# Patient Record
Sex: FEMALE | Race: WHITE | NOT HISPANIC OR LATINO | Employment: OTHER | URBAN - NONMETROPOLITAN AREA
[De-identification: names, ages, dates, MRNs, and addresses within clinical notes are randomized per-mention and may not be internally consistent; named-entity substitution may affect disease eponyms.]

---

## 2017-01-25 ENCOUNTER — TRANSCRIBE ORDERS (OUTPATIENT)
Dept: ADMINISTRATIVE | Facility: HOSPITAL | Age: 62
End: 2017-01-25

## 2017-01-25 ENCOUNTER — APPOINTMENT (OUTPATIENT)
Dept: LAB | Facility: HOSPITAL | Age: 62
End: 2017-01-25
Attending: FAMILY MEDICINE

## 2017-01-25 DIAGNOSIS — I10 ESSENTIAL HYPERTENSION, MALIGNANT: ICD-10-CM

## 2017-01-25 DIAGNOSIS — E11.9 DIABETES MELLITUS WITHOUT COMPLICATION (HCC): Primary | ICD-10-CM

## 2017-01-25 LAB
ANION GAP SERPL CALCULATED.3IONS-SCNC: 10 MMOL/L (ref 4–13)
BUN BLD-MCNC: 14 MG/DL (ref 5–21)
BUN/CREAT SERPL: 25.5 (ref 7–25)
CALCIUM SPEC-SCNC: 9 MG/DL (ref 8.4–10.4)
CHLORIDE SERPL-SCNC: 101 MMOL/L (ref 98–110)
CO2 SERPL-SCNC: 28 MMOL/L (ref 24–31)
CREAT BLD-MCNC: 0.55 MG/DL (ref 0.5–1.4)
GFR SERPL CREATININE-BSD FRML MDRD: 113 ML/MIN/1.73
GLUCOSE BLD-MCNC: 118 MG/DL (ref 70–100)
HBA1C MFR BLD: 8.1 %
POTASSIUM BLD-SCNC: 4 MMOL/L (ref 3.5–5.3)
SODIUM BLD-SCNC: 139 MMOL/L (ref 135–145)

## 2017-01-25 PROCEDURE — 80048 BASIC METABOLIC PNL TOTAL CA: CPT | Performed by: FAMILY MEDICINE

## 2017-01-25 PROCEDURE — 83036 HEMOGLOBIN GLYCOSYLATED A1C: CPT | Performed by: FAMILY MEDICINE

## 2017-01-25 PROCEDURE — 36415 COLL VENOUS BLD VENIPUNCTURE: CPT | Performed by: FAMILY MEDICINE

## 2017-02-24 ENCOUNTER — LAB REQUISITION (OUTPATIENT)
Dept: LAB | Facility: HOSPITAL | Age: 62
End: 2017-02-24

## 2017-02-24 ENCOUNTER — HOSPITAL ENCOUNTER (OUTPATIENT)
Dept: GENERAL RADIOLOGY | Facility: HOSPITAL | Age: 62
Discharge: HOME | End: 2017-02-24
Attending: FAMILY MEDICINE | Admitting: FAMILY MEDICINE

## 2017-02-24 ENCOUNTER — TRANSCRIBE ORDERS (OUTPATIENT)
Dept: ADMINISTRATIVE | Facility: HOSPITAL | Age: 62
End: 2017-02-24

## 2017-02-24 DIAGNOSIS — R10.9 ABDOMINAL PAIN, UNSPECIFIED LOCATION: Primary | ICD-10-CM

## 2017-02-24 DIAGNOSIS — R10.9 ABDOMINAL PAIN: ICD-10-CM

## 2017-02-24 DIAGNOSIS — R10.9 ABDOMINAL PAIN, UNSPECIFIED LOCATION: ICD-10-CM

## 2017-02-24 PROCEDURE — 87086 URINE CULTURE/COLONY COUNT: CPT | Performed by: FAMILY MEDICINE

## 2017-02-24 PROCEDURE — 87088 URINE BACTERIA CULTURE: CPT | Performed by: FAMILY MEDICINE

## 2017-02-24 PROCEDURE — 74020 HC XR ABDOMEN FLAT & UPRIGHT: CPT

## 2017-02-24 PROCEDURE — 87186 SC STD MICRODIL/AGAR DIL: CPT | Performed by: FAMILY MEDICINE

## 2017-02-26 LAB — BACTERIA SPEC AEROBE CULT: ABNORMAL

## 2017-03-07 ENCOUNTER — HOSPITAL ENCOUNTER (OUTPATIENT)
Dept: GENERAL RADIOLOGY | Facility: HOSPITAL | Age: 62
Discharge: HOME | End: 2017-03-07
Attending: FAMILY MEDICINE | Admitting: FAMILY MEDICINE

## 2017-03-07 ENCOUNTER — TRANSCRIBE ORDERS (OUTPATIENT)
Dept: ADMINISTRATIVE | Facility: HOSPITAL | Age: 62
End: 2017-03-07

## 2017-03-07 DIAGNOSIS — J18.9 PNEUMONIA DUE TO INFECTIOUS ORGANISM, UNSPECIFIED LATERALITY, UNSPECIFIED PART OF LUNG: Primary | ICD-10-CM

## 2017-03-07 DIAGNOSIS — J18.9 PNEUMONIA DUE TO INFECTIOUS ORGANISM, UNSPECIFIED LATERALITY, UNSPECIFIED PART OF LUNG: ICD-10-CM

## 2017-03-07 PROCEDURE — 71020 HC CHEST PA AND LATERAL: CPT

## 2017-03-17 ENCOUNTER — APPOINTMENT (OUTPATIENT)
Dept: CARDIOLOGY | Facility: HOSPITAL | Age: 62
End: 2017-03-17
Attending: EMERGENCY MEDICINE

## 2017-03-17 ENCOUNTER — APPOINTMENT (OUTPATIENT)
Dept: CT IMAGING | Facility: HOSPITAL | Age: 62
End: 2017-03-17

## 2017-03-17 ENCOUNTER — HOSPITAL ENCOUNTER (EMERGENCY)
Facility: HOSPITAL | Age: 62
Discharge: HOME | End: 2017-03-17
Attending: EMERGENCY MEDICINE | Admitting: EMERGENCY MEDICINE

## 2017-03-17 ENCOUNTER — APPOINTMENT (OUTPATIENT)
Dept: GENERAL RADIOLOGY | Facility: HOSPITAL | Age: 62
End: 2017-03-17

## 2017-03-17 VITALS
RESPIRATION RATE: 12 BRPM | TEMPERATURE: 97.7 F | WEIGHT: 207 LBS | DIASTOLIC BLOOD PRESSURE: 75 MMHG | HEIGHT: 64 IN | HEART RATE: 72 BPM | BODY MASS INDEX: 35.34 KG/M2 | OXYGEN SATURATION: 94 % | SYSTOLIC BLOOD PRESSURE: 132 MMHG

## 2017-03-17 DIAGNOSIS — R07.89 CHEST WALL PAIN: Primary | ICD-10-CM

## 2017-03-17 LAB
ALBUMIN SERPL-MCNC: 3.6 G/DL (ref 3.5–5)
ALBUMIN/GLOB SERPL: 1.3 G/DL (ref 1.1–2.5)
ALP SERPL-CCNC: 73 U/L (ref 24–120)
ALT SERPL W P-5'-P-CCNC: 20 U/L (ref 0–54)
ANION GAP SERPL CALCULATED.3IONS-SCNC: 10 MMOL/L (ref 4–13)
AST SERPL-CCNC: 24 U/L (ref 7–45)
BASOPHILS # BLD AUTO: 0.02 10*3/MM3 (ref 0–0.2)
BASOPHILS NFR BLD AUTO: 0.1 % (ref 0–2)
BH CV STRESS BP STAGE 1: NORMAL
BH CV STRESS BP STAGE 2: NORMAL
BH CV STRESS DOSE DOBUTAMINE STAGE 1: 10
BH CV STRESS DOSE DOBUTAMINE STAGE 2: 20
BH CV STRESS DOSE DOBUTAMINE STAGE 3: 30
BH CV STRESS DURATION MIN STAGE 1: 3
BH CV STRESS DURATION MIN STAGE 2: 3
BH CV STRESS DURATION MIN STAGE 3: 2
BH CV STRESS DURATION SEC STAGE 1: 0
BH CV STRESS DURATION SEC STAGE 2: 0
BH CV STRESS DURATION SEC STAGE 3: 0
BH CV STRESS HR STAGE 1: 85
BH CV STRESS HR STAGE 2: 117
BH CV STRESS HR STAGE 3: 153
BH CV STRESS PROTOCOL 1: NORMAL
BH CV STRESS RECOVERY BP: NORMAL MMHG
BH CV STRESS RECOVERY HR: 78 BPM
BH CV STRESS STAGE 1: 1
BH CV STRESS STAGE 2: 2
BH CV STRESS STAGE 3: 3
BILIRUB SERPL-MCNC: 0.9 MG/DL (ref 0.1–1)
BUN BLD-MCNC: 19 MG/DL (ref 5–21)
BUN/CREAT SERPL: 38.8 (ref 7–25)
CALCIUM SPEC-SCNC: 8.8 MG/DL (ref 8.4–10.4)
CHLORIDE SERPL-SCNC: 104 MMOL/L (ref 98–110)
CO2 SERPL-SCNC: 25 MMOL/L (ref 24–31)
CREAT BLD-MCNC: 0.49 MG/DL (ref 0.5–1.4)
D DIMER PPP FEU-MCNC: 1.44 MG/L (FEU) (ref 0–0.5)
DEPRECATED RDW RBC AUTO: 54.2 FL (ref 40–54)
EOSINOPHIL # BLD AUTO: 0.04 10*3/MM3 (ref 0–0.7)
EOSINOPHIL NFR BLD AUTO: 0.3 % (ref 0–4)
ERYTHROCYTE [DISTWIDTH] IN BLOOD BY AUTOMATED COUNT: 15.7 % (ref 12–15)
GFR SERPL CREATININE-BSD FRML MDRD: 129 ML/MIN/1.73
GLOBULIN UR ELPH-MCNC: 2.8 GM/DL
GLUCOSE BLD-MCNC: 231 MG/DL (ref 70–100)
HCT VFR BLD AUTO: 39.8 % (ref 37–47)
HGB BLD-MCNC: 12.8 G/DL (ref 12–16)
HOLD SPECIMEN: NORMAL
HOLD SPECIMEN: NORMAL
IMM GRANULOCYTES # BLD: 0.06 10*3/MM3 (ref 0–0.03)
IMM GRANULOCYTES NFR BLD: 0.4 % (ref 0–5)
LYMPHOCYTES # BLD AUTO: 1.27 10*3/MM3 (ref 0.72–4.86)
LYMPHOCYTES NFR BLD AUTO: 8.3 % (ref 15–45)
MAXIMAL PREDICTED HEART RATE: 160 BPM
MCH RBC QN AUTO: 30.6 PG (ref 28–32)
MCHC RBC AUTO-ENTMCNC: 32.2 G/DL (ref 33–36)
MCV RBC AUTO: 95.2 FL (ref 82–98)
MONOCYTES # BLD AUTO: 0.68 10*3/MM3 (ref 0.19–1.3)
MONOCYTES NFR BLD AUTO: 4.4 % (ref 4–12)
NEUTROPHILS # BLD AUTO: 13.26 10*3/MM3 (ref 1.87–8.4)
NEUTROPHILS NFR BLD AUTO: 86.5 % (ref 39–78)
NT-PROBNP SERPL-MCNC: 295 PG/ML (ref 0–900)
PERCENT MAX PREDICTED HR: 95.63 %
PLATELET # BLD AUTO: 193 10*3/MM3 (ref 130–400)
PMV BLD AUTO: 12 FL (ref 6–12)
POTASSIUM BLD-SCNC: 4 MMOL/L (ref 3.5–5.3)
PROT SERPL-MCNC: 6.4 G/DL (ref 6.3–8.7)
RBC # BLD AUTO: 4.18 10*6/MM3 (ref 4.2–5.4)
SODIUM BLD-SCNC: 139 MMOL/L (ref 135–145)
STRESS BASELINE BP: NORMAL MMHG
STRESS BASELINE HR: 70 BPM
STRESS PERCENT HR: 113 %
STRESS POST PEAK BP: NORMAL MMHG
STRESS POST PEAK HR: 153 BPM
STRESS TARGET HR: 136 BPM
TROPONIN I SERPL-MCNC: 0 NG/ML (ref 0–0.07)
WBC NRBC COR # BLD: 15.33 10*3/MM3 (ref 4.8–10.8)
WHOLE BLOOD HOLD SPECIMEN: NORMAL
WHOLE BLOOD HOLD SPECIMEN: NORMAL

## 2017-03-17 PROCEDURE — 96375 TX/PRO/DX INJ NEW DRUG ADDON: CPT

## 2017-03-17 PROCEDURE — 96374 THER/PROPH/DIAG INJ IV PUSH: CPT

## 2017-03-17 PROCEDURE — 93010 ELECTROCARDIOGRAM REPORT: CPT | Performed by: INTERNAL MEDICINE

## 2017-03-17 PROCEDURE — 93350 STRESS TTE ONLY: CPT | Performed by: INTERNAL MEDICINE

## 2017-03-17 PROCEDURE — 25010000003 DOBUTAMINE PER 250 MG: Performed by: INTERNAL MEDICINE

## 2017-03-17 PROCEDURE — 93352 ADMIN ECG CONTRAST AGENT: CPT | Performed by: INTERNAL MEDICINE

## 2017-03-17 PROCEDURE — 93017 CV STRESS TEST TRACING ONLY: CPT

## 2017-03-17 PROCEDURE — 25010000002 PERFLUTREN (DEFINITY) 8.476 MG IN SODIUM CHLORIDE 10 ML INJECTION: Performed by: INTERNAL MEDICINE

## 2017-03-17 PROCEDURE — 99284 EMERGENCY DEPT VISIT MOD MDM: CPT

## 2017-03-17 PROCEDURE — 0 IOPAMIDOL PER 1 ML: Performed by: EMERGENCY MEDICINE

## 2017-03-17 PROCEDURE — 80053 COMPREHEN METABOLIC PANEL: CPT | Performed by: EMERGENCY MEDICINE

## 2017-03-17 PROCEDURE — 93005 ELECTROCARDIOGRAM TRACING: CPT | Performed by: EMERGENCY MEDICINE

## 2017-03-17 PROCEDURE — 25010000002 ONDANSETRON PER 1 MG: Performed by: EMERGENCY MEDICINE

## 2017-03-17 PROCEDURE — 25010000002 MORPHINE PER 10 MG: Performed by: EMERGENCY MEDICINE

## 2017-03-17 PROCEDURE — 83880 ASSAY OF NATRIURETIC PEPTIDE: CPT | Performed by: EMERGENCY MEDICINE

## 2017-03-17 PROCEDURE — 84484 ASSAY OF TROPONIN QUANT: CPT

## 2017-03-17 PROCEDURE — 71275 CT ANGIOGRAPHY CHEST: CPT

## 2017-03-17 PROCEDURE — C8928 TTE W OR W/O FOL W/CON,STRES: HCPCS

## 2017-03-17 PROCEDURE — 85025 COMPLETE CBC W/AUTO DIFF WBC: CPT | Performed by: EMERGENCY MEDICINE

## 2017-03-17 PROCEDURE — 85379 FIBRIN DEGRADATION QUANT: CPT | Performed by: EMERGENCY MEDICINE

## 2017-03-17 PROCEDURE — 93018 CV STRESS TEST I&R ONLY: CPT | Performed by: INTERNAL MEDICINE

## 2017-03-17 PROCEDURE — 71010 HC CHEST PA OR AP: CPT

## 2017-03-17 RX ORDER — SODIUM CHLORIDE 0.9 % (FLUSH) 0.9 %
10 SYRINGE (ML) INJECTION AS NEEDED
Status: DISCONTINUED | OUTPATIENT
Start: 2017-03-17 | End: 2017-03-17 | Stop reason: HOSPADM

## 2017-03-17 RX ORDER — METOPROLOL TARTRATE 5 MG/5ML
5 INJECTION INTRAVENOUS ONCE
Status: COMPLETED | OUTPATIENT
Start: 2017-03-17 | End: 2017-03-17

## 2017-03-17 RX ORDER — HYDROCODONE BITARTRATE AND ACETAMINOPHEN 7.5; 325 MG/1; MG/1
1 TABLET ORAL EVERY 4 HOURS PRN
Qty: 15 TABLET | Refills: 0 | Status: SHIPPED | OUTPATIENT
Start: 2017-03-17 | End: 2017-03-30

## 2017-03-17 RX ORDER — ONDANSETRON 2 MG/ML
4 INJECTION INTRAMUSCULAR; INTRAVENOUS ONCE
Status: COMPLETED | OUTPATIENT
Start: 2017-03-17 | End: 2017-03-17

## 2017-03-17 RX ORDER — LOSARTAN POTASSIUM 25 MG/1
25 TABLET ORAL DAILY
COMMUNITY
End: 2017-09-28 | Stop reason: SDUPTHER

## 2017-03-17 RX ORDER — METHYLPREDNISOLONE 4 MG/1
TABLET ORAL
Qty: 21 TABLET | Refills: 0 | Status: SHIPPED | OUTPATIENT
Start: 2017-03-17 | End: 2017-03-30

## 2017-03-17 RX ORDER — NITROGLYCERIN 0.4 MG/1
0.4 TABLET SUBLINGUAL
COMMUNITY

## 2017-03-17 RX ORDER — MORPHINE SULFATE 4 MG/ML
4 INJECTION, SOLUTION INTRAMUSCULAR; INTRAVENOUS ONCE
Status: COMPLETED | OUTPATIENT
Start: 2017-03-17 | End: 2017-03-17

## 2017-03-17 RX ORDER — SIMVASTATIN 40 MG
40 TABLET ORAL NIGHTLY
COMMUNITY
End: 2017-12-21 | Stop reason: SINTOL

## 2017-03-17 RX ORDER — DOBUTAMINE HYDROCHLORIDE 100 MG/100ML
10-50 INJECTION INTRAVENOUS
Status: DISCONTINUED | OUTPATIENT
Start: 2017-03-17 | End: 2017-03-17 | Stop reason: HOSPADM

## 2017-03-17 RX ADMIN — METOPROLOL TARTRATE 5 MG: 5 INJECTION, SOLUTION INTRAVENOUS at 16:55

## 2017-03-17 RX ADMIN — DOBUTAMINE IN DEXTROSE 10 MCG/KG/MIN: 100 INJECTION, SOLUTION INTRAVENOUS at 16:42

## 2017-03-17 RX ADMIN — MORPHINE SULFATE 4 MG: 4 INJECTION, SOLUTION INTRAMUSCULAR; INTRAVENOUS at 15:29

## 2017-03-17 RX ADMIN — SODIUM CHLORIDE 5 ML: 9 INJECTION INTRAMUSCULAR; INTRAVENOUS; SUBCUTANEOUS at 16:47

## 2017-03-17 RX ADMIN — SODIUM CHLORIDE 5 ML: 9 INJECTION INTRAMUSCULAR; INTRAVENOUS; SUBCUTANEOUS at 16:31

## 2017-03-17 RX ADMIN — ONDANSETRON HYDROCHLORIDE 4 MG: 2 SOLUTION INTRAMUSCULAR; INTRAVENOUS at 15:30

## 2017-03-17 RX ADMIN — IOPAMIDOL 150 ML: 755 INJECTION, SOLUTION INTRAVENOUS at 14:37

## 2017-03-19 ENCOUNTER — HOSPITAL ENCOUNTER (EMERGENCY)
Facility: HOSPITAL | Age: 62
Discharge: HOME | End: 2017-03-19
Admitting: EMERGENCY MEDICINE

## 2017-03-19 VITALS
OXYGEN SATURATION: 96 % | HEART RATE: 74 BPM | BODY MASS INDEX: 35.34 KG/M2 | DIASTOLIC BLOOD PRESSURE: 55 MMHG | WEIGHT: 207 LBS | HEIGHT: 64 IN | TEMPERATURE: 98 F | SYSTOLIC BLOOD PRESSURE: 127 MMHG | RESPIRATION RATE: 18 BRPM

## 2017-03-19 DIAGNOSIS — Z92.241 HISTORY OF SYSTEMIC STEROID THERAPY: ICD-10-CM

## 2017-03-19 DIAGNOSIS — I47.9 PAROXYSMAL TACHYCARDIA (HCC): Primary | ICD-10-CM

## 2017-03-19 LAB
ALBUMIN SERPL-MCNC: 3.6 G/DL (ref 3.5–5)
ALBUMIN/GLOB SERPL: 1.3 G/DL (ref 1.1–2.5)
ALP SERPL-CCNC: 77 U/L (ref 24–120)
ALT SERPL W P-5'-P-CCNC: 27 U/L (ref 0–54)
ANION GAP SERPL CALCULATED.3IONS-SCNC: 12 MMOL/L (ref 4–13)
AST SERPL-CCNC: 17 U/L (ref 7–45)
BASOPHILS # BLD AUTO: 0.01 10*3/MM3 (ref 0–0.2)
BASOPHILS NFR BLD AUTO: 0.1 % (ref 0–2)
BILIRUB SERPL-MCNC: 0.7 MG/DL (ref 0.1–1)
BUN BLD-MCNC: 16 MG/DL (ref 5–21)
BUN/CREAT SERPL: 27.6 (ref 7–25)
CALCIUM SPEC-SCNC: 9.1 MG/DL (ref 8.4–10.4)
CHLORIDE SERPL-SCNC: 105 MMOL/L (ref 98–110)
CO2 SERPL-SCNC: 26 MMOL/L (ref 24–31)
CREAT BLD-MCNC: 0.58 MG/DL (ref 0.5–1.4)
DEPRECATED RDW RBC AUTO: 52.3 FL (ref 40–54)
EOSINOPHIL # BLD AUTO: 0 10*3/MM3 (ref 0–0.7)
EOSINOPHIL NFR BLD AUTO: 0 % (ref 0–4)
ERYTHROCYTE [DISTWIDTH] IN BLOOD BY AUTOMATED COUNT: 15.4 % (ref 12–15)
GFR SERPL CREATININE-BSD FRML MDRD: 106 ML/MIN/1.73
GLOBULIN UR ELPH-MCNC: 2.7 GM/DL
GLUCOSE BLD-MCNC: 265 MG/DL (ref 70–100)
HCT VFR BLD AUTO: 37.3 % (ref 37–47)
HGB BLD-MCNC: 12.3 G/DL (ref 12–16)
IMM GRANULOCYTES # BLD: 0.06 10*3/MM3 (ref 0–0.03)
IMM GRANULOCYTES NFR BLD: 0.5 % (ref 0–5)
LYMPHOCYTES # BLD AUTO: 1.06 10*3/MM3 (ref 0.72–4.86)
LYMPHOCYTES NFR BLD AUTO: 8.4 % (ref 15–45)
MCH RBC QN AUTO: 30.9 PG (ref 28–32)
MCHC RBC AUTO-ENTMCNC: 33 G/DL (ref 33–36)
MCV RBC AUTO: 93.7 FL (ref 82–98)
MONOCYTES # BLD AUTO: 0.64 10*3/MM3 (ref 0.19–1.3)
MONOCYTES NFR BLD AUTO: 5.1 % (ref 4–12)
NEUTROPHILS # BLD AUTO: 10.82 10*3/MM3 (ref 1.87–8.4)
NEUTROPHILS NFR BLD AUTO: 85.9 % (ref 39–78)
PLATELET # BLD AUTO: 220 10*3/MM3 (ref 130–400)
PMV BLD AUTO: 11.3 FL (ref 6–12)
POTASSIUM BLD-SCNC: 4.2 MMOL/L (ref 3.5–5.3)
PROT SERPL-MCNC: 6.3 G/DL (ref 6.3–8.7)
RBC # BLD AUTO: 3.98 10*6/MM3 (ref 4.2–5.4)
SODIUM BLD-SCNC: 143 MMOL/L (ref 135–145)
WBC NRBC COR # BLD: 12.59 10*3/MM3 (ref 4.8–10.8)

## 2017-03-19 PROCEDURE — 85025 COMPLETE CBC W/AUTO DIFF WBC: CPT | Performed by: PHYSICIAN ASSISTANT

## 2017-03-19 PROCEDURE — 80053 COMPREHEN METABOLIC PANEL: CPT | Performed by: PHYSICIAN ASSISTANT

## 2017-03-19 PROCEDURE — 93010 ELECTROCARDIOGRAM REPORT: CPT | Performed by: INTERNAL MEDICINE

## 2017-03-19 PROCEDURE — 99283 EMERGENCY DEPT VISIT LOW MDM: CPT

## 2017-03-19 PROCEDURE — 93005 ELECTROCARDIOGRAM TRACING: CPT

## 2017-03-19 RX ORDER — SODIUM CHLORIDE 0.9 % (FLUSH) 0.9 %
10 SYRINGE (ML) INJECTION AS NEEDED
Status: DISCONTINUED | OUTPATIENT
Start: 2017-03-19 | End: 2017-03-20 | Stop reason: HOSPADM

## 2017-03-20 NOTE — ED PROVIDER NOTES
Subjective   HPI Comments: The patient has a history of atrial flutter.  She has been doing well on her current therapy.  She was here in ED 2 days ago for chest pain.  She had a negative stress and was told that her pain was from pleurisy.  She was given a steroid pack.  She started taking it yesterday.  She states that about 2:30 she had a 5 mintue run of feeling her heart beat fast.  She took it at 160 at the highest.  Then, she had a second episode of 5 minute fast heart rate at 116 at highest that is again now resolved.    Patient is a 61 y.o. female presenting with palpitations.   History provided by:  Patient   used: No    Palpitations   Palpitations quality:  Fast  Onset quality:  At rest  Duration:  5 minutes  Timing:  Sporadic  Progression:  Resolved  Chronicity:  Recurrent  Context: not anxiety, not appetite suppressants, not blood loss, not bronchodilators, not dehydration and not exercise    Relieved by:  Nothing  Worsened by:  Nothing  Associated symptoms: no back pain, no chest pain, no chest pressure, no cough, no diaphoresis, no dizziness, no hemoptysis, no leg pain, no lower extremity edema, no malaise/fatigue, no nausea, no near-syncope, no numbness, no orthopnea, no PND, no shortness of breath, no syncope, no vomiting and no weakness        Review of Systems   Constitutional: Negative for diaphoresis and malaise/fatigue.   HENT: Negative.    Eyes: Negative.    Respiratory: Negative for cough, hemoptysis and shortness of breath.    Cardiovascular: Positive for palpitations. Negative for chest pain, orthopnea, syncope, PND and near-syncope.   Gastrointestinal: Negative for nausea and vomiting.   Endocrine: Negative.    Genitourinary: Negative.    Musculoskeletal: Negative for back pain.   Skin: Negative.    Allergic/Immunologic: Negative.    Neurological: Negative for dizziness, weakness and numbness.   Hematological: Negative.    Psychiatric/Behavioral: Negative.        Past  Medical History   Diagnosis Date   • Abnormal stress test    • Atrial flutter    • CAD (coronary artery disease)    • CAD in native artery      CABG x 3   • Diabetes mellitus    • Essential hypertension      Tricuspid valve insufficiency, unspecified etiology    • Hyperlipemia, mixed    • Hyperlipidemia    • Hypertension    • MI, old    • Mitral valve prolapse    • Near syncope    • Obesity, morbid    • Palpitations    • Paroxysmal SVT (supraventricular tachycardia)    • Paroxysmal SVT (supraventricular tachycardia)    • Pedal edema    • Precordial pain    • Rheumatoid arthritis    • Rheumatoid arthritis    • S/P CABG x 3        Allergies   Allergen Reactions   • Albuterol      High heart rate   • Codeine      Chest pain   • Sulfa Antibiotics Hives       Past Surgical History   Procedure Laterality Date   • Cardiac catheterization Left 06/18/2012     Intensify medical therapy   • Coronary stent placement  09/2009     X1 - Stent to LAD, Drug Eluting   • Replacement total knee Right    • Gallbladder surgery     • Appendectomy     • Coronary artery bypass graft  05/05/2002     LIMA to LAD, SVG to D1, SVG to OM1 - x3   • Cardiac catheterization Left 05/05/2002     surgery   • Cholecystectomy         Family History   Problem Relation Age of Onset   • Cancer Father    • Coronary artery disease Father    • Coronary artery disease Brother        Social History     Social History   • Marital status:      Spouse name: N/A   • Number of children: N/A   • Years of education: N/A     Social History Main Topics   • Smoking status: Former Smoker   • Smokeless tobacco: None   • Alcohol use No   • Drug use: No   • Sexual activity: Defer     Other Topics Concern   • None     Social History Narrative           Objective   Physical Exam   Constitutional: She is oriented to person, place, and time. She appears well-developed and well-nourished. No distress.   HENT:   Head: Normocephalic and atraumatic.   Eyes: EOM are normal.  Pupils are equal, round, and reactive to light.   Neck: Normal range of motion.   Cardiovascular: Normal rate, regular rhythm and normal heart sounds.  Exam reveals no friction rub.    No murmur heard.  Pulmonary/Chest: Effort normal and breath sounds normal. No respiratory distress. She has no wheezes.   Musculoskeletal: Normal range of motion. She exhibits no edema or deformity.   Neurological: She is alert and oriented to person, place, and time.   Skin: Skin is warm and dry. No rash noted. She is not diaphoretic. No erythema.   Psychiatric: She has a normal mood and affect. Her behavior is normal.   Nursing note and vitals reviewed.      Procedures         ED Course  ED Course                  MDM  Number of Diagnoses or Management Options  History of systemic steroid therapy:   Paroxysmal tachycardia:   Diagnosis management comments: Discussed case with Dr. Rico.  Patient has had normal rate and rhythm since arrival here.  Will DC steroid pack and discharge patient home.      Final diagnoses:   Paroxysmal tachycardia   History of systemic steroid therapy            Ricardo Bennett PA-C  03/19/17 1989

## 2017-03-30 ENCOUNTER — OFFICE VISIT (OUTPATIENT)
Dept: CARDIOLOGY | Facility: CLINIC | Age: 62
End: 2017-03-30

## 2017-03-30 VITALS
HEART RATE: 70 BPM | DIASTOLIC BLOOD PRESSURE: 64 MMHG | BODY MASS INDEX: 35.34 KG/M2 | WEIGHT: 207 LBS | HEIGHT: 64 IN | SYSTOLIC BLOOD PRESSURE: 134 MMHG

## 2017-03-30 DIAGNOSIS — E78.2 MIXED HYPERLIPIDEMIA: ICD-10-CM

## 2017-03-30 DIAGNOSIS — I48.3 TYPICAL ATRIAL FLUTTER (HCC): ICD-10-CM

## 2017-03-30 DIAGNOSIS — E11.9 TYPE 2 DIABETES MELLITUS WITHOUT COMPLICATION, WITHOUT LONG-TERM CURRENT USE OF INSULIN (HCC): ICD-10-CM

## 2017-03-30 DIAGNOSIS — I25.10 CORONARY ARTERY DISEASE INVOLVING NATIVE CORONARY ARTERY OF NATIVE HEART WITHOUT ANGINA PECTORIS: Primary | ICD-10-CM

## 2017-03-30 PROCEDURE — 99214 OFFICE O/P EST MOD 30 MIN: CPT | Performed by: INTERNAL MEDICINE

## 2017-03-30 PROCEDURE — 93000 ELECTROCARDIOGRAM COMPLETE: CPT | Performed by: INTERNAL MEDICINE

## 2017-03-30 NOTE — PROGRESS NOTES
Teresa Serna  [id number]  1955  61 y.o.  female    Referring Provider: Teresa Contreras MD    Reason for Follow-up Visit: PAF  Subjective .   Feeling better  Recent atypical chest pain with normal stress test  Occasional palpitations but much imrpoved    History of present illness:  Teresa Serna is a 61 y.o. yo female with history of PAF who presents today for   Chief Complaint   Patient presents with   • Rapid Heart Rate     6 month f/u    • Edema     ankles    .    History  Past Medical History:   Diagnosis Date   • Abnormal stress test    • Atrial flutter    • CAD (coronary artery disease)    • CAD in native artery     CABG x 3   • Diabetes mellitus    • Essential hypertension     Tricuspid valve insufficiency, unspecified etiology    • Hyperlipemia, mixed    • Hyperlipidemia    • Hypertension    • MI, old    • Mitral valve prolapse    • Near syncope    • Obesity, morbid    • Palpitations    • Paroxysmal SVT (supraventricular tachycardia)    • Paroxysmal SVT (supraventricular tachycardia)    • Pedal edema    • Precordial pain    • Rheumatoid arthritis    • Rheumatoid arthritis    • S/P CABG x 3    ,   Past Surgical History:   Procedure Laterality Date   • APPENDECTOMY     • CARDIAC CATHETERIZATION Left 06/18/2012    Intensify medical therapy   • CARDIAC CATHETERIZATION Left 05/05/2002    surgery   • CHOLECYSTECTOMY     • CORONARY ARTERY BYPASS GRAFT  05/05/2002    LIMA to LAD, SVG to D1, SVG to OM1 - x3   • CORONARY STENT PLACEMENT  09/2009    X1 - Stent to LAD, Drug Eluting   • GALLBLADDER SURGERY     • REPLACEMENT TOTAL KNEE Right    ,   Family History   Problem Relation Age of Onset   • Cancer Father    • Coronary artery disease Father    • Coronary artery disease Brother    ,   Social History   Substance Use Topics   • Smoking status: Former Smoker   • Smokeless tobacco: Never Used   • Alcohol use No   ,     Medications  Current Outpatient Prescriptions   Medication Sig Dispense Refill   • aspirin  81 MG EC tablet Take 81 mg by mouth Daily.     • dronedarone (MULTAQ) 400 MG tablet Take 1 tablet by mouth 2 (Two) Times a Day With Meals. 60 tablet 11   • etanercept (ENBREL) 50 MG/ML solution prefilled syringe injection Inject 50 mg under the skin 1 (One) Time Per Week.     • folic acid (FOLVITE) 1 MG tablet Take 1 mg by mouth Daily.     • furosemide (LASIX) 20 MG tablet Take 20 mg by mouth Daily.     • glyBURIDE (DIAbeta) 5 MG tablet Take 5 mg by mouth Daily With Breakfast.     • isosorbide mononitrate (IMDUR) 30 MG 24 hr tablet Take 30 mg by mouth Daily.     • losartan (COZAAR) 25 MG tablet Take 25 mg by mouth Daily.     • metFORMIN (GLUCOPHAGE) 1000 MG tablet Take 1,000 mg by mouth Daily With Breakfast.     • methotrexate 2.5 MG tablet Take 25 mg by mouth 1 (One) Time Per Week. 10 TABLETS Q WEEK patient takes on Saturday but didn't have previous Saturday dose     • METOPROLOL TARTRATE PO Take 50 mg by mouth 2 (Two) Times a Day.     • nitroglycerin (NITROSTAT) 0.4 MG SL tablet Place 0.4 mg under the tongue Every 5 (Five) Minutes As Needed for Chest Pain. Take no more than 3 doses in 15 minutes.     • pantoprazole (PROTONIX) 40 MG EC tablet Take 40 mg by mouth 2 (Two) Times a Day.     • potassium chloride (K-DUR) 10 MEQ CR tablet Take 10 mEq by mouth Daily.     • predniSONE (DELTASONE) 1 MG tablet Take 4 mg by mouth Every Night.     • predniSONE (DELTASONE) 5 MG tablet Take 5 mg by mouth Every Morning.     • rivaroxaban (XARELTO) 20 MG tablet Take 1 tablet by mouth Daily. 30 tablet 11   • simvastatin (ZOCOR) 40 MG tablet Take 40 mg by mouth Every Night.       No current facility-administered medications for this visit.        Allergies:  Albuterol; Codeine; and Sulfa antibiotics    Review of Systems  Review of Systems   Constitution: Positive for weakness.   HENT: Negative.    Eyes: Negative.    Cardiovascular: Negative for chest pain, claudication, cyanosis, dyspnea on exertion, irregular heartbeat, leg  "swelling, near-syncope, orthopnea, palpitations, paroxysmal nocturnal dyspnea and syncope.   Respiratory: Negative.    Endocrine: Negative.    Hematologic/Lymphatic: Negative.    Skin: Negative.    Gastrointestinal: Negative for anorexia.   Genitourinary: Negative.    Psychiatric/Behavioral: Negative.        Objective     Physical Exam:  /64 (BP Location: Left arm, Patient Position: Sitting, Cuff Size: Adult)  Pulse 70  Ht 64\" (162.6 cm)  Wt 207 lb (93.9 kg)  BMI 35.53 kg/m2  Physical Exam   Constitutional: She appears well-developed.   HENT:   Head: Normocephalic.   Neck: Normal carotid pulses and no JVD present. No tracheal tenderness present. Carotid bruit is not present. No tracheal deviation and no edema present.   Cardiovascular: Regular rhythm, normal heart sounds and normal pulses.    Pulmonary/Chest: Effort normal. No stridor.   Abdominal: Soft.   Neurological: She is alert. She has normal strength. No cranial nerve deficit or sensory deficit.   Skin: Skin is warm.   Psychiatric: She has a normal mood and affect. Her speech is normal and behavior is normal.       Results Review:       ECG 12 Lead  Date/Time: 3/30/2017 9:34 AM  Performed by: RADHA PRINCE  Authorized by: RADHA PRINCE   Comparison: compared with previous ECG from 3/19/2017  Comparison to previous ECG: QTc normalized  Rhythm: sinus rhythm  Rate: normal  QRS axis: normal  Clinical impression: normal ECG            Assessment/Plan   Patient Active Problem List   Diagnosis   • CAD (coronary artery disease)   • Atrial flutter   • Type 2 diabetes mellitus without complication, without long-term current use of insulin   • Mixed hyperlipidemia       Stable doing well. No chest pain or excessive dyspnea. No palpitations. No significant pedal edema. Compliant with medications and diet. Latest labs and medications reviewed. Saw Dr De Anda form EP and was told to continue same treatment.    Plan:    Doing much better on Multaq  Saw Dr" De Anda  Keep follow up with me in 6 months  Close follow up with you as scheduled.  Intensive factor modifications.  See order list.   No additional cardiac testing required at this point in time.      Return in about 6 months (around 9/30/2017).

## 2017-04-26 ENCOUNTER — TRANSCRIBE ORDERS (OUTPATIENT)
Dept: ADMINISTRATIVE | Facility: HOSPITAL | Age: 62
End: 2017-04-26

## 2017-04-26 ENCOUNTER — LAB (OUTPATIENT)
Dept: LAB | Facility: HOSPITAL | Age: 62
End: 2017-04-26
Attending: FAMILY MEDICINE

## 2017-04-26 DIAGNOSIS — E11.9 DIABETES MELLITUS WITHOUT COMPLICATION (HCC): ICD-10-CM

## 2017-04-26 DIAGNOSIS — D72.829 LEUKOCYTOSIS, UNSPECIFIED TYPE: Primary | ICD-10-CM

## 2017-04-26 DIAGNOSIS — E78.5 HYPERLIPIDEMIA, UNSPECIFIED HYPERLIPIDEMIA TYPE: ICD-10-CM

## 2017-04-26 DIAGNOSIS — D72.829 LEUKOCYTOSIS, UNSPECIFIED TYPE: ICD-10-CM

## 2017-04-26 LAB
ALBUMIN SERPL-MCNC: 4 G/DL (ref 3.5–5)
ALBUMIN/GLOB SERPL: 1.4 G/DL (ref 1.1–2.5)
ALP SERPL-CCNC: 82 U/L (ref 24–120)
ALT SERPL W P-5'-P-CCNC: 41 U/L (ref 0–54)
ANION GAP SERPL CALCULATED.3IONS-SCNC: 11 MMOL/L (ref 4–13)
ARTICHOKE IGE QN: 38 MG/DL (ref 0–99)
AST SERPL-CCNC: 30 U/L (ref 7–45)
BILIRUB SERPL-MCNC: 0.9 MG/DL (ref 0.1–1)
BUN BLD-MCNC: 14 MG/DL (ref 5–21)
BUN/CREAT SERPL: 23 (ref 7–25)
CALCIUM SPEC-SCNC: 9.2 MG/DL (ref 8.4–10.4)
CHLORIDE SERPL-SCNC: 100 MMOL/L (ref 98–110)
CHOLEST SERPL-MCNC: 117 MG/DL (ref 130–200)
CO2 SERPL-SCNC: 30 MMOL/L (ref 24–31)
CREAT BLD-MCNC: 0.61 MG/DL (ref 0.5–1.4)
DEPRECATED RDW RBC AUTO: 54.2 FL (ref 40–54)
ERYTHROCYTE [DISTWIDTH] IN BLOOD BY AUTOMATED COUNT: 16 % (ref 12–15)
GFR SERPL CREATININE-BSD FRML MDRD: 100 ML/MIN/1.73
GLOBULIN UR ELPH-MCNC: 2.8 GM/DL
GLUCOSE BLD-MCNC: 130 MG/DL (ref 70–100)
HBA1C MFR BLD: 8.1 %
HCT VFR BLD AUTO: 40.5 % (ref 37–47)
HDLC SERPL-MCNC: 65 MG/DL
HGB BLD-MCNC: 13 G/DL (ref 12–16)
LDLC/HDLC SERPL: 0.49 {RATIO}
MCH RBC QN AUTO: 30.3 PG (ref 28–32)
MCHC RBC AUTO-ENTMCNC: 32.1 G/DL (ref 33–36)
MCV RBC AUTO: 94.4 FL (ref 82–98)
PLATELET # BLD AUTO: 218 10*3/MM3 (ref 130–400)
PMV BLD AUTO: 10.6 FL (ref 6–12)
POTASSIUM BLD-SCNC: 4.4 MMOL/L (ref 3.5–5.3)
PROT SERPL-MCNC: 6.8 G/DL (ref 6.3–8.7)
RBC # BLD AUTO: 4.29 10*6/MM3 (ref 4.2–5.4)
SODIUM BLD-SCNC: 141 MMOL/L (ref 135–145)
TRIGL SERPL-MCNC: 101 MG/DL (ref 0–149)
WBC NRBC COR # BLD: 10.16 10*3/MM3 (ref 4.8–10.8)

## 2017-04-26 PROCEDURE — 80053 COMPREHEN METABOLIC PANEL: CPT | Performed by: FAMILY MEDICINE

## 2017-04-26 PROCEDURE — 80061 LIPID PANEL: CPT | Performed by: FAMILY MEDICINE

## 2017-04-26 PROCEDURE — 83036 HEMOGLOBIN GLYCOSYLATED A1C: CPT | Performed by: FAMILY MEDICINE

## 2017-04-26 PROCEDURE — 85027 COMPLETE CBC AUTOMATED: CPT | Performed by: FAMILY MEDICINE

## 2017-04-26 PROCEDURE — 36415 COLL VENOUS BLD VENIPUNCTURE: CPT | Performed by: FAMILY MEDICINE

## 2017-04-26 PROCEDURE — 82570 ASSAY OF URINE CREATININE: CPT | Performed by: FAMILY MEDICINE

## 2017-04-26 PROCEDURE — 82043 UR ALBUMIN QUANTITATIVE: CPT | Performed by: FAMILY MEDICINE

## 2017-04-27 LAB
CREAT 24H UR-MCNC: 78.6 MG/DL
MICROALB/CRT. RATIO UR: 24.7 MG/G CREAT (ref 0–30)
MICROALBUMIN UR-MCNC: 19.4 UG/ML

## 2017-05-23 ENCOUNTER — TRANSCRIBE ORDERS (OUTPATIENT)
Dept: ADMINISTRATIVE | Facility: HOSPITAL | Age: 62
End: 2017-05-23

## 2017-05-23 ENCOUNTER — HOSPITAL ENCOUNTER (OUTPATIENT)
Dept: GENERAL RADIOLOGY | Facility: HOSPITAL | Age: 62
Discharge: HOME | End: 2017-05-23
Attending: FAMILY MEDICINE | Admitting: FAMILY MEDICINE

## 2017-05-23 DIAGNOSIS — M79.672 LEFT FOOT PAIN: Primary | ICD-10-CM

## 2017-05-23 DIAGNOSIS — M79.672 LEFT FOOT PAIN: ICD-10-CM

## 2017-05-23 PROCEDURE — 73630 X-RAY EXAM OF FOOT: CPT

## 2017-07-13 ENCOUNTER — TELEPHONE (OUTPATIENT)
Dept: URGENT CARE | Facility: CLINIC | Age: 62
End: 2017-07-13

## 2017-07-13 DIAGNOSIS — J01.10 ACUTE NON-RECURRENT FRONTAL SINUSITIS: Primary | ICD-10-CM

## 2017-07-13 DIAGNOSIS — J01.10 ACUTE FRONTAL SINUSITIS, RECURRENCE NOT SPECIFIED: ICD-10-CM

## 2017-07-13 RX ORDER — CEFDINIR 300 MG/1
300 CAPSULE ORAL 2 TIMES DAILY
Qty: 14 CAPSULE | Refills: 0 | Status: SHIPPED | OUTPATIENT
Start: 2017-07-13 | End: 2017-09-28

## 2017-07-13 NOTE — TELEPHONE ENCOUNTER
Pt called saying Augmentin hurts her stomach and if we could call in something different omniecf was called in.

## 2017-07-24 ENCOUNTER — TELEPHONE (OUTPATIENT)
Dept: FAMILY MEDICINE CLINIC | Age: 62
End: 2017-07-24

## 2017-07-24 RX ORDER — FUROSEMIDE 20 MG/1
20 TABLET ORAL DAILY
Qty: 60 TABLET | Refills: 3 | Status: SHIPPED | OUTPATIENT
Start: 2017-07-24 | End: 2018-04-24 | Stop reason: SDUPTHER

## 2017-07-24 RX ORDER — FUROSEMIDE 20 MG/1
20 TABLET ORAL DAILY PRN
COMMUNITY
End: 2017-10-31 | Stop reason: SDUPTHER

## 2017-08-18 RX ORDER — METFORMIN HYDROCHLORIDE 1000 MG/1
1000 TABLET, FILM COATED, EXTENDED RELEASE ORAL
Qty: 30 TABLET | Refills: 0 | Status: SHIPPED | OUTPATIENT
Start: 2017-08-18 | End: 2017-09-18 | Stop reason: SDUPTHER

## 2017-08-25 RX ORDER — SIMVASTATIN 40 MG
40 TABLET ORAL EVERY EVENING
Qty: 30 TABLET | Refills: 3 | Status: SHIPPED | OUTPATIENT
Start: 2017-08-25 | End: 2018-01-02 | Stop reason: SDUPTHER

## 2017-09-18 RX ORDER — METFORMIN HYDROCHLORIDE 1000 MG/1
1000 TABLET, FILM COATED, EXTENDED RELEASE ORAL
Qty: 90 TABLET | Refills: 1 | Status: SHIPPED | OUTPATIENT
Start: 2017-09-18 | End: 2018-01-23 | Stop reason: CLARIF

## 2017-09-21 RX ORDER — METOPROLOL TARTRATE 50 MG/1
TABLET, FILM COATED ORAL
Qty: 60 TABLET | Refills: 0 | Status: SHIPPED | OUTPATIENT
Start: 2017-09-21 | End: 2017-11-14 | Stop reason: SDUPTHER

## 2017-09-28 ENCOUNTER — OFFICE VISIT (OUTPATIENT)
Dept: CARDIOLOGY | Facility: CLINIC | Age: 62
End: 2017-09-28

## 2017-09-28 VITALS
WEIGHT: 206 LBS | DIASTOLIC BLOOD PRESSURE: 62 MMHG | HEIGHT: 64 IN | SYSTOLIC BLOOD PRESSURE: 122 MMHG | OXYGEN SATURATION: 98 % | RESPIRATION RATE: 18 BRPM | HEART RATE: 74 BPM | BODY MASS INDEX: 35.17 KG/M2

## 2017-09-28 DIAGNOSIS — I25.10 CORONARY ARTERY DISEASE INVOLVING NATIVE CORONARY ARTERY OF NATIVE HEART WITHOUT ANGINA PECTORIS: Primary | ICD-10-CM

## 2017-09-28 DIAGNOSIS — Z95.5 STENTED CORONARY ARTERY: ICD-10-CM

## 2017-09-28 DIAGNOSIS — E11.9 TYPE 2 DIABETES MELLITUS WITHOUT COMPLICATION, WITHOUT LONG-TERM CURRENT USE OF INSULIN (HCC): ICD-10-CM

## 2017-09-28 DIAGNOSIS — E78.2 MIXED HYPERLIPIDEMIA: ICD-10-CM

## 2017-09-28 DIAGNOSIS — Z95.1 S/P CABG X 3: ICD-10-CM

## 2017-09-28 DIAGNOSIS — I48.3 TYPICAL ATRIAL FLUTTER (HCC): ICD-10-CM

## 2017-09-28 PROCEDURE — 93000 ELECTROCARDIOGRAM COMPLETE: CPT | Performed by: INTERNAL MEDICINE

## 2017-09-28 PROCEDURE — 99214 OFFICE O/P EST MOD 30 MIN: CPT | Performed by: INTERNAL MEDICINE

## 2017-09-28 RX ORDER — LOSARTAN POTASSIUM 50 MG/1
50 TABLET ORAL DAILY
Qty: 90 TABLET | Refills: 3 | Status: SHIPPED | OUTPATIENT
Start: 2017-09-28 | End: 2017-10-24 | Stop reason: HOSPADM

## 2017-09-28 NOTE — PROGRESS NOTES
Teresa Serna  [id number]  1955  61 y.o.  female    Referring Provider: Teresa Contreras MD    Reason for Follow-up Visit: Paroxysmal atrial fibrillation     Subjective .   Overall feeling well   No chest pain   Moderate chronic shortness of breath   Occasional palpitations  Much better after starting Multaq  Compliant with medications  Decreased effort tolerance  Moderate fatigue    History of present illness:  Teresa Serna is a 61 y.o. yo female with history of Paroxysmal atrial fibrillation who presents today for   Chief Complaint   Patient presents with   • PAF     6 MONTH    .    History  Past Medical History:   Diagnosis Date   • Abnormal stress test    • Atrial flutter    • CAD (coronary artery disease)    • CAD in native artery     CABG x 3   • Diabetes mellitus    • Essential hypertension     Tricuspid valve insufficiency, unspecified etiology    • Hyperlipemia, mixed    • Hyperlipidemia    • Hypertension    • MI, old    • Mitral valve prolapse    • Near syncope    • Obesity, morbid    • Palpitations    • Paroxysmal SVT (supraventricular tachycardia)    • Paroxysmal SVT (supraventricular tachycardia)    • Pedal edema    • Precordial pain    • Rheumatoid arthritis    • Rheumatoid arthritis    • S/P CABG x 3    ,   Past Surgical History:   Procedure Laterality Date   • APPENDECTOMY     • CARDIAC CATHETERIZATION Left 06/18/2012    Intensify medical therapy   • CARDIAC CATHETERIZATION Left 05/05/2002    surgery   • CHOLECYSTECTOMY     • CORONARY ARTERY BYPASS GRAFT  05/05/2002    LIMA to LAD, SVG to D1, SVG to OM1 - x3   • CORONARY STENT PLACEMENT  09/2009    X1 - Stent to LAD, Drug Eluting   • GALLBLADDER SURGERY     • REPLACEMENT TOTAL KNEE Right    ,   Family History   Problem Relation Age of Onset   • Cancer Father    • Coronary artery disease Father    • Coronary artery disease Brother    ,   Social History   Substance Use Topics   • Smoking status: Former Smoker   • Smokeless tobacco: Never Used    • Alcohol use No   ,     Medications  Current Outpatient Prescriptions   Medication Sig Dispense Refill   • aspirin 81 MG EC tablet Take 81 mg by mouth Daily.     • dronedarone (MULTAQ) 400 MG tablet Take 1 tablet by mouth 2 (Two) Times a Day With Meals. 60 tablet 11   • etanercept (ENBREL) 50 MG/ML solution prefilled syringe injection Inject 50 mg under the skin 1 (One) Time Per Week.     • folic acid (FOLVITE) 1 MG tablet Take 1 mg by mouth Daily.     • furosemide (LASIX) 20 MG tablet Take 20 mg by mouth Daily.     • glyBURIDE (DIAbeta) 5 MG tablet Take 5 mg by mouth Daily With Breakfast.     • isosorbide mononitrate (IMDUR) 30 MG 24 hr tablet Take 30 mg by mouth Daily.     • losartan (COZAAR) 25 MG tablet Take 25 mg by mouth Daily.     • metFORMIN (GLUCOPHAGE) 1000 MG tablet Take 1,000 mg by mouth Daily With Breakfast.     • methotrexate 2.5 MG tablet Take 25 mg by mouth 1 (One) Time Per Week. 10 TABLETS Q WEEK patient takes on Saturday but didn't have previous Saturday dose     • METOPROLOL TARTRATE PO Take 50 mg by mouth 2 (Two) Times a Day.     • nitroglycerin (NITROSTAT) 0.4 MG SL tablet Place 0.4 mg under the tongue Every 5 (Five) Minutes As Needed for Chest Pain. Take no more than 3 doses in 15 minutes.     • pantoprazole (PROTONIX) 40 MG EC tablet Take 40 mg by mouth 2 (Two) Times a Day.     • potassium chloride (K-DUR) 10 MEQ CR tablet Take 10 mEq by mouth Daily.     • predniSONE (DELTASONE) 1 MG tablet Take 4 mg by mouth Every Night.     • predniSONE (DELTASONE) 5 MG tablet Take 5 mg by mouth Every Morning.     • rivaroxaban (XARELTO) 20 MG tablet Take 1 tablet by mouth Daily. 30 tablet 11   • simvastatin (ZOCOR) 40 MG tablet Take 40 mg by mouth Every Night.       No current facility-administered medications for this visit.        Allergies:  Albuterol; Codeine; and Sulfa antibiotics    Review of Systems  Review of Systems   Constitution: Positive for weakness and malaise/fatigue.   HENT: Negative.   "  Eyes: Negative.    Cardiovascular: Positive for dyspnea on exertion. Negative for chest pain, claudication, cyanosis, irregular heartbeat, leg swelling, near-syncope, orthopnea, palpitations, paroxysmal nocturnal dyspnea and syncope.   Respiratory: Negative.    Endocrine: Negative.    Hematologic/Lymphatic: Negative.    Skin: Negative.    Gastrointestinal: Negative for anorexia.   Genitourinary: Negative.    Psychiatric/Behavioral: Negative.        Objective     Physical Exam:  /62 (BP Location: Left arm, Patient Position: Sitting, Cuff Size: Adult)  Pulse 74  Resp 18  Ht 64\" (162.6 cm)  Wt 206 lb (93.4 kg)  SpO2 98%  BMI 35.36 kg/m2  Physical Exam   Constitutional: She appears well-developed.   HENT:   Head: Normocephalic.   Neck: Normal carotid pulses and no JVD present. No tracheal tenderness present. Carotid bruit is not present. No tracheal deviation and no edema present.   Cardiovascular: Regular rhythm and normal pulses.    Murmur heard.   Systolic murmur is present with a grade of 2/6   Pulmonary/Chest: Effort normal. No stridor. She has wheezes.   Abdominal: Soft.   Neurological: She is alert. She has normal strength. No cranial nerve deficit or sensory deficit.   Skin: Skin is warm.   Psychiatric: She has a normal mood and affect. Her speech is normal and behavior is normal.       Results Review:       ECG 12 Lead  Date/Time: 9/28/2017 11:15 AM  Performed by: RADHA PRINCE  Authorized by: RADHA PRINCE   Comparison: compared with previous ECG from 3/30/2017  Comparison to previous ECG: NON SPECIFIC STT CHANGES  Rhythm: sinus rhythm  Rate: normal  ST Segments: ST segments normal  QRS axis: normal  Clinical impression: abnormal ECG and non-specific ECG  Comments: Prolonged QT            Assessment/Plan   Patient Active Problem List   Diagnosis   • CAD (coronary artery disease)   • Atrial flutter   • Type 2 diabetes mellitus without complication, without long-term current use of insulin   • Mixed " hyperlipidemia   • S/P CABG x 3 2000 Dr Larsen   • Stented coronary artery     Low risk stress echo with normal LVEF by echo cardiogram.       Plan:    Due to fatigue decrease Lopressor from 50 BID to 25 mg BID  And increase Losartan from 25 to 50 mg daily  Close follow up with you as scheduled.  Intensive factor modifications.  See order list.    Counseled regarding disease appropriate diet, fluid, caffeine, stimulants and sodium intake as well as importance of compliance to diet, exercise and regular follow up.  Avoid NSAIDS and COX2 inhibitors. Use Acetaminophen PRN.  Monitor for any signs of bleeding including red or dark stools. Fall precautions.   Patient is asked to monitor BP at home or work, several times per month and return with written values at next office visit.   Keep LDL below 70 mg/dl. Monitor liver and renal functions.      Return in about 6 months (around 3/28/2018).

## 2017-10-22 ENCOUNTER — APPOINTMENT (OUTPATIENT)
Dept: GENERAL RADIOLOGY | Facility: HOSPITAL | Age: 62
End: 2017-10-22

## 2017-10-22 ENCOUNTER — HOSPITAL ENCOUNTER (INPATIENT)
Facility: HOSPITAL | Age: 62
LOS: 2 days | Discharge: HOME | End: 2017-10-24
Attending: EMERGENCY MEDICINE | Admitting: INTERNAL MEDICINE

## 2017-10-22 ENCOUNTER — APPOINTMENT (OUTPATIENT)
Dept: CT IMAGING | Facility: HOSPITAL | Age: 62
End: 2017-10-22

## 2017-10-22 DIAGNOSIS — M89.8X1 SHOULDER BLADE PAIN: Primary | ICD-10-CM

## 2017-10-22 DIAGNOSIS — D50.0 IRON DEFICIENCY ANEMIA DUE TO CHRONIC BLOOD LOSS: ICD-10-CM

## 2017-10-22 DIAGNOSIS — R19.5 FECAL OCCULT BLOOD TEST POSITIVE: ICD-10-CM

## 2017-10-22 DIAGNOSIS — R06.02 SOB (SHORTNESS OF BREATH): ICD-10-CM

## 2017-10-22 LAB
ALBUMIN SERPL-MCNC: 4.3 G/DL (ref 3.5–5)
ALBUMIN/GLOB SERPL: 1.4 G/DL (ref 1.1–2.5)
ALP SERPL-CCNC: 110 U/L (ref 24–120)
ALT SERPL W P-5'-P-CCNC: 51 U/L (ref 0–54)
ANION GAP SERPL CALCULATED.3IONS-SCNC: 12 MMOL/L (ref 4–13)
AST SERPL-CCNC: 46 U/L (ref 7–45)
BASOPHILS # BLD AUTO: 0.02 10*3/MM3 (ref 0–0.2)
BASOPHILS NFR BLD AUTO: 0.2 % (ref 0–2)
BILIRUB SERPL-MCNC: 0.8 MG/DL (ref 0.1–1)
BUN BLD-MCNC: 11 MG/DL (ref 5–21)
BUN/CREAT SERPL: 21.2 (ref 7–25)
CALCIUM SPEC-SCNC: 9.2 MG/DL (ref 8.4–10.4)
CHLORIDE SERPL-SCNC: 106 MMOL/L (ref 98–110)
CO2 SERPL-SCNC: 26 MMOL/L (ref 24–31)
CREAT BLD-MCNC: 0.52 MG/DL (ref 0.5–1.4)
DEPRECATED RDW RBC AUTO: 59.6 FL (ref 40–54)
DEVELOPER EXPIRATION DATE: ABNORMAL
DEVELOPER LOT NUMBER: 111
EOSINOPHIL # BLD AUTO: 0.07 10*3/MM3 (ref 0–0.7)
EOSINOPHIL NFR BLD AUTO: 0.7 % (ref 0–4)
ERYTHROCYTE [DISTWIDTH] IN BLOOD BY AUTOMATED COUNT: 20.1 % (ref 12–15)
EXPIRATION DATE: ABNORMAL
FECAL OCCULT BLOOD SCREEN, POC: POSITIVE
GFR SERPL CREATININE-BSD FRML MDRD: 120 ML/MIN/1.73
GLOBULIN UR ELPH-MCNC: 3 GM/DL
GLUCOSE BLD-MCNC: 120 MG/DL (ref 70–100)
GLUCOSE BLDC GLUCOMTR-MCNC: 164 MG/DL (ref 70–130)
GLUCOSE BLDC GLUCOMTR-MCNC: 173 MG/DL (ref 70–130)
HCT VFR BLD AUTO: 33.5 % (ref 37–47)
HGB BLD-MCNC: 9.7 G/DL (ref 12–16)
HOLD SPECIMEN: NORMAL
HOLD SPECIMEN: NORMAL
IMM GRANULOCYTES # BLD: 0.04 10*3/MM3 (ref 0–0.03)
IMM GRANULOCYTES NFR BLD: 0.4 % (ref 0–5)
LYMPHOCYTES # BLD AUTO: 2.58 10*3/MM3 (ref 0.72–4.86)
LYMPHOCYTES NFR BLD AUTO: 24.7 % (ref 15–45)
Lab: 111
MCH RBC QN AUTO: 24.1 PG (ref 28–32)
MCHC RBC AUTO-ENTMCNC: 29 G/DL (ref 33–36)
MCV RBC AUTO: 83.3 FL (ref 82–98)
MONOCYTES # BLD AUTO: 0.85 10*3/MM3 (ref 0.19–1.3)
MONOCYTES NFR BLD AUTO: 8.1 % (ref 4–12)
NEGATIVE CONTROL: NEGATIVE
NEUTROPHILS # BLD AUTO: 6.87 10*3/MM3 (ref 1.87–8.4)
NEUTROPHILS NFR BLD AUTO: 65.9 % (ref 39–78)
NT-PROBNP SERPL-MCNC: 517 PG/ML (ref 0–900)
PLATELET # BLD AUTO: 254 10*3/MM3 (ref 130–400)
PMV BLD AUTO: 10.4 FL (ref 6–12)
POSITIVE CONTROL: POSITIVE
POTASSIUM BLD-SCNC: 3.5 MMOL/L (ref 3.5–5.3)
PROT SERPL-MCNC: 7.3 G/DL (ref 6.3–8.7)
RBC # BLD AUTO: 4.02 10*6/MM3 (ref 4.2–5.4)
SODIUM BLD-SCNC: 144 MMOL/L (ref 135–145)
TROPONIN I SERPL-MCNC: <0.012 NG/ML (ref 0–0.03)
TROPONIN I SERPL-MCNC: <0.012 NG/ML (ref 0–0.03)
WBC NRBC COR # BLD: 10.43 10*3/MM3 (ref 4.8–10.8)
WHOLE BLOOD HOLD SPECIMEN: NORMAL
WHOLE BLOOD HOLD SPECIMEN: NORMAL

## 2017-10-22 PROCEDURE — 80053 COMPREHEN METABOLIC PANEL: CPT | Performed by: EMERGENCY MEDICINE

## 2017-10-22 PROCEDURE — 71275 CT ANGIOGRAPHY CHEST: CPT

## 2017-10-22 PROCEDURE — 84484 ASSAY OF TROPONIN QUANT: CPT | Performed by: INTERNAL MEDICINE

## 2017-10-22 PROCEDURE — 93005 ELECTROCARDIOGRAM TRACING: CPT | Performed by: EMERGENCY MEDICINE

## 2017-10-22 PROCEDURE — 71010 HC CHEST PA OR AP: CPT

## 2017-10-22 PROCEDURE — 0 IOPAMIDOL PER 1 ML: Performed by: EMERGENCY MEDICINE

## 2017-10-22 PROCEDURE — 25010000002 MORPHINE SULFATE (PF) 2 MG/ML SOLUTION: Performed by: INTERNAL MEDICINE

## 2017-10-22 PROCEDURE — 63710000001 INSULIN LISPRO (HUMAN) PER 5 UNITS: Performed by: INTERNAL MEDICINE

## 2017-10-22 PROCEDURE — 82962 GLUCOSE BLOOD TEST: CPT

## 2017-10-22 PROCEDURE — 82270 OCCULT BLOOD FECES: CPT | Performed by: EMERGENCY MEDICINE

## 2017-10-22 PROCEDURE — 85025 COMPLETE CBC W/AUTO DIFF WBC: CPT | Performed by: EMERGENCY MEDICINE

## 2017-10-22 PROCEDURE — 93010 ELECTROCARDIOGRAM REPORT: CPT | Performed by: INTERNAL MEDICINE

## 2017-10-22 PROCEDURE — G0378 HOSPITAL OBSERVATION PER HR: HCPCS

## 2017-10-22 PROCEDURE — 25010000002 MORPHINE PER 10 MG: Performed by: EMERGENCY MEDICINE

## 2017-10-22 PROCEDURE — 96374 THER/PROPH/DIAG INJ IV PUSH: CPT

## 2017-10-22 PROCEDURE — 96375 TX/PRO/DX INJ NEW DRUG ADDON: CPT

## 2017-10-22 PROCEDURE — 83880 ASSAY OF NATRIURETIC PEPTIDE: CPT | Performed by: EMERGENCY MEDICINE

## 2017-10-22 PROCEDURE — 25010000002 ONDANSETRON PER 1 MG: Performed by: EMERGENCY MEDICINE

## 2017-10-22 PROCEDURE — 84484 ASSAY OF TROPONIN QUANT: CPT | Performed by: EMERGENCY MEDICINE

## 2017-10-22 PROCEDURE — 99284 EMERGENCY DEPT VISIT MOD MDM: CPT

## 2017-10-22 RX ORDER — FOLIC ACID 1 MG/1
1 TABLET ORAL DAILY
Status: DISCONTINUED | OUTPATIENT
Start: 2017-10-22 | End: 2017-10-24 | Stop reason: HOSPADM

## 2017-10-22 RX ORDER — NALOXONE HCL 0.4 MG/ML
0.4 VIAL (ML) INJECTION
Status: DISCONTINUED | OUTPATIENT
Start: 2017-10-22 | End: 2017-10-24 | Stop reason: HOSPADM

## 2017-10-22 RX ORDER — SODIUM CHLORIDE 0.9 % (FLUSH) 0.9 %
10 SYRINGE (ML) INJECTION AS NEEDED
Status: DISCONTINUED | OUTPATIENT
Start: 2017-10-22 | End: 2017-10-24 | Stop reason: HOSPADM

## 2017-10-22 RX ORDER — ASPIRIN 81 MG/1
81 TABLET ORAL DAILY
Status: DISCONTINUED | OUTPATIENT
Start: 2017-10-22 | End: 2017-10-24 | Stop reason: HOSPADM

## 2017-10-22 RX ORDER — MORPHINE SULFATE 2 MG/ML
1 INJECTION, SOLUTION INTRAMUSCULAR; INTRAVENOUS EVERY 4 HOURS PRN
Status: DISCONTINUED | OUTPATIENT
Start: 2017-10-22 | End: 2017-10-24 | Stop reason: HOSPADM

## 2017-10-22 RX ORDER — LOSARTAN POTASSIUM 50 MG/1
50 TABLET ORAL DAILY
Status: DISCONTINUED | OUTPATIENT
Start: 2017-10-22 | End: 2017-10-22

## 2017-10-22 RX ORDER — GLIPIZIDE 5 MG/1
5 TABLET ORAL
Status: DISCONTINUED | OUTPATIENT
Start: 2017-10-23 | End: 2017-10-24 | Stop reason: HOSPADM

## 2017-10-22 RX ORDER — ATORVASTATIN CALCIUM 10 MG/1
20 TABLET, FILM COATED ORAL NIGHTLY
Status: DISCONTINUED | OUTPATIENT
Start: 2017-10-22 | End: 2017-10-24 | Stop reason: HOSPADM

## 2017-10-22 RX ORDER — FUROSEMIDE 20 MG/1
20 TABLET ORAL DAILY
Status: DISCONTINUED | OUTPATIENT
Start: 2017-10-22 | End: 2017-10-24 | Stop reason: HOSPADM

## 2017-10-22 RX ORDER — PREDNISONE 1 MG/1
4 TABLET ORAL NIGHTLY
Status: DISCONTINUED | OUTPATIENT
Start: 2017-10-22 | End: 2017-10-22 | Stop reason: CLARIF

## 2017-10-22 RX ORDER — MORPHINE SULFATE 4 MG/ML
4 INJECTION, SOLUTION INTRAMUSCULAR; INTRAVENOUS ONCE
Status: COMPLETED | OUTPATIENT
Start: 2017-10-22 | End: 2017-10-22

## 2017-10-22 RX ORDER — PANTOPRAZOLE SODIUM 40 MG/1
40 TABLET, DELAYED RELEASE ORAL EVERY 12 HOURS SCHEDULED
Status: DISCONTINUED | OUTPATIENT
Start: 2017-10-22 | End: 2017-10-24 | Stop reason: HOSPADM

## 2017-10-22 RX ORDER — NITROGLYCERIN 0.4 MG/1
0.4 TABLET SUBLINGUAL
Status: DISCONTINUED | OUTPATIENT
Start: 2017-10-22 | End: 2017-10-24 | Stop reason: HOSPADM

## 2017-10-22 RX ORDER — SODIUM CHLORIDE 9 MG/ML
125 INJECTION, SOLUTION INTRAVENOUS CONTINUOUS
Status: DISCONTINUED | OUTPATIENT
Start: 2017-10-22 | End: 2017-10-22

## 2017-10-22 RX ORDER — ONDANSETRON 2 MG/ML
4 INJECTION INTRAMUSCULAR; INTRAVENOUS ONCE
Status: COMPLETED | OUTPATIENT
Start: 2017-10-22 | End: 2017-10-22

## 2017-10-22 RX ORDER — METHYLPREDNISOLONE SODIUM SUCCINATE 125 MG/2ML
60 INJECTION, POWDER, LYOPHILIZED, FOR SOLUTION INTRAMUSCULAR; INTRAVENOUS EVERY 6 HOURS
Status: DISCONTINUED | OUTPATIENT
Start: 2017-10-22 | End: 2017-10-23

## 2017-10-22 RX ORDER — METOPROLOL TARTRATE 50 MG/1
50 TABLET, FILM COATED ORAL EVERY 12 HOURS SCHEDULED
Status: DISCONTINUED | OUTPATIENT
Start: 2017-10-22 | End: 2017-10-24 | Stop reason: HOSPADM

## 2017-10-22 RX ORDER — LIDOCAINE 50 MG/G
1 PATCH TOPICAL
Status: DISCONTINUED | OUTPATIENT
Start: 2017-10-22 | End: 2017-10-24 | Stop reason: HOSPADM

## 2017-10-22 RX ORDER — ATORVASTATIN CALCIUM 10 MG/1
20 TABLET, FILM COATED ORAL DAILY
Status: DISCONTINUED | OUTPATIENT
Start: 2017-10-22 | End: 2017-10-22

## 2017-10-22 RX ORDER — POTASSIUM CHLORIDE 750 MG/1
10 CAPSULE, EXTENDED RELEASE ORAL DAILY
Status: DISCONTINUED | OUTPATIENT
Start: 2017-10-22 | End: 2017-10-24 | Stop reason: HOSPADM

## 2017-10-22 RX ORDER — NICOTINE POLACRILEX 4 MG
15 LOZENGE BUCCAL
Status: DISCONTINUED | OUTPATIENT
Start: 2017-10-22 | End: 2017-10-24 | Stop reason: HOSPADM

## 2017-10-22 RX ORDER — LOSARTAN POTASSIUM 25 MG/1
25 TABLET ORAL
Status: DISCONTINUED | OUTPATIENT
Start: 2017-10-22 | End: 2017-10-24 | Stop reason: HOSPADM

## 2017-10-22 RX ORDER — SODIUM CHLORIDE 9 MG/ML
75 INJECTION, SOLUTION INTRAVENOUS CONTINUOUS
Status: DISCONTINUED | OUTPATIENT
Start: 2017-10-22 | End: 2017-10-23

## 2017-10-22 RX ORDER — SODIUM CHLORIDE 0.9 % (FLUSH) 0.9 %
1-10 SYRINGE (ML) INJECTION AS NEEDED
Status: DISCONTINUED | OUTPATIENT
Start: 2017-10-22 | End: 2017-10-24 | Stop reason: HOSPADM

## 2017-10-22 RX ORDER — DEXTROSE MONOHYDRATE 25 G/50ML
25 INJECTION, SOLUTION INTRAVENOUS
Status: DISCONTINUED | OUTPATIENT
Start: 2017-10-22 | End: 2017-10-24 | Stop reason: HOSPADM

## 2017-10-22 RX ORDER — ACETAMINOPHEN 325 MG/1
650 TABLET ORAL EVERY 4 HOURS PRN
Status: DISCONTINUED | OUTPATIENT
Start: 2017-10-22 | End: 2017-10-24 | Stop reason: HOSPADM

## 2017-10-22 RX ORDER — PREDNISONE 1 MG/1
5 TABLET ORAL EVERY MORNING
Status: DISCONTINUED | OUTPATIENT
Start: 2017-10-23 | End: 2017-10-22 | Stop reason: CLARIF

## 2017-10-22 RX ORDER — TRAMADOL HYDROCHLORIDE 50 MG/1
50 TABLET ORAL EVERY 6 HOURS PRN
Status: DISCONTINUED | OUTPATIENT
Start: 2017-10-22 | End: 2017-10-24 | Stop reason: HOSPADM

## 2017-10-22 RX ORDER — ISOSORBIDE MONONITRATE 30 MG/1
30 TABLET, EXTENDED RELEASE ORAL DAILY
Status: DISCONTINUED | OUTPATIENT
Start: 2017-10-22 | End: 2017-10-24 | Stop reason: HOSPADM

## 2017-10-22 RX ADMIN — METOPROLOL TARTRATE 50 MG: 50 TABLET, FILM COATED ORAL at 21:19

## 2017-10-22 RX ADMIN — INSULIN LISPRO 2 UNITS: 100 INJECTION, SOLUTION INTRAVENOUS; SUBCUTANEOUS at 16:02

## 2017-10-22 RX ADMIN — NITROGLYCERIN 0.4 MG: 0.4 TABLET SUBLINGUAL at 07:29

## 2017-10-22 RX ADMIN — IOPAMIDOL 150 ML: 755 INJECTION, SOLUTION INTRAVENOUS at 11:13

## 2017-10-22 RX ADMIN — ASPIRIN 81 MG: 81 TABLET ORAL at 15:44

## 2017-10-22 RX ADMIN — INSULIN LISPRO 2 UNITS: 100 INJECTION, SOLUTION INTRAVENOUS; SUBCUTANEOUS at 21:29

## 2017-10-22 RX ADMIN — ONDANSETRON 4 MG: 2 INJECTION INTRAMUSCULAR; INTRAVENOUS at 08:11

## 2017-10-22 RX ADMIN — PANTOPRAZOLE SODIUM 40 MG: 40 TABLET, DELAYED RELEASE ORAL at 16:01

## 2017-10-22 RX ADMIN — SODIUM CHLORIDE 75 ML/HR: 9 INJECTION, SOLUTION INTRAVENOUS at 14:30

## 2017-10-22 RX ADMIN — MORPHINE SULFATE 1 MG: 2 INJECTION, SOLUTION INTRAMUSCULAR; INTRAVENOUS at 13:37

## 2017-10-22 RX ADMIN — PANTOPRAZOLE SODIUM 40 MG: 40 TABLET, DELAYED RELEASE ORAL at 21:15

## 2017-10-22 RX ADMIN — NITROGLYCERIN 0.4 MG: 0.4 TABLET SUBLINGUAL at 07:56

## 2017-10-22 RX ADMIN — POTASSIUM CHLORIDE 10 MEQ: 750 CAPSULE, EXTENDED RELEASE ORAL at 16:01

## 2017-10-22 RX ADMIN — FUROSEMIDE 20 MG: 20 TABLET ORAL at 16:01

## 2017-10-22 RX ADMIN — FOLIC ACID 1 MG: 1 TABLET ORAL at 15:44

## 2017-10-22 RX ADMIN — LOSARTAN POTASSIUM 25 MG: 25 TABLET ORAL at 18:07

## 2017-10-22 RX ADMIN — LIDOCAINE 1 PATCH: 50 PATCH CUTANEOUS at 15:41

## 2017-10-22 RX ADMIN — MORPHINE SULFATE 1 MG: 2 INJECTION, SOLUTION INTRAMUSCULAR; INTRAVENOUS at 18:07

## 2017-10-22 RX ADMIN — ATORVASTATIN CALCIUM 20 MG: 10 TABLET, FILM COATED ORAL at 21:18

## 2017-10-22 RX ADMIN — ISOSORBIDE MONONITRATE 30 MG: 30 TABLET ORAL at 15:41

## 2017-10-22 RX ADMIN — SODIUM CHLORIDE 125 ML/HR: 9 INJECTION, SOLUTION INTRAVENOUS at 07:33

## 2017-10-22 RX ADMIN — MORPHINE SULFATE 4 MG: 4 INJECTION, SOLUTION INTRAMUSCULAR; INTRAVENOUS at 08:11

## 2017-10-22 RX ADMIN — DRONEDARONE 400 MG: 400 TABLET, FILM COATED ORAL at 18:07

## 2017-10-23 LAB
ALBUMIN SERPL-MCNC: 3.3 G/DL (ref 3.5–5)
ALBUMIN/GLOB SERPL: 1.3 G/DL (ref 1.1–2.5)
ALP SERPL-CCNC: 94 U/L (ref 24–120)
ALT SERPL W P-5'-P-CCNC: 49 U/L (ref 0–54)
ANION GAP SERPL CALCULATED.3IONS-SCNC: 7 MMOL/L (ref 4–13)
ANISOCYTOSIS BLD QL: NORMAL
ARTICHOKE IGE QN: <30 MG/DL (ref 0–99)
AST SERPL-CCNC: 27 U/L (ref 7–45)
BASOPHILS # BLD AUTO: 0.01 10*3/MM3 (ref 0–0.2)
BASOPHILS NFR BLD AUTO: 0.1 % (ref 0–2)
BILIRUB SERPL-MCNC: 1.2 MG/DL (ref 0.1–1)
BUN BLD-MCNC: 6 MG/DL (ref 5–21)
BUN/CREAT SERPL: 12.2 (ref 7–25)
CALCIUM SPEC-SCNC: 8.2 MG/DL (ref 8.4–10.4)
CHLORIDE SERPL-SCNC: 105 MMOL/L (ref 98–110)
CHOLEST SERPL-MCNC: 85 MG/DL (ref 130–200)
CO2 SERPL-SCNC: 26 MMOL/L (ref 24–31)
CREAT BLD-MCNC: 0.49 MG/DL (ref 0.5–1.4)
DEPRECATED RDW RBC AUTO: 60.3 FL (ref 40–54)
EOSINOPHIL # BLD AUTO: 0.05 10*3/MM3 (ref 0–0.7)
EOSINOPHIL NFR BLD AUTO: 0.5 % (ref 0–4)
ERYTHROCYTE [DISTWIDTH] IN BLOOD BY AUTOMATED COUNT: 20.3 % (ref 12–15)
GFR SERPL CREATININE-BSD FRML MDRD: 129 ML/MIN/1.73
GLOBULIN UR ELPH-MCNC: 2.5 GM/DL
GLUCOSE BLD-MCNC: 159 MG/DL (ref 70–100)
GLUCOSE BLDC GLUCOMTR-MCNC: 138 MG/DL (ref 70–130)
GLUCOSE BLDC GLUCOMTR-MCNC: 159 MG/DL (ref 70–130)
GLUCOSE BLDC GLUCOMTR-MCNC: 174 MG/DL (ref 70–130)
GLUCOSE BLDC GLUCOMTR-MCNC: 175 MG/DL (ref 70–130)
HBA1C MFR BLD: 8.3 %
HCT VFR BLD AUTO: 28.4 % (ref 37–47)
HCT VFR BLD AUTO: 29.5 % (ref 37–47)
HDLC SERPL-MCNC: 48 MG/DL
HGB BLD-MCNC: 8.3 G/DL (ref 12–16)
HGB BLD-MCNC: 8.6 G/DL (ref 12–16)
IMM GRANULOCYTES # BLD: 0.03 10*3/MM3 (ref 0–0.03)
IMM GRANULOCYTES NFR BLD: 0.3 % (ref 0–5)
IRON 24H UR-MRATE: 14 MCG/DL (ref 42–180)
IRON SATN MFR SERPL: 4 % (ref 20–45)
LDLC/HDLC SERPL: ABNORMAL {RATIO}
LYMPHOCYTES # BLD AUTO: 1.23 10*3/MM3 (ref 0.72–4.86)
LYMPHOCYTES NFR BLD AUTO: 12.5 % (ref 15–45)
MCH RBC QN AUTO: 24.3 PG (ref 28–32)
MCHC RBC AUTO-ENTMCNC: 29.2 G/DL (ref 33–36)
MCV RBC AUTO: 83 FL (ref 82–98)
MONOCYTES # BLD AUTO: 0.47 10*3/MM3 (ref 0.19–1.3)
MONOCYTES NFR BLD AUTO: 4.8 % (ref 4–12)
NEUTROPHILS # BLD AUTO: 8.03 10*3/MM3 (ref 1.87–8.4)
NEUTROPHILS NFR BLD AUTO: 81.8 % (ref 39–78)
NRBC BLD MANUAL-RTO: 0.2 /100 WBC (ref 0–0)
PLAT MORPH BLD: NORMAL
PLATELET # BLD AUTO: 205 10*3/MM3 (ref 130–400)
PMV BLD AUTO: 9.9 FL (ref 6–12)
POTASSIUM BLD-SCNC: 3.3 MMOL/L (ref 3.5–5.3)
PROT SERPL-MCNC: 5.8 G/DL (ref 6.3–8.7)
RBC # BLD AUTO: 3.42 10*6/MM3 (ref 4.2–5.4)
SODIUM BLD-SCNC: 138 MMOL/L (ref 135–145)
TIBC SERPL-MCNC: 395 MCG/DL (ref 225–420)
TRIGL SERPL-MCNC: 83 MG/DL (ref 0–149)
TROPONIN I SERPL-MCNC: <0.012 NG/ML (ref 0–0.03)
TROPONIN I SERPL-MCNC: <0.012 NG/ML (ref 0–0.03)
WBC MORPH BLD: NORMAL
WBC NRBC COR # BLD: 9.82 10*3/MM3 (ref 4.8–10.8)

## 2017-10-23 PROCEDURE — 83036 HEMOGLOBIN GLYCOSYLATED A1C: CPT | Performed by: INTERNAL MEDICINE

## 2017-10-23 PROCEDURE — 83550 IRON BINDING TEST: CPT | Performed by: NURSE PRACTITIONER

## 2017-10-23 PROCEDURE — 85007 BL SMEAR W/DIFF WBC COUNT: CPT | Performed by: INTERNAL MEDICINE

## 2017-10-23 PROCEDURE — 63710000001 INSULIN LISPRO (HUMAN) PER 5 UNITS: Performed by: INTERNAL MEDICINE

## 2017-10-23 PROCEDURE — 82962 GLUCOSE BLOOD TEST: CPT

## 2017-10-23 PROCEDURE — 85014 HEMATOCRIT: CPT | Performed by: NURSE PRACTITIONER

## 2017-10-23 PROCEDURE — 80061 LIPID PANEL: CPT | Performed by: INTERNAL MEDICINE

## 2017-10-23 PROCEDURE — 80053 COMPREHEN METABOLIC PANEL: CPT | Performed by: INTERNAL MEDICINE

## 2017-10-23 PROCEDURE — G0378 HOSPITAL OBSERVATION PER HR: HCPCS

## 2017-10-23 PROCEDURE — 84484 ASSAY OF TROPONIN QUANT: CPT | Performed by: INTERNAL MEDICINE

## 2017-10-23 PROCEDURE — 25010000002 IRON SUCROSE PER 1 MG: Performed by: NURSE PRACTITIONER

## 2017-10-23 PROCEDURE — 83540 ASSAY OF IRON: CPT | Performed by: NURSE PRACTITIONER

## 2017-10-23 PROCEDURE — 85018 HEMOGLOBIN: CPT | Performed by: NURSE PRACTITIONER

## 2017-10-23 PROCEDURE — 85025 COMPLETE CBC W/AUTO DIFF WBC: CPT | Performed by: INTERNAL MEDICINE

## 2017-10-23 RX ORDER — SIMETHICONE 80 MG
80 TABLET,CHEWABLE ORAL 4 TIMES DAILY PRN
Status: DISCONTINUED | OUTPATIENT
Start: 2017-10-23 | End: 2017-10-24 | Stop reason: HOSPADM

## 2017-10-23 RX ORDER — POTASSIUM CHLORIDE 750 MG/1
40 CAPSULE, EXTENDED RELEASE ORAL ONCE
Status: COMPLETED | OUTPATIENT
Start: 2017-10-23 | End: 2017-10-23

## 2017-10-23 RX ORDER — SIMETHICONE 80 MG
80 TABLET,CHEWABLE ORAL ONCE
Status: COMPLETED | OUTPATIENT
Start: 2017-10-23 | End: 2017-10-23

## 2017-10-23 RX ADMIN — ISOSORBIDE MONONITRATE 30 MG: 30 TABLET ORAL at 08:37

## 2017-10-23 RX ADMIN — FOLIC ACID 1 MG: 1 TABLET ORAL at 08:38

## 2017-10-23 RX ADMIN — INSULIN LISPRO 2 UNITS: 100 INJECTION, SOLUTION INTRAVENOUS; SUBCUTANEOUS at 21:53

## 2017-10-23 RX ADMIN — ASPIRIN 81 MG: 81 TABLET ORAL at 08:38

## 2017-10-23 RX ADMIN — GLIPIZIDE 5 MG: 5 TABLET ORAL at 08:37

## 2017-10-23 RX ADMIN — FUROSEMIDE 20 MG: 20 TABLET ORAL at 08:37

## 2017-10-23 RX ADMIN — POTASSIUM CHLORIDE 10 MEQ: 750 CAPSULE, EXTENDED RELEASE ORAL at 08:37

## 2017-10-23 RX ADMIN — ATORVASTATIN CALCIUM 20 MG: 10 TABLET, FILM COATED ORAL at 21:53

## 2017-10-23 RX ADMIN — SODIUM CHLORIDE 75 ML/HR: 9 INJECTION, SOLUTION INTRAVENOUS at 04:15

## 2017-10-23 RX ADMIN — IRON SUCROSE 300 MG: 20 INJECTION, SOLUTION INTRAVENOUS at 16:53

## 2017-10-23 RX ADMIN — LIDOCAINE 1 PATCH: 50 PATCH CUTANEOUS at 08:38

## 2017-10-23 RX ADMIN — DRONEDARONE 400 MG: 400 TABLET, FILM COATED ORAL at 17:59

## 2017-10-23 RX ADMIN — PANTOPRAZOLE SODIUM 40 MG: 40 TABLET, DELAYED RELEASE ORAL at 21:53

## 2017-10-23 RX ADMIN — DRONEDARONE 400 MG: 400 TABLET, FILM COATED ORAL at 08:38

## 2017-10-23 RX ADMIN — INSULIN LISPRO 2 UNITS: 100 INJECTION, SOLUTION INTRAVENOUS; SUBCUTANEOUS at 11:54

## 2017-10-23 RX ADMIN — INSULIN LISPRO 2 UNITS: 100 INJECTION, SOLUTION INTRAVENOUS; SUBCUTANEOUS at 08:37

## 2017-10-23 RX ADMIN — METOPROLOL TARTRATE 50 MG: 50 TABLET, FILM COATED ORAL at 21:53

## 2017-10-23 RX ADMIN — LOSARTAN POTASSIUM 25 MG: 25 TABLET ORAL at 08:38

## 2017-10-23 RX ADMIN — ACETAMINOPHEN 650 MG: 325 TABLET, FILM COATED ORAL at 02:14

## 2017-10-23 RX ADMIN — PANTOPRAZOLE SODIUM 40 MG: 40 TABLET, DELAYED RELEASE ORAL at 08:38

## 2017-10-23 RX ADMIN — ACETAMINOPHEN 650 MG: 325 TABLET, FILM COATED ORAL at 11:57

## 2017-10-23 RX ADMIN — SIMETHICONE 80 MG: 80 TABLET, CHEWABLE ORAL at 08:38

## 2017-10-23 RX ADMIN — POTASSIUM CHLORIDE 40 MEQ: 750 CAPSULE, EXTENDED RELEASE ORAL at 08:39

## 2017-10-23 RX ADMIN — METOPROLOL TARTRATE 50 MG: 50 TABLET, FILM COATED ORAL at 08:38

## 2017-10-24 ENCOUNTER — APPOINTMENT (OUTPATIENT)
Dept: GENERAL RADIOLOGY | Facility: HOSPITAL | Age: 62
End: 2017-10-24

## 2017-10-24 VITALS
BODY MASS INDEX: 35.87 KG/M2 | RESPIRATION RATE: 16 BRPM | TEMPERATURE: 98.3 F | DIASTOLIC BLOOD PRESSURE: 53 MMHG | OXYGEN SATURATION: 93 % | HEIGHT: 64 IN | HEART RATE: 89 BPM | WEIGHT: 210.13 LBS | SYSTOLIC BLOOD PRESSURE: 117 MMHG

## 2017-10-24 LAB
ANION GAP SERPL CALCULATED.3IONS-SCNC: 10 MMOL/L (ref 4–13)
BASOPHILS # BLD AUTO: 0.01 10*3/MM3 (ref 0–0.2)
BASOPHILS NFR BLD AUTO: 0.1 % (ref 0–2)
BUN BLD-MCNC: 6 MG/DL (ref 5–21)
BUN/CREAT SERPL: 10.9 (ref 7–25)
CALCIUM SPEC-SCNC: 8.9 MG/DL (ref 8.4–10.4)
CHLORIDE SERPL-SCNC: 103 MMOL/L (ref 98–110)
CO2 SERPL-SCNC: 28 MMOL/L (ref 24–31)
CREAT BLD-MCNC: 0.55 MG/DL (ref 0.5–1.4)
DEPRECATED RDW RBC AUTO: 59.5 FL (ref 40–54)
EOSINOPHIL # BLD AUTO: 0.06 10*3/MM3 (ref 0–0.7)
EOSINOPHIL NFR BLD AUTO: 0.8 % (ref 0–4)
ERYTHROCYTE [DISTWIDTH] IN BLOOD BY AUTOMATED COUNT: 20.3 % (ref 12–15)
GFR SERPL CREATININE-BSD FRML MDRD: 112 ML/MIN/1.73
GLUCOSE BLD-MCNC: 137 MG/DL (ref 70–100)
GLUCOSE BLDC GLUCOMTR-MCNC: 132 MG/DL (ref 70–130)
HCT VFR BLD AUTO: 30.4 % (ref 37–47)
HCT VFR BLD AUTO: 30.6 % (ref 37–47)
HGB BLD-MCNC: 8.7 G/DL (ref 12–16)
HGB BLD-MCNC: 8.8 G/DL (ref 12–16)
IMM GRANULOCYTES # BLD: 0.03 10*3/MM3 (ref 0–0.03)
IMM GRANULOCYTES NFR BLD: 0.4 % (ref 0–5)
LYMPHOCYTES # BLD AUTO: 0.86 10*3/MM3 (ref 0.72–4.86)
LYMPHOCYTES NFR BLD AUTO: 10.8 % (ref 15–45)
MCH RBC QN AUTO: 23.9 PG (ref 28–32)
MCHC RBC AUTO-ENTMCNC: 28.8 G/DL (ref 33–36)
MCV RBC AUTO: 83.2 FL (ref 82–98)
MONOCYTES # BLD AUTO: 0.66 10*3/MM3 (ref 0.19–1.3)
MONOCYTES NFR BLD AUTO: 8.3 % (ref 4–12)
NEUTROPHILS # BLD AUTO: 6.33 10*3/MM3 (ref 1.87–8.4)
NEUTROPHILS NFR BLD AUTO: 79.6 % (ref 39–78)
PLATELET # BLD AUTO: 229 10*3/MM3 (ref 130–400)
PMV BLD AUTO: 10.1 FL (ref 6–12)
POTASSIUM BLD-SCNC: 3.6 MMOL/L (ref 3.5–5.3)
RBC # BLD AUTO: 3.68 10*6/MM3 (ref 4.2–5.4)
SODIUM BLD-SCNC: 141 MMOL/L (ref 135–145)
WBC NRBC COR # BLD: 7.95 10*3/MM3 (ref 4.8–10.8)

## 2017-10-24 PROCEDURE — 85025 COMPLETE CBC W/AUTO DIFF WBC: CPT | Performed by: NURSE PRACTITIONER

## 2017-10-24 PROCEDURE — 85014 HEMATOCRIT: CPT | Performed by: NURSE PRACTITIONER

## 2017-10-24 PROCEDURE — 74247: CPT

## 2017-10-24 PROCEDURE — 82962 GLUCOSE BLOOD TEST: CPT

## 2017-10-24 PROCEDURE — 85018 HEMOGLOBIN: CPT | Performed by: NURSE PRACTITIONER

## 2017-10-24 PROCEDURE — G0378 HOSPITAL OBSERVATION PER HR: HCPCS

## 2017-10-24 PROCEDURE — 80048 BASIC METABOLIC PNL TOTAL CA: CPT | Performed by: NURSE PRACTITIONER

## 2017-10-24 RX ORDER — LOSARTAN POTASSIUM 25 MG/1
25 TABLET ORAL
Qty: 30 TABLET | Refills: 1 | Status: SHIPPED | OUTPATIENT
Start: 2017-10-25 | End: 2017-11-13 | Stop reason: ALTCHOICE

## 2017-10-24 RX ORDER — FERROUS SULFATE 325(65) MG
325 TABLET ORAL
Qty: 30 TABLET | Refills: 1 | Status: ON HOLD | OUTPATIENT
Start: 2017-10-24 | End: 2018-10-11

## 2017-10-24 RX ORDER — PANTOPRAZOLE SODIUM 40 MG/1
40 TABLET, DELAYED RELEASE ORAL EVERY 12 HOURS SCHEDULED
Qty: 60 TABLET | Refills: 1 | Status: SHIPPED | OUTPATIENT
Start: 2017-10-24

## 2017-10-24 RX ADMIN — PANTOPRAZOLE SODIUM 40 MG: 40 TABLET, DELAYED RELEASE ORAL at 09:07

## 2017-10-24 RX ADMIN — GLIPIZIDE 5 MG: 5 TABLET ORAL at 09:07

## 2017-10-24 RX ADMIN — BARIUM SULFATE 250 ML: 980 POWDER, FOR SUSPENSION ORAL at 08:35

## 2017-10-24 RX ADMIN — METOPROLOL TARTRATE 50 MG: 50 TABLET, FILM COATED ORAL at 09:07

## 2017-10-24 RX ADMIN — LOSARTAN POTASSIUM 25 MG: 25 TABLET ORAL at 09:08

## 2017-10-24 RX ADMIN — FUROSEMIDE 20 MG: 20 TABLET ORAL at 09:08

## 2017-10-24 RX ADMIN — POTASSIUM CHLORIDE 10 MEQ: 750 CAPSULE, EXTENDED RELEASE ORAL at 09:07

## 2017-10-24 RX ADMIN — BARIUM SULFATE 250 ML: 0.6 SUSPENSION ORAL at 08:35

## 2017-10-24 RX ADMIN — LIDOCAINE 1 PATCH: 50 PATCH CUTANEOUS at 09:06

## 2017-10-24 RX ADMIN — ASPIRIN 81 MG: 81 TABLET ORAL at 09:07

## 2017-10-24 RX ADMIN — DRONEDARONE 400 MG: 400 TABLET, FILM COATED ORAL at 09:07

## 2017-10-24 RX ADMIN — ISOSORBIDE MONONITRATE 30 MG: 30 TABLET ORAL at 09:07

## 2017-10-24 RX ADMIN — FOLIC ACID 1 MG: 1 TABLET ORAL at 09:07

## 2017-10-25 ENCOUNTER — TELEPHONE (OUTPATIENT)
Dept: FAMILY MEDICINE CLINIC | Age: 62
End: 2017-10-25

## 2017-10-25 RX ORDER — ONDANSETRON HYDROCHLORIDE 8 MG/1
TABLET, FILM COATED ORAL
Qty: 21 TABLET | Refills: 0 | Status: SHIPPED | OUTPATIENT
Start: 2017-10-25 | End: 2019-06-26

## 2017-10-26 RX ORDER — ONDANSETRON 4 MG/1
4 TABLET, FILM COATED ORAL DAILY PRN
Qty: 30 TABLET | Refills: 0 | Status: SHIPPED | OUTPATIENT
Start: 2017-10-26 | End: 2017-10-31 | Stop reason: SDUPTHER

## 2017-10-31 ENCOUNTER — OFFICE VISIT (OUTPATIENT)
Dept: FAMILY MEDICINE CLINIC | Age: 62
End: 2017-10-31
Payer: COMMERCIAL

## 2017-10-31 ENCOUNTER — APPOINTMENT (OUTPATIENT)
Dept: LAB | Facility: HOSPITAL | Age: 62
End: 2017-10-31

## 2017-10-31 ENCOUNTER — TRANSCRIBE ORDERS (OUTPATIENT)
Dept: ADMINISTRATIVE | Facility: HOSPITAL | Age: 62
End: 2017-10-31

## 2017-10-31 VITALS
OXYGEN SATURATION: 97 % | HEART RATE: 69 BPM | HEIGHT: 64 IN | DIASTOLIC BLOOD PRESSURE: 64 MMHG | BODY MASS INDEX: 36.09 KG/M2 | WEIGHT: 211.4 LBS | SYSTOLIC BLOOD PRESSURE: 108 MMHG

## 2017-10-31 DIAGNOSIS — R19.5 HEME POSITIVE STOOL: ICD-10-CM

## 2017-10-31 DIAGNOSIS — D50.9 IRON DEFICIENCY ANEMIA, UNSPECIFIED IRON DEFICIENCY ANEMIA TYPE: Primary | ICD-10-CM

## 2017-10-31 DIAGNOSIS — I10 ESSENTIAL HYPERTENSION: ICD-10-CM

## 2017-10-31 DIAGNOSIS — D50.9 IRON DEFICIENCY ANEMIA, UNSPECIFIED IRON DEFICIENCY ANEMIA TYPE: ICD-10-CM

## 2017-10-31 LAB
DEPRECATED RDW RBC AUTO: 78.5 FL (ref 40–54)
ERYTHROCYTE [DISTWIDTH] IN BLOOD BY AUTOMATED COUNT: 24.9 % (ref 12–15)
HCT VFR BLD AUTO: 33.9 % (ref 37–47)
HGB BLD-MCNC: 9.6 G/DL (ref 12–16)
IRON 24H UR-MRATE: 135 MCG/DL (ref 42–180)
MCH RBC QN AUTO: 24.9 PG (ref 28–32)
MCHC RBC AUTO-ENTMCNC: 28.3 G/DL (ref 33–36)
MCV RBC AUTO: 87.8 FL (ref 82–98)
PLATELET # BLD AUTO: 273 10*3/MM3 (ref 130–400)
PMV BLD AUTO: 10.3 FL (ref 6–12)
RBC # BLD AUTO: 3.86 10*6/MM3 (ref 4.2–5.4)
WBC NRBC COR # BLD: 10.43 10*3/MM3 (ref 4.8–10.8)

## 2017-10-31 PROCEDURE — 85027 COMPLETE CBC AUTOMATED: CPT | Performed by: CLINICAL NURSE SPECIALIST

## 2017-10-31 PROCEDURE — G8427 DOCREV CUR MEDS BY ELIG CLIN: HCPCS | Performed by: CLINICAL NURSE SPECIALIST

## 2017-10-31 PROCEDURE — 3014F SCREEN MAMMO DOC REV: CPT | Performed by: CLINICAL NURSE SPECIALIST

## 2017-10-31 PROCEDURE — 36415 COLL VENOUS BLD VENIPUNCTURE: CPT

## 2017-10-31 PROCEDURE — 83540 ASSAY OF IRON: CPT | Performed by: CLINICAL NURSE SPECIALIST

## 2017-10-31 PROCEDURE — 3017F COLORECTAL CA SCREEN DOC REV: CPT | Performed by: CLINICAL NURSE SPECIALIST

## 2017-10-31 PROCEDURE — G8417 CALC BMI ABV UP PARAM F/U: HCPCS | Performed by: CLINICAL NURSE SPECIALIST

## 2017-10-31 PROCEDURE — 1036F TOBACCO NON-USER: CPT | Performed by: CLINICAL NURSE SPECIALIST

## 2017-10-31 PROCEDURE — G8484 FLU IMMUNIZE NO ADMIN: HCPCS | Performed by: CLINICAL NURSE SPECIALIST

## 2017-10-31 PROCEDURE — 99214 OFFICE O/P EST MOD 30 MIN: CPT | Performed by: CLINICAL NURSE SPECIALIST

## 2017-10-31 RX ORDER — PANTOPRAZOLE SODIUM 40 MG/1
40 TABLET, DELAYED RELEASE ORAL
COMMUNITY
Start: 2017-10-24 | End: 2018-04-24 | Stop reason: SDUPTHER

## 2017-10-31 RX ORDER — ASPIRIN 81 MG/1
81 TABLET ORAL DAILY
Status: ON HOLD | COMMUNITY
End: 2022-01-01 | Stop reason: HOSPADM

## 2017-10-31 RX ORDER — LOSARTAN POTASSIUM 25 MG/1
25 TABLET ORAL DAILY
COMMUNITY
End: 2018-01-11 | Stop reason: SDUPTHER

## 2017-10-31 RX ORDER — FOLIC ACID 1 MG/1
1 TABLET ORAL DAILY
COMMUNITY

## 2017-10-31 RX ORDER — NITROGLYCERIN 0.4 MG/1
0.4 TABLET SUBLINGUAL
COMMUNITY
End: 2018-06-05 | Stop reason: SDUPTHER

## 2017-10-31 RX ORDER — PREDNISONE 1 MG/1
5 TABLET ORAL 2 TIMES DAILY
COMMUNITY

## 2017-10-31 RX ORDER — POTASSIUM CHLORIDE 750 MG/1
10 TABLET, FILM COATED, EXTENDED RELEASE ORAL
COMMUNITY
End: 2017-11-14 | Stop reason: SDUPTHER

## 2017-10-31 RX ORDER — GLYBURIDE 5 MG/1
5 TABLET ORAL DAILY
COMMUNITY
End: 2018-01-23

## 2017-10-31 RX ORDER — ISOSORBIDE MONONITRATE 30 MG/1
30 TABLET, EXTENDED RELEASE ORAL DAILY
COMMUNITY
End: 2018-02-19 | Stop reason: SDUPTHER

## 2017-10-31 RX ORDER — FERROUS SULFATE 325(65) MG
325 TABLET ORAL DAILY
COMMUNITY
Start: 2017-10-24 | End: 2018-01-02 | Stop reason: SDUPTHER

## 2017-10-31 ASSESSMENT — ENCOUNTER SYMPTOMS
SORE THROAT: 0
SINUS PRESSURE: 0
CHEST TIGHTNESS: 0
CONSTIPATION: 0
COUGH: 0
TROUBLE SWALLOWING: 0
DIARRHEA: 0
BLOOD IN STOOL: 0
SHORTNESS OF BREATH: 0
ANAL BLEEDING: 0
BACK PAIN: 1
ABDOMINAL PAIN: 1
COLOR CHANGE: 0
WHEEZING: 0

## 2017-10-31 NOTE — PROGRESS NOTES
SUBJECTIVE:  Erick Aguilar is a 65 y.o. who presents today for Follow-Up from Hospital (Patient states that she was treated for anemia and blood in her stool at [address]      HPI    Ms day presents today for hospital follow up. She presented to [facility] ER with shoulder blade pain with some increased shortness of breath. She has underlying CAD with prior CABG and stenting, as well as RA. She had negative cardiac enzymes and CTA. She had negative stress testing in march. She is followed by Dr Tyrone Escoto for CAD and PAF. On admission, her hgb was 9.7 with heme positive stools. Her hgb dropped to 8.3 with iron or 14. Her Xarelto was held and she continues to hold this. She did receive IV venofer prior to discharge, hgb was 8.7  She denies any bleeding, but does have ALISSA abdominal pain. She has appt with Dr Jessica Brown on 11/29. She continues with fatigue, this really is chronic but more acute recently. She has chronic pain, but her back does feel some better. She is also on ferrous sulfate 325mg once daily, protonix 40mg twice daily. She notes pounding in her ears with exertion only. She has a lower than normal blood pressure reading due to multiple agents for PAF and CAD. She tried a lower dose of metoprolol but had tachycardia. She is on a low dose ARB, underlying DM as well.       Past Medical History:   Diagnosis Date    Atrial flutter by electrocardiogram ([facility] [address]     Constipation     Coronary artery disease     Diabetes mellitus (HCC)     Essential hypertension     Hyperlipidemia     IBS (irritable bowel syndrome)     Iron deficiency anemia     Menopause     Paroxysmal SVT (supraventricular tachycardia) (HCC)     Rheumatoid arthritis (HCC)      Past Surgical History:   Procedure Laterality Date    CHOLECYSTECTOMY      CORONARY ARTERY BYPASS GRAFT      TOTAL KNEE ARTHROPLASTY       Family History   Problem Relation Age of Onset    Heart Attack Father     Heart Attack Brother      Social History   Substance Use Topics    Smoking status: Former Smoker    Smokeless tobacco: Never Used    Alcohol use Not on file     Current Outpatient Prescriptions   Medication Sig Dispense Refill    losartan (COZAAR) 25 MG tablet Take 25 mg by mouth daily      isosorbide mononitrate (IMDUR) 30 MG extended release tablet Take 30 mg by mouth daily      ferrous sulfate 325 (65 Fe) MG tablet Take 325 mg by mouth daily      glyBURIDE (DIABETA) 5 MG tablet Take 5 mg by mouth daily      predniSONE (DELTASONE) 5 MG tablet Take 5 mg by mouth Take 5 mg in the morning and 4 mg in the evening.  aspirin EC 81 MG EC tablet Take 81 mg by mouth daily      dronedarone hcl (MULTAQ) 400 MG TABS Take 400 mg by mouth 2 times daily      nitroGLYCERIN (NITROSTAT) 0.4 MG SL tablet Place 0.4 mg under the tongue      etanercept (ENBREL) 50 MG/ML injection Inject 50 mg into the skin once a week      methotrexate (RHEUMATREX) 2.5 MG chemo tablet Take 25 mg by mouth      folic acid (FOLVITE) 1 MG tablet Take 1 mg by mouth      pantoprazole (PROTONIX) 40 MG tablet Take 40 mg by mouth      potassium chloride (KLOR-CON) 10 MEQ extended release tablet Take 10 mEq by mouth      ondansetron (ZOFRAN) 8 MG tablet TAKE 1 TABLET BY MOUTH EVERY 8 HOURS AS NEEDED 21 tablet 0    metoprolol tartrate (LOPRESSOR) 50 MG tablet TAKE 1 TABLET TWICE DAILY 60 tablet 0    metFORMIN, MOD, (GLUMETZA) 1000 MG extended release tablet Take 1 tablet by mouth daily (with breakfast) 90 tablet 1    simvastatin (ZOCOR) 40 MG tablet Take 1 tablet by mouth every evening 30 tablet 3    furosemide (LASIX) 20 MG tablet Take 1 tablet by mouth daily 60 tablet 3     No current facility-administered medications for this visit. Allergies   Allergen Reactions    Albuterol     Codeine     Sulfa Antibiotics        Review of Systems   Constitutional: Positive for fatigue. Negative for appetite change, chills, diaphoresis and fever.    HENT: Negative for Vitals reviewed. ASSESSMENT/PLAN:  1. Iron deficiency anemia, unspecified iron deficiency anemia type  Recheck labs today  She will take order to [facility] due to her insurance preference  Continue ferrous sulfate once daily for now  - CBC; Future  - Iron; Future    2. Essential hypertension  Asked her to hold the losartan for a few days only, if she notes improvement of fatigue with higher blood pressure readings to let us know  We may be able to stop losartan as she has normal renal function  She will need carotid US, considering the pounding sensation she is having, she is aware to keep us posted    3. Heme positive stool  Most likely UGI bleed vs gastritis  Continue to hold xarelto  Stay on PPI BID  Keep follow up with Dr Angel Rodriguez for possible scoping  Let Dr Fidencio Landin know you are off [facility] records reviewed, total time spent exceeded 25 minutes for hospital record review and direct patient contact         Return for already scheduled.

## 2017-11-01 ENCOUNTER — TELEPHONE (OUTPATIENT)
Dept: FAMILY MEDICINE CLINIC | Age: 62
End: 2017-11-01

## 2017-11-01 ENCOUNTER — TELEPHONE (OUTPATIENT)
Dept: CARDIOLOGY | Facility: CLINIC | Age: 62
End: 2017-11-01

## 2017-11-01 NOTE — TELEPHONE ENCOUNTER
Pt notified of results. Pt already has appt with Dr Jessica Brown told to call back if any questions or problems.

## 2017-11-01 NOTE — TELEPHONE ENCOUNTER
Her labs look great. Hgb is up to 9.3 and her iron is great. Stay on current medications. See if she can call Dr Princess Templeton office and try to get in sooner to see them, so she doesn't have to stay off Xarelto that long.   Maybe get on a cancellation list.

## 2017-11-01 NOTE — TELEPHONE ENCOUNTER
PT WAS IN HOSPITAL FOR IN ANEMIA POSITIVE HEMOCCLTS    THEY TOOK HER OFF XARELTO    PT WANTS TO MAKE SURE THIS IS OK WITH YOU    PLEASE ADVISE

## 2017-11-13 ENCOUNTER — OFFICE VISIT (OUTPATIENT)
Dept: GASTROENTEROLOGY | Facility: CLINIC | Age: 62
End: 2017-11-13

## 2017-11-13 VITALS
SYSTOLIC BLOOD PRESSURE: 128 MMHG | DIASTOLIC BLOOD PRESSURE: 74 MMHG | WEIGHT: 213 LBS | HEIGHT: 64 IN | TEMPERATURE: 96.6 F | BODY MASS INDEX: 36.37 KG/M2 | HEART RATE: 68 BPM | OXYGEN SATURATION: 97 %

## 2017-11-13 DIAGNOSIS — Z87.19 HISTORY OF ESOPHAGITIS: ICD-10-CM

## 2017-11-13 DIAGNOSIS — D64.9 ANEMIA, UNSPECIFIED TYPE: Primary | ICD-10-CM

## 2017-11-13 DIAGNOSIS — K92.1 BLACK TARRY STOOLS: ICD-10-CM

## 2017-11-13 DIAGNOSIS — R93.89 ABNORMAL FINDING ON IMAGING: ICD-10-CM

## 2017-11-13 PROCEDURE — 99214 OFFICE O/P EST MOD 30 MIN: CPT | Performed by: NURSE PRACTITIONER

## 2017-11-13 RX ORDER — SODIUM, POTASSIUM,MAG SULFATES 17.5-3.13G
2 SOLUTION, RECONSTITUTED, ORAL ORAL ONCE
Qty: 2 BOTTLE | Refills: 0 | Status: SHIPPED | OUTPATIENT
Start: 2017-11-13 | End: 2017-11-13

## 2017-11-13 NOTE — PROGRESS NOTES
Chief Complaint:   Chief Complaint   Patient presents with   • Follow-up     Patient is here today for hospital follow up with anemia and blood in her stools.         Patient ID: Teresa Serna is a 62 y.o. female     History of Present Illness:  This is a very pleasant 62-year-old female who comes today status post hospitalization with anemia and occult blood in stools.  The patient came in with complaints of shoulder blade pain.  She does carry a history of CAD along with A. Fib.  During her hospitalization she was found to have a hemoglobin of 9.7.  She tells me that she had seen some black tarry stools off and on prior to this hospitalization however she did not see it occur on a daily basis.  An upper GI study was performed with showed evidence of active ulceration.  She was started on Protonix twice a day and advised to follow-up in our office status post hospitalization.  During her hospitalization her Xarelto was discontinued and has not then restarted per Dr. Reyes.    She denies any nausea, vomiting, epigastric pain, pyrosis or hematemesis.  She denies any fever or chills.  She denies any melena or hematochezia.  She denies any unintentional weight loss or loss of appetite.  The patient's last colonoscopy was performed on 2/10/12 for rectal bleeding.  Diverticulosis was noted and nonbleeding internal hemorrhoids.  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    Past Medical History:   Diagnosis Date   • Abnormal stress test    • Atrial flutter    • CAD (coronary artery disease)    • CAD in native artery     CABG x 3   • Diabetes mellitus    • Essential hypertension     Tricuspid valve insufficiency, unspecified etiology    • Hyperlipemia, mixed    • Hyperlipidemia    • Hypertension    • MI, old    • Mitral valve prolapse    • Near syncope    • Obesity, morbid    • Palpitations    • Paroxysmal SVT  (supraventricular tachycardia)    • Paroxysmal SVT (supraventricular tachycardia)    • Pedal edema    • Precordial pain    • Rheumatoid arthritis    • Rheumatoid arthritis    • S/P CABG x 3        Past Surgical History:   Procedure Laterality Date   • APPENDECTOMY     • CARDIAC CATHETERIZATION Left 06/18/2012    Intensify medical therapy   • CARDIAC CATHETERIZATION Left 05/05/2002    surgery   • CHOLECYSTECTOMY     • COLONOSCOPY  02/10/2012   • CORONARY ARTERY BYPASS GRAFT  05/05/2002    LIMA to LAD, SVG to D1, SVG to OM1 - x3   • CORONARY STENT PLACEMENT  09/2009    X1 - Stent to LAD, Drug Eluting   • GALLBLADDER SURGERY     • REPLACEMENT TOTAL KNEE Right    • UPPER GASTROINTESTINAL ENDOSCOPY  03/23/2015         Current Outpatient Prescriptions:   •  aspirin 81 MG EC tablet, Take 81 mg by mouth Daily., Disp: , Rfl:   •  dronedarone (MULTAQ) 400 MG tablet, Take 1 tablet by mouth 2 (Two) Times a Day With Meals., Disp: 60 tablet, Rfl: 11  •  etanercept (ENBREL) 50 MG/ML solution prefilled syringe injection, Inject 50 mg under the skin 1 (One) Time Per Week. Saturday., Disp: , Rfl:   •  ferrous sulfate 325 (65 FE) MG tablet, Take 1 tablet by mouth Daily With Breakfast., Disp: 30 tablet, Rfl: 1  •  folic acid (FOLVITE) 1 MG tablet, Take 1 mg by mouth Daily., Disp: , Rfl:   •  furosemide (LASIX) 20 MG tablet, Take 20 mg by mouth Daily., Disp: , Rfl:   •  glyBURIDE (DIAbeta) 5 MG tablet, Take 5 mg by mouth Daily With Breakfast., Disp: , Rfl:   •  isosorbide mononitrate (IMDUR) 30 MG 24 hr tablet, Take 30 mg by mouth Daily., Disp: , Rfl:   •  metFORMIN (GLUCOPHAGE) 1000 MG tablet, Take 1,000 mg by mouth Daily With Breakfast., Disp: , Rfl:   •  methotrexate 2.5 MG tablet, Take 25 mg by mouth 1 (One) Time Per Week. Saturday., Disp: , Rfl:   •  metoprolol tartrate (LOPRESSOR) 25 MG tablet, Take 1 tablet by mouth 2 (Two) Times a Day., Disp: 180 tablet, Rfl: 3  •  nitroglycerin (NITROSTAT) 0.4 MG SL tablet, Place 0.4 mg under  "the tongue Every 5 (Five) Minutes As Needed for Chest Pain. Take no more than 3 doses in 15 minutes., Disp: , Rfl:   •  pantoprazole (PROTONIX) 40 MG EC tablet, Take 1 tablet by mouth Every 12 (Twelve) Hours., Disp: 60 tablet, Rfl: 1  •  potassium chloride (K-DUR) 10 MEQ CR tablet, Take 10 mEq by mouth Daily., Disp: , Rfl:   •  predniSONE (DELTASONE) 1 MG tablet, Take 4 mg by mouth Every Night., Disp: , Rfl:   •  predniSONE (DELTASONE) 5 MG tablet, Take 5 mg by mouth Every Morning., Disp: , Rfl:   •  simvastatin (ZOCOR) 40 MG tablet, Take 40 mg by mouth Every Night., Disp: , Rfl:   •  sodium-potassium-magnesium sulfates (SUPREP BOWEL PREP KIT) 17.5-3.13-1.6 GM/180ML solution oral solution, Take 2 bottles by mouth 1 (One) Time for 1 dose. Split dose prep as directed by office instructions provided.  2 bottles = one kit., Disp: 2 bottle, Rfl: 0    Allergies   Allergen Reactions   • Albuterol Palpitations     High heart rate   • Codeine      Chest pain   • Sulfa Antibiotics Hives       Social History     Social History   • Marital status:      Spouse name: N/A   • Number of children: N/A   • Years of education: N/A     Occupational History   • Not on file.     Social History Main Topics   • Smoking status: Former Smoker   • Smokeless tobacco: Never Used   • Alcohol use No   • Drug use: No   • Sexual activity: Defer     Other Topics Concern   • Not on file     Social History Narrative       Family History   Problem Relation Age of Onset   • Cancer Father    • Coronary artery disease Father    • Coronary artery disease Brother    • Colon polyps Neg Hx    • Colon cancer Neg Hx    • Esophageal cancer Neg Hx    • Liver cancer Neg Hx    • Liver disease Neg Hx    • Rectal cancer Neg Hx    • Stomach cancer Neg Hx        Vitals:    11/13/17 0935   BP: 128/74   Pulse: 68   Temp: 96.6 °F (35.9 °C)   SpO2: 97%   Weight: 213 lb (96.6 kg)   Height: 64\" (162.6 cm)       Review of Systems:    General:    Present -feeling well "   Skin:    Not Present-Rash   HEENT:     Not Present-Acute visual changes or Acute hearing changes   Neck :    Not Present- swollen glands   Genitourinary:      Not Present- burning, frequency, urgency hematuria, dysuria,   Cardiovascular:   Not Present-chest pain, palpitations, or pressure   Respiratory:   Not Present- shortness of breath or cough   Gastrointestinal:  Musculoskeletal:  Neurological:  Psychiatric:   Present as mentioned in the HP    Not Present. Recent gait disturbances.    Not Present-Seizures and weakness in extremities.    Not Present- Anxiety or Depression.       Physical Exam:    General Appearance:    Alert, cooperative, in no acute distress   Psych:    Mood appropriate    Eyes:          conjunctivae and sclerae normal, no   icterus, no pallor   ENMT:    Ears appear intact with no abnormalities noted oral mucosa moist   Neck:   No adenopathy, supple, trachea midline, no thyromegaly, no   carotid bruit, no JVD    Cardiovascular:    Regular rhythm and normal rate, normal S1 and S2, no            murmur, no gallop, no rub, no click   Gastrointestinal:     Inspection normal.  Normal bowel sounds, no masses, no organomegaly, soft round non-tender, non-distended, no guarding, no rebound or tenderness. No hepatosplenomegaly.   Skin:   No bleeding, bruising or rash   Neurologic:   nonfocal       Lab Results   Component Value Date    WBC 10.43 10/31/2017    WBC 7.95 10/24/2017    WBC 9.82 10/23/2017    HGB 9.6 (L) 10/31/2017    HGB 8.7 (L) 10/24/2017    HGB 8.8 (L) 10/24/2017    HCT 33.9 (L) 10/31/2017    HCT 30.4 (L) 10/24/2017    HCT 30.6 (L) 10/24/2017     10/31/2017     10/24/2017     10/23/2017        Lab Results   Component Value Date     10/24/2017     10/23/2017     10/22/2017    K 3.6 10/24/2017    K 3.3 (L) 10/23/2017    K 3.5 10/22/2017     10/24/2017     10/23/2017     10/22/2017    CO2 28.0 10/24/2017    CO2 26.0 10/23/2017    CO2  26.0 10/22/2017    BUN 6 10/24/2017    BUN 6 10/23/2017    BUN 11 10/22/2017    CREATININE 0.55 10/24/2017    CREATININE 0.49 (L) 10/23/2017    CREATININE 0.52 10/22/2017    BILITOT 1.2 (H) 10/23/2017    BILITOT 0.8 10/22/2017    BILITOT 0.9 04/26/2017    ALKPHOS 94 10/23/2017    ALKPHOS 110 10/22/2017    ALKPHOS 82 04/26/2017    ALT 49 10/23/2017    ALT 51 10/22/2017    ALT 41 04/26/2017    AST 27 10/23/2017    AST 46 (H) 10/22/2017    AST 30 04/26/2017    GLUCOSE 137 (H) 10/24/2017    GLUCOSE 159 (H) 10/23/2017    GLUCOSE 120 (H) 10/22/2017       Lab Results   Component Value Date    INR 0.83 (L) 09/02/2016    INR 0.91 03/20/2016    INR 0.90 (L) 02/12/2015       Assessment and Plan:     was seen today for follow-up.    Diagnoses and all orders for this visit:    Anemia, unspecified type  Comments:  Xarelto was stopped by cardiology   Orders:  -     Case Request; Standing  -     Case Request EGD and colonoscopy    Abnormal finding on imaging  -     Case Request; Standing  -     Case Request    Black tarry stools  -     Case Request; Standing  -     Case Request    History of esophagitis  -     Case Request; Standing  -     Case Request    Other orders  -     Implement Anesthesia Orders Day of Procedure; Standing  -     Obtain Informed Consent; Standing  -     Verify bowel prep was successful; Standing  -     sodium-potassium-magnesium sulfates (SUPREP BOWEL PREP KIT) 17.5-3.13-1.6 GM/180ML solution oral solution; Take 2 bottles by mouth 1 (One) Time for 1 dose. Split dose prep as directed by office instructions provided.  2 bottles = one kit.       The risks, benefits, and alternatives of endoscopy were reviewed with the patient today.  Risks including perforation, with or without dilation, possibly requiring surgery.  Additional risks include risk of bleeding.  There is also the risk of a drug reaction or problems with anesthesia.  This will be discussed with the further by the anesthesia team on the  day of the procedure. The benefits include the diagnosis and management of disease of the upper digestive tract.  Alternatives to endoscopy include upper GI series, laboratory testing, radiographic evaluation, or no intervention.  The patient verbalizes understanding and agrees.    The risks, benefits, and alternatives of colonoscopy were reviewed with the patient today.  Risks including perforation of the colon possibly requiring surgery or colostomy.  Additional risks include risk of bleeding from biopsies or removal of colon tissue.  There is also the risk of a drug reaction or problems with anesthesia.  This will be discussed with the further by the anesthesia team on the day of the procedure.  Lastly there is a possibility of missing a colon polyp or cancer.  The benefits include the diagnosis and management of disease of the colon and rectum.  Alternatives to colonoscopy include barium enema, laboratory testing, radiographic evaluation, or no intervention.  The patient verbalizes understanding and agrees.      Next follow-up appointment      EMR Dragon/Transcription disclaimer:  Much of this encounter note is an electronic transcription/translation of spoken language to printed text. The electronic translation of spoken language may permit erroneous, or at times, nonsensical words or phrases to be inadvertently transcribed; although I have reviewed the note for such errors, some may still exist.

## 2017-11-14 ENCOUNTER — OFFICE VISIT (OUTPATIENT)
Dept: FAMILY MEDICINE CLINIC | Age: 62
End: 2017-11-14
Payer: COMMERCIAL

## 2017-11-14 ENCOUNTER — HOSPITAL ENCOUNTER (OUTPATIENT)
Age: 62
Setting detail: SPECIMEN
Discharge: HOME | End: 2017-11-14
Payer: COMMERCIAL

## 2017-11-14 VITALS
HEIGHT: 64 IN | BODY MASS INDEX: 36.02 KG/M2 | HEART RATE: 67 BPM | WEIGHT: 211 LBS | SYSTOLIC BLOOD PRESSURE: 110 MMHG | DIASTOLIC BLOOD PRESSURE: 68 MMHG | OXYGEN SATURATION: 96 %

## 2017-11-14 DIAGNOSIS — I10 ESSENTIAL HYPERTENSION: ICD-10-CM

## 2017-11-14 DIAGNOSIS — K27.9 PEPTIC ULCER DISEASE: ICD-10-CM

## 2017-11-14 DIAGNOSIS — Z00.00 ANNUAL PHYSICAL EXAM: ICD-10-CM

## 2017-11-14 DIAGNOSIS — D50.9 IRON DEFICIENCY ANEMIA, UNSPECIFIED IRON DEFICIENCY ANEMIA TYPE: Primary | ICD-10-CM

## 2017-11-14 DIAGNOSIS — D50.9 IRON DEFICIENCY ANEMIA, UNSPECIFIED IRON DEFICIENCY ANEMIA TYPE: ICD-10-CM

## 2017-11-14 DIAGNOSIS — Z12.4 ENCOUNTER FOR PAPANICOLAOU SMEAR FOR CERVICAL CANCER SCREENING: ICD-10-CM

## 2017-11-14 LAB
ANION GAP SERPL CALCULATED.3IONS-SCNC: 13 MMOL/L (ref 7–19)
BASOPHILS ABSOLUTE: 0.1 K/UL (ref 0–0.2)
BASOPHILS RELATIVE PERCENT: 0.6 % (ref 0–1)
BUN BLDV-MCNC: 14 MG/DL (ref 8–23)
CALCIUM SERPL-MCNC: 8.9 MG/DL (ref 8.8–10.2)
CHLORIDE BLD-SCNC: 103 MMOL/L (ref 98–111)
CO2: 28 MMOL/L (ref 22–29)
CREAT SERPL-MCNC: 0.6 MG/DL (ref 0.5–0.9)
EOSINOPHILS ABSOLUTE: 0.1 K/UL (ref 0–0.6)
EOSINOPHILS RELATIVE PERCENT: 0.5 % (ref 0–5)
GFR NON-AFRICAN AMERICAN: >60
GLUCOSE BLD-MCNC: 190 MG/DL (ref 74–109)
HCT VFR BLD CALC: 40.5 % (ref 37–47)
HEMOGLOBIN: 11.2 G/DL (ref 12–16)
LYMPHOCYTES ABSOLUTE: 1.3 K/UL (ref 1.1–4.5)
LYMPHOCYTES RELATIVE PERCENT: 12.9 % (ref 20–40)
MCH RBC QN AUTO: 25.8 PG (ref 27–31)
MCHC RBC AUTO-ENTMCNC: 27.7 G/DL (ref 33–37)
MCV RBC AUTO: 93.3 FL (ref 81–99)
MONOCYTES ABSOLUTE: 0.5 K/UL (ref 0–0.9)
MONOCYTES RELATIVE PERCENT: 4.8 % (ref 0–10)
NEUTROPHILS ABSOLUTE: 7.8 K/UL (ref 1.5–7.5)
NEUTROPHILS RELATIVE PERCENT: 80.7 % (ref 50–65)
PDW BLD-RTO: 24.6 % (ref 11.5–14.5)
PLATELET # BLD: 231 K/UL (ref 130–400)
PMV BLD AUTO: 10.9 FL (ref 9.4–12.3)
POTASSIUM SERPL-SCNC: 4.8 MMOL/L (ref 3.5–5)
RBC # BLD: 4.34 M/UL (ref 4.2–5.4)
SODIUM BLD-SCNC: 144 MMOL/L (ref 136–145)
WBC # BLD: 9.7 K/UL (ref 4.8–10.8)

## 2017-11-14 PROCEDURE — 99396 PREV VISIT EST AGE 40-64: CPT | Performed by: FAMILY MEDICINE

## 2017-11-14 PROCEDURE — 88142 CYTOPATH C/V THIN LAYER: CPT

## 2017-11-14 RX ORDER — POTASSIUM CHLORIDE 750 MG/1
10 TABLET, FILM COATED, EXTENDED RELEASE ORAL DAILY
Qty: 30 TABLET | Refills: 3 | Status: SHIPPED | OUTPATIENT
Start: 2017-11-14 | End: 2018-02-19 | Stop reason: SDUPTHER

## 2017-11-14 RX ORDER — METOPROLOL TARTRATE 50 MG/1
TABLET, FILM COATED ORAL
Qty: 60 TABLET | Refills: 3 | Status: SHIPPED | OUTPATIENT
Start: 2017-11-14 | End: 2018-01-23 | Stop reason: SDUPTHER

## 2017-11-14 ASSESSMENT — PATIENT HEALTH QUESTIONNAIRE - PHQ9
SUM OF ALL RESPONSES TO PHQ9 QUESTIONS 1 & 2: 1
2. FEELING DOWN, DEPRESSED OR HOPELESS: 0
SUM OF ALL RESPONSES TO PHQ QUESTIONS 1-9: 1
1. LITTLE INTEREST OR PLEASURE IN DOING THINGS: 1

## 2017-11-14 NOTE — PROGRESS NOTES
daily      glyBURIDE (DIABETA) 5 MG tablet Take 5 mg by mouth daily      predniSONE (DELTASONE) 5 MG tablet Take 5 mg by mouth Take 5 mg in the morning and 4 mg in the evening.  aspirin EC 81 MG EC tablet Take 81 mg by mouth daily      dronedarone hcl (MULTAQ) 400 MG TABS Take 400 mg by mouth 2 times daily      etanercept (ENBREL) 50 MG/ML injection Inject 50 mg into the skin once a week      methotrexate (RHEUMATREX) 2.5 MG chemo tablet Take 25 mg by mouth      folic acid (FOLVITE) 1 MG tablet Take 1 mg by mouth      pantoprazole (PROTONIX) 40 MG tablet Take 40 mg by mouth      ondansetron (ZOFRAN) 8 MG tablet TAKE 1 TABLET BY MOUTH EVERY 8 HOURS AS NEEDED 21 tablet 0    metFORMIN, MOD, (GLUMETZA) 1000 MG extended release tablet Take 1 tablet by mouth daily (with breakfast) 90 tablet 1    simvastatin (ZOCOR) 40 MG tablet Take 1 tablet by mouth every evening 30 tablet 3    furosemide (LASIX) 20 MG tablet Take 1 tablet by mouth daily 60 tablet 3    losartan (COZAAR) 25 MG tablet Take 25 mg by mouth daily      nitroGLYCERIN (NITROSTAT) 0.4 MG SL tablet Place 0.4 mg under the tongue       No current facility-administered medications for this visit. Allergies   Allergen Reactions    Albuterol     Codeine     Sulfa Antibiotics        Health Maintenance   Topic Date Due    Hepatitis C screen  1955    Diabetic foot exam  10/24/1966    Diabetic retinal exam  10/24/1966    HIV screen  10/24/1971    Colon cancer screen colonoscopy  10/24/2006    Diabetic hemoglobin A1C test  08/27/2015    Diabetic microalbuminuria test  08/27/2015    Lipid screen  08/27/2015    Zostavax vaccine  10/24/2016    Flu vaccine (1) 09/01/2017    Breast cancer screen  12/05/2019    Cervical cancer screen  11/14/2020    DTaP/Tdap/Td vaccine (2 - Td) 12/31/2022    Pneumococcal med risk  Completed       Subjective:      Review of Systems   Constitutional: Negative for chills and fever.    HENT: Negative for congestion. Respiratory: Negative for cough, chest tightness and shortness of breath. Cardiovascular: Negative for chest pain, palpitations and leg swelling. Gastrointestinal: Negative for abdominal pain, anal bleeding, constipation, diarrhea and nausea. Genitourinary: Negative for difficulty urinating. Psychiatric/Behavioral: Negative. See HPI for visit specific review of symptoms. All others negative      Objective:   /68   Pulse 67   Ht 5' 4\" (1.626 m)   Wt 211 lb (95.7 kg)   SpO2 96%   BMI 36.22 kg/m²   Physical Exam   Constitutional: She appears well-developed. She does not appear ill. Eyes: Pupils are equal, round, and reactive to light. Neck: Normal range of motion. Neck supple. Cardiovascular: Normal rate and regular rhythm. Exam reveals no friction rub. No murmur heard. Pulmonary/Chest: Effort normal and breath sounds normal. No respiratory distress. She has no wheezes. She has no rales. Abdominal: Soft. Bowel sounds are normal. She exhibits no distension. There is no tenderness. There is no rebound and no guarding. Genitourinary: Cervix exhibits no motion tenderness, no discharge and no friability. Right adnexum displays no mass, no tenderness and no fullness. Left adnexum displays no mass, no tenderness and no fullness. No tenderness in the vagina. No vaginal discharge found. Musculoskeletal: She exhibits no edema. Neurological: She is alert. Psychiatric: She has a normal mood and affect.  Her behavior is normal.         Lab Review   Recent Results (from the past 672 hour(s))   CBC Auto Differential    Collection Time: 11/14/17 12:53 PM   Result Value Ref Range    WBC 9.7 4.8 - 10.8 K/uL    RBC 4.34 4.20 - 5.40 M/uL    Hemoglobin 11.2 (L) 12.0 - 16.0 g/dL    Hematocrit 40.5 37.0 - 47.0 %    MCV 93.3 81.0 - 99.0 fL    MCH 25.8 (L) 27.0 - 31.0 pg    MCHC 27.7 (L) 33.0 - 37.0 g/dL    RDW 24.6 (H) 11.5 - 14.5 %    Platelets 689 466 - 568 K/uL    MPV 10.9 9.4 - 12.3 fL    Neutrophils % 80.7 (H) 50.0 - 65.0 %    Lymphocytes % 12.9 (L) 20.0 - 40.0 %    Monocytes % 4.8 0.0 - 10.0 %    Eosinophils % 0.5 0.0 - 5.0 %    Basophils % 0.6 0.0 - 1.0 %    Neutrophils # 7.8 (H) 1.5 - 7.5 K/uL    Lymphocytes # 1.3 1.1 - 4.5 K/uL    Monocytes # 0.50 0.00 - 0.90 K/uL    Eosinophils # 0.10 0.00 - 0.60 K/uL    Basophils # 0.10 0.00 - 0.20 K/uL   Basic Metabolic Panel    Collection Time: 11/14/17 12:53 PM   Result Value Ref Range    Sodium 144 136 - 145 mmol/L    Potassium 4.8 3.5 - 5.0 mmol/L    Chloride 103 98 - 111 mmol/L    CO2 28 22 - 29 mmol/L    Anion Gap 13 7 - 19 mmol/L    Glucose 190 (H) 74 - 109 mg/dL    BUN 14 8 - 23 mg/dL    CREATININE 0.6 0.5 - 0.9 mg/dL    GFR Non-African American >60 >60    Calcium 8.9 8.8 - 10.2 mg/dL               Assessment & Plan: The following diagnoses and conditions are stable with no further orders unless indicated:  Rosa Elena Sandoval was seen today for annual exam.    Diagnoses and all orders for this visit:    Iron deficiency anemia, unspecified iron deficiency anemia type  -     CBC Auto Differential; Future   STable, continue protonix. Continue Fe. Essential hypertension  -     Basic Metabolic Panel; Future  Blood pressure is stable. Continue current medications. Monitor ambulatory bp readings, if persistently >140/90, return to clinic. Encounter for Papanicolaou smear for cervical cancer screening  -     PAP SMEAR; Future    Annual physical exam  Labs reviewed and discussed. Encouraged healthy diet and regular exercise. Immunizations UTD. See HPI for further details. Peptic ulcer disease  Followed by GI, continue protonix. Other orders  -     potassium chloride (KLOR-CON) 10 MEQ extended release tablet; Take 1 tablet by mouth daily  -     metoprolol tartrate (LOPRESSOR) 50 MG tablet; TAKE 1 TABLET TWICE DAILY            Return in about 2 months (around 1/14/2018). Take 1/2 losartan 25 daily for renal protection.       Discussed use, benefit, and side effects of prescribed medications. All patient questions answered. Pt voiced understanding. Reviewed health maintenance. Instructed to continue current medications, diet and exercise. Patient agreed with treatment plan. Follow up as directed.

## 2017-11-23 RX ORDER — RIVAROXABAN 20 MG/1
TABLET, FILM COATED ORAL
Qty: 30 TABLET | Refills: 11 | Status: SHIPPED | OUTPATIENT
Start: 2017-11-23 | End: 2017-11-28

## 2017-11-28 ENCOUNTER — OFFICE VISIT (OUTPATIENT)
Dept: CARDIOLOGY | Facility: CLINIC | Age: 62
End: 2017-11-28

## 2017-11-28 VITALS
WEIGHT: 203.2 LBS | HEIGHT: 64 IN | HEART RATE: 73 BPM | BODY MASS INDEX: 34.69 KG/M2 | DIASTOLIC BLOOD PRESSURE: 74 MMHG | SYSTOLIC BLOOD PRESSURE: 128 MMHG

## 2017-11-28 DIAGNOSIS — E11.9 TYPE 2 DIABETES MELLITUS WITHOUT COMPLICATION, WITHOUT LONG-TERM CURRENT USE OF INSULIN (HCC): ICD-10-CM

## 2017-11-28 DIAGNOSIS — I10 ESSENTIAL HYPERTENSION: ICD-10-CM

## 2017-11-28 DIAGNOSIS — R06.02 SHORTNESS OF BREATH: ICD-10-CM

## 2017-11-28 DIAGNOSIS — I25.10 CORONARY ARTERY DISEASE INVOLVING NATIVE CORONARY ARTERY OF NATIVE HEART WITHOUT ANGINA PECTORIS: Primary | ICD-10-CM

## 2017-11-28 DIAGNOSIS — E78.2 MIXED HYPERLIPIDEMIA: ICD-10-CM

## 2017-11-28 DIAGNOSIS — R60.9 EDEMA, UNSPECIFIED TYPE: ICD-10-CM

## 2017-11-28 DIAGNOSIS — I48.92 ATRIAL FLUTTER, UNSPECIFIED TYPE (HCC): ICD-10-CM

## 2017-11-28 PROCEDURE — 99214 OFFICE O/P EST MOD 30 MIN: CPT | Performed by: NURSE PRACTITIONER

## 2017-11-28 PROCEDURE — 93000 ELECTROCARDIOGRAM COMPLETE: CPT | Performed by: NURSE PRACTITIONER

## 2017-11-28 RX ORDER — LOSARTAN POTASSIUM 25 MG/1
25 TABLET ORAL DAILY
COMMUNITY

## 2017-11-28 NOTE — PROGRESS NOTES
"    Subjective:     Encounter Date:11/28/2017      Patient ID: Teresa Serna is a 62 y.o. female.    Chief Complaint:  HPI Comments: The patient reports she is feeling fair. She admits mild chronic MCCAULEY. She denies chest pain. She has intermittent palpitations- usually daily but only last a few seconds and do not interfere with activities of daily living. Her primary complaint is lower extremity swelling with associated pain- front of her legs, and redness. She denies calf pain, itching, drainage, fever, or chills. She denies weight gain.     Coronary Artery Disease   Presents for follow-up visit. Symptoms include leg swelling and palpitations. Pertinent negatives include no chest pain, chest pressure, chest tightness, dizziness, shortness of breath or weight gain. The symptoms have been stable. Compliance with diet is variable. Compliance with exercise is variable. Compliance with medications is good.   Atrial Fibrillation   Presents for follow-up visit. Symptoms include palpitations. Symptoms are negative for chest pain, dizziness, shortness of breath, syncope and weakness. The symptoms have been stable. Past medical history includes atrial fibrillation and CAD.       The following portions of the patient's history were reviewed and updated as appropriate: allergies, current medications, past family history, past medical history, past social history, past surgical history and problem list.  /74  Pulse 73  Ht 64\" (162.6 cm)  Wt 203 lb 3.2 oz (92.2 kg)  BMI 34.88 kg/m2  Allergies   Allergen Reactions   • Albuterol Palpitations     High heart rate   • Codeine      Chest pain   • Sulfa Antibiotics Hives       Current Outpatient Prescriptions:   •  aspirin 81 MG EC tablet, Take 81 mg by mouth Daily., Disp: , Rfl:   •  dronedarone (MULTAQ) 400 MG tablet, Take 1 tablet by mouth 2 (Two) Times a Day With Meals., Disp: 60 tablet, Rfl: 11  •  etanercept (ENBREL) 50 MG/ML solution prefilled syringe injection, Inject " 50 mg under the skin 1 (One) Time Per Week. Saturday., Disp: , Rfl:   •  ferrous sulfate 325 (65 FE) MG tablet, Take 1 tablet by mouth Daily With Breakfast., Disp: 30 tablet, Rfl: 1  •  folic acid (FOLVITE) 1 MG tablet, Take 1 mg by mouth Daily., Disp: , Rfl:   •  furosemide (LASIX) 20 MG tablet, Take 20 mg by mouth Daily., Disp: , Rfl:   •  glyBURIDE (DIAbeta) 5 MG tablet, Take 5 mg by mouth Daily With Breakfast., Disp: , Rfl:   •  isosorbide mononitrate (IMDUR) 30 MG 24 hr tablet, Take 30 mg by mouth Daily., Disp: , Rfl:   •  losartan (COZAAR) 25 MG tablet, Take 25 mg by mouth Daily., Disp: , Rfl:   •  metFORMIN (GLUCOPHAGE) 1000 MG tablet, Take 1,000 mg by mouth Daily With Breakfast., Disp: , Rfl:   •  methotrexate 2.5 MG tablet, Take 2.5 mg by mouth 1 (One) Time Per Week. Takes 10 tablets on Saturday., Disp: , Rfl:   •  metoprolol tartrate (LOPRESSOR) 25 MG tablet, Take 1 tablet by mouth 2 (Two) Times a Day. (Patient taking differently: Take 50 mg by mouth 2 (Two) Times a Day.), Disp: 180 tablet, Rfl: 3  •  nitroglycerin (NITROSTAT) 0.4 MG SL tablet, Place 0.4 mg under the tongue Every 5 (Five) Minutes As Needed for Chest Pain. Take no more than 3 doses in 15 minutes., Disp: , Rfl:   •  pantoprazole (PROTONIX) 40 MG EC tablet, Take 1 tablet by mouth Every 12 (Twelve) Hours., Disp: 60 tablet, Rfl: 1  •  potassium chloride (K-DUR) 10 MEQ CR tablet, Take 10 mEq by mouth Daily., Disp: , Rfl:   •  predniSONE (DELTASONE) 1 MG tablet, Take 4 mg by mouth Every Night., Disp: , Rfl:   •  predniSONE (DELTASONE) 5 MG tablet, Take 5 mg by mouth Every Morning., Disp: , Rfl:   •  simvastatin (ZOCOR) 40 MG tablet, Take 40 mg by mouth Every Night., Disp: , Rfl:   Past Medical History:   Diagnosis Date   • Abnormal stress test    • Atrial flutter    • CAD (coronary artery disease)    • CAD in native artery     CABG x 3   • Diabetes mellitus    • Essential hypertension     Tricuspid valve insufficiency, unspecified etiology    •  Hyperlipemia, mixed    • Hyperlipidemia    • Hypertension    • MI, old    • Mitral valve prolapse    • Near syncope    • Obesity, morbid    • Palpitations    • Paroxysmal SVT (supraventricular tachycardia)    • Paroxysmal SVT (supraventricular tachycardia)    • Pedal edema    • Precordial pain    • Rheumatoid arthritis    • Rheumatoid arthritis    • S/P CABG x 3      Past Surgical History:   Procedure Laterality Date   • APPENDECTOMY     • CARDIAC CATHETERIZATION Left 06/18/2012    Intensify medical therapy   • CARDIAC CATHETERIZATION Left 05/05/2002    surgery   • CHOLECYSTECTOMY     • COLONOSCOPY  02/10/2012   • CORONARY ARTERY BYPASS GRAFT  05/05/2002    LIMA to LAD, SVG to D1, SVG to OM1 - x3   • CORONARY STENT PLACEMENT  09/2009    X1 - Stent to LAD, Drug Eluting   • GALLBLADDER SURGERY     • REPLACEMENT TOTAL KNEE Right    • UPPER GASTROINTESTINAL ENDOSCOPY  03/23/2015     Social History     Social History   • Marital status:      Spouse name: N/A   • Number of children: N/A   • Years of education: N/A     Occupational History   • Not on file.     Social History Main Topics   • Smoking status: Former Smoker   • Smokeless tobacco: Never Used   • Alcohol use No   • Drug use: No   • Sexual activity: Defer     Other Topics Concern   • Not on file     Social History Narrative     Family History   Problem Relation Age of Onset   • Cancer Father    • Coronary artery disease Father    • Coronary artery disease Brother    • Colon polyps Neg Hx    • Colon cancer Neg Hx    • Esophageal cancer Neg Hx    • Liver cancer Neg Hx    • Liver disease Neg Hx    • Rectal cancer Neg Hx    • Stomach cancer Neg Hx        Review of Systems   Constitution: Negative for chills, diaphoresis, fever, weakness and weight gain.   HENT: Negative for nosebleeds.    Eyes: Negative for visual disturbance.   Cardiovascular: Positive for dyspnea on exertion, leg swelling and palpitations. Negative for chest pain, claudication, cyanosis,  irregular heartbeat, near-syncope, orthopnea, paroxysmal nocturnal dyspnea and syncope.   Respiratory: Negative for chest tightness, cough, hemoptysis, shortness of breath, sputum production and wheezing.    Hematologic/Lymphatic: Negative for bleeding problem.   Skin: Negative for color change and flushing.   Musculoskeletal: Positive for myalgias. Negative for falls.   Gastrointestinal: Negative for bloating, abdominal pain, hematemesis, hematochezia, melena, nausea and vomiting.   Genitourinary: Negative for hematuria.   Neurological: Negative for dizziness and light-headedness.   Psychiatric/Behavioral: Negative for altered mental status.         ECG 12 Lead  Date/Time: 11/28/2017 12:25 PM  Performed by: LEON AYALA  Authorized by: LEON AYALA   Comparison: compared with previous ECG from 10/22/2017  Similar to previous ECG  Rhythm: sinus rhythm               Objective:     Physical Exam   Constitutional: She is oriented to person, place, and time. She appears well-developed and well-nourished. No distress.   HENT:   Head: Normocephalic and atraumatic.   Eyes: Pupils are equal, round, and reactive to light.   Neck: Normal range of motion. Neck supple. No JVD present. No thyromegaly present.   Cardiovascular: Normal rate, regular rhythm, normal heart sounds and intact distal pulses.  Exam reveals no gallop and no friction rub.    No murmur heard.  Pulses:       Dorsalis pedis pulses are 2+ on the right side, and 2+ on the left side.   Pulmonary/Chest: Effort normal and breath sounds normal. No respiratory distress. She has no wheezes. She has no rales. She exhibits no tenderness.   Abdominal: Soft. Bowel sounds are normal. She exhibits no distension. There is no tenderness.   Musculoskeletal: Normal range of motion. She exhibits edema (2+ BLE edema).   Neurological: She is alert and oriented to person, place, and time. No cranial nerve deficit.   Skin: Skin is warm and dry. She is not diaphoretic.   Erythema of  lower extremities   Psychiatric: She has a normal mood and affect. Her behavior is normal.       Lab Review:       Assessment:          Diagnosis Plan   1. Coronary artery disease involving native coronary artery of native heart without angina pectoris  CABG 2000, PCI 2009. Stable, no angina. Stress echo negative 3/2017             2. Atrial flutter, unspecified type  Paroxysmal, maintaining NSR on multaq per today's EKG   3. Mixed hyperlipidemia  On statin, followed by pcp    4. Type 2 diabetes mellitus without complication, without long-term current use of insulin     5. Essential hypertension  Controlled          Plan:       2d echo  Flexible lasix dosing - continue 20 mg daily and take 1 extra 20 mg daily PRN sudden significant weight gain (greater than 2 lbs overnight or greater than 5 lbs in 1 week), edema or shortness of breath  Low sodium diet- less than 2g/day  Compression stockings  Consider ABIs at follow up if no improvement in leg pain- pt declines today; good pedal pulses   Continue current medical therapy- ASA, multaq, losartan 25 mg daily, lopressor 50 mg BID (pt increased lopressor back to 50 BID due to tachycardia and it resolved), imdur and statin  Xarelto on hold due to heme positive stools, anemia during recent hospital admission- followed by Dr. Bo. She is scheduled for endoscopy and colonoscopy at the end of January. Hx ulcers. She admits continued dark stools, but believes this may be secondary to taking iron. She admits a small amount of scattered bruising. She denies any other signs of bleeding. Pt reports anemia stable and improved per recent labs by Dr. Contreras- obtain copy.   Follow up 4 weeks, sooner with new or worsening symptoms.    Reviewed signs and symptoms of CHF and what to report with the patient. Patient instructed to restrict sodium and weigh daily. Report weight gain of greater than 2 lbs overnight or 5 lbs in 1 week. Pt verbalized understanding of instructions and plan of  care.

## 2017-12-05 ENCOUNTER — HOSPITAL ENCOUNTER (OUTPATIENT)
Dept: CARDIOLOGY | Facility: HOSPITAL | Age: 62
Discharge: HOME | End: 2017-12-05
Admitting: NURSE PRACTITIONER

## 2017-12-05 ENCOUNTER — HOSPITAL ENCOUNTER (OUTPATIENT)
Dept: WOMENS IMAGING | Age: 62
Discharge: HOME | End: 2017-12-05
Payer: COMMERCIAL

## 2017-12-05 VITALS
BODY MASS INDEX: 34.66 KG/M2 | SYSTOLIC BLOOD PRESSURE: 130 MMHG | DIASTOLIC BLOOD PRESSURE: 70 MMHG | WEIGHT: 203 LBS | HEIGHT: 64 IN

## 2017-12-05 DIAGNOSIS — R60.9 EDEMA, UNSPECIFIED TYPE: ICD-10-CM

## 2017-12-05 DIAGNOSIS — R06.02 SHORTNESS OF BREATH: ICD-10-CM

## 2017-12-05 DIAGNOSIS — Z12.31 VISIT FOR SCREENING MAMMOGRAM: ICD-10-CM

## 2017-12-05 PROCEDURE — 93306 TTE W/DOPPLER COMPLETE: CPT

## 2017-12-05 PROCEDURE — 77063 BREAST TOMOSYNTHESIS BI: CPT

## 2017-12-05 PROCEDURE — 93306 TTE W/DOPPLER COMPLETE: CPT | Performed by: INTERNAL MEDICINE

## 2017-12-07 LAB
BH CV ECHO MEAS - AO MAX PG (FULL): 4.6 MMHG
BH CV ECHO MEAS - AO MAX PG: 9.6 MMHG
BH CV ECHO MEAS - AO MEAN PG (FULL): 3 MMHG
BH CV ECHO MEAS - AO MEAN PG: 5 MMHG
BH CV ECHO MEAS - AO ROOT AREA (BSA CORRECTED): 1.7
BH CV ECHO MEAS - AO ROOT AREA: 8.6 CM^2
BH CV ECHO MEAS - AO ROOT DIAM: 3.3 CM
BH CV ECHO MEAS - AO V2 MAX: 155 CM/SEC
BH CV ECHO MEAS - AO V2 MEAN: 99 CM/SEC
BH CV ECHO MEAS - AO V2 VTI: 33.4 CM
BH CV ECHO MEAS - AVA(I,A): 1.8 CM^2
BH CV ECHO MEAS - AVA(I,D): 1.8 CM^2
BH CV ECHO MEAS - AVA(V,A): 2 CM^2
BH CV ECHO MEAS - AVA(V,D): 2 CM^2
BH CV ECHO MEAS - BSA(HAYCOCK): 2.1 M^2
BH CV ECHO MEAS - BSA: 2 M^2
BH CV ECHO MEAS - BZI_BMI: 34.8 KILOGRAMS/M^2
BH CV ECHO MEAS - BZI_METRIC_HEIGHT: 162.6 CM
BH CV ECHO MEAS - BZI_METRIC_WEIGHT: 92.1 KG
BH CV ECHO MEAS - CONTRAST EF 4CH: 63.8 ML/M^2
BH CV ECHO MEAS - EDV(CUBED): 87.5 ML
BH CV ECHO MEAS - EDV(MOD-SP4): 55.6 ML
BH CV ECHO MEAS - EDV(TEICH): 89.6 ML
BH CV ECHO MEAS - EF(CUBED): 71.6 %
BH CV ECHO MEAS - EF(MOD-SP4): 63.8 %
BH CV ECHO MEAS - EF(TEICH): 63.4 %
BH CV ECHO MEAS - ESV(CUBED): 24.9 ML
BH CV ECHO MEAS - ESV(MOD-SP4): 20.1 ML
BH CV ECHO MEAS - ESV(TEICH): 32.8 ML
BH CV ECHO MEAS - FS: 34.2 %
BH CV ECHO MEAS - IVS/LVPW: 0.9
BH CV ECHO MEAS - IVSD: 0.97 CM
BH CV ECHO MEAS - LA DIMENSION: 5.2 CM
BH CV ECHO MEAS - LA/AO: 1.6
BH CV ECHO MEAS - LAT PEAK E' VEL: 8.5 CM/SEC
BH CV ECHO MEAS - LV DIASTOLIC VOL/BSA (35-75): 28.2 ML/M^2
BH CV ECHO MEAS - LV MASS(C)D: 155.6 GRAMS
BH CV ECHO MEAS - LV MASS(C)DI: 79.1 GRAMS/M^2
BH CV ECHO MEAS - LV MAX PG: 5 MMHG
BH CV ECHO MEAS - LV MEAN PG: 2 MMHG
BH CV ECHO MEAS - LV SYSTOLIC VOL/BSA (12-30): 10.2 ML/M^2
BH CV ECHO MEAS - LV V1 MAX: 112 CM/SEC
BH CV ECHO MEAS - LV V1 MEAN: 65.4 CM/SEC
BH CV ECHO MEAS - LV V1 VTI: 21.3 CM
BH CV ECHO MEAS - LVIDD: 4.4 CM
BH CV ECHO MEAS - LVIDS: 2.9 CM
BH CV ECHO MEAS - LVLD AP4: 7 CM
BH CV ECHO MEAS - LVLS AP4: 5.5 CM
BH CV ECHO MEAS - LVOT AREA (M): 2.8 CM^2
BH CV ECHO MEAS - LVOT AREA: 2.8 CM^2
BH CV ECHO MEAS - LVOT DIAM: 1.9 CM
BH CV ECHO MEAS - LVPWD: 1.1 CM
BH CV ECHO MEAS - MR ALIAS VEL: 36.4 CM/SEC
BH CV ECHO MEAS - MR ERO: 0.06 CM^2
BH CV ECHO MEAS - MR FLOW RATE: 36.6 CM^3/SEC
BH CV ECHO MEAS - MR MAX PG: 128.8 MMHG
BH CV ECHO MEAS - MR MAX VEL: 567.5 CM/SEC
BH CV ECHO MEAS - MR MEAN PG: 95 MMHG
BH CV ECHO MEAS - MR MEAN VEL: 465 CM/SEC
BH CV ECHO MEAS - MR PISA RADIUS: 0.4 CM
BH CV ECHO MEAS - MR PISA: 1 CM^2
BH CV ECHO MEAS - MR VOLUME: 13.3 ML
BH CV ECHO MEAS - MR VTI: 207 CM
BH CV ECHO MEAS - MV A MAX VEL: 60.6 CM/SEC
BH CV ECHO MEAS - MV AREA (1 DIAM): 2.5 CM^2
BH CV ECHO MEAS - MV DEC TIME: 0.21 SEC
BH CV ECHO MEAS - MV DIAM: 1.8 CM
BH CV ECHO MEAS - MV E MAX VEL: 109 CM/SEC
BH CV ECHO MEAS - MV E/A: 1.8
BH CV ECHO MEAS - MV FLOW AREA(1DIAM): 2.5 CM^2
BH CV ECHO MEAS - PA MAX PG: 4.5 MMHG
BH CV ECHO MEAS - PA V2 MAX: 106 CM/SEC
BH CV ECHO MEAS - RAP SYSTOLE: 5 MMHG
BH CV ECHO MEAS - RVDD: 4.3 CM
BH CV ECHO MEAS - RVSP: 42.9 MMHG
BH CV ECHO MEAS - SI(AO): 145.1 ML/M^2
BH CV ECHO MEAS - SI(CUBED): 31.8 ML/M^2
BH CV ECHO MEAS - SI(LVOT): 30.7 ML/M^2
BH CV ECHO MEAS - SI(MOD-SP4): 18 ML/M^2
BH CV ECHO MEAS - SI(TEICH): 28.9 ML/M^2
BH CV ECHO MEAS - SV(AO): 285.7 ML
BH CV ECHO MEAS - SV(CUBED): 62.6 ML
BH CV ECHO MEAS - SV(LVOT): 60.4 ML
BH CV ECHO MEAS - SV(MOD-SP4): 35.5 ML
BH CV ECHO MEAS - SV(TEICH): 56.8 ML
BH CV ECHO MEAS - TR MAX VEL: 308 CM/SEC
E/E' RATIO: 14.3
LEFT ATRIUM VOLUME INDEX: 37 ML/M2
LEFT ATRIUM VOLUME: 70 CM3
LV EF 2D ECHO EST: 60 %
MAXIMAL PREDICTED HEART RATE: 159 BPM
STRESS TARGET HR: 135 BPM

## 2017-12-10 PROBLEM — Z00.00 ANNUAL PHYSICAL EXAM: Status: ACTIVE | Noted: 2017-11-14

## 2017-12-10 PROBLEM — K27.9 PEPTIC ULCER DISEASE: Status: ACTIVE | Noted: 2017-12-10

## 2017-12-10 ASSESSMENT — ENCOUNTER SYMPTOMS
DIARRHEA: 0
ABDOMINAL PAIN: 0
NAUSEA: 0
COUGH: 0
CONSTIPATION: 0
SHORTNESS OF BREATH: 0
ANAL BLEEDING: 0
CHEST TIGHTNESS: 0

## 2017-12-19 RX ORDER — DRONEDARONE 400 MG/1
TABLET, FILM COATED ORAL
Qty: 60 TABLET | Refills: 5 | Status: CANCELLED | OUTPATIENT
Start: 2017-12-19

## 2018-01-02 RX ORDER — SIMVASTATIN 40 MG
40 TABLET ORAL EVERY EVENING
Qty: 30 TABLET | Refills: 3 | Status: SHIPPED | OUTPATIENT
Start: 2018-01-02 | End: 2018-04-16 | Stop reason: SDUPTHER

## 2018-01-02 RX ORDER — FERROUS SULFATE 325(65) MG
325 TABLET ORAL DAILY
Qty: 30 TABLET | Refills: 1 | Status: SHIPPED | OUTPATIENT
Start: 2018-01-02 | End: 2018-02-19 | Stop reason: SDUPTHER

## 2018-01-03 ENCOUNTER — OFFICE VISIT (OUTPATIENT)
Dept: CARDIOLOGY | Facility: CLINIC | Age: 63
End: 2018-01-03

## 2018-01-03 VITALS
RESPIRATION RATE: 18 BRPM | HEART RATE: 64 BPM | DIASTOLIC BLOOD PRESSURE: 80 MMHG | HEIGHT: 64 IN | BODY MASS INDEX: 33.63 KG/M2 | SYSTOLIC BLOOD PRESSURE: 118 MMHG | WEIGHT: 197 LBS

## 2018-01-03 DIAGNOSIS — Z95.5 S/P CORONARY ARTERY STENT PLACEMENT: ICD-10-CM

## 2018-01-03 DIAGNOSIS — D64.9 ANEMIA, UNSPECIFIED TYPE: ICD-10-CM

## 2018-01-03 DIAGNOSIS — I48.92 ATRIAL FLUTTER, UNSPECIFIED TYPE (HCC): ICD-10-CM

## 2018-01-03 DIAGNOSIS — I25.10 CORONARY ARTERY DISEASE INVOLVING NATIVE CORONARY ARTERY OF NATIVE HEART WITHOUT ANGINA PECTORIS: Primary | ICD-10-CM

## 2018-01-03 DIAGNOSIS — Z95.1 S/P CABG X 3: ICD-10-CM

## 2018-01-03 DIAGNOSIS — E11.9 TYPE 2 DIABETES MELLITUS WITHOUT COMPLICATION, WITHOUT LONG-TERM CURRENT USE OF INSULIN (HCC): ICD-10-CM

## 2018-01-03 DIAGNOSIS — I10 ESSENTIAL HYPERTENSION: ICD-10-CM

## 2018-01-03 DIAGNOSIS — K92.2 GASTROINTESTINAL HEMORRHAGE, UNSPECIFIED GASTROINTESTINAL HEMORRHAGE TYPE: ICD-10-CM

## 2018-01-03 DIAGNOSIS — E78.2 MIXED HYPERLIPIDEMIA: ICD-10-CM

## 2018-01-03 PROCEDURE — 99213 OFFICE O/P EST LOW 20 MIN: CPT | Performed by: NURSE PRACTITIONER

## 2018-01-03 RX ORDER — METOPROLOL TARTRATE 50 MG/1
50 TABLET, FILM COATED ORAL 2 TIMES DAILY
COMMUNITY

## 2018-01-03 NOTE — PROGRESS NOTES
Subjective:     Encounter Date:01/03/2018      Patient ID: Teresa Serna is a 62 y.o. female. She presents today for follow up of recent office visit with complaints of bilateral lower extremity edema and recent 2D echo. She has a history of coronary artery disease s/p coronary artery bypass grafting X3 in 2009 and subsequent stent placement in 2009, hypertension, hyperlipidemia, type 2 diabetes mellitus and paroxysmal atrial flutter. She was taken off of xarelto during a recent hospital admission due to anemia with GI bleeding. She is scheduled to have and endoscopy/colonoscopy at the end of the month. She denies any signs of bleeding at this time. She reports that bilateral lower extremity edema has improved since her last visit. 2D echo revealed normal left ventricular systolic function with ejection fraction of 60% and mild pulmonary hypertension. She reports chronic intermittent palpitations and dyspnea with exertion that is at baseline. She denies chest pain, dizziness, syncope, orthopnea, PND or swelling.      Chief Complaint:  Coronary Artery Disease   Presents for follow-up visit. Symptoms include palpitations. Pertinent negatives include no chest pain, chest pressure, dizziness, leg swelling, shortness of breath or weight gain. Risk factors include hyperlipidemia. The symptoms have been stable. Compliance with diet is variable. Compliance with exercise is variable. Compliance with medications is good.   Atrial Flutter   This is a chronic problem. The current episode started more than 1 year ago. The problem occurs intermittently. The problem has been unchanged. Pertinent negatives include no abdominal pain, anorexia, arthralgias, change in bowel habit, chest pain, chills, congestion, coughing, diaphoresis, fatigue, fever, headaches, joint swelling, myalgias, nausea, neck pain, numbness, rash, sore throat, swollen glands, urinary symptoms, vertigo, visual change, vomiting or weakness. Treatments tried:  antiarrhythmic. The treatment provided significant relief.   Hypertension   This is a chronic problem. The current episode started more than 1 year ago. The problem is controlled. Associated symptoms include palpitations. Pertinent negatives include no anxiety, blurred vision, chest pain, headaches, malaise/fatigue, neck pain, orthopnea, peripheral edema, PND, shortness of breath or sweats. Risk factors for coronary artery disease include dyslipidemia. Past treatments include alpha 1 blockers, diuretics and beta blockers. The current treatment provides significant improvement.   Hyperlipidemia   This is a chronic problem. The current episode started more than 1 year ago. The problem is controlled. Recent lipid tests were reviewed and are low. Pertinent negatives include no chest pain, leg pain, myalgias or shortness of breath.   Palpitations    This is a chronic problem. The current episode started more than 1 year ago. The problem occurs intermittently. The problem has been unchanged. Pertinent negatives include no anxiety, chest fullness, chest pain, coughing, diaphoresis, dizziness, fever, irregular heartbeat, malaise/fatigue, nausea, near-syncope, numbness, shortness of breath, syncope, vomiting or weakness. She has tried beta blockers and antiarrhythmics for the symptoms. The treatment provided significant relief. Risk factors include diabetes mellitus.   Shortness of Breath   This is a chronic problem. The current episode started more than 1 year ago. The problem occurs intermittently. The problem has been unchanged. Pertinent negatives include no abdominal pain, chest pain, claudication, coryza, ear pain, fever, headaches, hemoptysis, leg pain, leg swelling, neck pain, orthopnea, PND, rash, rhinorrhea, sore throat, sputum production, swollen glands, syncope, vomiting or wheezing. The symptoms are aggravated by exercise. Her past medical history is significant for CAD.       The following portions of the  patient's history were reviewed and updated as appropriate: allergies, current medications, past family history, past medical history, past social history, past surgical history and problem list.     Allergies   Allergen Reactions   • Albuterol Palpitations     High heart rate   • Codeine      Chest pain   • Sulfa Antibiotics Hives       Current Outpatient Prescriptions:   •  aspirin 81 MG EC tablet, Take 81 mg by mouth Daily., Disp: , Rfl:   •  dronedarone (MULTAQ) 400 MG tablet, Take 1 tablet by mouth 2 (Two) Times a Day With Meals., Disp: 60 tablet, Rfl: 11  •  ferrous sulfate 325 (65 FE) MG tablet, Take 1 tablet by mouth Daily With Breakfast., Disp: 30 tablet, Rfl: 1  •  folic acid (FOLVITE) 1 MG tablet, Take 1 mg by mouth Daily., Disp: , Rfl:   •  furosemide (LASIX) 20 MG tablet, Take 20 mg by mouth Daily., Disp: , Rfl:   •  glyBURIDE (DIAbeta) 5 MG tablet, Take 5 mg by mouth Daily With Breakfast., Disp: , Rfl:   •  isosorbide mononitrate (IMDUR) 30 MG 24 hr tablet, Take 30 mg by mouth Daily., Disp: , Rfl:   •  losartan (COZAAR) 25 MG tablet, Take 25 mg by mouth Daily., Disp: , Rfl:   •  metFORMIN (GLUCOPHAGE) 1000 MG tablet, Take 1,000 mg by mouth Daily With Breakfast., Disp: , Rfl:   •  methotrexate 2.5 MG tablet, Take 2.5 mg by mouth 1 (One) Time Per Week. Takes 10 tablets on Saturday., Disp: , Rfl:   •  metoprolol tartrate (LOPRESSOR) 50 MG tablet, Take 50 mg by mouth 2 (Two) Times a Day., Disp: , Rfl:   •  nitroglycerin (NITROSTAT) 0.4 MG SL tablet, Place 0.4 mg under the tongue Every 5 (Five) Minutes As Needed for Chest Pain. Take no more than 3 doses in 15 minutes., Disp: , Rfl:   •  pantoprazole (PROTONIX) 40 MG EC tablet, Take 1 tablet by mouth Every 12 (Twelve) Hours., Disp: 60 tablet, Rfl: 1  •  potassium chloride (K-DUR) 10 MEQ CR tablet, Take 10 mEq by mouth Daily., Disp: , Rfl:   •  predniSONE (DELTASONE) 1 MG tablet, Take 4 mg by mouth Every Night., Disp: , Rfl:   •  predniSONE (DELTASONE) 5 MG  tablet, Take 5 mg by mouth Every Morning., Disp: , Rfl:   Past Medical History:   Diagnosis Date   • Abnormal stress test    • Atrial flutter    • CAD (coronary artery disease)    • CAD in native artery     CABG x 3   • Diabetes mellitus    • Essential hypertension     Tricuspid valve insufficiency, unspecified etiology    • Hyperlipemia, mixed    • Hyperlipidemia    • Hypertension    • MI, old    • Mitral valve prolapse    • Near syncope    • Obesity, morbid    • Palpitations    • Paroxysmal SVT (supraventricular tachycardia)    • Paroxysmal SVT (supraventricular tachycardia)    • Pedal edema    • Precordial pain    • Rheumatoid arthritis    • Rheumatoid arthritis    • S/P CABG x 3      Past Surgical History:   Procedure Laterality Date   • APPENDECTOMY     • CARDIAC CATHETERIZATION Left 06/18/2012    Intensify medical therapy   • CARDIAC CATHETERIZATION Left 05/05/2002    surgery   • CHOLECYSTECTOMY     • COLONOSCOPY  02/10/2012   • CORONARY ARTERY BYPASS GRAFT  05/05/2002    LIMA to LAD, SVG to D1, SVG to OM1 - x3   • CORONARY STENT PLACEMENT  09/2009    X1 - Stent to LAD, Drug Eluting   • GALLBLADDER SURGERY     • REPLACEMENT TOTAL KNEE Right    • UPPER GASTROINTESTINAL ENDOSCOPY  03/23/2015     Family History   Problem Relation Age of Onset   • Cancer Father    • Coronary artery disease Father    • Coronary artery disease Brother    • Colon polyps Neg Hx    • Colon cancer Neg Hx    • Esophageal cancer Neg Hx    • Liver cancer Neg Hx    • Liver disease Neg Hx    • Rectal cancer Neg Hx    • Stomach cancer Neg Hx      Social History     Social History   • Marital status:      Spouse name: N/A   • Number of children: N/A   • Years of education: N/A     Occupational History   • Not on file.     Social History Main Topics   • Smoking status: Former Smoker   • Smokeless tobacco: Never Used   • Alcohol use No   • Drug use: No   • Sexual activity: Defer     Other Topics Concern   • Not on file     Social History  "Narrative         Review of Systems   Constitution: Negative for chills, decreased appetite, diaphoresis, fatigue, fever, weakness, malaise/fatigue, night sweats, weight gain and weight loss.   HENT: Negative for congestion, ear pain, nosebleeds, rhinorrhea and sore throat.    Eyes: Negative for blurred vision and visual disturbance.   Cardiovascular: Positive for dyspnea on exertion and palpitations. Negative for chest pain, claudication, irregular heartbeat, leg swelling, near-syncope, orthopnea, paroxysmal nocturnal dyspnea and syncope.   Respiratory: Negative for cough, hemoptysis, shortness of breath, snoring, sputum production and wheezing.    Endocrine: Negative for cold intolerance and heat intolerance.   Hematologic/Lymphatic: Does not bruise/bleed easily.   Skin: Negative for rash.   Musculoskeletal: Negative for arthralgias, back pain, falls, joint swelling, myalgias and neck pain.   Gastrointestinal: Negative for abdominal pain, anorexia, change in bowel habit, constipation, diarrhea, dysphagia, heartburn, nausea and vomiting.   Genitourinary: Negative for hematuria.   Neurological: Negative for dizziness, headaches, light-headedness, numbness and vertigo.   Psychiatric/Behavioral: Negative for altered mental status. The patient is not nervous/anxious.    Allergic/Immunologic: Negative for persistent infections.       Procedures  /80 (BP Location: Left arm, Patient Position: Sitting, Cuff Size: Adult)  Pulse 64  Resp 18  Ht 162.6 cm (64\")  Wt 89.4 kg (197 lb)  Breastfeeding? No  BMI 33.81 kg/m2       Objective:     Physical Exam   Constitutional: She is oriented to person, place, and time. Vital signs are normal. She appears well-developed and well-nourished. No distress.   HENT:   Head: Normocephalic and atraumatic.   Right Ear: External ear normal.   Left Ear: External ear normal.   Nose: Nose normal.   Eyes: Conjunctivae are normal. Pupils are equal, round, and reactive to light. Right eye " exhibits no discharge. Left eye exhibits no discharge.   Neck: Normal range of motion. Neck supple. No JVD present. Carotid bruit is not present. No tracheal deviation present. No thyromegaly present.   Cardiovascular: Normal rate, regular rhythm, normal heart sounds, intact distal pulses and normal pulses.  PMI is not displaced.  Exam reveals no gallop and no friction rub.    No murmur heard.  Pulses:       Radial pulses are 2+ on the right side, and 2+ on the left side.        Dorsalis pedis pulses are 2+ on the right side, and 2+ on the left side.        Posterior tibial pulses are 2+ on the right side, and 2+ on the left side.   Pulmonary/Chest: Effort normal and breath sounds normal. No respiratory distress. She has no decreased breath sounds. She has no wheezes. She has no rhonchi. She has no rales. She exhibits no tenderness.   Abdominal: Soft. She exhibits no distension. There is no tenderness.   Musculoskeletal: Normal range of motion. She exhibits no edema, tenderness or deformity.   Neurological: She is alert and oriented to person, place, and time.   Skin: Skin is warm and dry. No rash noted. She is not diaphoretic. No erythema. No pallor.   Psychiatric: She has a normal mood and affect. Her behavior is normal. Judgment and thought content normal.   Vitals reviewed.      Lab Review:     Lab Results   Component Value Date    WBC 10.43 10/31/2017    HGB 9.6 (L) 10/31/2017    HCT 33.9 (L) 10/31/2017    MCV 87.8 10/31/2017     10/31/2017     Lab Results   Component Value Date    GLUCOSE 137 (H) 10/24/2017    BUN 6 10/24/2017    CREATININE 0.55 10/24/2017    EGFRIFNONA 112 10/24/2017    BCR 10.9 10/24/2017    K 3.6 10/24/2017    CO2 28.0 10/24/2017    CALCIUM 8.9 10/24/2017    ALBUMIN 3.30 (L) 10/23/2017    LABIL2 1.3 10/23/2017    AST 27 10/23/2017    ALT 49 10/23/2017     Lab Results   Component Value Date    CHOL 85 (L) 10/23/2017    CHOL 117 (L) 04/26/2017    CHLPL 127 (L) 09/03/2016    CHLPL 144  03/22/2016    CHLPL 142 02/12/2015     Lab Results   Component Value Date    TRIG 83 10/23/2017    TRIG 101 04/26/2017    TRIG 158 (H) 09/03/2016     Lab Results   Component Value Date    HDL 48 (L) 10/23/2017    HDL 65 04/26/2017    HDL 54 09/03/2016     Lab Results   Component Value Date    LDLDIRECT <30 10/23/2017    LDLDIRECT 38 04/26/2017    LDLDIRECT 46 09/03/2016           Assessment:          Diagnosis Plan   1. Coronary artery disease involving native coronary artery of native heart without angina pectoris  No clinical signs of ischemia.    2. S/P CABG x 3 2000 Dr Larsen  Continues on aspirin.    3. S/P coronary artery stent placement      2009   4. Atrial flutter, unspecified type  Maintaining sinus rhythm. Xarelto on hold due to GI bleed.    5. Mixed hyperlipidemia  Well controlled.    6. Essential hypertension  Well controlled.    7. Type 2 diabetes mellitus without complication, without long-term current use of insulin     8. Anemia, unspecified type     9. Gastrointestinal hemorrhage, unspecified gastrointestinal hemorrhage type  Scheduled for endoscopy/colonoscopy at the end of the month.           Plan:       1. Continue medications as previously prescribed.   2. Xarelto on hold due to anemia with GI bleed. Endoscopy and colonoscopy scheduled for the end of the month. Resume xarelto when okay with GI.   3. Report any worsening symptoms.   4. Follow up with PCP for blood pressure, cholesterol and diabetes management and routine lab work.  5. Follow up with Dr. Reyes in 6 months, or sooner if needed.   6. Continue heart healthy diet and regular exercise as tolerated.

## 2018-01-11 RX ORDER — LOSARTAN POTASSIUM 25 MG/1
25 TABLET ORAL DAILY
Qty: 30 TABLET | Refills: 3 | Status: SHIPPED | OUTPATIENT
Start: 2018-01-11 | End: 2018-04-16 | Stop reason: SDUPTHER

## 2018-01-18 DIAGNOSIS — Z00.00 ANNUAL PHYSICAL EXAM: Primary | ICD-10-CM

## 2018-01-18 DIAGNOSIS — Z00.00 ANNUAL PHYSICAL EXAM: ICD-10-CM

## 2018-01-18 LAB
ALBUMIN SERPL-MCNC: 4.1 G/DL (ref 3.5–5.2)
ALP BLD-CCNC: 76 U/L (ref 35–104)
ALT SERPL-CCNC: 19 U/L (ref 5–33)
ANION GAP SERPL CALCULATED.3IONS-SCNC: 14 MMOL/L (ref 7–19)
AST SERPL-CCNC: 15 U/L (ref 5–32)
BASOPHILS ABSOLUTE: 0.1 K/UL (ref 0–0.2)
BASOPHILS RELATIVE PERCENT: 0.4 % (ref 0–1)
BILIRUB SERPL-MCNC: 1 MG/DL (ref 0.2–1.2)
BUN BLDV-MCNC: 11 MG/DL (ref 8–23)
CALCIUM SERPL-MCNC: 9.2 MG/DL (ref 8.8–10.2)
CHLORIDE BLD-SCNC: 99 MMOL/L (ref 98–111)
CHOLESTEROL, TOTAL: 144 MG/DL (ref 160–199)
CO2: 28 MMOL/L (ref 22–29)
CREAT SERPL-MCNC: 0.5 MG/DL (ref 0.5–0.9)
EOSINOPHILS ABSOLUTE: 0.1 K/UL (ref 0–0.6)
EOSINOPHILS RELATIVE PERCENT: 0.9 % (ref 0–5)
GFR NON-AFRICAN AMERICAN: >60
GLUCOSE BLD-MCNC: 85 MG/DL (ref 74–109)
HBA1C MFR BLD: 8.1 %
HCT VFR BLD CALC: 47.2 % (ref 37–47)
HDLC SERPL-MCNC: 85 MG/DL (ref 65–121)
HEMOGLOBIN: 14.1 G/DL (ref 12–16)
LDL CHOLESTEROL CALCULATED: 42 MG/DL
LYMPHOCYTES ABSOLUTE: 1.4 K/UL (ref 1.1–4.5)
LYMPHOCYTES RELATIVE PERCENT: 10.2 % (ref 20–40)
MCH RBC QN AUTO: 28 PG (ref 27–31)
MCHC RBC AUTO-ENTMCNC: 29.9 G/DL (ref 33–37)
MCV RBC AUTO: 93.8 FL (ref 81–99)
MONOCYTES ABSOLUTE: 0.7 K/UL (ref 0–0.9)
MONOCYTES RELATIVE PERCENT: 5.3 % (ref 0–10)
NEUTROPHILS ABSOLUTE: 11.7 K/UL (ref 1.5–7.5)
NEUTROPHILS RELATIVE PERCENT: 82.7 % (ref 50–65)
PDW BLD-RTO: 18.6 % (ref 11.5–14.5)
PLATELET # BLD: 239 K/UL (ref 130–400)
PMV BLD AUTO: 10.5 FL (ref 9.4–12.3)
POTASSIUM SERPL-SCNC: 4.4 MMOL/L (ref 3.5–5)
RBC # BLD: 5.03 M/UL (ref 4.2–5.4)
SODIUM BLD-SCNC: 141 MMOL/L (ref 136–145)
TOTAL PROTEIN: 6.9 G/DL (ref 6.6–8.7)
TRIGL SERPL-MCNC: 87 MG/DL (ref 0–149)
WBC # BLD: 14.1 K/UL (ref 4.8–10.8)

## 2018-01-23 ENCOUNTER — OFFICE VISIT (OUTPATIENT)
Dept: FAMILY MEDICINE CLINIC | Age: 63
End: 2018-01-23
Payer: COMMERCIAL

## 2018-01-23 VITALS
TEMPERATURE: 98 F | SYSTOLIC BLOOD PRESSURE: 112 MMHG | HEART RATE: 69 BPM | HEIGHT: 64 IN | OXYGEN SATURATION: 96 % | WEIGHT: 204 LBS | DIASTOLIC BLOOD PRESSURE: 64 MMHG | BODY MASS INDEX: 34.83 KG/M2

## 2018-01-23 DIAGNOSIS — D72.829 LEUKOCYTOSIS, UNSPECIFIED TYPE: ICD-10-CM

## 2018-01-23 DIAGNOSIS — D50.9 IRON DEFICIENCY ANEMIA, UNSPECIFIED IRON DEFICIENCY ANEMIA TYPE: Primary | ICD-10-CM

## 2018-01-23 DIAGNOSIS — I10 ESSENTIAL HYPERTENSION: ICD-10-CM

## 2018-01-23 DIAGNOSIS — K27.9 PEPTIC ULCER DISEASE: ICD-10-CM

## 2018-01-23 DIAGNOSIS — E78.01 FAMILIAL HYPERCHOLESTEROLEMIA: ICD-10-CM

## 2018-01-23 DIAGNOSIS — E11.9 TYPE 2 DIABETES MELLITUS WITHOUT COMPLICATION, WITHOUT LONG-TERM CURRENT USE OF INSULIN (HCC): ICD-10-CM

## 2018-01-23 DIAGNOSIS — K58.9 IRRITABLE BOWEL SYNDROME, UNSPECIFIED TYPE: ICD-10-CM

## 2018-01-23 DIAGNOSIS — M06.9 RHEUMATOID ARTHRITIS INVOLVING MULTIPLE SITES, UNSPECIFIED RHEUMATOID FACTOR PRESENCE: ICD-10-CM

## 2018-01-23 PROCEDURE — 3014F SCREEN MAMMO DOC REV: CPT | Performed by: FAMILY MEDICINE

## 2018-01-23 PROCEDURE — G8484 FLU IMMUNIZE NO ADMIN: HCPCS | Performed by: FAMILY MEDICINE

## 2018-01-23 PROCEDURE — 3017F COLORECTAL CA SCREEN DOC REV: CPT | Performed by: FAMILY MEDICINE

## 2018-01-23 PROCEDURE — 99214 OFFICE O/P EST MOD 30 MIN: CPT | Performed by: FAMILY MEDICINE

## 2018-01-23 PROCEDURE — G8417 CALC BMI ABV UP PARAM F/U: HCPCS | Performed by: FAMILY MEDICINE

## 2018-01-23 PROCEDURE — 3045F PR MOST RECENT HEMOGLOBIN A1C LEVEL 7.0-9.0%: CPT | Performed by: FAMILY MEDICINE

## 2018-01-23 PROCEDURE — 1036F TOBACCO NON-USER: CPT | Performed by: FAMILY MEDICINE

## 2018-01-23 PROCEDURE — G8427 DOCREV CUR MEDS BY ELIG CLIN: HCPCS | Performed by: FAMILY MEDICINE

## 2018-01-23 RX ORDER — METOPROLOL TARTRATE 50 MG/1
TABLET, FILM COATED ORAL
Qty: 60 TABLET | Refills: 5 | Status: SHIPPED | OUTPATIENT
Start: 2018-01-23 | End: 2018-08-07 | Stop reason: SDUPTHER

## 2018-01-23 RX ORDER — METFORMIN HYDROCHLORIDE 500 MG/1
500 TABLET, FILM COATED, EXTENDED RELEASE ORAL 2 TIMES DAILY WITH MEALS
Qty: 60 TABLET | Refills: 5 | Status: SHIPPED | OUTPATIENT
Start: 2018-01-23 | End: 2018-04-24 | Stop reason: CLARIF

## 2018-01-23 RX ORDER — METFORMIN HYDROCHLORIDE 500 MG/1
500 TABLET, FILM COATED, EXTENDED RELEASE ORAL 2 TIMES DAILY WITH MEALS
COMMUNITY
End: 2018-01-23 | Stop reason: SDUPTHER

## 2018-01-23 NOTE — PROGRESS NOTES
use Not on file      Current Outpatient Prescriptions   Medication Sig Dispense Refill    Tofacitinib Citrate (XELJANZ XR) 11 MG TB24 Take 11 mg by mouth daily      metoprolol tartrate (LOPRESSOR) 50 MG tablet TAKE 1 TABLET TWICE DAILY 60 tablet 5    metFORMIN, MOD, (GLUMETZA) 500 MG extended release tablet Take 1 tablet by mouth 2 times daily (with meals) 60 tablet 5    losartan (COZAAR) 25 MG tablet Take 1 tablet by mouth daily 30 tablet 3    ferrous sulfate 325 (65 Fe) MG tablet Take 1 tablet by mouth daily 30 tablet 1    simvastatin (ZOCOR) 40 MG tablet Take 1 tablet by mouth every evening 30 tablet 3    potassium chloride (KLOR-CON) 10 MEQ extended release tablet Take 1 tablet by mouth daily 30 tablet 3    isosorbide mononitrate (IMDUR) 30 MG extended release tablet Take 30 mg by mouth daily      predniSONE (DELTASONE) 5 MG tablet Take 5 mg by mouth Take 5 mg in the morning and 4 mg in the evening.  aspirin EC 81 MG EC tablet Take 81 mg by mouth daily      dronedarone hcl (MULTAQ) 400 MG TABS Take 400 mg by mouth 2 times daily      nitroGLYCERIN (NITROSTAT) 0.4 MG SL tablet Place 0.4 mg under the tongue      folic acid (FOLVITE) 1 MG tablet Take 1 mg by mouth      pantoprazole (PROTONIX) 40 MG tablet Take 40 mg by mouth      ondansetron (ZOFRAN) 8 MG tablet TAKE 1 TABLET BY MOUTH EVERY 8 HOURS AS NEEDED 21 tablet 0    furosemide (LASIX) 20 MG tablet Take 1 tablet by mouth daily 60 tablet 3     No current facility-administered medications for this visit.       Allergies   Allergen Reactions    Albuterol     Codeine     Sulfa Antibiotics        Health Maintenance   Topic Date Due    Hepatitis C screen  1955    Diabetic foot exam  10/24/1966    Diabetic retinal exam  10/24/1966    HIV screen  10/24/1971    Diabetic microalbuminuria test  08/27/2015    Zostavax vaccine  10/24/2016    Flu vaccine (1) 09/01/2017    A1C test (Diabetic or Prediabetic)  01/18/2019    Lipid screen 18.6 (H) 11.5 - 14.5 %    Platelets 353 258 - 364 K/uL    MPV 10.5 9.4 - 12.3 fL    Neutrophils % 82.7 (H) 50.0 - 65.0 %    Lymphocytes % 10.2 (L) 20.0 - 40.0 %    Monocytes % 5.3 0.0 - 10.0 %    Eosinophils % 0.9 0.0 - 5.0 %    Basophils % 0.4 0.0 - 1.0 %    Neutrophils # 11.7 (H) 1.5 - 7.5 K/uL    Lymphocytes # 1.4 1.1 - 4.5 K/uL    Monocytes # 0.70 0.00 - 0.90 K/uL    Eosinophils # 0.10 0.00 - 0.60 K/uL    Basophils # 0.10 0.00 - 0.20 K/uL   Lipid Panel    Collection Time: 01/18/18  9:19 AM   Result Value Ref Range    Cholesterol, Total 144 (L) 160 - 199 mg/dL    Triglycerides 87 0 - 149 mg/dL    HDL 85 65 - 121 mg/dL    LDL Calculated 42 <100 mg/dL   Hemoglobin A1C    Collection Time: 01/18/18  9:19 AM   Result Value Ref Range    Hemoglobin A1C 8.1 (H) See comment %   CBC Auto Differential    Collection Time: 02/07/18 11:25 AM   Result Value Ref Range    WBC 11.8 (H) 4.8 - 10.8 K/uL    RBC 4.87 4.20 - 5.40 M/uL    Hemoglobin 14.0 12.0 - 16.0 g/dL    Hematocrit 45.4 37.0 - 47.0 %    MCV 93.2 81.0 - 99.0 fL    MCH 28.7 27.0 - 31.0 pg    MCHC 30.8 (L) 33.0 - 37.0 g/dL    RDW 16.2 (H) 11.5 - 14.5 %    Platelets 618 503 - 736 K/uL    MPV 10.8 9.4 - 12.3 fL    Neutrophils % 83.8 (H) 50.0 - 65.0 %    Lymphocytes % 10.0 (L) 20.0 - 40.0 %    Monocytes % 5.0 0.0 - 10.0 %    Eosinophils % 0.3 0.0 - 5.0 %    Basophils % 0.4 0.0 - 1.0 %    Neutrophils # 9.9 (H) 1.5 - 7.5 K/uL    Lymphocytes # 1.2 1.1 - 4.5 K/uL    Monocytes # 0.60 0.00 - 0.90 K/uL    Eosinophils # 0.00 0.00 - 0.60 K/uL    Basophils # 0.10 0.00 - 0.20 K/uL   Comprehensive Metabolic Panel    Collection Time: 02/07/18 11:25 AM   Result Value Ref Range    Sodium 140 136 - 145 mmol/L    Potassium 4.2 3.5 - 5.0 mmol/L    Chloride 101 98 - 111 mmol/L    CO2 25 22 - 29 mmol/L    Anion Gap 14 7 - 19 mmol/L    Glucose 337 (H) 74 - 109 mg/dL    BUN 18 8 - 23 mg/dL    CREATININE 0.5 0.5 - 0.9 mg/dL    GFR Non-African American >60 >60    Calcium 9.0 8.8 - 10.2 mg/dL

## 2018-01-31 ENCOUNTER — ANESTHESIA (OUTPATIENT)
Dept: GASTROENTEROLOGY | Facility: HOSPITAL | Age: 63
End: 2018-01-31

## 2018-01-31 ENCOUNTER — HOSPITAL ENCOUNTER (OUTPATIENT)
Facility: HOSPITAL | Age: 63
Setting detail: HOSPITAL OUTPATIENT SURGERY
Discharge: HOME | End: 2018-01-31
Attending: INTERNAL MEDICINE | Admitting: INTERNAL MEDICINE

## 2018-01-31 ENCOUNTER — ANESTHESIA EVENT (OUTPATIENT)
Dept: GASTROENTEROLOGY | Facility: HOSPITAL | Age: 63
End: 2018-01-31

## 2018-01-31 VITALS
SYSTOLIC BLOOD PRESSURE: 138 MMHG | RESPIRATION RATE: 16 BRPM | HEIGHT: 64 IN | HEART RATE: 72 BPM | WEIGHT: 198 LBS | TEMPERATURE: 97.3 F | DIASTOLIC BLOOD PRESSURE: 74 MMHG | OXYGEN SATURATION: 99 % | BODY MASS INDEX: 33.8 KG/M2

## 2018-01-31 DIAGNOSIS — D64.9 ANEMIA, UNSPECIFIED TYPE: ICD-10-CM

## 2018-01-31 DIAGNOSIS — R93.89 ABNORMAL FINDING ON IMAGING: ICD-10-CM

## 2018-01-31 DIAGNOSIS — K92.1 BLACK TARRY STOOLS: ICD-10-CM

## 2018-01-31 DIAGNOSIS — Z87.19 HISTORY OF ESOPHAGITIS: ICD-10-CM

## 2018-01-31 PROBLEM — K63.5 COLON POLYPS: Status: ACTIVE | Noted: 2018-01-31

## 2018-01-31 PROBLEM — K31.7 GASTRIC POLYP: Status: ACTIVE | Noted: 2018-01-03

## 2018-01-31 LAB — GLUCOSE BLDC GLUCOMTR-MCNC: 134 MG/DL (ref 70–130)

## 2018-01-31 PROCEDURE — 82962 GLUCOSE BLOOD TEST: CPT

## 2018-01-31 PROCEDURE — 45385 COLONOSCOPY W/LESION REMOVAL: CPT | Performed by: INTERNAL MEDICINE

## 2018-01-31 PROCEDURE — 43251 EGD REMOVE LESION SNARE: CPT | Performed by: INTERNAL MEDICINE

## 2018-01-31 PROCEDURE — 88341 IMHCHEM/IMCYTCHM EA ADD ANTB: CPT | Performed by: INTERNAL MEDICINE

## 2018-01-31 PROCEDURE — 88342 IMHCHEM/IMCYTCHM 1ST ANTB: CPT | Performed by: INTERNAL MEDICINE

## 2018-01-31 PROCEDURE — 87081 CULTURE SCREEN ONLY: CPT | Performed by: INTERNAL MEDICINE

## 2018-01-31 PROCEDURE — 25010000002 PROPOFOL 10 MG/ML EMULSION: Performed by: NURSE ANESTHETIST, CERTIFIED REGISTERED

## 2018-01-31 PROCEDURE — 88305 TISSUE EXAM BY PATHOLOGIST: CPT | Performed by: INTERNAL MEDICINE

## 2018-01-31 PROCEDURE — 43239 EGD BIOPSY SINGLE/MULTIPLE: CPT | Performed by: INTERNAL MEDICINE

## 2018-01-31 DEVICE — DEV CLIP ENDO RESOLUTION360 CONTRL ROT 235CM: Type: IMPLANTABLE DEVICE | Site: STOMACH | Status: FUNCTIONAL

## 2018-01-31 RX ORDER — LIDOCAINE HYDROCHLORIDE 20 MG/ML
INJECTION, SOLUTION INFILTRATION; PERINEURAL AS NEEDED
Status: DISCONTINUED | OUTPATIENT
Start: 2018-01-31 | End: 2018-01-31 | Stop reason: SURG

## 2018-01-31 RX ORDER — PROPOFOL 10 MG/ML
VIAL (ML) INTRAVENOUS AS NEEDED
Status: DISCONTINUED | OUTPATIENT
Start: 2018-01-31 | End: 2018-01-31 | Stop reason: SURG

## 2018-01-31 RX ORDER — SODIUM CHLORIDE 9 MG/ML
500 INJECTION, SOLUTION INTRAVENOUS CONTINUOUS PRN
Status: DISCONTINUED | OUTPATIENT
Start: 2018-01-31 | End: 2018-01-31 | Stop reason: HOSPADM

## 2018-01-31 RX ORDER — SODIUM CHLORIDE 0.9 % (FLUSH) 0.9 %
3 SYRINGE (ML) INJECTION AS NEEDED
Status: DISCONTINUED | OUTPATIENT
Start: 2018-01-31 | End: 2018-01-31 | Stop reason: HOSPADM

## 2018-01-31 RX ORDER — GLYCOPYRROLATE 0.2 MG/ML
INJECTION INTRAMUSCULAR; INTRAVENOUS AS NEEDED
Status: DISCONTINUED | OUTPATIENT
Start: 2018-01-31 | End: 2018-01-31 | Stop reason: SURG

## 2018-01-31 RX ADMIN — PROPOFOL 480 MG: 10 INJECTION, EMULSION INTRAVENOUS at 08:33

## 2018-01-31 RX ADMIN — LIDOCAINE HYDROCHLORIDE 60 MG: 20 INJECTION, SOLUTION INFILTRATION; PERINEURAL at 08:33

## 2018-01-31 RX ADMIN — SODIUM CHLORIDE 500 ML: 9 INJECTION, SOLUTION INTRAVENOUS at 08:07

## 2018-01-31 RX ADMIN — GLYCOPYRROLATE 0.2 MG: 0.2 INJECTION, SOLUTION INTRAMUSCULAR; INTRAVENOUS at 08:30

## 2018-01-31 NOTE — ANESTHESIA POSTPROCEDURE EVALUATION
Patient: Teresa ANTUNEZ Day    Procedure Summary     Date Anesthesia Start Anesthesia Stop Room / Location    01/31/18 0829 [id number] [facility] ENDOSCOPY 2 / [facility] END[facility]       Procedure Diagnosis Surgeon Provider    ESOPHAGOGASTRODUODENOSCOPY WITH ANESTHESIA (N/A Esophagus); COLONOSCOPY WITH ANESTHESIA (N/A ) Abnormal finding on imaging; History of esophagitis; Black tarry stools; Anemia, unspecified type  (Abnormal finding on imaging [R93.8]; History of esophagitis [Z87.19]; Black tarry stools [K92.1]; Anemia, unspecified type [D64.9]) MD Cindy Guzman CRNA          Anesthesia Type: general  Last vitals  BP   116/71 (01/31/18 0911)   Temp   97.3 °F (36.3 °C) (01/31/18 0734)   Pulse   75 (01/31/18 0911)   Resp   22 (01/31/18 0911)     SpO2   95 % (01/31/18 0911)     Post Anesthesia Care and Evaluation    Patient location during evaluation: PHASE II  Patient participation: complete - patient participated  Level of consciousness: awake and alert  Pain management: satisfactory to patient  Airway patency: patent  Anesthetic complications: No anesthetic complications    Cardiovascular status: acceptable  Respiratory status: acceptable  Hydration status: acceptable

## 2018-01-31 NOTE — ANESTHESIA PREPROCEDURE EVALUATION
Anesthesia Evaluation     Patient summary reviewed   no history of anesthetic complications:  NPO Solid Status: > 8 hours       Airway   Mallampati: II  TM distance: >3 FB  Neck ROM: full  Dental      Pulmonary - negative pulmonary ROS   Cardiovascular   Exercise tolerance: good (4-7 METS)    Patient on routine beta blocker and Beta blocker given within 24 hours of surgery    (+) hypertension, CAD, CABG (2000), cardiac stents (2009) dysrhythmias, hyperlipidemia  (-) angina      Neuro/Psych- negative ROS  GI/Hepatic/Renal/Endo    (+) obesity,  diabetes mellitus,     Musculoskeletal     Abdominal    Substance History      OB/GYN          Other   (+) arthritis (rheumatoid)                                             Anesthesia Plan    ASA 3     general     intravenous induction   Anesthetic plan and risks discussed with patient.

## 2018-02-01 LAB — UREASE TISS QL: NEGATIVE

## 2018-02-05 LAB
CYTO UR: NORMAL
LAB AP CASE REPORT: NORMAL
LAB AP CLINICAL INFORMATION: NORMAL
Lab: NORMAL
PATH REPORT.FINAL DX SPEC: NORMAL
PATH REPORT.GROSS SPEC: NORMAL

## 2018-02-07 ENCOUNTER — HOSPITAL ENCOUNTER (OUTPATIENT)
Dept: LAB | Age: 63
Discharge: HOME | End: 2018-02-07
Payer: COMMERCIAL

## 2018-02-07 LAB
ALBUMIN SERPL-MCNC: 3.9 G/DL (ref 3.5–5.2)
ALP BLD-CCNC: 74 U/L (ref 35–104)
ALT SERPL-CCNC: 21 U/L (ref 5–33)
ANION GAP SERPL CALCULATED.3IONS-SCNC: 14 MMOL/L (ref 7–19)
AST SERPL-CCNC: 17 U/L (ref 5–32)
BASOPHILS ABSOLUTE: 0.1 K/UL (ref 0–0.2)
BASOPHILS RELATIVE PERCENT: 0.4 % (ref 0–1)
BILIRUB SERPL-MCNC: 0.8 MG/DL (ref 0.2–1.2)
BUN BLDV-MCNC: 18 MG/DL (ref 8–23)
C-REACTIVE PROTEIN: 0.2 MG/DL (ref 0–0.5)
CALCIUM SERPL-MCNC: 9 MG/DL (ref 8.8–10.2)
CHLORIDE BLD-SCNC: 101 MMOL/L (ref 98–111)
CHOLESTEROL, TOTAL: 127 MG/DL (ref 160–199)
CO2: 25 MMOL/L (ref 22–29)
CREAT SERPL-MCNC: 0.5 MG/DL (ref 0.5–0.9)
EOSINOPHILS ABSOLUTE: 0 K/UL (ref 0–0.6)
EOSINOPHILS RELATIVE PERCENT: 0.3 % (ref 0–5)
GFR NON-AFRICAN AMERICAN: >60
GLUCOSE BLD-MCNC: 337 MG/DL (ref 74–109)
HCT VFR BLD CALC: 45.4 % (ref 37–47)
HDLC SERPL-MCNC: 68 MG/DL (ref 65–121)
HEMOGLOBIN: 14 G/DL (ref 12–16)
LDL CHOLESTEROL CALCULATED: 37 MG/DL
LYMPHOCYTES ABSOLUTE: 1.2 K/UL (ref 1.1–4.5)
LYMPHOCYTES RELATIVE PERCENT: 10 % (ref 20–40)
MCH RBC QN AUTO: 28.7 PG (ref 27–31)
MCHC RBC AUTO-ENTMCNC: 30.8 G/DL (ref 33–37)
MCV RBC AUTO: 93.2 FL (ref 81–99)
MONOCYTES ABSOLUTE: 0.6 K/UL (ref 0–0.9)
MONOCYTES RELATIVE PERCENT: 5 % (ref 0–10)
NEUTROPHILS ABSOLUTE: 9.9 K/UL (ref 1.5–7.5)
NEUTROPHILS RELATIVE PERCENT: 83.8 % (ref 50–65)
PDW BLD-RTO: 16.2 % (ref 11.5–14.5)
PLATELET # BLD: 242 K/UL (ref 130–400)
PMV BLD AUTO: 10.8 FL (ref 9.4–12.3)
POTASSIUM SERPL-SCNC: 4.2 MMOL/L (ref 3.5–5)
RBC # BLD: 4.87 M/UL (ref 4.2–5.4)
SODIUM BLD-SCNC: 140 MMOL/L (ref 136–145)
TOTAL PROTEIN: 6.6 G/DL (ref 6.6–8.7)
TRIGL SERPL-MCNC: 110 MG/DL (ref 0–149)
WBC # BLD: 11.8 K/UL (ref 4.8–10.8)

## 2018-02-10 PROBLEM — E78.01 FAMILIAL HYPERCHOLESTEROLEMIA: Status: ACTIVE | Noted: 2018-02-10

## 2018-02-10 PROBLEM — K58.9 IBS (IRRITABLE BOWEL SYNDROME): Status: ACTIVE | Noted: 2018-02-10

## 2018-02-10 PROBLEM — D72.829 LEUKOCYTOSIS: Status: ACTIVE | Noted: 2018-02-10

## 2018-02-10 PROBLEM — M06.9 RHEUMATOID ARTHRITIS (HCC): Status: ACTIVE | Noted: 2018-02-10

## 2018-02-10 ASSESSMENT — ENCOUNTER SYMPTOMS
SHORTNESS OF BREATH: 0
CHEST TIGHTNESS: 0
NAUSEA: 0
ABDOMINAL PAIN: 0
DIARRHEA: 0
CONSTIPATION: 0
ANAL BLEEDING: 0
COUGH: 0

## 2018-02-19 RX ORDER — FERROUS SULFATE 325(65) MG
325 TABLET ORAL DAILY
Qty: 30 TABLET | Refills: 0 | Status: SHIPPED | OUTPATIENT
Start: 2018-02-19 | End: 2018-06-05 | Stop reason: SDUPTHER

## 2018-02-19 RX ORDER — POTASSIUM CHLORIDE 750 MG/1
10 TABLET, FILM COATED, EXTENDED RELEASE ORAL DAILY
Qty: 30 TABLET | Refills: 0 | Status: SHIPPED | OUTPATIENT
Start: 2018-02-19 | End: 2018-04-16 | Stop reason: SDUPTHER

## 2018-02-19 RX ORDER — ISOSORBIDE MONONITRATE 30 MG/1
TABLET, EXTENDED RELEASE ORAL
Qty: 30 TABLET | Refills: 0 | Status: SHIPPED | OUTPATIENT
Start: 2018-02-19 | End: 2018-04-16 | Stop reason: SDUPTHER

## 2018-02-22 ENCOUNTER — OFFICE VISIT (OUTPATIENT)
Dept: FAMILY MEDICINE CLINIC | Age: 63
End: 2018-02-22
Payer: MEDICARE

## 2018-02-22 VITALS
SYSTOLIC BLOOD PRESSURE: 128 MMHG | WEIGHT: 208 LBS | DIASTOLIC BLOOD PRESSURE: 78 MMHG | TEMPERATURE: 97.6 F | HEART RATE: 61 BPM | BODY MASS INDEX: 35.51 KG/M2 | OXYGEN SATURATION: 95 % | HEIGHT: 64 IN

## 2018-02-22 DIAGNOSIS — I10 ESSENTIAL HYPERTENSION: Primary | ICD-10-CM

## 2018-02-22 DIAGNOSIS — D50.9 IRON DEFICIENCY ANEMIA, UNSPECIFIED IRON DEFICIENCY ANEMIA TYPE: ICD-10-CM

## 2018-02-22 DIAGNOSIS — M06.9 RHEUMATOID ARTHRITIS INVOLVING MULTIPLE SITES, UNSPECIFIED RHEUMATOID FACTOR PRESENCE: ICD-10-CM

## 2018-02-22 DIAGNOSIS — E11.9 TYPE 2 DIABETES MELLITUS WITHOUT COMPLICATION, WITHOUT LONG-TERM CURRENT USE OF INSULIN (HCC): ICD-10-CM

## 2018-02-22 PROCEDURE — 3014F SCREEN MAMMO DOC REV: CPT | Performed by: FAMILY MEDICINE

## 2018-02-22 PROCEDURE — 3017F COLORECTAL CA SCREEN DOC REV: CPT | Performed by: FAMILY MEDICINE

## 2018-02-22 PROCEDURE — G8427 DOCREV CUR MEDS BY ELIG CLIN: HCPCS | Performed by: FAMILY MEDICINE

## 2018-02-22 PROCEDURE — 3045F PR MOST RECENT HEMOGLOBIN A1C LEVEL 7.0-9.0%: CPT | Performed by: FAMILY MEDICINE

## 2018-02-22 PROCEDURE — 99214 OFFICE O/P EST MOD 30 MIN: CPT | Performed by: FAMILY MEDICINE

## 2018-02-22 PROCEDURE — 1036F TOBACCO NON-USER: CPT | Performed by: FAMILY MEDICINE

## 2018-02-22 PROCEDURE — G8417 CALC BMI ABV UP PARAM F/U: HCPCS | Performed by: FAMILY MEDICINE

## 2018-02-22 PROCEDURE — G8484 FLU IMMUNIZE NO ADMIN: HCPCS | Performed by: FAMILY MEDICINE

## 2018-02-22 RX ORDER — GLYBURIDE 5 MG/1
2.5 TABLET ORAL 2 TIMES DAILY WITH MEALS
Qty: 30 TABLET | Refills: 3
Start: 2018-02-22 | End: 2018-05-16 | Stop reason: SDUPTHER

## 2018-02-22 ASSESSMENT — ENCOUNTER SYMPTOMS
COUGH: 0
ABDOMINAL PAIN: 0
ANAL BLEEDING: 0
DIARRHEA: 0
SHORTNESS OF BREATH: 0
CONSTIPATION: 0
NAUSEA: 0
CHEST TIGHTNESS: 0

## 2018-02-22 NOTE — PROGRESS NOTES
cancer screen  12/05/2019    Cervical cancer screen  11/14/2020    DTaP/Tdap/Td vaccine (2 - Td) 12/31/2022    Colon cancer screen colonoscopy  01/31/2028    Pneumococcal med risk  Completed       Subjective:      Review of Systems   Constitutional: Negative for chills and fever. HENT: Negative for congestion. Respiratory: Negative for cough, chest tightness and shortness of breath. Cardiovascular: Negative for chest pain, palpitations and leg swelling. Gastrointestinal: Negative for abdominal pain, anal bleeding, constipation, diarrhea and nausea. Genitourinary: Negative for difficulty urinating. Psychiatric/Behavioral: Negative. See HPI for visit specific review of symptoms. All others negative      Objective:   /78   Pulse 61   Temp 97.6 °F (36.4 °C)   Ht 5' 4\" (1.626 m)   Wt 208 lb (94.3 kg)   SpO2 95%   BMI 35.70 kg/m²   Physical Exam   Constitutional: She appears well-developed. She does not appear ill. Eyes: Pupils are equal, round, and reactive to light. Neck: Normal range of motion. Neck supple. Cardiovascular: Normal rate and regular rhythm. Exam reveals no friction rub. No murmur heard. Pulmonary/Chest: Effort normal and breath sounds normal. No respiratory distress. She has no wheezes. She has no rales. Abdominal: Soft. Bowel sounds are normal. She exhibits no distension. There is no tenderness. There is no rebound and no guarding. Musculoskeletal: She exhibits no edema. Neurological: She is alert. Psychiatric: She has a normal mood and affect.  Her behavior is normal.         Lab Review   Recent Results (from the past 672 hour(s))   CBC Auto Differential    Collection Time: 02/07/18 11:25 AM   Result Value Ref Range    WBC 11.8 (H) 4.8 - 10.8 K/uL    RBC 4.87 4.20 - 5.40 M/uL    Hemoglobin 14.0 12.0 - 16.0 g/dL    Hematocrit 45.4 37.0 - 47.0 %    MCV 93.2 81.0 - 99.0 fL    MCH 28.7 27.0 - 31.0 pg    MCHC 30.8 (L) 33.0 - 37.0 g/dL    RDW 16.2 (H) 11.5 - 14.5 %    Platelets 900 852 - 737 K/uL    MPV 10.8 9.4 - 12.3 fL    Neutrophils % 83.8 (H) 50.0 - 65.0 %    Lymphocytes % 10.0 (L) 20.0 - 40.0 %    Monocytes % 5.0 0.0 - 10.0 %    Eosinophils % 0.3 0.0 - 5.0 %    Basophils % 0.4 0.0 - 1.0 %    Neutrophils # 9.9 (H) 1.5 - 7.5 K/uL    Lymphocytes # 1.2 1.1 - 4.5 K/uL    Monocytes # 0.60 0.00 - 0.90 K/uL    Eosinophils # 0.00 0.00 - 0.60 K/uL    Basophils # 0.10 0.00 - 0.20 K/uL   Comprehensive Metabolic Panel    Collection Time: 02/07/18 11:25 AM   Result Value Ref Range    Sodium 140 136 - 145 mmol/L    Potassium 4.2 3.5 - 5.0 mmol/L    Chloride 101 98 - 111 mmol/L    CO2 25 22 - 29 mmol/L    Anion Gap 14 7 - 19 mmol/L    Glucose 337 (H) 74 - 109 mg/dL    BUN 18 8 - 23 mg/dL    CREATININE 0.5 0.5 - 0.9 mg/dL    GFR Non-African American >60 >60    Calcium 9.0 8.8 - 10.2 mg/dL    Total Protein 6.6 6.6 - 8.7 g/dL    Alb 3.9 3.5 - 5.2 g/dL    Total Bilirubin 0.8 0.2 - 1.2 mg/dL    Alkaline Phosphatase 74 35 - 104 U/L    ALT 21 5 - 33 U/L    AST 17 5 - 32 U/L   C-Reactive Protein    Collection Time: 02/07/18 11:25 AM   Result Value Ref Range    CRP 0.20 0.00 - 0.50 mg/dL   Lipid Panel    Collection Time: 02/07/18 11:25 AM   Result Value Ref Range    Cholesterol, Total 127 (L) 160 - 199 mg/dL    Triglycerides 110 0 - 149 mg/dL    HDL 68 65 - 121 mg/dL    LDL Calculated 37 <100 mg/dL               Assessment & Plan: The following diagnoses and conditions are stable with no further orders unless indicated:  Sachin Vang was seen today for diabetes. Diagnoses and all orders for this visit:    Essential hypertension  -     Basic Metabolic Panel; Future  Recheck bmp in 2 months. Blood pressure is stable. Continue current medications. Monitor ambulatory bp readings, if persistently >140/90, return to clinic. Type 2 diabetes mellitus without complication, without long-term current use of insulin (HCC)  Uncontrolled. Will add back glyburide, increase to 2.5 mg bid. Monitor accuchecks daily. Take 2nd dose with meal at night. If hypoglycemic, call or rtc asap. Encouraged adherence to diabetic diet. Rheumatoid arthritis involving multiple sites, unspecified rheumatoid factor presence (HCC)  Stable, continue xeljanz, labs stable. Followed by Rheum. Iron deficiency anemia, unspecified iron deficiency anemia type  Stable, no current symptoms, recheck hgb with next labs. Other orders  -     glyBURIDE (DIABETA) 5 MG tablet; Take 0.5 tablets by mouth 2 times daily (with meals)            Return for already scheduled. Discussed use, benefit, and side effects of prescribed medications. All patient questions answered. Pt voiced understanding. Reviewed health maintenance. Instructed to continue current medications, diet and exercise. Patient agreed with treatment plan. Follow up as directed.

## 2018-02-28 ENCOUNTER — OFFICE VISIT (OUTPATIENT)
Dept: FAMILY MEDICINE CLINIC | Age: 63
End: 2018-02-28
Payer: MEDICARE

## 2018-02-28 VITALS
SYSTOLIC BLOOD PRESSURE: 126 MMHG | TEMPERATURE: 98.1 F | OXYGEN SATURATION: 96 % | HEART RATE: 80 BPM | DIASTOLIC BLOOD PRESSURE: 80 MMHG

## 2018-02-28 DIAGNOSIS — B96.89 ACUTE BACTERIAL SINUSITIS: Primary | ICD-10-CM

## 2018-02-28 DIAGNOSIS — H61.22 IMPACTED CERUMEN OF LEFT EAR: ICD-10-CM

## 2018-02-28 DIAGNOSIS — J01.90 ACUTE BACTERIAL SINUSITIS: Primary | ICD-10-CM

## 2018-02-28 PROCEDURE — 1036F TOBACCO NON-USER: CPT | Performed by: CLINICAL NURSE SPECIALIST

## 2018-02-28 PROCEDURE — 99213 OFFICE O/P EST LOW 20 MIN: CPT | Performed by: CLINICAL NURSE SPECIALIST

## 2018-02-28 PROCEDURE — 3017F COLORECTAL CA SCREEN DOC REV: CPT | Performed by: CLINICAL NURSE SPECIALIST

## 2018-02-28 PROCEDURE — G8427 DOCREV CUR MEDS BY ELIG CLIN: HCPCS | Performed by: CLINICAL NURSE SPECIALIST

## 2018-02-28 PROCEDURE — G8484 FLU IMMUNIZE NO ADMIN: HCPCS | Performed by: CLINICAL NURSE SPECIALIST

## 2018-02-28 PROCEDURE — 3014F SCREEN MAMMO DOC REV: CPT | Performed by: CLINICAL NURSE SPECIALIST

## 2018-02-28 PROCEDURE — G8417 CALC BMI ABV UP PARAM F/U: HCPCS | Performed by: CLINICAL NURSE SPECIALIST

## 2018-02-28 RX ORDER — AMOXICILLIN AND CLAVULANATE POTASSIUM 875; 125 MG/1; MG/1
1 TABLET, FILM COATED ORAL 2 TIMES DAILY
Qty: 14 TABLET | Refills: 0 | Status: SHIPPED | OUTPATIENT
Start: 2018-02-28 | End: 2018-03-07

## 2018-02-28 ASSESSMENT — ENCOUNTER SYMPTOMS
SHORTNESS OF BREATH: 0
SORE THROAT: 0
SINUS PAIN: 1
EYE DISCHARGE: 0
WHEEZING: 0
NAUSEA: 0
VOMITING: 0
CHEST TIGHTNESS: 0
EYE PAIN: 0
SINUS PRESSURE: 1
RHINORRHEA: 0
FACIAL SWELLING: 0
COUGH: 1

## 2018-03-14 ENCOUNTER — TELEPHONE (OUTPATIENT)
Dept: FAMILY MEDICINE CLINIC | Age: 63
End: 2018-03-14

## 2018-03-14 ENCOUNTER — OFFICE VISIT (OUTPATIENT)
Dept: FAMILY MEDICINE CLINIC | Age: 63
End: 2018-03-14
Payer: MEDICARE

## 2018-03-14 VITALS
TEMPERATURE: 98.8 F | DIASTOLIC BLOOD PRESSURE: 68 MMHG | OXYGEN SATURATION: 93 % | WEIGHT: 202.8 LBS | BODY MASS INDEX: 34.81 KG/M2 | HEART RATE: 85 BPM | SYSTOLIC BLOOD PRESSURE: 116 MMHG

## 2018-03-14 DIAGNOSIS — R05.9 COUGH: ICD-10-CM

## 2018-03-14 DIAGNOSIS — J06.9 BACTERIAL UPPER RESPIRATORY INFECTION: Primary | ICD-10-CM

## 2018-03-14 DIAGNOSIS — B96.89 BACTERIAL UPPER RESPIRATORY INFECTION: Primary | ICD-10-CM

## 2018-03-14 PROCEDURE — G8427 DOCREV CUR MEDS BY ELIG CLIN: HCPCS | Performed by: CLINICAL NURSE SPECIALIST

## 2018-03-14 PROCEDURE — 3017F COLORECTAL CA SCREEN DOC REV: CPT | Performed by: CLINICAL NURSE SPECIALIST

## 2018-03-14 PROCEDURE — G8484 FLU IMMUNIZE NO ADMIN: HCPCS | Performed by: CLINICAL NURSE SPECIALIST

## 2018-03-14 PROCEDURE — G8417 CALC BMI ABV UP PARAM F/U: HCPCS | Performed by: CLINICAL NURSE SPECIALIST

## 2018-03-14 PROCEDURE — 3014F SCREEN MAMMO DOC REV: CPT | Performed by: CLINICAL NURSE SPECIALIST

## 2018-03-14 PROCEDURE — 99213 OFFICE O/P EST LOW 20 MIN: CPT | Performed by: CLINICAL NURSE SPECIALIST

## 2018-03-14 PROCEDURE — 1036F TOBACCO NON-USER: CPT | Performed by: CLINICAL NURSE SPECIALIST

## 2018-03-14 RX ORDER — CEFDINIR 300 MG/1
300 CAPSULE ORAL 2 TIMES DAILY
Qty: 14 CAPSULE | Refills: 0 | Status: SHIPPED | OUTPATIENT
Start: 2018-03-14 | End: 2018-03-21

## 2018-03-14 RX ORDER — PROMETHAZINE HYDROCHLORIDE AND PHENYLEPHRINE HYDROCHLORIDE 6.25; 5 MG/5ML; MG/5ML
5 SYRUP ORAL 4 TIMES DAILY PRN
Qty: 120 ML | Refills: 0 | Status: SHIPPED | OUTPATIENT
Start: 2018-03-14 | End: 2018-03-14 | Stop reason: RX

## 2018-03-14 ASSESSMENT — ENCOUNTER SYMPTOMS
COUGH: 1
VOMITING: 0
FACIAL SWELLING: 0
CHEST TIGHTNESS: 0
SINUS PRESSURE: 0
WHEEZING: 0
RHINORRHEA: 1
SORE THROAT: 0
NAUSEA: 0
SHORTNESS OF BREATH: 0

## 2018-03-14 NOTE — PROGRESS NOTES
MOUTH DAILY. 30 tablet 0    isosorbide mononitrate (IMDUR) 30 MG extended release tablet TAKE 1 TABLET DAILY. 30 tablet 0    ferrous sulfate 325 (65 Fe) MG tablet TAKE 1 TABLET BY MOUTH DAILY 30 tablet 0    Tofacitinib Citrate (XELJANZ XR) 11 MG TB24 Take 11 mg by mouth daily      metoprolol tartrate (LOPRESSOR) 50 MG tablet TAKE 1 TABLET TWICE DAILY 60 tablet 5    metFORMIN, MOD, (GLUMETZA) 500 MG extended release tablet Take 1 tablet by mouth 2 times daily (with meals) 60 tablet 5    losartan (COZAAR) 25 MG tablet Take 1 tablet by mouth daily 30 tablet 3    simvastatin (ZOCOR) 40 MG tablet Take 1 tablet by mouth every evening 30 tablet 3    predniSONE (DELTASONE) 5 MG tablet Take 5 mg by mouth Take 5 mg in the morning and 4 mg in the evening.  aspirin EC 81 MG EC tablet Take 81 mg by mouth daily      dronedarone hcl (MULTAQ) 400 MG TABS Take 400 mg by mouth 2 times daily      nitroGLYCERIN (NITROSTAT) 0.4 MG SL tablet Place 0.4 mg under the tongue      folic acid (FOLVITE) 1 MG tablet Take 1 mg by mouth      pantoprazole (PROTONIX) 40 MG tablet Take 40 mg by mouth      ondansetron (ZOFRAN) 8 MG tablet TAKE 1 TABLET BY MOUTH EVERY 8 HOURS AS NEEDED 21 tablet 0    furosemide (LASIX) 20 MG tablet Take 1 tablet by mouth daily 60 tablet 3     No current facility-administered medications for this visit. Allergies   Allergen Reactions    Albuterol     Codeine     Sulfa Antibiotics     Augmentin [Amoxicillin-Pot Clavulanate] Palpitations       Review of Systems   Constitutional: Positive for fatigue and fever. Negative for appetite change and chills. HENT: Positive for postnasal drip and rhinorrhea. Negative for congestion, ear discharge, ear pain, facial swelling, hearing loss, sinus pressure and sore throat. Eyes: Negative for visual disturbance. Respiratory: Positive for cough. Negative for chest tightness, shortness of breath and wheezing. Cardiovascular: Negative for chest pain. Gastrointestinal: Negative for nausea and vomiting. Genitourinary: Negative for dysuria, frequency and urgency. Musculoskeletal: Positive for arthralgias. Negative for myalgias. Neurological: Negative for weakness, light-headedness and headaches. OBJECTIVE:  /68   Pulse 85   Temp 98.8 °F (37.1 °C)   Wt 202 lb 12.8 oz (92 kg)   SpO2 93%   BMI 34.81 kg/m²    Physical Exam   Constitutional: She is oriented to person, place, and time. She appears well-developed and well-nourished. HENT:   Head: Normocephalic and atraumatic. Nose: Mucosal edema and rhinorrhea present. Mouth/Throat: Oropharynx is clear and moist. No oropharyngeal exudate. Eyes: Conjunctivae are normal. Pupils are equal, round, and reactive to light. Right eye exhibits no discharge. Left eye exhibits no discharge. Neck: Neck supple. Cardiovascular: Normal rate and regular rhythm. Pulmonary/Chest: Effort normal and breath sounds normal. No respiratory distress. She has no wheezes. She has no rales. Musculoskeletal: Normal range of motion. Lymphadenopathy:     She has no cervical adenopathy. Neurological: She is alert and oriented to person, place, and time. Skin: Skin is warm and dry. No rash noted. Psychiatric: She has a normal mood and affect. Vitals reviewed. ASSESSMENT/PLAN:  1. Bacterial upper respiratory infection  otc plain antihistamine like zyrtec or allegra once daily  - cefdinir (OMNICEF) 300 MG capsule; Take 1 capsule by mouth 2 times daily for 7 days  Dispense: 14 capsule; Refill: 0    2. Cough  - Promethazine-Phenylephrine (PROMETHAZINE VC PLAIN) 6.25-5 MG/5ML syrup; Take 5 mLs by mouth 4 times daily as needed for Cough  Dispense: 120 mL; Refill: 0         Return for already scheduled.

## 2018-03-14 NOTE — TELEPHONE ENCOUNTER
PHarmacy called and stated that they do no have the promethazine VC in stock that was sent in by [facility]. Pharmacy would like to know if there is something else that would rather have sent in. Please advise.

## 2018-03-19 RX ORDER — LANCETS 28 GAUGE
EACH MISCELLANEOUS
Qty: 100 EACH | Refills: 2 | Status: SHIPPED | OUTPATIENT
Start: 2018-03-19 | End: 2018-04-24 | Stop reason: SDUPTHER

## 2018-03-19 RX ORDER — BLOOD-GLUCOSE METER
KIT MISCELLANEOUS
Qty: 100 STRIP | Refills: 0 | Status: SHIPPED | OUTPATIENT
Start: 2018-03-19 | End: 2018-04-24 | Stop reason: SDUPTHER

## 2018-03-26 ENCOUNTER — OFFICE VISIT (OUTPATIENT)
Dept: FAMILY MEDICINE CLINIC | Age: 63
End: 2018-03-26
Payer: MEDICARE

## 2018-03-26 ENCOUNTER — HOSPITAL ENCOUNTER (OUTPATIENT)
Dept: GENERAL RADIOLOGY | Age: 63
Discharge: HOME | End: 2018-03-26
Payer: MEDICARE

## 2018-03-26 VITALS
BODY MASS INDEX: 34.02 KG/M2 | WEIGHT: 199.25 LBS | TEMPERATURE: 97 F | SYSTOLIC BLOOD PRESSURE: 148 MMHG | HEIGHT: 64 IN | OXYGEN SATURATION: 94 % | DIASTOLIC BLOOD PRESSURE: 74 MMHG | HEART RATE: 70 BPM

## 2018-03-26 DIAGNOSIS — R05.9 COUGH: ICD-10-CM

## 2018-03-26 DIAGNOSIS — J20.9 ACUTE BRONCHITIS, UNSPECIFIED ORGANISM: ICD-10-CM

## 2018-03-26 DIAGNOSIS — I48.92 ATRIAL FLUTTER BY ELECTROCARDIOGRAM (HCC): ICD-10-CM

## 2018-03-26 DIAGNOSIS — J01.10 ACUTE NON-RECURRENT FRONTAL SINUSITIS: ICD-10-CM

## 2018-03-26 DIAGNOSIS — I10 ESSENTIAL HYPERTENSION: Primary | ICD-10-CM

## 2018-03-26 PROCEDURE — 71046 X-RAY EXAM CHEST 2 VIEWS: CPT

## 2018-03-26 PROCEDURE — G8484 FLU IMMUNIZE NO ADMIN: HCPCS | Performed by: NURSE PRACTITIONER

## 2018-03-26 PROCEDURE — 1036F TOBACCO NON-USER: CPT | Performed by: NURSE PRACTITIONER

## 2018-03-26 PROCEDURE — 99213 OFFICE O/P EST LOW 20 MIN: CPT | Performed by: NURSE PRACTITIONER

## 2018-03-26 PROCEDURE — 3017F COLORECTAL CA SCREEN DOC REV: CPT | Performed by: NURSE PRACTITIONER

## 2018-03-26 PROCEDURE — 3014F SCREEN MAMMO DOC REV: CPT | Performed by: NURSE PRACTITIONER

## 2018-03-26 PROCEDURE — G8427 DOCREV CUR MEDS BY ELIG CLIN: HCPCS | Performed by: NURSE PRACTITIONER

## 2018-03-26 PROCEDURE — G8417 CALC BMI ABV UP PARAM F/U: HCPCS | Performed by: NURSE PRACTITIONER

## 2018-03-26 RX ORDER — CEFDINIR 300 MG/1
300 CAPSULE ORAL 2 TIMES DAILY
Qty: 20 CAPSULE | Refills: 0 | Status: SHIPPED | OUTPATIENT
Start: 2018-03-26 | End: 2018-04-05

## 2018-03-26 ASSESSMENT — ENCOUNTER SYMPTOMS
COUGH: 1
SHORTNESS OF BREATH: 0
CHEST TIGHTNESS: 1
ABDOMINAL PAIN: 0
DIARRHEA: 0
SINUS PRESSURE: 1
WHEEZING: 0
SORE THROAT: 0
NAUSEA: 0

## 2018-03-26 NOTE — PROGRESS NOTES
Ms.Teresa CHERYL Fagan is a 65 y.o. female who presents today for   Chief Complaint   Patient presents with    Cough    URI       HPI:  She was treated for acute sinusitis a few weeks ago with augmentin but could not tolerate it due to tachycardia. She has underlying paroxysmal atrial flutter/SVT which has been controlled with multaq. She stopped the augmentin after 1 dose. She was seen again and started on cefdinir which she completed last week. Some improvement with the sinus pressure but symptoms have not resolved. She has also had cough and chest congestion with occasional chest tightness but no wheeze. No recent fever/chills. Altogether symptoms ongoing x 5 weeks. She tolerates delsym which helps some with the cough. She cannot take several abx or otc meds due to allergy or causing palpitations/tachycardia. Cannot tolerate higher doses of steroids. BP has been stable at home on current meds. Systolic minimally elevated today. Review of Systems   Constitutional: Negative for chills and fever. HENT: Positive for congestion and sinus pressure. Negative for ear pain and sore throat. Respiratory: Positive for cough and chest tightness. Negative for shortness of breath and wheezing. Cardiovascular: Negative for chest pain. Gastrointestinal: Negative for abdominal pain, diarrhea and nausea. Musculoskeletal: Positive for arthralgias (RA, chronic/stable). Negative for myalgias. Skin: Negative for rash.        Past Medical History:   Diagnosis Date    Atrial flutter by electrocardiogram ([facility] [address]     Constipation     Coronary artery disease     Diabetes mellitus (HCC)     Essential hypertension     Hyperlipidemia     IBS (irritable bowel syndrome)     Iron deficiency anemia     Menopause     Paroxysmal SVT (supraventricular tachycardia) (HCC)     Rheumatoid arthritis (HCC)        Current Outpatient Prescriptions   Medication Sig Dispense Refill    cefdinir (OMNICEF) 300 MG capsule Take

## 2018-04-12 PROBLEM — Z00.00 ANNUAL PHYSICAL EXAM: Status: RESOLVED | Noted: 2017-11-14 | Resolved: 2018-04-12

## 2018-04-16 RX ORDER — SIMVASTATIN 40 MG
40 TABLET ORAL EVERY EVENING
Qty: 30 TABLET | Refills: 0 | Status: SHIPPED | OUTPATIENT
Start: 2018-04-16 | End: 2018-04-24 | Stop reason: SDUPTHER

## 2018-04-16 RX ORDER — LOSARTAN POTASSIUM 25 MG/1
25 TABLET ORAL DAILY
Qty: 30 TABLET | Refills: 0 | Status: SHIPPED | OUTPATIENT
Start: 2018-04-16 | End: 2018-04-24 | Stop reason: SDUPTHER

## 2018-04-16 RX ORDER — POTASSIUM CHLORIDE 750 MG/1
10 TABLET, FILM COATED, EXTENDED RELEASE ORAL DAILY
Qty: 30 TABLET | Refills: 0 | Status: SHIPPED | OUTPATIENT
Start: 2018-04-16 | End: 2018-04-24 | Stop reason: SDUPTHER

## 2018-04-16 RX ORDER — ISOSORBIDE MONONITRATE 30 MG/1
TABLET, EXTENDED RELEASE ORAL
Qty: 30 TABLET | Refills: 0 | Status: SHIPPED | OUTPATIENT
Start: 2018-04-16 | End: 2018-04-24 | Stop reason: SDUPTHER

## 2018-04-18 DIAGNOSIS — E11.9 TYPE 2 DIABETES MELLITUS WITHOUT COMPLICATION, WITHOUT LONG-TERM CURRENT USE OF INSULIN (HCC): ICD-10-CM

## 2018-04-18 DIAGNOSIS — I10 ESSENTIAL HYPERTENSION: ICD-10-CM

## 2018-04-18 DIAGNOSIS — D50.9 IRON DEFICIENCY ANEMIA, UNSPECIFIED IRON DEFICIENCY ANEMIA TYPE: ICD-10-CM

## 2018-04-18 LAB
ANION GAP SERPL CALCULATED.3IONS-SCNC: 12 MMOL/L (ref 7–19)
BASOPHILS ABSOLUTE: 0.1 K/UL (ref 0–0.2)
BASOPHILS RELATIVE PERCENT: 0.4 % (ref 0–1)
BUN BLDV-MCNC: 15 MG/DL (ref 8–23)
CALCIUM SERPL-MCNC: 9.3 MG/DL (ref 8.8–10.2)
CHLORIDE BLD-SCNC: 101 MMOL/L (ref 98–111)
CO2: 28 MMOL/L (ref 22–29)
CREAT SERPL-MCNC: 0.6 MG/DL (ref 0.5–0.9)
EOSINOPHILS ABSOLUTE: 0.1 K/UL (ref 0–0.6)
EOSINOPHILS RELATIVE PERCENT: 0.6 % (ref 0–5)
GFR NON-AFRICAN AMERICAN: >60
GLUCOSE BLD-MCNC: 112 MG/DL (ref 74–109)
HBA1C MFR BLD: 8.5 %
HCT VFR BLD CALC: 44.6 % (ref 37–47)
HEMOGLOBIN: 14 G/DL (ref 12–16)
IRON: 109 UG/DL (ref 37–145)
LYMPHOCYTES ABSOLUTE: 2 K/UL (ref 1.1–4.5)
LYMPHOCYTES RELATIVE PERCENT: 17.4 % (ref 20–40)
MCH RBC QN AUTO: 30.1 PG (ref 27–31)
MCHC RBC AUTO-ENTMCNC: 31.4 G/DL (ref 33–37)
MCV RBC AUTO: 95.9 FL (ref 81–99)
MONOCYTES ABSOLUTE: 0.6 K/UL (ref 0–0.9)
MONOCYTES RELATIVE PERCENT: 5 % (ref 0–10)
NEUTROPHILS ABSOLUTE: 8.5 K/UL (ref 1.5–7.5)
NEUTROPHILS RELATIVE PERCENT: 76 % (ref 50–65)
PDW BLD-RTO: 16.1 % (ref 11.5–14.5)
PLATELET # BLD: 254 K/UL (ref 130–400)
PMV BLD AUTO: 10.3 FL (ref 9.4–12.3)
POTASSIUM SERPL-SCNC: 4 MMOL/L (ref 3.5–5)
RBC # BLD: 4.65 M/UL (ref 4.2–5.4)
SODIUM BLD-SCNC: 141 MMOL/L (ref 136–145)
WBC # BLD: 11.2 K/UL (ref 4.8–10.8)

## 2018-04-24 ENCOUNTER — TELEPHONE (OUTPATIENT)
Dept: FAMILY MEDICINE CLINIC | Age: 63
End: 2018-04-24

## 2018-04-24 ENCOUNTER — OFFICE VISIT (OUTPATIENT)
Dept: FAMILY MEDICINE CLINIC | Age: 63
End: 2018-04-24
Payer: MEDICARE

## 2018-04-24 VITALS
DIASTOLIC BLOOD PRESSURE: 72 MMHG | TEMPERATURE: 98.2 F | SYSTOLIC BLOOD PRESSURE: 118 MMHG | OXYGEN SATURATION: 97 % | HEART RATE: 78 BPM

## 2018-04-24 DIAGNOSIS — R15.9 INCONTINENCE OF FECES, UNSPECIFIED FECAL INCONTINENCE TYPE: ICD-10-CM

## 2018-04-24 DIAGNOSIS — E11.9 TYPE 2 DIABETES MELLITUS WITHOUT COMPLICATION, WITHOUT LONG-TERM CURRENT USE OF INSULIN (HCC): ICD-10-CM

## 2018-04-24 DIAGNOSIS — I10 ESSENTIAL HYPERTENSION: ICD-10-CM

## 2018-04-24 DIAGNOSIS — D50.9 IRON DEFICIENCY ANEMIA, UNSPECIFIED IRON DEFICIENCY ANEMIA TYPE: ICD-10-CM

## 2018-04-24 DIAGNOSIS — D72.829 LEUKOCYTOSIS, UNSPECIFIED TYPE: Primary | ICD-10-CM

## 2018-04-24 LAB
BILIRUBIN, POC: NORMAL
BLOOD URINE, POC: NORMAL
CLARITY, POC: CLEAR
COLOR, POC: YELLOW
GLUCOSE URINE, POC: NORMAL
KETONES, POC: NORMAL
LEUKOCYTE EST, POC: NORMAL
NITRITE, POC: NORMAL
PH, POC: 5
PROTEIN, POC: NORMAL
SPECIFIC GRAVITY, POC: 1.01
UROBILINOGEN, POC: 2

## 2018-04-24 PROCEDURE — G8417 CALC BMI ABV UP PARAM F/U: HCPCS | Performed by: FAMILY MEDICINE

## 2018-04-24 PROCEDURE — 81002 URINALYSIS NONAUTO W/O SCOPE: CPT | Performed by: FAMILY MEDICINE

## 2018-04-24 PROCEDURE — 99214 OFFICE O/P EST MOD 30 MIN: CPT | Performed by: FAMILY MEDICINE

## 2018-04-24 PROCEDURE — 1036F TOBACCO NON-USER: CPT | Performed by: FAMILY MEDICINE

## 2018-04-24 PROCEDURE — 2022F DILAT RTA XM EVC RTNOPTHY: CPT | Performed by: FAMILY MEDICINE

## 2018-04-24 PROCEDURE — G8427 DOCREV CUR MEDS BY ELIG CLIN: HCPCS | Performed by: FAMILY MEDICINE

## 2018-04-24 PROCEDURE — 3017F COLORECTAL CA SCREEN DOC REV: CPT | Performed by: FAMILY MEDICINE

## 2018-04-24 PROCEDURE — 3045F PR MOST RECENT HEMOGLOBIN A1C LEVEL 7.0-9.0%: CPT | Performed by: FAMILY MEDICINE

## 2018-04-24 RX ORDER — LOSARTAN POTASSIUM 25 MG/1
25 TABLET ORAL DAILY
Qty: 30 TABLET | Refills: 3 | Status: SHIPPED | OUTPATIENT
Start: 2018-04-24 | End: 2018-08-07 | Stop reason: SDUPTHER

## 2018-04-24 RX ORDER — POTASSIUM CHLORIDE 750 MG/1
10 TABLET, FILM COATED, EXTENDED RELEASE ORAL DAILY
Qty: 30 TABLET | Refills: 3 | Status: SHIPPED | OUTPATIENT
Start: 2018-04-24 | End: 2018-09-24 | Stop reason: CLARIF

## 2018-04-24 RX ORDER — FUROSEMIDE 20 MG/1
20 TABLET ORAL DAILY
Qty: 30 TABLET | Refills: 3 | Status: SHIPPED | OUTPATIENT
Start: 2018-04-24 | End: 2018-08-07 | Stop reason: SDUPTHER

## 2018-04-24 RX ORDER — SIMVASTATIN 40 MG
40 TABLET ORAL EVERY EVENING
Qty: 30 TABLET | Refills: 3 | Status: SHIPPED | OUTPATIENT
Start: 2018-04-24 | End: 2018-08-07 | Stop reason: SDUPTHER

## 2018-04-24 RX ORDER — LANCETS 28 GAUGE
EACH MISCELLANEOUS
Qty: 100 EACH | Refills: 2 | Status: SHIPPED | OUTPATIENT
Start: 2018-04-24

## 2018-04-24 RX ORDER — ISOSORBIDE MONONITRATE 30 MG/1
TABLET, EXTENDED RELEASE ORAL
Qty: 30 TABLET | Refills: 3 | Status: SHIPPED | OUTPATIENT
Start: 2018-04-24 | End: 2018-10-12 | Stop reason: SDUPTHER

## 2018-04-24 RX ORDER — PANTOPRAZOLE SODIUM 40 MG/1
40 TABLET, DELAYED RELEASE ORAL DAILY
Qty: 30 TABLET | Refills: 3 | Status: SHIPPED | OUTPATIENT
Start: 2018-04-24 | End: 2018-10-12 | Stop reason: SDUPTHER

## 2018-05-04 PROBLEM — R15.9 INCONTINENCE OF FECES: Status: ACTIVE | Noted: 2018-05-04

## 2018-05-04 ASSESSMENT — ENCOUNTER SYMPTOMS
NAUSEA: 0
CHEST TIGHTNESS: 0
SHORTNESS OF BREATH: 0
ABDOMINAL PAIN: 0
CONSTIPATION: 0
DIARRHEA: 0
COUGH: 0
ANAL BLEEDING: 0

## 2018-05-16 RX ORDER — GLYBURIDE 5 MG/1
2.5 TABLET ORAL 2 TIMES DAILY WITH MEALS
Qty: 30 TABLET | Refills: 3 | Status: SHIPPED | OUTPATIENT
Start: 2018-05-16 | End: 2018-08-07 | Stop reason: SDUPTHER

## 2018-06-05 ENCOUNTER — APPOINTMENT (OUTPATIENT)
Dept: CT IMAGING | Facility: HOSPITAL | Age: 63
End: 2018-06-05

## 2018-06-05 ENCOUNTER — HOSPITAL ENCOUNTER (EMERGENCY)
Facility: HOSPITAL | Age: 63
Discharge: HOME | End: 2018-06-05
Attending: EMERGENCY MEDICINE | Admitting: EMERGENCY MEDICINE

## 2018-06-05 ENCOUNTER — APPOINTMENT (OUTPATIENT)
Dept: GENERAL RADIOLOGY | Facility: HOSPITAL | Age: 63
End: 2018-06-05

## 2018-06-05 ENCOUNTER — OFFICE VISIT (OUTPATIENT)
Dept: FAMILY MEDICINE CLINIC | Age: 63
End: 2018-06-05
Payer: MEDICARE

## 2018-06-05 ENCOUNTER — TELEPHONE (OUTPATIENT)
Dept: FAMILY MEDICINE CLINIC | Age: 63
End: 2018-06-05

## 2018-06-05 VITALS
BODY MASS INDEX: 35 KG/M2 | DIASTOLIC BLOOD PRESSURE: 80 MMHG | WEIGHT: 205 LBS | HEART RATE: 74 BPM | RESPIRATION RATE: 18 BRPM | HEIGHT: 64 IN | SYSTOLIC BLOOD PRESSURE: 130 MMHG

## 2018-06-05 VITALS
BODY MASS INDEX: 35 KG/M2 | TEMPERATURE: 98.4 F | OXYGEN SATURATION: 98 % | DIASTOLIC BLOOD PRESSURE: 68 MMHG | HEIGHT: 64 IN | WEIGHT: 205 LBS | RESPIRATION RATE: 18 BRPM | SYSTOLIC BLOOD PRESSURE: 132 MMHG | HEART RATE: 84 BPM

## 2018-06-05 DIAGNOSIS — D72.829 LEUKOCYTOSIS, UNSPECIFIED TYPE: ICD-10-CM

## 2018-06-05 DIAGNOSIS — R00.0 SINUS TACHYCARDIA: Primary | ICD-10-CM

## 2018-06-05 DIAGNOSIS — R15.9 INCONTINENCE OF FECES, UNSPECIFIED FECAL INCONTINENCE TYPE: ICD-10-CM

## 2018-06-05 DIAGNOSIS — E11.9 TYPE 2 DIABETES MELLITUS WITHOUT COMPLICATION, WITHOUT LONG-TERM CURRENT USE OF INSULIN (HCC): ICD-10-CM

## 2018-06-05 DIAGNOSIS — D50.9 IRON DEFICIENCY ANEMIA, UNSPECIFIED IRON DEFICIENCY ANEMIA TYPE: ICD-10-CM

## 2018-06-05 DIAGNOSIS — I48.92 ATRIAL FLUTTER BY ELECTROCARDIOGRAM (HCC): ICD-10-CM

## 2018-06-05 DIAGNOSIS — R51.9 NONINTRACTABLE HEADACHE, UNSPECIFIED CHRONICITY PATTERN, UNSPECIFIED HEADACHE TYPE: ICD-10-CM

## 2018-06-05 DIAGNOSIS — I10 ESSENTIAL HYPERTENSION: Primary | ICD-10-CM

## 2018-06-05 DIAGNOSIS — E78.01 FAMILIAL HYPERCHOLESTEROLEMIA: ICD-10-CM

## 2018-06-05 PROBLEM — K29.40 ATROPHIC GASTRITIS WITHOUT HEMORRHAGE: Status: ACTIVE | Noted: 2018-06-05

## 2018-06-05 PROBLEM — K31.7 POLYP OF STOMACH: Status: ACTIVE | Noted: 2018-06-05

## 2018-06-05 LAB
ALBUMIN SERPL-MCNC: 4.4 G/DL (ref 3.5–5)
ALBUMIN/GLOB SERPL: 1.4 G/DL (ref 1.1–2.5)
ALP SERPL-CCNC: 95 U/L (ref 24–120)
ALT SERPL W P-5'-P-CCNC: 48 U/L (ref 0–54)
ANION GAP SERPL CALCULATED.3IONS-SCNC: 16 MMOL/L (ref 4–13)
AST SERPL-CCNC: 32 U/L (ref 7–45)
BASOPHILS # BLD AUTO: 0.04 10*3/MM3 (ref 0–0.2)
BASOPHILS ABSOLUTE: 0 K/UL (ref 0–0.2)
BASOPHILS NFR BLD AUTO: 0.3 % (ref 0–2)
BASOPHILS RELATIVE PERCENT: 0.2 % (ref 0–1)
BILIRUB SERPL-MCNC: 1.1 MG/DL (ref 0.1–1)
BUN BLD-MCNC: 9 MG/DL (ref 5–21)
BUN/CREAT SERPL: 16.7 (ref 7–25)
CALCIUM SPEC-SCNC: 9.4 MG/DL (ref 8.4–10.4)
CHLORIDE SERPL-SCNC: 103 MMOL/L (ref 98–110)
CO2 SERPL-SCNC: 22 MMOL/L (ref 24–31)
CREAT BLD-MCNC: 0.54 MG/DL (ref 0.5–1.4)
D DIMER PPP FEU-MCNC: 0.41 MG/L (FEU) (ref 0–0.5)
DEPRECATED RDW RBC AUTO: 48.4 FL (ref 40–54)
EOSINOPHIL # BLD AUTO: 0.04 10*3/MM3 (ref 0–0.7)
EOSINOPHIL NFR BLD AUTO: 0.3 % (ref 0–4)
EOSINOPHILS ABSOLUTE: 0 K/UL (ref 0–0.6)
EOSINOPHILS RELATIVE PERCENT: 0.2 % (ref 0–5)
ERYTHROCYTE [DISTWIDTH] IN BLOOD BY AUTOMATED COUNT: 14.4 % (ref 12–15)
GFR SERPL CREATININE-BSD FRML MDRD: 115 ML/MIN/1.73
GLOBULIN UR ELPH-MCNC: 3.1 GM/DL
GLUCOSE BLD-MCNC: 258 MG/DL (ref 70–100)
HCT VFR BLD AUTO: 44.9 % (ref 37–47)
HCT VFR BLD CALC: 45.5 % (ref 37–47)
HEMOGLOBIN: 14 G/DL (ref 12–16)
HGB BLD-MCNC: 14.5 G/DL (ref 12–16)
HOLD SPECIMEN: NORMAL
HOLD SPECIMEN: NORMAL
IMM GRANULOCYTES # BLD: 0.13 10*3/MM3 (ref 0–0.03)
IMM GRANULOCYTES NFR BLD: 0.9 % (ref 0–5)
LYMPHOCYTES # BLD AUTO: 1.98 10*3/MM3 (ref 0.72–4.86)
LYMPHOCYTES ABSOLUTE: 1.1 K/UL (ref 1.1–4.5)
LYMPHOCYTES NFR BLD AUTO: 14.5 % (ref 15–45)
LYMPHOCYTES RELATIVE PERCENT: 8.7 % (ref 20–40)
MAGNESIUM SERPL-MCNC: 1.8 MG/DL (ref 1.4–2.2)
MCH RBC QN AUTO: 29.8 PG (ref 27–31)
MCH RBC QN AUTO: 29.8 PG (ref 28–32)
MCHC RBC AUTO-ENTMCNC: 30.8 G/DL (ref 33–37)
MCHC RBC AUTO-ENTMCNC: 32.3 G/DL (ref 33–36)
MCV RBC AUTO: 92.2 FL (ref 82–98)
MCV RBC AUTO: 96.8 FL (ref 81–99)
MONOCYTES # BLD AUTO: 0.75 10*3/MM3 (ref 0.19–1.3)
MONOCYTES ABSOLUTE: 0.7 K/UL (ref 0–0.9)
MONOCYTES NFR BLD AUTO: 5.5 % (ref 4–12)
MONOCYTES RELATIVE PERCENT: 5.7 % (ref 0–10)
NEUTROPHILS # BLD AUTO: 10.76 10*3/MM3 (ref 1.87–8.4)
NEUTROPHILS ABSOLUTE: 10.3 K/UL (ref 1.5–7.5)
NEUTROPHILS NFR BLD AUTO: 78.5 % (ref 39–78)
NEUTROPHILS RELATIVE PERCENT: 84.4 % (ref 50–65)
NRBC BLD MANUAL-RTO: 0 /100 WBC (ref 0–0)
PDW BLD-RTO: 14.6 % (ref 11.5–14.5)
PLATELET # BLD AUTO: 268 10*3/MM3 (ref 130–400)
PLATELET # BLD: 224 K/UL (ref 130–400)
PMV BLD AUTO: 10.5 FL (ref 6–12)
PMV BLD AUTO: 10.9 FL (ref 9.4–12.3)
POTASSIUM BLD-SCNC: 4.1 MMOL/L (ref 3.5–5.3)
PROT SERPL-MCNC: 7.5 G/DL (ref 6.3–8.7)
RBC # BLD AUTO: 4.87 10*6/MM3 (ref 4.2–5.4)
RBC # BLD: 4.7 M/UL (ref 4.2–5.4)
SODIUM BLD-SCNC: 141 MMOL/L (ref 135–145)
TROPONIN I SERPL-MCNC: <0.012 NG/ML (ref 0–0.03)
TSH SERPL DL<=0.05 MIU/L-ACNC: 1.31 MIU/ML (ref 0.47–4.68)
WBC # BLD: 12.3 K/UL (ref 4.8–10.8)
WBC NRBC COR # BLD: 13.7 10*3/MM3 (ref 4.8–10.8)
WHOLE BLOOD HOLD SPECIMEN: NORMAL
WHOLE BLOOD HOLD SPECIMEN: NORMAL

## 2018-06-05 PROCEDURE — 71045 X-RAY EXAM CHEST 1 VIEW: CPT

## 2018-06-05 PROCEDURE — 96360 HYDRATION IV INFUSION INIT: CPT

## 2018-06-05 PROCEDURE — 99214 OFFICE O/P EST MOD 30 MIN: CPT | Performed by: FAMILY MEDICINE

## 2018-06-05 PROCEDURE — 84443 ASSAY THYROID STIM HORMONE: CPT | Performed by: EMERGENCY MEDICINE

## 2018-06-05 PROCEDURE — 93010 ELECTROCARDIOGRAM REPORT: CPT | Performed by: INTERNAL MEDICINE

## 2018-06-05 PROCEDURE — 1036F TOBACCO NON-USER: CPT | Performed by: FAMILY MEDICINE

## 2018-06-05 PROCEDURE — 99285 EMERGENCY DEPT VISIT HI MDM: CPT

## 2018-06-05 PROCEDURE — 93005 ELECTROCARDIOGRAM TRACING: CPT

## 2018-06-05 PROCEDURE — 84484 ASSAY OF TROPONIN QUANT: CPT | Performed by: EMERGENCY MEDICINE

## 2018-06-05 PROCEDURE — G8427 DOCREV CUR MEDS BY ELIG CLIN: HCPCS | Performed by: FAMILY MEDICINE

## 2018-06-05 PROCEDURE — 70450 CT HEAD/BRAIN W/O DYE: CPT

## 2018-06-05 PROCEDURE — 3017F COLORECTAL CA SCREEN DOC REV: CPT | Performed by: FAMILY MEDICINE

## 2018-06-05 PROCEDURE — 83735 ASSAY OF MAGNESIUM: CPT | Performed by: EMERGENCY MEDICINE

## 2018-06-05 PROCEDURE — 85379 FIBRIN DEGRADATION QUANT: CPT | Performed by: EMERGENCY MEDICINE

## 2018-06-05 PROCEDURE — 2022F DILAT RTA XM EVC RTNOPTHY: CPT | Performed by: FAMILY MEDICINE

## 2018-06-05 PROCEDURE — 93005 ELECTROCARDIOGRAM TRACING: CPT | Performed by: EMERGENCY MEDICINE

## 2018-06-05 PROCEDURE — 85025 COMPLETE CBC W/AUTO DIFF WBC: CPT | Performed by: EMERGENCY MEDICINE

## 2018-06-05 PROCEDURE — G8417 CALC BMI ABV UP PARAM F/U: HCPCS | Performed by: FAMILY MEDICINE

## 2018-06-05 PROCEDURE — 3045F PR MOST RECENT HEMOGLOBIN A1C LEVEL 7.0-9.0%: CPT | Performed by: FAMILY MEDICINE

## 2018-06-05 PROCEDURE — 80053 COMPREHEN METABOLIC PANEL: CPT | Performed by: EMERGENCY MEDICINE

## 2018-06-05 RX ORDER — ASPIRIN 81 MG/1
324 TABLET, CHEWABLE ORAL ONCE
Status: COMPLETED | OUTPATIENT
Start: 2018-06-05 | End: 2018-06-05

## 2018-06-05 RX ORDER — ACETAMINOPHEN 500 MG
1000 TABLET ORAL ONCE
Status: COMPLETED | OUTPATIENT
Start: 2018-06-05 | End: 2018-06-05

## 2018-06-05 RX ORDER — NITROGLYCERIN 0.4 MG/1
0.4 TABLET SUBLINGUAL EVERY 5 MIN PRN
Qty: 25 TABLET | Refills: 2 | Status: SHIPPED | OUTPATIENT
Start: 2018-06-05 | End: 2020-05-14

## 2018-06-05 RX ORDER — SODIUM CHLORIDE 0.9 % (FLUSH) 0.9 %
10 SYRINGE (ML) INJECTION AS NEEDED
Status: DISCONTINUED | OUTPATIENT
Start: 2018-06-05 | End: 2018-06-05 | Stop reason: HOSPADM

## 2018-06-05 RX ORDER — FERROUS SULFATE 325(65) MG
325 TABLET ORAL DAILY
Qty: 30 TABLET | Refills: 0 | Status: SHIPPED | OUTPATIENT
Start: 2018-06-05 | End: 2018-08-07

## 2018-06-05 RX ADMIN — ACETAMINOPHEN 1000 MG: 500 TABLET ORAL at 15:49

## 2018-06-05 RX ADMIN — ASPIRIN 81 MG 243 MG: 81 TABLET ORAL at 15:34

## 2018-06-05 RX ADMIN — SODIUM CHLORIDE 1000 ML: 9 INJECTION, SOLUTION INTRAVENOUS at 14:31

## 2018-06-05 NOTE — ED PROVIDER NOTES
Subjective   Patient is a 62-year-old female who presents with headache, shortness of breath, palpitations, fast heart rate.  History of SVT, a flutter, diabetes.  Patient notes approximately 10-15 minutes ago she was in town and she noted her heart racing.  She got an acute headache which she describes as severe.  Top of her head.  No radiation.  Different from her normal headache.  Associated with shortness of breath and fast heart rate.  Denies associated chest pain, focal numbness or weakness, confusion, slurred speech, abdominal pain, nausea, vomiting, urinary symptoms.  Denies any skin symptoms.            Review of Systems   Constitutional: Negative for chills, diaphoresis, fatigue and fever.   HENT: Negative for sore throat.    Eyes: Negative for photophobia and visual disturbance.   Respiratory: Positive for shortness of breath. Negative for cough, wheezing and stridor.    Cardiovascular: Positive for palpitations. Negative for chest pain and leg swelling.   Gastrointestinal: Negative for abdominal pain, blood in stool, constipation, diarrhea, nausea and vomiting.   Genitourinary: Negative for dysuria and hematuria.   Musculoskeletal: Negative for back pain.   Skin: Negative for rash.   Neurological: Negative for dizziness, tremors, seizures, syncope, speech difficulty, weakness, light-headedness and headaches.   Psychiatric/Behavioral: Negative for confusion.       Past Medical History:   Diagnosis Date   • Abnormal stress test    • Atrial flutter    • CAD (coronary artery disease)    • CAD in native artery     CABG x 3   • Diabetes mellitus    • Essential hypertension     Tricuspid valve insufficiency, unspecified etiology    • Hyperlipemia, mixed    • Hyperlipidemia    • Hypertension    • MI, old    • Mitral valve prolapse    • Near syncope    • Obesity, morbid    • Palpitations    • Paroxysmal SVT (supraventricular tachycardia)    • Paroxysmal SVT (supraventricular tachycardia)    • Pedal edema    •  Precordial pain    • Rheumatoid arthritis    • Rheumatoid arthritis    • S/P CABG x 3        Allergies   Allergen Reactions   • Albuterol Palpitations     High heart rate   • Codeine      Chest pain   • Sulfa Antibiotics Hives       Past Surgical History:   Procedure Laterality Date   • APPENDECTOMY     • CARDIAC CATHETERIZATION Left 06/18/2012    Intensify medical therapy   • CARDIAC CATHETERIZATION Left 05/05/2002    surgery   • CHOLECYSTECTOMY     • COLONOSCOPY  02/10/2012   • COLONOSCOPY N/A 1/31/2018    Procedure: COLONOSCOPY WITH ANESTHESIA;  Surgeon: Patricia Bo MD;  Location: [facility] ENDOSCOPY;  Service:    • CORONARY ARTERY BYPASS GRAFT  05/05/2002    LIMA to LAD, SVG to D1, SVG to OM1 - x3   • CORONARY STENT PLACEMENT  09/2009    X1 - Stent to LAD, Drug Eluting   • ENDOSCOPY N/A 1/31/2018    Procedure: ESOPHAGOGASTRODUODENOSCOPY WITH ANESTHESIA;  Surgeon: Patricia Bo MD;  Location: [facility] ENDOSCOPY;  Service:    • GALLBLADDER SURGERY     • REPLACEMENT TOTAL KNEE Right    • UPPER GASTROINTESTINAL ENDOSCOPY  03/23/2015       Family History   Problem Relation Age of Onset   • Cancer Father    • Coronary artery disease Father    • Coronary artery disease Brother    • Colon polyps Neg Hx    • Colon cancer Neg Hx    • Esophageal cancer Neg Hx    • Liver cancer Neg Hx    • Liver disease Neg Hx    • Rectal cancer Neg Hx    • Stomach cancer Neg Hx        Social History     Social History   • Marital status:      Social History Main Topics   • Smoking status: Former Smoker     Quit date: 1/31/2000   • Smokeless tobacco: Never Used   • Alcohol use No   • Drug use: No   • Sexual activity: Defer     Other Topics Concern   • Not on file       Lab Results (last 24 hours)     Procedure Component Value Units Date/Time    CBC & Differential [[id number]] Collected:  06/05/18 1421    Specimen:  Blood Updated:  06/05/18 1430    Narrative:       The following orders were created for panel order CBC &  Differential.  Procedure                               Abnormality         Status                     ---------                               -----------         ------                     CBC Auto Differential[[id number]]        Abnormal            Final result                 Please view results for these tests on the individual orders.    Comprehensive Metabolic Panel [[id number]]  (Abnormal) Collected:  06/05/18 1421    Specimen:  Blood Updated:  06/05/18 1440     Glucose 258 (H) mg/dL      BUN 9 mg/dL      Creatinine 0.54 mg/dL      Sodium 141 mmol/L      Potassium 4.1 mmol/L      Chloride 103 mmol/L      CO2 22.0 (L) mmol/L      Calcium 9.4 mg/dL      Total Protein 7.5 g/dL      Albumin 4.40 g/dL      ALT (SGPT) 48 U/L      AST (SGOT) 32 U/L      Alkaline Phosphatase 95 U/L      Total Bilirubin 1.1 (H) mg/dL      eGFR Non African Amer 115 mL/min/1.73      Globulin 3.1 gm/dL      A/G Ratio 1.4 g/dL      BUN/Creatinine Ratio 16.7     Anion Gap 16.0 (H) mmol/L     Troponin [[id number]]  (Normal) Collected:  06/05/18 1421    Specimen:  Blood Updated:  06/05/18 1451     Troponin I <0.012 ng/mL     CBC Auto Differential [[id number]]  (Abnormal) Collected:  06/05/18 1421    Specimen:  Blood Updated:  06/05/18 1430     WBC 13.70 (H) 10*3/mm3      RBC 4.87 10*6/mm3      Hemoglobin 14.5 g/dL      Hematocrit 44.9 %      MCV 92.2 fL      MCH 29.8 pg      MCHC 32.3 (L) g/dL      RDW 14.4 %      RDW-SD 48.4 fl      MPV 10.5 fL      Platelets 268 10*3/mm3      Neutrophil % 78.5 (H) %      Lymphocyte % 14.5 (L) %      Monocyte % 5.5 %      Eosinophil % 0.3 %      Basophil % 0.3 %      Immature Grans % 0.9 %      Neutrophils, Absolute 10.76 (H) 10*3/mm3      Lymphocytes, Absolute 1.98 10*3/mm3      Monocytes, Absolute 0.75 10*3/mm3      Eosinophils, Absolute 0.04 10*3/mm3      Basophils, Absolute 0.04 10*3/mm3      Immature Grans, Absolute 0.13 (H) 10*3/mm3      nRBC 0.0 /100 WBC     D-dimer, Quantitative [[id number]]  (Normal)  Collected:  06/05/18 1421    Specimen:  Blood Updated:  06/05/18 1440     D-Dimer, Quantitative 0.41 mg/L (FEU)     Narrative:       Reference Range is 0-0.50 mg/L FEU. However, results <0.50 mg/L FEU tends to rule out DVT or PE. Results >0.50 mg/L FEU are not useful in predicting absence or presence of DVT or PE.    TSH [[id number]]  (Normal) Collected:  06/05/18 1421    Specimen:  Blood Updated:  06/05/18 1515     TSH 1.310 mIU/mL     Magnesium [[id number]]  (Normal) Collected:  06/05/18 1421    Specimen:  Blood Updated:  06/05/18 1443     Magnesium 1.8 mg/dL           Objective   Physical Exam   Constitutional: She is oriented to person, place, and time. She appears well-nourished.  Non-toxic appearance. She does not have a sickly appearance. She does not appear ill. No distress.   HENT:   Head: Atraumatic.   Mouth/Throat: Oropharynx is clear and moist and mucous membranes are normal.   Eyes: EOM are normal. Pupils are equal, round, and reactive to light.   Neck: Normal range of motion. Neck supple. No JVD present. No tracheal deviation present.   Cardiovascular: Normal rate, regular rhythm, normal heart sounds and intact distal pulses.  Exam reveals no gallop and no friction rub.    No murmur heard.  Pulmonary/Chest: Effort normal and breath sounds normal. No stridor. Tachypnea noted. No respiratory distress. She has no decreased breath sounds. She has no wheezes. She has no rhonchi. She has no rales. She exhibits no tenderness.   Abdominal: Soft. There is no tenderness.   Musculoskeletal: Normal range of motion. She exhibits no edema, tenderness or deformity.   Neurological: She is alert and oriented to person, place, and time. She has normal strength. No cranial nerve deficit or sensory deficit. GCS eye subscore is 4. GCS verbal subscore is 5. GCS motor subscore is 6.   Skin: Skin is warm and dry. Capillary refill takes less than 2 seconds. She is not diaphoretic.   Psychiatric: She has a normal mood and affect.  "  Nursing note and vitals reviewed.      ECG 12 Lead    Date/Time: 6/5/2018 2:30 PM  Performed by: RICARDO SANDERS  Authorized by: RICARDO SANDERS   Interpreted by physician  Comparison: compared with previous ECG   Rhythm: sinus tachycardia  Rate: tachycardic  QRS axis: normal  Comments: Sinus tachycardia.  There appears to be a P-wave before each QRS.  DE interval 126.  There are nonspecific ST and T-wave changes which were present on previous ECG.  No obvious SVT, A. fib, a flutter.  No Q waves.               CT Head Without Contrast   Final Result   1. No acute intracranial process.           This report was finalized on 06/05/2018 15:13 by Dr. Carlos Omer MD.      XR Chest 1 View   Final Result   1. Cardiomegaly without evidence of acute cardiopulmonary process.           This report was finalized on 06/05/2018 14:37 by Dr. Carlos Omer MD.          /67 (BP Location: Right arm, Patient Position: Lying)   Pulse 86   Temp 98.4 °F (36.9 °C) (Oral)   Resp 19   Ht 162.6 cm (64\")   Wt 93 kg (205 lb)   SpO2 97%   BMI 35.19 kg/m²     ED Course    ED Course as of Jun 05 1543   Tue Jun 05, 2018   1508 Glucose: (!) 258 [TH]   1509 WBC: (!) 13.70 [TH]   1509 This is a 62-year-old female who presents the setting of tachycardia and palpitations along with headache.  Initial ECG was sinus tachycardia.  Sinus arrhythmia noted.  Chest x-ray showed cardiomegaly otherwise normal.  Glucose and white blood cell count slightly elevated otherwise labs benign.  D-dimer negative.  Troponin negative.  [TH]   1519 Head CT negative.  [TH]   1541 Upon reassessment, vitals have improved.  Normal sinus rhythm.  She is well-appearing and she feels significantly improved after fluids.  We will give her Tylenol for her headache.  Head CT was negative.  Again she had no chest pain.  We will finish out the fluids and discharge her home.  I did advise PCP follow-up this week.  [TH]      ED Course User Index  [TH] Ricardo " Horacio Adams MD       Medications   sodium chloride 0.9 % flush 10 mL (not administered)   aspirin chewable tablet 324 mg (243 mg Oral Given 6/5/18 1534)   sodium chloride 0.9 % bolus 1,000 mL (1,000 mL Intravenous New Bag 6/5/18 1431)           PERC Rule (for pulmonary embolism) reviewed and/or performed as part of the patient evaluation and treatment planning process.  The result associated with this review/performance is: 2      MDM  Number of Diagnoses or Management Options  Nonintractable headache, unspecified chronicity pattern, unspecified headache type: new and requires workup  Sinus tachycardia: new and requires workup     Amount and/or Complexity of Data Reviewed  Clinical lab tests: reviewed and ordered  Tests in the radiology section of CPT®: reviewed and ordered  Tests in the medicine section of CPT®: reviewed    Risk of Complications, Morbidity, and/or Mortality  Presenting problems: low  Diagnostic procedures: low  Management options: low    Patient Progress  Patient progress: resolved      Final diagnoses:   Sinus tachycardia   Nonintractable headache, unspecified chronicity pattern, unspecified headache type          Ricardo Adams MD  06/05/18 9122

## 2018-06-05 NOTE — ED NOTES
Patient discharged with AVS. AVS reviewed and patient given RX x0. All questions answered at this time. Vitals stable at discharge.        Baudilio Dykes RN  06/05/18 9460

## 2018-06-05 NOTE — ED NOTES
Intentional Rounding complete. Patient resting comfortably at this time. Call light in reach. Vitals stable. Will continue to monitor.        Baudilio Dykes RN  06/05/18 1700

## 2018-06-16 ASSESSMENT — ENCOUNTER SYMPTOMS
ANAL BLEEDING: 0
COUGH: 0
CHEST TIGHTNESS: 0
CONSTIPATION: 0
SHORTNESS OF BREATH: 0
NAUSEA: 0
DIARRHEA: 0
ABDOMINAL PAIN: 0

## 2018-07-16 RX ORDER — BLOOD-GLUCOSE METER
KIT MISCELLANEOUS
Qty: 100 STRIP | Refills: 0 | Status: SHIPPED | OUTPATIENT
Start: 2018-07-16 | End: 2019-05-29 | Stop reason: SDUPTHER

## 2018-07-17 ENCOUNTER — OFFICE VISIT (OUTPATIENT)
Dept: CARDIOLOGY | Facility: CLINIC | Age: 63
End: 2018-07-17

## 2018-07-17 VITALS
SYSTOLIC BLOOD PRESSURE: 140 MMHG | WEIGHT: 204 LBS | HEART RATE: 94 BPM | OXYGEN SATURATION: 98 % | BODY MASS INDEX: 34.83 KG/M2 | DIASTOLIC BLOOD PRESSURE: 78 MMHG | HEIGHT: 64 IN

## 2018-07-17 DIAGNOSIS — E78.2 MIXED HYPERLIPIDEMIA: ICD-10-CM

## 2018-07-17 DIAGNOSIS — Z95.5 S/P CORONARY ARTERY STENT PLACEMENT: ICD-10-CM

## 2018-07-17 DIAGNOSIS — Z87.19 HISTORY OF ESOPHAGITIS: ICD-10-CM

## 2018-07-17 DIAGNOSIS — Z95.1 S/P CABG X 3: ICD-10-CM

## 2018-07-17 DIAGNOSIS — R93.89 ABNORMAL FINDING ON IMAGING: ICD-10-CM

## 2018-07-17 DIAGNOSIS — M89.8X1 SHOULDER BLADE PAIN: ICD-10-CM

## 2018-07-17 DIAGNOSIS — I25.10 CORONARY ARTERY DISEASE INVOLVING NATIVE CORONARY ARTERY WITHOUT ANGINA PECTORIS, UNSPECIFIED WHETHER NATIVE OR TRANSPLANTED HEART: ICD-10-CM

## 2018-07-17 DIAGNOSIS — K31.7 GASTRIC POLYP: ICD-10-CM

## 2018-07-17 DIAGNOSIS — E11.9 TYPE 2 DIABETES MELLITUS WITHOUT COMPLICATION, WITHOUT LONG-TERM CURRENT USE OF INSULIN (HCC): ICD-10-CM

## 2018-07-17 DIAGNOSIS — I48.3 TYPICAL ATRIAL FLUTTER (HCC): ICD-10-CM

## 2018-07-17 DIAGNOSIS — I10 ESSENTIAL HYPERTENSION: Primary | ICD-10-CM

## 2018-07-17 PROCEDURE — 99214 OFFICE O/P EST MOD 30 MIN: CPT | Performed by: INTERNAL MEDICINE

## 2018-07-17 PROCEDURE — 93000 ELECTROCARDIOGRAM COMPLETE: CPT | Performed by: INTERNAL MEDICINE

## 2018-07-17 RX ORDER — AMIODARONE HYDROCHLORIDE 200 MG/1
200 TABLET ORAL 2 TIMES DAILY
Qty: 60 TABLET | Refills: 1 | Status: ON HOLD | OUTPATIENT
Start: 2018-07-17 | End: 2018-08-02

## 2018-07-17 NOTE — PROGRESS NOTES
Teresa Serna  [id number]  1955  62 y.o.  female    Referring Provider: Teresa Contreras MD    Reason for Follow-up Visit: Here for routine follow up   Paroxysmal atrial fibrillation     Subjective    Mild chronic exertional shortness of breath on exertion relieved with rest  No significant cough or wheezing  Going on for several months  No palpitations  No associated chest pain  No significant pedal edema  No fever or chills  No significant expectoration  No hemoptysis  No presyncope or syncope   Saw Dr De Anda and 30 day monitor was ordered  Now gets light headed and dizzy  No anticoagulation   as Gastrointestinal bleed had UGIE and Colonscopy s/p gastric polypectomy 1/18/18    History of present illness:  Teresa Serna is a 62 y.o. yo female with history of Paroxysmal atrial fibrillation who presents today for   Chief Complaint   Patient presents with   • Coronary Artery Disease     6 MON FU   • Rapid Heart Rate   • Dizziness   • Fatigue   • Shortness of Breath   • Palpitations   .    History  Past Medical History:   Diagnosis Date   • Abnormal stress test    • Atrial flutter (CMS/HCC)    • CAD (coronary artery disease)    • CAD in native artery     CABG x 3   • Diabetes mellitus (CMS/HCC)    • Essential hypertension     Tricuspid valve insufficiency, unspecified etiology    • Hyperlipemia, mixed    • Hyperlipidemia    • Hypertension    • MI, old    • Mitral valve prolapse    • Near syncope    • Obesity, morbid (CMS/HCC)    • Palpitations    • Paroxysmal SVT (supraventricular tachycardia) (CMS/HCC)    • Paroxysmal SVT (supraventricular tachycardia) (CMS/HCC)    • Pedal edema    • Precordial pain    • Rheumatoid arthritis (CMS/HCC)    • Rheumatoid arthritis (CMS/HCC)    • S/P CABG x 3    ,   Past Surgical History:   Procedure Laterality Date   • APPENDECTOMY     • CARDIAC CATHETERIZATION Left 06/18/2012    Intensify medical therapy   • CARDIAC CATHETERIZATION Left 05/05/2002    surgery   • CHOLECYSTECTOMY      • COLONOSCOPY  02/10/2012   • COLONOSCOPY N/A 1/31/2018    Procedure: COLONOSCOPY WITH ANESTHESIA;  Surgeon: Patricia Bo MD;  Location: [facility] ENDOSCOPY;  Service:    • CORONARY ARTERY BYPASS GRAFT  05/05/2002    LIMA to LAD, SVG to D1, SVG to OM1 - x3   • CORONARY STENT PLACEMENT  09/2009    X1 - Stent to LAD, Drug Eluting   • ENDOSCOPY N/A 1/31/2018    Procedure: ESOPHAGOGASTRODUODENOSCOPY WITH ANESTHESIA;  Surgeon: Patricia Bo MD;  Location: [facility] ENDOSCOPY;  Service:    • GALLBLADDER SURGERY     • REPLACEMENT TOTAL KNEE Right    • UPPER GASTROINTESTINAL ENDOSCOPY  03/23/2015   ,   Family History   Problem Relation Age of Onset   • Cancer Father    • Coronary artery disease Father    • Coronary artery disease Brother    • Colon polyps Neg Hx    • Colon cancer Neg Hx    • Esophageal cancer Neg Hx    • Liver cancer Neg Hx    • Liver disease Neg Hx    • Rectal cancer Neg Hx    • Stomach cancer Neg Hx    ,   Social History   Substance Use Topics   • Smoking status: Former Smoker     Years: 20.00     Types: Cigarettes     Quit date: 1/31/2000   • Smokeless tobacco: Never Used   • Alcohol use No   ,     Medications  Current Outpatient Prescriptions   Medication Sig Dispense Refill   • aspirin 81 MG EC tablet Take 81 mg by mouth Daily.     • folic acid (FOLVITE) 1 MG tablet Take 1 mg by mouth Daily.     • furosemide (LASIX) 20 MG tablet Take 20 mg by mouth Daily.     • glyBURIDE (DIAbeta) 5 MG tablet Take 5 mg by mouth Daily With Breakfast.     • isosorbide mononitrate (IMDUR) 30 MG 24 hr tablet Take 30 mg by mouth Daily.     • losartan (COZAAR) 25 MG tablet Take 25 mg by mouth Daily.     • metFORMIN (GLUCOPHAGE) 1000 MG tablet Take 1,000 mg by mouth Daily With Breakfast.     • metoprolol tartrate (LOPRESSOR) 50 MG tablet Take 50 mg by mouth 2 (Two) Times a Day.     • nitroglycerin (NITROSTAT) 0.4 MG SL tablet Place 0.4 mg under the tongue Every 5 (Five) Minutes As Needed for Chest Pain. Take no more than 3  "doses in 15 minutes.     • pantoprazole (PROTONIX) 40 MG EC tablet Take 1 tablet by mouth Every 12 (Twelve) Hours. 60 tablet 1   • potassium chloride (K-DUR) 10 MEQ CR tablet Take 10 mEq by mouth Daily.     • predniSONE (DELTASONE) 1 MG tablet Take 4 mg by mouth Every Night.     • predniSONE (DELTASONE) 5 MG tablet Take 5 mg by mouth Every Morning.     • Tofacitinib Citrate (XELJANZ XR) 11 MG tablet sustained-release 24 hour Take  by mouth.     • amiodarone (PACERONE) 200 MG tablet Take 1 tablet by mouth 2 (Two) Times a Day. 60 tablet 1   • ferrous sulfate 325 (65 FE) MG tablet Take 1 tablet by mouth Daily With Breakfast. 30 tablet 1   • methotrexate 2.5 MG tablet Take 2.5 mg by mouth 1 (One) Time Per Week. Takes 10 tablets on Saturday.       No current facility-administered medications for this visit.      Results for orders placed during the hospital encounter of 12/05/17   Adult Transthoracic Echo Complete W/ Cont if Necessary Per Protocol    Narrative · Left ventricular systolic function is normal. Estimated EF = 60%.  · Mild pulmonary hypertension is present.          Allergies:  Albuterol; Codeine; and Sulfa antibiotics    Review of Systems  Review of Systems   Constitution: Positive for weakness and malaise/fatigue.   HENT: Negative.    Eyes: Negative.    Cardiovascular: Positive for dyspnea on exertion and palpitations. Negative for chest pain, claudication, cyanosis, irregular heartbeat, leg swelling, near-syncope, orthopnea, paroxysmal nocturnal dyspnea and syncope.   Respiratory: Negative.    Endocrine: Negative.    Hematologic/Lymphatic: Negative.    Skin: Negative.    Musculoskeletal: Positive for arthritis, back pain and joint pain.   Gastrointestinal: Negative for anorexia.   Genitourinary: Negative.    Psychiatric/Behavioral: Negative.        Objective     Physical Exam:  /78   Pulse 94   Ht 162.6 cm (64.02\")   Wt 92.5 kg (204 lb)   SpO2 98%   BMI 35.00 kg/m²   Physical Exam "   Constitutional: She appears well-developed.   HENT:   Head: Normocephalic.   Neck: Normal carotid pulses and no JVD present. No tracheal tenderness present. Carotid bruit is not present. No tracheal deviation and no edema present.   Cardiovascular: Regular rhythm and normal pulses.    Murmur heard.   Systolic murmur is present with a grade of 2/6   Pulmonary/Chest: Effort normal. No stridor. She has wheezes.   Abdominal: Soft.   Neurological: She is alert. She has normal strength. No cranial nerve deficit or sensory deficit.   Skin: Skin is warm.   Psychiatric: She has a normal mood and affect. Her speech is normal and behavior is normal.       Results Review:       ECG 12 Lead  Date/Time: 7/17/2018 1:42 PM  Performed by: RADHA PRINCE  Authorized by: RADHA PRINCE   Comparison: compared with previous ECG from 3/17/2017  Similar to previous ECG  Rhythm: sinus rhythm  Ectopy: atrial premature contractions  Rate: normal  Conduction: conduction normal  ST Segments: ST segments normal  T Waves: T waves normal  QRS axis: normal  Other: no other findings  Clinical impression: abnormal ECG            Assessment/Plan   Patient Active Problem List   Diagnosis   • CAD (coronary artery disease)   • Atrial flutter (CMS/HCC)   • Type 2 diabetes mellitus without complication, without long-term current use of insulin (CMS/HCC)   • Mixed hyperlipidemia   • S/P CABG x 3 2000 Dr Larsen   • Shoulder blade pain   • Anemia   • Abnormal finding on imaging   • History of esophagitis   • Essential hypertension   • S/P coronary artery stent placement   • Gastric polyp   • Colon polyps     Low risk stress echo with normal LVEF by echo cardiogram.       Plan:    Low salt/ HTN/ Heart healthy carbohydrate restricted cardiac diet as applicable to this patient's current diagnoses.   This handout has relevant information regarding shopping for food, preparing meals, what to eat at restaurants, tracking of food intake, information regarding sodium  "intake and salt content, how to read food labels, knowing what to eat, tips reagarding physical activity, calorie count and calorie expenditure. What foods to avoid. Information regarding alcoholic drinks along with \"good\" and \"bad\" fats.  Reiterated prior recommendations     The patient is advised to reduce or avoid caffeine or other cardiac stimulants.     The patient was advised that NSAID-type medications have three  very important potential side effects: cardiovascular complications, gastrointestinal irritation including hemorrhage and renal injuries.  Do not use anti-inflammatories such as Motrin/ibuprofen, Alleve/naprosyn, Mobic and like medications Use Tylenol instead.The patient expresses understanding of these issues and questions were answered.   Monitor for any signs of bleeding including red or dark stools. Fall precautions.       Monitor for any signs of bleeding including red or dark stools. Fall precautions.   Patient is asked to monitor BP at home or work, several times per month and return with written values at next office visit.     Advised staying uptodate with immunizations per established standard guidelines.    Reviewed available prior notes, consults, prior visits, laboratory findings, radiology And cardiology relevant reports. Updated chart as applicable. I have reviewed the patient's medical history in detail and updated the computerized patient record as relevant.    Updated patient regarding any new or relevant abnormalities on review of records or any new findings on physical exam. Mentioned to patient about purpose of visit and desirable health short and long term goals and objectives.    Offered to give patient  a copy of my notes and relevant tests/ prior ECG etc for the patient to review and follow specific advise and relevant findings if any, prognosis, along with my current and future plans.      Questions were encouraged, asked and answered to the patient's  understanding and " satisfaction. Questions if any regarding current medications and side effects, need for refills and importance of compliance to medications stressed.    Reviewed available prior notes, consults, prior visits, laboratory findings, radiology And cardiology relevant reports. Updated chart as applicable. I have reviewed the patient's medical history in detail and updated the computerized patient record as relevant.    Updated patient regarding any new or relevant abnormalities on review of records or any new findings on physical exam. Mentioned to patient about purpose of visit and desirable health short and long term goals and objectives.     Monitor CBC, CMP, TSH and Lipid Panel by primary  Eye exam, pulmonary function tests   Discussed Amiodarone induced toxicity and required monitoring and close follow up for this  Requested Prescriptions     Signed Prescriptions Disp Refills   • amiodarone (PACERONE) 200 MG tablet 60 tablet 1     Sig: Take 1 tablet by mouth 2 (Two) Times a Day.      After 30 days decrease Amiodarone to daily dose  Stop Multaq as not effective  Will check 30 day monitor report ordered by Dr De Anda    Keep A1c less than 7 Primary to monitor  Keep LDL below 70 mg/dl. Monitor liver and renal functions.   Monitor CBC, CMP and Lipid Panel by primary      Return in about 4 weeks (around 8/14/2018).

## 2018-07-18 ENCOUNTER — HOSPITAL ENCOUNTER (OUTPATIENT)
Facility: HOSPITAL | Age: 63
Setting detail: OBSERVATION
Discharge: HOME | End: 2018-07-21
Attending: INTERNAL MEDICINE | Admitting: INTERNAL MEDICINE

## 2018-07-18 PROBLEM — I48.0 PAF (PAROXYSMAL ATRIAL FIBRILLATION) (HCC): Status: ACTIVE | Noted: 2018-07-18

## 2018-07-18 LAB — GLUCOSE BLDC GLUCOMTR-MCNC: 189 MG/DL (ref 70–130)

## 2018-07-18 PROCEDURE — 99220 PR INITIAL OBSERVATION CARE/DAY 70 MINUTES: CPT | Performed by: INTERNAL MEDICINE

## 2018-07-18 PROCEDURE — G0378 HOSPITAL OBSERVATION PER HR: HCPCS

## 2018-07-18 PROCEDURE — 63710000001 PREDNISONE PER 5 MG: Performed by: INTERNAL MEDICINE

## 2018-07-18 PROCEDURE — G0379 DIRECT REFER HOSPITAL OBSERV: HCPCS

## 2018-07-18 PROCEDURE — 82962 GLUCOSE BLOOD TEST: CPT

## 2018-07-18 PROCEDURE — 94799 UNLISTED PULMONARY SVC/PX: CPT

## 2018-07-18 RX ORDER — POTASSIUM CHLORIDE 750 MG/1
10 CAPSULE, EXTENDED RELEASE ORAL DAILY
Status: DISCONTINUED | OUTPATIENT
Start: 2018-07-18 | End: 2018-07-21 | Stop reason: HOSPADM

## 2018-07-18 RX ORDER — AMIODARONE HYDROCHLORIDE 200 MG/1
400 TABLET ORAL 2 TIMES DAILY
Status: DISCONTINUED | OUTPATIENT
Start: 2018-07-18 | End: 2018-07-21 | Stop reason: HOSPADM

## 2018-07-18 RX ORDER — PANTOPRAZOLE SODIUM 40 MG/1
40 TABLET, DELAYED RELEASE ORAL EVERY 12 HOURS SCHEDULED
Status: DISCONTINUED | OUTPATIENT
Start: 2018-07-18 | End: 2018-07-21 | Stop reason: HOSPADM

## 2018-07-18 RX ORDER — FERROUS SULFATE 325(65) MG
325 TABLET ORAL
Status: DISCONTINUED | OUTPATIENT
Start: 2018-07-19 | End: 2018-07-21 | Stop reason: HOSPADM

## 2018-07-18 RX ORDER — ATORVASTATIN CALCIUM 10 MG/1
20 TABLET, FILM COATED ORAL DAILY
Status: DISCONTINUED | OUTPATIENT
Start: 2018-07-18 | End: 2018-07-21 | Stop reason: HOSPADM

## 2018-07-18 RX ORDER — SODIUM CHLORIDE 0.9 % (FLUSH) 0.9 %
1-10 SYRINGE (ML) INJECTION AS NEEDED
Status: DISCONTINUED | OUTPATIENT
Start: 2018-07-18 | End: 2018-07-21 | Stop reason: HOSPADM

## 2018-07-18 RX ORDER — ASPIRIN 81 MG/1
81 TABLET ORAL DAILY
Status: DISCONTINUED | OUTPATIENT
Start: 2018-07-18 | End: 2018-07-21 | Stop reason: HOSPADM

## 2018-07-18 RX ORDER — FOLIC ACID 1 MG/1
1 TABLET ORAL DAILY
Status: DISCONTINUED | OUTPATIENT
Start: 2018-07-18 | End: 2018-07-21 | Stop reason: HOSPADM

## 2018-07-18 RX ORDER — SIMVASTATIN 40 MG
40 TABLET ORAL NIGHTLY
COMMUNITY
Start: 2018-07-17 | End: 2022-01-01

## 2018-07-18 RX ORDER — METOPROLOL TARTRATE 50 MG/1
50 TABLET, FILM COATED ORAL EVERY 12 HOURS SCHEDULED
Status: DISCONTINUED | OUTPATIENT
Start: 2018-07-18 | End: 2018-07-21 | Stop reason: HOSPADM

## 2018-07-18 RX ORDER — FUROSEMIDE 20 MG/1
20 TABLET ORAL DAILY
Status: DISCONTINUED | OUTPATIENT
Start: 2018-07-18 | End: 2018-07-21 | Stop reason: HOSPADM

## 2018-07-18 RX ORDER — NITROGLYCERIN 0.4 MG/1
0.4 TABLET SUBLINGUAL
Status: DISCONTINUED | OUTPATIENT
Start: 2018-07-18 | End: 2018-07-21 | Stop reason: HOSPADM

## 2018-07-18 RX ORDER — PREDNISONE 1 MG/1
4 TABLET ORAL NIGHTLY
Status: DISCONTINUED | OUTPATIENT
Start: 2018-07-18 | End: 2018-07-21 | Stop reason: HOSPADM

## 2018-07-18 RX ORDER — ISOSORBIDE MONONITRATE 30 MG/1
30 TABLET, EXTENDED RELEASE ORAL DAILY
Status: DISCONTINUED | OUTPATIENT
Start: 2018-07-18 | End: 2018-07-21 | Stop reason: HOSPADM

## 2018-07-18 RX ORDER — LOSARTAN POTASSIUM 25 MG/1
25 TABLET ORAL DAILY
Status: DISCONTINUED | OUTPATIENT
Start: 2018-07-18 | End: 2018-07-21 | Stop reason: HOSPADM

## 2018-07-18 RX ORDER — PREDNISONE 1 MG/1
5 TABLET ORAL EVERY MORNING
Status: DISCONTINUED | OUTPATIENT
Start: 2018-07-19 | End: 2018-07-21 | Stop reason: HOSPADM

## 2018-07-18 RX ADMIN — ATORVASTATIN CALCIUM 20 MG: 10 TABLET, FILM COATED ORAL at 21:26

## 2018-07-18 RX ADMIN — METOPROLOL TARTRATE 50 MG: 50 TABLET ORAL at 22:37

## 2018-07-18 RX ADMIN — AMIODARONE HYDROCHLORIDE 400 MG: 200 TABLET ORAL at 21:25

## 2018-07-18 RX ADMIN — PREDNISONE 4 MG: 1 TABLET ORAL at 21:26

## 2018-07-18 RX ADMIN — PANTOPRAZOLE SODIUM 40 MG: 40 TABLET, DELAYED RELEASE ORAL at 21:25

## 2018-07-18 NOTE — H&P
LOS: 0 days   Patient Care Team:  Teresa Contreras MD as PCP - General  Teresa Contreras MD as PCP - Family Medicine  Jagdeep Reyes MD as Cardiologist (Cardiology)    Chief Complaint: Active Problems:    PAF (paroxysmal atrial fibrillation) (CMS/HCC)    History of current illness    Very pleasant 62-year-old  lady with history of coronary artery disease with history of paroxysmal atrial fibrillation/flutter type II diabetes mellitus with prior aortocoronary bypass surgery in May 2000 by Dr. Levy with chronic anemia and history of gastric polyps status post polypectomy in January 2018 presented to outside local hospital with complaints of rapid heart rate.  Had transient chest pain and heart rate was a high this subsequently resolved.  No associated diaphoresis or nausea.  He felt somewhat weak and tired.  No presyncope or syncope.  Had mild shortness of breath when had rapid atrial fibrillation/flutter.  Currently heart rates of L control and feels better  Somewhat anxious  Easy fatigability  Generalized weakness  Telemetry: no malignant arrhythmia. No significant pauses.  Atrial fibrillation with controlled ventricular response       Review of Systems   Constitutional: No chills   Has fatigue   No fever.   HENT: Negative.    Eyes: Negative.    Respiratory: Negative for cough,   No chest wall soreness,   Moderate shortness of breath,   no wheezing, no stridor.    Cardiovascular: Positive for chest pain,   Positive for palpitations   No significant  leg swelling.   Gastrointestinal: Negative for abdominal distention,  No abdominal pain,   No blood in stool,   No constipation,   No diarrhea,   No nausea   No vomiting.   Endocrine: Negative.    Genitourinary: Negative for difficulty urinating, dysuria, flank pain and hematuria.   Musculoskeletal: Negative.    Skin: Negative for rash and wound.   Allergic/Immunologic: Negative.    Neurological: Negative for dizziness, syncope, weakness,   No  light-headedness  No  headaches.   Hematological: Does not bruise/bleed easily.   Psychiatric/Behavioral: Negative for agitation or behavioral problems,   No confusion,   the patient is  nervous/anxious.       History:   Past Medical History:   Diagnosis Date   • Abnormal stress test    • Atrial flutter (CMS/HCC)    • CAD (coronary artery disease)    • CAD in native artery     CABG x 3   • Diabetes mellitus (CMS/HCC)    • Essential hypertension     Tricuspid valve insufficiency, unspecified etiology    • Hyperlipemia, mixed    • Hyperlipidemia    • Hypertension    • MI, old    • Mitral valve prolapse    • Near syncope    • Obesity, morbid (CMS/[facility])    • Palpitations    • Paroxysmal SVT (supraventricular tachycardia) (CMS/HCC)    • Paroxysmal SVT (supraventricular tachycardia) (CMS/[facility])    • Pedal edema    • Precordial pain    • Rheumatoid arthritis (CMS/HCC)    • Rheumatoid arthritis (CMS/[facility])    • S/P CABG x 3      Past Surgical History:   Procedure Laterality Date   • APPENDECTOMY     • CARDIAC CATHETERIZATION Left 06/18/2012    Intensify medical therapy   • CARDIAC CATHETERIZATION Left 05/05/2002    surgery   • CHOLECYSTECTOMY     • COLONOSCOPY  02/10/2012   • COLONOSCOPY N/A 1/31/2018    Procedure: COLONOSCOPY WITH ANESTHESIA;  Surgeon: Patricia oB MD;  Location: [facility] ENDOSCOPY;  Service:    • CORONARY ARTERY BYPASS GRAFT  05/05/2002    LIMA to LAD, SVG to D1, SVG to OM1 - x3   • CORONARY STENT PLACEMENT  09/2009    X1 - Stent to LAD, Drug Eluting   • ENDOSCOPY N/A 1/31/2018    Procedure: ESOPHAGOGASTRODUODENOSCOPY WITH ANESTHESIA;  Surgeon: Patricia Bo MD;  Location: [facility] ENDOSCOPY;  Service:    • GALLBLADDER SURGERY     • REPLACEMENT TOTAL KNEE Right    • UPPER GASTROINTESTINAL ENDOSCOPY  03/23/2015     Social History     Social History   • Marital status:      Spouse name: N/A   • Number of children: N/A   • Years of education: N/A     Occupational History   • Not on file.     Social History  Main Topics   • Smoking status: Former Smoker     Years: 20.00     Types: Cigarettes     Quit date: 1/31/2000   • Smokeless tobacco: Never Used   • Alcohol use No   • Drug use: No   • Sexual activity: Defer     Other Topics Concern   • Not on file     Social History Narrative   • No narrative on file     Family History   Problem Relation Age of Onset   • Cancer Father    • Coronary artery disease Father    • Coronary artery disease Brother    • Colon polyps Neg Hx    • Colon cancer Neg Hx    • Esophageal cancer Neg Hx    • Liver cancer Neg Hx    • Liver disease Neg Hx    • Rectal cancer Neg Hx    • Stomach cancer Neg Hx        Labs:  WBC No results found for: WBC   HGB No results found for: HGB   HCT No results found for: HCT   Platelets No results found for: PLT   MCV No results found for: MCV         Invalid input(s): LABALBU, PROT  Lab Results   Component Value Date    CKMB 0.42 03/21/2016    TROPONINI <0.012 06/05/2018     PT/INR:  No results found for: PROTIME/No results found for: INR    Imaging Results (last 72 hours)     ** No results found for the last 72 hours. **          Objective     Allergies   Allergen Reactions   • Albuterol Palpitations     High heart rate   • Codeine      Chest pain   • Augmentin [Amoxicillin-Pot Clavulanate] Palpitations   • Sulfa Antibiotics Hives       Medication Review: Performed  Current Facility-Administered Medications   Medication Dose Route Frequency Provider Last Rate Last Dose   • amiodarone (PACERONE) tablet 400 mg  400 mg Oral BID Jagdeep Reyes MD       • aspirin EC tablet 81 mg  81 mg Oral Daily Jagdeep Reyes MD       • atorvastatin (LIPITOR) tablet 20 mg  20 mg Oral Daily Jagdeep Reyes MD       • enoxaparin (LOVENOX) syringe 90 mg  1 mg/kg Subcutaneous Q12H Jagdeep Ryees MD       • [START ON 7/19/2018] ferrous sulfate tablet 325 mg  325 mg Oral Daily With Breakfast Jagdeep eRyes MD       • folic acid (FOLVITE) tablet 1 mg  1 mg Oral Daily Jagdeep Reyes MD       •  "furosemide (LASIX) tablet 20 mg  20 mg Oral Daily Jagdeep Reyes MD       • isosorbide mononitrate (IMDUR) 24 hr tablet 30 mg  30 mg Oral Daily Jagdeep Reyes MD       • losartan (COZAAR) tablet 25 mg  25 mg Oral Daily Jagdeep Reyes MD       • [START ON 7/19/2018] metFORMIN (GLUCOPHAGE) tablet 1,000 mg  1,000 mg Oral Daily With Breakfast Jagdeep Reyes MD       • metoprolol tartrate (LOPRESSOR) tablet 50 mg  50 mg Oral Q12H Jagdeep Reyes MD       • nitroglycerin (NITROSTAT) SL tablet 0.4 mg  0.4 mg Sublingual Q5 Min PRN Jagdeep Reyes MD       • pantoprazole (PROTONIX) EC tablet 40 mg  40 mg Oral Q12H Jagdeep Reyes MD       • potassium chloride (MICRO-K) CR capsule 10 mEq  10 mEq Oral Daily Jagdeep Reyes MD       • predniSONE (DELTASONE) tablet 4 mg  4 mg Oral Nightly Jagdeep Reyes MD       • [START ON 7/19/2018] predniSONE (DELTASONE) tablet 5 mg  5 mg Oral QAM Jagdeep Reyes MD       • sodium chloride 0.9 % flush 1-10 mL  1-10 mL Intravenous PRN Jagdeep Reyes MD           Vital Sign Min/Max for last 24 hours  Temp  Min: 98.1 °F (36.7 °C)  Max: 98.1 °F (36.7 °C)   BP  Min: 119/62  Max: 119/62   Pulse  Min: 92  Max: 92   Resp  Min: 18  Max: 18   SpO2  Min: 96 %  Max: 96 %   No Data Recorded   Weight  Min: 93.9 kg (207 lb)  Max: 93.9 kg (207 lb)     Flowsheet Rows      First Filed Value   Admission Height  162.6 cm (64\") Documented at 07/18/2018 1555   Admission Weight  93.9 kg (207 lb) Documented at 07/18/2018 1555          Results for orders placed during the hospital encounter of 12/05/17   Adult Transthoracic Echo Complete W/ Cont if Necessary Per Protocol    Narrative · Left ventricular systolic function is normal. Estimated EF = 60%.  · Mild pulmonary hypertension is present.          Physical Exam:  General Appearance: Awake, alert, in no acute distress  Eyes: Pupils equal and reactive    Ears: Appear intact with no abnormalities noted  Nose: Nares normal, no drainage  Neck: supple, trachea midline, no carotid bruit and no " JVD  Back: no kyphosis present,    Lungs: respirations regular, respirations even and respirations unlabored  Heart: normal S1, S2,  2/6 pansystolic murmur in the left sternal border,  no rub and no click  Abdomen: normal bowel sounds, no masses, no hepatomegaly, no splenomegaly, guarding and no rebound tenderness   Skin: no bleeding, bruising or rash  Extremities: no cyanosis  Psychiatric/Behavioral: Negative for agitation, behavioral problems, confusion, the patient does  appear to be nervous/anxious.          Results Review:   I reviewed the patient's new clinical results.  I reviewed the patient's new imaging results and agree with the interpretation.  I reviewed the patient's other test results and agree with the interpretation  I personally viewed and interpreted the patient's EKG/Telemetry data  Discussed with patient, and present family member(s) Nurse taking care of the patient    Assessment/Plan     Active Problems:    PAF (paroxysmal atrial fibrillation) (CMS/HCC)  Rapid ventricular response  • CAD (coronary artery disease)   • Atrial flutter (CMS/HCC)   • Type 2 diabetes mellitus without complication, without long-term current use of insulin (CMS/HCC)   • Mixed hyperlipidemia   • S/P CABG x 3 2000 Dr Larsen   • Shoulder blade pain   • Anemia   • Abnormal finding on imaging   • History of esophagitis   • Essential hypertension   • S/P coronary artery stent placement   • Gastric polyp   • Colon polyps         Plan    Increase amiodarone to 400 mg by mouth twice a day ×4 days then 200 mg daily  Check TSH CBC and CMP tomorrow  Lovenox 1 mg/kg simultaneously every 12 hours already received a dose from [facility]  Elective physiology consultation  When the obtained records more than 10 years old when had aortocoronary bypass surgery to see if left atrial appendage was excluded  Reviewed available prior notes, consults, prior visits, laboratory findings, radiology And cardiology relevant reports.  Updated chart as applicable. I have reviewed the patient's medical history in detail and updated the computerized patient record as relevant.    Updated patient regarding any new or relevant abnormalities on review of records or any new findings on physical exam. Mentioned to patient about purpose of visit and desirable health short and long term goals and objectives.   Monitor blood sugars  Supportive care  Telemetry  Optimal medical therapy  Deep vein thrombosis prophylaxis/precautions  Appropriate diet, fluid, sodium, caffeine, stimulants intake   Compliance to diet and medications   Avoid NSAIDS    Jagdeep Reyes MD  07/18/18  5:31 PM

## 2018-07-19 ENCOUNTER — APPOINTMENT (OUTPATIENT)
Dept: CARDIOLOGY | Facility: HOSPITAL | Age: 63
End: 2018-07-19
Attending: INTERNAL MEDICINE

## 2018-07-19 LAB
ALBUMIN SERPL-MCNC: 3.8 G/DL (ref 3.5–5)
ALBUMIN/GLOB SERPL: 1.5 G/DL (ref 1.1–2.5)
ALP SERPL-CCNC: 67 U/L (ref 24–120)
ALT SERPL W P-5'-P-CCNC: 44 U/L (ref 0–54)
ANION GAP SERPL CALCULATED.3IONS-SCNC: 10 MMOL/L (ref 4–13)
AST SERPL-CCNC: 26 U/L (ref 7–45)
BASOPHILS # BLD AUTO: 0.03 10*3/MM3 (ref 0–0.2)
BASOPHILS NFR BLD AUTO: 0.4 % (ref 0–2)
BH CV ECHO MEAS - AO MAX PG (FULL): 2.7 MMHG
BH CV ECHO MEAS - AO MAX PG: 7.1 MMHG
BH CV ECHO MEAS - AO MEAN PG (FULL): 0 MMHG
BH CV ECHO MEAS - AO MEAN PG: 3 MMHG
BH CV ECHO MEAS - AO ROOT AREA (BSA CORRECTED): 1.2
BH CV ECHO MEAS - AO ROOT AREA: 4.2 CM^2
BH CV ECHO MEAS - AO ROOT DIAM: 2.3 CM
BH CV ECHO MEAS - AO V2 MAX: 133 CM/SEC
BH CV ECHO MEAS - AO V2 MEAN: 85 CM/SEC
BH CV ECHO MEAS - AO V2 VTI: 21.9 CM
BH CV ECHO MEAS - AVA(I,A): 2.8 CM^2
BH CV ECHO MEAS - AVA(I,D): 2.8 CM^2
BH CV ECHO MEAS - AVA(V,A): 2.4 CM^2
BH CV ECHO MEAS - AVA(V,D): 2.4 CM^2
BH CV ECHO MEAS - BSA(HAYCOCK): 2.1 M^2
BH CV ECHO MEAS - BSA: 1.9 M^2
BH CV ECHO MEAS - BZI_BMI: 37.9 KILOGRAMS/M^2
BH CV ECHO MEAS - BZI_METRIC_HEIGHT: 157.5 CM
BH CV ECHO MEAS - BZI_METRIC_WEIGHT: 93.9 KG
BH CV ECHO MEAS - CONTRAST EF 4CH: 68.6 ML/M^2
BH CV ECHO MEAS - EDV(CUBED): 73 ML
BH CV ECHO MEAS - EDV(MOD-SP4): 58.6 ML
BH CV ECHO MEAS - EDV(TEICH): 77.7 ML
BH CV ECHO MEAS - EF(CUBED): 61.2 %
BH CV ECHO MEAS - EF(MOD-SP4): 68.6 %
BH CV ECHO MEAS - EF(TEICH): 53.1 %
BH CV ECHO MEAS - ESV(CUBED): 28.4 ML
BH CV ECHO MEAS - ESV(MOD-SP4): 18.4 ML
BH CV ECHO MEAS - ESV(TEICH): 36.4 ML
BH CV ECHO MEAS - FS: 27 %
BH CV ECHO MEAS - IVS/LVPW: 1.1
BH CV ECHO MEAS - IVSD: 1 CM
BH CV ECHO MEAS - LA DIMENSION: 4.4 CM
BH CV ECHO MEAS - LA/AO: 1.9
BH CV ECHO MEAS - LAT PEAK E' VEL: 8.5 CM/SEC
BH CV ECHO MEAS - LV DIASTOLIC VOL/BSA (35-75): 30.2 ML/M^2
BH CV ECHO MEAS - LV MASS(C)D: 133.2 GRAMS
BH CV ECHO MEAS - LV MASS(C)DI: 68.7 GRAMS/M^2
BH CV ECHO MEAS - LV MAX PG: 4.4 MMHG
BH CV ECHO MEAS - LV MEAN PG: 3 MMHG
BH CV ECHO MEAS - LV SYSTOLIC VOL/BSA (12-30): 9.5 ML/M^2
BH CV ECHO MEAS - LV V1 MAX: 103.5 CM/SEC
BH CV ECHO MEAS - LV V1 MEAN: 79.7 CM/SEC
BH CV ECHO MEAS - LV V1 VTI: 19.3 CM
BH CV ECHO MEAS - LVIDD: 4.2 CM
BH CV ECHO MEAS - LVIDS: 3.1 CM
BH CV ECHO MEAS - LVLD AP4: 7.2 CM
BH CV ECHO MEAS - LVLS AP4: 5.9 CM
BH CV ECHO MEAS - LVOT AREA (M): 3.1 CM^2
BH CV ECHO MEAS - LVOT AREA: 3.1 CM^2
BH CV ECHO MEAS - LVOT DIAM: 2 CM
BH CV ECHO MEAS - LVPWD: 0.95 CM
BH CV ECHO MEAS - MED PEAK E' VEL: 7.62 CM/SEC
BH CV ECHO MEAS - MV A MAX VEL: 74.1 CM/SEC
BH CV ECHO MEAS - MV DEC TIME: 0.3 SEC
BH CV ECHO MEAS - MV E MAX VEL: 51.7 CM/SEC
BH CV ECHO MEAS - MV E/A: 0.7
BH CV ECHO MEAS - PA MAX PG: 2.5 MMHG
BH CV ECHO MEAS - PA V2 MAX: 78.5 CM/SEC
BH CV ECHO MEAS - RAP SYSTOLE: 5 MMHG
BH CV ECHO MEAS - RVSP: 34.6 MMHG
BH CV ECHO MEAS - SI(AO): 46.9 ML/M^2
BH CV ECHO MEAS - SI(CUBED): 23 ML/M^2
BH CV ECHO MEAS - SI(LVOT): 31.3 ML/M^2
BH CV ECHO MEAS - SI(MOD-SP4): 20.7 ML/M^2
BH CV ECHO MEAS - SI(TEICH): 21.3 ML/M^2
BH CV ECHO MEAS - SV(AO): 91 ML
BH CV ECHO MEAS - SV(CUBED): 44.7 ML
BH CV ECHO MEAS - SV(LVOT): 60.6 ML
BH CV ECHO MEAS - SV(MOD-SP4): 40.2 ML
BH CV ECHO MEAS - SV(TEICH): 41.3 ML
BH CV ECHO MEAS - TR MAX VEL: 272 CM/SEC
BH CV ECHO MEASUREMENTS AVERAGE E/E' RATIO: 6.41
BILIRUB SERPL-MCNC: 1 MG/DL (ref 0.1–1)
BUN BLD-MCNC: 16 MG/DL (ref 5–21)
BUN/CREAT SERPL: 34.8 (ref 7–25)
CALCIUM SPEC-SCNC: 9.1 MG/DL (ref 8.4–10.4)
CHLORIDE SERPL-SCNC: 104 MMOL/L (ref 98–110)
CO2 SERPL-SCNC: 27 MMOL/L (ref 24–31)
CREAT BLD-MCNC: 0.46 MG/DL (ref 0.5–1.4)
DEPRECATED RDW RBC AUTO: 48.4 FL (ref 40–54)
EOSINOPHIL # BLD AUTO: 0.02 10*3/MM3 (ref 0–0.7)
EOSINOPHIL NFR BLD AUTO: 0.2 % (ref 0–4)
ERYTHROCYTE [DISTWIDTH] IN BLOOD BY AUTOMATED COUNT: 14.6 % (ref 12–15)
GFR SERPL CREATININE-BSD FRML MDRD: 138 ML/MIN/1.73
GLOBULIN UR ELPH-MCNC: 2.5 GM/DL
GLUCOSE BLD-MCNC: 204 MG/DL (ref 70–100)
HCT VFR BLD AUTO: 41.2 % (ref 37–47)
HGB BLD-MCNC: 13.6 G/DL (ref 12–16)
IMM GRANULOCYTES # BLD: 0.06 10*3/MM3 (ref 0–0.03)
IMM GRANULOCYTES NFR BLD: 0.7 % (ref 0–5)
LEFT ATRIUM VOLUME INDEX: 14.9 ML/M2
LEFT ATRIUM VOLUME: 29 CM3
LV EF 2D ECHO EST: 55 %
LYMPHOCYTES # BLD AUTO: 1.2 10*3/MM3 (ref 0.72–4.86)
LYMPHOCYTES NFR BLD AUTO: 14.4 % (ref 15–45)
MAXIMAL PREDICTED HEART RATE: 159 BPM
MCH RBC QN AUTO: 30.3 PG (ref 28–32)
MCHC RBC AUTO-ENTMCNC: 33 G/DL (ref 33–36)
MCV RBC AUTO: 91.8 FL (ref 82–98)
MONOCYTES # BLD AUTO: 0.55 10*3/MM3 (ref 0.19–1.3)
MONOCYTES NFR BLD AUTO: 6.6 % (ref 4–12)
NEUTROPHILS # BLD AUTO: 6.49 10*3/MM3 (ref 1.87–8.4)
NEUTROPHILS NFR BLD AUTO: 77.7 % (ref 39–78)
NRBC BLD MANUAL-RTO: 0 /100 WBC (ref 0–0)
PLATELET # BLD AUTO: 209 10*3/MM3 (ref 130–400)
PMV BLD AUTO: 10.8 FL (ref 6–12)
POTASSIUM BLD-SCNC: 4.2 MMOL/L (ref 3.5–5.3)
PROT SERPL-MCNC: 6.3 G/DL (ref 6.3–8.7)
RBC # BLD AUTO: 4.49 10*6/MM3 (ref 4.2–5.4)
SODIUM BLD-SCNC: 141 MMOL/L (ref 135–145)
STRESS TARGET HR: 135 BPM
TSH SERPL DL<=0.05 MIU/L-ACNC: 0.87 MIU/ML (ref 0.47–4.68)
WBC NRBC COR # BLD: 8.35 10*3/MM3 (ref 4.8–10.8)

## 2018-07-19 PROCEDURE — 93306 TTE W/DOPPLER COMPLETE: CPT | Performed by: INTERNAL MEDICINE

## 2018-07-19 PROCEDURE — G0378 HOSPITAL OBSERVATION PER HR: HCPCS

## 2018-07-19 PROCEDURE — 80053 COMPREHEN METABOLIC PANEL: CPT | Performed by: INTERNAL MEDICINE

## 2018-07-19 PROCEDURE — 93010 ELECTROCARDIOGRAM REPORT: CPT | Performed by: INTERNAL MEDICINE

## 2018-07-19 PROCEDURE — 94799 UNLISTED PULMONARY SVC/PX: CPT

## 2018-07-19 PROCEDURE — 84443 ASSAY THYROID STIM HORMONE: CPT | Performed by: INTERNAL MEDICINE

## 2018-07-19 PROCEDURE — 25010000002 PERFLUTREN 6.52 MG/ML SUSPENSION: Performed by: INTERNAL MEDICINE

## 2018-07-19 PROCEDURE — 85025 COMPLETE CBC W/AUTO DIFF WBC: CPT | Performed by: INTERNAL MEDICINE

## 2018-07-19 PROCEDURE — 93306 TTE W/DOPPLER COMPLETE: CPT

## 2018-07-19 PROCEDURE — 99225 PR SBSQ OBSERVATION CARE/DAY 25 MINUTES: CPT | Performed by: INTERNAL MEDICINE

## 2018-07-19 PROCEDURE — 93005 ELECTROCARDIOGRAM TRACING: CPT | Performed by: INTERNAL MEDICINE

## 2018-07-19 PROCEDURE — 94760 N-INVAS EAR/PLS OXIMETRY 1: CPT

## 2018-07-19 PROCEDURE — 25010000002 ENOXAPARIN PER 10 MG: Performed by: INTERNAL MEDICINE

## 2018-07-19 PROCEDURE — 96372 THER/PROPH/DIAG INJ SC/IM: CPT

## 2018-07-19 PROCEDURE — 63710000001 PREDNISONE PER 5 MG: Performed by: INTERNAL MEDICINE

## 2018-07-19 RX ORDER — ACETAMINOPHEN 325 MG/1
650 TABLET ORAL EVERY 4 HOURS PRN
Status: DISCONTINUED | OUTPATIENT
Start: 2018-07-19 | End: 2018-07-21 | Stop reason: HOSPADM

## 2018-07-19 RX ADMIN — ISOSORBIDE MONONITRATE 30 MG: 30 TABLET, EXTENDED RELEASE ORAL at 09:12

## 2018-07-19 RX ADMIN — FUROSEMIDE 20 MG: 20 TABLET ORAL at 09:12

## 2018-07-19 RX ADMIN — ENOXAPARIN SODIUM 90 MG: 100 INJECTION SUBCUTANEOUS at 17:39

## 2018-07-19 RX ADMIN — FERROUS SULFATE TAB 325 MG (65 MG ELEMENTAL FE) 325 MG: 325 (65 FE) TAB at 09:12

## 2018-07-19 RX ADMIN — PANTOPRAZOLE SODIUM 40 MG: 40 TABLET, DELAYED RELEASE ORAL at 09:12

## 2018-07-19 RX ADMIN — AMIODARONE HYDROCHLORIDE 400 MG: 200 TABLET ORAL at 09:12

## 2018-07-19 RX ADMIN — METFORMIN HYDROCHLORIDE 1000 MG: 500 TABLET ORAL at 09:12

## 2018-07-19 RX ADMIN — PANTOPRAZOLE SODIUM 40 MG: 40 TABLET, DELAYED RELEASE ORAL at 20:36

## 2018-07-19 RX ADMIN — METOPROLOL TARTRATE 50 MG: 50 TABLET ORAL at 09:12

## 2018-07-19 RX ADMIN — PERFLUTREN 9.78 MG: 6.52 INJECTION, SUSPENSION INTRAVENOUS at 16:14

## 2018-07-19 RX ADMIN — LOSARTAN POTASSIUM 25 MG: 25 TABLET ORAL at 09:12

## 2018-07-19 RX ADMIN — POTASSIUM CHLORIDE 10 MEQ: 750 CAPSULE, EXTENDED RELEASE ORAL at 09:12

## 2018-07-19 RX ADMIN — FOLIC ACID 1 MG: 1 TABLET ORAL at 09:12

## 2018-07-19 RX ADMIN — ACETAMINOPHEN 650 MG: 325 TABLET, FILM COATED ORAL at 11:08

## 2018-07-19 RX ADMIN — ATORVASTATIN CALCIUM 20 MG: 10 TABLET, FILM COATED ORAL at 20:36

## 2018-07-19 RX ADMIN — ENOXAPARIN SODIUM 90 MG: 100 INJECTION SUBCUTANEOUS at 04:47

## 2018-07-19 RX ADMIN — PREDNISONE 5 MG: 5 TABLET ORAL at 06:27

## 2018-07-19 RX ADMIN — AMIODARONE HYDROCHLORIDE 400 MG: 200 TABLET ORAL at 20:36

## 2018-07-19 RX ADMIN — METOPROLOL TARTRATE 50 MG: 50 TABLET ORAL at 20:36

## 2018-07-19 RX ADMIN — PREDNISONE 4 MG: 1 TABLET ORAL at 20:35

## 2018-07-19 RX ADMIN — ASPIRIN 81 MG: 81 TABLET ORAL at 11:08

## 2018-07-19 NOTE — PLAN OF CARE
Problem: Patient Care Overview  Goal: Plan of Care Review  Outcome: Ongoing (interventions implemented as appropriate)   07/19/18 0406   Coping/Psychosocial   Plan of Care Reviewed With patient   Plan of Care Review   Progress no change   OTHER   Outcome Summary VSS. S/SA/ST 85 to 117 on tele. PO amio initiated. Plans for echo today. Will continue to monitor.       Problem: Arrhythmia/Dysrhythmia (Symptomatic) (Adult)  Goal: Signs and Symptoms of Listed Potential Problems Will be Absent, Minimized or Managed (Arrhythmia/Dysrhythmia)  Outcome: Ongoing (interventions implemented as appropriate)   07/19/18 0406   Goal/Outcome Evaluation   Problems Assessed (Arrhythmia/Dysrhythmia) all   Problems Present (Dysrhythmia) electrophysiologic conduction defect

## 2018-07-19 NOTE — PLAN OF CARE
Problem: Patient Care Overview  Goal: Plan of Care Review  Outcome: Ongoing (interventions implemented as appropriate)   07/19/18 6173   Coping/Psychosocial   Plan of Care Reviewed With patient;family   Plan of Care Review   Progress improving   OTHER   Outcome Summary TYLENOL FOR HEADACHE THIS AM WITH RELIEF. ECHO PENDING, NO FURTHER COMPLAINTS. CONTINUE TO MONITOR.     Goal: Individualization and Mutuality  Outcome: Ongoing (interventions implemented as appropriate)    Goal: Discharge Needs Assessment  Outcome: Ongoing (interventions implemented as appropriate)    Goal: Interprofessional Rounds/Family Conf  Outcome: Ongoing (interventions implemented as appropriate)      Problem: Arrhythmia/Dysrhythmia (Symptomatic) (Adult)  Goal: Signs and Symptoms of Listed Potential Problems Will be Absent, Minimized or Managed (Arrhythmia/Dysrhythmia)  Outcome: Ongoing (interventions implemented as appropriate)

## 2018-07-20 PROCEDURE — 63710000001 PREDNISONE PER 5 MG: Performed by: INTERNAL MEDICINE

## 2018-07-20 PROCEDURE — G0378 HOSPITAL OBSERVATION PER HR: HCPCS

## 2018-07-20 PROCEDURE — 94760 N-INVAS EAR/PLS OXIMETRY 1: CPT

## 2018-07-20 PROCEDURE — 94799 UNLISTED PULMONARY SVC/PX: CPT

## 2018-07-20 PROCEDURE — 99225 PR SBSQ OBSERVATION CARE/DAY 25 MINUTES: CPT | Performed by: INTERNAL MEDICINE

## 2018-07-20 PROCEDURE — 96372 THER/PROPH/DIAG INJ SC/IM: CPT

## 2018-07-20 PROCEDURE — 25010000002 ENOXAPARIN PER 10 MG: Performed by: INTERNAL MEDICINE

## 2018-07-20 RX ADMIN — AMIODARONE HYDROCHLORIDE 400 MG: 200 TABLET ORAL at 20:47

## 2018-07-20 RX ADMIN — ACETAMINOPHEN 650 MG: 325 TABLET, FILM COATED ORAL at 22:20

## 2018-07-20 RX ADMIN — APIXABAN 5 MG: 5 TABLET, FILM COATED ORAL at 08:53

## 2018-07-20 RX ADMIN — METOPROLOL TARTRATE 50 MG: 50 TABLET ORAL at 20:46

## 2018-07-20 RX ADMIN — AMIODARONE HYDROCHLORIDE 400 MG: 200 TABLET ORAL at 08:52

## 2018-07-20 RX ADMIN — APIXABAN 5 MG: 5 TABLET, FILM COATED ORAL at 20:46

## 2018-07-20 RX ADMIN — METOPROLOL TARTRATE 50 MG: 50 TABLET ORAL at 08:52

## 2018-07-20 RX ADMIN — PANTOPRAZOLE SODIUM 40 MG: 40 TABLET, DELAYED RELEASE ORAL at 20:46

## 2018-07-20 RX ADMIN — ISOSORBIDE MONONITRATE 30 MG: 30 TABLET, EXTENDED RELEASE ORAL at 08:52

## 2018-07-20 RX ADMIN — ATORVASTATIN CALCIUM 20 MG: 10 TABLET, FILM COATED ORAL at 20:47

## 2018-07-20 RX ADMIN — POTASSIUM CHLORIDE 10 MEQ: 750 CAPSULE, EXTENDED RELEASE ORAL at 08:50

## 2018-07-20 RX ADMIN — FUROSEMIDE 20 MG: 20 TABLET ORAL at 08:52

## 2018-07-20 RX ADMIN — PANTOPRAZOLE SODIUM 40 MG: 40 TABLET, DELAYED RELEASE ORAL at 08:50

## 2018-07-20 RX ADMIN — ASPIRIN 81 MG: 81 TABLET ORAL at 08:52

## 2018-07-20 RX ADMIN — PREDNISONE 4 MG: 1 TABLET ORAL at 20:47

## 2018-07-20 RX ADMIN — ENOXAPARIN SODIUM 90 MG: 100 INJECTION SUBCUTANEOUS at 06:58

## 2018-07-20 RX ADMIN — METFORMIN HYDROCHLORIDE 1000 MG: 500 TABLET ORAL at 08:50

## 2018-07-20 RX ADMIN — LOSARTAN POTASSIUM 25 MG: 25 TABLET ORAL at 08:57

## 2018-07-20 RX ADMIN — PREDNISONE 5 MG: 5 TABLET ORAL at 06:58

## 2018-07-20 RX ADMIN — FOLIC ACID 1 MG: 1 TABLET ORAL at 08:50

## 2018-07-20 NOTE — CONSULTS
Referring Provider: Dr. Jagdeep Reyes  Reason for Consultation: Evaluation of atrial fibrillation    Patient Care Team:  Teresa Contreras MD as PCP - General  Teresa Contreras MD as PCP - Family Medicine  Jagdeep Reyes MD as Cardiologist (Cardiology)    Chief complaint Atrial fibrillation    Subjective .     History of present illness:  A consultation was requested by Dr. Reyes regarding atrial fibrillation.The patient reports that this problem has recently become worse.  She was admitted after experiencing heart rates fluctuating to 190 bpm.  She was experiencing a mixture of atrial fibrillation and atrial flutter.She had just been started on amiodarone, and the dose has now been increased.  She has seen Dr. Reyes on 7/17.    She has a history of coronary disease, CABG in 2000, PTCI in 2009.  She has a history of anemia, treated last fall.  She was on Xarelto at the time.  A polyp was removed from her stomach.    Review of Systems   Pertinent items are noted in HPI, all other systems reviewed and negative    History  Past Medical History:   Diagnosis Date   • Abnormal stress test    • Atrial flutter (CMS/HCC)    • CAD (coronary artery disease)    • CAD in native artery     CABG x 3   • Diabetes mellitus (CMS/HCC)    • Essential hypertension     Tricuspid valve insufficiency, unspecified etiology    • Hyperlipemia, mixed    • Hyperlipidemia    • Hypertension    • MI, old    • Mitral valve prolapse    • Near syncope    • Obesity, morbid (CMS/HCC)    • Palpitations    • Paroxysmal SVT (supraventricular tachycardia) (CMS/HCC)    • Paroxysmal SVT (supraventricular tachycardia) (CMS/HCC)    • Pedal edema    • Precordial pain    • Rheumatoid arthritis (CMS/HCC)    • Rheumatoid arthritis (CMS/HCC)    • S/P CABG x 3    ,   Past Surgical History:   Procedure Laterality Date   • APPENDECTOMY     • CARDIAC CATHETERIZATION Left 06/18/2012    Intensify medical therapy   • CARDIAC CATHETERIZATION Left 05/05/2002    surgery    • CHOLECYSTECTOMY     • COLONOSCOPY  02/10/2012   • COLONOSCOPY N/A 1/31/2018    Procedure: COLONOSCOPY WITH ANESTHESIA;  Surgeon: Patricia Bo MD;  Location: [facility] ENDOSCOPY;  Service:    • CORONARY ARTERY BYPASS GRAFT  05/05/2002    LIMA to LAD, SVG to D1, SVG to OM1 - x3   • CORONARY STENT PLACEMENT  09/2009    X1 - Stent to LAD, Drug Eluting   • ENDOSCOPY N/A 1/31/2018    Procedure: ESOPHAGOGASTRODUODENOSCOPY WITH ANESTHESIA;  Surgeon: Patricia Bo MD;  Location: [facility] ENDOSCOPY;  Service:    • GALLBLADDER SURGERY     • REPLACEMENT TOTAL KNEE Right    • UPPER GASTROINTESTINAL ENDOSCOPY  03/23/2015   ,   Family History   Problem Relation Age of Onset   • Cancer Father    • Coronary artery disease Father    • Coronary artery disease Brother    • Colon polyps Neg Hx    • Colon cancer Neg Hx    • Esophageal cancer Neg Hx    • Liver cancer Neg Hx    • Liver disease Neg Hx    • Rectal cancer Neg Hx    • Stomach cancer Neg Hx     and   Prescriptions Prior to Admission   Medication Sig Dispense Refill Last Dose   • simvastatin (ZOCOR) 40 MG tablet Take 40 mg by mouth Every Night.      • amiodarone (PACERONE) 200 MG tablet Take 1 tablet by mouth 2 (Two) Times a Day. 60 tablet 1    • aspirin 81 MG EC tablet Take 81 mg by mouth Daily.   Taking   • ferrous sulfate 325 (65 FE) MG tablet Take 1 tablet by mouth Daily With Breakfast. (Patient not taking: Reported on 7/18/2018) 30 tablet 1 Not Taking at Unknown time   • folic acid (FOLVITE) 1 MG tablet Take 1 mg by mouth Daily.   Taking   • furosemide (LASIX) 20 MG tablet Take 20 mg by mouth Daily.   Taking   • glyBURIDE (DIAbeta) 5 MG tablet Take 5 mg by mouth Daily With Breakfast.   Taking   • isosorbide mononitrate (IMDUR) 30 MG 24 hr tablet Take 30 mg by mouth Daily.   Taking   • losartan (COZAAR) 25 MG tablet Take 25 mg by mouth Daily.   Taking   • metFORMIN (GLUCOPHAGE) 1000 MG tablet Take 1,000 mg by mouth Daily With Breakfast.   Taking   • methotrexate 2.5 MG  "tablet Take 2.5 mg by mouth 1 (One) Time Per Week. Takes 10 tablets on Saturday.   Not Taking   • metoprolol tartrate (LOPRESSOR) 50 MG tablet Take 50 mg by mouth 2 (Two) Times a Day.   Taking   • nitroglycerin (NITROSTAT) 0.4 MG SL tablet Place 0.4 mg under the tongue Every 5 (Five) Minutes As Needed for Chest Pain. Take no more than 3 doses in 15 minutes.   Taking   • pantoprazole (PROTONIX) 40 MG EC tablet Take 1 tablet by mouth Every 12 (Twelve) Hours. 60 tablet 1 Taking   • potassium chloride (K-DUR) 10 MEQ CR tablet Take 10 mEq by mouth Daily.   Taking   • predniSONE (DELTASONE) 1 MG tablet Take 4 mg by mouth Every Night.   Taking   • predniSONE (DELTASONE) 5 MG tablet Take 5 mg by mouth Every Morning.   Taking   • Tofacitinib Citrate (XELJANZ XR) 11 MG tablet sustained-release 24 hour Take  by mouth.   Taking       Objective     Vital Sign Min/Max for last 24 hours  Temp  Min: 97.4 °F (36.3 °C)  Max: 98 °F (36.7 °C)   BP  Min: 110/68  Max: 149/91   Pulse  Min: 67  Max: 93   Resp  Min: 16  Max: 20   SpO2  Min: 91 %  Max: 96 %   No Data Recorded   Weight  Min: 91.1 kg (200 lb 12.8 oz)  Max: 91.1 kg (200 lb 12.8 oz)     Flowsheet Rows      First Filed Value   Admission Height  162.6 cm (64\") Documented at 07/18/2018 1555   Admission Weight  93.9 kg (207 lb) Documented at 07/18/2018 1555             Ejection Fraction  No results found for: EF    Echo EF Estimated  Lab Results   Component Value Date    ECHOEFEST 55 07/19/2018       Nuclear Stress Ejection Fraction  No components found for: NUCEF    Cath Ejection Fraction Quantitative  No results found for: CATHEF    Physical Exam:     General Appearance:    Alert, cooperative, in no acute distress   Head:    Normocephalic, without obvious abnormality, atraumatic   Eyes:            Lids and lashes normal, conjunctivae and sclerae normal, no   icterus, no pallor, corneas clear, PERRLA   Ears:    Ears appear intact with no abnormalities noted   Throat:   No oral " lesions, no thrush, oral mucosa moist   Neck:   No adenopathy, supple, trachea midline, no thyromegaly, no     carotid bruit, no JVD   Back:     No kyphosis present, no scoliosis present, no skin lesions,       erythema or scars, no tenderness to percussion or                   palpation,   range of motion normal   Lungs:     Clear to auscultation,respirations regular, even and                   unlabored    Heart:    Regular rhythm and normal rate, normal S1 and S2, no            murmur, no gallop, no rub, no click   Breast Exam:    Deferred   Abdomen:     Normal bowel sounds, no masses, no organomegaly, soft        non-tender, non-distended, no guarding, no rebound                 tenderness   Genitalia:    Deferred   Extremities:   Moves all extremities well, no edema, no cyanosis, no              redness   Pulses:   Pulses palpable and equal bilaterally   Skin:   No bleeding, bruising or rash   Lymph nodes:   No palpable adenopathy   Neurologic:   Cranial nerves 2 - 12 grossly intact, sensation intact, DTR        present and equal bilaterally       Results Review:   I personally viewed and interpreted the patient's EKG/Telemetry data      Assessment/Plan     Active Problems:    PAF (paroxysmal atrial fibrillation) (CMS/HCC)      Atrial fibrillation:  -She has had a mixture of atrial fibrillation and flutter.  This may subside with the higher dose of amiodarone.  -If the arrhythmia subsides and she is discharged with good control, then I would be happy to see her as an outpatient for consideration of possible future ablation to eliminate the long-term need for amiodarone.  -If the arrhythmias cannot be suppressed, please call us to see about arranging additional inpatient care.    Coronary artery disease:  -Controlled, no new intervention mentioned at this time.    I discussed the patients findings and my recommendations with patient and family    W Cristhian De Anda MD  07/19/18  11:01 PM

## 2018-07-20 NOTE — PROGRESS NOTES
LOS: 0 days   Patient Care Team:  Teresa Contreras MD as PCP - General  Teresa Contreras MD as PCP - Family Medicine  Jagdeep Reyes MD as Cardiologist (Cardiology)    Chief Complaint: Active Problems:    PAF (paroxysmal atrial fibrillation) (CMS/HCC)    Shortness of breath    Subjective  Feeling  better  No chest pain   No excessive shortness of breath  No new complaints  No further palpitations    Interval History: Improved overall    Patient Complaints: No chief complaint on file.    Denies chest pain currently. Denies excessive shortness of breath. Denies abdominal pain, nausea vomiting or diarrhoea.    Telemetry: no malignant arrhythmia. No significant pauses. Maintaining sinus rhythm    Review of Systems   Constitutional: No chills   Has fatigue   No fever.   HENT: Negative.    Eyes: Negative.    Respiratory: Negative for cough,   No chest wall soreness,   Mild shortness of breath,   no wheezing, no stridor.    Cardiovascular: Negative for chest pain,   No palpitations   No significant  leg swelling.   Gastrointestinal: Negative for abdominal distention,  No abdominal pain,   No blood in stool,   No constipation,   No diarrhea,   No nausea   No vomiting.   Endocrine: Negative.    Genitourinary: Negative for difficulty urinating, dysuria, flank pain and hematuria.   Musculoskeletal: Negative.    Skin: Negative for rash and wound.   Allergic/Immunologic: Negative.    Neurological: Negative for dizziness, syncope, weakness,   No light-headedness  No  headaches.   Hematological: Does not bruise/bleed easily.   Psychiatric/Behavioral: Negative for agitation or behavioral problems,   No confusion,   the patient is  nervous/anxious.       History:   Past Medical History:   Diagnosis Date   • Abnormal stress test    • Atrial flutter (CMS/HCC)    • CAD (coronary artery disease)    • CAD in native artery     CABG x 3   • Diabetes mellitus (CMS/HCC)    • Essential hypertension     Tricuspid valve insufficiency,  unspecified etiology    • Hyperlipemia, mixed    • Hyperlipidemia    • Hypertension    • MI, old    • Mitral valve prolapse    • Near syncope    • Obesity, morbid (CMS/HCC)    • Palpitations    • Paroxysmal SVT (supraventricular tachycardia) (CMS/HCC)    • Paroxysmal SVT (supraventricular tachycardia) (CMS/HCC)    • Pedal edema    • Precordial pain    • Rheumatoid arthritis (CMS/HCC)    • Rheumatoid arthritis (CMS/HCC)    • S/P CABG x 3      Past Surgical History:   Procedure Laterality Date   • APPENDECTOMY     • CARDIAC CATHETERIZATION Left 06/18/2012    Intensify medical therapy   • CARDIAC CATHETERIZATION Left 05/05/2002    surgery   • CHOLECYSTECTOMY     • COLONOSCOPY  02/10/2012   • COLONOSCOPY N/A 1/31/2018    Procedure: COLONOSCOPY WITH ANESTHESIA;  Surgeon: Patricia Bo MD;  Location: [facility] ENDOSCOPY;  Service:    • CORONARY ARTERY BYPASS GRAFT  05/05/2002    LIMA to LAD, SVG to D1, SVG to OM1 - x3   • CORONARY STENT PLACEMENT  09/2009    X1 - Stent to LAD, Drug Eluting   • ENDOSCOPY N/A 1/31/2018    Procedure: ESOPHAGOGASTRODUODENOSCOPY WITH ANESTHESIA;  Surgeon: Patricia Bo MD;  Location: [facility] ENDOSCOPY;  Service:    • GALLBLADDER SURGERY     • REPLACEMENT TOTAL KNEE Right    • UPPER GASTROINTESTINAL ENDOSCOPY  03/23/2015     Social History     Social History   • Marital status:      Spouse name: N/A   • Number of children: N/A   • Years of education: N/A     Occupational History   • Not on file.     Social History Main Topics   • Smoking status: Former Smoker     Years: 20.00     Types: Cigarettes     Quit date: 1/31/2000   • Smokeless tobacco: Never Used   • Alcohol use No   • Drug use: No   • Sexual activity: Defer     Other Topics Concern   • Not on file     Social History Narrative   • No narrative on file     Family History   Problem Relation Age of Onset   • Cancer Father    • Coronary artery disease Father    • Coronary artery disease Brother    • Colon polyps Neg Hx    • Colon  cancer Neg Hx    • Esophageal cancer Neg Hx    • Liver cancer Neg Hx    • Liver disease Neg Hx    • Rectal cancer Neg Hx    • Stomach cancer Neg Hx        Labs:  WBC WBC   Date Value Ref Range Status   07/19/2018 8.35 4.80 - 10.80 10*3/mm3 Final      HGB Hemoglobin   Date Value Ref Range Status   07/19/2018 13.6 12.0 - 16.0 g/dL Final      HCT Hematocrit   Date Value Ref Range Status   07/19/2018 41.2 37.0 - 47.0 % Final      Platelets Platelets   Date Value Ref Range Status   07/19/2018 209 130 - 400 10*3/mm3 Final      MCV MCV   Date Value Ref Range Status   07/19/2018 91.8 82.0 - 98.0 fL Final        Results from last 7 days  Lab Units 07/19/18  0453   SODIUM mmol/L 141   POTASSIUM mmol/L 4.2   CHLORIDE mmol/L 104   CO2 mmol/L 27.0   BUN mg/dL 16   CREATININE mg/dL 0.46*   CALCIUM mg/dL 9.1   BILIRUBIN mg/dL 1.0   ALK PHOS U/L 67   ALT (SGPT) U/L 44   AST (SGOT) U/L 26   GLUCOSE mg/dL 204*     Lab Results   Component Value Date    CKMB 0.42 03/21/2016    TROPONINI <0.012 06/05/2018     PT/INR:  No results found for: PROTIME/No results found for: INR    Imaging Results (last 72 hours)     ** No results found for the last 72 hours. **          Objective     Allergies   Allergen Reactions   • Albuterol Palpitations     High heart rate   • Codeine      Chest pain   • Augmentin [Amoxicillin-Pot Clavulanate] Palpitations   • Sulfa Antibiotics Hives       Medication Review: Performed  Current Facility-Administered Medications   Medication Dose Route Frequency Provider Last Rate Last Dose   • acetaminophen (TYLENOL) tablet 650 mg  650 mg Oral Q4H PRN Jagdeep Reyes MD   650 mg at 07/19/18 1108   • amiodarone (PACERONE) tablet 400 mg  400 mg Oral BID Jagdeep Reyes MD   400 mg at 07/19/18 2036   • aspirin EC tablet 81 mg  81 mg Oral Daily Jagdeep Reyes MD   81 mg at 07/19/18 1108   • atorvastatin (LIPITOR) tablet 20 mg  20 mg Oral Daily Jagdeep Reyes MD   20 mg at 07/19/18 2036   • enoxaparin (LOVENOX) syringe 90 mg  1 mg/kg  "Subcutaneous Q12H Jagdeep Reyes MD   90 mg at 07/19/18 1739   • ferrous sulfate tablet 325 mg  325 mg Oral Daily With Breakfast Jagdeep Reyes MD   325 mg at 07/19/18 0912   • folic acid (FOLVITE) tablet 1 mg  1 mg Oral Daily Jagdeep Reyes MD   1 mg at 07/19/18 0912   • furosemide (LASIX) tablet 20 mg  20 mg Oral Daily Jagdeep Reyes MD   20 mg at 07/19/18 0912   • isosorbide mononitrate (IMDUR) 24 hr tablet 30 mg  30 mg Oral Daily Jagdeep Reyes MD   30 mg at 07/19/18 0912   • losartan (COZAAR) tablet 25 mg  25 mg Oral Daily Jagdeep Reyes MD   25 mg at 07/19/18 0912   • metFORMIN (GLUCOPHAGE) tablet 1,000 mg  1,000 mg Oral Daily With Breakfast Jagdeep Reyes MD   1,000 mg at 07/19/18 0912   • metoprolol tartrate (LOPRESSOR) tablet 50 mg  50 mg Oral Q12H Jagdeep Reyes MD   50 mg at 07/19/18 2036   • nitroglycerin (NITROSTAT) SL tablet 0.4 mg  0.4 mg Sublingual Q5 Min PRN Jagdeep Reyes MD       • pantoprazole (PROTONIX) EC tablet 40 mg  40 mg Oral Q12H Jagdeep Reyes MD   40 mg at 07/19/18 2036   • potassium chloride (MICRO-K) CR capsule 10 mEq  10 mEq Oral Daily Jagdeep Reyes MD   10 mEq at 07/19/18 0912   • predniSONE (DELTASONE) tablet 4 mg  4 mg Oral Nightly Jagdeep Reyes MD   4 mg at 07/19/18 2035   • predniSONE (DELTASONE) tablet 5 mg  5 mg Oral QAM Jagdeep Reyes MD   5 mg at 07/19/18 0627   • sodium chloride 0.9 % flush 1-10 mL  1-10 mL Intravenous PRN Jagdeep Reyes MD           Vital Sign Min/Max for last 24 hours  Temp  Min: 97.4 °F (36.3 °C)  Max: 98 °F (36.7 °C)   BP  Min: 110/68  Max: 149/91   Pulse  Min: 67  Max: 93   Resp  Min: 16  Max: 20   SpO2  Min: 91 %  Max: 96 %   No Data Recorded   Weight  Min: 91.1 kg (200 lb 12.8 oz)  Max: 91.1 kg (200 lb 12.8 oz)     Flowsheet Rows      First Filed Value   Admission Height  162.6 cm (64\") Documented at 07/18/2018 1555   Admission Weight  93.9 kg (207 lb) Documented at 07/18/2018 1555          Results for orders placed during the hospital encounter of 12/05/17   Adult " Transthoracic Echo Complete W/ Cont if Necessary Per Protocol    Narrative · Left ventricular systolic function is normal. Estimated EF = 60%.  · Mild pulmonary hypertension is present.          Physical Exam:  General Appearance: Awake, alert, in no acute distress  Eyes: Pupils equal and reactive    Ears: Appear intact with no abnormalities noted  Nose: Nares normal, no drainage  Neck: supple, trachea midline, no carotid bruit and no JVD  Back: no kyphosis present,    Lungs: respirations regular, respirations even and respirations unlabored  Heart: normal S1, S2,  2/6 pansystolic murmur in the left sternal border,  no rub and no click  Abdomen: normal bowel sounds, no masses, no hepatomegaly, no splenomegaly, guarding and no rebound tenderness   Skin: no bleeding, bruising or rash  Extremities: no cyanosis  Psychiatric/Behavioral: Negative for agitation, behavioral problems, confusion, the patient does  appear to be nervous/anxious.          Results Review:   I reviewed the patient's new clinical results.  I reviewed the patient's new imaging results and agree with the interpretation.  I reviewed the patient's other test results and agree with the interpretation  I personally viewed and interpreted the patient's EKG/Telemetry data  Discussed with patient, and present family member(s)     Assessment/Plan      PAF (paroxysmal atrial fibrillation) (CMS/HCC)  Rapid ventricular response  • CAD (coronary artery disease)   • Atrial flutter (CMS/HCC)   • Type 2 diabetes mellitus without complication, without long-term current use of insulin (CMS/HCC)   • Mixed hyperlipidemia   • S/P CABG x 3 2000 Dr Larsen   • Shoulder blade pain   • Anemia   • Abnormal finding on imaging   • History of esophagitis   • Essential hypertension   • S/P coronary artery stent placement   • Gastric polyp   • Colon polyps            Plan     Continue amiodarone   400 mg by mouth twice a day ×4 days then 200 mg daily  Lovenox 1 mg/kg simultaneously  every 12 hours already received a dose from transferring hospital  Elective physiology consultation  When the obtained records more than 10 years old when had aortocoronary bypass surgery to see if left atrial appendage was excluded  Monitor blood sugars  Supportive care  Telemetry  Optimal medical therapy  Deep vein thrombosis prophylaxis/precautions  Appropriate diet, fluid, sodium, caffeine, stimulants intake   Compliance to diet and medications   Avoid NSAIDS  Discharge Home 1-2 days  Start Eliquis 5 mg by mouth twice a day on discharge      Jagdeep Reyes MD  07/19/18  9:24 PM

## 2018-07-20 NOTE — PLAN OF CARE
Problem: Patient Care Overview  Goal: Plan of Care Review  Outcome: Ongoing (interventions implemented as appropriate)   07/20/18 2744   Coping/Psychosocial   Plan of Care Reviewed With patient   Plan of Care Review   Progress improving   OTHER   Outcome Summary VSS. No c/o pain today. Eliquis started today. Up ad arlen. Will cont to monitor.

## 2018-07-20 NOTE — PLAN OF CARE
Problem: Patient Care Overview  Goal: Plan of Care Review  Outcome: Ongoing (interventions implemented as appropriate)   07/20/18 0433   Coping/Psychosocial   Plan of Care Reviewed With patient   Plan of Care Review   Progress improving   OTHER   Outcome Summary No pain. no c/o's. Cont. to monitor. SA

## 2018-07-21 VITALS
BODY MASS INDEX: 34.45 KG/M2 | OXYGEN SATURATION: 92 % | HEIGHT: 64 IN | WEIGHT: 201.8 LBS | HEART RATE: 81 BPM | SYSTOLIC BLOOD PRESSURE: 115 MMHG | DIASTOLIC BLOOD PRESSURE: 54 MMHG | TEMPERATURE: 97.5 F | RESPIRATION RATE: 18 BRPM

## 2018-07-21 LAB
ALBUMIN SERPL-MCNC: 3.9 G/DL (ref 3.5–5)
ALBUMIN/GLOB SERPL: 1.6 G/DL (ref 1.1–2.5)
ALP SERPL-CCNC: 73 U/L (ref 24–120)
ALT SERPL W P-5'-P-CCNC: 44 U/L (ref 0–54)
ANION GAP SERPL CALCULATED.3IONS-SCNC: 15 MMOL/L (ref 4–13)
AST SERPL-CCNC: 26 U/L (ref 7–45)
BASOPHILS # BLD AUTO: 0.03 10*3/MM3 (ref 0–0.2)
BASOPHILS NFR BLD AUTO: 0.3 % (ref 0–2)
BILIRUB SERPL-MCNC: 1.6 MG/DL (ref 0.1–1)
BUN BLD-MCNC: 15 MG/DL (ref 5–21)
BUN/CREAT SERPL: 33.3 (ref 7–25)
CALCIUM SPEC-SCNC: 9.4 MG/DL (ref 8.4–10.4)
CHLORIDE SERPL-SCNC: 98 MMOL/L (ref 98–110)
CO2 SERPL-SCNC: 25 MMOL/L (ref 24–31)
CREAT BLD-MCNC: 0.45 MG/DL (ref 0.5–1.4)
DEPRECATED RDW RBC AUTO: 47.4 FL (ref 40–54)
EOSINOPHIL # BLD AUTO: 0.03 10*3/MM3 (ref 0–0.7)
EOSINOPHIL NFR BLD AUTO: 0.3 % (ref 0–4)
ERYTHROCYTE [DISTWIDTH] IN BLOOD BY AUTOMATED COUNT: 14.3 % (ref 12–15)
GFR SERPL CREATININE-BSD FRML MDRD: 142 ML/MIN/1.73
GLOBULIN UR ELPH-MCNC: 2.5 GM/DL
GLUCOSE BLD-MCNC: 218 MG/DL (ref 70–100)
HCT VFR BLD AUTO: 44.7 % (ref 37–47)
HGB BLD-MCNC: 14.5 G/DL (ref 12–16)
IMM GRANULOCYTES # BLD: 0.08 10*3/MM3 (ref 0–0.03)
IMM GRANULOCYTES NFR BLD: 0.8 % (ref 0–5)
LYMPHOCYTES # BLD AUTO: 0.93 10*3/MM3 (ref 0.72–4.86)
LYMPHOCYTES NFR BLD AUTO: 8.8 % (ref 15–45)
MCH RBC QN AUTO: 29.4 PG (ref 28–32)
MCHC RBC AUTO-ENTMCNC: 32.4 G/DL (ref 33–36)
MCV RBC AUTO: 90.5 FL (ref 82–98)
MONOCYTES # BLD AUTO: 0.7 10*3/MM3 (ref 0.19–1.3)
MONOCYTES NFR BLD AUTO: 6.7 % (ref 4–12)
NEUTROPHILS # BLD AUTO: 8.75 10*3/MM3 (ref 1.87–8.4)
NEUTROPHILS NFR BLD AUTO: 83.1 % (ref 39–78)
NRBC BLD MANUAL-RTO: 0 /100 WBC (ref 0–0)
PLATELET # BLD AUTO: 196 10*3/MM3 (ref 130–400)
PMV BLD AUTO: 10.8 FL (ref 6–12)
POTASSIUM BLD-SCNC: 4.4 MMOL/L (ref 3.5–5.3)
PROT SERPL-MCNC: 6.4 G/DL (ref 6.3–8.7)
RBC # BLD AUTO: 4.94 10*6/MM3 (ref 4.2–5.4)
SODIUM BLD-SCNC: 138 MMOL/L (ref 135–145)
WBC NRBC COR # BLD: 10.52 10*3/MM3 (ref 4.8–10.8)

## 2018-07-21 PROCEDURE — 99217 PR OBSERVATION CARE DISCHARGE MANAGEMENT: CPT | Performed by: INTERNAL MEDICINE

## 2018-07-21 PROCEDURE — G0378 HOSPITAL OBSERVATION PER HR: HCPCS

## 2018-07-21 PROCEDURE — 80053 COMPREHEN METABOLIC PANEL: CPT | Performed by: INTERNAL MEDICINE

## 2018-07-21 PROCEDURE — 85025 COMPLETE CBC W/AUTO DIFF WBC: CPT | Performed by: INTERNAL MEDICINE

## 2018-07-21 PROCEDURE — 63710000001 PREDNISONE PER 5 MG: Performed by: INTERNAL MEDICINE

## 2018-07-21 RX ADMIN — ASPIRIN 81 MG: 81 TABLET ORAL at 08:24

## 2018-07-21 RX ADMIN — PREDNISONE 5 MG: 5 TABLET ORAL at 08:24

## 2018-07-21 RX ADMIN — POTASSIUM CHLORIDE 10 MEQ: 750 CAPSULE, EXTENDED RELEASE ORAL at 08:24

## 2018-07-21 RX ADMIN — FOLIC ACID 1 MG: 1 TABLET ORAL at 08:24

## 2018-07-21 RX ADMIN — METFORMIN HYDROCHLORIDE 1000 MG: 500 TABLET ORAL at 08:24

## 2018-07-21 RX ADMIN — AMIODARONE HYDROCHLORIDE 400 MG: 200 TABLET ORAL at 08:25

## 2018-07-21 RX ADMIN — APIXABAN 5 MG: 5 TABLET, FILM COATED ORAL at 08:25

## 2018-07-21 RX ADMIN — LOSARTAN POTASSIUM 25 MG: 25 TABLET ORAL at 08:24

## 2018-07-21 RX ADMIN — ISOSORBIDE MONONITRATE 30 MG: 30 TABLET, EXTENDED RELEASE ORAL at 08:24

## 2018-07-21 RX ADMIN — METOPROLOL TARTRATE 50 MG: 50 TABLET ORAL at 08:25

## 2018-07-21 RX ADMIN — FUROSEMIDE 20 MG: 20 TABLET ORAL at 08:24

## 2018-07-21 RX ADMIN — PANTOPRAZOLE SODIUM 40 MG: 40 TABLET, DELAYED RELEASE ORAL at 08:24

## 2018-07-21 NOTE — PLAN OF CARE
Problem: Patient Care Overview  Goal: Plan of Care Review  Outcome: Ongoing (interventions implemented as appropriate)   07/21/18 2333   Coping/Psychosocial   Plan of Care Reviewed With patient   Plan of Care Review   Progress improving   OTHER   Outcome Summary VSS, c/o aye shoulder pain with relief from PRN pain med. Pt tolerating Eliquis. Possibly home today. Cont to monitor     Goal: Individualization and Mutuality  Outcome: Ongoing (interventions implemented as appropriate)    Goal: Discharge Needs Assessment  Outcome: Ongoing (interventions implemented as appropriate)      Problem: Arrhythmia/Dysrhythmia (Symptomatic) (Adult)  Goal: Signs and Symptoms of Listed Potential Problems Will be Absent, Minimized or Managed (Arrhythmia/Dysrhythmia)  Outcome: Ongoing (interventions implemented as appropriate)   07/20/18 6654   Goal/Outcome Evaluation   Problems Assessed (Arrhythmia/Dysrhythmia) all   Problems Present (Dysrhythmia) situational response

## 2018-07-21 NOTE — PROGRESS NOTES
LOS: 0 days   Patient Care Team:  Teresa Contreras MD as PCP - General  Teresa Contreras MD as PCP - Family Medicine  Jagdeep Reyes MD as Cardiologist (Cardiology)    Chief Complaint: Active Problems:    PAF (paroxysmal atrial fibrillation) (CMS/HCC)    Shortness of breath    Subjective  Feeling  better  No chest pain   No excessive shortness of breath  No new complaints  No further palpitations    Interval History: Improved overall    Patient Complaints: palpitations    Denies chest pain currently. Denies excessive shortness of breath. Denies abdominal pain, nausea vomiting or diarrhoea.    Telemetry: no malignant arrhythmia. No significant pauses. Maintaining sinus rhythm    Review of Systems   Constitutional: No chills   Has fatigue   No fever.   HENT: Negative.    Eyes: Negative.    Respiratory: Negative for cough,   No chest wall soreness,   Mild shortness of breath,   no wheezing, no stridor.    Cardiovascular: Negative for chest pain,   No palpitations   No significant  leg swelling.   Gastrointestinal: Negative for abdominal distention,  No abdominal pain,   No blood in stool,   No constipation,   No diarrhea,   No nausea   No vomiting.   Endocrine: Negative.    Genitourinary: Negative for difficulty urinating, dysuria, flank pain and hematuria.   Musculoskeletal: Negative.    Skin: Negative for rash and wound.   Allergic/Immunologic: Negative.    Neurological: Negative for dizziness, syncope, weakness,   No light-headedness  No  headaches.   Hematological: Does not bruise/bleed easily.   Psychiatric/Behavioral: Negative for agitation or behavioral problems,   No confusion,   the patient is  nervous/anxious.       History:   Past Medical History:   Diagnosis Date   • Abnormal stress test    • Atrial flutter (CMS/HCC)    • CAD (coronary artery disease)    • CAD in native artery     CABG x 3   • Diabetes mellitus (CMS/HCC)    • Essential hypertension     Tricuspid valve insufficiency, unspecified  etiology    • Hyperlipemia, mixed    • Hyperlipidemia    • Hypertension    • MI, old    • Mitral valve prolapse    • Near syncope    • Obesity, morbid (CMS/HCC)    • Palpitations    • Paroxysmal SVT (supraventricular tachycardia) (CMS/HCC)    • Paroxysmal SVT (supraventricular tachycardia) (CMS/HCC)    • Pedal edema    • Precordial pain    • Rheumatoid arthritis (CMS/HCC)    • Rheumatoid arthritis (CMS/HCC)    • S/P CABG x 3      Past Surgical History:   Procedure Laterality Date   • APPENDECTOMY     • CARDIAC CATHETERIZATION Left 06/18/2012    Intensify medical therapy   • CARDIAC CATHETERIZATION Left 05/05/2002    surgery   • CHOLECYSTECTOMY     • COLONOSCOPY  02/10/2012   • COLONOSCOPY N/A 1/31/2018    Procedure: COLONOSCOPY WITH ANESTHESIA;  Surgeon: Patricia Bo MD;  Location: [facility] ENDOSCOPY;  Service:    • CORONARY ARTERY BYPASS GRAFT  05/05/2002    LIMA to LAD, SVG to D1, SVG to OM1 - x3   • CORONARY STENT PLACEMENT  09/2009    X1 - Stent to LAD, Drug Eluting   • ENDOSCOPY N/A 1/31/2018    Procedure: ESOPHAGOGASTRODUODENOSCOPY WITH ANESTHESIA;  Surgeon: Patricia Bo MD;  Location: [facility] ENDOSCOPY;  Service:    • GALLBLADDER SURGERY     • REPLACEMENT TOTAL KNEE Right    • UPPER GASTROINTESTINAL ENDOSCOPY  03/23/2015     Social History     Social History   • Marital status:      Spouse name: N/A   • Number of children: N/A   • Years of education: N/A     Occupational History   • Not on file.     Social History Main Topics   • Smoking status: Former Smoker     Years: 20.00     Types: Cigarettes     Quit date: 1/31/2000   • Smokeless tobacco: Never Used   • Alcohol use No   • Drug use: No   • Sexual activity: Defer     Other Topics Concern   • Not on file     Social History Narrative   • No narrative on file     Family History   Problem Relation Age of Onset   • Cancer Father    • Coronary artery disease Father    • Coronary artery disease Brother    • Colon polyps Neg Hx    • Colon cancer Neg Hx     • Esophageal cancer Neg Hx    • Liver cancer Neg Hx    • Liver disease Neg Hx    • Rectal cancer Neg Hx    • Stomach cancer Neg Hx        Labs:  WBC WBC   Date Value Ref Range Status   07/19/2018 8.35 4.80 - 10.80 10*3/mm3 Final      HGB Hemoglobin   Date Value Ref Range Status   07/19/2018 13.6 12.0 - 16.0 g/dL Final      HCT Hematocrit   Date Value Ref Range Status   07/19/2018 41.2 37.0 - 47.0 % Final      Platelets Platelets   Date Value Ref Range Status   07/19/2018 209 130 - 400 10*3/mm3 Final      MCV MCV   Date Value Ref Range Status   07/19/2018 91.8 82.0 - 98.0 fL Final          Results from last 7 days  Lab Units 07/19/18  0453   SODIUM mmol/L 141   POTASSIUM mmol/L 4.2   CHLORIDE mmol/L 104   CO2 mmol/L 27.0   BUN mg/dL 16   CREATININE mg/dL 0.46*   CALCIUM mg/dL 9.1   BILIRUBIN mg/dL 1.0   ALK PHOS U/L 67   ALT (SGPT) U/L 44   AST (SGOT) U/L 26   GLUCOSE mg/dL 204*     Lab Results   Component Value Date    CKMB 0.42 03/21/2016    TROPONINI <0.012 06/05/2018     PT/INR:  No results found for: PROTIME/No results found for: INR    Imaging Results (last 72 hours)     ** No results found for the last 72 hours. **          Objective     Allergies   Allergen Reactions   • Albuterol Palpitations     High heart rate   • Codeine      Chest pain   • Augmentin [Amoxicillin-Pot Clavulanate] Palpitations   • Sulfa Antibiotics Hives       Medication Review: Performed  Current Facility-Administered Medications   Medication Dose Route Frequency Provider Last Rate Last Dose   • acetaminophen (TYLENOL) tablet 650 mg  650 mg Oral Q4H PRN Jagdeep Reyes MD   650 mg at 07/19/18 1108   • amiodarone (PACERONE) tablet 400 mg  400 mg Oral BID Jagdeep Reyes MD   400 mg at 07/20/18 2047   • apixaban (ELIQUIS) tablet 5 mg  5 mg Oral Q12H Jagdeep Reyes MD   5 mg at 07/20/18 2046   • aspirin EC tablet 81 mg  81 mg Oral Daily Jagdeep Reyes MD   81 mg at 07/20/18 0852   • atorvastatin (LIPITOR) tablet 20 mg  20 mg Oral Daily Jagdeep Reyes,  "MD   20 mg at 07/20/18 2047   • ferrous sulfate tablet 325 mg  325 mg Oral Daily With Breakfast Jagdeep Reyes MD   325 mg at 07/19/18 0912   • folic acid (FOLVITE) tablet 1 mg  1 mg Oral Daily Jagdeep Reyes MD   1 mg at 07/20/18 0850   • furosemide (LASIX) tablet 20 mg  20 mg Oral Daily Jagdeep Reyes MD   20 mg at 07/20/18 0852   • isosorbide mononitrate (IMDUR) 24 hr tablet 30 mg  30 mg Oral Daily Jagdeep Reyes MD   30 mg at 07/20/18 0852   • losartan (COZAAR) tablet 25 mg  25 mg Oral Daily Jagdeep Reyes MD   25 mg at 07/20/18 0857   • metFORMIN (GLUCOPHAGE) tablet 1,000 mg  1,000 mg Oral Daily With Breakfast Jagdeep Reyes MD   1,000 mg at 07/20/18 0850   • metoprolol tartrate (LOPRESSOR) tablet 50 mg  50 mg Oral Q12H Jagdeep Reyes MD   50 mg at 07/20/18 2046   • nitroglycerin (NITROSTAT) SL tablet 0.4 mg  0.4 mg Sublingual Q5 Min PRN Jagdeep Reyes MD       • pantoprazole (PROTONIX) EC tablet 40 mg  40 mg Oral Q12H Jagdeep Reyes MD   40 mg at 07/20/18 2046   • potassium chloride (MICRO-K) CR capsule 10 mEq  10 mEq Oral Daily Jagdeep Reyes MD   10 mEq at 07/20/18 0850   • predniSONE (DELTASONE) tablet 4 mg  4 mg Oral Nightly Jagdeep Reyes MD   4 mg at 07/20/18 2047   • predniSONE (DELTASONE) tablet 5 mg  5 mg Oral QAM Jagdeep Reyes MD   5 mg at 07/20/18 0658   • sodium chloride 0.9 % flush 1-10 mL  1-10 mL Intravenous PRN Jagdeep Reyes MD           Vital Sign Min/Max for last 24 hours  Temp  Min: 97.2 °F (36.2 °C)  Max: 97.7 °F (36.5 °C)   BP  Min: 114/70  Max: 144/66   Pulse  Min: 66  Max: 114   Resp  Min: 18  Max: 18   SpO2  Min: 92 %  Max: 96 %   No Data Recorded   Weight  Min: 91.5 kg (201 lb 12.8 oz)  Max: 91.5 kg (201 lb 12.8 oz)     Flowsheet Rows      First Filed Value   Admission Height  162.6 cm (64\") Documented at 07/18/2018 1555   Admission Weight  93.9 kg (207 lb) Documented at 07/18/2018 1555          Results for orders placed during the hospital encounter of 07/18/18   Adult Transthoracic Echo Complete W/ Cont if " Necessary Per Protocol    Narrative · Left ventricular systolic function is normal. Estimated EF = 55%.  · Left ventricular diastolic dysfunction.  · Left atrial cavity size is mildly dilated.  · No evidence of pulmonary hypertension is present.          Physical Exam:  General Appearance: Awake, alert, in no acute distress  Eyes: Pupils equal and reactive    Ears: Appear intact with no abnormalities noted  Nose: Nares normal, no drainage  Neck: supple, trachea midline, no carotid bruit and no JVD  Back: no kyphosis present,    Lungs: respirations regular, respirations even and respirations unlabored  Heart: normal S1, S2,  2/6 pansystolic murmur in the left sternal border,  no rub and no click  Abdomen: normal bowel sounds, no masses, no hepatomegaly, no splenomegaly, guarding and no rebound tenderness   Skin: no bleeding, bruising or rash  Extremities: no cyanosis  Psychiatric/Behavioral: Negative for agitation, behavioral problems, confusion, the patient does  appear to be nervous/anxious.          Results Review:   I reviewed the patient's new clinical results.  I reviewed the patient's new imaging results and agree with the interpretation.  I reviewed the patient's other test results and agree with the interpretation  I personally viewed and interpreted the patient's EKG/Telemetry data  Discussed with patient,      Assessment/Plan      PAF (paroxysmal atrial fibrillation) (CMS/HCC)  Rapid ventricular response  • CAD (coronary artery disease)   • Atrial flutter (CMS/HCC)   • Type 2 diabetes mellitus without complication, without long-term current use of insulin (CMS/HCC)   • Mixed hyperlipidemia   • S/P CABG x 3 2000 Dr Larsen   • Shoulder blade pain   • Anemia   • Abnormal finding on imaging   • History of esophagitis   • Essential hypertension   • S/P coronary artery stent placement   • Gastric polyp   • Colon polyps            Plan     Continue amiodarone   400 mg by mouth twice a day ×4 days then 200 mg  daily  Electrophysiology consultation was obtained and recommendations reviewed    Reviewed available prior notes, consults, prior visits, laboratory findings, radiology And cardiology relevant reports. Updated chart as applicable. I have reviewed the patient's medical history in detail and updated the computerized patient record as relevant.    Updated patient regarding any new or relevant abnormalities on review of records or any new findings on physical exam. Mentioned to patient about purpose of visit and desirable health short and long term goals and objectives.     Discontinue Lovenox and start Eliquis 5 mg by mouth twice a day and monitor closely for GI bleeding given history of GI bleeding with subsequent gastric polypectomy    Hopefully discharge home in the next 1 to 2 days, provided no GI bleeding  Monitor blood sugars  Supportive care  Telemetry  Optimal medical therapy  Deep vein thrombosis prophylaxis/precautions  Appropriate diet, fluid, sodium, caffeine, stimulants intake   Compliance to diet and medications   Avoid NSAIDS  Ambulate patient    EMR Dragon/Transcription disclaimer: Much of this encounter note is an electronic transcription/translation of spoken language to printed text. The electronic translation of spoken language may permit erroneous, or at times, nonsensical words or phrases to be inadvertently transcribed; although I have reviewed the note for such errors, some may still exist.       Jagdeep Reyes MD  07/20/18  8:50 PM

## 2018-07-21 NOTE — DISCHARGE SUMMARY
[facility], [facility] Discharge Summary    Date of Discharge:  7/21/2018    Discharge Diagnosis:   PAF  CAD  T2DM  HL  CAD, s/p CABG  H/o anemia  HTN    Hospital Course  Patient is a 62 y.o. female presented with rapid HR to [facility].  She was transferred here.  Medication was adjusted, and she was started on anticoagulation tx.  She was seen in consult by EP.  If this recurs or if there is concern for long-term amio tx, she can be referred to EP for ablation.  She is now in SR and ready for d/c.  She has been cautioned about the risk for bleeding on anticoagulation.      Consults:   Consults     Date and Time Order Name Status Description    7/18/2018 1721 Inpatient Cardiac Electrophysiology Consult            Physical Exam at Discharge    Vital Signs  Temp:  [97.2 °F (36.2 °C)-98.1 °F (36.7 °C)] 97.5 °F (36.4 °C)  Heart Rate:  [] 81  Resp:  [18] 18  BP: (115-145)/(53-79) 115/54    Physical Exam:  General Appearance:    Alert, cooperative, in no acute distress   Head:    Normocephalic, without obvious abnormality, atraumatic   Eyes:            Lids and lashes normal, conjunctivae and sclerae normal, no   icterus, PERRLA, EOMI   Throat:   Oral mucosa pink and moist   Neck:   No adenopathy, supple, trachea midline, no thyromegaly, no   carotid bruit, no JVD   Lungs:     Clear to auscultation bilaterally,respirations regular, even     and unlabored    Heart:    Regular rhythm and normal rate, normal S1 and S2, no            murmur, no gallop, no rub, no click   Chest Wall:    No abnormalities observed   Abdomen:     Normal bowel sounds present in all four quadrants, no       masses, no organomegaly, soft non-tender, non-distended    Extremities:   No edema, no cyanosis, no clubbing   Pulses:   Pulses palpable and equal bilaterally   Skin:   No bleeding, bruising or rash   Psychiatric:   Displays appropriate mood and affect       Discharge Disposition  Home or Self Care    Discharge  Medications     Discharge Medications      New Medications      Instructions Start Date   apixaban 5 MG tablet tablet  Commonly known as:  ELIQUIS   5 mg, Oral, Every 12 Hours Scheduled         Continue These Medications      Instructions Start Date   amiodarone 200 MG tablet  Commonly known as:  PACERONE   200 mg, Oral, 2 Times Daily      aspirin 81 MG EC tablet   81 mg, Oral, Daily      ferrous sulfate 325 (65 FE) MG tablet   325 mg, Oral, Daily With Breakfast      folic acid 1 MG tablet  Commonly known as:  FOLVITE   1 mg, Oral, Daily      furosemide 20 MG tablet  Commonly known as:  LASIX   20 mg, Oral, Daily      glyBURIDE 5 MG tablet  Commonly known as:  DIAbeta   5 mg, Oral, Daily With Breakfast      isosorbide mononitrate 30 MG 24 hr tablet  Commonly known as:  IMDUR   30 mg, Oral, Daily      losartan 25 MG tablet  Commonly known as:  COZAAR   25 mg, Oral, Daily      metFORMIN 1000 MG tablet  Commonly known as:  GLUCOPHAGE   1,000 mg, Oral, Daily With Breakfast      methotrexate 2.5 MG tablet   2.5 mg, Oral, Weekly, Takes 10 tablets on Saturday.      metoprolol tartrate 50 MG tablet  Commonly known as:  LOPRESSOR   50 mg, Oral, 2 Times Daily      nitroglycerin 0.4 MG SL tablet  Commonly known as:  NITROSTAT   0.4 mg, Sublingual, Every 5 Minutes PRN, Take no more than 3 doses in 15 minutes.      pantoprazole 40 MG EC tablet  Commonly known as:  PROTONIX   40 mg, Oral, Every 12 Hours Scheduled      potassium chloride 10 MEQ CR tablet  Commonly known as:  K-DUR   10 mEq, Oral, Daily      predniSONE 5 MG tablet  Commonly known as:  DELTASONE   5 mg, Oral, Every Morning      predniSONE 1 MG tablet  Commonly known as:  DELTASONE   4 mg, Oral, Nightly      simvastatin 40 MG tablet  Commonly known as:  ZOCOR   40 mg, Oral, Nightly      XELJANZ XR 11 MG tablet sustained-release 24 hour  Generic drug:  Tofacitinib Citrate   Oral           Follow-up Appointments  Future Appointments  Date Time Provider Department  [place]   8/22/2018 11:30 AM Jagdeep eRyes MD [facility] None     Additional Instructions for the Follow-ups that You Need to Schedule     Discharge Follow-up with PCP    As directed      Currently Documented PCP:  Teresa Contreras MD  PCP Phone Number:  [phone]    Follow Up Details:  1-2 weeks         Discharge Follow-up with Specified Provider: Amy; 1 Month    As directed      To:  Amy    Follow Up:  1 Month                Eliza Mandujano PA-C  07/21/18  10:39 AM

## 2018-07-30 ENCOUNTER — APPOINTMENT (OUTPATIENT)
Dept: GENERAL RADIOLOGY | Facility: HOSPITAL | Age: 63
End: 2018-07-30

## 2018-07-30 ENCOUNTER — TELEPHONE (OUTPATIENT)
Dept: CARDIOLOGY | Facility: CLINIC | Age: 63
End: 2018-07-30

## 2018-07-30 ENCOUNTER — HOSPITAL ENCOUNTER (INPATIENT)
Facility: HOSPITAL | Age: 63
LOS: 4 days | Discharge: HOME | End: 2018-08-03
Attending: EMERGENCY MEDICINE | Admitting: INTERNAL MEDICINE

## 2018-07-30 DIAGNOSIS — R11.0 NAUSEA: ICD-10-CM

## 2018-07-30 DIAGNOSIS — R06.00 DYSPNEA, UNSPECIFIED TYPE: ICD-10-CM

## 2018-07-30 DIAGNOSIS — I48.92 ATRIAL FLUTTER, UNSPECIFIED TYPE (HCC): Primary | ICD-10-CM

## 2018-07-30 DIAGNOSIS — R00.2 PALPITATIONS: ICD-10-CM

## 2018-07-30 LAB
ALBUMIN SERPL-MCNC: 4.2 G/DL (ref 3.5–5)
ALBUMIN/GLOB SERPL: 1.6 G/DL (ref 1.1–2.5)
ALP SERPL-CCNC: 70 U/L (ref 24–120)
ALT SERPL W P-5'-P-CCNC: 42 U/L (ref 0–54)
ANION GAP SERPL CALCULATED.3IONS-SCNC: 12 MMOL/L (ref 4–13)
AST SERPL-CCNC: 40 U/L (ref 7–45)
BASOPHILS # BLD AUTO: 0.04 10*3/MM3 (ref 0–0.2)
BASOPHILS NFR BLD AUTO: 0.5 % (ref 0–2)
BILIRUB SERPL-MCNC: 0.9 MG/DL (ref 0.1–1)
BUN BLD-MCNC: 14 MG/DL (ref 5–21)
BUN/CREAT SERPL: 19.2 (ref 7–25)
CALCIUM SPEC-SCNC: 9 MG/DL (ref 8.4–10.4)
CHLORIDE SERPL-SCNC: 104 MMOL/L (ref 98–110)
CO2 SERPL-SCNC: 25 MMOL/L (ref 24–31)
CREAT BLD-MCNC: 0.73 MG/DL (ref 0.5–1.4)
DEPRECATED RDW RBC AUTO: 48.9 FL (ref 40–54)
EOSINOPHIL # BLD AUTO: 0.02 10*3/MM3 (ref 0–0.7)
EOSINOPHIL NFR BLD AUTO: 0.2 % (ref 0–4)
ERYTHROCYTE [DISTWIDTH] IN BLOOD BY AUTOMATED COUNT: 14.6 % (ref 12–15)
GFR SERPL CREATININE-BSD FRML MDRD: 81 ML/MIN/1.73
GLOBULIN UR ELPH-MCNC: 2.6 GM/DL
GLUCOSE BLD-MCNC: 195 MG/DL (ref 70–100)
HCT VFR BLD AUTO: 45.7 % (ref 37–47)
HGB BLD-MCNC: 14.7 G/DL (ref 12–16)
HOLD SPECIMEN: NORMAL
HOLD SPECIMEN: NORMAL
IMM GRANULOCYTES # BLD: 0.07 10*3/MM3 (ref 0–0.03)
IMM GRANULOCYTES NFR BLD: 0.8 % (ref 0–5)
INR PPP: 0.96 (ref 0.91–1.09)
LYMPHOCYTES # BLD AUTO: 1.31 10*3/MM3 (ref 0.72–4.86)
LYMPHOCYTES NFR BLD AUTO: 15.3 % (ref 15–45)
MCH RBC QN AUTO: 29.5 PG (ref 28–32)
MCHC RBC AUTO-ENTMCNC: 32.2 G/DL (ref 33–36)
MCV RBC AUTO: 91.6 FL (ref 82–98)
MONOCYTES # BLD AUTO: 0.39 10*3/MM3 (ref 0.19–1.3)
MONOCYTES NFR BLD AUTO: 4.6 % (ref 4–12)
NEUTROPHILS # BLD AUTO: 6.73 10*3/MM3 (ref 1.87–8.4)
NEUTROPHILS NFR BLD AUTO: 78.6 % (ref 39–78)
NRBC BLD MANUAL-RTO: 0 /100 WBC (ref 0–0)
NT-PROBNP SERPL-MCNC: 563 PG/ML (ref 0–900)
PLATELET # BLD AUTO: 228 10*3/MM3 (ref 130–400)
PMV BLD AUTO: 10.8 FL (ref 6–12)
POTASSIUM BLD-SCNC: 4.1 MMOL/L (ref 3.5–5.3)
PROT SERPL-MCNC: 6.8 G/DL (ref 6.3–8.7)
PROTHROMBIN TIME: 13.1 SECONDS (ref 11.9–14.6)
RBC # BLD AUTO: 4.99 10*6/MM3 (ref 4.2–5.4)
SODIUM BLD-SCNC: 141 MMOL/L (ref 135–145)
TROPONIN I SERPL-MCNC: <0.012 NG/ML (ref 0–0.03)
TROPONIN I SERPL-MCNC: <0.012 NG/ML (ref 0–0.03)
TSH SERPL DL<=0.05 MIU/L-ACNC: 1.76 MIU/ML (ref 0.47–4.68)
WBC NRBC COR # BLD: 8.56 10*3/MM3 (ref 4.8–10.8)
WHOLE BLOOD HOLD SPECIMEN: NORMAL
WHOLE BLOOD HOLD SPECIMEN: NORMAL

## 2018-07-30 PROCEDURE — 93010 ELECTROCARDIOGRAM REPORT: CPT | Performed by: INTERNAL MEDICINE

## 2018-07-30 PROCEDURE — 25010000002 ADENOSINE PER 6 MG

## 2018-07-30 PROCEDURE — 71045 X-RAY EXAM CHEST 1 VIEW: CPT

## 2018-07-30 PROCEDURE — 93005 ELECTROCARDIOGRAM TRACING: CPT | Performed by: EMERGENCY MEDICINE

## 2018-07-30 PROCEDURE — 25010000002 METOCLOPRAMIDE PER 10 MG: Performed by: EMERGENCY MEDICINE

## 2018-07-30 PROCEDURE — 85610 PROTHROMBIN TIME: CPT | Performed by: EMERGENCY MEDICINE

## 2018-07-30 PROCEDURE — 80053 COMPREHEN METABOLIC PANEL: CPT | Performed by: EMERGENCY MEDICINE

## 2018-07-30 PROCEDURE — 99285 EMERGENCY DEPT VISIT HI MDM: CPT

## 2018-07-30 PROCEDURE — 84443 ASSAY THYROID STIM HORMONE: CPT | Performed by: EMERGENCY MEDICINE

## 2018-07-30 PROCEDURE — 93005 ELECTROCARDIOGRAM TRACING: CPT | Performed by: INTERNAL MEDICINE

## 2018-07-30 PROCEDURE — 84436 ASSAY OF TOTAL THYROXINE: CPT | Performed by: EMERGENCY MEDICINE

## 2018-07-30 PROCEDURE — 84484 ASSAY OF TROPONIN QUANT: CPT | Performed by: EMERGENCY MEDICINE

## 2018-07-30 PROCEDURE — 25010000002 DIPHENHYDRAMINE PER 50 MG: Performed by: EMERGENCY MEDICINE

## 2018-07-30 PROCEDURE — 83880 ASSAY OF NATRIURETIC PEPTIDE: CPT | Performed by: EMERGENCY MEDICINE

## 2018-07-30 PROCEDURE — 36415 COLL VENOUS BLD VENIPUNCTURE: CPT

## 2018-07-30 PROCEDURE — 85025 COMPLETE CBC W/AUTO DIFF WBC: CPT | Performed by: EMERGENCY MEDICINE

## 2018-07-30 PROCEDURE — 25010000002 ADENOSINE PER 6 MG: Performed by: EMERGENCY MEDICINE

## 2018-07-30 PROCEDURE — 93005 ELECTROCARDIOGRAM TRACING: CPT

## 2018-07-30 RX ORDER — PREDNISONE 1 MG/1
4 TABLET ORAL NIGHTLY
Status: DISCONTINUED | OUTPATIENT
Start: 2018-07-30 | End: 2018-08-03 | Stop reason: HOSPADM

## 2018-07-30 RX ORDER — LOSARTAN POTASSIUM 25 MG/1
25 TABLET ORAL DAILY
Status: DISCONTINUED | OUTPATIENT
Start: 2018-07-31 | End: 2018-08-03 | Stop reason: HOSPADM

## 2018-07-30 RX ORDER — DIPHENHYDRAMINE HYDROCHLORIDE 50 MG/ML
25 INJECTION INTRAMUSCULAR; INTRAVENOUS ONCE
Status: COMPLETED | OUTPATIENT
Start: 2018-07-30 | End: 2018-07-30

## 2018-07-30 RX ORDER — ADENOSINE 3 MG/ML
12 INJECTION, SOLUTION INTRAVENOUS ONCE
Status: COMPLETED | OUTPATIENT
Start: 2018-07-30 | End: 2018-07-30

## 2018-07-30 RX ORDER — METOCLOPRAMIDE HYDROCHLORIDE 5 MG/ML
10 INJECTION INTRAMUSCULAR; INTRAVENOUS ONCE
Status: COMPLETED | OUTPATIENT
Start: 2018-07-30 | End: 2018-07-30

## 2018-07-30 RX ORDER — SODIUM CHLORIDE 0.9 % (FLUSH) 0.9 %
10 SYRINGE (ML) INJECTION AS NEEDED
Status: DISCONTINUED | OUTPATIENT
Start: 2018-07-30 | End: 2018-08-03 | Stop reason: HOSPADM

## 2018-07-30 RX ORDER — AMIODARONE HYDROCHLORIDE 200 MG/1
200 TABLET ORAL DAILY
Status: DISCONTINUED | OUTPATIENT
Start: 2018-07-31 | End: 2018-08-03 | Stop reason: HOSPADM

## 2018-07-30 RX ORDER — PREDNISONE 1 MG/1
5 TABLET ORAL DAILY
Status: DISCONTINUED | OUTPATIENT
Start: 2018-07-31 | End: 2018-08-03 | Stop reason: HOSPADM

## 2018-07-30 RX ORDER — ISOSORBIDE MONONITRATE 30 MG/1
30 TABLET, EXTENDED RELEASE ORAL DAILY
Status: DISCONTINUED | OUTPATIENT
Start: 2018-07-31 | End: 2018-08-03 | Stop reason: HOSPADM

## 2018-07-30 RX ORDER — ADENOSINE 3 MG/ML
INJECTION, SOLUTION INTRAVENOUS
Status: COMPLETED
Start: 2018-07-30 | End: 2018-07-30

## 2018-07-30 RX ORDER — METOPROLOL TARTRATE 50 MG/1
50 TABLET, FILM COATED ORAL EVERY 12 HOURS SCHEDULED
Status: DISCONTINUED | OUTPATIENT
Start: 2018-07-30 | End: 2018-08-03 | Stop reason: HOSPADM

## 2018-07-30 RX ORDER — ATORVASTATIN CALCIUM 10 MG/1
20 TABLET, FILM COATED ORAL DAILY
Status: DISCONTINUED | OUTPATIENT
Start: 2018-07-31 | End: 2018-08-03 | Stop reason: HOSPADM

## 2018-07-30 RX ORDER — PANTOPRAZOLE SODIUM 40 MG/1
40 TABLET, DELAYED RELEASE ORAL
Status: DISCONTINUED | OUTPATIENT
Start: 2018-07-31 | End: 2018-08-03

## 2018-07-30 RX ORDER — ASPIRIN 81 MG/1
81 TABLET ORAL DAILY
Status: DISCONTINUED | OUTPATIENT
Start: 2018-07-31 | End: 2018-08-03 | Stop reason: HOSPADM

## 2018-07-30 RX ORDER — ADENOSINE 3 MG/ML
6 INJECTION, SOLUTION INTRAVENOUS ONCE
Status: COMPLETED | OUTPATIENT
Start: 2018-07-30 | End: 2018-07-30

## 2018-07-30 RX ORDER — NITROGLYCERIN 0.4 MG/1
0.4 TABLET SUBLINGUAL
Status: DISCONTINUED | OUTPATIENT
Start: 2018-07-30 | End: 2018-08-03 | Stop reason: HOSPADM

## 2018-07-30 RX ADMIN — DILTIAZEM HYDROCHLORIDE 5 MG/HR: 5 INJECTION INTRAVENOUS at 16:25

## 2018-07-30 RX ADMIN — SODIUM CHLORIDE 1000 ML: 9 INJECTION, SOLUTION INTRAVENOUS at 15:45

## 2018-07-30 RX ADMIN — ADENOSINE 12 MG: 3 INJECTION, SOLUTION INTRAVENOUS at 15:44

## 2018-07-30 RX ADMIN — DIPHENHYDRAMINE HYDROCHLORIDE 25 MG: 50 INJECTION, SOLUTION INTRAMUSCULAR; INTRAVENOUS at 17:49

## 2018-07-30 RX ADMIN — METOPROLOL TARTRATE 50 MG: 50 TABLET ORAL at 21:47

## 2018-07-30 RX ADMIN — ADENOSINE 6 MG: 3 INJECTION, SOLUTION INTRAVENOUS at 13:38

## 2018-07-30 RX ADMIN — APIXABAN 5 MG: 5 TABLET, FILM COATED ORAL at 21:44

## 2018-07-30 RX ADMIN — DILTIAZEM HYDROCHLORIDE 15 MG/HR: 5 INJECTION INTRAVENOUS at 23:52

## 2018-07-30 RX ADMIN — METOCLOPRAMIDE 10 MG: 5 INJECTION, SOLUTION INTRAMUSCULAR; INTRAVENOUS at 17:49

## 2018-07-31 PROCEDURE — 94760 N-INVAS EAR/PLS OXIMETRY 1: CPT

## 2018-07-31 PROCEDURE — 63710000001 PREDNISONE PER 5 MG: Performed by: INTERNAL MEDICINE

## 2018-07-31 PROCEDURE — 94799 UNLISTED PULMONARY SVC/PX: CPT

## 2018-07-31 PROCEDURE — 99223 1ST HOSP IP/OBS HIGH 75: CPT | Performed by: INTERNAL MEDICINE

## 2018-07-31 PROCEDURE — 25010000002 DIGOXIN PER 500 MCG: Performed by: INTERNAL MEDICINE

## 2018-07-31 RX ORDER — DIGOXIN 0.25 MG/ML
250 INJECTION INTRAMUSCULAR; INTRAVENOUS EVERY 6 HOURS
Status: COMPLETED | OUTPATIENT
Start: 2018-07-31 | End: 2018-08-01

## 2018-07-31 RX ORDER — ACETAMINOPHEN 325 MG/1
650 TABLET ORAL EVERY 6 HOURS PRN
Status: DISCONTINUED | OUTPATIENT
Start: 2018-07-31 | End: 2018-08-03 | Stop reason: HOSPADM

## 2018-07-31 RX ORDER — DIGOXIN 125 MCG
125 TABLET ORAL DAILY
Status: DISCONTINUED | OUTPATIENT
Start: 2018-08-02 | End: 2018-08-03

## 2018-07-31 RX ADMIN — PANTOPRAZOLE SODIUM 40 MG: 40 TABLET, DELAYED RELEASE ORAL at 17:33

## 2018-07-31 RX ADMIN — APIXABAN 5 MG: 5 TABLET, FILM COATED ORAL at 10:05

## 2018-07-31 RX ADMIN — PANTOPRAZOLE SODIUM 40 MG: 40 TABLET, DELAYED RELEASE ORAL at 10:06

## 2018-07-31 RX ADMIN — APIXABAN 5 MG: 5 TABLET, FILM COATED ORAL at 20:16

## 2018-07-31 RX ADMIN — DILTIAZEM HYDROCHLORIDE 15 MG/HR: 5 INJECTION INTRAVENOUS at 22:30

## 2018-07-31 RX ADMIN — METOPROLOL TARTRATE 50 MG: 50 TABLET ORAL at 20:16

## 2018-07-31 RX ADMIN — LOSARTAN POTASSIUM 25 MG: 25 TABLET ORAL at 10:05

## 2018-07-31 RX ADMIN — DILTIAZEM HYDROCHLORIDE 5 MG/HR: 5 INJECTION INTRAVENOUS at 14:59

## 2018-07-31 RX ADMIN — PREDNISONE 4 MG: 1 TABLET ORAL at 20:16

## 2018-07-31 RX ADMIN — ASPIRIN 81 MG: 81 TABLET ORAL at 10:05

## 2018-07-31 RX ADMIN — ISOSORBIDE MONONITRATE 30 MG: 30 TABLET, EXTENDED RELEASE ORAL at 10:05

## 2018-07-31 RX ADMIN — ACETAMINOPHEN 650 MG: 325 TABLET, FILM COATED ORAL at 01:29

## 2018-07-31 RX ADMIN — ATORVASTATIN CALCIUM 20 MG: 10 TABLET, FILM COATED ORAL at 10:06

## 2018-07-31 RX ADMIN — PREDNISONE 5 MG: 5 TABLET ORAL at 10:05

## 2018-07-31 RX ADMIN — DIGOXIN 250 MCG: 250 INJECTION, SOLUTION INTRAMUSCULAR; INTRAVENOUS; PARENTERAL at 23:14

## 2018-07-31 RX ADMIN — METOPROLOL TARTRATE 50 MG: 50 TABLET ORAL at 10:05

## 2018-07-31 RX ADMIN — DILTIAZEM HYDROCHLORIDE 15 MG/HR: 5 INJECTION INTRAVENOUS at 07:15

## 2018-07-31 RX ADMIN — AMIODARONE HYDROCHLORIDE 200 MG: 200 TABLET ORAL at 10:05

## 2018-08-01 LAB — T4 SERPL-MCNC: 6.6 UG/DL (ref 4.5–12)

## 2018-08-01 PROCEDURE — 63710000001 PREDNISONE PER 5 MG: Performed by: INTERNAL MEDICINE

## 2018-08-01 PROCEDURE — 99233 SBSQ HOSP IP/OBS HIGH 50: CPT | Performed by: INTERNAL MEDICINE

## 2018-08-01 PROCEDURE — 25010000002 DIGOXIN PER 500 MCG: Performed by: INTERNAL MEDICINE

## 2018-08-01 RX ORDER — VERAPAMIL HYDROCHLORIDE 80 MG/1
80 TABLET ORAL EVERY 8 HOURS SCHEDULED
Status: DISCONTINUED | OUTPATIENT
Start: 2018-08-01 | End: 2018-08-03 | Stop reason: HOSPADM

## 2018-08-01 RX ORDER — VERAPAMIL HYDROCHLORIDE 40 MG/1
40 TABLET ORAL EVERY 8 HOURS SCHEDULED
Status: DISCONTINUED | OUTPATIENT
Start: 2018-08-01 | End: 2018-08-01

## 2018-08-01 RX ADMIN — PANTOPRAZOLE SODIUM 40 MG: 40 TABLET, DELAYED RELEASE ORAL at 08:57

## 2018-08-01 RX ADMIN — METOPROLOL TARTRATE 50 MG: 50 TABLET ORAL at 20:03

## 2018-08-01 RX ADMIN — ISOSORBIDE MONONITRATE 30 MG: 30 TABLET, EXTENDED RELEASE ORAL at 08:58

## 2018-08-01 RX ADMIN — DILTIAZEM HYDROCHLORIDE 15 MG/HR: 5 INJECTION INTRAVENOUS at 10:21

## 2018-08-01 RX ADMIN — APIXABAN 5 MG: 5 TABLET, FILM COATED ORAL at 08:58

## 2018-08-01 RX ADMIN — AMIODARONE HYDROCHLORIDE 200 MG: 200 TABLET ORAL at 08:58

## 2018-08-01 RX ADMIN — PREDNISONE 4 MG: 1 TABLET ORAL at 20:03

## 2018-08-01 RX ADMIN — DIGOXIN 250 MCG: 250 INJECTION, SOLUTION INTRAMUSCULAR; INTRAVENOUS; PARENTERAL at 05:01

## 2018-08-01 RX ADMIN — VERAPAMIL HYDROCHLORIDE 40 MG: 40 TABLET ORAL at 08:57

## 2018-08-01 RX ADMIN — LOSARTAN POTASSIUM 25 MG: 25 TABLET ORAL at 08:58

## 2018-08-01 RX ADMIN — DIGOXIN 250 MCG: 250 INJECTION, SOLUTION INTRAMUSCULAR; INTRAVENOUS; PARENTERAL at 11:32

## 2018-08-01 RX ADMIN — PREDNISONE 5 MG: 5 TABLET ORAL at 08:58

## 2018-08-01 RX ADMIN — VERAPAMIL HYDROCHLORIDE 40 MG: 40 TABLET ORAL at 14:35

## 2018-08-01 RX ADMIN — VERAPAMIL HYDROCHLORIDE 80 MG: 80 TABLET, FILM COATED ORAL at 20:04

## 2018-08-01 RX ADMIN — ATORVASTATIN CALCIUM 20 MG: 10 TABLET, FILM COATED ORAL at 08:58

## 2018-08-01 RX ADMIN — ASPIRIN 81 MG: 81 TABLET ORAL at 08:58

## 2018-08-01 RX ADMIN — METOPROLOL TARTRATE 50 MG: 50 TABLET ORAL at 08:58

## 2018-08-01 RX ADMIN — PANTOPRAZOLE SODIUM 40 MG: 40 TABLET, DELAYED RELEASE ORAL at 16:39

## 2018-08-01 RX ADMIN — DILTIAZEM HYDROCHLORIDE 15 MG/HR: 5 INJECTION INTRAVENOUS at 22:34

## 2018-08-01 RX ADMIN — APIXABAN 5 MG: 5 TABLET, FILM COATED ORAL at 20:04

## 2018-08-01 NOTE — PLAN OF CARE
Problem: Arrhythmia/Dysrhythmia (Symptomatic) (Adult)  Goal: Signs and Symptoms of Listed Potential Problems Will be Absent, Minimized or Managed (Arrhythmia/Dysrhythmia)  Outcome: Ongoing (interventions implemented as appropriate)      Problem: Patient Care Overview  Goal: Plan of Care Review  Outcome: Ongoing (interventions implemented as appropriate)   08/01/18 1524   Coping/Psychosocial   Plan of Care Reviewed With patient   Plan of Care Review   Progress improving   OTHER   Outcome Summary Pt did not c/o pain this shift. Calan started today per order. Heart rate is a little bit controlled today. Card drip wean down to 5ml/hr. Pt is ambulating in the hallway today. BP is WNL. Cont to monitor pt.      Goal: Individualization and Mutuality  Outcome: Ongoing (interventions implemented as appropriate)

## 2018-08-01 NOTE — H&P
LOS: 1 day   Patient Care Team:  Teresa Contreras MD as PCP - General  Teresa Contreras MD as PCP - Family Medicine  Jagdeep Reyes MD as Cardiologist (Cardiology)    Chief Complaint: Active Problems:    Atrial flutter (CMS/HCC)    Presenting complaints: Palpitations  Extremely pleasant 62-year-old  lady with history of paroxysmal atrial flutter/fibrillation who was recently admitted to the hospital and then discharged and has been on Eliquis since approximately July 18  Was doing well until approximately 9 AM yesterday started complaining of rapid heart rate.  Experience palpitations.  No chest pain  Mild shortness of breath  No cough or phlegm or fever  Currently feels better with atrial flutter with ventricular rates 1 20-1 30 bpm  Continues on intravenous Cardizem and continues on prior oral medications  No presyncope or syncope  Compliant with current prescribed at occasions  No significant dietary indiscretion including excessive consumption of high sodium containing food or drink items or intake of any cardiac stimulants    Telemetry: no malignant arrhythmia. No significant pauses.atrial flutter with rapid ventricular response    Review of Systems   Constitutional: No chills   Has fatigue   No fever.   HENT: Negative.    Eyes: Negative.    Respiratory: Negative for cough,   No chest wall soreness,   Shortness of breath,   no wheezing, no stridor.    Cardiovascular: Negative for chest pain,   Positive for palpitations   No significant  leg swelling.   Gastrointestinal: Negative for abdominal distention,  No abdominal pain,   No blood in stool,   No constipation,   No diarrhea,   No nausea   No vomiting.   Endocrine: Negative.    Genitourinary: Negative for difficulty urinating, dysuria, flank pain and hematuria.   Musculoskeletal: Negative.    Skin: Negative for rash and wound.   Allergic/Immunologic: Negative.    Neurological: Negative for dizziness, syncope, weakness,   No  light-headedness  No  headaches.   Hematological: Does not bruise/bleed easily.   Psychiatric/Behavioral: Negative for agitation or behavioral problems,   No confusion,   the patient is  nervous/anxious.       History:   Past Medical History:   Diagnosis Date   • Abnormal stress test    • Atrial flutter (CMS/HCC)    • CAD (coronary artery disease)    • CAD in native artery     CABG x 3   • Diabetes mellitus (CMS/HCC)    • Essential hypertension     Tricuspid valve insufficiency, unspecified etiology    • Hyperlipemia, mixed    • Hyperlipidemia    • Hypertension    • MI, old    • Mitral valve prolapse    • Near syncope    • Obesity, morbid (CMS/[facility])    • Palpitations    • Paroxysmal SVT (supraventricular tachycardia) (CMS/HCC)    • Paroxysmal SVT (supraventricular tachycardia) (CMS/[facility])    • Pedal edema    • Precordial pain    • Rheumatoid arthritis (CMS/HCC)    • Rheumatoid arthritis (CMS/[facility])    • S/P CABG x 3      Past Surgical History:   Procedure Laterality Date   • APPENDECTOMY     • CARDIAC CATHETERIZATION Left 06/18/2012    Intensify medical therapy   • CARDIAC CATHETERIZATION Left 05/05/2002    surgery   • CHOLECYSTECTOMY     • COLONOSCOPY  02/10/2012   • COLONOSCOPY N/A 1/31/2018    Procedure: COLONOSCOPY WITH ANESTHESIA;  Surgeon: Patricia Bo MD;  Location: [facility] ENDOSCOPY;  Service:    • CORONARY ARTERY BYPASS GRAFT  05/05/2002    LIMA to LAD, SVG to D1, SVG to OM1 - x3   • CORONARY STENT PLACEMENT  09/2009    X1 - Stent to LAD, Drug Eluting   • ENDOSCOPY N/A 1/31/2018    Procedure: ESOPHAGOGASTRODUODENOSCOPY WITH ANESTHESIA;  Surgeon: Patricia Bo MD;  Location: [facility] ENDOSCOPY;  Service:    • GALLBLADDER SURGERY     • REPLACEMENT TOTAL KNEE Right    • UPPER GASTROINTESTINAL ENDOSCOPY  03/23/2015     Social History     Social History   • Marital status:      Spouse name: N/A   • Number of children: N/A   • Years of education: N/A     Occupational History   • Not on file.     Social History  Main Topics   • Smoking status: Former Smoker     Years: 20.00     Types: Cigarettes     Quit date: 1/31/2000   • Smokeless tobacco: Never Used   • Alcohol use No   • Drug use: No   • Sexual activity: Defer     Other Topics Concern   • Not on file     Social History Narrative   • No narrative on file     Family History   Problem Relation Age of Onset   • Cancer Father    • Coronary artery disease Father    • Coronary artery disease Brother    • Colon polyps Neg Hx    • Colon cancer Neg Hx    • Esophageal cancer Neg Hx    • Liver cancer Neg Hx    • Liver disease Neg Hx    • Rectal cancer Neg Hx    • Stomach cancer Neg Hx        Labs:  WBC WBC   Date Value Ref Range Status   07/30/2018 8.56 4.80 - 10.80 10*3/mm3 Final      HGB Hemoglobin   Date Value Ref Range Status   07/30/2018 14.7 12.0 - 16.0 g/dL Final      HCT Hematocrit   Date Value Ref Range Status   07/30/2018 45.7 37.0 - 47.0 % Final      Platelets Platelets   Date Value Ref Range Status   07/30/2018 228 130 - 400 10*3/mm3 Final      MCV MCV   Date Value Ref Range Status   07/30/2018 91.6 82.0 - 98.0 fL Final        Results from last 7 days  Lab Units 07/30/18  1530   SODIUM mmol/L 141   POTASSIUM mmol/L 4.1   CHLORIDE mmol/L 104   CO2 mmol/L 25.0   BUN mg/dL 14   CREATININE mg/dL 0.73   CALCIUM mg/dL 9.0   BILIRUBIN mg/dL 0.9   ALK PHOS U/L 70   ALT (SGPT) U/L 42   AST (SGOT) U/L 40   GLUCOSE mg/dL 195*     Lab Results   Component Value Date    CKMB 0.42 03/21/2016    TROPONINI <0.012 07/30/2018     PT/INR:    Protime   Date Value Ref Range Status   07/30/2018 13.1 11.9 - 14.6 Seconds Final   /  INR   Date Value Ref Range Status   07/30/2018 0.96 0.91 - 1.09 Final       Imaging Results (last 72 hours)     Procedure Component Value Units Date/Time    XR Chest 1 View [[id number]] Collected:  07/30/18 1732     Updated:  07/30/18 1735    Narrative:       EXAMINATION: Chest 1 view 7/30/2018     HISTORY: CHF/COPD protocol     FINDINGS: Today's exam is compared to  previous study of 6/5/2018. Lungs  are fully expanded and clear. The patient's undergone prior median  sternotomy. There is no effusion or free air present.       Impression:       . No active disease.  This report was finalized on 07/30/2018 17:32 by Dr. Ignacio Perkins MD.          Objective     Allergies   Allergen Reactions   • Codeine Other (See Comments)     Chest pain   • Albuterol Palpitations     High heart rate   • Augmentin [Amoxicillin-Pot Clavulanate] Palpitations   • Sulfa Antibiotics Hives       Medication Review: Performed  Current Facility-Administered Medications   Medication Dose Route Frequency Provider Last Rate Last Dose   • acetaminophen (TYLENOL) tablet 650 mg  650 mg Oral Q6H PRN Jagdeep Reyes MD   650 mg at 07/31/18 0129   • amiodarone (PACERONE) tablet 200 mg  200 mg Oral Daily Matt Johnson MD   200 mg at 07/31/18 1005   • apixaban (ELIQUIS) tablet 5 mg  5 mg Oral Q12H Matt Johnson MD   5 mg at 07/31/18 2016   • aspirin EC tablet 81 mg  81 mg Oral Daily Matt Johnson MD   81 mg at 07/31/18 1005   • atorvastatin (LIPITOR) tablet 20 mg  20 mg Oral Daily Matt Johnson MD   20 mg at 07/31/18 1006   • diltiaZEM (CARDIZEM) 125 mg in sodium chloride 0.9 % 125 mL (1 mg/mL) infusion  5-15 mg/hr Intravenous Continuous Pepito Clemente,  5 mL/hr at 07/31/18 1459 5 mg/hr at 07/31/18 1459   • isosorbide mononitrate (IMDUR) 24 hr tablet 30 mg  30 mg Oral Daily Matt Johnson MD   30 mg at 07/31/18 1005   • losartan (COZAAR) tablet 25 mg  25 mg Oral Daily Matt Johnson MD   25 mg at 07/31/18 1005   • metoprolol tartrate (LOPRESSOR) tablet 50 mg  50 mg Oral Q12H Matt Johnson MD   50 mg at 07/31/18 2016   • nitroglycerin (NITROSTAT) SL tablet 0.4 mg  0.4 mg Sublingual Q5 Min PRN Matt Johnson MD       • pantoprazole (PROTONIX) EC tablet 40 mg  40 mg Oral BID AC Matt Johnson MD   40 mg at 07/31/18 1733   • predniSONE (DELTASONE) tablet 4 mg  4 mg Oral Nightly Matt Johnson MD   4  "mg at 07/31/18 2016   • predniSONE (DELTASONE) tablet 5 mg  5 mg Oral Daily Matt Johnson MD   5 mg at 07/31/18 1005   • sodium chloride 0.9 % flush 10 mL  10 mL Intravenous PRN Pepito Clemente,            Vital Sign Min/Max for last 24 hours  Temp  Min: 97 °F (36.1 °C)  Max: 98 °F (36.7 °C)   BP  Min: 95/52  Max: 121/75   Pulse  Min: 63  Max: 129   Resp  Min: 16  Max: 20   SpO2  Min: 92 %  Max: 96 %   No Data Recorded   Weight  Min: 92.1 kg (203 lb 2 oz)  Max: 92.1 kg (203 lb 2 oz)     Flowsheet Rows      First Filed Value   Admission Height  162.6 cm (64\") Documented at 07/30/2018 1511   Admission Weight  91.6 kg (202 lb) Documented at 07/30/2018 1511          Results for orders placed during the hospital encounter of 07/18/18   Adult Transthoracic Echo Complete W/ Cont if Necessary Per Protocol    Narrative · Left ventricular systolic function is normal. Estimated EF = 55%.  · Left ventricular diastolic dysfunction.  · Left atrial cavity size is mildly dilated.  · No evidence of pulmonary hypertension is present.          Physical Exam:    General Appearance: Awake, alert, in no acute distress  Eyes: Pupils equal and reactive    Ears: Appear intact with no abnormalities noted  Nose: Nares normal, no drainage  Neck: supple, trachea midline, no carotid bruit and no JVD  Back: no kyphosis present,    Lungs: respirations regular, respirations even and respirations unlabored  Heart: normal S1, S2,  2/6 pansystolic murmur in the left sternal border,  no rub and no click  tachycardic  Abdomen: normal bowel sounds, no masses, no hepatomegaly, no splenomegaly, guarding and no rebound tenderness   Skin: no bleeding, bruising or rash  Extremities: no cyanosis  Psychiatric/Behavioral: Negative for agitation, behavioral problems, confusion, the patient does  appear to be nervous/anxious.          Results Review:   I reviewed the patient's new clinical results.  I reviewed the patient's new imaging results and agree with " the interpretation.  I reviewed the patient's other test results and agree with the interpretation  I personally viewed and interpreted the patient's EKG/Telemetry data  Discussed with patient, and present family member    Reviewed available prior notes, consults, prior visits, laboratory findings, radiology and cardiology relevant reports. Updated chart as applicable. I have reviewed the patient's medical history in detail and updated the computerized patient record as relevant.    Updated patient regarding any new or relevant abnormalities on review of records or any new findings on physical exam. Mentioned to patient about purpose of visit and desirable health short and long term goals and objectives.     Assessment/Plan     Active Problems:    Atrial flutter (CMS/HCC)  PAF (paroxysmal atrial fibrillation) (CMS/HCC)  Rapid ventricular response  CAD (coronary artery disease)   Atrial flutter (CMS/HCC)   Type 2 diabetes mellitus without complication, without long-term current use of insulin (CMS/HCC)   Mixed hyperlipidemia   S/P CABG x 3 2000 Dr Larsen   Shoulder blade pain   Anemia   Abnormal finding on imaging   History of esophagitis   Essential hypertension   S/P coronary artery stent placement   Gastric polyp   Colon polyps         Plan    Rate controlled  Defer DC cardioversion as anticoagulation duration less than 4 weeks  Hopefully will revert back to sinus rhythm on her own  Continue Eliquis  EP consultation with Dr. De Anda who has seen her before with a view to perform atrial flutter ablation  Supportive care  Telemetry  Optimal medical therapy  Deep vein thrombosis prophylaxis/precautions  Appropriate diet, fluid, sodium, caffeine, stimulants intake   Compliance to diet and medications   Avoid NSAIDS    Jagdeep Reyes MD  07/31/18  9:32 PM    EMR Dragon/Transcription was used to dictate part of this note

## 2018-08-01 NOTE — PLAN OF CARE
Problem: Patient Care Overview  Goal: Plan of Care Review  Outcome: Ongoing (interventions implemented as appropriate)   08/01/18 0302   Coping/Psychosocial   Plan of Care Reviewed With patient   Plan of Care Review   Progress no change   OTHER   Outcome Summary Aflutter. patient still on IV cardizem. Dr. Reyes started IV Digoxin every 6 hours and po daily. Cardizem drip continues at 15mg/hr. Patient stable without compllaints. cont. to monitor. call MD for c/o's or concerns.

## 2018-08-02 LAB
ALBUMIN SERPL-MCNC: 4 G/DL (ref 3.5–5)
ALBUMIN/GLOB SERPL: 1.7 G/DL (ref 1.1–2.5)
ALP SERPL-CCNC: 65 U/L (ref 24–120)
ALT SERPL W P-5'-P-CCNC: 55 U/L (ref 0–54)
ANION GAP SERPL CALCULATED.3IONS-SCNC: 10 MMOL/L (ref 4–13)
AST SERPL-CCNC: 27 U/L (ref 7–45)
BASOPHILS # BLD AUTO: 0.02 10*3/MM3 (ref 0–0.2)
BASOPHILS NFR BLD AUTO: 0.2 % (ref 0–2)
BILIRUB SERPL-MCNC: 2 MG/DL (ref 0.1–1)
BUN BLD-MCNC: 10 MG/DL (ref 5–21)
BUN/CREAT SERPL: 21.7 (ref 7–25)
CALCIUM SPEC-SCNC: 9.1 MG/DL (ref 8.4–10.4)
CHLORIDE SERPL-SCNC: 106 MMOL/L (ref 98–110)
CO2 SERPL-SCNC: 25 MMOL/L (ref 24–31)
CREAT BLD-MCNC: 0.46 MG/DL (ref 0.5–1.4)
DEPRECATED RDW RBC AUTO: 48.8 FL (ref 40–54)
DIGOXIN SERPL-MCNC: 1 NG/ML (ref 0.8–2)
EOSINOPHIL # BLD AUTO: 0.03 10*3/MM3 (ref 0–0.7)
EOSINOPHIL NFR BLD AUTO: 0.4 % (ref 0–4)
ERYTHROCYTE [DISTWIDTH] IN BLOOD BY AUTOMATED COUNT: 14.6 % (ref 12–15)
GFR SERPL CREATININE-BSD FRML MDRD: 138 ML/MIN/1.73
GLOBULIN UR ELPH-MCNC: 2.4 GM/DL
GLUCOSE BLD-MCNC: 191 MG/DL (ref 70–100)
HCT VFR BLD AUTO: 44.5 % (ref 37–47)
HGB BLD-MCNC: 14.6 G/DL (ref 12–16)
IMM GRANULOCYTES # BLD: 0.05 10*3/MM3 (ref 0–0.03)
IMM GRANULOCYTES NFR BLD: 0.6 % (ref 0–5)
LYMPHOCYTES # BLD AUTO: 1.01 10*3/MM3 (ref 0.72–4.86)
LYMPHOCYTES NFR BLD AUTO: 11.9 % (ref 15–45)
MCH RBC QN AUTO: 30 PG (ref 28–32)
MCHC RBC AUTO-ENTMCNC: 32.8 G/DL (ref 33–36)
MCV RBC AUTO: 91.4 FL (ref 82–98)
MONOCYTES # BLD AUTO: 0.49 10*3/MM3 (ref 0.19–1.3)
MONOCYTES NFR BLD AUTO: 5.8 % (ref 4–12)
NEUTROPHILS # BLD AUTO: 6.89 10*3/MM3 (ref 1.87–8.4)
NEUTROPHILS NFR BLD AUTO: 81.1 % (ref 39–78)
NRBC BLD MANUAL-RTO: 0 /100 WBC (ref 0–0)
PLATELET # BLD AUTO: 204 10*3/MM3 (ref 130–400)
PMV BLD AUTO: 10.4 FL (ref 6–12)
POTASSIUM BLD-SCNC: 4.1 MMOL/L (ref 3.5–5.3)
PROT SERPL-MCNC: 6.4 G/DL (ref 6.3–8.7)
RBC # BLD AUTO: 4.87 10*6/MM3 (ref 4.2–5.4)
SODIUM BLD-SCNC: 141 MMOL/L (ref 135–145)
WBC NRBC COR # BLD: 8.49 10*3/MM3 (ref 4.8–10.8)

## 2018-08-02 PROCEDURE — 80053 COMPREHEN METABOLIC PANEL: CPT | Performed by: INTERNAL MEDICINE

## 2018-08-02 PROCEDURE — 80162 ASSAY OF DIGOXIN TOTAL: CPT | Performed by: INTERNAL MEDICINE

## 2018-08-02 PROCEDURE — 63710000001 PREDNISONE PER 5 MG: Performed by: INTERNAL MEDICINE

## 2018-08-02 PROCEDURE — 85025 COMPLETE CBC W/AUTO DIFF WBC: CPT | Performed by: INTERNAL MEDICINE

## 2018-08-02 PROCEDURE — 99233 SBSQ HOSP IP/OBS HIGH 50: CPT | Performed by: INTERNAL MEDICINE

## 2018-08-02 RX ORDER — FUROSEMIDE 20 MG/1
20 TABLET ORAL DAILY
Status: DISCONTINUED | OUTPATIENT
Start: 2018-08-02 | End: 2018-08-03 | Stop reason: HOSPADM

## 2018-08-02 RX ORDER — AMIODARONE HYDROCHLORIDE 200 MG/1
200 TABLET ORAL DAILY
COMMUNITY
End: 2018-08-14 | Stop reason: SDUPTHER

## 2018-08-02 RX ADMIN — VERAPAMIL HYDROCHLORIDE 80 MG: 80 TABLET, FILM COATED ORAL at 20:26

## 2018-08-02 RX ADMIN — VERAPAMIL HYDROCHLORIDE 80 MG: 80 TABLET, FILM COATED ORAL at 05:05

## 2018-08-02 RX ADMIN — PANTOPRAZOLE SODIUM 40 MG: 40 TABLET, DELAYED RELEASE ORAL at 08:23

## 2018-08-02 RX ADMIN — METOPROLOL TARTRATE 50 MG: 50 TABLET ORAL at 08:22

## 2018-08-02 RX ADMIN — FUROSEMIDE 20 MG: 20 TABLET ORAL at 09:12

## 2018-08-02 RX ADMIN — METOPROLOL TARTRATE 50 MG: 50 TABLET ORAL at 20:25

## 2018-08-02 RX ADMIN — AMIODARONE HYDROCHLORIDE 200 MG: 200 TABLET ORAL at 08:23

## 2018-08-02 RX ADMIN — PREDNISONE 4 MG: 1 TABLET ORAL at 20:26

## 2018-08-02 RX ADMIN — VERAPAMIL HYDROCHLORIDE 80 MG: 80 TABLET, FILM COATED ORAL at 13:52

## 2018-08-02 RX ADMIN — PREDNISONE 5 MG: 5 TABLET ORAL at 08:23

## 2018-08-02 RX ADMIN — ACETAMINOPHEN 650 MG: 325 TABLET, FILM COATED ORAL at 20:29

## 2018-08-02 RX ADMIN — LOSARTAN POTASSIUM 25 MG: 25 TABLET ORAL at 08:23

## 2018-08-02 RX ADMIN — ISOSORBIDE MONONITRATE 30 MG: 30 TABLET, EXTENDED RELEASE ORAL at 08:22

## 2018-08-02 RX ADMIN — PANTOPRAZOLE SODIUM 40 MG: 40 TABLET, DELAYED RELEASE ORAL at 16:56

## 2018-08-02 RX ADMIN — ATORVASTATIN CALCIUM 20 MG: 10 TABLET, FILM COATED ORAL at 08:22

## 2018-08-02 RX ADMIN — APIXABAN 5 MG: 5 TABLET, FILM COATED ORAL at 20:26

## 2018-08-02 RX ADMIN — APIXABAN 5 MG: 5 TABLET, FILM COATED ORAL at 08:22

## 2018-08-02 RX ADMIN — DIGOXIN 125 MCG: 0.12 TABLET ORAL at 08:23

## 2018-08-02 RX ADMIN — ASPIRIN 81 MG: 81 TABLET ORAL at 08:22

## 2018-08-02 NOTE — PAYOR COMM NOTE
"8/2/18 [facility]  ATTENTION: NOEL ELDER [phone]  [phone]    FAXING ADDITIONAL CLINICAL    DayTeresa (62 y.o. Female)     Date of Birth Social Security Number Address Home Phone MRN    1955  [address] [address] [phone] [id number]    Yazidi Marital Status          Religion        Admission Date Admission Type Admitting Provider Attending Provider Department, Room/Bed    7/30/18 Emergency Jagdeep Reyes MD Bose, Sanjay, MD [facility] 4B, 401/1    Discharge Date Discharge Disposition Discharge Destination                       Attending Provider:  Jagdeep Reyes MD    Allergies:  Codeine, Albuterol, Augmentin [Amoxicillin-pot Clavulanate], Sulfa Antibiotics    Isolation:  None   Infection:  None   Code Status:  CPR    Ht:  162.6 cm (64\")   Wt:  91.9 kg (202 lb 9.6 oz)    Admission Cmt:  None   Principal Problem:  None                Active Insurance as of 7/30/2018     Primary Coverage     Payor Plan Insurance Group Employer/Plan Group    ALLIANCE COAL ALLIANCE COAL [id number]     Payor Plan Address Payor Plan Phone Number Effective From Effective To    [address] [phone] 1/1/2018     [place] [id number]       Subscriber Name Subscriber Birth Date Member ID       TERESA STERN 1955 [id number]           Secondary Coverage     Payor Plan Insurance Group Employer/Plan Group    MEDICARE MEDICARE A & B      Payor Plan Address Payor Plan Phone Number Effective From Effective To    [address] [phone] 4/1/2017     [facility] [address]       Subscriber Name Subscriber Birth Date Member ID       TERESA STERN 1955 [id number]                 Emergency Contacts      (Rel.) Home Phone Work Phone Mobile Phone    Barbra Moody (Sister) -- -- [phone]    Cyrus Stern (Spouse) [phone] -- [phone]    DaphneJose (Son) -- -- [phone]      ED Provider Notes  Date of Service: 7/30/2018 6:33 PM  Pepito Clemente " [place], DO   Emergency Medicine   Expand All Collapse All    []Manual[]Template  []Copied        [facility]  EMERGENCY DEPARTMENT ENCOUNTER       Pt Name: Teresa Serna  MRN: [id number]  Birthdate 1955  Date of evaluation: 7/30/2018        CHIEF COMPLAINT            Chief Complaint   Patient presents with   • Shortness of Breath   • Rapid Heart Rate         Nurses Notes reviewed and I agree except as noted in the HPI.        HISTORY OF PRESENT ILLNESS    Tereas Serna is a 62 y.o. female who presents To the emergency department complaining of shortness of breath, palpitations, and nausea.  Patient has history of rheumatoid arthritis, SVT, diabetes, and atrial flutter.  Patient is currently wearing a cardiac event monitor.  Patient recently started amiodarone and Eliquis.  Patient sees Dr. Reyes for cardiology.  She denies chest pain, fever, chills, cough.      REVIEW OF SYSTEMS     Review of Systems   All systems reviewed and otherwise negative except as listed above in HPI        PAST MEDICAL HISTORY      Medical History        Past Medical History:   Diagnosis Date   • Abnormal stress test     • Atrial flutter (CMS/HCC)     • CAD (coronary artery disease)     • CAD in native artery       CABG x 3   • Diabetes mellitus (CMS/HCC)     • Essential hypertension       Tricuspid valve insufficiency, unspecified etiology    • Hyperlipemia, mixed     • Hyperlipidemia     • Hypertension     • MI, old     • Mitral valve prolapse     • Near syncope     • Obesity, morbid (CMS/HCC)     • Palpitations     • Paroxysmal SVT (supraventricular tachycardia) (CMS/HCC)     • Paroxysmal SVT (supraventricular tachycardia) (CMS/HCC)     • Pedal edema     • Precordial pain     • Rheumatoid arthritis (CMS/HCC)     • Rheumatoid arthritis (CMS/HCC)     • S/P CABG x 3              SURGICAL HISTORY      has a past surgical history that includes Cardiac catheterization (Left, 06/18/2012); Replacement total knee (Right); Gallbladder  surgery; Appendectomy; Cardiac catheterization (Left, 05/05/2002); Cholecystectomy; Upper gastrointestinal endoscopy (03/23/2015); Colonoscopy (02/10/2012); Esophagogastroduodenoscopy (N/A, 1/31/2018); Colonoscopy (N/A, 1/31/2018); Coronary stent placement (09/2009); and Coronary artery bypass graft (05/05/2002).     CURRENT MEDICATIONS        Medication List      ASK your doctor about these medications    amiodarone 200 MG tablet  Commonly known as:  PACERONE  Take 1 tablet by mouth 2 (Two) Times a Day.     apixaban 5 MG tablet tablet  Commonly known as:  ELIQUIS  Take 1 tablet by mouth Every 12 (Twelve) Hours.     aspirin 81 MG EC tablet     ferrous sulfate 325 (65 FE) MG tablet  Take 1 tablet by mouth Daily With Breakfast.     folic acid 1 MG tablet  Commonly known as:  FOLVITE     furosemide 20 MG tablet  Commonly known as:  LASIX     glyBURIDE 5 MG tablet  Commonly known as:  DIAbeta     isosorbide mononitrate 30 MG 24 hr tablet  Commonly known as:  IMDUR     losartan 25 MG tablet  Commonly known as:  COZAAR     metFORMIN 1000 MG tablet  Commonly known as:  GLUCOPHAGE     methotrexate 2.5 MG tablet     metoprolol tartrate 50 MG tablet  Commonly known as:  LOPRESSOR     nitroglycerin 0.4 MG SL tablet  Commonly known as:  NITROSTAT     pantoprazole 40 MG EC tablet  Commonly known as:  PROTONIX  Take 1 tablet by mouth Every 12 (Twelve) Hours.     potassium chloride 10 MEQ CR tablet  Commonly known as:  K-DUR     * predniSONE 5 MG tablet  Commonly known as:  DELTASONE     * predniSONE 1 MG tablet  Commonly known as:  DELTASONE     simvastatin 40 MG tablet  Commonly known as:  ZOCOR     XELJANZ XR 11 MG tablet sustained-release 24 hour  Generic drug:  Tofacitinib Citrate        * This list has 2 medication(s) that are the same as other medications   prescribed for you. Read the directions carefully, and ask your doctor or   other care provider to review them with you.               ALLERGIES     is allergic to  "codeine; albuterol; augmentin [amoxicillin-pot clavulanate]; and sulfa antibiotics.     FAMILY HISTORY     indicated that her mother is alive. She indicated that her father is alive. She indicated that the status of her brother is unknown. She indicated that the status of her neg hx is unknown.    family history includes Cancer in her father; Coronary artery disease in her brother and father.     SOCIAL HISTORY      reports that she quit smoking about 18 years ago. Her smoking use included Cigarettes. She quit after 20.00 years of use. She has never used smokeless tobacco. She reports that she does not drink alcohol or use drugs.     PHYSICAL EXAM     INITIAL VITALS:  height is 162.6 cm (64\") and weight is 92.2 kg (203 lb 3.2 oz). Her temporal artery  temperature is 97.4 °F (36.3 °C). Her blood pressure is 132/79 and her pulse is 132 (abnormal). Her respiration is 24 and oxygen saturation is 95%.    Physical Exam     CONSTITUTIONAL: Well developed, well nourished, not diaphoretic mildly distressed  HENT: Normocephalic, atraumatic, oropharynx clear and moist  EYES: PERRL, EOM normal, no discharge, no scleral icterus  NECK: ROM normal, supple, no tracheal deviation nor JVD, no stridor  CARDIOVASCULAR: Tachycardic rate, normal rhythm, heart sounds normal, no rub no gallop, intact distal pulses, normal cap refill  PULMONARY: Normal effort and breath sounds, no distress, no wheezes, rhonchi or rales, no chest tenderness  ABDOMINAL: Soft, nontender, no guarding, no mass, no rebound, no hernia  GENITOURINARY/ANORECTAL: deferred  MUSCULOSKELETAL: ROM normal, no tenderness nor deformity, no edema  NEUROLOGICAL: Alert, oriented x 3, DTRS normal, CN x 10 normal, normal tone  SKIN: Warm, dry, no erythema, no rash, normal color  PSYCH: Mood and affect normal, behavior normal, thought content and judgement normal.     DIAGNOSTIC RESULTS      EKG: All EKG's are interpreted by the Emergency Department Physician who either signs or " Co-signs this chart in the absence of a cardiologist.     EKG performed at 1508 shows atrial flutter with 2-1 AV conduction.  Rate of 127 bpm, normal axis, nonspecific ST segments and T waves     EKG performed at 1541 shows no acute changes from EKG performed at 1508              RADIOLOGY: non-plain film images(s) such as CT, Ultrasound and MRI are read by the radiologist.  Plain radiographic images are visualized and preliminarily interpreted by the emergency physician unless otherwise stated below.  Xr Chest 1 View     Result Date: 7/30/2018  . No active disease. This report was finalized on 07/30/2018 17:32 by Dr. Ignacio Perkins MD.                 LABS:           Lab Results (last 24 hours)      Procedure Component Value Units Date/Time     CBC & Differential [[id number]] Collected:  07/30/18 1530     Specimen:  Blood Updated:  07/30/18 1545     Narrative:        The following orders were created for panel order CBC & Differential.  Procedure                               Abnormality         Status                     ---------                               -----------         ------                     CBC Auto Differential[[id number]]        Abnormal            Final result                  Please view results for these tests on the individual orders.     Comprehensive Metabolic Panel [[id number]]  (Abnormal) Collected:  07/30/18 1530     Specimen:  Blood Updated:  07/30/18 1623       Glucose 195 (H) mg/dL         BUN 14 mg/dL         Comment: Specimen hemolyzed. Results may be affected.          Creatinine 0.73 mg/dL         Sodium 141 mmol/L         Potassium 4.1 mmol/L         Comment: Specimen hemolyzed.  Results may be affected.          Chloride 104 mmol/L         CO2 25.0 mmol/L         Calcium 9.0 mg/dL         Total Protein 6.8 g/dL         Albumin 4.20 g/dL         ALT (SGPT) 42 U/L         Comment: Specimen hemolyzed.  Results may be affected.          AST (SGOT) 40 U/L         Comment: Specimen  hemolyzed.  Results may be affected.          Alkaline Phosphatase 70 U/L         Comment: Specimen hemolyzed. Results may be affected.          Total Bilirubin 0.9 mg/dL         eGFR Non African Amer 81 mL/min/1.73         Globulin 2.6 gm/dL         A/G Ratio 1.6 g/dL         BUN/Creatinine Ratio 19.2       Anion Gap 12.0 mmol/L       BNP [[id number]]  (Normal) Collected:  07/30/18 1530     Specimen:  Blood Updated:  07/30/18 1607       proBNP 563.0 pg/mL       Troponin [[id number]]  (Normal) Collected:  07/30/18 1530     Specimen:  Blood Updated:  07/30/18 1607       Troponin I <0.012 ng/mL       Protime-INR [[id number]]  (Normal) Collected:  07/30/18 1530     Specimen:  Blood Updated:  07/30/18 1606       Protime 13.1 Seconds         INR 0.96     CBC Auto Differential [[id number]]  (Abnormal) Collected:  07/30/18 1530     Specimen:  Blood Updated:  07/30/18 1545       WBC 8.56 10*3/mm3         RBC 4.99 10*6/mm3         Hemoglobin 14.7 g/dL         Hematocrit 45.7 %         MCV 91.6 fL         MCH 29.5 pg         MCHC 32.2 (L) g/dL         RDW 14.6 %         RDW-SD 48.9 fl         MPV 10.8 fL         Platelets 228 10*3/mm3         Neutrophil % 78.6 (H) %         Lymphocyte % 15.3 %         Monocyte % 4.6 %         Eosinophil % 0.2 %         Basophil % 0.5 %         Immature Grans % 0.8 %         Neutrophils, Absolute 6.73 10*3/mm3         Lymphocytes, Absolute 1.31 10*3/mm3         Monocytes, Absolute 0.39 10*3/mm3         Eosinophils, Absolute 0.02 10*3/mm3         Basophils, Absolute 0.04 10*3/mm3         Immature Grans, Absolute 0.07 (H) 10*3/mm3         nRBC 0.0 /100 WBC       TSH [[id number]]  (Normal) Collected:  07/30/18 1530     Specimen:  Blood Updated:  07/30/18 1908       TSH 1.760 mIU/mL       Troponin [[id number]]  (Normal) Collected:  07/30/18 1859     Specimen:  Blood Updated:  07/30/18 1929       Troponin I <0.012 ng/mL       T4 [[id number]] Collected:  07/30/18 1859     Specimen:  Blood Updated:   07/30/18 1902             EMERGENCY DEPARTMENT COURSE:   Vitals:    Vitals          Vitals:     07/30/18 1846 07/30/18 1901 07/30/18 1916 07/30/18 2015   BP: 123/70 106/67 119/76 132/79   BP Location:       Left arm   Patient Position:       Sitting   Pulse: (!) 128 (!) 127 (!) 128 (!) 132   Resp:       24   Temp:       97.4 °F (36.3 °C)   TempSrc:       Temporal Artery    SpO2: 94% 94% 93% 95%   Weight:       92.2 kg (203 lb 3.2 oz)   Height:                       The patient was given the following medications:  Medications   sodium chloride 0.9 % flush 10 mL (not administered)   diltiaZEM (CARDIZEM) 125 mg in sodium chloride 0.9 % 125 mL (1 mg/mL) infusion (15 mg/hr Intravenous Rate/Dose Change 7/30/18 1717)   amiodarone (PACERONE) tablet 200 mg (not administered)   apixaban (ELIQUIS) tablet 5 mg (5 mg Oral Given 7/30/18 2144)   aspirin EC tablet 81 mg (not administered)   isosorbide mononitrate (IMDUR) 24 hr tablet 30 mg (not administered)   losartan (COZAAR) tablet 25 mg (not administered)   metoprolol tartrate (LOPRESSOR) tablet 50 mg (50 mg Oral Given 7/30/18 2147)   nitroglycerin (NITROSTAT) SL tablet 0.4 mg (not administered)   pantoprazole (PROTONIX) EC tablet 40 mg (not administered)   predniSONE (DELTASONE) tablet 4 mg (4 mg Oral Not Given 7/30/18 2144)   predniSONE (DELTASONE) tablet 5 mg (not administered)   atorvastatin (LIPITOR) tablet 20 mg (not administered)   adenosine (ADENOCARD) injection 6 mg (6 mg Intravenous Given 7/30/18 1338)   adenosine (ADENOCARD) injection 12 mg (12 mg Intravenous Given 7/30/18 1544)   sodium chloride 0.9 % bolus 1,000 mL (0 mL Intravenous Stopped 7/30/18 1749)   diltiazem (CARDIZEM) bolus from bag 1 mg/mL 20 mg (20 mg Intravenous Bolus from Bag 7/30/18 1625)   diphenhydrAMINE (BENADRYL) injection 25 mg (25 mg Intravenous Given 7/30/18 1749)   metoclopramide (REGLAN) injection 10 mg (10 mg Intravenous Given 7/30/18 1749)                Patient with history of atrial  flutter and SVT currently on amiodarone and Eliquis presents with shortness of breath, palpitations, and nausea.  EKG looked mostly like atrial flutter but could not rule out SVT.  Patient received 6 mg of adenosine followed by 12 mg of adenosine.  On the rhythm strip the patient's heart rate slowed down enough to show that she is in fact in atrial flutter.  She received Cardizem bolus and drip.  Labs show hyperglycemia.  Chest x-ray is unremarkable.  I spoke to Dr. Johnson who agrees that the patient should be admitted to the hospital and he would like the patient admitted to Dr. Reyes.  Patient is comfortable at time of mission and agreeable with plan of care.        CRITICAL CARE:  none     CONSULTS:  Cardiology     PROCEDURES:  None     FINAL IMPRESSION       1. Atrial flutter, unspecified type (CMS/HCC)    2. Dyspnea, unspecified type    3. Palpitations    4. Nausea           DISPOSITION/PLAN   Admit        PATIENT REFERRED TO:  No follow-up provider specified.     DISCHARGE MEDICATIONS:     Medication List      ASK your doctor about these medications    amiodarone 200 MG tablet  Commonly known as:  PACERONE  Take 1 tablet by mouth 2 (Two) Times a Day.     apixaban 5 MG tablet tablet  Commonly known as:  ELIQUIS  Take 1 tablet by mouth Every 12 (Twelve) Hours.     aspirin 81 MG EC tablet     ferrous sulfate 325 (65 FE) MG tablet  Take 1 tablet by mouth Daily With Breakfast.     folic acid 1 MG tablet  Commonly known as:  FOLVITE     furosemide 20 MG tablet  Commonly known as:  LASIX     glyBURIDE 5 MG tablet  Commonly known as:  DIAbeta     isosorbide mononitrate 30 MG 24 hr tablet  Commonly known as:  IMDUR     losartan 25 MG tablet  Commonly known as:  COZAAR     metFORMIN 1000 MG tablet  Commonly known as:  GLUCOPHAGE     methotrexate 2.5 MG tablet     metoprolol tartrate 50 MG tablet  Commonly known as:  LOPRESSOR     nitroglycerin 0.4 MG SL tablet  Commonly known as:  NITROSTAT     pantoprazole 40 MG EC  tablet  Commonly known as:  PROTONIX  Take 1 tablet by mouth Every 12 (Twelve) Hours.     potassium chloride 10 MEQ CR tablet  Commonly known as:  K-DUR     * predniSONE 5 MG tablet  Commonly known as:  DELTASONE     * predniSONE 1 MG tablet  Commonly known as:  DELTASONE     simvastatin 40 MG tablet  Commonly known as:  ZOCOR     XELJANZ XR 11 MG tablet sustained-release 24 hour  Generic drug:  Tofacitinib Citrate        * This list has 2 medication(s) that are the same as other medications   prescribed for you. Read the directions carefully, and ask your doctor or   other care provider to review them with you.               (Please note that portions of this note were completed with a voice recognition program.)     DO Pepito Vizcaino DO  07/30/18 [id number]         H&P  Date of Service: 7/31/2018 7:50 AM  Jagdeep Reyes MD   Cardiology   Expand All Collapse All    []Manual[]Template  []Copied      LOS: 1 day   Patient Care Team:  Teresa Contreras MD as PCP - General  Teresa Contreras MD as PCP - Family Medicine  Jagdeep Reyes MD as Cardiologist (Cardiology)     Chief Complaint: Active Problems:    Atrial flutter (CMS/HCC)     Presenting complaints: Palpitations  Extremely pleasant 62-year-old  lady with history of paroxysmal atrial flutter/fibrillation who was recently admitted to the hospital and then discharged and has been on Eliquis since approximately July 18  Was doing well until approximately 9 AM yesterday started complaining of rapid heart rate.  Experience palpitations.  No chest pain  Mild shortness of breath  No cough or phlegm or fever  Currently feels better with atrial flutter with ventricular rates 1 20-1 30 bpm  Continues on intravenous Cardizem and continues on prior oral medications  No presyncope or syncope  Compliant with current prescribed at occasions  No significant dietary indiscretion including excessive consumption of high sodium  containing food or drink items or intake of any cardiac stimulants     Telemetry: no malignant arrhythmia. No significant pauses.atrial flutter with rapid ventricular response     Review of Systems   Constitutional: No chills   Has fatigue   No fever.   HENT: Negative.    Eyes: Negative.    Respiratory: Negative for cough,   No chest wall soreness,   Shortness of breath,   no wheezing, no stridor.    Cardiovascular: Negative for chest pain,   Positive for palpitations   No significant  leg swelling.   Gastrointestinal: Negative for abdominal distention,  No abdominal pain,   No blood in stool,   No constipation,   No diarrhea,   No nausea   No vomiting.   Endocrine: Negative.    Genitourinary: Negative for difficulty urinating, dysuria, flank pain and hematuria.   Musculoskeletal: Negative.    Skin: Negative for rash and wound.   Allergic/Immunologic: Negative.    Neurological: Negative for dizziness, syncope, weakness,   No light-headedness  No  headaches.   Hematological: Does not bruise/bleed easily.   Psychiatric/Behavioral: Negative for agitation or behavioral problems,   No confusion,   the patient is  nervous/anxious.        History:   Medical History        Past Medical History:   Diagnosis Date   • Abnormal stress test     • Atrial flutter (CMS/HCC)     • CAD (coronary artery disease)     • CAD in native artery       CABG x 3   • Diabetes mellitus (CMS/HCC)     • Essential hypertension       Tricuspid valve insufficiency, unspecified etiology    • Hyperlipemia, mixed     • Hyperlipidemia     • Hypertension     • MI, old     • Mitral valve prolapse     • Near syncope     • Obesity, morbid (CMS/HCC)     • Palpitations     • Paroxysmal SVT (supraventricular tachycardia) (CMS/HCC)     • Paroxysmal SVT (supraventricular tachycardia) (CMS/HCC)     • Pedal edema     • Precordial pain     • Rheumatoid arthritis (CMS/HCC)     • Rheumatoid arthritis (CMS/HCC)     • S/P CABG x 3           Surgical History         Past  Surgical History:   Procedure Laterality Date   • APPENDECTOMY       • CARDIAC CATHETERIZATION Left 06/18/2012     Intensify medical therapy   • CARDIAC CATHETERIZATION Left 05/05/2002     surgery   • CHOLECYSTECTOMY       • COLONOSCOPY   02/10/2012   • COLONOSCOPY N/A 1/31/2018     Procedure: COLONOSCOPY WITH ANESTHESIA;  Surgeon: Patricia Bo MD;  Location: [facility] ENDOSCOPY;  Service:    • CORONARY ARTERY BYPASS GRAFT   05/05/2002     LIMA to LAD, SVG to D1, SVG to OM1 - x3   • CORONARY STENT PLACEMENT   09/2009     X1 - Stent to LAD, Drug Eluting   • ENDOSCOPY N/A 1/31/2018     Procedure: ESOPHAGOGASTRODUODENOSCOPY WITH ANESTHESIA;  Surgeon: Patricia Bo MD;  Location: [facility] ENDOSCOPY;  Service:    • GALLBLADDER SURGERY       • REPLACEMENT TOTAL KNEE Right     • UPPER GASTROINTESTINAL ENDOSCOPY   03/23/2015         Social History   Social History            Social History   • Marital status:        Spouse name: N/A   • Number of children: N/A   • Years of education: N/A          Occupational History   • Not on file.            Social History Main Topics   • Smoking status: Former Smoker       Years: 20.00       Types: Cigarettes       Quit date: 1/31/2000   • Smokeless tobacco: Never Used   • Alcohol use No   • Drug use: No   • Sexual activity: Defer           Other Topics Concern   • Not on file          Social History Narrative   • No narrative on file               Family History   Problem Relation Age of Onset   • Cancer Father     • Coronary artery disease Father     • Coronary artery disease Brother     • Colon polyps Neg Hx     • Colon cancer Neg Hx     • Esophageal cancer Neg Hx     • Liver cancer Neg Hx     • Liver disease Neg Hx     • Rectal cancer Neg Hx     • Stomach cancer Neg Hx           Labs:  WBC       WBC   Date Value Ref Range Status   07/30/2018 8.56 4.80 - 10.80 10*3/mm3 Final       HGB       Hemoglobin   Date Value Ref Range Status   07/30/2018 14.7 12.0 - 16.0 g/dL Final        HCT       Hematocrit   Date Value Ref Range Status   07/30/2018 45.7 37.0 - 47.0 % Final       Platelets       Platelets   Date Value Ref Range Status   07/30/2018 228 130 - 400 10*3/mm3 Final       MCV       MCV   Date Value Ref Range Status   07/30/2018 91.6 82.0 - 98.0 fL Final          Results from last 7 days  Lab Units 07/30/18  1530   SODIUM mmol/L 141   POTASSIUM mmol/L 4.1   CHLORIDE mmol/L 104   CO2 mmol/L 25.0   BUN mg/dL 14   CREATININE mg/dL 0.73   CALCIUM mg/dL 9.0   BILIRUBIN mg/dL 0.9   ALK PHOS U/L 70   ALT (SGPT) U/L 42   AST (SGOT) U/L 40   GLUCOSE mg/dL 195*            Lab Results   Component Value Date     CKMB 0.42 03/21/2016     TROPONINI <0.012 07/30/2018      PT/INR:          Protime   Date Value Ref Range Status   07/30/2018 13.1 11.9 - 14.6 Seconds Final   /        INR   Date Value Ref Range Status   07/30/2018 0.96 0.91 - 1.09 Final                 Imaging Results (last 72 hours)      Procedure Component Value Units Date/Time     XR Chest 1 View [[id number]] Collected:  07/30/18 1732       Updated:  07/30/18 1735     Narrative:        EXAMINATION: Chest 1 view 7/30/2018     HISTORY: CHF/COPD protocol     FINDINGS: Today's exam is compared to previous study of 6/5/2018. Lungs  are fully expanded and clear. The patient's undergone prior median  sternotomy. There is no effusion or free air present.        Impression:        . No active disease.  This report was finalized on 07/30/2018 17:32 by Dr. Ignacio Perkins MD.                Objective               Allergies   Allergen Reactions   • Codeine Other (See Comments)       Chest pain   • Albuterol Palpitations       High heart rate   • Augmentin [Amoxicillin-Pot Clavulanate] Palpitations   • Sulfa Antibiotics Hives         Medication Review: Performed  Current Medications             Current Facility-Administered Medications   Medication Dose Route Frequency Provider Last Rate Last Dose   • acetaminophen (TYLENOL) tablet 650 mg  650 mg  "Oral Q6H PRN Jagdeep Reyes MD   650 mg at 07/31/18 0129   • amiodarone (PACERONE) tablet 200 mg  200 mg Oral Daily Matt Johnson MD   200 mg at 07/31/18 1005   • apixaban (ELIQUIS) tablet 5 mg  5 mg Oral Q12H Matt Johnson MD   5 mg at 07/31/18 2016   • aspirin EC tablet 81 mg  81 mg Oral Daily Matt Johnson MD   81 mg at 07/31/18 1005   • atorvastatin (LIPITOR) tablet 20 mg  20 mg Oral Daily Matt Johnson MD   20 mg at 07/31/18 1006   • diltiaZEM (CARDIZEM) 125 mg in sodium chloride 0.9 % 125 mL (1 mg/mL) infusion  5-15 mg/hr Intravenous Continuous Pepito Clemente, DO 5 mL/hr at 07/31/18 1459 5 mg/hr at 07/31/18 1459   • isosorbide mononitrate (IMDUR) 24 hr tablet 30 mg  30 mg Oral Daily Matt Johnson MD   30 mg at 07/31/18 1005   • losartan (COZAAR) tablet 25 mg  25 mg Oral Daily Matt Johnson MD   25 mg at 07/31/18 1005   • metoprolol tartrate (LOPRESSOR) tablet 50 mg  50 mg Oral Q12H Matt Johnson MD   50 mg at 07/31/18 2016   • nitroglycerin (NITROSTAT) SL tablet 0.4 mg  0.4 mg Sublingual Q5 Min PRN Matt Johnson MD       • pantoprazole (PROTONIX) EC tablet 40 mg  40 mg Oral BID AC Matt Johnson MD   40 mg at 07/31/18 1733   • predniSONE (DELTASONE) tablet 4 mg  4 mg Oral Nightly Matt Johnson MD   4 mg at 07/31/18 2016   • predniSONE (DELTASONE) tablet 5 mg  5 mg Oral Daily Matt Johnson MD   5 mg at 07/31/18 1005   • sodium chloride 0.9 % flush 10 mL  10 mL Intravenous PRN Pepito Clemente, DO                Vital Sign Min/Max for last 24 hours  Temp  Min: 97 °F (36.1 °C)  Max: 98 °F (36.7 °C)   BP  Min: 95/52  Max: 121/75   Pulse  Min: 63  Max: 129   Resp  Min: 16  Max: 20   SpO2  Min: 92 %  Max: 96 %   No Data Recorded   Weight  Min: 92.1 kg (203 lb 2 oz)  Max: 92.1 kg (203 lb 2 oz)          Flowsheet Rows      First Filed Value   Admission Height  162.6 cm (64\") Documented at 07/30/2018 1511   Admission Weight  91.6 kg (202 lb) Documented at 07/30/2018 1511                  Results " for orders placed during the hospital encounter of 07/18/18   Adult Transthoracic Echo Complete W/ Cont if Necessary Per Protocol     Narrative · Left ventricular systolic function is normal. Estimated EF = 55%.  · Left ventricular diastolic dysfunction.  · Left atrial cavity size is mildly dilated.  · No evidence of pulmonary hypertension is present.            Physical Exam:     General Appearance: Awake, alert, in no acute distress  Eyes: Pupils equal and reactive    Ears: Appear intact with no abnormalities noted  Nose: Nares normal, no drainage  Neck: supple, trachea midline, no carotid bruit and no JVD  Back: no kyphosis present,    Lungs: respirations regular, respirations even and respirations unlabored  Heart: normal S1, S2,  2/6 pansystolic murmur in the left sternal border,  no rub and no click  tachycardic  Abdomen: normal bowel sounds, no masses, no hepatomegaly, no splenomegaly, guarding and no rebound tenderness   Skin: no bleeding, bruising or rash  Extremities: no cyanosis  Psychiatric/Behavioral: Negative for agitation, behavioral problems, confusion, the patient does  appear to be nervous/anxious.                      Results Review:   I reviewed the patient's new clinical results.  I reviewed the patient's new imaging results and agree with the interpretation.  I reviewed the patient's other test results and agree with the interpretation  I personally viewed and interpreted the patient's EKG/Telemetry data  Discussed with patient, and present family member     Reviewed available prior notes, consults, prior visits, laboratory findings, radiology and cardiology relevant reports. Updated chart as applicable. I have reviewed the patient's medical history in detail and updated the computerized patient record as relevant.    Updated patient regarding any new or relevant abnormalities on review of records or any new findings on physical exam. Mentioned to patient about purpose of visit and desirable  health short and long term goals and objectives.         Assessment/Plan         Active Problems:    Atrial flutter (CMS/HCC)  PAF (paroxysmal atrial fibrillation) (CMS/HCC)  Rapid ventricular response  CAD (coronary artery disease)   Atrial flutter (CMS/HCC)   Type 2 diabetes mellitus without complication, without long-term current use of insulin (CMS/HCC)   Mixed hyperlipidemia   S/P CABG x 3 2000 Dr Larsen   Shoulder blade pain   Anemia   Abnormal finding on imaging   History of esophagitis   Essential hypertension   S/P coronary artery stent placement   Gastric polyp   Colon polyps            Plan     Rate controlled  Defer DC cardioversion as anticoagulation duration less than 4 weeks  Hopefully will revert back to sinus rhythm on her own  Continue Eliquis  EP consultation with Dr. De Anda who has seen her before with a view to perform atrial flutter ablation  Supportive care  Telemetry  Optimal medical therapy  Deep vein thrombosis prophylaxis/precautions  Appropriate diet, fluid, sodium, caffeine, stimulants intake   Compliance to diet and medications   Avoid NSAIDS     Jagdeep Reyes MD  07/31/18  9:32 PM     EMR Dragon/Transcription was used to dictate part of this note              Plan of Care  Date of Service: 8/1/2018 3:07 AM  Lucy Mejia RN   Cardiology      Problem: Patient Care Overview  Goal: Plan of Care Review  Outcome: Ongoing (interventions implemented as appropriate)    08/01/18 0302   Coping/Psychosocial   Plan of Care Reviewed With patient   Plan of Care Review   Progress no change   OTHER   Outcome Summary Aflutter. patient still on IV cardizem. Dr. Reyes started IV Digoxin every 6 hours and po daily. Cardizem drip continues at 15mg/hr. Patient stable without compllaints. cont. to monitor. call MD for c/o's or concerns.                     Plan of Care  Date of Service: 8/2/2018 4:52 AM  Lucy Mejia RN   Cardiology      Problem: Arrhythmia/Dysrhythmia (Symptomatic) (Adult)  Goal: Signs  "and Symptoms of Listed Potential Problems Will be Absent, Minimized or Managed (Arrhythmia/Dysrhythmia)  Outcome: Ongoing (interventions implemented as appropriate)        Problem: Patient Care Overview  Goal: Plan of Care Review  Outcome: Ongoing (interventions implemented as appropriate)    08/02/18 4673   OTHER   Outcome Summary Patient afl 60's-130's. cardizem at 15. started verapamil continue to monitor and report any concerns.                     Treesa Serna #[id number] (CSN:[id number])  (62 y.o. F)  (Adm: 07/30/18)   [facility] [id number]   Comment      Last edited by  on  at    Attending Provider: Jagdeep Reyes MD    Allergies: Codeine, Albuterol, Augmentin [Amoxicillin-pot Clavulanate], Sulfa Antibiotics    Isolation: (none)   Code Status: CPR    Ht: 162.6 cm (64\")   Wt: 91.9 kg (202 lb 9.6 oz)   Admission Wt: 91.6 kg (202 lb)    Admission Cmt: None         Treatment Team     Treatment Team   Provider Role Specialty From To   Jagdeep Reyes MD Attending Provider Cardiology 07/30/18 2014 -   Alyssa Garcia, RN Registered Nurse  - 08/02/18 0726 -   Krystal Jones Technician  - 08/02/18 0653 -   Aurora Silva, RRT Respiratory Therapist  Respiratory Therapy 08/02/18 0545 -   Eliu Reddy Technician  - 08/01/18 1847 -   Silvia Sanchez, RRT Respiratory Therapist  Respiratory Therapy 08/01/18 1754 -   Kendal Keller, LPN   - 08/01/18 1049 -   Natalie Siegel, MSW    07/31/18 0802 -   Lucy Mejai, RN Charge Nurse  - 07/30/18 2020 -   Sonja Cardona, RN Case Manager  - 07/30/18 1836 -   Orders to Be Acknowledged   Comment      Last edited by  on  at    None   Length of Stay     Actual Admission Date    3 days 07/30/2018    BestPractice Advisories     Click to view active BestPractice Advisories      Treatment Team Sticky Notes   Comment      Last edited by  on  at     Sticky Notes to Physicians   Comment   Patient takes Lasix 20mg oral daily at home and she has " not been getting it here.  Is there a reason that it was not restarted? She would like to have it in the morning because her ankles are starting to swell a little.  She does not want to take it tonight.    It was listed on her home med list.  Please advise.   Last edited by Lucy Mejia RN on 08/01/18 at 2240          Medical Problems   Comment      Last edited by  on  at    Hospital Problem List   Date Reviewed: 7/21/2018      Codes Priority Class Noted POA   Atrial flutter (CMS/HCC) ICD-10-CM: I48.92  ICD-9-CM: 427.32   Unknown Yes      Non-Hospital Problem List   Date Reviewed: 7/21/2018      Codes Priority Class Noted   CAD (coronary artery disease) ICD-10-CM: I25.10  ICD-9-CM: 414.00   Unknown   Type 2 diabetes mellitus without complication, without long-term current use of insulin (CMS/HCC) ICD-10-CM: E11.9  ICD-9-CM: 250.00   11/8/2016   Mixed hyperlipidemia ICD-10-CM: E78.2  ICD-9-CM: 272.2   11/8/2016   S/P CABG x 3 2000 Dr Larsen ICD-10-CM: Z95.1  ICD-9-CM: V45.81   9/28/2017   Shoulder blade pain ICD-10-CM: M89.8X1  ICD-9-CM: 733.90   10/22/2017   Anemia ICD-10-CM: D64.9  ICD-9-CM: 285.9   11/13/2017   Abnormal finding on imaging ICD-10-CM: R93.8  ICD-9-CM: 793.99   11/13/2017   History of esophagitis ICD-10-CM: Z87.19  ICD-9-CM: V12.79   11/13/2017   Overview Signed 11/13/2017  2:40 PM by Etelvina Castañeda APRN   Added automatically from request for surgery [id number]   Essential hypertension ICD-10-CM: I10  ICD-9-CM: 401.9   11/28/2017   S/P coronary artery stent placement ICD-10-CM: Z95.5  ICD-9-CM: V45.82   1/3/2018   Gastric polyp ICD-10-CM: K31.7  ICD-9-CM: 211.1   1/3/2018   Overview Signed 1/31/2018  9:16 AM by Patricia Bo MD   This is actively oozing on endoscopy in January 2018.  It was removed completely with snare cautery.  There was also a smaller nonbleeding polyp at the pylorus which was also removed with snare cautery.   Colon polyps ICD-10-CM: K63.5  ICD-9-CM: 211.3   1/31/2018   Overview  Signed 1/31/2018  9:16 AM by Patricia Bo MD   Removed from the transverse colon in January 2018.   PAF (paroxysmal atrial fibrillation) (CMS/[facility]) ICD-10-CM: I48.0  ICD-9-CM: 427.31   7/18/2018      Consult Orders   Expand  Hide   Start   Ordered   08/02/18 0538  Inpatient Cardiac Electrophysiology Consult Once    Complete Specialty: Cardiac Electrophysiology Provider: (Not yet assigned)    08/02/18 0537   Vital Signs (last 2 days)     Date/Time  Temp  Pulse  Resp  BP  Device (Oxygen Therapy)  SpO2   08/02/18 0700  97.6 (36.4)  85  18  124/83  room air  94   08/02/18 0339  97 (36.1)   121  18  123/69  room air  97   08/01/18 2341  96.9 (36.1)  79  18  118/63  room air  92   08/01/18 2043  97.9 (36.6)   129  18  111/71  room air  93   08/01/18 2003  --   129  --  111/71  --  --   08/01/18 1921  --  --  --  --  room air  --   08/01/18 1658  98.4 (36.9)   127  18  133/79  room air  98   08/01/18 1403  98.4 (36.9)  68  18  124/64  room air  96   08/01/18 1131  --  78  --  --  --  --   08/01/18 0831  97.6 (36.4)   129  18  134/72  room air  96   08/01/18 0800  --  --  --  --  room air  --   08/01/18 0456  96.9 (36.1)   129  18  121/81  room air  94   08/01/18 0347  96.9 (36.1)   121  18  117/71  room air  93   07/31/18 2330  96.9 (36.1)   125  18  96/64  room air  93   07/31/18 2016  97 (36.1)   129  18  120/68  room air  92   Pulse: Simultaneous filing. User may be unaware of other data. at 07/31/18 2016   BP: Simultaneous filing. User may be unaware of other data. at 07/31/18 2016 07/31/18 2001  --  --  --  --  room air  94   07/31/18 1915  --  --  --  --  room air  --   07/31/18 1610  97.1 (36.2)  63  18  97/58  room air  92   07/31/18 1100  98 (36.7)  88  20  108/52  room air  92   07/31/18 0730  --  --  --  --  room air  --   07/31/18 0723  97 (36.1)   127  20  115/72  room air  94   Pulse: RN notified at 07/31/18 0723   07/31/18 0720  --   127  --  --  room air  93   07/31/18 0405  97.7 (36.5)  118  16  95/52   room air  96      Intake/Output Detail Report     Date 07/31/18 0701 - 08/01/18 0700 08/01/18 0701 - 08/02/18 0700 08/02/18 0701 - 08/03/18 0700   Shift 1068-4343 2907-9924 Total 8647-3260 2433-7757 Total 2874-5418 5879-4179 Total   I  N  T  A  K  E P.O. 960  960 840  840         P.O. 960  960 840  840       I.V. 359  359            Volume (mL) Diltiazem 359  359          Shift Total 1319  1319 840  840      O  U  T  P  U  T Urine 350 1050 1842 998 5302 2100         Urine 350 1050 4219 233 9893 2100         Unmeasured Urine Occurrence 1 x  1 x          Stool               Unmeasured Stool Occurrence    1 x  1 x       Shift Total 350 1050 8561 785 2764 2100       -1050 -81 140 1400 -0663         Patient Lines/Drains/Airways Status     Active Lines, Drains, and Airways     Peripheral IV 07/30/18 1530 Right Arm   Placement date:  07/30/18  Site:  Arm    Placement time:  1530  Days:  2      Orders and Labs

## 2018-08-02 NOTE — PLAN OF CARE
Problem: Arrhythmia/Dysrhythmia (Symptomatic) (Adult)  Goal: Signs and Symptoms of Listed Potential Problems Will be Absent, Minimized or Managed (Arrhythmia/Dysrhythmia)  Outcome: Ongoing (interventions implemented as appropriate)      Problem: Patient Care Overview  Goal: Plan of Care Review  Outcome: Ongoing (interventions implemented as appropriate)   08/02/18 1882   Coping/Psychosocial   Plan of Care Reviewed With patient   Plan of Care Review   Progress improving   OTHER   Outcome Summary Patient off cardizem drip since beginning of shift. AFL/AF  on tele. No c/o pain. Continue to monitor.

## 2018-08-02 NOTE — PLAN OF CARE
Problem: Arrhythmia/Dysrhythmia (Symptomatic) (Adult)  Goal: Signs and Symptoms of Listed Potential Problems Will be Absent, Minimized or Managed (Arrhythmia/Dysrhythmia)  Outcome: Ongoing (interventions implemented as appropriate)      Problem: Patient Care Overview  Goal: Plan of Care Review  Outcome: Ongoing (interventions implemented as appropriate)   08/02/18 5469   OTHER   Outcome Summary Patient afl 60's-130's. cardizem at 15. started verapamil continue to monitor and report any concerns.

## 2018-08-02 NOTE — CONSULTS
.  Encounter Provider: Robert Wasserman MD  Place of Service: [facility]  Patient Name: Teresa Serna  :1955  Referral Provider: Jagdeep Reyes MD    Chief complaint  palpitations    History of Present Illness  Pt seen and examined. Records reviewed from [facility], [facility], and [facility]. Pt with a h/o recurrent palpitations. Event monitoring in the past documented runs of atrial tachycardia. She developed more frequent and sustained palps this year. Presented to [facility] ER in regular SVT. After receiving adenosine, noted both A flutter and A fib. She was placed on multaq but continued to experience atrial arrhythmias. Was seen by Dr. De Anda and event monitor placed. Recently was switched to amiodarone and apixaban resumed.    She presented with sustained tachycardia on ; found to be in AFl with RVR.      Past Medical History:   Diagnosis Date   • Abnormal stress test    • Atrial flutter (CMS/HCC)    • CAD (coronary artery disease)    • CAD in native artery     CABG x 3   • Diabetes mellitus (CMS/HCC)    • Essential hypertension     Tricuspid valve insufficiency, unspecified etiology    • Hyperlipemia, mixed    • Hyperlipidemia    • Hypertension    • MI, old    • Mitral valve prolapse    • Near syncope    • Obesity, morbid (CMS/HCC)    • Palpitations    • Paroxysmal SVT (supraventricular tachycardia) (CMS/HCC)    • Paroxysmal SVT (supraventricular tachycardia) (CMS/HCC)    • Pedal edema    • Precordial pain    • Rheumatoid arthritis (CMS/HCC)    • Rheumatoid arthritis (CMS/HCC)    • S/P CABG x 3        Past Surgical History:   Procedure Laterality Date   • APPENDECTOMY     • CARDIAC CATHETERIZATION Left 2012    Intensify medical therapy   • CARDIAC CATHETERIZATION Left 2002    surgery   • CHOLECYSTECTOMY     • COLONOSCOPY  02/10/2012   • COLONOSCOPY N/A 2018    Procedure: COLONOSCOPY WITH ANESTHESIA;  Surgeon: Patricia Bo MD;  Location: [facility] ENDOSCOPY;   Service:    • CORONARY ARTERY BYPASS GRAFT  05/05/2002    LIMA to LAD, SVG to D1, SVG to OM1 - x3   • CORONARY STENT PLACEMENT  09/2009    X1 - Stent to LAD, Drug Eluting   • ENDOSCOPY N/A 1/31/2018    Procedure: ESOPHAGOGASTRODUODENOSCOPY WITH ANESTHESIA;  Surgeon: Patricia Bo MD;  Location: [facility] ENDOSCOPY;  Service:    • GALLBLADDER SURGERY     • REPLACEMENT TOTAL KNEE Right    • UPPER GASTROINTESTINAL ENDOSCOPY  03/23/2015       Prescriptions Prior to Admission   Medication Sig Dispense Refill Last Dose   • amiodarone (PACERONE) 200 MG tablet Take 1 tablet by mouth 2 (Two) Times a Day. (Patient taking differently: Take 200 mg by mouth Daily.) 60 tablet 1    • apixaban (ELIQUIS) 5 MG tablet tablet Take 1 tablet by mouth Every 12 (Twelve) Hours. 60 tablet 11    • aspirin 81 MG EC tablet Take 81 mg by mouth Daily.   Taking   • ferrous sulfate 325 (65 FE) MG tablet Take 1 tablet by mouth Daily With Breakfast. (Patient not taking: Reported on 7/18/2018) 30 tablet 1 Not Taking at Unknown time   • folic acid (FOLVITE) 1 MG tablet Take 1 mg by mouth Daily.   Taking   • furosemide (LASIX) 20 MG tablet Take 20 mg by mouth Daily.   Taking   • glyBURIDE (DIAbeta) 5 MG tablet Take 2.5 mg by mouth 2 (Two) Times a Day With Meals.   Taking   • isosorbide mononitrate (IMDUR) 30 MG 24 hr tablet Take 30 mg by mouth Daily.   Taking   • losartan (COZAAR) 25 MG tablet Take 25 mg by mouth Daily.   Taking   • metFORMIN (GLUCOPHAGE) 1000 MG tablet Take 1,000 mg by mouth Daily With Breakfast.   Taking   • methotrexate 2.5 MG tablet Take 2.5 mg by mouth 1 (One) Time Per Week. Takes 10 tablets on Saturday.   Not Taking   • metoprolol tartrate (LOPRESSOR) 50 MG tablet Take 50 mg by mouth 2 (Two) Times a Day.   Taking   • nitroglycerin (NITROSTAT) 0.4 MG SL tablet Place 0.4 mg under the tongue Every 5 (Five) Minutes As Needed for Chest Pain. Take no more than 3 doses in 15 minutes.   Taking   • pantoprazole (PROTONIX) 40 MG EC tablet  Take 1 tablet by mouth Every 12 (Twelve) Hours. 60 tablet 1 Taking   • potassium chloride (K-DUR) 10 MEQ CR tablet Take 10 mEq by mouth Daily.   Taking   • predniSONE (DELTASONE) 1 MG tablet Take 4 mg by mouth Every Night.   Taking   • predniSONE (DELTASONE) 5 MG tablet Take 5 mg by mouth Every Morning.   Taking   • simvastatin (ZOCOR) 40 MG tablet Take 40 mg by mouth Every Night.      • Tofacitinib Citrate (XELJANZ XR) 11 MG tablet sustained-release 24 hour Take  by mouth.   Taking       Current Meds  Scheduled Meds:  amiodarone 200 mg Oral Daily   apixaban 5 mg Oral Q12H   aspirin 81 mg Oral Daily   atorvastatin 20 mg Oral Daily   digoxin 125 mcg Oral Daily   furosemide 20 mg Oral Daily   isosorbide mononitrate 30 mg Oral Daily   losartan 25 mg Oral Daily   metoprolol tartrate 50 mg Oral Q12H   pantoprazole 40 mg Oral BID AC   predniSONE 4 mg Oral Nightly   predniSONE 5 mg Oral Daily   verapamil 80 mg Oral Q8H     Continuous Infusions:  diltiaZEM 5-15 mg/hr Last Rate: Stopped (08/02/18 0815)     PRN Meds:.•  acetaminophen  •  nitroglycerin  •  sodium chloride    Allergies as of 07/30/2018 - Reviewed 07/30/2018   Allergen Reaction Noted   • Codeine Other (See Comments) 11/08/2016   • Albuterol Palpitations 11/08/2016   • Augmentin [amoxicillin-pot clavulanate] Palpitations 07/18/2018   • Sulfa antibiotics Hives 11/08/2016       Social History     Social History   • Marital status:      Spouse name: N/A   • Number of children: N/A   • Years of education: N/A     Occupational History   • Not on file.     Social History Main Topics   • Smoking status: Former Smoker     Years: 20.00     Types: Cigarettes     Quit date: 1/31/2000   • Smokeless tobacco: Never Used   • Alcohol use No   • Drug use: No   • Sexual activity: Defer     Other Topics Concern   • Not on file     Social History Narrative   • No narrative on file       Family History   Problem Relation Age of Onset   • Cancer Father    • Coronary artery  "disease Father    • Coronary artery disease Brother    • Colon polyps Neg Hx    • Colon cancer Neg Hx    • Esophageal cancer Neg Hx    • Liver cancer Neg Hx    • Liver disease Neg Hx    • Rectal cancer Neg Hx    • Stomach cancer Neg Hx        REVIEW OF SYSTEMS:   12 point ROS was performed and is negative except as outlined in HPI     REVIEW OF SYSTEMS:   CONSTITUTIONAL: No weight loss, fever, chills, weakness or fatigue.   HEENT: Eyes: No visual loss, blurred vision, double vision or yellow sclerae. Ears, Nose, Throat: No hearing loss, sneezing, congestion, runny nose or sore throat.   SKIN: No rash or itching.     RESPIRATORY: No shortness of breath, hemoptysis, cough or sputum.   GASTROINTESTINAL: No anorexia, nausea, vomiting or diarrhea. No abdominal pain, bright red blood per rectum or melena.  GENITOURINARY: No burning on urination, hematuria or increased frequency.  NEUROLOGICAL: No headache, dizziness, syncope, paralysis, ataxia, numbness or tingling in the extremities. No change in bowel or bladder control.   MUSCULOSKELETAL: No muscle, back pain, joint pain or stiffness.   HEMATOLOGIC: No anemia, bleeding or bruising.   LYMPHATICS: No enlarged nodes. No history of splenectomy.   PSYCHIATRIC: No history of depression, anxiety, hallucinations.   ENDOCRINOLOGIC: No reports of sweating, cold or heat intolerance. No polyuria or polydipsia.        Objective:   Temp:  [96.9 °F (36.1 °C)-98.4 °F (36.9 °C)] 98 °F (36.7 °C)  Heart Rate:  [] 64  Resp:  [18] 18  BP: (111-133)/(50-83) 112/50  Body mass index is 34.78 kg/m².  Flowsheet Rows      First Filed Value   Admission Height  162.6 cm (64\") Documented at 07/30/2018 1511   Admission Weight  91.6 kg (202 lb) Documented at 07/30/2018 1511        Vitals:    08/02/18 1100   BP: 112/50   Pulse: 64   Resp: 18   Temp: 98 °F (36.7 °C)   SpO2: 97%       General Appearance:    Alert, cooperative, in no acute distress; obese MA WF    Head:    Normocephalic, without " obvious abnormality, atraumatic   Eyes:            Lids and lashes normal, conjunctivae and sclerae normal, no   icterus, no pallor, corneas clear, PERRLA   Ears:    Ears appear intact with no abnormalities noted   Throat:   No oral lesions, no thrush, oral mucosa moist   Neck:   No adenopathy, supple, trachea midline, no thyromegaly, no   carotid bruit, no JVD   Back:     No kyphosis present, no scoliosis present, no skin lesions, erythema or scars, no tenderness to percussion or palpation, range of motion normal   Lungs:     Clear to auscultation,respirations regular, even and unlabored    Heart:    Regular rhythm and normal rate, normal S1 and S2, no murmur, no gallop, no rub, no click   Chest Wall:    No abnormalities observed   Abdomen:     Normal bowel sounds, no masses, no organomegaly, soft        non-tender, non-distended, no guarding, no rebound  tenderness   Extremities:   Moves all extremities well, 1+edema, no cyanosis, no redness   Pulses:   Pulses palpable and equal bilaterally. Normal radial, carotid, femoral, dorsalis pedis and posterior tibial pulses bilaterally. Normal abdominal aorta   Skin:  Psychiatric:   No bleeding, bruising or rash    Alert and oriented x 3, normal mood and affect           Lab Review:      Results from last 7 days  Lab Units 08/02/18  0634   SODIUM mmol/L 141   POTASSIUM mmol/L 4.1   CHLORIDE mmol/L 106   CO2 mmol/L 25.0   BUN mg/dL 10   CREATININE mg/dL 0.46*   CALCIUM mg/dL 9.1   BILIRUBIN mg/dL 2.0*   ALK PHOS U/L 65   ALT (SGPT) U/L 55*   AST (SGOT) U/L 27   GLUCOSE mg/dL 191*       Results from last 7 days  Lab Units 07/30/18  1859 07/30/18  1530   TROPONIN I ng/mL <0.012 <0.012           Results from last 7 days  Lab Units 08/02/18  0634 07/30/18  1530   WBC 10*3/mm3 8.49 8.56   HEMOGLOBIN g/dL 14.6 14.7   HEMATOCRIT % 44.5 45.7   PLATELETS 10*3/mm3 204 228       Results from last 7 days  Lab Units 07/30/18  1530   INR  0.96               I personally viewed and  interpreted the patient's EKG/Telemetry data.  Patient Active Problem List   Diagnosis   • CAD (coronary artery disease)   • Atrial flutter (CMS/HCC)   • Type 2 diabetes mellitus without complication, without long-term current use of insulin (CMS/HCC)   • Mixed hyperlipidemia   • S/P CABG x 3 2000 Dr Larsen   • Shoulder blade pain   • Anemia   • Abnormal finding on imaging   • History of esophagitis   • Essential hypertension   • S/P coronary artery stent placement   • Gastric polyp   • Colon polyps   • PAF (paroxysmal atrial fibrillation) (CMS/HCC)     Assessment and Plan:  1. Recurrent atrial flutter - occurring early after the initiation of amiodarone. Will discuss situation with Dr. De Anda. She most likely needs to undergo ablation of both atrial fib and AFl.  Only on apixaban for 2 wks, so will need DULCE beforehand.    2. CAD - no angina recently.    3. Mixed hyperliipdemia - agree with current therapy.    Robert Wasserman MD  08/02/18  1:10 PM.

## 2018-08-03 VITALS
SYSTOLIC BLOOD PRESSURE: 104 MMHG | RESPIRATION RATE: 18 BRPM | HEART RATE: 79 BPM | WEIGHT: 200 LBS | DIASTOLIC BLOOD PRESSURE: 57 MMHG | OXYGEN SATURATION: 95 % | TEMPERATURE: 97.8 F | HEIGHT: 64 IN | BODY MASS INDEX: 34.15 KG/M2

## 2018-08-03 LAB
ANION GAP SERPL CALCULATED.3IONS-SCNC: 12 MMOL/L (ref 4–13)
BUN BLD-MCNC: 13 MG/DL (ref 5–21)
BUN/CREAT SERPL: 28.3 (ref 7–25)
CALCIUM SPEC-SCNC: 8.9 MG/DL (ref 8.4–10.4)
CHLORIDE SERPL-SCNC: 102 MMOL/L (ref 98–110)
CO2 SERPL-SCNC: 25 MMOL/L (ref 24–31)
CREAT BLD-MCNC: 0.46 MG/DL (ref 0.5–1.4)
GFR SERPL CREATININE-BSD FRML MDRD: 138 ML/MIN/1.73
GLUCOSE BLD-MCNC: 212 MG/DL (ref 70–100)
POTASSIUM BLD-SCNC: 3.9 MMOL/L (ref 3.5–5.3)
SODIUM BLD-SCNC: 139 MMOL/L (ref 135–145)

## 2018-08-03 PROCEDURE — 63710000001 PREDNISONE PER 5 MG: Performed by: INTERNAL MEDICINE

## 2018-08-03 PROCEDURE — 99239 HOSP IP/OBS DSCHRG MGMT >30: CPT | Performed by: INTERNAL MEDICINE

## 2018-08-03 PROCEDURE — 25010000002 ONDANSETRON PER 1 MG: Performed by: INTERNAL MEDICINE

## 2018-08-03 PROCEDURE — 80048 BASIC METABOLIC PNL TOTAL CA: CPT | Performed by: INTERNAL MEDICINE

## 2018-08-03 RX ORDER — VERAPAMIL HYDROCHLORIDE 240 MG/1
240 TABLET, FILM COATED, EXTENDED RELEASE ORAL DAILY
Qty: 30 TABLET | Refills: 11 | Status: SHIPPED | OUTPATIENT
Start: 2018-08-03 | End: 2019-06-04 | Stop reason: SDUPTHER

## 2018-08-03 RX ORDER — PANTOPRAZOLE SODIUM 40 MG/1
40 TABLET, DELAYED RELEASE ORAL
Status: DISCONTINUED | OUTPATIENT
Start: 2018-08-03 | End: 2018-08-03 | Stop reason: HOSPADM

## 2018-08-03 RX ORDER — ONDANSETRON 2 MG/ML
4 INJECTION INTRAMUSCULAR; INTRAVENOUS EVERY 6 HOURS PRN
Status: DISCONTINUED | OUTPATIENT
Start: 2018-08-03 | End: 2018-08-03 | Stop reason: HOSPADM

## 2018-08-03 RX ADMIN — PANTOPRAZOLE SODIUM 40 MG: 40 TABLET, DELAYED RELEASE ORAL at 17:33

## 2018-08-03 RX ADMIN — APIXABAN 5 MG: 5 TABLET, FILM COATED ORAL at 10:05

## 2018-08-03 RX ADMIN — LOSARTAN POTASSIUM 25 MG: 25 TABLET ORAL at 10:05

## 2018-08-03 RX ADMIN — METOPROLOL TARTRATE 50 MG: 50 TABLET ORAL at 10:05

## 2018-08-03 RX ADMIN — PANTOPRAZOLE SODIUM 40 MG: 40 TABLET, DELAYED RELEASE ORAL at 05:27

## 2018-08-03 RX ADMIN — ATORVASTATIN CALCIUM 20 MG: 10 TABLET, FILM COATED ORAL at 10:04

## 2018-08-03 RX ADMIN — VERAPAMIL HYDROCHLORIDE 80 MG: 80 TABLET, FILM COATED ORAL at 15:01

## 2018-08-03 RX ADMIN — VERAPAMIL HYDROCHLORIDE 80 MG: 80 TABLET, FILM COATED ORAL at 05:26

## 2018-08-03 RX ADMIN — AMIODARONE HYDROCHLORIDE 200 MG: 200 TABLET ORAL at 10:05

## 2018-08-03 RX ADMIN — ISOSORBIDE MONONITRATE 30 MG: 30 TABLET, EXTENDED RELEASE ORAL at 10:05

## 2018-08-03 RX ADMIN — ONDANSETRON 4 MG: 2 INJECTION INTRAMUSCULAR; INTRAVENOUS at 07:43

## 2018-08-03 RX ADMIN — PREDNISONE 5 MG: 5 TABLET ORAL at 10:05

## 2018-08-03 RX ADMIN — ASPIRIN 81 MG: 81 TABLET ORAL at 10:05

## 2018-08-03 NOTE — PLAN OF CARE
Problem: Arrhythmia/Dysrhythmia (Symptomatic) (Adult)  Goal: Signs and Symptoms of Listed Potential Problems Will be Absent, Minimized or Managed (Arrhythmia/Dysrhythmia)  Outcome: Ongoing (interventions implemented as appropriate)      Problem: Patient Care Overview  Goal: Plan of Care Review  Outcome: Ongoing (interventions implemented as appropriate)   08/03/18 0250   Coping/Psychosocial   Plan of Care Reviewed With patient   Plan of Care Review   Progress improving   OTHER   Outcome Summary VSS. Pt has had no c/o soa or cp. Afib on telemetry. Will continue to monitor and notify MD of changes.     Goal: Individualization and Mutuality  Outcome: Ongoing (interventions implemented as appropriate)

## 2018-08-03 NOTE — PROGRESS NOTES
LOS: 3 days   Patient Care Team:  Teresa Contreras MD as PCP - General  Teresa Contreras MD as PCP - Family Medicine  Jagdeep Reyes MD as Cardiologist (Cardiology)    Chief Complaint: Active Problems:    Atrial flutter (CMS/HCC)    Subjective    Feels tired  Short of breath  Palpitations  Fatigue  Generalized weakness  Anxious  Easy fatigability overall with minimal exertion    Interval History: Improved overall    Patient Complaints:   Chief Complaint   Patient presents with   • Shortness of Breath   • Rapid Heart Rate       Denies chest pain currently. Denies excessive shortness of breath. Denies abdominal pain, nausea vomiting or diarrhoea.    Telemetry: no malignant arrhythmia. No significant pauses.    Review of Systems   Constitutional: No chills   Has fatigue   No fever.   HENT: Negative.    Eyes: Negative.    Respiratory: Negative for cough,   No chest wall soreness,   Shortness of breath,   no wheezing, no stridor.    Cardiovascular: Negative for chest pain,   No palpitations   No significant  leg swelling.   Gastrointestinal: Negative for abdominal distention,  No abdominal pain,   No blood in stool,   No constipation,   No diarrhea,   No nausea   No vomiting.   Endocrine: Negative.    Genitourinary: Negative for difficulty urinating, dysuria, flank pain and hematuria.   Musculoskeletal: Negative.    Skin: Negative for rash and wound.   Allergic/Immunologic: Negative.    Neurological: Negative for dizziness, syncope, weakness,   No light-headedness  No  headaches.   Hematological: Does not bruise/bleed easily.   Psychiatric/Behavioral: Negative for agitation or behavioral problems,   No confusion,   the patient is  nervous/anxious.       History:   Past Medical History:   Diagnosis Date   • Abnormal stress test    • Atrial flutter (CMS/HCC)    • CAD (coronary artery disease)    • CAD in native artery     CABG x 3   • Diabetes mellitus (CMS/HCC)    • Essential hypertension     Tricuspid valve  insufficiency, unspecified etiology    • Hyperlipemia, mixed    • Hyperlipidemia    • Hypertension    • MI, old    • Mitral valve prolapse    • Near syncope    • Obesity, morbid (CMS/HCC)    • Palpitations    • Paroxysmal SVT (supraventricular tachycardia) (CMS/HCC)    • Paroxysmal SVT (supraventricular tachycardia) (CMS/HCC)    • Pedal edema    • Precordial pain    • Rheumatoid arthritis (CMS/HCC)    • Rheumatoid arthritis (CMS/HCC)    • S/P CABG x 3      Past Surgical History:   Procedure Laterality Date   • APPENDECTOMY     • CARDIAC CATHETERIZATION Left 06/18/2012    Intensify medical therapy   • CARDIAC CATHETERIZATION Left 05/05/2002    surgery   • CHOLECYSTECTOMY     • COLONOSCOPY  02/10/2012   • COLONOSCOPY N/A 1/31/2018    Procedure: COLONOSCOPY WITH ANESTHESIA;  Surgeon: Patricia Bo MD;  Location: [facility] ENDOSCOPY;  Service:    • CORONARY ARTERY BYPASS GRAFT  05/05/2002    LIMA to LAD, SVG to D1, SVG to OM1 - x3   • CORONARY STENT PLACEMENT  09/2009    X1 - Stent to LAD, Drug Eluting   • ENDOSCOPY N/A 1/31/2018    Procedure: ESOPHAGOGASTRODUODENOSCOPY WITH ANESTHESIA;  Surgeon: Patricia Bo MD;  Location: [facility] ENDOSCOPY;  Service:    • GALLBLADDER SURGERY     • REPLACEMENT TOTAL KNEE Right    • UPPER GASTROINTESTINAL ENDOSCOPY  03/23/2015     Social History     Social History   • Marital status:      Spouse name: N/A   • Number of children: N/A   • Years of education: N/A     Occupational History   • Not on file.     Social History Main Topics   • Smoking status: Former Smoker     Years: 20.00     Types: Cigarettes     Quit date: 1/31/2000   • Smokeless tobacco: Never Used   • Alcohol use No   • Drug use: No   • Sexual activity: Defer     Other Topics Concern   • Not on file     Social History Narrative   • No narrative on file     Family History   Problem Relation Age of Onset   • Cancer Father    • Coronary artery disease Father    • Coronary artery disease Brother    • Colon polyps Neg Hx     • Colon cancer Neg Hx    • Esophageal cancer Neg Hx    • Liver cancer Neg Hx    • Liver disease Neg Hx    • Rectal cancer Neg Hx    • Stomach cancer Neg Hx        Labs:  WBC WBC   Date Value Ref Range Status   08/02/2018 8.49 4.80 - 10.80 10*3/mm3 Final      HGB Hemoglobin   Date Value Ref Range Status   08/02/2018 14.6 12.0 - 16.0 g/dL Final      HCT Hematocrit   Date Value Ref Range Status   08/02/2018 44.5 37.0 - 47.0 % Final      Platelets Platelets   Date Value Ref Range Status   08/02/2018 204 130 - 400 10*3/mm3 Final      MCV MCV   Date Value Ref Range Status   08/02/2018 91.4 82.0 - 98.0 fL Final        Results from last 7 days  Lab Units 08/02/18  0634 07/30/18  1530   SODIUM mmol/L 141 141   POTASSIUM mmol/L 4.1 4.1   CHLORIDE mmol/L 106 104   CO2 mmol/L 25.0 25.0   BUN mg/dL 10 14   CREATININE mg/dL 0.46* 0.73   CALCIUM mg/dL 9.1 9.0   BILIRUBIN mg/dL 2.0* 0.9   ALK PHOS U/L 65 70   ALT (SGPT) U/L 55* 42   AST (SGOT) U/L 27 40   GLUCOSE mg/dL 191* 195*     Lab Results   Component Value Date    CKMB 0.42 03/21/2016    TROPONINI <0.012 07/30/2018     PT/INR:  No results found for: PROTIME/No results found for: INR    Imaging Results (last 72 hours)     ** No results found for the last 72 hours. **          Objective     Allergies   Allergen Reactions   • Codeine Other (See Comments)     Chest pain   • Albuterol Palpitations     High heart rate   • Augmentin [Amoxicillin-Pot Clavulanate] Palpitations   • Sulfa Antibiotics Hives       Medication Review: Performed  Current Facility-Administered Medications   Medication Dose Route Frequency Provider Last Rate Last Dose   • acetaminophen (TYLENOL) tablet 650 mg  650 mg Oral Q6H PRN Jagdeep Reyes MD   650 mg at 08/02/18 2029   • amiodarone (PACERONE) tablet 200 mg  200 mg Oral Daily Matt Johnson MD   200 mg at 08/02/18 0823   • apixaban (ELIQUIS) tablet 5 mg  5 mg Oral Q12H Matt Johnson MD   5 mg at 08/02/18 2026   • aspirin EC tablet 81 mg  81 mg Oral  "Daily Matt Johnson MD   81 mg at 08/02/18 0822   • atorvastatin (LIPITOR) tablet 20 mg  20 mg Oral Daily Matt Johnson MD   20 mg at 08/02/18 0822   • digoxin (LANOXIN) tablet 125 mcg  125 mcg Oral Daily Jagdeep Reyes MD   125 mcg at 08/02/18 0823   • diltiaZEM (CARDIZEM) 125 mg in sodium chloride 0.9 % 125 mL (1 mg/mL) infusion  5-15 mg/hr Intravenous Continuous Pepito Clemente DO   Stopped at 08/02/18 0815   • furosemide (LASIX) tablet 20 mg  20 mg Oral Daily Jagdeep Reyes MD   20 mg at 08/02/18 0912   • isosorbide mononitrate (IMDUR) 24 hr tablet 30 mg  30 mg Oral Daily Matt Johnson MD   30 mg at 08/02/18 0822   • losartan (COZAAR) tablet 25 mg  25 mg Oral Daily Matt Johnson MD   25 mg at 08/02/18 0823   • metoprolol tartrate (LOPRESSOR) tablet 50 mg  50 mg Oral Q12H Matt Johnson MD   50 mg at 08/02/18 2025   • nitroglycerin (NITROSTAT) SL tablet 0.4 mg  0.4 mg Sublingual Q5 Min PRN Matt Johnson MD       • pantoprazole (PROTONIX) EC tablet 40 mg  40 mg Oral BID AC Matt Johnson MD   40 mg at 08/02/18 1656   • predniSONE (DELTASONE) tablet 4 mg  4 mg Oral Nightly Matt Johnson MD   4 mg at 08/02/18 2026   • predniSONE (DELTASONE) tablet 5 mg  5 mg Oral Daily Matt Johnson MD   5 mg at 08/02/18 0823   • sodium chloride 0.9 % flush 10 mL  10 mL Intravenous PRN Pepito Clemente DO       • verapamil (CALAN) tablet 80 mg  80 mg Oral Q8H Jagdeep Reyes MD   80 mg at 08/02/18 2026       Vital Sign Min/Max for last 24 hours  Temp  Min: 96.9 °F (36.1 °C)  Max: 98 °F (36.7 °C)   BP  Min: 110/75  Max: 124/83   Pulse  Min: 64  Max: 129   Resp  Min: 18  Max: 20   SpO2  Min: 92 %  Max: 97 %   No Data Recorded   Weight  Min: 90.7 kg (200 lb)  Max: 90.7 kg (200 lb)     Flowsheet Rows      First Filed Value   Admission Height  162.6 cm (64\") Documented at 07/30/2018 1511   Admission Weight  91.6 kg (202 lb) Documented at 07/30/2018 1511          Results for orders placed during the hospital " encounter of 07/18/18   Adult Transthoracic Echo Complete W/ Cont if Necessary Per Protocol    Narrative · Left ventricular systolic function is normal. Estimated EF = 55%.  · Left ventricular diastolic dysfunction.  · Left atrial cavity size is mildly dilated.  · No evidence of pulmonary hypertension is present.          Physical Exam:    General Appearance: Awake, alert, in no acute distress  Eyes: Pupils equal and reactive    Ears: Appear intact with no abnormalities noted  Nose: Nares normal, no drainage  Neck: supple, trachea midline, no carotid bruit and no JVD  Back: no kyphosis present,    Lungs: respirations regular, respirations even and respirations unlabored  Heart: normal S1, S2,  2/6 pansystolic murmur in the left sternal border,  no rub and no click  Abdomen: normal bowel sounds, no masses, no hepatomegaly, no splenomegaly, guarding and no rebound tenderness   Skin: no bleeding, bruising or rash  Extremities: no cyanosis  Psychiatric/Behavioral: Negative for agitation, behavioral problems, confusion, the patient does  appear to be nervous/anxious.          Results Review:   I reviewed the patient's new clinical results.  I reviewed the patient's new imaging results and agree with the interpretation.  I reviewed the patient's other test results and agree with the interpretation  I personally viewed and interpreted the patient's EKG/Telemetry data  Discussed with patient,    Reviewed available prior notes, consults, prior visits, laboratory findings, radiology and cardiology relevant reports. Updated chart as applicable. I have reviewed the patient's medical history in detail and updated the computerized patient record as relevant.    Updated patient regarding any new or relevant abnormalities on review of records or any new findings on physical exam. Mentioned to patient about purpose of visit and desirable health short and long term goals and objectives.     Assessment/Plan     Active  Problems:  Atrial flutter (CMS/HCC)  PAF (paroxysmal atrial fibrillation) (CMS/HCC)  Rapid ventricular response  CAD (coronary artery disease)   Atrial flutter (CMS/HCC)   Type 2 diabetes mellitus without complication, without long-term current use of insulin (CMS/HCC)   Mixed hyperlipidemia   S/P CABG x 3 2000 Dr Larsen   Shoulder blade pain   Anemia   Abnormal finding on imaging   History of esophagitis   Essential hypertension   S/P coronary artery stent placement   Gastric polyp   Colon polyps            Plan     Rate control  Add verapamil 40 mg every 6 hours  Defer DC cardioversion as anticoagulation duration less than 4 weeks  Hopefully will revert back to sinus rhythm on her own  Electrophysiology consultation tomorrow, has seen Dr. De Anda in past and advised atrial flutter ablation  Continue [facility]  Supportive care  Telemetry  Optimal medical therapy  Deep vein thrombosis prophylaxis/precautions  Appropriate diet, fluid, sodium, caffeine, stimulants intake   Compliance to diet and medications   Avoid NSAIDS    Jagdeep Reyes MD  08/02/18  9:55 PM    EMR Dragon/Transcription was used to dictate part of this note

## 2018-08-03 NOTE — PROGRESS NOTES
Continued Stay Note  ELIAS Fernandez     Patient Name: Teresa Serna  MRN: [id number]  Today's Date: 8/3/2018    Admit Date: 7/30/2018          Discharge Plan     Row Name 08/03/18 0837       Plan    Patient/Family in Agreement with Plan yes    Plan Comments PT resides at home and is independent. PT plans to dc home and denies any dc needs including HH. Will follow.     Final Discharge Disposition Code 01 - home or self-care              Discharge Codes    No documentation.           CORWIN Miranda

## 2018-08-03 NOTE — PROGRESS NOTES
LOS: 3 days   Patient Care Team:  Teresa Contreras MD as PCP - General  Teresa Contreras MD as PCP - Family Medicine  Jagdeep Reyes MD as Cardiologist (Cardiology)    Chief Complaint: Active Problems:    Atrial flutter (CMS/HCC)    Subjective    Similar to yesterday but improved  Feels less tired  As shortness of breath  No palpitations  Fatigue  Generalized weakness  Anxious  Easy fatigability overall with minimal exertion    Interval History: Improved overall    Patient Complaints:   Chief Complaint   Patient presents with   • Shortness of Breath   • Rapid Heart Rate       Denies chest pain currently. Denies excessive shortness of breath. Denies abdominal pain, nausea vomiting or diarrhoea.    Telemetry: no malignant arrhythmia. No significant pauses.    Review of Systems   Constitutional: No chills   Has fatigue   No fever.   HENT: Negative.    Eyes: Negative.    Respiratory: Negative for cough,   No chest wall soreness,   Shortness of breath,   no wheezing, no stridor.    Cardiovascular: Negative for chest pain,   No palpitations   No significant  leg swelling.   Gastrointestinal: Negative for abdominal distention,  No abdominal pain,   No blood in stool,   No constipation,   No diarrhea,   No nausea   No vomiting.   Endocrine: Negative.    Genitourinary: Negative for difficulty urinating, dysuria, flank pain and hematuria.   Musculoskeletal: Negative.    Skin: Negative for rash and wound.   Allergic/Immunologic: Negative.    Neurological: Negative for dizziness, syncope, weakness,   No light-headedness  No  headaches.   Hematological: Does not bruise/bleed easily.   Psychiatric/Behavioral: Negative for agitation or behavioral problems,   No confusion,   the patient is  nervous/anxious.       History:   Past Medical History:   Diagnosis Date   • Abnormal stress test    • Atrial flutter (CMS/HCC)    • CAD (coronary artery disease)    • CAD in native artery     CABG x 3   • Diabetes mellitus (CMS/HCC)     • Essential hypertension     Tricuspid valve insufficiency, unspecified etiology    • Hyperlipemia, mixed    • Hyperlipidemia    • Hypertension    • MI, old    • Mitral valve prolapse    • Near syncope    • Obesity, morbid (CMS/[facility])    • Palpitations    • Paroxysmal SVT (supraventricular tachycardia) (CMS/[facility])    • Paroxysmal SVT (supraventricular tachycardia) (CMS/[facility])    • Pedal edema    • Precordial pain    • Rheumatoid arthritis (CMS/HCC)    • Rheumatoid arthritis (CMS/[facility])    • S/P CABG x 3      Past Surgical History:   Procedure Laterality Date   • APPENDECTOMY     • CARDIAC CATHETERIZATION Left 06/18/2012    Intensify medical therapy   • CARDIAC CATHETERIZATION Left 05/05/2002    surgery   • CHOLECYSTECTOMY     • COLONOSCOPY  02/10/2012   • COLONOSCOPY N/A 1/31/2018    Procedure: COLONOSCOPY WITH ANESTHESIA;  Surgeon: Patricia Bo MD;  Location: [facility] ENDOSCOPY;  Service:    • CORONARY ARTERY BYPASS GRAFT  05/05/2002    LIMA to LAD, SVG to D1, SVG to OM1 - x3   • CORONARY STENT PLACEMENT  09/2009    X1 - Stent to LAD, Drug Eluting   • ENDOSCOPY N/A 1/31/2018    Procedure: ESOPHAGOGASTRODUODENOSCOPY WITH ANESTHESIA;  Surgeon: Patricia Bo MD;  Location: [facility] ENDOSCOPY;  Service:    • GALLBLADDER SURGERY     • REPLACEMENT TOTAL KNEE Right    • UPPER GASTROINTESTINAL ENDOSCOPY  03/23/2015     Social History     Social History   • Marital status:      Spouse name: N/A   • Number of children: N/A   • Years of education: N/A     Occupational History   • Not on file.     Social History Main Topics   • Smoking status: Former Smoker     Years: 20.00     Types: Cigarettes     Quit date: 1/31/2000   • Smokeless tobacco: Never Used   • Alcohol use No   • Drug use: No   • Sexual activity: Defer     Other Topics Concern   • Not on file     Social History Narrative   • No narrative on file     Family History   Problem Relation Age of Onset   • Cancer Father    • Coronary artery disease Father    • Coronary  artery disease Brother    • Colon polyps Neg Hx    • Colon cancer Neg Hx    • Esophageal cancer Neg Hx    • Liver cancer Neg Hx    • Liver disease Neg Hx    • Rectal cancer Neg Hx    • Stomach cancer Neg Hx        Labs:  WBC WBC   Date Value Ref Range Status   08/02/2018 8.49 4.80 - 10.80 10*3/mm3 Final      HGB Hemoglobin   Date Value Ref Range Status   08/02/2018 14.6 12.0 - 16.0 g/dL Final      HCT Hematocrit   Date Value Ref Range Status   08/02/2018 44.5 37.0 - 47.0 % Final      Platelets Platelets   Date Value Ref Range Status   08/02/2018 204 130 - 400 10*3/mm3 Final      MCV MCV   Date Value Ref Range Status   08/02/2018 91.4 82.0 - 98.0 fL Final          Results from last 7 days  Lab Units 08/02/18  0634 07/30/18  1530   SODIUM mmol/L 141 141   POTASSIUM mmol/L 4.1 4.1   CHLORIDE mmol/L 106 104   CO2 mmol/L 25.0 25.0   BUN mg/dL 10 14   CREATININE mg/dL 0.46* 0.73   CALCIUM mg/dL 9.1 9.0   BILIRUBIN mg/dL 2.0* 0.9   ALK PHOS U/L 65 70   ALT (SGPT) U/L 55* 42   AST (SGOT) U/L 27 40   GLUCOSE mg/dL 191* 195*     Lab Results   Component Value Date    CKMB 0.42 03/21/2016    TROPONINI <0.012 07/30/2018     PT/INR:  No results found for: PROTIME/No results found for: INR    Imaging Results (last 72 hours)     ** No results found for the last 72 hours. **          Objective     Allergies   Allergen Reactions   • Codeine Other (See Comments)     Chest pain   • Albuterol Palpitations     High heart rate   • Augmentin [Amoxicillin-Pot Clavulanate] Palpitations   • Sulfa Antibiotics Hives       Medication Review: Performed  Current Facility-Administered Medications   Medication Dose Route Frequency Provider Last Rate Last Dose   • acetaminophen (TYLENOL) tablet 650 mg  650 mg Oral Q6H PRN Jagdeep Reyes MD   650 mg at 08/02/18 2029   • amiodarone (PACERONE) tablet 200 mg  200 mg Oral Daily Matt Johnson MD   200 mg at 08/02/18 0823   • apixaban (ELIQUIS) tablet 5 mg  5 mg Oral Q12H Matt Johnson MD   5 mg at  "08/02/18 2026   • aspirin EC tablet 81 mg  81 mg Oral Daily Matt Johnson MD   81 mg at 08/02/18 0822   • atorvastatin (LIPITOR) tablet 20 mg  20 mg Oral Daily Matt Johnson MD   20 mg at 08/02/18 0822   • digoxin (LANOXIN) tablet 125 mcg  125 mcg Oral Daily Jagdeep Reyes MD   125 mcg at 08/02/18 0823   • diltiaZEM (CARDIZEM) 125 mg in sodium chloride 0.9 % 125 mL (1 mg/mL) infusion  5-15 mg/hr Intravenous Continuous Pepito Clemente DO   Stopped at 08/02/18 0815   • furosemide (LASIX) tablet 20 mg  20 mg Oral Daily Jagdeep Reyes MD   20 mg at 08/02/18 0912   • isosorbide mononitrate (IMDUR) 24 hr tablet 30 mg  30 mg Oral Daily Matt Johnson MD   30 mg at 08/02/18 0822   • losartan (COZAAR) tablet 25 mg  25 mg Oral Daily Matt Johnson MD   25 mg at 08/02/18 0823   • metoprolol tartrate (LOPRESSOR) tablet 50 mg  50 mg Oral Q12H Matt Johnson MD   50 mg at 08/02/18 2025   • nitroglycerin (NITROSTAT) SL tablet 0.4 mg  0.4 mg Sublingual Q5 Min PRN Matt Johnson MD       • pantoprazole (PROTONIX) EC tablet 40 mg  40 mg Oral BID AC Matt Johnson MD   40 mg at 08/02/18 1656   • predniSONE (DELTASONE) tablet 4 mg  4 mg Oral Nightly Matt Johnson MD   4 mg at 08/02/18 2026   • predniSONE (DELTASONE) tablet 5 mg  5 mg Oral Daily Matt Johnson MD   5 mg at 08/02/18 0823   • sodium chloride 0.9 % flush 10 mL  10 mL Intravenous PRN Pepito Clemente DO       • verapamil (CALAN) tablet 80 mg  80 mg Oral Q8H Jagdeep Reyes MD   80 mg at 08/02/18 2026       Vital Sign Min/Max for last 24 hours  Temp  Min: 96.9 °F (36.1 °C)  Max: 98 °F (36.7 °C)   BP  Min: 110/75  Max: 124/83   Pulse  Min: 64  Max: 129   Resp  Min: 18  Max: 20   SpO2  Min: 92 %  Max: 97 %   No Data Recorded   Weight  Min: 90.7 kg (200 lb)  Max: 90.7 kg (200 lb)     Flowsheet Rows      First Filed Value   Admission Height  162.6 cm (64\") Documented at 07/30/2018 1511   Admission Weight  91.6 kg (202 lb) Documented at 07/30/2018 1511    "       Results for orders placed during the hospital encounter of 07/18/18   Adult Transthoracic Echo Complete W/ Cont if Necessary Per Protocol    Narrative · Left ventricular systolic function is normal. Estimated EF = 55%.  · Left ventricular diastolic dysfunction.  · Left atrial cavity size is mildly dilated.  · No evidence of pulmonary hypertension is present.          Physical Exam:    General Appearance: Awake, alert, in no acute distress  Eyes: Pupils equal and reactive    Ears: Appear intact with no abnormalities noted  Nose: Nares normal, no drainage  Neck: supple, trachea midline, no carotid bruit and no JVD  Back: no kyphosis present,    Lungs: respirations regular, respirations even and respirations unlabored  Heart: normal S1, S2,  2/6 pansystolic murmur in the left sternal border,  no rub and no click  Abdomen: normal bowel sounds, no masses, no hepatomegaly, no splenomegaly, guarding and no rebound tenderness   Skin: no bleeding, bruising or rash  Extremities: no cyanosis  Psychiatric/Behavioral: Negative for agitation, behavioral problems, confusion, the patient does  appear to be nervous/anxious.          Results Review:   I reviewed the patient's new clinical results.  I reviewed the patient's new imaging results and agree with the interpretation.  I reviewed the patient's other test results and agree with the interpretation  I personally viewed and interpreted the patient's EKG/Telemetry data  Discussed with patient,    Reviewed available prior notes, consults, prior visits, laboratory findings, radiology and cardiology relevant reports. Updated chart as applicable. I have reviewed the patient's medical history in detail and updated the computerized patient record as relevant.    Updated patient regarding any new or relevant abnormalities on review of records or any new findings on physical exam. Mentioned to patient about purpose of visit and desirable health short and long term goals and  objectives.     Assessment/Plan     Active Problems:  Atrial flutter (CMS/HCC)  PAF (paroxysmal atrial fibrillation) (CMS/HCC)  Rapid ventricular response  CAD (coronary artery disease)   Atrial flutter (CMS/HCC)   Type 2 diabetes mellitus without complication, without long-term current use of insulin (CMS/HCC)   Mixed hyperlipidemia   S/P CABG x 3 2000 Dr Larsen   Shoulder blade pain   Anemia   Abnormal finding on imaging   History of esophagitis   Essential hypertension   S/P coronary artery stent placement   Gastric polyp   Colon polyps            Plan     Rate control  increased verapamil to 80 mg from 40 mg every 6 hours  Hopefully can switch to long-acting verapamil tomorrow  Defer DC cardioversion as anticoagulation duration less than 4 weeks  Hopefully will revert back to sinus rhythm on her own  Rates better controlled today  Electrophysiology consultation today, has seen Dr. De Anda in past and advised atrial flutter ablation  [facility]  [facility]  Telemetry  Optimal medical therapy  Deep vein thrombosis prophylaxis/precautions  Appropriate diet, fluid, sodium, caffeine, stimulants intake   Compliance to diet and medications   Avoid NSAIDS  Hopefully discharge home tomorrow    Jagdeep Reyes MD  08/02/18  9:58 PM    EMR Dragon/Transcription was used to dictate part of this note

## 2018-08-04 NOTE — DISCHARGE SUMMARY
Date of Discharge:  8/3/2018     LOS: 4 days   Patient Care Team:  Teresa Contreras MD as PCP - General  Teresa Contreras MD as PCP - Family Medicine  Jagdeep Reyes MD as Cardiologist (Cardiology)    Chief Complaint: Active Problems:    Atrial flutter (CMS/HCC)    Shortness of breath    Subjective  Feeling  better  No chest pain   No excessive shortness of breath  No new complaints    Interval History: Improved overall    Patient Complaints:   Chief Complaint   Patient presents with   • Shortness of Breath   • Rapid Heart Rate       Denies chest pain currently. Denies excessive shortness of breath. Denies abdominal pain, nausea vomiting or diarrhoea.    Telemetry: no malignant arrhythmia. No significant pauses.    Review of Systems   Constitutional: No chills   No fatigue   No fever.   HENT: Negative.    Eyes: Negative.    Respiratory: Negative for cough,   No chest wall soreness,   Mild shortness of breath,   no wheezing, no stridor.    Cardiovascular: Negative for chest pain,   No palpitations   No significant  leg swelling.   Gastrointestinal: Negative for abdominal distention,  No abdominal pain,   No blood in stool, constipation, diarrhea, nausea or vomiting.   Endocrine: Negative.    Genitourinary: Negative for difficulty urinating, dysuria, flank pain and hematuria.   Musculoskeletal: Negative.    Skin: Negative for rash and wound.   Allergic/Immunologic: Negative.    Neurological: Negative for dizziness, syncope, weakness,   No light-headedness or headaches.   Hematological: Does not bruise/bleed easily.   Psychiatric/Behavioral: Negative for agitation, behavioral problems, confusion, the patient does not appear to be nervous/anxious.       History:   Past Medical History:   Diagnosis Date   • Abnormal stress test    • Atrial flutter (CMS/HCC)    • CAD (coronary artery disease)    • CAD in native artery     CABG x 3   • Diabetes mellitus (CMS/HCC)    • Essential hypertension     Tricuspid valve  insufficiency, unspecified etiology    • Hyperlipemia, mixed    • Hyperlipidemia    • Hypertension    • MI, old    • Mitral valve prolapse    • Near syncope    • Obesity, morbid (CMS/HCC)    • Palpitations    • Paroxysmal SVT (supraventricular tachycardia) (CMS/HCC)    • Paroxysmal SVT (supraventricular tachycardia) (CMS/HCC)    • Pedal edema    • Precordial pain    • Rheumatoid arthritis (CMS/HCC)    • Rheumatoid arthritis (CMS/HCC)    • S/P CABG x 3      Past Surgical History:   Procedure Laterality Date   • APPENDECTOMY     • CARDIAC CATHETERIZATION Left 06/18/2012    Intensify medical therapy   • CARDIAC CATHETERIZATION Left 05/05/2002    surgery   • CHOLECYSTECTOMY     • COLONOSCOPY  02/10/2012   • COLONOSCOPY N/A 1/31/2018    Procedure: COLONOSCOPY WITH ANESTHESIA;  Surgeon: Patricia Bo MD;  Location: [facility] ENDOSCOPY;  Service:    • CORONARY ARTERY BYPASS GRAFT  05/05/2002    LIMA to LAD, SVG to D1, SVG to OM1 - x3   • CORONARY STENT PLACEMENT  09/2009    X1 - Stent to LAD, Drug Eluting   • ENDOSCOPY N/A 1/31/2018    Procedure: ESOPHAGOGASTRODUODENOSCOPY WITH ANESTHESIA;  Surgeon: Patricia Bo MD;  Location: [facility] ENDOSCOPY;  Service:    • GALLBLADDER SURGERY     • REPLACEMENT TOTAL KNEE Right    • UPPER GASTROINTESTINAL ENDOSCOPY  03/23/2015     Social History     Social History   • Marital status:      Spouse name: N/A   • Number of children: N/A   • Years of education: N/A     Occupational History   • Not on file.     Social History Main Topics   • Smoking status: Former Smoker     Years: 20.00     Types: Cigarettes     Quit date: 1/31/2000   • Smokeless tobacco: Never Used   • Alcohol use No   • Drug use: No   • Sexual activity: Defer     Other Topics Concern   • Not on file     Social History Narrative   • No narrative on file     Family History   Problem Relation Age of Onset   • Cancer Father    • Coronary artery disease Father    • Coronary artery disease Brother    • Colon polyps Neg Hx     • Colon cancer Neg Hx    • Esophageal cancer Neg Hx    • Liver cancer Neg Hx    • Liver disease Neg Hx    • Rectal cancer Neg Hx    • Stomach cancer Neg Hx        Labs:  WBC WBC   Date Value Ref Range Status   08/02/2018 8.49 4.80 - 10.80 10*3/mm3 Final      HGB Hemoglobin   Date Value Ref Range Status   08/02/2018 14.6 12.0 - 16.0 g/dL Final      HCT Hematocrit   Date Value Ref Range Status   08/02/2018 44.5 37.0 - 47.0 % Final      Platlets Platelets   Date Value Ref Range Status   08/02/2018 204 130 - 400 10*3/mm3 Final      MCV MCV   Date Value Ref Range Status   08/02/2018 91.4 82.0 - 98.0 fL Final        Results from last 7 days  Lab Units 08/03/18  0507 08/02/18  0634 07/30/18  1530   SODIUM mmol/L 139 141 141   POTASSIUM mmol/L 3.9 4.1 4.1   CHLORIDE mmol/L 102 106 104   CO2 mmol/L 25.0 25.0 25.0   BUN mg/dL 13 10 14   CREATININE mg/dL 0.46* 0.46* 0.73   CALCIUM mg/dL 8.9 9.1 9.0   BILIRUBIN mg/dL  --  2.0* 0.9   ALK PHOS U/L  --  65 70   ALT (SGPT) U/L  --  55* 42   AST (SGOT) U/L  --  27 40   GLUCOSE mg/dL 212* 191* 195*     Lab Results   Component Value Date    CKMB 0.42 03/21/2016    TROPONINI <0.012 07/30/2018     PT/INR:  No results found for: PROTIME/No results found for: INR    Imaging Results (last 72 hours)     ** No results found for the last 72 hours. **          Objective     Allergies   Allergen Reactions   • Codeine Other (See Comments)     Chest pain   • Albuterol Palpitations     High heart rate   • Augmentin [Amoxicillin-Pot Clavulanate] Palpitations   • Sulfa Antibiotics Hives       Medication Review: Performed  No current facility-administered medications for this encounter.      Current Outpatient Prescriptions   Medication Sig Dispense Refill   • amiodarone (PACERONE) 200 MG tablet Take 200 mg by mouth Daily.     • apixaban (ELIQUIS) 5 MG tablet tablet Take 1 tablet by mouth Every 12 (Twelve) Hours. 60 tablet 11   • aspirin 81 MG EC tablet Take 81 mg by mouth Daily.     • folic acid  "(FOLVITE) 1 MG tablet Take 1 mg by mouth Daily.     • furosemide (LASIX) 20 MG tablet Take 20 mg by mouth Daily.     • glyBURIDE (DIAbeta) 5 MG tablet Take 2.5 mg by mouth 2 (Two) Times a Day With Meals.     • isosorbide mononitrate (IMDUR) 30 MG 24 hr tablet Take 30 mg by mouth Daily.     • losartan (COZAAR) 25 MG tablet Take 25 mg by mouth Daily.     • metFORMIN (GLUCOPHAGE) 1000 MG tablet Take 1,000 mg by mouth Daily With Breakfast.     • metoprolol tartrate (LOPRESSOR) 50 MG tablet Take 50 mg by mouth 2 (Two) Times a Day.     • pantoprazole (PROTONIX) 40 MG EC tablet Take 1 tablet by mouth Every 12 (Twelve) Hours. 60 tablet 1   • potassium chloride (K-DUR) 10 MEQ CR tablet Take 10 mEq by mouth Daily.     • predniSONE (DELTASONE) 1 MG tablet Take 4 mg by mouth Every Night.     • predniSONE (DELTASONE) 5 MG tablet Take 5 mg by mouth Every Morning.     • simvastatin (ZOCOR) 40 MG tablet Take 40 mg by mouth Every Night.     • Tofacitinib Citrate (XELJANZ XR) 11 MG tablet sustained-release 24 hour Take 1 tablet by mouth Daily.     • ferrous sulfate 325 (65 FE) MG tablet Take 1 tablet by mouth Daily With Breakfast. (Patient not taking: Reported on 7/18/2018) 30 tablet 1   • nitroglycerin (NITROSTAT) 0.4 MG SL tablet Place 0.4 mg under the tongue Every 5 (Five) Minutes As Needed for Chest Pain. Take no more than 3 doses in 15 minutes.     • verapamil SR (CALAN-SR) 240 MG CR tablet Take 1 tablet by mouth Daily. 30 tablet 11       Vital Sign Min/Max for last 24 hours  Temp  Min: 97.6 °F (36.4 °C)  Max: 98.6 °F (37 °C)   BP  Min: 104/57  Max: 140/90   Pulse  Min: 79  Max: 124   Resp  Min: 18  Max: 18   SpO2  Min: 94 %  Max: 98 %   No Data Recorded   No Data Recorded     Flowsheet Rows      First Filed Value   Admission Height  162.6 cm (64\") Documented at 07/30/2018 1511   Admission Weight  91.6 kg (202 lb) Documented at 07/30/2018 1511          Results for orders placed during the hospital encounter of 07/18/18   Adult " Transthoracic Echo Complete W/ Cont if Necessary Per Protocol    Narrative · Left ventricular systolic function is normal. Estimated EF = 55%.  · Left ventricular diastolic dysfunction.  · Left atrial cavity size is mildly dilated.  · No evidence of pulmonary hypertension is present.            Consults:   Consults     Date and Time Order Name Status Description    8/2/2018 0537 Inpatient Cardiac Electrophysiology Consult Completed           Procedures Performed         Physical Exam:  General Appearance: Awake, alert, in no acute distress  Eyes: Pupils equal and reactive    Ears: Appear intact with no abnormalities noted  Nose: Nares normal, no drainage  Neck: supple, trachea midline, no carotid bruit and no JVD  Back: no kyphosis present,    Lungs: respirations regular, respirations even and respirations unlabored  Heart: normal S1, S2,  2/6 pansystolic murmur in the left sternal border,  no rub and no click  Abdomen: normal bowel sounds, no masses, no hepatomegaly, no splenomegaly, guarding and no rebound tenderness   Skin: no bleeding, bruising or rash  Extremities: no cyanosis  Psychiatric/Behavioral: Negative for agitation, behavioral problems, confusion, the patient does not appear to be nervous/anxious.          Results Review:   I reviewed the patient's new clinical results.  I reviewed the patient's new imaging results and agree with the interpretation.  I reviewed the patient's other test results and agree with the interpretation  I personally viewed and interpreted the patient's EKG/Telemetry data  Discussed with patient, and present family member(s) if any      Assessment/Plan     Discharge diagnosis with prior  Active Problems:     Atrial flutter (CMS/HCC)  PAF (paroxysmal atrial fibrillation) (CMS/HCC)  Rapid ventricular response  CAD (coronary artery disease)   Atrial flutter (CMS/HCC)   Type 2 diabetes mellitus without complication, without long-term current use of insulin (CMS/HCC)   Mixed  hyperlipidemia   S/P CABG x 3 2000 Dr Larsen   Shoulder blade pain   Anemia   Abnormal finding on imaging   History of esophagitis   Essential hypertension   S/P coronary artery stent placement   Gastric polyp   Colon polyps         Plan    OK to discharge  Low salt cardiac diet.   Discharge to home and or self care with improvement or resolution of presenting symptoms or complaints  Ambulating  Optimal medical therapy  Deep vein thrombosis precautions with hydration and ambulation  Counseled regarding disease appropriate diet, fluid, sodium, caffeine, stimulants intake   Stressed compliance to diet and medications   Avoid NSAIDS  Follow up with primary provider and primary cardiologist as scheduled   Overall improved and deemed fit for discharge  Will be provided with written discharge instructions including diet and medications including prescriptions as required and labs as applicable  Go to nearest emergency room if recurrence of primary complaints or any suspected disabling or life threatening symptoms or complaints such as chest pain, increasing shortness of breath, profound dizziness, weakness, significant palpitations, passing out episodes, cold sweats, excessive nausea etc  More than 30 minutes spent in the discharge process.     Hospital Course  Patient is a 62 y.o. female presented with atrial fibrillation with rapid ventricular response  Was started on rate control agents  Digoxin was started  Amiodarone was continued  Verapamil low-dose short-acting given which was subsequently titrated and discharged on Calan  mg daily  Dr. Wasserman saw from the physiology service and advised atrial flutter/fibrillation ablation and to continue current medications.  Date of discharge patient was nauseous likely due to digoxin this was discontinued.  Prior to discharge for better was ambulating and able to tolerate by mouth solids and liquids well.  Heart rates well controlled and between  bpm      Condition on  Discharge: Stable and Improved    Discharge Disposition: To home and self care  Home or Self Care    Discharge Medications     Discharge Medications      New Medications      Instructions Start Date   verapamil  MG CR tablet  Commonly known as:  CALAN-SR   240 mg, Oral, Daily         Continue These Medications      Instructions Start Date   amiodarone 200 MG tablet  Commonly known as:  PACERONE   200 mg, Oral, Daily      apixaban 5 MG tablet tablet  Commonly known as:  ELIQUIS   5 mg, Oral, Every 12 Hours Scheduled      aspirin 81 MG EC tablet   81 mg, Oral, Daily      ferrous sulfate 325 (65 FE) MG tablet   325 mg, Oral, Daily With Breakfast      folic acid 1 MG tablet  Commonly known as:  FOLVITE   1 mg, Oral, Daily      furosemide 20 MG tablet  Commonly known as:  LASIX   20 mg, Oral, Daily      glyBURIDE 5 MG tablet  Commonly known as:  DIAbeta   2.5 mg, Oral, 2 Times Daily With Meals      isosorbide mononitrate 30 MG 24 hr tablet  Commonly known as:  IMDUR   30 mg, Oral, Daily      losartan 25 MG tablet  Commonly known as:  COZAAR   25 mg, Oral, Daily      metFORMIN 1000 MG tablet  Commonly known as:  GLUCOPHAGE   1,000 mg, Oral, Daily With Breakfast      metoprolol tartrate 50 MG tablet  Commonly known as:  LOPRESSOR   50 mg, Oral, 2 Times Daily      nitroglycerin 0.4 MG SL tablet  Commonly known as:  NITROSTAT   0.4 mg, Sublingual, Every 5 Minutes PRN, Take no more than 3 doses in 15 minutes.      pantoprazole 40 MG EC tablet  Commonly known as:  PROTONIX   40 mg, Oral, Every 12 Hours Scheduled      potassium chloride 10 MEQ CR tablet  Commonly known as:  K-DUR   10 mEq, Oral, Daily      predniSONE 5 MG tablet  Commonly known as:  DELTASONE   5 mg, Oral, Every Morning      predniSONE 1 MG tablet  Commonly known as:  DELTASONE   4 mg, Oral, Nightly      simvastatin 40 MG tablet  Commonly known as:  ZOCOR   40 mg, Oral, Nightly      XELJANZ XR 11 MG tablet sustained-release 24 hour  Generic drug:   Tofacitinib Citrate   1 tablet, Oral, Daily             Discharge Diet:   Diet Instructions     Heart healthy diet              Low salt heart healthy cardiac diet    Activity at Discharge:   Activity Instructions     Resume daily activities               As tolerated    Follow-up Appointments  Future Appointments  Date Time Provider Department Center   8/22/2018 11:30 AM Jagdeep Reyes MD [facility] None         Test Results Pending at Discharge: None       Jagdeep Reyes MD  08/03/18  10:46 PM

## 2018-08-04 NOTE — PAYOR COMM NOTE
"DC HOME 8-3-18  [id number]  UR  [phone]    Teersa Stern (62 y.o. Female)     Date of Birth Social Security Number Address Home Phone MRN    1955  [address] [address] [phone] [id number]    [employer] Marital Status          Catholic        Admission Date Admission Type Admitting Provider Attending Provider Department, Room/Bed    7/30/18 Emergency Jagdeep Reyes MD  [facility] 4B, 401/1    Discharge Date Discharge Disposition Discharge Destination        8/3/2018 Home or Self Care Home             Attending Provider:  (none)   Allergies:  Codeine, Albuterol, Augmentin [Amoxicillin-pot Clavulanate], Sulfa Antibiotics    Isolation:  None   Infection:  None   Code Status:  Prior    Ht:  162.6 cm (64\")   Wt:  90.7 kg (200 lb)    Admission Cmt:  None   Principal Problem:  None                Active Insurance as of 7/30/2018     Primary Coverage     Payor Plan Insurance Group Employer/Plan Group    ALLIANCE COAL ALLIANCE COAL [id number]     Payor Plan Address Payor Plan Phone Number Effective From Effective To    [address] [phone] 1/1/2018     [place] [address]       Subscriber Name Subscriber Birth Date Member ID       TERESA STERN 1955 [id number]           Secondary Coverage     Payor Plan Insurance Group Employer/Plan Group    MEDICARE MEDICARE A & B      Payor Plan Address Payor Plan Phone Number Effective From Effective To    [address] [phone] 4/1/2017     [facility] [address]       Subscriber Name Subscriber Birth Date Member ID       TERESA STERN 1955 [id number]                 Emergency Contacts      (Rel.) Home Phone Work Phone Mobile Phone    Barbra Moody (Sister) -- -- [phone]    Cyrus Stern (Spouse) [phone] -- [phone]    DaphneJose (Son) -- -- [phone]               Discharge Summary      Jagdeep Reyes MD at 8/3/2018  5:35 PM            Date of Discharge:  8/3/2018     LOS: 4 days   Patient Care " Team:  Teresa Contreras MD as PCP - General  Teresa Contreras MD as PCP - Family Medicine  Jagdeep Reyes MD as Cardiologist (Cardiology)    Chief Complaint: Active Problems:    Atrial flutter (CMS/HCC)    Shortness of breath    Subjective  Feeling  better  No chest pain   No excessive shortness of breath  No new complaints    Interval History: Improved overall    Patient Complaints:   Chief Complaint   Patient presents with   • Shortness of Breath   • Rapid Heart Rate       Denies chest pain currently. Denies excessive shortness of breath. Denies abdominal pain, nausea vomiting or diarrhoea.    Telemetry: no malignant arrhythmia. No significant pauses.    Review of Systems   Constitutional: No chills   No fatigue   No fever.   HENT: Negative.    Eyes: Negative.    Respiratory: Negative for cough,   No chest wall soreness,   Mild shortness of breath,   no wheezing, no stridor.    Cardiovascular: Negative for chest pain,   No palpitations   No significant  leg swelling.   Gastrointestinal: Negative for abdominal distention,  No abdominal pain,   No blood in stool, constipation, diarrhea, nausea or vomiting.   Endocrine: Negative.    Genitourinary: Negative for difficulty urinating, dysuria, flank pain and hematuria.   Musculoskeletal: Negative.    Skin: Negative for rash and wound.   Allergic/Immunologic: Negative.    Neurological: Negative for dizziness, syncope, weakness,   No light-headedness or headaches.   Hematological: Does not bruise/bleed easily.   Psychiatric/Behavioral: Negative for agitation, behavioral problems, confusion, the patient does not appear to be nervous/anxious.       History:   Past Medical History:   Diagnosis Date   • Abnormal stress test    • Atrial flutter (CMS/HCC)    • CAD (coronary artery disease)    • CAD in native artery     CABG x 3   • Diabetes mellitus (CMS/HCC)    • Essential hypertension     Tricuspid valve insufficiency, unspecified etiology    • Hyperlipemia, mixed     • Hyperlipidemia    • Hypertension    • MI, old    • Mitral valve prolapse    • Near syncope    • Obesity, morbid (CMS/HCC)    • Palpitations    • Paroxysmal SVT (supraventricular tachycardia) (CMS/HCC)    • Paroxysmal SVT (supraventricular tachycardia) (CMS/HCC)    • Pedal edema    • Precordial pain    • Rheumatoid arthritis (CMS/HCC)    • Rheumatoid arthritis (CMS/HCC)    • S/P CABG x 3      Past Surgical History:   Procedure Laterality Date   • APPENDECTOMY     • CARDIAC CATHETERIZATION Left 06/18/2012    Intensify medical therapy   • CARDIAC CATHETERIZATION Left 05/05/2002    surgery   • CHOLECYSTECTOMY     • COLONOSCOPY  02/10/2012   • COLONOSCOPY N/A 1/31/2018    Procedure: COLONOSCOPY WITH ANESTHESIA;  Surgeon: Patricia Bo MD;  Location: [facility] ENDOSCOPY;  Service:    • CORONARY ARTERY BYPASS GRAFT  05/05/2002    LIMA to LAD, SVG to D1, SVG to OM1 - x3   • CORONARY STENT PLACEMENT  09/2009    X1 - Stent to LAD, Drug Eluting   • ENDOSCOPY N/A 1/31/2018    Procedure: ESOPHAGOGASTRODUODENOSCOPY WITH ANESTHESIA;  Surgeon: Patricia Bo MD;  Location: [facility] ENDOSCOPY;  Service:    • GALLBLADDER SURGERY     • REPLACEMENT TOTAL KNEE Right    • UPPER GASTROINTESTINAL ENDOSCOPY  03/23/2015     Social History     Social History   • Marital status:      Spouse name: N/A   • Number of children: N/A   • Years of education: N/A     Occupational History   • Not on file.     Social History Main Topics   • Smoking status: Former Smoker     Years: 20.00     Types: Cigarettes     Quit date: 1/31/2000   • Smokeless tobacco: Never Used   • Alcohol use No   • Drug use: No   • Sexual activity: Defer     Other Topics Concern   • Not on file     Social History Narrative   • No narrative on file     Family History   Problem Relation Age of Onset   • Cancer Father    • Coronary artery disease Father    • Coronary artery disease Brother    • Colon polyps Neg Hx    • Colon cancer Neg Hx    • Esophageal cancer Neg Hx    •  Liver cancer Neg Hx    • Liver disease Neg Hx    • Rectal cancer Neg Hx    • Stomach cancer Neg Hx        Labs:  WBC WBC   Date Value Ref Range Status   08/02/2018 8.49 4.80 - 10.80 10*3/mm3 Final      HGB Hemoglobin   Date Value Ref Range Status   08/02/2018 14.6 12.0 - 16.0 g/dL Final      HCT Hematocrit   Date Value Ref Range Status   08/02/2018 44.5 37.0 - 47.0 % Final      Platlets Platelets   Date Value Ref Range Status   08/02/2018 204 130 - 400 10*3/mm3 Final      MCV MCV   Date Value Ref Range Status   08/02/2018 91.4 82.0 - 98.0 fL Final        Results from last 7 days  Lab Units 08/03/18  0507 08/02/18  0634 07/30/18  1530   SODIUM mmol/L 139 141 141   POTASSIUM mmol/L 3.9 4.1 4.1   CHLORIDE mmol/L 102 106 104   CO2 mmol/L 25.0 25.0 25.0   BUN mg/dL 13 10 14   CREATININE mg/dL 0.46* 0.46* 0.73   CALCIUM mg/dL 8.9 9.1 9.0   BILIRUBIN mg/dL  --  2.0* 0.9   ALK PHOS U/L  --  65 70   ALT (SGPT) U/L  --  55* 42   AST (SGOT) U/L  --  27 40   GLUCOSE mg/dL 212* 191* 195*     Lab Results   Component Value Date    CKMB 0.42 03/21/2016    TROPONINI <0.012 07/30/2018     PT/INR:  No results found for: PROTIME/No results found for: INR    Imaging Results (last 72 hours)     ** No results found for the last 72 hours. **          Objective     Allergies   Allergen Reactions   • Codeine Other (See Comments)     Chest pain   • Albuterol Palpitations     High heart rate   • Augmentin [Amoxicillin-Pot Clavulanate] Palpitations   • Sulfa Antibiotics Hives       Medication Review: Performed  No current facility-administered medications for this encounter.      Current Outpatient Prescriptions   Medication Sig Dispense Refill   • amiodarone (PACERONE) 200 MG tablet Take 200 mg by mouth Daily.     • apixaban (ELIQUIS) 5 MG tablet tablet Take 1 tablet by mouth Every 12 (Twelve) Hours. 60 tablet 11   • aspirin 81 MG EC tablet Take 81 mg by mouth Daily.     • folic acid (FOLVITE) 1 MG tablet Take 1 mg by mouth Daily.     •  "furosemide (LASIX) 20 MG tablet Take 20 mg by mouth Daily.     • glyBURIDE (DIAbeta) 5 MG tablet Take 2.5 mg by mouth 2 (Two) Times a Day With Meals.     • isosorbide mononitrate (IMDUR) 30 MG 24 hr tablet Take 30 mg by mouth Daily.     • losartan (COZAAR) 25 MG tablet Take 25 mg by mouth Daily.     • metFORMIN (GLUCOPHAGE) 1000 MG tablet Take 1,000 mg by mouth Daily With Breakfast.     • metoprolol tartrate (LOPRESSOR) 50 MG tablet Take 50 mg by mouth 2 (Two) Times a Day.     • pantoprazole (PROTONIX) 40 MG EC tablet Take 1 tablet by mouth Every 12 (Twelve) Hours. 60 tablet 1   • potassium chloride (K-DUR) 10 MEQ CR tablet Take 10 mEq by mouth Daily.     • predniSONE (DELTASONE) 1 MG tablet Take 4 mg by mouth Every Night.     • predniSONE (DELTASONE) 5 MG tablet Take 5 mg by mouth Every Morning.     • simvastatin (ZOCOR) 40 MG tablet Take 40 mg by mouth Every Night.     • Tofacitinib Citrate (XELJANZ XR) 11 MG tablet sustained-release 24 hour Take 1 tablet by mouth Daily.     • ferrous sulfate 325 (65 FE) MG tablet Take 1 tablet by mouth Daily With Breakfast. (Patient not taking: Reported on 7/18/2018) 30 tablet 1   • nitroglycerin (NITROSTAT) 0.4 MG SL tablet Place 0.4 mg under the tongue Every 5 (Five) Minutes As Needed for Chest Pain. Take no more than 3 doses in 15 minutes.     • verapamil SR (CALAN-SR) 240 MG CR tablet Take 1 tablet by mouth Daily. 30 tablet 11       Vital Sign Min/Max for last 24 hours  Temp  Min: 97.6 °F (36.4 °C)  Max: 98.6 °F (37 °C)   BP  Min: 104/57  Max: 140/90   Pulse  Min: 79  Max: 124   Resp  Min: 18  Max: 18   SpO2  Min: 94 %  Max: 98 %   No Data Recorded   No Data Recorded     Flowsheet Rows      First Filed Value   Admission Height  162.6 cm (64\") Documented at 07/30/2018 1511   Admission Weight  91.6 kg (202 lb) Documented at 07/30/2018 1511          Results for orders placed during the hospital encounter of 07/18/18   Adult Transthoracic Echo Complete W/ Cont if Necessary Per " Protocol    Narrative · Left ventricular systolic function is normal. Estimated EF = 55%.  · Left ventricular diastolic dysfunction.  · Left atrial cavity size is mildly dilated.  · No evidence of pulmonary hypertension is present.            Consults:   Consults     Date and Time Order Name Status Description    8/2/2018 0537 Inpatient Cardiac Electrophysiology Consult Completed           Procedures Performed         Physical Exam:  General Appearance: Awake, alert, in no acute distress  Eyes: Pupils equal and reactive    Ears: Appear intact with no abnormalities noted  Nose: Nares normal, no drainage  Neck: supple, trachea midline, no carotid bruit and no JVD  Back: no kyphosis present,    Lungs: respirations regular, respirations even and respirations unlabored  Heart: normal S1, S2,  2/6 pansystolic murmur in the left sternal border,  no rub and no click  Abdomen: normal bowel sounds, no masses, no hepatomegaly, no splenomegaly, guarding and no rebound tenderness   Skin: no bleeding, bruising or rash  Extremities: no cyanosis  Psychiatric/Behavioral: Negative for agitation, behavioral problems, confusion, the patient does not appear to be nervous/anxious.          Results Review:   I reviewed the patient's new clinical results.  I reviewed the patient's new imaging results and agree with the interpretation.  I reviewed the patient's other test results and agree with the interpretation  I personally viewed and interpreted the patient's EKG/Telemetry data  Discussed with patient, and present family member(s) if any      Assessment/Plan     Discharge diagnosis with prior  Active Problems:     Atrial flutter (CMS/HCC)  PAF (paroxysmal atrial fibrillation) (CMS/HCC)  Rapid ventricular response  CAD (coronary artery disease)   Atrial flutter (CMS/HCC)   Type 2 diabetes mellitus without complication, without long-term current use of insulin (CMS/HCC)   Mixed hyperlipidemia   S/P CABG x 3 2000 Dr Larsen   Shoulder blade  pain   Anemia   Abnormal finding on imaging   History of esophagitis   Essential hypertension   S/P coronary artery stent placement   Gastric polyp   Colon polyps         Plan    OK to discharge  Low salt cardiac diet.   Discharge to home and or self care with improvement or resolution of presenting symptoms or complaints  Ambulating  Optimal medical therapy  Deep vein thrombosis precautions with hydration and ambulation  Counseled regarding disease appropriate diet, fluid, sodium, caffeine, stimulants intake   Stressed compliance to diet and medications   Avoid NSAIDS  Follow up with primary provider and primary cardiologist as scheduled   Overall improved and deemed fit for discharge  Will be provided with written discharge instructions including diet and medications including prescriptions as required and labs as applicable  Go to nearest emergency room if recurrence of primary complaints or any suspected disabling or life threatening symptoms or complaints such as chest pain, increasing shortness of breath, profound dizziness, weakness, significant palpitations, passing out episodes, cold sweats, excessive nausea etc  More than 30 minutes spent in the discharge process.     Hospital Course  Patient is a 62 y.o. female presented with atrial fibrillation with rapid ventricular response  Was started on rate control agents  Digoxin was started  Amiodarone was continued  Verapamil low-dose short-acting given which was subsequently titrated and discharged on Calan  mg daily  Dr. Wasserman saw from the physiology service and advised atrial flutter/fibrillation ablation and to continue current medications.  Date of discharge patient was nauseous likely due to digoxin this was discontinued.  Prior to discharge for better was ambulating and able to tolerate by mouth solids and liquids well.  Heart rates well controlled and between  bpm      Condition on Discharge: Stable and Improved    Discharge Disposition: To  home and self care  Home or Self Care    Discharge Medications     Discharge Medications      New Medications      Instructions Start Date   verapamil  MG CR tablet  Commonly known as:  CALAN-SR   240 mg, Oral, Daily         Continue These Medications      Instructions Start Date   amiodarone 200 MG tablet  Commonly known as:  PACERONE   200 mg, Oral, Daily      apixaban 5 MG tablet tablet  Commonly known as:  ELIQUIS   5 mg, Oral, Every 12 Hours Scheduled      aspirin 81 MG EC tablet   81 mg, Oral, Daily      ferrous sulfate 325 (65 FE) MG tablet   325 mg, Oral, Daily With Breakfast      folic acid 1 MG tablet  Commonly known as:  FOLVITE   1 mg, Oral, Daily      furosemide 20 MG tablet  Commonly known as:  LASIX   20 mg, Oral, Daily      glyBURIDE 5 MG tablet  Commonly known as:  DIAbeta   2.5 mg, Oral, 2 Times Daily With Meals      isosorbide mononitrate 30 MG 24 hr tablet  Commonly known as:  IMDUR   30 mg, Oral, Daily      losartan 25 MG tablet  Commonly known as:  COZAAR   25 mg, Oral, Daily      metFORMIN 1000 MG tablet  Commonly known as:  GLUCOPHAGE   1,000 mg, Oral, Daily With Breakfast      metoprolol tartrate 50 MG tablet  Commonly known as:  LOPRESSOR   50 mg, Oral, 2 Times Daily      nitroglycerin 0.4 MG SL tablet  Commonly known as:  NITROSTAT   0.4 mg, Sublingual, Every 5 Minutes PRN, Take no more than 3 doses in 15 minutes.      pantoprazole 40 MG EC tablet  Commonly known as:  PROTONIX   40 mg, Oral, Every 12 Hours Scheduled      potassium chloride 10 MEQ CR tablet  Commonly known as:  K-DUR   10 mEq, Oral, Daily      predniSONE 5 MG tablet  Commonly known as:  DELTASONE   5 mg, Oral, Every Morning      predniSONE 1 MG tablet  Commonly known as:  DELTASONE   4 mg, Oral, Nightly      simvastatin 40 MG tablet  Commonly known as:  ZOCOR   40 mg, Oral, Nightly      XELJANZ XR 11 MG tablet sustained-release 24 hour  Generic drug:  Tofacitinib Citrate   1 tablet, Oral, Daily             Discharge  Diet:   Diet Instructions     Heart healthy diet              Low salt heart healthy cardiac diet    Activity at Discharge:   Activity Instructions     Resume daily activities               As tolerated    Follow-up Appointments  Future Appointments  Date Time Provider Department Center   8/22/2018 11:30 AM Jagdeep Reyes MD [facility] None         Test Results Pending at Discharge: None       Jagdeep Reyes MD  08/03/18  10:46 PM        Electronically signed by Jagdeep Reyes MD at 8/3/2018 10:49 PM

## 2018-08-07 ENCOUNTER — OFFICE VISIT (OUTPATIENT)
Dept: FAMILY MEDICINE CLINIC | Age: 63
End: 2018-08-07
Payer: MEDICARE

## 2018-08-07 VITALS
WEIGHT: 204 LBS | OXYGEN SATURATION: 96 % | HEIGHT: 64 IN | DIASTOLIC BLOOD PRESSURE: 82 MMHG | BODY MASS INDEX: 34.83 KG/M2 | HEART RATE: 84 BPM | SYSTOLIC BLOOD PRESSURE: 138 MMHG

## 2018-08-07 DIAGNOSIS — E11.9 TYPE 2 DIABETES MELLITUS WITHOUT COMPLICATION, WITHOUT LONG-TERM CURRENT USE OF INSULIN (HCC): ICD-10-CM

## 2018-08-07 DIAGNOSIS — I10 ESSENTIAL HYPERTENSION: ICD-10-CM

## 2018-08-07 DIAGNOSIS — K27.9 PEPTIC ULCER DISEASE: Primary | ICD-10-CM

## 2018-08-07 DIAGNOSIS — F41.9 ANXIETY: ICD-10-CM

## 2018-08-07 DIAGNOSIS — I48.92 ATRIAL FLUTTER BY ELECTROCARDIOGRAM (HCC): ICD-10-CM

## 2018-08-07 DIAGNOSIS — D50.9 IRON DEFICIENCY ANEMIA, UNSPECIFIED IRON DEFICIENCY ANEMIA TYPE: ICD-10-CM

## 2018-08-07 DIAGNOSIS — E78.01 FAMILIAL HYPERCHOLESTEROLEMIA: ICD-10-CM

## 2018-08-07 DIAGNOSIS — M06.9 RHEUMATOID ARTHRITIS INVOLVING MULTIPLE SITES, UNSPECIFIED RHEUMATOID FACTOR PRESENCE: ICD-10-CM

## 2018-08-07 PROCEDURE — 99214 OFFICE O/P EST MOD 30 MIN: CPT | Performed by: FAMILY MEDICINE

## 2018-08-07 PROCEDURE — G8427 DOCREV CUR MEDS BY ELIG CLIN: HCPCS | Performed by: FAMILY MEDICINE

## 2018-08-07 PROCEDURE — 1036F TOBACCO NON-USER: CPT | Performed by: FAMILY MEDICINE

## 2018-08-07 PROCEDURE — 2022F DILAT RTA XM EVC RTNOPTHY: CPT | Performed by: FAMILY MEDICINE

## 2018-08-07 PROCEDURE — G8417 CALC BMI ABV UP PARAM F/U: HCPCS | Performed by: FAMILY MEDICINE

## 2018-08-07 PROCEDURE — 3017F COLORECTAL CA SCREEN DOC REV: CPT | Performed by: FAMILY MEDICINE

## 2018-08-07 PROCEDURE — 3045F PR MOST RECENT HEMOGLOBIN A1C LEVEL 7.0-9.0%: CPT | Performed by: FAMILY MEDICINE

## 2018-08-07 RX ORDER — GLYBURIDE 5 MG/1
2.5 TABLET ORAL
COMMUNITY
End: 2018-08-07 | Stop reason: SDUPTHER

## 2018-08-07 RX ORDER — SIMVASTATIN 40 MG
40 TABLET ORAL NIGHTLY
COMMUNITY
Start: 2018-07-17 | End: 2018-09-24 | Stop reason: CLARIF

## 2018-08-07 RX ORDER — LOSARTAN POTASSIUM 25 MG/1
25 TABLET ORAL DAILY
COMMUNITY
End: 2018-11-05 | Stop reason: SDUPTHER

## 2018-08-07 RX ORDER — VERAPAMIL HYDROCHLORIDE 240 MG/1
240 TABLET, FILM COATED, EXTENDED RELEASE ORAL DAILY
COMMUNITY
Start: 2018-08-03 | End: 2021-01-01

## 2018-08-07 RX ORDER — BUSPIRONE HYDROCHLORIDE 5 MG/1
5 TABLET ORAL 2 TIMES DAILY PRN
Qty: 60 TABLET | Refills: 2 | Status: SHIPPED | OUTPATIENT
Start: 2018-08-07 | End: 2018-10-12 | Stop reason: SDUPTHER

## 2018-08-07 RX ORDER — PREDNISONE 1 MG/1
4 TABLET ORAL
COMMUNITY
End: 2018-08-07 | Stop reason: SDUPTHER

## 2018-08-07 RX ORDER — METOPROLOL TARTRATE 50 MG/1
50 TABLET, FILM COATED ORAL 2 TIMES DAILY
COMMUNITY
End: 2018-08-20 | Stop reason: SDUPTHER

## 2018-08-07 RX ORDER — GLYBURIDE 2.5 MG/1
TABLET ORAL
COMMUNITY
Start: 2016-04-20 | End: 2018-08-07

## 2018-08-07 RX ORDER — AMIODARONE HYDROCHLORIDE 200 MG/1
200 TABLET ORAL
COMMUNITY
End: 2018-08-07 | Stop reason: SDUPTHER

## 2018-08-07 RX ORDER — GLYBURIDE 5 MG/1
5 TABLET ORAL
COMMUNITY
End: 2019-03-16 | Stop reason: SDUPTHER

## 2018-08-07 RX ORDER — AMIODARONE HYDROCHLORIDE 100 MG/1
100 TABLET ORAL DAILY
COMMUNITY

## 2018-08-07 NOTE — PROGRESS NOTES
[address]  [address] [facility]  [address] [address]  Dept: [phone]  Dept Fax: [phone]  Loc: [phone]    Thomas Fagan is a 65 y.o. female who presents today for her medical conditions/complaints as noted below. Thomas Fagan is c/o of Follow-Up from Hospital (follow up from hospital) and Hypertension (2 month follow up, she states its been running good)        HPI:   Patient is here for follow up. She reports not feeling well, palpitations for the past 2 months. She is followed by Dr. Nichelle Lynn, Cardiology, has been wearing heart event monitor. She was [facility] to [facility] for atrial flutter on 7-30-18, discharge 8-3-18. She states She was discharged on calan 240 mg daily. She went back into NSR upon discharge. Event monitor showed flutter again, needs ablation, scheduled for 8-17-18. She had chest pain with atrial flutter, none now. No current chest pain, sob. Diabetes Mellitus  Has been compliant with medications. No side effects of medications since last visit. No polyuria, polydipsia, or vision changes since last visit. No symptomatic episodes of hypoglycemia. She states blood sugar running high. She has been off metformin pm dose during hospitalization. Last Cr 0.4 . PUD stable, has mild ALISSA pain, no bleeding, cbc stable, Hgb stable. Hypertension  Compliant with medications. No adverse effects from medication. No lightheadedness, palpitations, or chest pain. She reports increased anxiety lately, dealing with mother's failing health. No depressed mood. No SI/HI. She has stress daily with this.    HPI    Past Medical History:   Diagnosis Date    Atrial flutter by electrocardiogram ([address]     Constipation     Coronary artery disease     Diabetes mellitus ([address]     Essential hypertension     Hyperlipidemia     IBS (irritable bowel syndrome)     Iron deficiency anemia     Menopause     tablet Take 3 tablets by mouth 3 times daily (Patient taking differently: Take 1,000 mg by mouth 2 times daily (with meals) 2 tablets two times per day) 90 tablet 5    predniSONE (DELTASONE) 5 MG tablet Take 5 mg by mouth Take 5 mg in the morning and 4 mg in the evening.  aspirin EC 81 MG EC tablet Take 81 mg by mouth daily      folic acid (FOLVITE) 1 MG tablet Take 1 mg by mouth daily       ondansetron (ZOFRAN) 8 MG tablet TAKE 1 TABLET BY MOUTH EVERY 8 HOURS AS NEEDED 21 tablet 0     No current facility-administered medications for this visit. Allergies   Allergen Reactions    Codeine Other (See Comments)     Chest pain    Albuterol     Sulfa Antibiotics     Augmentin [Amoxicillin-Pot Clavulanate] Palpitations       Health Maintenance   Topic Date Due    TSH testing  1955    Hepatitis C screen  1955    Diabetic retinal exam  10/24/1966    HIV screen  10/24/1971    Shingles Vaccine (1 of 2 - 2 Dose Series) 10/24/2006    Diabetic microalbuminuria test  08/27/2015    Flu vaccine (1) 09/01/2018    Lipid screen  02/07/2019    A1C test (Diabetic or Prediabetic)  04/18/2019    Potassium monitoring  04/18/2019    Creatinine monitoring  04/18/2019    Diabetic foot exam  04/24/2019    Breast cancer screen  12/05/2019    Cervical cancer screen  11/14/2020    DTaP/Tdap/Td vaccine (2 - Td) 12/31/2022    Colon cancer screen colonoscopy  01/31/2023    Pneumococcal med risk  Completed       Subjective:      Review of Systems   Constitutional: Negative for chills and fever. HENT: Negative for congestion. Respiratory: Negative for cough, chest tightness and shortness of breath. Cardiovascular: Positive for palpitations. Negative for chest pain and leg swelling. Gastrointestinal: Negative for abdominal pain, anal bleeding, constipation, diarrhea and nausea. Genitourinary: Negative for difficulty urinating. Psychiatric/Behavioral: Negative.           See HPI for visit specific review of symptoms. All others negative      Objective:   /82   Pulse 84   Ht 5' 4\" (1.626 m)   Wt 204 lb (92.5 kg)   SpO2 96%   BMI 35.02 kg/m²   Physical Exam   Constitutional: She appears well-developed. She does not appear ill. Eyes: Pupils are equal, round, and reactive to light. Neck: Normal range of motion. Neck supple. Cardiovascular: Normal rate and regular rhythm. Exam reveals no friction rub. No murmur heard. Pulmonary/Chest: Effort normal and breath sounds normal. No respiratory distress. She has no wheezes. She has no rales. Abdominal: Soft. Bowel sounds are normal. She exhibits no distension. There is no tenderness. There is no rebound and no guarding. Musculoskeletal: She exhibits no edema. Neurological: She is alert. Psychiatric: She has a normal mood and affect. Her behavior is normal.         Lab Review   No results found for this or any previous visit (from the past 672 hour(s)). Assessment & Plan: The following diagnoses and conditions are stable with no further orders unless indicated:  Dick Lockett was seen today for follow-up from hospital and hypertension. Diagnoses and all orders for this visit:    Peptic ulcer disease  Continue PPI daily. Type 2 diabetes mellitus without complication, without long-term current use of insulin (HCC)  -     Hemoglobin A1C; Future  -     Comprehensive Metabolic Panel; Future  -     MICROALBUMIN, RANDOM URINE (W/O CREATININE)  Restart metformin, GFR stable, daily accuchecks. Work on diet and exercise. Essential hypertension  Blood pressure is stable. Continue current medications. Monitor ambulatory bp readings, if persistently >140/90, return to clinic. Rheumatoid arthritis involving multiple sites, unspecified rheumatoid factor presence (HCC)  Stable, continue same. Iron deficiency anemia, unspecified iron deficiency anemia type  Stable, will follow.    Atrial flutter by electrocardiogram [facility])  Followed by Cards, continue current meds, on event monitor, plans for ablation. Familial hypercholesterolemia  -     Lipid Panel; Future    Anxiety  Buspar prn. SE profile discussed including drowsiness, will follow. Other orders  -     busPIRone (BUSPAR) 5 MG tablet; Take 1 tablet by mouth 2 times daily as needed (anxiety)    She has DCd  Lasix and K+ supplement, will recheck in 6 weeks. Return in about 6 weeks (around 9/18/2018). Discussed use, benefit, and side effects of prescribed medications. All patient questions answered. Pt voiced understanding. Reviewed health maintenance. Instructed to continue current medications, diet and exercise. Patient agreed with treatment plan. Follow up as directed.

## 2018-08-14 RX ORDER — AMIODARONE HYDROCHLORIDE 200 MG/1
TABLET ORAL
Qty: 60 TABLET | Refills: 0 | OUTPATIENT
Start: 2018-08-14

## 2018-08-14 RX ORDER — AMIODARONE HYDROCHLORIDE 200 MG/1
200 TABLET ORAL DAILY
Qty: 30 TABLET | Refills: 11 | Status: SHIPPED | OUTPATIENT
Start: 2018-08-14 | End: 2018-08-22 | Stop reason: SDUPTHER

## 2018-08-19 PROBLEM — F41.9 ANXIETY: Status: ACTIVE | Noted: 2018-08-19

## 2018-08-19 ASSESSMENT — ENCOUNTER SYMPTOMS
NAUSEA: 0
COUGH: 0
ANAL BLEEDING: 0
CHEST TIGHTNESS: 0
ABDOMINAL PAIN: 0
DIARRHEA: 0
CONSTIPATION: 0
SHORTNESS OF BREATH: 0

## 2018-08-20 RX ORDER — METOPROLOL TARTRATE 50 MG/1
50 TABLET, FILM COATED ORAL 2 TIMES DAILY
Qty: 60 TABLET | Refills: 3 | Status: SHIPPED | OUTPATIENT
Start: 2018-08-20 | End: 2018-12-27 | Stop reason: SDUPTHER

## 2018-08-22 ENCOUNTER — OFFICE VISIT (OUTPATIENT)
Dept: CARDIOLOGY | Facility: CLINIC | Age: 63
End: 2018-08-22

## 2018-08-22 VITALS
OXYGEN SATURATION: 92 % | SYSTOLIC BLOOD PRESSURE: 128 MMHG | HEIGHT: 64 IN | HEART RATE: 75 BPM | BODY MASS INDEX: 34.31 KG/M2 | DIASTOLIC BLOOD PRESSURE: 70 MMHG | WEIGHT: 201 LBS

## 2018-08-22 DIAGNOSIS — I25.10 CORONARY ARTERY DISEASE INVOLVING NATIVE CORONARY ARTERY WITHOUT ANGINA PECTORIS, UNSPECIFIED WHETHER NATIVE OR TRANSPLANTED HEART: ICD-10-CM

## 2018-08-22 DIAGNOSIS — D50.0 IRON DEFICIENCY ANEMIA DUE TO CHRONIC BLOOD LOSS: ICD-10-CM

## 2018-08-22 DIAGNOSIS — Z95.1 S/P CABG X 3: ICD-10-CM

## 2018-08-22 DIAGNOSIS — I48.4 ATYPICAL ATRIAL FLUTTER (HCC): ICD-10-CM

## 2018-08-22 DIAGNOSIS — I48.0 PAF (PAROXYSMAL ATRIAL FIBRILLATION) (HCC): ICD-10-CM

## 2018-08-22 DIAGNOSIS — K31.7 GASTRIC POLYP: ICD-10-CM

## 2018-08-22 DIAGNOSIS — I10 ESSENTIAL HYPERTENSION: Primary | ICD-10-CM

## 2018-08-22 DIAGNOSIS — D12.2 ADENOMATOUS POLYP OF ASCENDING COLON: ICD-10-CM

## 2018-08-22 DIAGNOSIS — Z95.5 S/P CORONARY ARTERY STENT PLACEMENT: ICD-10-CM

## 2018-08-22 DIAGNOSIS — E78.2 MIXED HYPERLIPIDEMIA: ICD-10-CM

## 2018-08-22 PROCEDURE — 93000 ELECTROCARDIOGRAM COMPLETE: CPT | Performed by: INTERNAL MEDICINE

## 2018-08-22 PROCEDURE — 99214 OFFICE O/P EST MOD 30 MIN: CPT | Performed by: INTERNAL MEDICINE

## 2018-08-22 RX ORDER — AMIODARONE HYDROCHLORIDE 100 MG/1
200 TABLET ORAL DAILY
Qty: 90 TABLET | Refills: 3 | Status: ON HOLD | OUTPATIENT
Start: 2018-08-22 | End: 2018-10-11

## 2018-08-22 NOTE — PROGRESS NOTES
Teresa Serna  [id number]  1955  62 y.o.  female    Referring Provider: Teresa Contreras MD    Reason for Follow-up Visit: Here for routine follow up   Paroxysmal atrial fibrillation now sinus rhythm       Subjective    Similar symptoms as during last visit  Mild chronic exertional shortness of breath on exertion relieved with rest  No significant cough or wheezing  Going on for several months  No palpitations  No associated chest pain  No significant pedal edema  No fever or chills  No significant expectoration  No hemoptysis  No presyncope or syncope   Saw Dr De Anda AF ablation tried but due to no good venous access not possible  Now  sinus rhythm much better      History of present illness:  Teresa Serna is a 62 y.o. yo female with history of Paroxysmal atrial fibrillation who presents today for   Chief Complaint   Patient presents with   • Coronary Artery Disease     1 MONTH FOLLOW UP    • Atrial Flutter     PATIENT STATED SHE WENT TO HAVE AN ABLATION AND THE DOCTOR COULD NOT DO IT, DO TO THE FACT HER VEINS WHERE TWISTED INSIDE BOTH OF HER LEGS.    .    History  Past Medical History:   Diagnosis Date   • Abnormal stress test    • Atrial flutter (CMS/HCC)    • CAD (coronary artery disease)    • CAD in native artery     CABG x 3   • Diabetes mellitus (CMS/HCC)    • Essential hypertension     Tricuspid valve insufficiency, unspecified etiology    • Hyperlipemia, mixed    • Hyperlipidemia    • Hypertension    • MI, old    • Mitral valve prolapse    • Near syncope    • Obesity, morbid (CMS/HCC)    • Palpitations    • Paroxysmal SVT (supraventricular tachycardia) (CMS/HCC)    • Paroxysmal SVT (supraventricular tachycardia) (CMS/HCC)    • Pedal edema    • Precordial pain    • Rheumatoid arthritis (CMS/HCC)    • Rheumatoid arthritis (CMS/HCC)    • S/P CABG x 3    ,   Past Surgical History:   Procedure Laterality Date   • APPENDECTOMY     • CARDIAC CATHETERIZATION Left 06/18/2012    Intensify medical therapy    • CARDIAC CATHETERIZATION Left 05/05/2002    surgery   • CHOLECYSTECTOMY     • COLONOSCOPY  02/10/2012   • COLONOSCOPY N/A 1/31/2018    Procedure: COLONOSCOPY WITH ANESTHESIA;  Surgeon: Patricia Bo MD;  Location: [facility] ENDOSCOPY;  Service:    • CORONARY ARTERY BYPASS GRAFT  05/05/2002    LIMA to LAD, SVG to D1, SVG to OM1 - x3   • CORONARY STENT PLACEMENT  09/2009    X1 - Stent to LAD, Drug Eluting   • ENDOSCOPY N/A 1/31/2018    Procedure: ESOPHAGOGASTRODUODENOSCOPY WITH ANESTHESIA;  Surgeon: Patricia Bo MD;  Location: [facility] ENDOSCOPY;  Service:    • GALLBLADDER SURGERY     • REPLACEMENT TOTAL KNEE Right    • UPPER GASTROINTESTINAL ENDOSCOPY  03/23/2015   ,   Family History   Problem Relation Age of Onset   • Cancer Father    • Coronary artery disease Father    • Coronary artery disease Brother    • Colon polyps Neg Hx    • Colon cancer Neg Hx    • Esophageal cancer Neg Hx    • Liver cancer Neg Hx    • Liver disease Neg Hx    • Rectal cancer Neg Hx    • Stomach cancer Neg Hx    ,   Social History   Substance Use Topics   • Smoking status: Former Smoker     Years: 20.00     Types: Cigarettes     Quit date: 1/31/2000   • Smokeless tobacco: Never Used   • Alcohol use No   ,     Medications  Current Outpatient Prescriptions   Medication Sig Dispense Refill   • amiodarone (PACERONE) 100 MG tablet Take 2 tablets by mouth Daily. 90 tablet 3   • apixaban (ELIQUIS) 5 MG tablet tablet Take 1 tablet by mouth Every 12 (Twelve) Hours. 60 tablet 11   • aspirin 81 MG EC tablet Take 81 mg by mouth Daily.     • folic acid (FOLVITE) 1 MG tablet Take 1 mg by mouth Daily.     • glyBURIDE (DIAbeta) 5 MG tablet Take 2.5 mg by mouth 2 (Two) Times a Day With Meals.     • isosorbide mononitrate (IMDUR) 30 MG 24 hr tablet Take 30 mg by mouth Daily.     • losartan (COZAAR) 25 MG tablet Take 25 mg by mouth Daily.     • metFORMIN (GLUCOPHAGE) 1000 MG tablet Take 1,000 mg by mouth Daily With Breakfast.     • metoprolol tartrate  (LOPRESSOR) 50 MG tablet Take 50 mg by mouth 2 (Two) Times a Day.     • nitroglycerin (NITROSTAT) 0.4 MG SL tablet Place 0.4 mg under the tongue Every 5 (Five) Minutes As Needed for Chest Pain. Take no more than 3 doses in 15 minutes.     • pantoprazole (PROTONIX) 40 MG EC tablet Take 1 tablet by mouth Every 12 (Twelve) Hours. 60 tablet 1   • predniSONE (DELTASONE) 1 MG tablet Take 4 mg by mouth Every Night.     • predniSONE (DELTASONE) 5 MG tablet Take 5 mg by mouth Every Morning.     • simvastatin (ZOCOR) 40 MG tablet Take 40 mg by mouth Every Night.     • Tofacitinib Citrate (XELJANZ XR) 11 MG tablet sustained-release 24 hour Take 1 tablet by mouth Daily.     • verapamil SR (CALAN-SR) 240 MG CR tablet Take 1 tablet by mouth Daily. 30 tablet 11   • ferrous sulfate 325 (65 FE) MG tablet Take 1 tablet by mouth Daily With Breakfast. (Patient not taking: Reported on 7/18/2018) 30 tablet 1   • furosemide (LASIX) 20 MG tablet Take 20 mg by mouth Daily.     • potassium chloride (K-DUR) 10 MEQ CR tablet Take 10 mEq by mouth Daily.       No current facility-administered medications for this visit.      Results for orders placed during the hospital encounter of 07/18/18   Adult Transthoracic Echo Complete W/ Cont if Necessary Per Protocol    Narrative · Left ventricular systolic function is normal. Estimated EF = 55%.  · Left ventricular diastolic dysfunction.  · Left atrial cavity size is mildly dilated.  · No evidence of pulmonary hypertension is present.          Allergies:  Codeine; Albuterol; Augmentin [amoxicillin-pot clavulanate]; and Sulfa antibiotics    Review of Systems  Review of Systems   Constitution: Positive for weakness and malaise/fatigue.   HENT: Negative.    Eyes: Negative.    Cardiovascular: Positive for dyspnea on exertion and palpitations. Negative for chest pain, claudication, cyanosis, irregular heartbeat, leg swelling, near-syncope, orthopnea, paroxysmal nocturnal dyspnea and syncope.   Respiratory:  "Negative.    Endocrine: Negative.    Hematologic/Lymphatic: Negative.    Skin: Negative.    Musculoskeletal: Positive for arthritis, back pain and joint pain.   Gastrointestinal: Negative for anorexia.   Genitourinary: Negative.    Psychiatric/Behavioral: Negative.        Objective     Physical Exam:  /70 (BP Location: Right arm, Patient Position: Sitting, Cuff Size: Adult)   Pulse 75   Ht 162.6 cm (64\")   Wt 91.2 kg (201 lb)   SpO2 92%   BMI 34.50 kg/m²   Physical Exam   Constitutional: She appears well-developed.   HENT:   Head: Normocephalic.   Neck: Normal carotid pulses and no JVD present. No tracheal tenderness present. Carotid bruit is not present. No tracheal deviation and no edema present.   Cardiovascular: Regular rhythm and normal pulses.    Murmur heard.   Systolic murmur is present with a grade of 2/6   Pulmonary/Chest: Effort normal. No stridor. She has wheezes.   Abdominal: Soft.   Neurological: She is alert. She has normal strength. No cranial nerve deficit or sensory deficit.   Skin: Skin is warm.   Psychiatric: She has a normal mood and affect. Her speech is normal and behavior is normal.   B/L  groin: Venotomy site healthy,  No bleeding, swelling   3 cm  bruising. Preserved distal pulses.    Results Review:       ECG 12 Lead  Date/Time: 8/22/2018 12:35 PM  Performed by: RADHA PRINCE  Authorized by: RADHA PRINCE   Comparison: compared with previous ECG from 7/30/2018  Similar to previous ECG  Rhythm: sinus rhythm  Ectopy: atrial premature contractions  Rate: normal  ST Segments: ST segments normal  T Waves: T waves normal  QRS axis: normal  Other: no other findings  Clinical impression: non-specific ECG            Assessment/Plan   Patient Active Problem List   Diagnosis   • CAD (coronary artery disease)   • Atrial flutter (CMS/HCC)   • Type 2 diabetes mellitus without complication, without long-term current use of insulin (CMS/HCC)   • Mixed hyperlipidemia   • S/P CABG x 3 2000 Dr Larsen " "  • Shoulder blade pain   • Anemia   • Abnormal finding on imaging   • History of esophagitis   • Essential hypertension   • S/P coronary artery stent placement   • Gastric polyp   • Colon polyps   • PAF (paroxysmal atrial fibrillation) (CMS/HCC)     Low risk stress echo with normal LVEF by echo cardiogram.       Plan:    Similar recommendations as last visit    No additional cardiac testing required at this point in time.     Low salt/ HTN/ Heart healthy carbohydrate restricted cardiac diet as applicable to this patient's current diagnoses.   This handout has relevant information regarding shopping for food, preparing meals, what to eat at restaurants, tracking of food intake, information regarding sodium intake and salt content, how to read food labels, knowing what to eat, tips reagarding physical activity, calorie count and calorie expenditure. What foods to avoid. Information regarding alcoholic drinks along with \"good\" and \"bad\" fats.  Reiterated prior recommendations     The patient is advised to reduce or avoid caffeine or other cardiac stimulants.     The patient was advised that NSAID-type medications have three  very important potential side effects: cardiovascular complications, gastrointestinal irritation including hemorrhage and renal injuries.  Do not use anti-inflammatories such as Motrin/ibuprofen, Alleve/naprosyn, Mobic and like medications Use Tylenol instead.The patient expresses understanding of these issues and questions were answered.   Monitor for any signs of bleeding including red or dark stools. Fall precautions.       Monitor for any signs of bleeding including red or dark stools. Fall precautions.   Patient is asked to monitor BP at home or work, several times per month and return with written values at next office visit.     Advised staying uptodate with immunizations per established standard guidelines.    Reviewed available prior notes, consults, prior visits, laboratory findings, " radiology And cardiology relevant reports. Updated chart as applicable. I have reviewed the patient's medical history in detail and updated the computerized patient record as relevant.    Updated patient regarding any new or relevant abnormalities on review of records or any new findings on physical exam. Mentioned to patient about purpose of visit and desirable health short and long term goals and objectives.    Offered to give patient  a copy of my notes and relevant tests/ prior ECG etc for the patient to review and follow specific advise and relevant findings if any, prognosis, along with my current and future plans.      Questions were encouraged, asked and answered to the patient's  understanding and satisfaction. Questions if any regarding current medications and side effects, need for refills and importance of compliance to medications stressed.    Reviewed available prior notes, consults, prior visits, laboratory findings, radiology and cardiology relevant reports. Updated chart as applicable. I have reviewed the patient's medical history in detail and updated the computerized patient record as relevant.    Updated patient regarding any new or relevant abnormalities on review of records or any new findings on physical exam. Mentioned to patient about purpose of visit and desirable health short and long term goals and objectives.    Keep A1c less than 7 Primary to monitor  Keep LDL below 70 mg/dl. Monitor liver and renal functions.   Monitor CBC, CMP, TSH (as indicated) and Lipid Panel by primary     Eye exam, pulmonary function tests   Discussed Amiodarone induced toxicity and required monitoring and close follow up for this  Decrease Amiodarone to 100 mg daily  Requested Prescriptions     Signed Prescriptions Disp Refills   • amiodarone (PACERONE) 100 MG tablet 90 tablet 3     Sig: Take 2 tablets by mouth Daily.            Return in about 3 months (around 11/22/2018).

## 2018-08-23 NOTE — TELEPHONE ENCOUNTER
[facility] - CALLED TO CLARIFY PATIENTS RX FOR AMMIODARONE      PER LOV 8/22/18 - PT IS TO DECREASE  MG DAILY    PHARMACY NOTIFIED

## 2018-09-13 RX ORDER — LOSARTAN POTASSIUM 25 MG/1
25 TABLET ORAL DAILY
Qty: 30 TABLET | Refills: 0 | Status: SHIPPED | OUTPATIENT
Start: 2018-09-13 | End: 2018-09-24 | Stop reason: CLARIF

## 2018-09-13 RX ORDER — SIMVASTATIN 40 MG
40 TABLET ORAL EVERY EVENING
Qty: 30 TABLET | Refills: 0 | Status: SHIPPED | OUTPATIENT
Start: 2018-09-13 | End: 2018-09-24 | Stop reason: CLARIF

## 2018-09-13 RX ORDER — AMIODARONE HYDROCHLORIDE 200 MG/1
TABLET ORAL
Qty: 60 TABLET | Refills: 0 | OUTPATIENT
Start: 2018-09-13

## 2018-09-13 RX ORDER — SIMVASTATIN 40 MG
40 TABLET ORAL EVERY EVENING
Qty: 30 TABLET | Refills: 0 | Status: SHIPPED | OUTPATIENT
Start: 2018-09-13 | End: 2018-10-12 | Stop reason: SDUPTHER

## 2018-09-19 DIAGNOSIS — D50.9 IRON DEFICIENCY ANEMIA, UNSPECIFIED IRON DEFICIENCY ANEMIA TYPE: ICD-10-CM

## 2018-09-19 DIAGNOSIS — E11.9 TYPE 2 DIABETES MELLITUS WITHOUT COMPLICATION, WITHOUT LONG-TERM CURRENT USE OF INSULIN (HCC): ICD-10-CM

## 2018-09-19 DIAGNOSIS — E78.01 FAMILIAL HYPERCHOLESTEROLEMIA: ICD-10-CM

## 2018-09-19 LAB
ALBUMIN SERPL-MCNC: 4.1 G/DL (ref 3.5–5.2)
ALP BLD-CCNC: 68 U/L (ref 35–104)
ALT SERPL-CCNC: 34 U/L (ref 5–33)
ANION GAP SERPL CALCULATED.3IONS-SCNC: 14 MMOL/L (ref 7–19)
AST SERPL-CCNC: 19 U/L (ref 5–32)
BILIRUB SERPL-MCNC: 1.2 MG/DL (ref 0.2–1.2)
BUN BLDV-MCNC: 13 MG/DL (ref 8–23)
CALCIUM SERPL-MCNC: 9.1 MG/DL (ref 8.8–10.2)
CHLORIDE BLD-SCNC: 103 MMOL/L (ref 98–111)
CHOLESTEROL, TOTAL: 120 MG/DL (ref 160–199)
CO2: 26 MMOL/L (ref 22–29)
CREAT SERPL-MCNC: 0.5 MG/DL (ref 0.5–0.9)
GFR NON-AFRICAN AMERICAN: >60
GLUCOSE BLD-MCNC: 94 MG/DL (ref 74–109)
HBA1C MFR BLD: 8.4 % (ref 4–6)
HCT VFR BLD CALC: 42.4 % (ref 37–47)
HDLC SERPL-MCNC: 74 MG/DL (ref 65–121)
HEMOGLOBIN: 13 G/DL (ref 12–16)
LDL CHOLESTEROL CALCULATED: 26 MG/DL
MCH RBC QN AUTO: 30.7 PG (ref 27–31)
MCHC RBC AUTO-ENTMCNC: 30.7 G/DL (ref 33–37)
MCV RBC AUTO: 100 FL (ref 81–99)
PDW BLD-RTO: 15.2 % (ref 11.5–14.5)
PLATELET # BLD: 205 K/UL (ref 130–400)
PMV BLD AUTO: 10.8 FL (ref 9.4–12.3)
POTASSIUM SERPL-SCNC: 3.6 MMOL/L (ref 3.5–5)
RBC # BLD: 4.24 M/UL (ref 4.2–5.4)
SODIUM BLD-SCNC: 143 MMOL/L (ref 136–145)
TOTAL PROTEIN: 6.5 G/DL (ref 6.6–8.7)
TRIGL SERPL-MCNC: 102 MG/DL (ref 0–149)
WBC # BLD: 9 K/UL (ref 4.8–10.8)

## 2018-09-24 ENCOUNTER — OFFICE VISIT (OUTPATIENT)
Dept: FAMILY MEDICINE CLINIC | Age: 63
End: 2018-09-24
Payer: MEDICARE

## 2018-09-24 VITALS
HEART RATE: 69 BPM | BODY MASS INDEX: 35.7 KG/M2 | TEMPERATURE: 97.5 F | WEIGHT: 208 LBS | SYSTOLIC BLOOD PRESSURE: 112 MMHG | DIASTOLIC BLOOD PRESSURE: 62 MMHG | OXYGEN SATURATION: 98 %

## 2018-09-24 DIAGNOSIS — E78.01 FAMILIAL HYPERCHOLESTEROLEMIA: ICD-10-CM

## 2018-09-24 DIAGNOSIS — N93.9 VAGINAL BLEEDING: ICD-10-CM

## 2018-09-24 DIAGNOSIS — D50.9 IRON DEFICIENCY ANEMIA, UNSPECIFIED IRON DEFICIENCY ANEMIA TYPE: ICD-10-CM

## 2018-09-24 DIAGNOSIS — E11.9 TYPE 2 DIABETES MELLITUS WITHOUT COMPLICATION, WITHOUT LONG-TERM CURRENT USE OF INSULIN (HCC): Primary | ICD-10-CM

## 2018-09-24 DIAGNOSIS — F41.9 ANXIETY: ICD-10-CM

## 2018-09-24 DIAGNOSIS — I10 ESSENTIAL HYPERTENSION: ICD-10-CM

## 2018-09-24 DIAGNOSIS — R07.9 CHEST PAIN, UNSPECIFIED TYPE: ICD-10-CM

## 2018-09-24 PROCEDURE — G8417 CALC BMI ABV UP PARAM F/U: HCPCS | Performed by: FAMILY MEDICINE

## 2018-09-24 PROCEDURE — 3045F PR MOST RECENT HEMOGLOBIN A1C LEVEL 7.0-9.0%: CPT | Performed by: FAMILY MEDICINE

## 2018-09-24 PROCEDURE — 1036F TOBACCO NON-USER: CPT | Performed by: FAMILY MEDICINE

## 2018-09-24 PROCEDURE — G8427 DOCREV CUR MEDS BY ELIG CLIN: HCPCS | Performed by: FAMILY MEDICINE

## 2018-09-24 PROCEDURE — 99214 OFFICE O/P EST MOD 30 MIN: CPT | Performed by: FAMILY MEDICINE

## 2018-09-24 PROCEDURE — 3017F COLORECTAL CA SCREEN DOC REV: CPT | Performed by: FAMILY MEDICINE

## 2018-09-24 PROCEDURE — 93000 ELECTROCARDIOGRAM COMPLETE: CPT | Performed by: FAMILY MEDICINE

## 2018-09-24 PROCEDURE — 2022F DILAT RTA XM EVC RTNOPTHY: CPT | Performed by: FAMILY MEDICINE

## 2018-09-24 ASSESSMENT — PATIENT HEALTH QUESTIONNAIRE - PHQ9
2. FEELING DOWN, DEPRESSED OR HOPELESS: 0
1. LITTLE INTEREST OR PLEASURE IN DOING THINGS: 1
SUM OF ALL RESPONSES TO PHQ QUESTIONS 1-9: 1
SUM OF ALL RESPONSES TO PHQ9 QUESTIONS 1 & 2: 1
SUM OF ALL RESPONSES TO PHQ QUESTIONS 1-9: 1

## 2018-09-24 ASSESSMENT — ENCOUNTER SYMPTOMS
NAUSEA: 0
DIARRHEA: 0
CHEST TIGHTNESS: 0
COUGH: 0
CONSTIPATION: 0
ABDOMINAL PAIN: 0
SHORTNESS OF BREATH: 0
ANAL BLEEDING: 0

## 2018-09-24 NOTE — PROGRESS NOTES
with medications. No side effects of medications since last visit. No polyuria, polydipsia, or vision changes since last visit. She has occasional low blood sugar readings in the 50's. A1C 8.4. HPI    Past Medical History:   Diagnosis Date    Atrial flutter by electrocardiogram ([facility] [address]     Constipation     Coronary artery disease     Diabetes mellitus ([facility] [address]     Essential hypertension     Hyperlipidemia     IBS (irritable bowel syndrome)     Iron deficiency anemia     Menopause     Paroxysmal SVT (supraventricular tachycardia) (HCC)     Rheumatoid arthritis (HCC)       Past Surgical History:   Procedure Laterality Date    CHOLECYSTECTOMY      CORONARY ARTERY BYPASS GRAFT      TOTAL KNEE ARTHROPLASTY         Family History   Problem Relation Age of Onset    Heart Attack Father     Heart Attack Brother        Social History   Substance Use Topics    Smoking status: Former Smoker     Packs/day: 0.50     Years: 20.00     Quit date: 9/24/2000    Smokeless tobacco: Never Used    Alcohol use No      Current Outpatient Prescriptions   Medication Sig Dispense Refill    simvastatin (ZOCOR) 40 MG tablet TAKE 1 TABLET BY MOUTH EVERY EVENING 30 tablet 0    metoprolol tartrate (LOPRESSOR) 50 MG tablet Take 1 tablet by mouth 2 times daily 60 tablet 3    apixaban (ELIQUIS) 5 MG TABS tablet Take 5 mg by mouth 2 times daily       losartan (COZAAR) 25 MG tablet Take 25 mg by mouth daily       Tofacitinib Citrate 11 MG TB24 Take 1 tablet by mouth daily       verapamil (CALAN SR) 240 MG extended release tablet Take 240 mg by mouth nightly       amiodarone (CORDARONE) 200 MG tablet Take 100 mg by mouth daily       glyBURIDE (DIABETA) 5 MG tablet Take 5 mg by mouth daily (with breakfast) Taking half tablet 2 times per day      isosorbide mononitrate (IMDUR) 30 MG extended release tablet TAKE 1 TABLET BY MOUTH DAILY.  30 tablet 3    pantoprazole (PROTONIX) 40 MG tablet Take 1 tablet by mouth daily (Patient taking differently: Take 40 mg by mouth 2 times daily ) 30 tablet 3    metFORMIN (GLUCOPHAGE) 500 MG tablet Take 3 tablets by mouth 3 times daily (Patient taking differently: Take 1,000 mg by mouth 2 times daily (with meals) 2 tablets two times per day) 90 tablet 5    predniSONE (DELTASONE) 5 MG tablet Take 5 mg by mouth Take 5 mg in the morning and 4 mg in the evening.  aspirin EC 81 MG EC tablet Take 81 mg by mouth daily      folic acid (FOLVITE) 1 MG tablet Take 1 mg by mouth daily       busPIRone (BUSPAR) 5 MG tablet Take 1 tablet by mouth 2 times daily as needed (anxiety) 60 tablet 2    FREESTYLE LITE strip USE 1 STRIP TWICE DAILY TO CHECK BLOOD SUGAR 100 strip 0    nitroGLYCERIN (NITROSTAT) 0.4 MG SL tablet Place 1 tablet under the tongue every 5 minutes as needed for Chest pain 25 tablet 2    FREESTYLE LANCETS MISC TEST SUGAR DAILY AS DIRECTED 100 each 2    ondansetron (ZOFRAN) 8 MG tablet TAKE 1 TABLET BY MOUTH EVERY 8 HOURS AS NEEDED 21 tablet 0     No current facility-administered medications for this visit.       Allergies   Allergen Reactions    Codeine Other (See Comments)     Chest pain    Albuterol     Sulfa Antibiotics     Augmentin [Amoxicillin-Pot Clavulanate] Palpitations       Health Maintenance   Topic Date Due    TSH testing  1955    Hepatitis C screen  1955    Diabetic retinal exam  10/24/1966    HIV screen  10/24/1971    Shingles Vaccine (1 of 2 - 2 Dose Series) 10/24/2006    Diabetic microalbuminuria test  08/27/2015    Flu vaccine (1) 10/01/2018 (Originally 9/1/2018)    Diabetic foot exam  04/24/2019    A1C test (Diabetic or Prediabetic)  09/19/2019    Lipid screen  09/19/2019    Potassium monitoring  09/19/2019    Creatinine monitoring  09/19/2019    Breast cancer screen  12/05/2019    Cervical cancer screen  11/14/2020    DTaP/Tdap/Td vaccine (2 - Td) 12/31/2022    Colon cancer screen colonoscopy  01/31/2023    Pneumococcal med risk Completed       Subjective:      Review of Systems   Constitutional: Negative for chills and fever. HENT: Negative for congestion. Respiratory: Negative for cough, chest tightness and shortness of breath. Cardiovascular: Positive for chest pain. Negative for palpitations and leg swelling. Gastrointestinal: Negative for abdominal pain, anal bleeding, constipation, diarrhea and nausea. Genitourinary: Positive for vaginal bleeding. Negative for difficulty urinating. Psychiatric/Behavioral: Negative. See HPI for visit specific review of symptoms. All others negative      Objective:   /62   Pulse 69   Temp 97.5 °F (36.4 °C)   Wt 208 lb (94.3 kg)   SpO2 98%   BMI 35.70 kg/m²   Physical Exam   Constitutional: She appears well-developed. She does not appear ill. Eyes: Pupils are equal, round, and reactive to light. Neck: Normal range of motion. Neck supple. Cardiovascular: Normal rate and regular rhythm. Exam reveals no friction rub. No murmur heard. Pulmonary/Chest: Effort normal and breath sounds normal. No respiratory distress. She has no wheezes. She has no rales. Abdominal: Soft. Bowel sounds are normal. She exhibits no distension. There is no tenderness. There is no rebound and no guarding. Musculoskeletal: She exhibits no edema. Neurological: She is alert. Psychiatric: She has a normal mood and affect.  Her behavior is normal.         Lab Review   Recent Results (from the past 672 hour(s))   Hemoglobin A1C    Collection Time: 09/19/18  9:45 AM   Result Value Ref Range    Hemoglobin A1C 8.4 (H) 4.0 - 6.0 %   Comprehensive Metabolic Panel    Collection Time: 09/19/18  9:45 AM   Result Value Ref Range    Sodium 143 136 - 145 mmol/L    Potassium 3.6 3.5 - 5.0 mmol/L    Chloride 103 98 - 111 mmol/L    CO2 26 22 - 29 mmol/L    Anion Gap 14 7 - 19 mmol/L    Glucose 94 74 - 109 mg/dL    BUN 13 8 - 23 mg/dL    CREATININE 0.5 0.5 - 0.9 mg/dL    GFR Non-African American >60 Iron deficiency anemia, unspecified iron deficiency anemia type  Lab Results   Component Value Date    WBC 9.0 09/19/2018    HGB 13.0 09/19/2018    HCT 42.4 09/19/2018    .0 (H) 09/19/2018     09/19/2018     Stable, continue to follow. Anxiety    Buspar prn. Good support network, will follow. Return in about 6 weeks (around 11/5/2018). Discussed use, benefit, and side effects of prescribed medications. All patient questions answered. Pt voiced understanding. Reviewed health maintenance. Instructed to continue current medications, diet and exercise. Patient agreed with treatment plan. Follow up as directed.

## 2018-09-25 ENCOUNTER — TRANSCRIBE ORDERS (OUTPATIENT)
Dept: ADMINISTRATIVE | Facility: HOSPITAL | Age: 63
End: 2018-09-25

## 2018-09-25 DIAGNOSIS — R07.9 CHEST PAIN, UNSPECIFIED TYPE: Primary | ICD-10-CM

## 2018-09-28 ENCOUNTER — APPOINTMENT (OUTPATIENT)
Dept: CARDIOLOGY | Facility: HOSPITAL | Age: 63
End: 2018-09-28
Attending: FAMILY MEDICINE

## 2018-10-02 ENCOUNTER — HOSPITAL ENCOUNTER (OUTPATIENT)
Dept: ULTRASOUND IMAGING | Age: 63
Discharge: HOME | End: 2018-10-02
Payer: MEDICARE

## 2018-10-02 DIAGNOSIS — N93.9 VAGINAL BLEEDING: ICD-10-CM

## 2018-10-02 PROCEDURE — 76856 US EXAM PELVIC COMPLETE: CPT

## 2018-10-04 ENCOUNTER — TELEPHONE (OUTPATIENT)
Dept: CARDIOLOGY | Facility: CLINIC | Age: 63
End: 2018-10-04

## 2018-10-04 NOTE — TELEPHONE ENCOUNTER
PT HAS CALLED STATING HER HEART RATE IS BACK UP, WANTS TO KNOW IF YOU WANT TO INCREASE ONE OF HER MEDS

## 2018-10-05 ENCOUNTER — OFFICE VISIT (OUTPATIENT)
Dept: OBGYN | Age: 63
End: 2018-10-05
Payer: MEDICARE

## 2018-10-05 VITALS
HEIGHT: 64 IN | WEIGHT: 206 LBS | SYSTOLIC BLOOD PRESSURE: 138 MMHG | DIASTOLIC BLOOD PRESSURE: 64 MMHG | BODY MASS INDEX: 35.17 KG/M2

## 2018-10-05 DIAGNOSIS — N95.0 PMB (POSTMENOPAUSAL BLEEDING): Primary | ICD-10-CM

## 2018-10-05 PROCEDURE — G8484 FLU IMMUNIZE NO ADMIN: HCPCS | Performed by: OBSTETRICS & GYNECOLOGY

## 2018-10-05 PROCEDURE — 1036F TOBACCO NON-USER: CPT | Performed by: OBSTETRICS & GYNECOLOGY

## 2018-10-05 PROCEDURE — 99204 OFFICE O/P NEW MOD 45 MIN: CPT | Performed by: OBSTETRICS & GYNECOLOGY

## 2018-10-05 PROCEDURE — G8427 DOCREV CUR MEDS BY ELIG CLIN: HCPCS | Performed by: OBSTETRICS & GYNECOLOGY

## 2018-10-05 PROCEDURE — 58100 BIOPSY OF UTERUS LINING: CPT | Performed by: OBSTETRICS & GYNECOLOGY

## 2018-10-05 PROCEDURE — 3017F COLORECTAL CA SCREEN DOC REV: CPT | Performed by: OBSTETRICS & GYNECOLOGY

## 2018-10-05 PROCEDURE — G8417 CALC BMI ABV UP PARAM F/U: HCPCS | Performed by: OBSTETRICS & GYNECOLOGY

## 2018-10-05 ASSESSMENT — ENCOUNTER SYMPTOMS
RESPIRATORY NEGATIVE: 1
GASTROINTESTINAL NEGATIVE: 1
EYES NEGATIVE: 1
ALLERGIC/IMMUNOLOGIC NEGATIVE: 1

## 2018-10-05 NOTE — TELEPHONE ENCOUNTER
PT CAME IN OFFICE SAYS SHE FEELS BETTER TODAY, WANTS TO WAIT UNTIL HER NEXT APPT BEFORE SHE DOES ANYTHING WITH MEDS.

## 2018-10-05 NOTE — PROGRESS NOTES
 Rheumatoid arthritis ([facility] [address]      Past Surgical History:   Procedure Laterality Date    CHOLECYSTECTOMY      CORONARY ARTERY BYPASS GRAFT      TOTAL KNEE ARTHROPLASTY       Family History   Problem Relation Age of Onset    Heart Attack Father     Prostate Cancer Father     Stroke Father     Heart Attack Brother     Stroke Brother      Social History   Substance Use Topics    Smoking status: Former Smoker     Packs/day: 0.50     Years: 20.00     Quit date: 9/24/2000    Smokeless tobacco: Never Used    Alcohol use No     /64   Ht 5' 4\" (1.626 m)   Wt 206 lb (93.4 kg)   BMI 35.36 kg/m²   Objective:   Physical Exam   Constitutional: She is oriented to person, place, and time. She appears well-developed and well-nourished. No distress. HENT:   Head: Normocephalic and atraumatic. Mouth/Throat: Oropharynx is clear and moist.   Eyes: Pupils are equal, round, and reactive to light. Conjunctivae and EOM are normal.   Neck: Normal range of motion. Pulmonary/Chest: Effort normal. No respiratory distress. Abdominal: Soft. She exhibits no distension and no mass. There is no tenderness. There is no rebound and no guarding. Genitourinary: Rectum normal, vagina normal and uterus normal. There is no rash, tenderness or lesion on the right labia. There is no rash, tenderness or lesion on the left labia. Cervix exhibits no motion tenderness, no discharge and no friability. Right adnexum displays no mass, no tenderness and no fullness. Left adnexum displays no mass, no tenderness and no fullness. Genitourinary Comments: Uterus 6 wk size   Musculoskeletal: Normal range of motion. Neurological: She is alert and oriented to person, place, and time. No cranial nerve deficit. Skin: Skin is warm and dry. No rash noted. Psychiatric: She has a normal mood and affect.  Her speech is normal and behavior is normal. Judgment and thought content normal. Cognition and memory are normal.       Assessment: Diagnosis Orders   1. PMB (postmenopausal bleeding)  Surgical Pathology         Plan:      1. Pelvic ultrasound reviewed, told it is normal.  2. Told pt we will need an EMB. 3. EMB done today. Very scant sample obtained. Patient informed will likely need hysteroscopy if sample inadequate. 4. Will notify of results  I Dick Stearns, am scribing for and in the presence of Dr. Denver Jack 10/5/46635:14 PM.      I, Dr. Denver Jack, personally performed the services described in this documentation as scribed by Dick Stearns in my presence, and it is both accurate and complete.        Amalia Yoo MD

## 2018-10-05 NOTE — PROGRESS NOTES
Alis Hernadez is a 65 y.o.  with history of menorrhagia who presents today for endometrial biopsy. /65   Ht 5' 4\" (1.626 m)   Wt 206 lb (93.4 kg)   BMI 35.36 kg/m²     Endometrial Biopsy Procedure Note    Pre-operative Diagnosis: PMB    Post-operative Diagnosis: same    Indications: postmenopausal bleeding    Procedure Details    Urine pregnancy test was not done. The risks (including infection, bleeding, pain, and uterine perforation) and benefits of the procedure were explained to the patient and Written informed consent was obtained. Antibiotic prophylaxis against endocarditis was not indicated. The patient was placed in the dorsal lithotomy position. Bimanual exam showed the uterus to be in the neutral position. A Graves' speculum inserted in the vagina, and the cervix prepped with povidone iodine. Endocervical curettage with a [facility] curette was performed. A sharp tenaculum was applied to the anterior lip of the cervix for stabilization. A sterile uterine sound was used to sound the uterus to a depth of 7.5cm. A Pipelle endometrial aspirator was used to sample the endometrium. Scant sample obtained. Sample was sent for pathologic examination. Condition:  Stable    Complications:  None    Plan:    1. The patient was advised to call for any fever or for prolonged or severe pain or bleeding. She was advised to use OTC ibuprofen as needed for mild to moderate pain. She was advised to avoid vaginal intercourse for 48 hours or until the bleeding has completely stopped. 2. Small sample obtained. Told pt may need to do the biopsy in OR - Hysteroscopy, Myosure, D&C. I, Dr. Alexx Sommer, personally performed the services described in this documentation as scribed by Lindsey Desai in my presence, and it is both accurate and complete.

## 2018-10-05 NOTE — PATIENT INSTRUCTIONS
or this instruction, always ask your healthcare professional. [place] [id number] any warranty or liability for your use of this information.

## 2018-10-08 ENCOUNTER — APPOINTMENT (OUTPATIENT)
Dept: CARDIOLOGY | Facility: HOSPITAL | Age: 63
End: 2018-10-08
Attending: FAMILY MEDICINE

## 2018-10-11 ENCOUNTER — HOSPITAL ENCOUNTER (OUTPATIENT)
Facility: HOSPITAL | Age: 63
Setting detail: OBSERVATION
Discharge: HOME | End: 2018-10-12
Attending: EMERGENCY MEDICINE | Admitting: INTERNAL MEDICINE

## 2018-10-11 ENCOUNTER — APPOINTMENT (OUTPATIENT)
Dept: GENERAL RADIOLOGY | Facility: HOSPITAL | Age: 63
End: 2018-10-11

## 2018-10-11 DIAGNOSIS — I48.91 ATRIAL FIBRILLATION WITH RAPID VENTRICULAR RESPONSE (HCC): Primary | ICD-10-CM

## 2018-10-11 LAB
ALBUMIN SERPL-MCNC: 4.8 G/DL (ref 3.5–5)
ALBUMIN/GLOB SERPL: 1.5 G/DL (ref 1.1–2.5)
ALP SERPL-CCNC: 75 U/L (ref 24–120)
ALT SERPL W P-5'-P-CCNC: 45 U/L (ref 0–54)
ANION GAP SERPL CALCULATED.3IONS-SCNC: 13 MMOL/L (ref 4–13)
AST SERPL-CCNC: 41 U/L (ref 7–45)
BASOPHILS # BLD AUTO: 0.05 10*3/MM3 (ref 0–0.2)
BASOPHILS NFR BLD AUTO: 0.5 % (ref 0–2)
BILIRUB SERPL-MCNC: 1.6 MG/DL (ref 0.1–1)
BUN BLD-MCNC: 14 MG/DL (ref 5–21)
BUN/CREAT SERPL: 22.6 (ref 7–25)
CALCIUM SPEC-SCNC: 9.6 MG/DL (ref 8.4–10.4)
CHLORIDE SERPL-SCNC: 101 MMOL/L (ref 98–110)
CO2 SERPL-SCNC: 29 MMOL/L (ref 24–31)
CREAT BLD-MCNC: 0.62 MG/DL (ref 0.5–1.4)
DEPRECATED RDW RBC AUTO: 50.9 FL (ref 40–54)
DIGOXIN SERPL-MCNC: <0.4 NG/ML (ref 0.8–2)
EOSINOPHIL # BLD AUTO: 0.06 10*3/MM3 (ref 0–0.7)
EOSINOPHIL NFR BLD AUTO: 0.6 % (ref 0–4)
ERYTHROCYTE [DISTWIDTH] IN BLOOD BY AUTOMATED COUNT: 14.6 % (ref 12–15)
GFR SERPL CREATININE-BSD FRML MDRD: 98 ML/MIN/1.73
GLOBULIN UR ELPH-MCNC: 3.2 GM/DL
GLUCOSE BLD-MCNC: 156 MG/DL (ref 70–100)
HCT VFR BLD AUTO: 48 % (ref 37–47)
HGB BLD-MCNC: 15.6 G/DL (ref 12–16)
HOLD SPECIMEN: NORMAL
HOLD SPECIMEN: NORMAL
IMM GRANULOCYTES # BLD: 0.09 10*3/MM3 (ref 0–0.03)
IMM GRANULOCYTES NFR BLD: 0.9 % (ref 0–5)
INR PPP: 0.92 (ref 0.91–1.09)
LYMPHOCYTES # BLD AUTO: 1.81 10*3/MM3 (ref 0.72–4.86)
LYMPHOCYTES NFR BLD AUTO: 18.4 % (ref 15–45)
MAGNESIUM SERPL-MCNC: 1.9 MG/DL (ref 1.4–2.2)
MCH RBC QN AUTO: 30.7 PG (ref 28–32)
MCHC RBC AUTO-ENTMCNC: 32.5 G/DL (ref 33–36)
MCV RBC AUTO: 94.5 FL (ref 82–98)
MONOCYTES # BLD AUTO: 0.59 10*3/MM3 (ref 0.19–1.3)
MONOCYTES NFR BLD AUTO: 6 % (ref 4–12)
NEUTROPHILS # BLD AUTO: 7.23 10*3/MM3 (ref 1.87–8.4)
NEUTROPHILS NFR BLD AUTO: 73.6 % (ref 39–78)
NRBC BLD MANUAL-RTO: 0 /100 WBC (ref 0–0)
PLATELET # BLD AUTO: 231 10*3/MM3 (ref 130–400)
PMV BLD AUTO: 10.7 FL (ref 6–12)
POTASSIUM BLD-SCNC: 3.7 MMOL/L (ref 3.5–5.3)
PROT SERPL-MCNC: 8 G/DL (ref 6.3–8.7)
PROTHROMBIN TIME: 12.6 SECONDS (ref 11.9–14.6)
RBC # BLD AUTO: 5.08 10*6/MM3 (ref 4.2–5.4)
SODIUM BLD-SCNC: 143 MMOL/L (ref 135–145)
TROPONIN I SERPL-MCNC: 0.05 NG/ML (ref 0–0.03)
TROPONIN I SERPL-MCNC: <0.012 NG/ML (ref 0–0.03)
WBC NRBC COR # BLD: 9.83 10*3/MM3 (ref 4.8–10.8)
WHOLE BLOOD HOLD SPECIMEN: NORMAL
WHOLE BLOOD HOLD SPECIMEN: NORMAL

## 2018-10-11 PROCEDURE — 36415 COLL VENOUS BLD VENIPUNCTURE: CPT | Performed by: EMERGENCY MEDICINE

## 2018-10-11 PROCEDURE — 93005 ELECTROCARDIOGRAM TRACING: CPT

## 2018-10-11 PROCEDURE — 25010000002 MAGNESIUM SULFATE 2 GM/50ML SOLUTION: Performed by: EMERGENCY MEDICINE

## 2018-10-11 PROCEDURE — 83735 ASSAY OF MAGNESIUM: CPT | Performed by: EMERGENCY MEDICINE

## 2018-10-11 PROCEDURE — 96367 TX/PROPH/DG ADDL SEQ IV INF: CPT

## 2018-10-11 PROCEDURE — G0378 HOSPITAL OBSERVATION PER HR: HCPCS

## 2018-10-11 PROCEDURE — 93010 ELECTROCARDIOGRAM REPORT: CPT | Performed by: INTERNAL MEDICINE

## 2018-10-11 PROCEDURE — 85025 COMPLETE CBC W/AUTO DIFF WBC: CPT | Performed by: EMERGENCY MEDICINE

## 2018-10-11 PROCEDURE — 94799 UNLISTED PULMONARY SVC/PX: CPT

## 2018-10-11 PROCEDURE — 80053 COMPREHEN METABOLIC PANEL: CPT | Performed by: EMERGENCY MEDICINE

## 2018-10-11 PROCEDURE — 71045 X-RAY EXAM CHEST 1 VIEW: CPT

## 2018-10-11 PROCEDURE — 99285 EMERGENCY DEPT VISIT HI MDM: CPT

## 2018-10-11 PROCEDURE — 63710000001 PREDNISONE PER 5 MG: Performed by: INTERNAL MEDICINE

## 2018-10-11 PROCEDURE — 93005 ELECTROCARDIOGRAM TRACING: CPT | Performed by: EMERGENCY MEDICINE

## 2018-10-11 PROCEDURE — 99220 PR INITIAL OBSERVATION CARE/DAY 70 MINUTES: CPT | Performed by: INTERNAL MEDICINE

## 2018-10-11 PROCEDURE — 84484 ASSAY OF TROPONIN QUANT: CPT | Performed by: EMERGENCY MEDICINE

## 2018-10-11 PROCEDURE — 85610 PROTHROMBIN TIME: CPT | Performed by: EMERGENCY MEDICINE

## 2018-10-11 PROCEDURE — 96375 TX/PRO/DX INJ NEW DRUG ADDON: CPT

## 2018-10-11 PROCEDURE — 96366 THER/PROPH/DIAG IV INF ADDON: CPT

## 2018-10-11 PROCEDURE — 96365 THER/PROPH/DIAG IV INF INIT: CPT

## 2018-10-11 PROCEDURE — 25010000002 ADENOSINE PER 6 MG: Performed by: EMERGENCY MEDICINE

## 2018-10-11 PROCEDURE — 80162 ASSAY OF DIGOXIN TOTAL: CPT | Performed by: EMERGENCY MEDICINE

## 2018-10-11 RX ORDER — METOPROLOL TARTRATE 50 MG/1
50 TABLET, FILM COATED ORAL EVERY 12 HOURS SCHEDULED
Status: DISCONTINUED | OUTPATIENT
Start: 2018-10-11 | End: 2018-10-12 | Stop reason: HOSPADM

## 2018-10-11 RX ORDER — ADENOSINE 3 MG/ML
12 INJECTION, SOLUTION INTRAVENOUS ONCE
Status: COMPLETED | OUTPATIENT
Start: 2018-10-11 | End: 2018-10-11

## 2018-10-11 RX ORDER — PREDNISONE 1 MG/1
5 TABLET ORAL EVERY MORNING
Status: DISCONTINUED | OUTPATIENT
Start: 2018-10-12 | End: 2018-10-12 | Stop reason: HOSPADM

## 2018-10-11 RX ORDER — SODIUM CHLORIDE 0.9 % (FLUSH) 0.9 %
3-10 SYRINGE (ML) INJECTION AS NEEDED
Status: DISCONTINUED | OUTPATIENT
Start: 2018-10-11 | End: 2018-10-12 | Stop reason: HOSPADM

## 2018-10-11 RX ORDER — MAGNESIUM SULFATE HEPTAHYDRATE 40 MG/ML
2 INJECTION, SOLUTION INTRAVENOUS ONCE
Status: COMPLETED | OUTPATIENT
Start: 2018-10-11 | End: 2018-10-11

## 2018-10-11 RX ORDER — LOSARTAN POTASSIUM 25 MG/1
25 TABLET ORAL DAILY
Status: DISCONTINUED | OUTPATIENT
Start: 2018-10-12 | End: 2018-10-12 | Stop reason: HOSPADM

## 2018-10-11 RX ORDER — NITROGLYCERIN 0.4 MG/1
0.4 TABLET SUBLINGUAL
Status: DISCONTINUED | OUTPATIENT
Start: 2018-10-11 | End: 2018-10-12 | Stop reason: HOSPADM

## 2018-10-11 RX ORDER — PANTOPRAZOLE SODIUM 40 MG/1
40 TABLET, DELAYED RELEASE ORAL
Status: DISCONTINUED | OUTPATIENT
Start: 2018-10-11 | End: 2018-10-12 | Stop reason: HOSPADM

## 2018-10-11 RX ORDER — SODIUM CHLORIDE 0.9 % (FLUSH) 0.9 %
3 SYRINGE (ML) INJECTION EVERY 12 HOURS SCHEDULED
Status: DISCONTINUED | OUTPATIENT
Start: 2018-10-11 | End: 2018-10-12 | Stop reason: HOSPADM

## 2018-10-11 RX ORDER — AMIODARONE HYDROCHLORIDE 200 MG/1
100 TABLET ORAL DAILY
COMMUNITY
End: 2018-10-12 | Stop reason: HOSPADM

## 2018-10-11 RX ORDER — AMIODARONE HYDROCHLORIDE 200 MG/1
200 TABLET ORAL DAILY
Status: DISCONTINUED | OUTPATIENT
Start: 2018-10-12 | End: 2018-10-12 | Stop reason: HOSPADM

## 2018-10-11 RX ORDER — ATORVASTATIN CALCIUM 10 MG/1
10 TABLET, FILM COATED ORAL NIGHTLY
Status: DISCONTINUED | OUTPATIENT
Start: 2018-10-11 | End: 2018-10-12 | Stop reason: HOSPADM

## 2018-10-11 RX ORDER — ASPIRIN 81 MG/1
324 TABLET, CHEWABLE ORAL ONCE
Status: COMPLETED | OUTPATIENT
Start: 2018-10-11 | End: 2018-10-11

## 2018-10-11 RX ORDER — ISOSORBIDE MONONITRATE 30 MG/1
30 TABLET, EXTENDED RELEASE ORAL DAILY
Status: DISCONTINUED | OUTPATIENT
Start: 2018-10-12 | End: 2018-10-12 | Stop reason: HOSPADM

## 2018-10-11 RX ORDER — DIGOXIN 0.25 MG/ML
250 INJECTION INTRAMUSCULAR; INTRAVENOUS ONCE
Status: DISCONTINUED | OUTPATIENT
Start: 2018-10-11 | End: 2018-10-12 | Stop reason: HOSPADM

## 2018-10-11 RX ORDER — GLIPIZIDE 5 MG/1
2.5 TABLET ORAL
Status: DISCONTINUED | OUTPATIENT
Start: 2018-10-12 | End: 2018-10-12 | Stop reason: HOSPADM

## 2018-10-11 RX ORDER — SODIUM CHLORIDE 0.9 % (FLUSH) 0.9 %
10 SYRINGE (ML) INJECTION AS NEEDED
Status: DISCONTINUED | OUTPATIENT
Start: 2018-10-11 | End: 2018-10-12 | Stop reason: HOSPADM

## 2018-10-11 RX ORDER — FOLIC ACID 1 MG/1
1 TABLET ORAL DAILY
Status: DISCONTINUED | OUTPATIENT
Start: 2018-10-12 | End: 2018-10-12 | Stop reason: HOSPADM

## 2018-10-11 RX ORDER — DILTIAZEM HYDROCHLORIDE 5 MG/ML
15 INJECTION INTRAVENOUS ONCE
Status: COMPLETED | OUTPATIENT
Start: 2018-10-11 | End: 2018-10-11

## 2018-10-11 RX ORDER — ASPIRIN 81 MG/1
81 TABLET ORAL DAILY
Status: DISCONTINUED | OUTPATIENT
Start: 2018-10-12 | End: 2018-10-12 | Stop reason: HOSPADM

## 2018-10-11 RX ORDER — ADENOSINE 3 MG/ML
6 INJECTION, SOLUTION INTRAVENOUS ONCE
Status: COMPLETED | OUTPATIENT
Start: 2018-10-11 | End: 2018-10-11

## 2018-10-11 RX ORDER — VERAPAMIL HYDROCHLORIDE 240 MG/1
240 TABLET, FILM COATED, EXTENDED RELEASE ORAL DAILY
Status: DISCONTINUED | OUTPATIENT
Start: 2018-10-12 | End: 2018-10-12 | Stop reason: HOSPADM

## 2018-10-11 RX ORDER — PREDNISONE 1 MG/1
4 TABLET ORAL NIGHTLY
Status: DISCONTINUED | OUTPATIENT
Start: 2018-10-11 | End: 2018-10-12 | Stop reason: HOSPADM

## 2018-10-11 RX ADMIN — APIXABAN 5 MG: 5 TABLET, FILM COATED ORAL at 20:46

## 2018-10-11 RX ADMIN — METOPROLOL TARTRATE 50 MG: 50 TABLET ORAL at 20:46

## 2018-10-11 RX ADMIN — DILTIAZEM HYDROCHLORIDE 5 MG/HR: 5 INJECTION INTRAVENOUS at 12:35

## 2018-10-11 RX ADMIN — DILTIAZEM HYDROCHLORIDE 15 MG: 5 INJECTION INTRAVENOUS at 11:32

## 2018-10-11 RX ADMIN — PANTOPRAZOLE SODIUM 40 MG: 40 TABLET, DELAYED RELEASE ORAL at 16:59

## 2018-10-11 RX ADMIN — ADENOSINE 6 MG: 3 INJECTION, SOLUTION INTRAVENOUS at 11:19

## 2018-10-11 RX ADMIN — ASPIRIN 81 MG CHEWABLE TABLET 243 MG: 81 TABLET CHEWABLE at 10:59

## 2018-10-11 RX ADMIN — ADENOSINE 12 MG: 3 INJECTION, SOLUTION INTRAVENOUS at 11:21

## 2018-10-11 RX ADMIN — MAGNESIUM SULFATE HEPTAHYDRATE 2 G: 40 INJECTION, SOLUTION INTRAVENOUS at 11:37

## 2018-10-11 RX ADMIN — SODIUM CHLORIDE 250 ML: 9 INJECTION, SOLUTION INTRAVENOUS at 12:50

## 2018-10-11 RX ADMIN — ATORVASTATIN CALCIUM 10 MG: 10 TABLET, FILM COATED ORAL at 20:46

## 2018-10-11 RX ADMIN — PREDNISONE 4 MG: 1 TABLET ORAL at 20:46

## 2018-10-11 NOTE — ED NOTES
ADENOSINE UNSUCCESSFUL X2. DR. CARLTON TO PLACE ORDER FOR CARDIZEM. PATIENT ALERT AND ORIENTED      Laura Pennington, RN  10/11/18 1790

## 2018-10-11 NOTE — PLAN OF CARE
Problem: Patient Care Overview  Goal: Plan of Care Review  Outcome: Ongoing (interventions implemented as appropriate)   10/11/18 1414   Coping/Psychosocial   Plan of Care Reviewed With patient   Plan of Care Review   Progress improving   OTHER   Outcome Summary Pt admit from ER with Afib RVR. HR was in 140's in the ER. Cardazem drip at 5ml/hr. pt is now sinus in the 70's. cont to monitor.      Goal: Individualization and Mutuality  Outcome: Ongoing (interventions implemented as appropriate)    Goal: Discharge Needs Assessment  Outcome: Ongoing (interventions implemented as appropriate)    Goal: Interprofessional Rounds/Family Conf  Outcome: Ongoing (interventions implemented as appropriate)      Problem: Arrhythmia/Dysrhythmia (Symptomatic) (Adult)  Goal: Signs and Symptoms of Listed Potential Problems Will be Absent, Minimized or Managed (Arrhythmia/Dysrhythmia)  Outcome: Ongoing (interventions implemented as appropriate)

## 2018-10-11 NOTE — ED PROVIDER NOTES
Subjective   History of Present Illness   62-year-old female presenting with tachycardia, chest pain, shortness of breath.    Patient has a long history of tachycardia arrhythmias.  Patient notes sudden onset of tachycardia at approximately 8 AM this morning, onset at rest, constant since then.  After the onset of the tachycardia patient reports left sided pressure-like chest pain, nonradiating, constant.  Pain and tachycardia are also associated with some mild shortness of breath.  This feels similar to prior episodes of SVT and atrial fibrillation with RVR.  Patient notes compliance with all of her medications.      Patient denies any associated fevers, chills, nausea, vomiting, dysuria, rash.    Review of Systems   Constitutional: Negative for chills and fever.   HENT: Negative for nosebleeds.    Eyes: Negative for redness.   Respiratory: Positive for shortness of breath.    Cardiovascular: Positive for chest pain and palpitations.   Gastrointestinal: Negative for abdominal pain.   Genitourinary: Negative for flank pain.   Musculoskeletal: Negative for gait problem.   Skin: Negative for wound.   Neurological: Negative for syncope and weakness.   Psychiatric/Behavioral: The patient is not hyperactive.        Past Medical History:   Diagnosis Date   • Abnormal stress test    • Atrial flutter (CMS/HCC)    • CAD (coronary artery disease)    • CAD in native artery     CABG x 3   • Diabetes mellitus (CMS/HCC)    • Essential hypertension     Tricuspid valve insufficiency, unspecified etiology    • Hyperlipemia, mixed    • Hyperlipidemia    • Hypertension    • MI, old    • Mitral valve prolapse    • Near syncope    • Obesity, morbid (CMS/HCC)    • Palpitations    • Paroxysmal SVT (supraventricular tachycardia) (CMS/HCC)    • Paroxysmal SVT (supraventricular tachycardia) (CMS/HCC)    • Pedal edema    • Precordial pain    • Rheumatoid arthritis (CMS/HCC)    • Rheumatoid arthritis (CMS/HCC)    • S/P CABG x 3         Allergies   Allergen Reactions   • Codeine Other (See Comments)     Chest pain  Chest pain   • Albuterol Palpitations     High heart rate   • Augmentin [Amoxicillin-Pot Clavulanate] Palpitations   • Sulfa Antibiotics Hives       Past Surgical History:   Procedure Laterality Date   • APPENDECTOMY     • CARDIAC CATHETERIZATION Left 06/18/2012    Intensify medical therapy   • CARDIAC CATHETERIZATION Left 05/05/2002    surgery   • CHOLECYSTECTOMY     • COLONOSCOPY  02/10/2012   • COLONOSCOPY N/A 1/31/2018    Procedure: COLONOSCOPY WITH ANESTHESIA;  Surgeon: Patricia Bo MD;  Location: [facility] ENDOSCOPY;  Service:    • CORONARY ARTERY BYPASS GRAFT  05/05/2002    LIMA to LAD, SVG to D1, SVG to OM1 - x3   • CORONARY STENT PLACEMENT  09/2009    X1 - Stent to LAD, Drug Eluting   • ENDOSCOPY N/A 1/31/2018    Procedure: ESOPHAGOGASTRODUODENOSCOPY WITH ANESTHESIA;  Surgeon: Patricia Bo MD;  Location: [facility] ENDOSCOPY;  Service:    • GALLBLADDER SURGERY     • REPLACEMENT TOTAL KNEE Right    • UPPER GASTROINTESTINAL ENDOSCOPY  03/23/2015       Family History   Problem Relation Age of Onset   • Cancer Father    • Coronary artery disease Father    • Coronary artery disease Brother    • Colon polyps Neg Hx    • Colon cancer Neg Hx    • Esophageal cancer Neg Hx    • Liver cancer Neg Hx    • Liver disease Neg Hx    • Rectal cancer Neg Hx    • Stomach cancer Neg Hx        Social History     Social History   • Marital status:      Social History Main Topics   • Smoking status: Former Smoker     Years: 20.00     Types: Cigarettes     Quit date: 1/31/2000   • Smokeless tobacco: Never Used   • Alcohol use No   • Drug use: No   • Sexual activity: Defer     Other Topics Concern   • Not on file           Objective   Physical Exam   Constitutional: She is oriented to person, place, and time. She appears well-developed and well-nourished.   HENT:   Head: Normocephalic and atraumatic.   Eyes: Conjunctivae are normal.   Neck:  Normal range of motion.   Cardiovascular: Intact distal pulses.    tachycardia   Pulmonary/Chest: Effort normal and breath sounds normal. No respiratory distress.   Abdominal: Soft. She exhibits no distension. There is no tenderness.   Musculoskeletal: She exhibits no edema.   Neurological: She is alert and oriented to person, place, and time.   Skin: Skin is warm and dry. Capillary refill takes less than 2 seconds.   Psychiatric: She has a normal mood and affect.   Nursing note and vitals reviewed.      Procedures           ED Course  ED Course as of Oct 11 2355   Thu Oct 11, 2018   1258 Spoke with Dr. Reyes cardiology.  Recommends 150 mg bolus of amiodarone and 250 µg of digoxin.  Recommends admission to cardiology for further workup and management.  [NR]      ED Course User Index  [NR] Oswald Nichols MD                  MDM  Number of Diagnoses or Management Options  Diagnosis management comments: 62-year-old female presenting with sudden onset tachycardia.  Patient has a history of tachycardia arrhythmias.  Initially on the EKG appeared to be regular, consistent with a AVNRT.  After administration of adenosine underlying rhythm determined to be atrial fibrillation is no clear P waves were seen.  Patient does have history of atrial fibrillation with RVR.  Patient's chest pain shortness of breath or likely related to demand from the rapid heart rate.  Evaluation will include troponins and EKGs.  Rate control with diltiazem.    Patient will likely need admission to cardiology for rate control and evaluation.        Final diagnoses:   Atrial fibrillation with rapid ventricular response (CMS/HCC)            Oswald Nichols MD  10/11/18 5577

## 2018-10-11 NOTE — H&P
LOS: 0 days   Patient Care Team:  Teresa Contreras MD as PCP - General  Teresa Contreras MD as PCP - Family Medicine  Jagdeep Reyes MD as Cardiologist (Cardiology)    Chief Complaint:   Atrial fibrillation with rapid ventricular response (CMS/[facility])    Reason for presentation: Palpitations    History of current illness    Exceedingly pleasant 62-year-old  lady well known to me with history of type II diabetes mellitus currently taking metformin with history of obesity with   paroxysmal atrial fibrillation on multiple medications the who also is on anticoagulation with Eliquis who presented with a rapid the onset of palpitations.  Had moderate shortness of breath  Had substernal transient chest pain when she was in atrial fibrillation which completely resolved after reestablishment of sinus rhythm.  Chest pain was nonpleuritic next and chest pain non-positional  Chest pain was non-gustatory.  Felt weak and dizzy and the tired.  Presented to the emergency room and started on intravenous Cardizem subsequently reverted back to sinus rhythm and all his symptoms resolved.  Feels exhausted after establishment in sinus rhythm.  She is compliant with prescribed medication regimen.          Patient Complaints:   Chief Complaint   Patient presents with   • Chest Pain   • Shortness of Breath       Telemetry: no malignant arrhythmia. No significant pauses.    Review of Systems     Constitutional: No chills   Has fatigue   No fever.   HENT: Negative.    Eyes: Negative.      Respiratory: Negative for cough,   No chest wall soreness,   Shortness of breath,   no wheezing, no stridor.      Cardiovascular: Transient earlier atrial fibrillation with rapid ventricular response currently in sinus rhythm for chest pain,   Has palpitations   No significant  leg swelling.     Gastrointestinal: Negative for abdominal distention,  No abdominal pain,   No blood in stool,   No constipation,   No diarrhea,   No nausea   No  vomiting.     Endocrine: Negative.    Genitourinary: Negative for difficulty urinating, dysuria, flank pain and hematuria.     Musculoskeletal: Negative.    Skin: Negative for rash and wound.   Allergic/Immunologic: Negative.      Neurological: Negative for dizziness, syncope, weakness,   No light-headedness  No  headaches.     Hematological: Does not bruise/bleed easily.     Psychiatric/Behavioral: Negative for agitation or behavioral problems,   No confusion,   the patient is  nervous/anxious.       History:   Past Medical History:   Diagnosis Date   • Abnormal stress test    • Atrial flutter (CMS/HCC)    • CAD (coronary artery disease)    • CAD in native artery     CABG x 3   • Diabetes mellitus (CMS/HCC)    • Essential hypertension     Tricuspid valve insufficiency, unspecified etiology    • Hyperlipemia, mixed    • Hyperlipidemia    • Hypertension    • MI, old    • Mitral valve prolapse    • Near syncope    • Obesity, morbid (CMS/HCC)    • Palpitations    • Paroxysmal SVT (supraventricular tachycardia) (CMS/HCC)    • Paroxysmal SVT (supraventricular tachycardia) (CMS/HCC)    • Pedal edema    • Precordial pain    • Rheumatoid arthritis (CMS/HCC)    • Rheumatoid arthritis (CMS/HCC)    • S/P CABG x 3      Past Surgical History:   Procedure Laterality Date   • APPENDECTOMY     • CARDIAC CATHETERIZATION Left 06/18/2012    Intensify medical therapy   • CARDIAC CATHETERIZATION Left 05/05/2002    surgery   • CHOLECYSTECTOMY     • COLONOSCOPY  02/10/2012   • COLONOSCOPY N/A 1/31/2018    Procedure: COLONOSCOPY WITH ANESTHESIA;  Surgeon: Patricia Bo MD;  Location: [facility] ENDOSCOPY;  Service:    • CORONARY ARTERY BYPASS GRAFT  05/05/2002    LIMA to LAD, SVG to D1, SVG to OM1 - x3   • CORONARY STENT PLACEMENT  09/2009    X1 - Stent to LAD, Drug Eluting   • ENDOSCOPY N/A 1/31/2018    Procedure: ESOPHAGOGASTRODUODENOSCOPY WITH ANESTHESIA;  Surgeon: Patricia Bo MD;  Location: [facility] ENDOSCOPY;  Service:    • GALLBLADDER  SURGERY     • REPLACEMENT TOTAL KNEE Right    • UPPER GASTROINTESTINAL ENDOSCOPY  03/23/2015     Social History     Social History   • Marital status:      Spouse name: N/A   • Number of children: N/A   • Years of education: N/A     Occupational History   • Not on file.     Social History Main Topics   • Smoking status: Former Smoker     Years: 20.00     Types: Cigarettes     Quit date: 1/31/2000   • Smokeless tobacco: Never Used   • Alcohol use No   • Drug use: No   • Sexual activity: Defer     Other Topics Concern   • Not on file     Social History Narrative   • No narrative on file     Family History   Problem Relation Age of Onset   • Cancer Father    • Coronary artery disease Father    • Coronary artery disease Brother    • Colon polyps Neg Hx    • Colon cancer Neg Hx    • Esophageal cancer Neg Hx    • Liver cancer Neg Hx    • Liver disease Neg Hx    • Rectal cancer Neg Hx    • Stomach cancer Neg Hx        Labs:  WBC WBC   Date Value Ref Range Status   10/11/2018 9.83 4.80 - 10.80 10*3/mm3 Final      HGB Hemoglobin   Date Value Ref Range Status   10/11/2018 15.6 12.0 - 16.0 g/dL Final      HCT Hematocrit   Date Value Ref Range Status   10/11/2018 48.0 (H) 37.0 - 47.0 % Final      Platelets Platelets   Date Value Ref Range Status   10/11/2018 231 130 - 400 10*3/mm3 Final      MCV MCV   Date Value Ref Range Status   10/11/2018 94.5 82.0 - 98.0 fL Final        Results from last 7 days  Lab Units 10/11/18  1100   SODIUM mmol/L 143   POTASSIUM mmol/L 3.7   CHLORIDE mmol/L 101   CO2 mmol/L 29.0   BUN mg/dL 14   CREATININE mg/dL 0.62   CALCIUM mg/dL 9.6   BILIRUBIN mg/dL 1.6*   ALK PHOS U/L 75   ALT (SGPT) U/L 45   AST (SGOT) U/L 41   GLUCOSE mg/dL 156*     Lab Results   Component Value Date    CKMB 0.42 03/21/2016    TROPONINI 0.051 (H) 10/11/2018     PT/INR:    Protime   Date Value Ref Range Status   10/11/2018 12.6 11.9 - 14.6 Seconds Final   /  INR   Date Value Ref Range Status   10/11/2018 0.92 0.91 - 1.09  Final       Imaging Results (last 72 hours)     Procedure Component Value Units Date/Time    XR Chest 1 View [[id number]] Collected:  10/11/18 1123     Updated:  10/11/18 1128    Narrative:       EXAMINATION: XR CHEST 1 VW- 10/11/2018 11:23 AM CDT     HISTORY: Chest Pain protocol.     REPORT: Comparison is made with the study from 7/30/2018.     Lungs are mildly hypoaerated, there is patchy airspace opacity in the  left lateral lung base, no consolidation is identified. There is mild  stable elevation of the right hemidiaphragm. No pneumothorax or definite  pleural effusion is identified. Heart size is normal. Previous median  sternotomy is noted. The osseous structures are unremarkable.       Impression:       Shallow inspiration with patchy left basilar air space  opacities possibly related to simple atelectasis. Correlate clinically  for signs of pneumonia. No definite effusion. No evidence of CHF.  This report was finalized on 10/11/2018 11:25 by Dr. Robert Jiang MD.          Objective     Allergies   Allergen Reactions   • Codeine Other (See Comments)     Chest pain  Chest pain   • Albuterol Palpitations     High heart rate   • Augmentin [Amoxicillin-Pot Clavulanate] Palpitations   • Sulfa Antibiotics Hives       Medication Review: Performed  Current Facility-Administered Medications   Medication Dose Route Frequency Provider Last Rate Last Dose   • [START ON 10/12/2018] amiodarone (PACERONE) tablet 200 mg  200 mg Oral Daily Jagdeep Reyes MD       • amiodarone in dextrose 5% (NEXTERONE) loading dose 150mg/100mL  150 mg Intravenous Once Oswald Nichols MD   Stopped at 10/11/18 1311   • apixaban (ELIQUIS) tablet 5 mg  5 mg Oral Q12H Jagdeep Reyes MD       • [START ON 10/12/2018] aspirin EC tablet 81 mg  81 mg Oral Daily Jagdeep Reyes MD       • atorvastatin (LIPITOR) tablet 10 mg  10 mg Oral Nightly Jagdeep Reyes MD       • digoxin (LANOXIN) injection 250 mcg  250 mcg Intravenous Once Oswald Nichols  MD Akilah   Stopped at 10/11/18 1311   • diltiaZEM (CARDIZEM) 125 mg in sodium chloride 0.9 % 125 mL (1 mg/mL) infusion  5-15 mg/hr Intravenous Titrated Oswald Nichols MD 5 mL/hr at 10/11/18 1700 5 mg/hr at 10/11/18 1700   • [START ON 10/12/2018] folic acid (FOLVITE) tablet 1 mg  1 mg Oral Daily Jagdeep Reyes MD       • [START ON 10/12/2018] glipiZIDE (GLUCOTROL) tablet 2.5 mg  2.5 mg Oral QAM AC Jagdeep Reyes MD       • Influenza Vac Subunit Quad (FLUCELVAX) injection 0.5 mL  0.5 mL Intramuscular During Hospitalization Jagdeep Reyes MD       • [START ON 10/12/2018] isosorbide mononitrate (IMDUR) 24 hr tablet 30 mg  30 mg Oral Daily Jagdeep Reyes MD       • [START ON 10/12/2018] losartan (COZAAR) tablet 25 mg  25 mg Oral Daily Jagdeep Reyes MD       • [START ON 10/12/2018] metFORMIN (GLUCOPHAGE) tablet 1,000 mg  1,000 mg Oral Daily With Breakfast Jagdeep Reyes MD       • metoprolol tartrate (LOPRESSOR) tablet 50 mg  50 mg Oral Q12H Jagdeep Reyes MD       • nitroglycerin (NITROSTAT) SL tablet 0.4 mg  0.4 mg Sublingual Q5 Min PRN Jagdeep Reyes MD       • pantoprazole (PROTONIX) EC tablet 40 mg  40 mg Oral BID AC Jagdeep Reyes MD   40 mg at 10/11/18 1659   • predniSONE (DELTASONE) tablet 4 mg  4 mg Oral Nightly Jagdeep Reyes MD       • [START ON 10/12/2018] predniSONE (DELTASONE) tablet 5 mg  5 mg Oral QAM Jagdeep Reyes MD       • sodium chloride 0.9 % flush 10 mL  10 mL Intravenous PRN Oswald Nichols MD       • sodium chloride 0.9 % flush 3 mL  3 mL Intravenous Q12H Jagdeep Reyes MD       • sodium chloride 0.9 % flush 3-10 mL  3-10 mL Intravenous PRN Jagdeep Reyes MD       • [START ON 10/12/2018] verapamil SR (CALAN-SR) CR tablet 240 mg  240 mg Oral Daily Jagdeep Reyes MD           Vital Sign Min/Max for last 24 hours  Temp  Min: 97.6 °F (36.4 °C)  Max: 98 °F (36.7 °C)   BP  Min: 91/66  Max: 146/83   Pulse  Min: 70  Max: 157   Resp  Min: 15  Max: 22   SpO2  Min: 91 %  Max: 96 %   No Data Recorded  "  Weight  Min: 91.4 kg (201 lb 9.6 oz)  Max: 94.3 kg (208 lb)     Flowsheet Rows      First Filed Value   Admission Height  162.6 cm (64\") Documented at 10/11/2018 1040   Admission Weight  94.3 kg (208 lb) Documented at 10/11/2018 1040          Results for orders placed during the hospital encounter of 07/18/18   Adult Transthoracic Echo Complete W/ Cont if Necessary Per Protocol    Narrative · Left ventricular systolic function is normal. Estimated EF = 55%.  · Left ventricular diastolic dysfunction.  · Left atrial cavity size is mildly dilated.  · No evidence of pulmonary hypertension is present.          Physical Exam:    General Appearance: Awake, alert, in no acute distress  Eyes: Pupils equal and reactive    Ears: Appear intact with no abnormalities noted  Nose: Nares normal, no drainage  Neck: supple, trachea midline, no carotid bruit and no JVD  Back: no kyphosis present,    Lungs: respirations regular, respirations even and respirations unlabored    Heart: normal S1, S2,  2/6 pansystolic murmur in the left sternal border,  no rub and no click    Abdomen: normal bowel sounds, no tenderness   Skin: no bleeding, bruising or rash  Extremities: no cyanosis  Psychiatric/Behavioral: Negative for agitation, behavioral problems, confusion, the patient does  appear to be nervous/anxious.          Results Review:   I reviewed the patient's new clinical results.  I reviewed the patient's new imaging results and agree with the interpretation.  I reviewed the patient's other test results and agree with the interpretation  I personally viewed and interpreted the patient's EKG/Telemetry data  Discussed with patient    Reviewed available prior notes, consults, prior visits, laboratory findings, radiology and cardiology relevant reports. Updated chart as applicable. I have reviewed the patient's medical history in detail and updated the computerized patient record as relevant.    Updated patient regarding any new or relevant " abnormalities on review of records or any new findings on physical exam. Mentioned to patient about purpose of visit and desirable health short and long term goals and objectives.     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

## 2018-10-12 VITALS
BODY MASS INDEX: 34.42 KG/M2 | RESPIRATION RATE: 18 BRPM | OXYGEN SATURATION: 96 % | TEMPERATURE: 98.1 F | HEART RATE: 70 BPM | DIASTOLIC BLOOD PRESSURE: 65 MMHG | HEIGHT: 64 IN | SYSTOLIC BLOOD PRESSURE: 134 MMHG | WEIGHT: 201.6 LBS

## 2018-10-12 PROCEDURE — 99217 PR OBSERVATION CARE DISCHARGE MANAGEMENT: CPT | Performed by: INTERNAL MEDICINE

## 2018-10-12 PROCEDURE — 63710000001 PREDNISONE PER 5 MG: Performed by: INTERNAL MEDICINE

## 2018-10-12 PROCEDURE — G0378 HOSPITAL OBSERVATION PER HR: HCPCS

## 2018-10-12 PROCEDURE — 96366 THER/PROPH/DIAG IV INF ADDON: CPT

## 2018-10-12 RX ORDER — AMIODARONE HYDROCHLORIDE 200 MG/1
200 TABLET ORAL DAILY
Start: 2018-10-13 | End: 2020-01-24

## 2018-10-12 RX ADMIN — PANTOPRAZOLE SODIUM 40 MG: 40 TABLET, DELAYED RELEASE ORAL at 07:59

## 2018-10-12 RX ADMIN — ISOSORBIDE MONONITRATE 30 MG: 30 TABLET, EXTENDED RELEASE ORAL at 08:01

## 2018-10-12 RX ADMIN — ASPIRIN 81 MG: 81 TABLET ORAL at 08:01

## 2018-10-12 RX ADMIN — AMIODARONE HYDROCHLORIDE 200 MG: 200 TABLET ORAL at 08:01

## 2018-10-12 RX ADMIN — GLIPIZIDE 2.5 MG: 5 TABLET ORAL at 07:59

## 2018-10-12 RX ADMIN — METFORMIN HYDROCHLORIDE 1000 MG: 500 TABLET ORAL at 07:59

## 2018-10-12 RX ADMIN — FOLIC ACID 1 MG: 1 TABLET ORAL at 08:01

## 2018-10-12 RX ADMIN — PREDNISONE 5 MG: 5 TABLET ORAL at 06:20

## 2018-10-12 RX ADMIN — LOSARTAN POTASSIUM 25 MG: 25 TABLET ORAL at 08:01

## 2018-10-12 RX ADMIN — METOPROLOL TARTRATE 50 MG: 50 TABLET ORAL at 08:01

## 2018-10-12 RX ADMIN — VERAPAMIL HYDROCHLORIDE 240 MG: 240 TABLET, FILM COATED, EXTENDED RELEASE ORAL at 08:01

## 2018-10-12 RX ADMIN — APIXABAN 5 MG: 5 TABLET, FILM COATED ORAL at 08:00

## 2018-10-12 NOTE — DISCHARGE SUMMARY
Date of Discharge:  10/12/2018     LOS: 0 days   Patient Care Team:  Teresa Contreras MD as PCP - General  Teresa Contreras MD as PCP - Family Medicine  Jagdeep Reyes MD as Cardiologist (Cardiology)    Chief Complaint:   Atrial fibrillation with rapid ventricular response (CMS/HCC)    Shortness of breath    Subjective  Feeling  better  No chest pain   No excessive shortness of breath  No new complaints    Interval History: Improved overall    Patient Complaints:   Chief Complaint   Patient presents with   • Chest Pain   • Shortness of Breath       Denies chest pain currently. Denies excessive shortness of breath. Denies abdominal pain, nausea vomiting or diarrhoea.    Telemetry: no malignant arrhythmia. No significant pauses.  Maintaining sinus rhythm    Review of Systems   Constitutional: No chills   No fatigue   No fever.   HENT: Negative.    Eyes: Negative.    Respiratory: Negative for cough,   No chest wall soreness,   Mild shortness of breath,   no wheezing, no stridor.    Cardiovascular: Negative for chest pain,   No palpitations   No significant  leg swelling.   Gastrointestinal: Negative for abdominal distention,  No abdominal pain,   No blood in stool, constipation, diarrhea, nausea or vomiting.   Endocrine: Negative.    Genitourinary: Negative for difficulty urinating, dysuria, flank pain and hematuria.   Musculoskeletal: Negative.    Skin: Negative for rash and wound.   Allergic/Immunologic: Negative.    Neurological: Negative for dizziness, syncope, weakness,   No light-headedness or headaches.   Hematological: Does not bruise/bleed easily.   Psychiatric/Behavioral: Negative for agitation, behavioral problems, confusion, the patient does not appear to be nervous/anxious.       History:   Past Medical History:   Diagnosis Date   • Abnormal stress test    • Atrial flutter (CMS/HCC)    • CAD (coronary artery disease)    • CAD in native artery     CABG x 3   • Diabetes mellitus (CMS/HCC)    •  Essential hypertension     Tricuspid valve insufficiency, unspecified etiology    • Hyperlipemia, mixed    • Hyperlipidemia    • Hypertension    • MI, old    • Mitral valve prolapse    • Near syncope    • Obesity, morbid (CMS/[facility])    • Palpitations    • Paroxysmal SVT (supraventricular tachycardia) (CMS/[facility])    • Paroxysmal SVT (supraventricular tachycardia) (CMS/[facility])    • Pedal edema    • Precordial pain    • Rheumatoid arthritis (CMS/[facility])    • Rheumatoid arthritis (CMS/[facility])    • S/P CABG x 3      Past Surgical History:   Procedure Laterality Date   • APPENDECTOMY     • CARDIAC CATHETERIZATION Left 06/18/2012    Intensify medical therapy   • CARDIAC CATHETERIZATION Left 05/05/2002    surgery   • CHOLECYSTECTOMY     • COLONOSCOPY  02/10/2012   • COLONOSCOPY N/A 1/31/2018    Procedure: COLONOSCOPY WITH ANESTHESIA;  Surgeon: Patricia Bo MD;  Location: [facility] ENDOSCOPY;  Service:    • CORONARY ARTERY BYPASS GRAFT  05/05/2002    LIMA to LAD, SVG to D1, SVG to OM1 - x3   • CORONARY STENT PLACEMENT  09/2009    X1 - Stent to LAD, Drug Eluting   • ENDOSCOPY N/A 1/31/2018    Procedure: ESOPHAGOGASTRODUODENOSCOPY WITH ANESTHESIA;  Surgeon: Patricia Bo MD;  Location: [facility] ENDOSCOPY;  Service:    • GALLBLADDER SURGERY     • REPLACEMENT TOTAL KNEE Right    • UPPER GASTROINTESTINAL ENDOSCOPY  03/23/2015     Social History     Social History   • Marital status:      Spouse name: N/A   • Number of children: N/A   • Years of education: N/A     Occupational History   • Not on file.     Social History Main Topics   • Smoking status: Former Smoker     Years: 20.00     Types: Cigarettes     Quit date: 1/31/2000   • Smokeless tobacco: Never Used   • Alcohol use No   • Drug use: No   • Sexual activity: Defer     Other Topics Concern   • Not on file     Social History Narrative   • No narrative on file     Family History   Problem Relation Age of Onset   • Cancer Father    • Coronary artery disease Father    • Coronary  artery disease Brother    • Colon polyps Neg Hx    • Colon cancer Neg Hx    • Esophageal cancer Neg Hx    • Liver cancer Neg Hx    • Liver disease Neg Hx    • Rectal cancer Neg Hx    • Stomach cancer Neg Hx        Labs:  WBC WBC   Date Value Ref Range Status   10/11/2018 9.83 4.80 - 10.80 10*3/mm3 Final      HGB Hemoglobin   Date Value Ref Range Status   10/11/2018 15.6 12.0 - 16.0 g/dL Final      HCT Hematocrit   Date Value Ref Range Status   10/11/2018 48.0 (H) 37.0 - 47.0 % Final      Platlets Platelets   Date Value Ref Range Status   10/11/2018 231 130 - 400 10*3/mm3 Final      MCV MCV   Date Value Ref Range Status   10/11/2018 94.5 82.0 - 98.0 fL Final        Results from last 7 days  Lab Units 10/11/18  1100   SODIUM mmol/L 143   POTASSIUM mmol/L 3.7   CHLORIDE mmol/L 101   CO2 mmol/L 29.0   BUN mg/dL 14   CREATININE mg/dL 0.62   CALCIUM mg/dL 9.6   BILIRUBIN mg/dL 1.6*   ALK PHOS U/L 75   ALT (SGPT) U/L 45   AST (SGOT) U/L 41   GLUCOSE mg/dL 156*     Lab Results   Component Value Date    CKMB 0.42 03/21/2016    TROPONINI 0.051 (H) 10/11/2018     PT/INR:    Protime   Date Value Ref Range Status   10/11/2018 12.6 11.9 - 14.6 Seconds Final   /  INR   Date Value Ref Range Status   10/11/2018 0.92 0.91 - 1.09 Final       Imaging Results (last 72 hours)     Procedure Component Value Units Date/Time    XR Chest 1 View [[id number]] Collected:  10/11/18 1123     Updated:  10/11/18 1128    Narrative:       EXAMINATION: XR CHEST 1 VW- 10/11/2018 11:23 AM CDT     HISTORY: Chest Pain protocol.     REPORT: Comparison is made with the study from 7/30/2018.     Lungs are mildly hypoaerated, there is patchy airspace opacity in the  left lateral lung base, no consolidation is identified. There is mild  stable elevation of the right hemidiaphragm. No pneumothorax or definite  pleural effusion is identified. Heart size is normal. Previous median  sternotomy is noted. The osseous structures are unremarkable.       Impression:        Shallow inspiration with patchy left basilar air space  opacities possibly related to simple atelectasis. Correlate clinically  for signs of pneumonia. No definite effusion. No evidence of CHF.  This report was finalized on 10/11/2018 11:25 by Dr. Robert Jiang MD.          Objective     Allergies   Allergen Reactions   • Codeine Other (See Comments)     Chest pain  Chest pain   • Albuterol Palpitations     High heart rate   • Augmentin [Amoxicillin-Pot Clavulanate] Palpitations   • Sulfa Antibiotics Hives       Medication Review: Performed  Current Facility-Administered Medications   Medication Dose Route Frequency Provider Last Rate Last Dose   • amiodarone (PACERONE) tablet 200 mg  200 mg Oral Daily Jagdeep Reyes MD       • amiodarone in dextrose 5% (NEXTERONE) loading dose 150mg/100mL  150 mg Intravenous Once Oswald Nichols MD   Stopped at 10/11/18 1311   • apixaban (ELIQUIS) tablet 5 mg  5 mg Oral Q12H Jagdeep Reyes MD   5 mg at 10/11/18 2046   • aspirin EC tablet 81 mg  81 mg Oral Daily Jagdeep Reyes MD       • atorvastatin (LIPITOR) tablet 10 mg  10 mg Oral Nightly Jagdeep Reyes MD   10 mg at 10/11/18 2046   • digoxin (LANOXIN) injection 250 mcg  250 mcg Intravenous Once Oswald Nichols MD   Stopped at 10/11/18 1311   • diltiaZEM (CARDIZEM) 125 mg in sodium chloride 0.9 % 125 mL (1 mg/mL) infusion  5-15 mg/hr Intravenous Titrated Oswald Nichols MD   Stopped at 10/12/18 0619   • folic acid (FOLVITE) tablet 1 mg  1 mg Oral Daily Jagdeep Reyes MD       • glipiZIDE (GLUCOTROL) tablet 2.5 mg  2.5 mg Oral QAM AC Jagdeep Reyes MD       • Influenza Vac Subunit Quad (FLUCELVAX) injection 0.5 mL  0.5 mL Intramuscular During Hospitalization Jagdeep Reyes MD       • isosorbide mononitrate (IMDUR) 24 hr tablet 30 mg  30 mg Oral Daily Jagdeep Reyes MD       • losartan (COZAAR) tablet 25 mg  25 mg Oral Daily Jagdeep Reyes MD       • metFORMIN (GLUCOPHAGE) tablet 1,000 mg  1,000 mg Oral Daily With  "Breakfast Jagdeep Reyes MD       • metoprolol tartrate (LOPRESSOR) tablet 50 mg  50 mg Oral Q12H Jagdeep Reyes MD   50 mg at 10/11/18 2046   • nitroglycerin (NITROSTAT) SL tablet 0.4 mg  0.4 mg Sublingual Q5 Min PRN Jagdeep Reyes MD       • pantoprazole (PROTONIX) EC tablet 40 mg  40 mg Oral BID AC Jagdeep Reyes MD   40 mg at 10/11/18 1659   • predniSONE (DELTASONE) tablet 4 mg  4 mg Oral Nightly Jagdeep Reyes MD   4 mg at 10/11/18 2046   • predniSONE (DELTASONE) tablet 5 mg  5 mg Oral QAM Jagdeep Reyes MD   5 mg at 10/12/18 0620   • sodium chloride 0.9 % flush 10 mL  10 mL Intravenous PRN Oswald Nichols MD       • sodium chloride 0.9 % flush 3 mL  3 mL Intravenous Q12H Jagdeep Reyes MD       • sodium chloride 0.9 % flush 3-10 mL  3-10 mL Intravenous PRN Jagdeep Reyes MD       • verapamil SR (CALAN-SR) CR tablet 240 mg  240 mg Oral Daily Jagdeep Reyes MD           Vital Sign Min/Max for last 24 hours  Temp  Min: 97.6 °F (36.4 °C)  Max: 98.6 °F (37 °C)   BP  Min: 91/66  Max: 146/83   Pulse  Min: 69  Max: 157   Resp  Min: 15  Max: 22   SpO2  Min: 91 %  Max: 96 %   No Data Recorded   Weight  Min: 91.4 kg (201 lb 9.6 oz)  Max: 94.3 kg (208 lb)     Flowsheet Rows      First Filed Value   Admission Height  162.6 cm (64\") Documented at 10/11/2018 1040   Admission Weight  94.3 kg (208 lb) Documented at 10/11/2018 1040          Results for orders placed during the hospital encounter of 07/18/18   Adult Transthoracic Echo Complete W/ Cont if Necessary Per Protocol    Narrative · Left ventricular systolic function is normal. Estimated EF = 55%.  · Left ventricular diastolic dysfunction.  · Left atrial cavity size is mildly dilated.  · No evidence of pulmonary hypertension is present.            Consults:   Consults     No orders found for last 30 day(s).          Procedures Performed         Physical Exam:  General Appearance: Awake, alert, in no acute distress  Eyes: Pupils equal and reactive    Ears: Appear intact " with no abnormalities noted  Nose: Nares normal, no drainage  Neck: supple, trachea midline, no carotid bruit and no JVD  Back: no kyphosis present,    Lungs: respirations regular, respirations even and respirations unlabored  Heart: normal S1, S2,  2/6 pansystolic murmur in the left sternal border,  no rub and no click  Abdomen: normal bowel sounds, no masses, no hepatomegaly, no splenomegaly, guarding and no rebound tenderness   Skin: no bleeding, bruising or rash  Extremities: no cyanosis  Psychiatric/Behavioral: Negative for agitation, behavioral problems, confusion, the patient does not appear to be nervous/anxious.          Results Review:   I reviewed the patient's new clinical results.  I reviewed the patient's new imaging results and agree with the interpretation.  I reviewed the patient's other test results and agree with the interpretation  I personally viewed and interpreted the patient's EKG/Telemetry data  Discussed with patient, and nurse taking care of this patient      Assessment/Plan     Discharge diagnosis with prior    Atrial fibrillation with rapid ventricular response (CMS/HCC)  Currently in sinus rhythm  Anatomy not suitable for atrial fibrillation ablation, has seen electrophysiologists and attempts at atrial fibrillation ablation not successful  Left ventricular diastolic dysfunction  Morbid obesity  Type II diabetes mellitus non-insulin requiring     Plan    OK to discharge  Low salt cardiac diet.   Discharge to home and or self care with improvement or resolution of presenting symptoms or complaints  Ambulating  Optimal medical therapy  Deep vein thrombosis precautions with hydration and ambulation  Counseled regarding disease appropriate diet, fluid, sodium, caffeine, stimulants intake   Stressed compliance to diet and medications   Avoid NSAIDS  Follow up with primary provider and primary cardiologist as scheduled   Overall improved and deemed fit for discharge  Will be provided with  written discharge instructions including diet and medications including prescriptions as required and labs as applicable  Go to nearest emergency room if recurrence of primary complaints or any suspected disabling or life threatening symptoms or complaints such as chest pain, increasing shortness of breath, profound dizziness, weakness, significant palpitations, passing out episodes, cold sweats, excessive nausea etc  She will increase amiodarone to 200 mg daily and  she has enough tablets of 100 mg amiodarone at home  Keep follow-up in my office  If she is able to tolerate amiodarone 200 mg daily Will provide her with a new prescription.  More than 30 minutes spent in the discharge process.     Hospital Course  Patient is a 62 y.o. female presented with atrial fibrillation with rapid ventricular response.  Start on IV Cardizem  Subsequently reverted back to sinus rhythm   has maintained sinus rhythm  IV Cardizem has been discontinued and continues to be in sinus rhythm and feels overall much better and ambulating.        Condition on Discharge: Stable and Improved    Discharge Disposition: To home and self care  Home or Self Care    Discharge Medications     Discharge Medications      Continue These Medications      Instructions Start Date   amiodarone 100 MG half tablet  Commonly known as:  PACERONE   100 mg, Oral, Daily      apixaban 5 MG tablet tablet  Commonly known as:  ELIQUIS   5 mg, Oral, Every 12 Hours Scheduled      aspirin 81 MG EC tablet   81 mg, Oral, Daily      folic acid 1 MG tablet  Commonly known as:  FOLVITE   1 mg, Oral, Daily      glyBURIDE 5 MG tablet  Commonly known as:  DIAbeta   5 mg, Oral, Daily With Breakfast      isosorbide mononitrate 30 MG 24 hr tablet  Commonly known as:  IMDUR   30 mg, Oral, Daily      losartan 25 MG tablet  Commonly known as:  COZAAR   25 mg, Oral, Daily      metFORMIN 1000 MG tablet  Commonly known as:  GLUCOPHAGE   1,000 mg, Oral, Daily With Breakfast       metoprolol tartrate 50 MG tablet  Commonly known as:  LOPRESSOR   50 mg, Oral, 2 Times Daily      nitroglycerin 0.4 MG SL tablet  Commonly known as:  NITROSTAT   0.4 mg, Sublingual, Every 5 Minutes PRN, Take no more than 3 doses in 15 minutes.      pantoprazole 40 MG EC tablet  Commonly known as:  PROTONIX   40 mg, Oral, Every 12 Hours Scheduled      predniSONE 5 MG tablet  Commonly known as:  DELTASONE   5 mg, Oral, Every Morning      predniSONE 1 MG tablet  Commonly known as:  DELTASONE   4 mg, Oral, Nightly      simvastatin 40 MG tablet  Commonly known as:  ZOCOR   40 mg, Oral, Nightly      verapamil  MG CR tablet  Commonly known as:  CALAN-SR   240 mg, Oral, Daily      XELJANZ XR 11 MG tablet sustained-release 24 hour  Generic drug:  Tofacitinib Citrate   1 tablet, Oral, Daily             Discharge Diet:   Diet Instructions     Cardiac              Low salt heart healthy cardiac diet    Activity at Discharge:   Activity Instructions     Up ad arlen              As tolerated    Follow-up Appointments  Future Appointments  Date Time Provider Department Center   11/26/2018 11:00 AM Jagdeep Reyes MD [facility]         Test Results Pending at Discharge: None       Jagdeep Reyes MD  10/12/18  7:39 AM

## 2018-10-14 RX ORDER — SIMVASTATIN 40 MG
40 TABLET ORAL EVERY EVENING
Qty: 30 TABLET | Refills: 0 | Status: SHIPPED | OUTPATIENT
Start: 2018-10-14 | End: 2018-11-05 | Stop reason: SDUPTHER

## 2018-10-14 RX ORDER — ISOSORBIDE MONONITRATE 30 MG/1
TABLET, EXTENDED RELEASE ORAL
Qty: 30 TABLET | Refills: 0 | Status: SHIPPED | OUTPATIENT
Start: 2018-10-14 | End: 2018-11-05 | Stop reason: SDUPTHER

## 2018-10-14 RX ORDER — PANTOPRAZOLE SODIUM 40 MG/1
TABLET, DELAYED RELEASE ORAL
Qty: 60 TABLET | Refills: 0 | Status: SHIPPED | OUTPATIENT
Start: 2018-10-14 | End: 2018-11-05 | Stop reason: SDUPTHER

## 2018-10-14 RX ORDER — BUSPIRONE HYDROCHLORIDE 5 MG/1
TABLET ORAL
Qty: 60 TABLET | Refills: 0 | Status: SHIPPED | OUTPATIENT
Start: 2018-10-14 | End: 2019-02-14

## 2018-10-14 RX ORDER — POTASSIUM CHLORIDE 20 MEQ/1
TABLET, EXTENDED RELEASE ORAL
Qty: 30 TABLET | Refills: 0 | Status: SHIPPED | OUTPATIENT
Start: 2018-10-14 | End: 2018-10-19

## 2018-10-19 ENCOUNTER — OFFICE VISIT (OUTPATIENT)
Dept: FAMILY MEDICINE CLINIC | Age: 63
End: 2018-10-19
Payer: MEDICARE

## 2018-10-19 VITALS
OXYGEN SATURATION: 98 % | TEMPERATURE: 97.5 F | DIASTOLIC BLOOD PRESSURE: 70 MMHG | SYSTOLIC BLOOD PRESSURE: 126 MMHG | HEART RATE: 69 BPM | BODY MASS INDEX: 35.29 KG/M2 | WEIGHT: 205.6 LBS

## 2018-10-19 DIAGNOSIS — I48.0 PAROXYSMAL ATRIAL FIBRILLATION (HCC): Primary | ICD-10-CM

## 2018-10-19 PROCEDURE — G8427 DOCREV CUR MEDS BY ELIG CLIN: HCPCS | Performed by: CLINICAL NURSE SPECIALIST

## 2018-10-19 PROCEDURE — 1036F TOBACCO NON-USER: CPT | Performed by: CLINICAL NURSE SPECIALIST

## 2018-10-19 PROCEDURE — G8417 CALC BMI ABV UP PARAM F/U: HCPCS | Performed by: CLINICAL NURSE SPECIALIST

## 2018-10-19 PROCEDURE — 3017F COLORECTAL CA SCREEN DOC REV: CPT | Performed by: CLINICAL NURSE SPECIALIST

## 2018-10-19 PROCEDURE — G8484 FLU IMMUNIZE NO ADMIN: HCPCS | Performed by: CLINICAL NURSE SPECIALIST

## 2018-10-19 PROCEDURE — 99213 OFFICE O/P EST LOW 20 MIN: CPT | Performed by: CLINICAL NURSE SPECIALIST

## 2018-10-19 ASSESSMENT — ENCOUNTER SYMPTOMS
EYE REDNESS: 0
CHEST TIGHTNESS: 0
ABDOMINAL PAIN: 0
BACK PAIN: 0
SHORTNESS OF BREATH: 1
COUGH: 0
EYE DISCHARGE: 0
TROUBLE SWALLOWING: 0
SINUS PRESSURE: 0
COLOR CHANGE: 0
WHEEZING: 0
FACIAL SWELLING: 0
EYE PAIN: 0
SORE THROAT: 0

## 2018-11-05 ENCOUNTER — OFFICE VISIT (OUTPATIENT)
Dept: FAMILY MEDICINE CLINIC | Age: 63
End: 2018-11-05
Payer: MEDICARE

## 2018-11-05 VITALS
HEART RATE: 61 BPM | WEIGHT: 212 LBS | DIASTOLIC BLOOD PRESSURE: 68 MMHG | BODY MASS INDEX: 36.39 KG/M2 | OXYGEN SATURATION: 98 % | SYSTOLIC BLOOD PRESSURE: 122 MMHG

## 2018-11-05 DIAGNOSIS — I47.1 PAROXYSMAL SVT (SUPRAVENTRICULAR TACHYCARDIA) (HCC): ICD-10-CM

## 2018-11-05 DIAGNOSIS — E11.9 TYPE 2 DIABETES MELLITUS WITHOUT COMPLICATION, WITHOUT LONG-TERM CURRENT USE OF INSULIN (HCC): ICD-10-CM

## 2018-11-05 DIAGNOSIS — E78.01 FAMILIAL HYPERCHOLESTEROLEMIA: ICD-10-CM

## 2018-11-05 DIAGNOSIS — D50.9 IRON DEFICIENCY ANEMIA, UNSPECIFIED IRON DEFICIENCY ANEMIA TYPE: ICD-10-CM

## 2018-11-05 DIAGNOSIS — J01.10 ACUTE NON-RECURRENT FRONTAL SINUSITIS: ICD-10-CM

## 2018-11-05 DIAGNOSIS — I10 ESSENTIAL HYPERTENSION: Primary | ICD-10-CM

## 2018-11-05 DIAGNOSIS — I48.0 PAROXYSMAL ATRIAL FIBRILLATION (HCC): ICD-10-CM

## 2018-11-05 PROBLEM — I47.10 PAROXYSMAL SVT (SUPRAVENTRICULAR TACHYCARDIA): Status: ACTIVE | Noted: 2018-11-05

## 2018-11-05 PROCEDURE — G8427 DOCREV CUR MEDS BY ELIG CLIN: HCPCS | Performed by: FAMILY MEDICINE

## 2018-11-05 PROCEDURE — 99214 OFFICE O/P EST MOD 30 MIN: CPT | Performed by: FAMILY MEDICINE

## 2018-11-05 PROCEDURE — 1036F TOBACCO NON-USER: CPT | Performed by: FAMILY MEDICINE

## 2018-11-05 PROCEDURE — 3017F COLORECTAL CA SCREEN DOC REV: CPT | Performed by: FAMILY MEDICINE

## 2018-11-05 PROCEDURE — 3045F PR MOST RECENT HEMOGLOBIN A1C LEVEL 7.0-9.0%: CPT | Performed by: FAMILY MEDICINE

## 2018-11-05 PROCEDURE — G8417 CALC BMI ABV UP PARAM F/U: HCPCS | Performed by: FAMILY MEDICINE

## 2018-11-05 PROCEDURE — 2022F DILAT RTA XM EVC RTNOPTHY: CPT | Performed by: FAMILY MEDICINE

## 2018-11-05 PROCEDURE — G8484 FLU IMMUNIZE NO ADMIN: HCPCS | Performed by: FAMILY MEDICINE

## 2018-11-05 RX ORDER — SIMVASTATIN 40 MG
40 TABLET ORAL EVERY EVENING
Qty: 30 TABLET | Refills: 5 | Status: SHIPPED | OUTPATIENT
Start: 2018-11-05 | End: 2019-06-04 | Stop reason: SDUPTHER

## 2018-11-05 RX ORDER — PANTOPRAZOLE SODIUM 40 MG/1
TABLET, DELAYED RELEASE ORAL
Qty: 60 TABLET | Refills: 5 | Status: SHIPPED | OUTPATIENT
Start: 2018-11-05 | End: 2019-06-04 | Stop reason: SDUPTHER

## 2018-11-05 RX ORDER — AZITHROMYCIN 250 MG/1
250 TABLET, FILM COATED ORAL DAILY
Qty: 1 PACKET | Refills: 0 | Status: SHIPPED | OUTPATIENT
Start: 2018-11-05 | End: 2018-11-10

## 2018-11-05 RX ORDER — LOSARTAN POTASSIUM 25 MG/1
25 TABLET ORAL DAILY
Qty: 30 TABLET | Refills: 5 | Status: SHIPPED | OUTPATIENT
Start: 2018-11-05 | End: 2019-05-03 | Stop reason: SDUPTHER

## 2018-11-05 RX ORDER — ISOSORBIDE MONONITRATE 30 MG/1
TABLET, EXTENDED RELEASE ORAL
Qty: 30 TABLET | Refills: 5 | Status: SHIPPED | OUTPATIENT
Start: 2018-11-05 | End: 2019-06-04 | Stop reason: SDUPTHER

## 2018-11-05 NOTE — PROGRESS NOTES
Smoking status: Former Smoker     Packs/day: 0.50     Years: 20.00     Quit date: 9/24/2000    Smokeless tobacco: Never Used    Alcohol use No      Current Outpatient Prescriptions   Medication Sig Dispense Refill    pantoprazole (PROTONIX) 40 MG tablet TAKE 1 TABLET BY MOUTH 2 TIMES A DAY 60 tablet 5    isosorbide mononitrate (IMDUR) 30 MG extended release tablet TAKE 1 TABLET BY MOUTH DAILY. 30 tablet 5    simvastatin (ZOCOR) 40 MG tablet Take 1 tablet by mouth every evening 30 tablet 5    metFORMIN (GLUCOPHAGE) 500 MG tablet TAKE 1 TABLET BY MOUTH 3 TIMES DAILY 90 tablet 5    losartan (COZAAR) 25 MG tablet Take 1 tablet by mouth daily 30 tablet 5    busPIRone (BUSPAR) 5 MG tablet TAKE 1 TABLET BY MOUTH TWICE DAILY AS NEEDED FOR ANXIETY 60 tablet 0    metoprolol tartrate (LOPRESSOR) 50 MG tablet Take 1 tablet by mouth 2 times daily 60 tablet 3    apixaban (ELIQUIS) 5 MG TABS tablet Take 5 mg by mouth 2 times daily       Tofacitinib Citrate 11 MG TB24 Take 1 tablet by mouth daily       verapamil (CALAN SR) 240 MG extended release tablet Take 240 mg by mouth nightly       amiodarone (CORDARONE) 200 MG tablet Take 200 mg by mouth daily       glyBURIDE (DIABETA) 5 MG tablet Take 5 mg by mouth daily (with breakfast) Taking half tablet 2 times per day      FREESTYLE LITE strip USE 1 STRIP TWICE DAILY TO CHECK BLOOD SUGAR 100 strip 0    nitroGLYCERIN (NITROSTAT) 0.4 MG SL tablet Place 1 tablet under the tongue every 5 minutes as needed for Chest pain 25 tablet 2    FREESTYLE LANCETS MISC TEST SUGAR DAILY AS DIRECTED 100 each 2    predniSONE (DELTASONE) 5 MG tablet Take 5 mg by mouth Take 5 mg in the morning and 4 mg in the evening.       aspirin EC 81 MG EC tablet Take 81 mg by mouth daily      folic acid (FOLVITE) 1 MG tablet Take 1 mg by mouth daily       ondansetron (ZOFRAN) 8 MG tablet TAKE 1 TABLET BY MOUTH EVERY 8 HOURS AS NEEDED 21 tablet 0     No current facility-administered medications for this visit. Allergies   Allergen Reactions    Codeine Other (See Comments)     Chest pain    Albuterol     Sulfa Antibiotics     Augmentin [Amoxicillin-Pot Clavulanate] Palpitations       Health Maintenance   Topic Date Due    TSH testing  1955    Hepatitis C screen  1955    Diabetic retinal exam  10/24/1966    HIV screen  10/24/1971    Shingles Vaccine (1 of 2 - 2 Dose Series) 10/24/2006    Diabetic microalbuminuria test  08/27/2015    Flu vaccine (1) 12/24/2018 (Originally 9/1/2018)    Diabetic foot exam  04/24/2019    A1C test (Diabetic or Prediabetic)  09/19/2019    Lipid screen  09/19/2019    Potassium monitoring  09/19/2019    Creatinine monitoring  09/19/2019    Breast cancer screen  12/05/2019    Cervical cancer screen  11/14/2020    DTaP/Tdap/Td vaccine (2 - Td) 12/31/2022    Colon cancer screen colonoscopy  01/31/2023    Pneumococcal med risk  Completed       Subjective:     Review of Systems   Constitutional: Negative for chills and fever. HENT: Positive for congestion, postnasal drip, sinus pain and sinus pressure. Respiratory: Negative for cough, chest tightness and shortness of breath. Cardiovascular: Negative for chest pain, palpitations and leg swelling. Gastrointestinal: Negative for abdominal pain, anal bleeding, constipation, diarrhea and nausea. Genitourinary: Negative for difficulty urinating. Psychiatric/Behavioral: Negative. See HPI for visit specific review of symptoms. All others negative      Objective:   /68   Pulse 61   Wt 212 lb (96.2 kg)   SpO2 98%   BMI 36.39 kg/m²   Physical Exam   Constitutional: She appears well-developed. She does not appear ill. HENT:   Nose: Right sinus exhibits frontal sinus tenderness. Left sinus exhibits frontal sinus tenderness. Eyes: Pupils are equal, round, and reactive to light. Neck: Normal range of motion. Neck supple. Cardiovascular: Normal rate and regular rhythm.   Exam reveals no friction rub. No murmur heard. Pulmonary/Chest: Effort normal and breath sounds normal. No respiratory distress. She has no wheezes. She has no rales. Abdominal: Soft. Bowel sounds are normal. She exhibits no distension. There is no tenderness. There is no rebound and no guarding. Musculoskeletal: She exhibits no edema. Neurological: She is alert. Psychiatric: She has a normal mood and affect. Her behavior is normal.         Lab Review   No results found for this or any previous visit (from the past 672 hour(s)). Assessment & Plan: The following diagnoses and conditions are stable withno further orders unless indicated:  Susan Galvez was seen today for diabetes. Diagnoses and all orders for this visit:    Essential hypertension  Blood pressure is stable. Continue current medications. Monitor ambulatory bp readings, if persistently >140/90, return to clinic. Type 2 diabetes mellitus without complication, without long-term current use of insulin (HCC)  -     Hemoglobin A1C; Future  Will be working on diet and exercise, continue glyburide at current dose for now due to no hypoglycemia, if persistently elevated, will increase. Daily accuchecks bid. Familial hypercholesterolemia  -     Comprehensive Metabolic Panel; Future  -     Lipid Panel; Future  Stable, labs check, continue lifestyle modification, will follow. Iron deficiency anemia, unspecified iron deficiency anemia type  -     CBC Auto Differential; Future    Paroxysmal SVT (supraventricular tachycardia) (HCC)  Stable, rate controlled, on amiodarone higher dose now. Acute non-recurrent frontal sinusitis  Tx with zpack. Increase po fluids, rest. Tylenol for fever, pain. If worsens or no improvement, rtc. Paroxysmal atrial fibrillation (HCC)  Continue eliquis, amiodarone, follow up with Cardiology. Rate controlled.      Other orders  -     pantoprazole (PROTONIX) 40 MG tablet; TAKE 1 TABLET BY MOUTH 2 TIMES A

## 2018-11-06 ENCOUNTER — TELEPHONE (OUTPATIENT)
Dept: OBGYN | Age: 63
End: 2018-11-06

## 2018-11-16 ASSESSMENT — ENCOUNTER SYMPTOMS
ANAL BLEEDING: 0
SHORTNESS OF BREATH: 0
COUGH: 0
NAUSEA: 0
SINUS PAIN: 1
DIARRHEA: 0
CHEST TIGHTNESS: 0
CONSTIPATION: 0
ABDOMINAL PAIN: 0
SINUS PRESSURE: 1

## 2018-11-26 ENCOUNTER — OFFICE VISIT (OUTPATIENT)
Dept: CARDIOLOGY | Facility: CLINIC | Age: 63
End: 2018-11-26

## 2018-11-26 VITALS
OXYGEN SATURATION: 98 % | WEIGHT: 205 LBS | DIASTOLIC BLOOD PRESSURE: 68 MMHG | SYSTOLIC BLOOD PRESSURE: 138 MMHG | HEIGHT: 64 IN | HEART RATE: 75 BPM | BODY MASS INDEX: 35 KG/M2

## 2018-11-26 DIAGNOSIS — I25.10 CORONARY ARTERY DISEASE INVOLVING NATIVE CORONARY ARTERY OF NATIVE HEART WITHOUT ANGINA PECTORIS: ICD-10-CM

## 2018-11-26 DIAGNOSIS — Z95.1 S/P CABG X 3: ICD-10-CM

## 2018-11-26 DIAGNOSIS — E11.9 TYPE 2 DIABETES MELLITUS WITHOUT COMPLICATION, WITHOUT LONG-TERM CURRENT USE OF INSULIN (HCC): ICD-10-CM

## 2018-11-26 DIAGNOSIS — I48.0 PAF (PAROXYSMAL ATRIAL FIBRILLATION) (HCC): ICD-10-CM

## 2018-11-26 DIAGNOSIS — Z95.5 S/P CORONARY ARTERY STENT PLACEMENT: Primary | ICD-10-CM

## 2018-11-26 DIAGNOSIS — E78.2 MIXED HYPERLIPIDEMIA: ICD-10-CM

## 2018-11-26 PROCEDURE — 99214 OFFICE O/P EST MOD 30 MIN: CPT | Performed by: INTERNAL MEDICINE

## 2018-11-26 PROCEDURE — 93000 ELECTROCARDIOGRAM COMPLETE: CPT | Performed by: INTERNAL MEDICINE

## 2018-11-26 NOTE — PROGRESS NOTES
Teresa Serna  [id number]  1955  63 y.o.  female    Referring Provider: Teresa Contreras MD    Reason for Follow-up Visit: Here for routine follow up   Paroxysmal atrial fibrillation now sinus rhythm  Saw Dr De Anda AF ablation tried but due to no good venous access not possible  Now  sinus rhythm much better      Subjective    Similar symptoms as during last visit  Dramatically better  Mild chronic exertional shortness of breath on exertion relieved with rest  No significant cough or wheezing  Going on for several months    No palpitations  No associated chest pain  No significant pedal edema  No fever or chills    No significant expectoration  No hemoptysis  No presyncope or syncope     Fairly active      History of present illness:  Teresa Serna is a 63 y.o. yo female with history of Paroxysmal atrial fibrillation who presents today for   Chief Complaint   Patient presents with   • Atrial Fibrillation     3 MO F/U - RECENT ER   .    History  Past Medical History:   Diagnosis Date   • Abnormal stress test    • Atrial flutter (CMS/HCC)    • CAD (coronary artery disease)    • CAD in native artery     CABG x 3   • Diabetes mellitus (CMS/HCC)    • Essential hypertension     Tricuspid valve insufficiency, unspecified etiology    • Hyperlipemia, mixed    • Hyperlipidemia    • Hypertension    • MI, old    • Mitral valve prolapse    • Near syncope    • Obesity, morbid (CMS/HCC)    • Palpitations    • Paroxysmal SVT (supraventricular tachycardia) (CMS/HCC)    • Paroxysmal SVT (supraventricular tachycardia) (CMS/HCC)    • Pedal edema    • Precordial pain    • Rheumatoid arthritis (CMS/HCC)    • Rheumatoid arthritis (CMS/HCC)    • S/P CABG x 3    ,   Past Surgical History:   Procedure Laterality Date   • APPENDECTOMY     • CARDIAC CATHETERIZATION Left 06/18/2012    Intensify medical therapy   • CARDIAC CATHETERIZATION Left 05/05/2002    surgery   • CHOLECYSTECTOMY     • COLONOSCOPY  02/10/2012   • CORONARY ARTERY  BYPASS GRAFT  2002    LIMA to LAD, SVG to D1, SVG to OM1 - x3   • CORONARY STENT PLACEMENT  09/2009    X1 - Stent to LAD, Drug Eluting   • GALLBLADDER SURGERY     • REPLACEMENT TOTAL KNEE Right    • UPPER GASTROINTESTINAL ENDOSCOPY  2015   ,   Family History   Problem Relation Age of Onset   • Cancer Father    • Coronary artery disease Father    • No Known Problems Mother    ,   Social History     Tobacco Use   • Smoking status: Former Smoker     Years: 20.00     Types: Cigarettes     Last attempt to quit: 2000     Years since quittin.8   • Smokeless tobacco: Never Used   Substance Use Topics   • Alcohol use: No   • Drug use: No   ,     Medications  Current Outpatient Medications   Medication Sig Dispense Refill   • amiodarone (PACERONE) 200 MG tablet Take 1 tablet by mouth Daily.     • apixaban (ELIQUIS) 5 MG tablet tablet Take 1 tablet by mouth Every 12 (Twelve) Hours. 60 tablet 11   • aspirin 81 MG EC tablet Take 81 mg by mouth Daily.     • folic acid (FOLVITE) 1 MG tablet Take 1 mg by mouth Daily.     • glyBURIDE (DIAbeta) 5 MG tablet Take 5 mg by mouth Daily With Breakfast.     • isosorbide mononitrate (IMDUR) 30 MG 24 hr tablet Take 30 mg by mouth Daily.     • losartan (COZAAR) 25 MG tablet Take 25 mg by mouth Daily.     • metFORMIN (GLUCOPHAGE) 1000 MG tablet Take 1,000 mg by mouth Daily With Breakfast.     • metoprolol tartrate (LOPRESSOR) 50 MG tablet Take 50 mg by mouth 2 (Two) Times a Day.     • nitroglycerin (NITROSTAT) 0.4 MG SL tablet Place 0.4 mg under the tongue Every 5 (Five) Minutes As Needed for Chest Pain. Take no more than 3 doses in 15 minutes.     • pantoprazole (PROTONIX) 40 MG EC tablet Take 1 tablet by mouth Every 12 (Twelve) Hours. 60 tablet 1   • predniSONE (DELTASONE) 1 MG tablet Take 4 mg by mouth Every Night.     • predniSONE (DELTASONE) 5 MG tablet Take 5 mg by mouth Every Morning.     • simvastatin (ZOCOR) 40 MG tablet Take 40 mg by mouth Every Night.     •  "Tofacitinib Citrate (XELJANZ XR) 11 MG tablet sustained-release 24 hour Take 1 tablet by mouth Daily.     • verapamil SR (CALAN-SR) 240 MG CR tablet Take 1 tablet by mouth Daily. 30 tablet 11     No current facility-administered medications for this visit.      Results for orders placed during the hospital encounter of 07/18/18   Adult Transthoracic Echo Complete W/ Cont if Necessary Per Protocol    Narrative · Left ventricular systolic function is normal. Estimated EF = 55%.  · Left ventricular diastolic dysfunction.  · Left atrial cavity size is mildly dilated.  · No evidence of pulmonary hypertension is present.          Allergies:  Codeine; Albuterol; Augmentin [amoxicillin-pot clavulanate]; and Sulfa antibiotics    Review of Systems  Review of Systems   Constitution: Positive for weakness and malaise/fatigue.   HENT: Negative.    Eyes: Negative.    Cardiovascular: Positive for dyspnea on exertion and palpitations. Negative for chest pain, claudication, cyanosis, irregular heartbeat, leg swelling, near-syncope, orthopnea, paroxysmal nocturnal dyspnea and syncope.   Respiratory: Negative.    Endocrine: Negative.    Hematologic/Lymphatic: Negative.    Skin: Negative.    Musculoskeletal: Positive for arthritis, back pain and joint pain.   Gastrointestinal: Negative for anorexia.   Genitourinary: Negative.    Psychiatric/Behavioral: Negative.        Objective     Physical Exam:  /68   Pulse 75   Ht 162.6 cm (64\")   Wt 93 kg (205 lb)   SpO2 98%   BMI 35.19 kg/m²   Physical Exam   Constitutional: She appears well-developed.   HENT:   Head: Normocephalic.   Neck: Normal carotid pulses and no JVD present. No tracheal tenderness present. Carotid bruit is not present. No tracheal deviation and no edema present.   Cardiovascular: Regular rhythm and normal pulses.   Murmur heard.   Systolic murmur is present with a grade of 2/6.  Pulmonary/Chest: Effort normal. No stridor. She has wheezes.   Abdominal: Soft. "   Neurological: She is alert. She has normal strength. No cranial nerve deficit or sensory deficit.   Skin: Skin is warm.   Psychiatric: She has a normal mood and affect. Her speech is normal and behavior is normal.       Results Review:       ECG 12 Lead  Date/Time: 11/26/2018 11:41 AM  Performed by: Jagdeep Reyes MD  Authorized by: Jagdeep Reyes MD   Comparison: compared with previous ECG from 10/11/2018  Comparison to previous ECG:  sinus rhythm replaced atrial flutter   Rhythm: sinus rhythm  Rate: normal  T depression: V1, V2 and V3  QRS axis: normal  Clinical impression: abnormal ECG            Assessment/Plan   Patient Active Problem List   Diagnosis   • CAD (coronary artery disease)   • Atrial flutter (CMS/HCC)   • Type 2 diabetes mellitus without complication, without long-term current use of insulin (CMS/HCC)   • Mixed hyperlipidemia   • S/P CABG x 3 2000 Dr Larsen   • Shoulder blade pain   • Anemia   • Abnormal finding on imaging   • History of esophagitis   • Essential hypertension   • S/P coronary artery stent placement   • Gastric polyp   • Colon polyps   • PAF (paroxysmal atrial fibrillation) (CMS/HCC)   • Atrial fibrillation with rapid ventricular response (CMS/HCC)     Low risk stress echo with normal LVEF by echo cardiogram.         Plan        Cannot decrease Amio to less than 200 mg otherwise Paroxysmal atrial fibrillation     Keep A1c less than 7 Primary to monitor  Keep LDL below 70 mg/dl. Monitor liver and renal functions.   Monitor CBC, CMP, TSH (as indicated) and Lipid Panel by primary        Monitor CBC, CMP, TSH and Lipid Panel by primary  Eye exam, pulmonary function tests   Discussed Amiodarone induced toxicity and required monitoring and close follow up for this    No additional cardiac testing required at this point in time.   "    xxxxxxxxxxxxxxxxxxxxxxxxxxxxxxxxxxxxxxxxxxxxxxxxxxxxxxxxxxxxxxxxxxxxxxxxxxxxxxxxxxxxxxxxxxxxxxxxxxxxxxxxxxxxxxxxxxxxxxxxxxxxxxxxxxxxxxxxxxxxxxxxxxxxxxxxxxxxxxxxxxxxxxxxxxxxxxxxxxxxxxxxxxxxxxxxxxxxxxxxxxxxxxxxxxxxxxxxxxxxxxxxxxxxxxxxxxxxxxxxxxxxxxxx  Health maintenance    Low salt/ HTN/ Heart healthy carbohydrate restricted cardiac diet as applicable to this patient's current diagnoses.   This handout has relevant information regarding shopping for food, preparing meals, what to eat at restaurants, tracking of food intake, information regarding sodium intake and salt content, how to read food labels, knowing what to eat, tips reagarding physical activity, calorie count and calorie expenditure. What foods to avoid. Information regarding alcoholic drinks along with \"good\" and \"bad\" fats.  Reiterated prior recommendations     The patient is advised to reduce or avoid caffeine or other cardiac stimulants.     The patient was advised that NSAID-type medications have three  very important potential side effects: cardiovascular complications, gastrointestinal irritation including hemorrhage and renal injuries.  Do not use anti-inflammatories such as Motrin/ibuprofen, Alleve/naprosyn, Mobic and like medications Use Tylenol instead.The patient expresses understanding of these issues and questions were answered.   Monitor for any signs of bleeding including red or dark stools. Fall precautions.       Monitor for any signs of bleeding including red or dark stools. Fall precautions.   Patient is asked to monitor BP at home or work, several times per month and return with written values at next office visit.     Advised staying uptodate with immunizations per established standard guidelines.      Offered to give patient  a copy of my notes and relevant tests/ prior ECG etc for the patient to review and follow specific advise and relevant findings if any, prognosis, along with my current and future plans.      Questions were " encouraged, asked and answered to the patient's  understanding and satisfaction. Questions if any regarding current medications and side effects, need for refills and importance of compliance to medications stressed.    Reviewed available prior notes, consults, prior visits, laboratory findings, radiology and cardiology relevant reports. Updated chart as applicable. I have reviewed the patient's medical history in detail and updated the computerized patient record as relevant.      Updated patient regarding any new or relevant abnormalities on review of records or any new findings on physical exam. Mentioned to patient about purpose of visit and desirable health short and long term goals and objectives.        xxxxxxxxxxxxxxxxxxxxxxxxxxxxxxxxxxxxxxxxxxxxxxxxxxxxxxxxxxxxxxxxxxxxxxxxxxxxxxxxxxxxxxxxxxxxxxxxxxxxxxxxxxxxxxxxxxxxxxxxxxxxxxxxxxxxxxxxxxxxxxxxxxxxxxxxxxxxxxxxxxxxxxxxxxxxxxxxxxxxxxxxxxxxxxxxxxxxxxxxxxxxxxxxxxxxxxxxxxxxxxxxxxxxxxxxxxxxxxxxxxxxxxxx      Return in about 6 months (around 5/26/2019).

## 2018-12-05 RX ORDER — AMIODARONE HYDROCHLORIDE 100 MG/1
TABLET ORAL
Qty: 30 TABLET | Refills: 0 | Status: SHIPPED | OUTPATIENT
Start: 2018-12-05 | End: 2019-03-06 | Stop reason: SDUPTHER

## 2018-12-06 ENCOUNTER — HOSPITAL ENCOUNTER (OUTPATIENT)
Dept: WOMENS IMAGING | Age: 63
Discharge: HOME | End: 2018-12-06
Payer: MEDICARE

## 2018-12-06 DIAGNOSIS — Z12.39 BREAST CANCER SCREENING: ICD-10-CM

## 2018-12-06 PROCEDURE — 77063 BREAST TOMOSYNTHESIS BI: CPT

## 2019-01-04 ENCOUNTER — OFFICE VISIT (OUTPATIENT)
Dept: FAMILY MEDICINE CLINIC | Age: 64
End: 2019-01-04
Payer: MEDICARE

## 2019-01-04 VITALS
TEMPERATURE: 97.5 F | BODY MASS INDEX: 35.7 KG/M2 | OXYGEN SATURATION: 97 % | HEART RATE: 70 BPM | WEIGHT: 208 LBS | SYSTOLIC BLOOD PRESSURE: 120 MMHG | DIASTOLIC BLOOD PRESSURE: 66 MMHG

## 2019-01-04 DIAGNOSIS — J20.9 ACUTE BRONCHITIS, UNSPECIFIED ORGANISM: ICD-10-CM

## 2019-01-04 DIAGNOSIS — I48.0 PAROXYSMAL ATRIAL FIBRILLATION (HCC): ICD-10-CM

## 2019-01-04 DIAGNOSIS — J01.10 ACUTE NON-RECURRENT FRONTAL SINUSITIS: Primary | ICD-10-CM

## 2019-01-04 DIAGNOSIS — H61.22 IMPACTED CERUMEN OF LEFT EAR: ICD-10-CM

## 2019-01-04 PROCEDURE — 99214 OFFICE O/P EST MOD 30 MIN: CPT | Performed by: CLINICAL NURSE SPECIALIST

## 2019-01-04 PROCEDURE — G8417 CALC BMI ABV UP PARAM F/U: HCPCS | Performed by: CLINICAL NURSE SPECIALIST

## 2019-01-04 PROCEDURE — G8427 DOCREV CUR MEDS BY ELIG CLIN: HCPCS | Performed by: CLINICAL NURSE SPECIALIST

## 2019-01-04 PROCEDURE — 3017F COLORECTAL CA SCREEN DOC REV: CPT | Performed by: CLINICAL NURSE SPECIALIST

## 2019-01-04 PROCEDURE — 1036F TOBACCO NON-USER: CPT | Performed by: CLINICAL NURSE SPECIALIST

## 2019-01-04 PROCEDURE — G8484 FLU IMMUNIZE NO ADMIN: HCPCS | Performed by: CLINICAL NURSE SPECIALIST

## 2019-01-04 RX ORDER — CEFDINIR 300 MG/1
300 CAPSULE ORAL 2 TIMES DAILY
Qty: 20 CAPSULE | Refills: 0 | Status: SHIPPED | OUTPATIENT
Start: 2019-01-04 | End: 2019-01-14

## 2019-01-04 ASSESSMENT — ENCOUNTER SYMPTOMS
CONSTIPATION: 0
FACIAL SWELLING: 0
COUGH: 1
WHEEZING: 1
EYE PAIN: 0
SINUS PAIN: 1
DIARRHEA: 0
SORE THROAT: 0
TROUBLE SWALLOWING: 0
RHINORRHEA: 1
SHORTNESS OF BREATH: 0
BACK PAIN: 0
CHEST TIGHTNESS: 0
SINUS PRESSURE: 1
EYE REDNESS: 0
COLOR CHANGE: 0
ABDOMINAL PAIN: 0
EYE DISCHARGE: 0

## 2019-01-16 ENCOUNTER — OFFICE VISIT (OUTPATIENT)
Dept: FAMILY MEDICINE CLINIC | Age: 64
End: 2019-01-16
Payer: MEDICARE

## 2019-01-16 VITALS
RESPIRATION RATE: 16 BRPM | TEMPERATURE: 97.8 F | BODY MASS INDEX: 35.87 KG/M2 | SYSTOLIC BLOOD PRESSURE: 110 MMHG | DIASTOLIC BLOOD PRESSURE: 62 MMHG | OXYGEN SATURATION: 98 % | WEIGHT: 209 LBS | HEART RATE: 70 BPM

## 2019-01-16 DIAGNOSIS — E11.9 TYPE 2 DIABETES MELLITUS WITHOUT COMPLICATION, WITHOUT LONG-TERM CURRENT USE OF INSULIN (HCC): ICD-10-CM

## 2019-01-16 DIAGNOSIS — M06.9 RHEUMATOID ARTHRITIS INVOLVING MULTIPLE SITES, UNSPECIFIED RHEUMATOID FACTOR PRESENCE: ICD-10-CM

## 2019-01-16 DIAGNOSIS — R73.9 BLOOD GLUCOSE ELEVATED: Primary | ICD-10-CM

## 2019-01-16 DIAGNOSIS — I48.0 PAROXYSMAL ATRIAL FIBRILLATION (HCC): ICD-10-CM

## 2019-01-16 DIAGNOSIS — I10 ESSENTIAL HYPERTENSION: ICD-10-CM

## 2019-01-16 LAB — GLUCOSE BLD-MCNC: 262 MG/DL

## 2019-01-16 PROCEDURE — 2022F DILAT RTA XM EVC RTNOPTHY: CPT | Performed by: NURSE PRACTITIONER

## 2019-01-16 PROCEDURE — 99214 OFFICE O/P EST MOD 30 MIN: CPT | Performed by: NURSE PRACTITIONER

## 2019-01-16 PROCEDURE — 3017F COLORECTAL CA SCREEN DOC REV: CPT | Performed by: NURSE PRACTITIONER

## 2019-01-16 PROCEDURE — 3046F HEMOGLOBIN A1C LEVEL >9.0%: CPT | Performed by: NURSE PRACTITIONER

## 2019-01-16 PROCEDURE — G8427 DOCREV CUR MEDS BY ELIG CLIN: HCPCS | Performed by: NURSE PRACTITIONER

## 2019-01-16 PROCEDURE — 1036F TOBACCO NON-USER: CPT | Performed by: NURSE PRACTITIONER

## 2019-01-16 PROCEDURE — G8484 FLU IMMUNIZE NO ADMIN: HCPCS | Performed by: NURSE PRACTITIONER

## 2019-01-16 PROCEDURE — 82962 GLUCOSE BLOOD TEST: CPT | Performed by: NURSE PRACTITIONER

## 2019-01-16 PROCEDURE — G8417 CALC BMI ABV UP PARAM F/U: HCPCS | Performed by: NURSE PRACTITIONER

## 2019-01-16 ASSESSMENT — ENCOUNTER SYMPTOMS
SHORTNESS OF BREATH: 1
WHEEZING: 0
CHEST TIGHTNESS: 0
COUGH: 0
NAUSEA: 0
SORE THROAT: 0
ABDOMINAL PAIN: 0
DIARRHEA: 0

## 2019-01-22 ENCOUNTER — TELEPHONE (OUTPATIENT)
Dept: CARDIOLOGY | Facility: CLINIC | Age: 64
End: 2019-01-22

## 2019-01-23 DIAGNOSIS — R00.1 BRADYCARDIA: Primary | ICD-10-CM

## 2019-01-24 ENCOUNTER — CLINICAL SUPPORT (OUTPATIENT)
Dept: CARDIOLOGY | Facility: CLINIC | Age: 64
End: 2019-01-24

## 2019-01-24 ENCOUNTER — LAB (OUTPATIENT)
Dept: LAB | Facility: HOSPITAL | Age: 64
End: 2019-01-24
Attending: INTERNAL MEDICINE

## 2019-01-24 VITALS
WEIGHT: 207 LBS | BODY MASS INDEX: 35.34 KG/M2 | DIASTOLIC BLOOD PRESSURE: 70 MMHG | HEIGHT: 64 IN | SYSTOLIC BLOOD PRESSURE: 115 MMHG

## 2019-01-24 DIAGNOSIS — D50.0 IRON DEFICIENCY ANEMIA DUE TO CHRONIC BLOOD LOSS: ICD-10-CM

## 2019-01-24 DIAGNOSIS — M89.8X1 SHOULDER BLADE PAIN: ICD-10-CM

## 2019-01-24 DIAGNOSIS — E78.2 MIXED HYPERLIPIDEMIA: ICD-10-CM

## 2019-01-24 DIAGNOSIS — E11.9 TYPE 2 DIABETES MELLITUS WITHOUT COMPLICATION, WITHOUT LONG-TERM CURRENT USE OF INSULIN (HCC): ICD-10-CM

## 2019-01-24 DIAGNOSIS — Z95.5 S/P CORONARY ARTERY STENT PLACEMENT: ICD-10-CM

## 2019-01-24 DIAGNOSIS — Z87.19 HISTORY OF ESOPHAGITIS: ICD-10-CM

## 2019-01-24 DIAGNOSIS — I48.0 PAF (PAROXYSMAL ATRIAL FIBRILLATION) (HCC): ICD-10-CM

## 2019-01-24 DIAGNOSIS — R00.1 BRADYCARDIA: ICD-10-CM

## 2019-01-24 DIAGNOSIS — I48.91 ATRIAL FIBRILLATION WITH RAPID VENTRICULAR RESPONSE (HCC): ICD-10-CM

## 2019-01-24 DIAGNOSIS — K31.7 GASTRIC POLYP: ICD-10-CM

## 2019-01-24 DIAGNOSIS — R93.89 ABNORMAL FINDING ON IMAGING: Primary | ICD-10-CM

## 2019-01-24 DIAGNOSIS — R53.83 FATIGUE, UNSPECIFIED TYPE: ICD-10-CM

## 2019-01-24 DIAGNOSIS — I10 ESSENTIAL HYPERTENSION: ICD-10-CM

## 2019-01-24 DIAGNOSIS — I25.10 CORONARY ARTERY DISEASE INVOLVING NATIVE CORONARY ARTERY OF NATIVE HEART WITHOUT ANGINA PECTORIS: ICD-10-CM

## 2019-01-24 DIAGNOSIS — Z95.1 S/P CABG X 3: ICD-10-CM

## 2019-01-24 DIAGNOSIS — I48.4 ATYPICAL ATRIAL FLUTTER (HCC): ICD-10-CM

## 2019-01-24 LAB
ALBUMIN SERPL-MCNC: 4.4 G/DL (ref 3.5–5)
ALBUMIN/GLOB SERPL: 1.8 G/DL (ref 1.1–2.5)
ALP SERPL-CCNC: 64 U/L (ref 24–120)
ALT SERPL W P-5'-P-CCNC: 43 U/L (ref 0–54)
ANION GAP SERPL CALCULATED.3IONS-SCNC: 11 MMOL/L (ref 4–13)
AST SERPL-CCNC: 34 U/L (ref 7–45)
BASOPHILS # BLD AUTO: 0.06 10*3/MM3 (ref 0–0.2)
BASOPHILS NFR BLD AUTO: 0.5 % (ref 0–2)
BILIRUB SERPL-MCNC: 1.4 MG/DL (ref 0.1–1)
BUN BLD-MCNC: 15 MG/DL (ref 5–21)
BUN/CREAT SERPL: 30.6 (ref 7–25)
CALCIUM SPEC-SCNC: 8.8 MG/DL (ref 8.4–10.4)
CHLORIDE SERPL-SCNC: 102 MMOL/L (ref 98–110)
CO2 SERPL-SCNC: 27 MMOL/L (ref 24–31)
CREAT BLD-MCNC: 0.49 MG/DL (ref 0.5–1.4)
DEPRECATED RDW RBC AUTO: 55 FL (ref 40–54)
EOSINOPHIL # BLD AUTO: 0.05 10*3/MM3 (ref 0–0.7)
EOSINOPHIL NFR BLD AUTO: 0.4 % (ref 0–4)
ERYTHROCYTE [DISTWIDTH] IN BLOOD BY AUTOMATED COUNT: 15.6 % (ref 12–15)
GFR SERPL CREATININE-BSD FRML MDRD: 128 ML/MIN/1.73
GLOBULIN UR ELPH-MCNC: 2.5 GM/DL
GLUCOSE BLD-MCNC: 144 MG/DL (ref 70–100)
HCT VFR BLD AUTO: 43.5 % (ref 37–47)
HGB BLD-MCNC: 13.8 G/DL (ref 12–16)
IMM GRANULOCYTES # BLD AUTO: 0.12 10*3/MM3 (ref 0–0.03)
IMM GRANULOCYTES NFR BLD AUTO: 0.9 % (ref 0–5)
LYMPHOCYTES # BLD AUTO: 1.33 10*3/MM3 (ref 0.72–4.86)
LYMPHOCYTES NFR BLD AUTO: 10.3 % (ref 15–45)
MCH RBC QN AUTO: 30.5 PG (ref 28–32)
MCHC RBC AUTO-ENTMCNC: 31.7 G/DL (ref 33–36)
MCV RBC AUTO: 96 FL (ref 82–98)
MONOCYTES # BLD AUTO: 0.72 10*3/MM3 (ref 0.19–1.3)
MONOCYTES NFR BLD AUTO: 5.6 % (ref 4–12)
NEUTROPHILS # BLD AUTO: 10.66 10*3/MM3 (ref 1.87–8.4)
NEUTROPHILS NFR BLD AUTO: 82.3 % (ref 39–78)
NRBC BLD AUTO-RTO: 0 /100 WBC (ref 0–0)
PLATELET # BLD AUTO: 223 10*3/MM3 (ref 130–400)
PMV BLD AUTO: 11 FL (ref 6–12)
POTASSIUM BLD-SCNC: 3.9 MMOL/L (ref 3.5–5.3)
PROT SERPL-MCNC: 6.9 G/DL (ref 6.3–8.7)
RBC # BLD AUTO: 4.53 10*6/MM3 (ref 4.2–5.4)
SODIUM BLD-SCNC: 140 MMOL/L (ref 135–145)
TSH SERPL DL<=0.05 MIU/L-ACNC: 2.28 MIU/ML (ref 0.47–4.68)
WBC NRBC COR # BLD: 12.94 10*3/MM3 (ref 4.8–10.8)

## 2019-01-24 PROCEDURE — 36415 COLL VENOUS BLD VENIPUNCTURE: CPT

## 2019-01-24 PROCEDURE — 93000 ELECTROCARDIOGRAM COMPLETE: CPT | Performed by: INTERNAL MEDICINE

## 2019-01-24 PROCEDURE — 84443 ASSAY THYROID STIM HORMONE: CPT | Performed by: INTERNAL MEDICINE

## 2019-01-24 PROCEDURE — 99214 OFFICE O/P EST MOD 30 MIN: CPT | Performed by: INTERNAL MEDICINE

## 2019-01-24 PROCEDURE — 80053 COMPREHEN METABOLIC PANEL: CPT | Performed by: INTERNAL MEDICINE

## 2019-01-24 PROCEDURE — 85025 COMPLETE CBC W/AUTO DIFF WBC: CPT | Performed by: INTERNAL MEDICINE

## 2019-01-24 RX ORDER — MECLIZINE HCL 12.5 MG/1
12.5 TABLET ORAL 3 TIMES DAILY PRN
Qty: 90 TABLET | Refills: 3 | Status: SHIPPED | OUTPATIENT
Start: 2019-01-24 | End: 2022-01-01

## 2019-01-24 NOTE — PROGRESS NOTES
Teresa Serna  [id number]  1955  63 y.o.  female    Referring Provider: Teresa Contreras MD    Reason for Follow-up Visit: Called to be seen  More dizzy and weak  Some ringing in ears  Easy fatiguability   Paroxysmal atrial fibrillation now sinus rhythm  Saw Dr De Anda AF ablation tried but due to no good venous access not possible  Now  sinus rhythm much better        Subjective    Mild chronic exertional shortness of breath on exertion relieved with rest  No significant cough or wheezing  Going on for several months or longer    Occasional palpitations, once every morning lasting for less than 1 minute  No associated symptoms of dizziness, weakness, chest pain,  shortness of breath   No associated chest pain  No significant pedal edema    No fever or chills  No significant expectoration    No hemoptysis  No presyncope or syncope     Easy fatiguability     Joint pain in small, medium and large joints  Chronic low back pain      On anticoagulation with Eliquis   No recent labs      History of present illness:  Teresa Serna is a 63 y.o. yo female with history of Paroxysmal atrial fibrillation who presents today for   Chief Complaint   Patient presents with   • Slow Heart Rate     patient stated when she is sleep her heart rate is real low when she gets up in the morning.   • Fatigue     patient stated she is really tired and she just drags    .    History  Past Medical History:   Diagnosis Date   • Abnormal stress test    • Atrial flutter (CMS/HCC)    • CAD (coronary artery disease)    • CAD in native artery     CABG x 3   • Diabetes mellitus (CMS/HCC)    • Essential hypertension     Tricuspid valve insufficiency, unspecified etiology    • Hyperlipemia, mixed    • Hyperlipidemia    • Hypertension    • MI, old    • Mitral valve prolapse    • Near syncope    • Obesity, morbid (CMS/HCC)    • Palpitations    • Paroxysmal SVT (supraventricular tachycardia) (CMS/HCC)    • Paroxysmal SVT (supraventricular  tachycardia) (CMS/HCC)    • Pedal edema    • Precordial pain    • Rheumatoid arthritis (CMS/HCC)    • Rheumatoid arthritis (CMS/HCC)    • S/P CABG x 3    ,   Past Surgical History:   Procedure Laterality Date   • APPENDECTOMY     • CARDIAC CATHETERIZATION Left 2012    Intensify medical therapy   • CARDIAC CATHETERIZATION Left 2002    surgery   • CHOLECYSTECTOMY     • COLONOSCOPY  02/10/2012   • COLONOSCOPY N/A 2018    Procedure: COLONOSCOPY WITH ANESTHESIA;  Surgeon: Patricia Bo MD;  Location: [facility] ENDOSCOPY;  Service:    • CORONARY ARTERY BYPASS GRAFT  2002    LIMA to LAD, SVG to D1, SVG to OM1 - x3   • CORONARY STENT PLACEMENT  09/2009    X1 - Stent to LAD, Drug Eluting   • ENDOSCOPY N/A 2018    Procedure: ESOPHAGOGASTRODUODENOSCOPY WITH ANESTHESIA;  Surgeon: Patricia Bo MD;  Location: [facility] ENDOSCOPY;  Service:    • GALLBLADDER SURGERY     • REPLACEMENT TOTAL KNEE Right    • UPPER GASTROINTESTINAL ENDOSCOPY  2015   ,   Family History   Problem Relation Age of Onset   • Cancer Father    • Coronary artery disease Father    • No Known Problems Mother    ,   Social History     Tobacco Use   • Smoking status: Former Smoker     Years: 20.00     Types: Cigarettes     Last attempt to quit: 2000     Years since quittin.9   • Smokeless tobacco: Never Used   Substance Use Topics   • Alcohol use: No   • Drug use: No   ,     Medications  Current Outpatient Medications   Medication Sig Dispense Refill   • amiodarone (PACERONE) 100 MG tablet TAKE 1 TABLET BY MOUTH DAILY 30 tablet 0   • amiodarone (PACERONE) 200 MG tablet Take 1 tablet by mouth Daily.     • apixaban (ELIQUIS) 5 MG tablet tablet Take 1 tablet by mouth Every 12 (Twelve) Hours. 60 tablet 11   • aspirin 81 MG EC tablet Take 81 mg by mouth Daily.     • folic acid (FOLVITE) 1 MG tablet Take 1 mg by mouth Daily.     • glyBURIDE (DIAbeta) 5 MG tablet Take 5 mg by mouth Daily With Breakfast.     • isosorbide  mononitrate (IMDUR) 30 MG 24 hr tablet Take 30 mg by mouth Daily.     • losartan (COZAAR) 25 MG tablet Take 25 mg by mouth Daily.     • metFORMIN (GLUCOPHAGE) 1000 MG tablet Take 1,000 mg by mouth Daily With Breakfast.     • metoprolol tartrate (LOPRESSOR) 50 MG tablet Take 50 mg by mouth 2 (Two) Times a Day.     • nitroglycerin (NITROSTAT) 0.4 MG SL tablet Place 0.4 mg under the tongue Every 5 (Five) Minutes As Needed for Chest Pain. Take no more than 3 doses in 15 minutes.     • pantoprazole (PROTONIX) 40 MG EC tablet Take 1 tablet by mouth Every 12 (Twelve) Hours. 60 tablet 1   • predniSONE (DELTASONE) 1 MG tablet Take 4 mg by mouth Every Night.     • predniSONE (DELTASONE) 5 MG tablet Take 5 mg by mouth Every Morning.     • simvastatin (ZOCOR) 40 MG tablet Take 40 mg by mouth Every Night.     • Tofacitinib Citrate (XELJANZ XR) 11 MG tablet sustained-release 24 hour Take 1 tablet by mouth Daily.     • verapamil SR (CALAN-SR) 240 MG CR tablet Take 1 tablet by mouth Daily. 30 tablet 11   • meclizine (ANTIVERT) 12.5 MG tablet Take 1 tablet by mouth 3 (Three) Times a Day As Needed for dizziness. 90 tablet 3     No current facility-administered medications for this visit.      Results for orders placed during the hospital encounter of 07/18/18   Adult Transthoracic Echo Complete W/ Cont if Necessary Per Protocol    Narrative · Left ventricular systolic function is normal. Estimated EF = 55%.  · Left ventricular diastolic dysfunction.  · Left atrial cavity size is mildly dilated.  · No evidence of pulmonary hypertension is present.          Allergies:  Codeine; Albuterol; Augmentin [amoxicillin-pot clavulanate]; and Sulfa antibiotics    Review of Systems  Review of Systems   Constitution: Positive for weakness and malaise/fatigue.   HENT: Negative.    Eyes: Negative.    Cardiovascular: Positive for dyspnea on exertion and palpitations. Negative for chest pain, claudication, cyanosis, irregular heartbeat, leg swelling,  "near-syncope, orthopnea, paroxysmal nocturnal dyspnea and syncope.   Respiratory: Negative.    Endocrine: Negative.    Hematologic/Lymphatic: Negative.    Skin: Negative.    Musculoskeletal: Positive for arthritis, back pain and joint pain.   Gastrointestinal: Negative for anorexia.   Genitourinary: Negative.    Neurological: Positive for dizziness.   Psychiatric/Behavioral: Negative.        Objective     Physical Exam:  /70 (BP Location: Left arm, Patient Position: Sitting, Cuff Size: Adult)   Ht 162.6 cm (64\")   Wt 93.9 kg (207 lb)   BMI 35.53 kg/m²   Physical Exam   Constitutional: She appears well-developed.   HENT:   Head: Normocephalic.   Neck: Normal carotid pulses and no JVD present. No tracheal tenderness present. Carotid bruit is not present. No tracheal deviation and no edema present.   Cardiovascular: Regular rhythm and normal pulses.   Murmur heard.   Systolic murmur is present with a grade of 2/6.  Pulmonary/Chest: Effort normal. No stridor. She has wheezes.   Abdominal: Soft.   Neurological: She is alert. She has normal strength. No cranial nerve deficit or sensory deficit.   Skin: Skin is warm.   Psychiatric: She has a normal mood and affect. Her speech is normal and behavior is normal.       Results Review:       ECG 12 Lead  Date/Time: 1/24/2019 11:27 AM  Performed by: Jagdeep Reyes MD  Authorized by: Jagdeep Reyes MD   Comparison: compared with previous ECG from 3/17/2017  Similar to previous ECG  Rhythm: sinus rhythm  Rate: normal  ST Segments: ST segments normal  QRS axis: normal  Other findings: prolonged QTc interval  Clinical impression: non-specific ECG            Assessment/Plan   Patient Active Problem List   Diagnosis   • CAD (coronary artery disease)   • Atrial flutter (CMS/HCC)   • Type 2 diabetes mellitus without complication, without long-term current use of insulin (CMS/HCC)   • Mixed hyperlipidemia   • S/P CABG x 3 2000 Dr Larsen   • Shoulder blade pain   • Anemia   • " Abnormal finding on imaging   • History of esophagitis   • Essential hypertension   • S/P coronary artery stent placement   • Gastric polyp   • Colon polyps   • PAF (paroxysmal atrial fibrillation) (CMS/HCC)   • Atrial fibrillation with rapid ventricular response (CMS/HCC)     Low risk stress echo with normal LVEF by echo cardiogram.         Plan        Possible benign positional vertigo   Will start on Antivert    Requested Prescriptions     Signed Prescriptions Disp Refills   • meclizine (ANTIVERT) 12.5 MG tablet 90 tablet 3     Sig: Take 1 tablet by mouth 3 (Three) Times a Day As Needed for dizziness.        Orders Placed This Encounter   Procedures   • Comprehensive Metabolic Panel     Standing Status:   Future     Standing Expiration Date:   1/24/2020   • TSH     Standing Status:   Future     Standing Expiration Date:   1/24/2020   • Holter Monitor - 24 Hour     Standing Status:   Future     Standing Expiration Date:   1/24/2020     Order Specific Question:   Reason for exam?     Answer:   Palpitations   • ECG 12 Lead     This order was created via procedure documentation   • CBC & Differential     Standing Status:   Future     Standing Expiration Date:   1/24/2020     Order Specific Question:   Manual Differential     Answer:   No     Will see the patient soon per appointment or sooner if required  Patient advised in advance that there may be a longer wait time next follow up with  The patient given the option to see another provider that the patient declined      xxxxxxxxxxxxxxxxxxxxxxxxxxxxxxxxxxxxxxxxxxxxxxxxxxxxxxxxxxxxxxxxxxxxxxxxxxxxxxxxxxxxxxxxxxxxxxxxxxxxxxxxxxxxxxxxxxxxxxxxxxxxxxxxxxxxxxxxxxxxxxxxxxxxxxxxxxxxxxxxxxxxxxxxxxxxxxxxxxxxxxxxxxxxxxxxxxxxxxxxxxxxxxxxxxxxxxxxxxxxxxxxxxxxxxxxxxxxxxxxxxxxxxxx  Health maintenance    Low salt/ HTN/ Heart healthy carbohydrate restricted cardiac diet as    The patient is advised to reduce or avoid caffeine or other cardiac stimulants.     The patient was advised  that NSAID-type medications        Monitor for any signs of bleeding including red or dark stools. Fall precautions.        Advised staying uptodate with immunizations per established standard guidelines.      Offered to give patient  a copy      Questions were encouraged, asked and answered to the patient's  understanding and satisfaction. Questions if any regarding current medications and side effects, need for refills and importance of compliance to medications stressed.    Reviewed available prior notes, consults, prior visits, laboratory findings, radiology and cardiology relevant reports. Updated chart as applicable. I have reviewed the patient's medical history in detail and updated the computerized patient record as relevant.      Updated patient regarding any new or relevant abnormalities on review of records or any new findings on physical exam. Mentioned to patient about purpose of visit and desirable health short and long term goals and objectives.      xxxxxxxxxxxxxxxxxxxxxxxxxxxxxxxxxxxxxxxxxxxxxxxxxxxxxxxxxxxxxxxxxxxxxxxxxxxxxxxxxxxxxxxxxxxxxxxxxxxxxxxxxxxxxxxxxxxxxxxxxxxxxxxxxxxxxxxxxxxxxxxxxxxxxxxxxxxxxxxxxxxxxxxxxxxxxxxxxxxxxxxxxxxxxxxxxxxxxxxxxxxxxxxxxxxxxxxxxxxxxxxxxxxxxxxxxxxxxxxxxxxxxxxx      Return in about 2 weeks (around 2/7/2019).

## 2019-02-05 DIAGNOSIS — E11.9 TYPE 2 DIABETES MELLITUS WITHOUT COMPLICATION, WITHOUT LONG-TERM CURRENT USE OF INSULIN (HCC): ICD-10-CM

## 2019-02-05 DIAGNOSIS — E78.01 FAMILIAL HYPERCHOLESTEROLEMIA: ICD-10-CM

## 2019-02-05 DIAGNOSIS — D50.9 IRON DEFICIENCY ANEMIA, UNSPECIFIED IRON DEFICIENCY ANEMIA TYPE: ICD-10-CM

## 2019-02-05 LAB
ALBUMIN SERPL-MCNC: 4.3 G/DL (ref 3.5–5.2)
ALP BLD-CCNC: 83 U/L (ref 35–104)
ALT SERPL-CCNC: 37 U/L (ref 5–33)
ANION GAP SERPL CALCULATED.3IONS-SCNC: 16 MMOL/L (ref 7–19)
AST SERPL-CCNC: 19 U/L (ref 5–32)
BASOPHILS ABSOLUTE: 0 K/UL (ref 0–0.2)
BASOPHILS RELATIVE PERCENT: 0.3 % (ref 0–1)
BILIRUB SERPL-MCNC: 1.1 MG/DL (ref 0.2–1.2)
BUN BLDV-MCNC: 14 MG/DL (ref 8–23)
CALCIUM SERPL-MCNC: 9.6 MG/DL (ref 8.8–10.2)
CHLORIDE BLD-SCNC: 103 MMOL/L (ref 98–111)
CHOLESTEROL, TOTAL: 129 MG/DL (ref 160–199)
CO2: 26 MMOL/L (ref 22–29)
CREAT SERPL-MCNC: 0.6 MG/DL (ref 0.5–0.9)
EOSINOPHILS ABSOLUTE: 0.1 K/UL (ref 0–0.6)
EOSINOPHILS RELATIVE PERCENT: 0.5 % (ref 0–5)
GFR NON-AFRICAN AMERICAN: >60
GLUCOSE BLD-MCNC: 113 MG/DL (ref 74–109)
HBA1C MFR BLD: 8.9 % (ref 4–6)
HCT VFR BLD CALC: 44.4 % (ref 37–47)
HDLC SERPL-MCNC: 77 MG/DL (ref 65–121)
HEMOGLOBIN: 13.3 G/DL (ref 12–16)
LDL CHOLESTEROL CALCULATED: 32 MG/DL
LYMPHOCYTES ABSOLUTE: 1.7 K/UL (ref 1.1–4.5)
LYMPHOCYTES RELATIVE PERCENT: 18.6 % (ref 20–40)
MCH RBC QN AUTO: 29.8 PG (ref 27–31)
MCHC RBC AUTO-ENTMCNC: 30 G/DL (ref 33–37)
MCV RBC AUTO: 99.6 FL (ref 81–99)
MONOCYTES ABSOLUTE: 0.5 K/UL (ref 0–0.9)
MONOCYTES RELATIVE PERCENT: 5.7 % (ref 0–10)
NEUTROPHILS ABSOLUTE: 6.8 K/UL (ref 1.5–7.5)
NEUTROPHILS RELATIVE PERCENT: 74 % (ref 50–65)
PDW BLD-RTO: 15.8 % (ref 11.5–14.5)
PLATELET # BLD: 206 K/UL (ref 130–400)
PMV BLD AUTO: 11.1 FL (ref 9.4–12.3)
POTASSIUM SERPL-SCNC: 4.2 MMOL/L (ref 3.5–5)
RBC # BLD: 4.46 M/UL (ref 4.2–5.4)
SODIUM BLD-SCNC: 145 MMOL/L (ref 136–145)
TOTAL PROTEIN: 6.9 G/DL (ref 6.6–8.7)
TRIGL SERPL-MCNC: 99 MG/DL (ref 0–149)
WBC # BLD: 9.2 K/UL (ref 4.8–10.8)

## 2019-02-14 ENCOUNTER — OFFICE VISIT (OUTPATIENT)
Dept: FAMILY MEDICINE CLINIC | Age: 64
End: 2019-02-14
Payer: MEDICARE

## 2019-02-14 VITALS
HEART RATE: 64 BPM | WEIGHT: 220 LBS | HEIGHT: 62 IN | BODY MASS INDEX: 40.48 KG/M2 | OXYGEN SATURATION: 95 % | TEMPERATURE: 97.3 F | DIASTOLIC BLOOD PRESSURE: 80 MMHG | SYSTOLIC BLOOD PRESSURE: 118 MMHG

## 2019-02-14 DIAGNOSIS — R53.83 FATIGUE, UNSPECIFIED TYPE: ICD-10-CM

## 2019-02-14 DIAGNOSIS — I51.89 DIASTOLIC DYSFUNCTION: ICD-10-CM

## 2019-02-14 DIAGNOSIS — R60.0 BILATERAL LOWER EXTREMITY EDEMA: ICD-10-CM

## 2019-02-14 DIAGNOSIS — E11.9 TYPE 2 DIABETES MELLITUS WITHOUT COMPLICATION, WITHOUT LONG-TERM CURRENT USE OF INSULIN (HCC): Primary | ICD-10-CM

## 2019-02-14 DIAGNOSIS — E66.01 MORBID OBESITY WITH BMI OF 40.0-44.9, ADULT (HCC): ICD-10-CM

## 2019-02-14 PROCEDURE — 99213 OFFICE O/P EST LOW 20 MIN: CPT | Performed by: FAMILY MEDICINE

## 2019-02-14 ASSESSMENT — ENCOUNTER SYMPTOMS
ABDOMINAL PAIN: 1
FACIAL SWELLING: 0
EYE DISCHARGE: 0
COLOR CHANGE: 0
APNEA: 0
STRIDOR: 0
VOMITING: 0
NAUSEA: 0
EYE ITCHING: 0
BACK PAIN: 0

## 2019-02-14 ASSESSMENT — PATIENT HEALTH QUESTIONNAIRE - PHQ9
1. LITTLE INTEREST OR PLEASURE IN DOING THINGS: 0
2. FEELING DOWN, DEPRESSED OR HOPELESS: 0
SUM OF ALL RESPONSES TO PHQ QUESTIONS 1-9: 0
SUM OF ALL RESPONSES TO PHQ9 QUESTIONS 1 & 2: 0
SUM OF ALL RESPONSES TO PHQ QUESTIONS 1-9: 0

## 2019-02-15 ENCOUNTER — OFFICE VISIT (OUTPATIENT)
Dept: CARDIOLOGY | Facility: CLINIC | Age: 64
End: 2019-02-15

## 2019-02-15 VITALS
OXYGEN SATURATION: 97 % | HEIGHT: 64 IN | BODY MASS INDEX: 37.39 KG/M2 | DIASTOLIC BLOOD PRESSURE: 72 MMHG | SYSTOLIC BLOOD PRESSURE: 128 MMHG | WEIGHT: 219 LBS | HEART RATE: 97 BPM

## 2019-02-15 DIAGNOSIS — I10 ESSENTIAL HYPERTENSION: ICD-10-CM

## 2019-02-15 DIAGNOSIS — E78.2 MIXED HYPERLIPIDEMIA: ICD-10-CM

## 2019-02-15 DIAGNOSIS — R93.89 ABNORMAL FINDING ON IMAGING: Primary | ICD-10-CM

## 2019-02-15 DIAGNOSIS — I48.0 PAF (PAROXYSMAL ATRIAL FIBRILLATION) (HCC): ICD-10-CM

## 2019-02-15 DIAGNOSIS — E11.9 TYPE 2 DIABETES MELLITUS WITHOUT COMPLICATION, WITHOUT LONG-TERM CURRENT USE OF INSULIN (HCC): ICD-10-CM

## 2019-02-15 DIAGNOSIS — I25.10 CORONARY ARTERY DISEASE INVOLVING NATIVE CORONARY ARTERY OF NATIVE HEART WITHOUT ANGINA PECTORIS: ICD-10-CM

## 2019-02-15 DIAGNOSIS — Z95.1 S/P CABG X 3: ICD-10-CM

## 2019-02-15 PROCEDURE — 99214 OFFICE O/P EST MOD 30 MIN: CPT | Performed by: INTERNAL MEDICINE

## 2019-02-15 PROCEDURE — 93000 ELECTROCARDIOGRAM COMPLETE: CPT | Performed by: INTERNAL MEDICINE

## 2019-02-15 RX ORDER — FUROSEMIDE 20 MG/1
20 TABLET ORAL DAILY
Qty: 90 TABLET | Refills: 3 | Status: SHIPPED | OUTPATIENT
Start: 2019-02-15 | End: 2019-07-27 | Stop reason: SDUPTHER

## 2019-02-15 RX ORDER — POTASSIUM CHLORIDE 750 MG/1
10 TABLET, FILM COATED, EXTENDED RELEASE ORAL DAILY
Qty: 90 TABLET | Refills: 3 | Status: SHIPPED | OUTPATIENT
Start: 2019-02-15

## 2019-02-15 NOTE — PROGRESS NOTES
Teresa Serna  [id number]  1955  63 y.o.  female    Referring Provider: Teresa Contreras MD    Reason for Follow-up Visit: Called to be seen  More dizzy and weak  Some ringing in ears  Easy fatiguability   Paroxysmal atrial fibrillation now sinus rhythm  Saw Dr De Anda AF ablation tried but due to no good venous access not possible  Now  Maintaining sinus rhythm        Subjective    Mild chronic exertional shortness of breath on exertion relieved with rest  No significant cough or wheezing  Going on for several months or longer    Occasional palpitations, once every morning lasting for less than 1 minute  No associated symptoms of dizziness, weakness, chest pain,  shortness of breath   No associated chest pain  Now moderate bipedal edema pedal edema    No fever or chills  No significant expectoration    No hemoptysis  No presyncope or syncope     Easy fatiguability     Joint pain in small, medium and large joints  Chronic low back pain      On anticoagulation with Eliquis   Reviewed recent labs      History of present illness:  Teresa Serna is a 63 y.o. yo female with history of Paroxysmal atrial fibrillation who presents today for   Chief Complaint   Patient presents with   • Atrial Fibrillation     2 WK FU HOLTER/ LAB RESULTS    • Edema     LEGS    • Coronary Artery Disease     CABG X 3 2002 STENT X 1 2009   .    History  Past Medical History:   Diagnosis Date   • Abnormal stress test    • Atrial flutter (CMS/HCC)    • CAD (coronary artery disease)    • CAD in native artery     CABG x 3   • Diabetes mellitus (CMS/HCC)    • Essential hypertension     Tricuspid valve insufficiency, unspecified etiology    • Hyperlipemia, mixed    • Hyperlipidemia    • Hypertension    • MI, old    • Mitral valve prolapse    • Near syncope    • Obesity, morbid (CMS/HCC)    • Palpitations    • Paroxysmal SVT (supraventricular tachycardia) (CMS/HCC)    • Paroxysmal SVT (supraventricular tachycardia) (CMS/HCC)    • Pedal edema     • Precordial pain    • Rheumatoid arthritis (CMS/HCC)    • Rheumatoid arthritis (CMS/HCC)    • S/P CABG x 3    ,   Past Surgical History:   Procedure Laterality Date   • APPENDECTOMY     • CARDIAC CATHETERIZATION Left 2012    Intensify medical therapy   • CARDIAC CATHETERIZATION Left 2002    surgery   • CHOLECYSTECTOMY     • COLONOSCOPY  02/10/2012   • COLONOSCOPY N/A 2018    Procedure: COLONOSCOPY WITH ANESTHESIA;  Surgeon: Patricia Bo MD;  Location:  [facility];  Service:    • CORONARY ARTERY BYPASS GRAFT  2002    LIMA to LAD, SVG to D1, SVG to OM1 - x3   • CORONARY STENT PLACEMENT  09/2009    X1 - Stent to LAD, Drug Eluting   • ENDOSCOPY N/A 2018    Procedure: ESOPHAGOGASTRODUODENOSCOPY WITH ANESTHESIA;  Surgeon: Patricia Bo MD;  Location:  [facility];  Service:    • GALLBLADDER SURGERY     • REPLACEMENT TOTAL KNEE Right    • UPPER GASTROINTESTINAL ENDOSCOPY  2015   ,   Family History   Problem Relation Age of Onset   • Cancer Father    • Coronary artery disease Father    ,   Social History     Tobacco Use   • Smoking status: Former Smoker     Years: 20.00     Types: Cigarettes     Last attempt to quit: 2000     Years since quittin.0   • Smokeless tobacco: Never Used   Substance Use Topics   • Alcohol use: No   • Drug use: No   ,     Medications  Current Outpatient Medications   Medication Sig Dispense Refill   • amiodarone (PACERONE) 100 MG tablet TAKE 1 TABLET BY MOUTH DAILY 30 tablet 0   • amiodarone (PACERONE) 200 MG tablet Take 1 tablet by mouth Daily.     • apixaban (ELIQUIS) 5 MG tablet tablet Take 1 tablet by mouth Every 12 (Twelve) Hours. 60 tablet 11   • aspirin 81 MG EC tablet Take 81 mg by mouth Daily.     • folic acid (FOLVITE) 1 MG tablet Take 1 mg by mouth Daily.     • glyBURIDE (DIAbeta) 5 MG tablet Take 5 mg by mouth Daily With Breakfast.     • isosorbide mononitrate (IMDUR) 30 MG 24 hr tablet Take 30 mg by mouth Daily.     • losartan  (COZAAR) 25 MG tablet Take 25 mg by mouth Daily.     • meclizine (ANTIVERT) 12.5 MG tablet Take 1 tablet by mouth 3 (Three) Times a Day As Needed for dizziness. 90 tablet 3   • metFORMIN (GLUCOPHAGE) 1000 MG tablet Take 1,000 mg by mouth Daily With Breakfast.     • metoprolol tartrate (LOPRESSOR) 50 MG tablet Take 50 mg by mouth 2 (Two) Times a Day.     • nitroglycerin (NITROSTAT) 0.4 MG SL tablet Place 0.4 mg under the tongue Every 5 (Five) Minutes As Needed for Chest Pain. Take no more than 3 doses in 15 minutes.     • pantoprazole (PROTONIX) 40 MG EC tablet Take 1 tablet by mouth Every 12 (Twelve) Hours. 60 tablet 1   • predniSONE (DELTASONE) 1 MG tablet Take 4 mg by mouth Every Night.     • predniSONE (DELTASONE) 5 MG tablet Take 5 mg by mouth Every Morning.     • simvastatin (ZOCOR) 40 MG tablet Take 40 mg by mouth Every Night.     • Tofacitinib Citrate (XELJANZ XR) 11 MG tablet sustained-release 24 hour Take 1 tablet by mouth Daily.     • verapamil SR (CALAN-SR) 240 MG CR tablet Take 1 tablet by mouth Daily. 30 tablet 11   • furosemide (LASIX) 20 MG tablet Take 1 tablet by mouth Daily. 90 tablet 3   • potassium chloride (K-DUR) 10 MEQ CR tablet Take 1 tablet by mouth Daily. 90 tablet 3     No current facility-administered medications for this visit.      Results for orders placed during the hospital encounter of 07/18/18   Adult Transthoracic Echo Complete W/ Cont if Necessary Per Protocol    Narrative · Left ventricular systolic function is normal. Estimated EF = 55%.  · Left ventricular diastolic dysfunction.  · Left atrial cavity size is mildly dilated.  · No evidence of pulmonary hypertension is present.          Allergies:  Codeine; Albuterol; Augmentin [amoxicillin-pot clavulanate]; and Sulfa antibiotics    Review of Systems  Review of Systems   Constitution: Positive for weakness and malaise/fatigue.   HENT: Negative.    Eyes: Negative.    Cardiovascular: Positive for dyspnea on exertion and leg  "swelling. Negative for chest pain, claudication, cyanosis, irregular heartbeat, near-syncope, orthopnea, palpitations, paroxysmal nocturnal dyspnea and syncope.   Respiratory: Negative.    Endocrine: Negative.    Hematologic/Lymphatic: Negative.    Skin: Negative.    Musculoskeletal: Positive for arthritis, back pain and joint pain.   Gastrointestinal: Negative for anorexia.   Genitourinary: Negative.    Neurological: Positive for dizziness.   Psychiatric/Behavioral: Negative.        Objective     Physical Exam:  /72   Pulse 97   Ht 162.6 cm (64.02\")   Wt 99.3 kg (219 lb)   SpO2 97%   BMI 37.57 kg/m²   Physical Exam   Constitutional: She appears well-developed.   HENT:   Head: Normocephalic.   Neck: Normal carotid pulses and no JVD present. No tracheal tenderness present. Carotid bruit is not present. No tracheal deviation and no edema present.   Cardiovascular: Regular rhythm and normal pulses.   Murmur heard.   Systolic murmur is present with a grade of 2/6.  Pulmonary/Chest: Effort normal. No stridor. She has wheezes.   Abdominal: Soft.     Vascular Status -  Her right foot exhibits abnormal foot edema. Her left foot exhibits abnormal foot edema.  Neurological: She is alert. She has normal strength. No cranial nerve deficit or sensory deficit.   Skin: Skin is warm.   Psychiatric: She has a normal mood and affect. Her speech is normal and behavior is normal.   Lower extremity: 2 plus pitting edema.     Results Review:       ECG 12 Lead  Date/Time: 2/15/2019 9:36 AM  Performed by: Jagdeep Reyes MD  Authorized by: Jagdeep Reyes MD   Comparison: compared with previous ECG from 3/17/2017  Comparison to previous ECG: atrial premature contractions   Rhythm: sinus rhythm  Ectopy: atrial premature contractions  Rate: normal  QRS axis: normal              Assessment/Plan   Patient Active Problem List   Diagnosis   • CAD (coronary artery disease)   • Atrial flutter (CMS/HCC)   • Type 2 diabetes mellitus without " complication, without long-term current use of insulin (CMS/HCC)   • Mixed hyperlipidemia   • S/P CABG x 3 2000 Dr Larsen   • Shoulder blade pain   • Anemia   • Abnormal finding on imaging   • History of esophagitis   • Essential hypertension   • S/P coronary artery stent placement   • Gastric polyp   • Colon polyps   • Atrial fibrillation with rapid ventricular response (CMS/HCC)   • PAF (paroxysmal atrial fibrillation) (CMS/HCC)     Low risk stress echo with normal LVEF by echo cardiogram.         Plan    Medication introduced/refilled or adjusted as below for increasing pedal edema   She will take these daily x 1 week and then PRN    Requested Prescriptions     Signed Prescriptions Disp Refills   • furosemide (LASIX) 20 MG tablet 90 tablet 3     Sig: Take 1 tablet by mouth Daily.   • potassium chloride (K-DUR) 10 MEQ CR tablet 90 tablet 3     Sig: Take 1 tablet by mouth Daily.          ____________________________________________________________________________________________________________________________________________  Health maintenance and recommendations      Low salt/ HTN/ Heart healthy carbohydrate restricted cardiac diet   The patient is advised to reduce or avoid caffeine or other cardiac stimulants.     The patient was advised that NSAID-type medications        Monitor for any signs of bleeding including red or dark stools. Fall precautions.        Advised staying uptodate with immunizations per established standard guidelines.      Offered to give patient  a copy      Questions were encouraged, asked and answered to the patient's  understanding and satisfaction. Questions if any regarding current medications and side effects, need for refills and importance of compliance to medications stressed.    Reviewed available prior notes, consults, prior visits, laboratory findings, radiology and cardiology relevant reports. Updated chart as applicable. I have reviewed the patient's medical history in detail  and updated the computerized patient record as relevant.      Updated patient regarding any new or relevant abnormalities on review of records or any new findings on physical exam. Mentioned to patient about purpose of visit and desirable health short and long term goals and objectives.    Primary to monitor CBC CMP Lipid panel and TSH as applicable    ___________________________________________________________________________________________________________________________________________             Return in about 3 months (around 5/15/2019).

## 2019-03-07 RX ORDER — AMIODARONE HYDROCHLORIDE 100 MG/1
TABLET ORAL
Qty: 30 TABLET | Refills: 11 | Status: SHIPPED | OUTPATIENT
Start: 2019-03-07 | End: 2020-01-24 | Stop reason: SDUPTHER

## 2019-04-02 ENCOUNTER — OFFICE VISIT (OUTPATIENT)
Dept: FAMILY MEDICINE CLINIC | Age: 64
End: 2019-04-02
Payer: MEDICARE

## 2019-04-02 VITALS
WEIGHT: 202.4 LBS | HEIGHT: 64 IN | OXYGEN SATURATION: 94 % | DIASTOLIC BLOOD PRESSURE: 76 MMHG | SYSTOLIC BLOOD PRESSURE: 130 MMHG | TEMPERATURE: 98 F | HEART RATE: 77 BPM | BODY MASS INDEX: 34.56 KG/M2

## 2019-04-02 DIAGNOSIS — J20.9 ACUTE BRONCHITIS, UNSPECIFIED ORGANISM: ICD-10-CM

## 2019-04-02 DIAGNOSIS — I10 ESSENTIAL HYPERTENSION: Primary | ICD-10-CM

## 2019-04-02 DIAGNOSIS — I48.0 PAROXYSMAL ATRIAL FIBRILLATION (HCC): ICD-10-CM

## 2019-04-02 DIAGNOSIS — E11.9 TYPE 2 DIABETES MELLITUS WITHOUT COMPLICATION, WITHOUT LONG-TERM CURRENT USE OF INSULIN (HCC): ICD-10-CM

## 2019-04-02 DIAGNOSIS — E78.01 FAMILIAL HYPERCHOLESTEROLEMIA: ICD-10-CM

## 2019-04-02 PROCEDURE — G8427 DOCREV CUR MEDS BY ELIG CLIN: HCPCS | Performed by: NURSE PRACTITIONER

## 2019-04-02 PROCEDURE — 2022F DILAT RTA XM EVC RTNOPTHY: CPT | Performed by: NURSE PRACTITIONER

## 2019-04-02 PROCEDURE — 3045F PR MOST RECENT HEMOGLOBIN A1C LEVEL 7.0-9.0%: CPT | Performed by: NURSE PRACTITIONER

## 2019-04-02 PROCEDURE — 3017F COLORECTAL CA SCREEN DOC REV: CPT | Performed by: NURSE PRACTITIONER

## 2019-04-02 PROCEDURE — 1036F TOBACCO NON-USER: CPT | Performed by: NURSE PRACTITIONER

## 2019-04-02 PROCEDURE — 99214 OFFICE O/P EST MOD 30 MIN: CPT | Performed by: NURSE PRACTITIONER

## 2019-04-02 PROCEDURE — G8417 CALC BMI ABV UP PARAM F/U: HCPCS | Performed by: NURSE PRACTITIONER

## 2019-04-02 ASSESSMENT — ENCOUNTER SYMPTOMS
SORE THROAT: 0
WHEEZING: 1
ABDOMINAL PAIN: 0
NAUSEA: 0
COUGH: 1
DIARRHEA: 0
SHORTNESS OF BREATH: 1
CHEST TIGHTNESS: 0

## 2019-04-02 NOTE — PROGRESS NOTES
Ms.Teresa CHERYL Fagan is a 59 y.o. female who presents today for  Chief Complaint   Patient presents with    1 Month Follow-Up     T2DM       HPI:  Here for follow-up on diabetes. Metformin was recently increased to 1000 mg in the evening. She was already taking 1000 mg in the morning. Her blood sugars have been much better with this regimen. Fasting sugar has been running 70s to 90s, 120s at the highest.  She is tolerating the higher dose of metformin. No GI symptoms. She remains on glyburide once daily. She has had cough and congestion for over a week. She feels it is getting slightly better but has not resolved. She has had purulent sputum. Initially she had a fever but this is resolved. She's had some mild shortness of breath and wheeze but this seems to be improving. She has a pulse oximeter at home and states it was in the 80s at one point but quickly improved to 90s. This was at rest. She occasionally wakes up feeling a little short of breath. She was seen in the walk-in clinic last week, diagnosed with viral illness but given prescription for cefdinir to fill if not improving. She filled it but has never started it. PAF has been stable in sinus rhythm, rate controlled. She is taking amiodarone and eliquis. She has history of hyperlipidemia, needs fasting labs. No recent chest pain. Blood pressure has been controlled on current medication. Review of Systems   Constitutional: Negative for chills and fever. HENT: Negative for congestion, ear pain and sore throat. Respiratory: Positive for cough, shortness of breath (improving) and wheezing (improving). Negative for chest tightness. Cardiovascular: Negative for chest pain. Gastrointestinal: Negative for abdominal pain, diarrhea and nausea. Musculoskeletal: Negative for arthralgias and myalgias. Skin: Negative for rash.        Past Medical History:   Diagnosis Date    Atrial flutter by electrocardiogram ([facility] [address]     Constipation     Allergies   Allergen Reactions    Codeine Other (See Comments)     Chest pain    Albuterol      Rapid heart rate    Augmentin [Amoxicillin-Pot Clavulanate] Palpitations    Sulfa Antibiotics Rash          Past Surgical History:   Procedure Laterality Date    CHOLECYSTECTOMY      CORONARY ARTERY BYPASS GRAFT      TOTAL KNEE ARTHROPLASTY         Social History     Tobacco Use    Smoking status: Former Smoker     Packs/day: 0.50     Years: 20.00     Pack years: 10.00     Last attempt to quit: 2000     Years since quittin.5    Smokeless tobacco: Never Used   Substance Use Topics    Alcohol use: No    Drug use: No       Family History   Problem Relation Age of Onset    Heart Attack Father     Prostate Cancer Father     Stroke Father     Heart Attack Brother     Stroke Brother        /76   Pulse 77   Temp 98 °F (36.7 °C) (Temporal)   Ht 5' 4\" (1.626 m)   Wt 202 lb 6.4 oz (91.8 kg)   SpO2 94%   BMI 34.74 kg/m²     Physical Exam   Constitutional: She appears well-developed and well-nourished. HENT:   Head: Normocephalic. Right Ear: External ear normal.   Left Ear: External ear normal.   Nose: Nose normal.   Mouth/Throat: Oropharynx is clear and moist. No oropharyngeal exudate. Eyes: Pupils are equal, round, and reactive to light. Conjunctivae and EOM are normal.   Neck: Normal range of motion. Neck supple. No JVD present. No tracheal deviation present. No thyromegaly present. Cardiovascular: Normal rate, regular rhythm, normal heart sounds and intact distal pulses. No murmur heard. Pulmonary/Chest: Effort normal. No respiratory distress. She has wheezes. Faint wheeze bilaterally on forced expiration. Respirations are even and unlabored. Musculoskeletal: Normal range of motion. She exhibits no edema. Lymphadenopathy:     She has no cervical adenopathy. Skin: Skin is warm and dry. No rash noted. Psychiatric: She has a normal mood and affect.    Vitals reviewed. Assessment:    ICD-10-CM    1. Essential hypertension I10 CBC Auto Differential   2. Familial hypercholesterolemia E78.01 Comprehensive Metabolic Panel     Lipid Panel   3. Type 2 diabetes mellitus without complication, without long-term current use of insulin (HCC) E11.9 Hemoglobin A1C   4. Paroxysmal atrial fibrillation (HCC) I48.0    5. Acute bronchitis, unspecified organism J20.9        Plan:  -Blood sugars much better with increased dose of metformin. Continue this dose as well as glimepiride. She will continue to monitor fasting sugars at home.  -She continues to have bronchitic symptoms. She has the prescription for cefdinir at home from urgent care. She will start this and completed. She declined any cough medicine. She declined an inhaler. We will try to avoid steroids due toher blood sugars.  -Discussed overnight pulse ox to make sure her oxygen levels not dropping. She would rather wait until she is over her bronchitis. If this persists we will need to set this up. -PAF is stable, sinus rhythm, rate controlled. Continue current medication, continue follow-up with cardiology  -Follow-up with Dr. Tae Alcocer in 3 months for routine follow-up with fasting lab including CMP, lipid, CBC, A1c. Tequila Lazcano was seen today for 1 month follow-up. Diagnoses and all orders for this visit:    Essential hypertension  -     CBC Auto Differential; Future    Familial hypercholesterolemia  -     Comprehensive Metabolic Panel; Future  -     Lipid Panel; Future    Type 2 diabetes mellitus without complication, without long-term current use of insulin (HCC)  -     Hemoglobin A1C; Future    Paroxysmal atrial fibrillation (HCC)    Acute bronchitis, unspecified organism      There are no discontinued medications. There are no Patient Instructions on file for this visit. Patient voicesunderstanding and agrees to plans along with risks and benefits of plan.     Counseling:  Sushant Fagan's case, medications and

## 2019-04-09 ENCOUNTER — OFFICE VISIT (OUTPATIENT)
Dept: URGENT CARE | Age: 64
End: 2019-04-09
Payer: MEDICARE

## 2019-04-09 VITALS
BODY MASS INDEX: 35.97 KG/M2 | OXYGEN SATURATION: 95 % | TEMPERATURE: 96.3 F | SYSTOLIC BLOOD PRESSURE: 124 MMHG | HEIGHT: 63 IN | HEART RATE: 79 BPM | WEIGHT: 203 LBS | DIASTOLIC BLOOD PRESSURE: 70 MMHG

## 2019-04-09 DIAGNOSIS — H91.23 SUDDEN HEARING LOSS, BILATERAL: ICD-10-CM

## 2019-04-09 DIAGNOSIS — H60.313 ACUTE DIFFUSE OTITIS EXTERNA OF BOTH EARS: ICD-10-CM

## 2019-04-09 DIAGNOSIS — H61.23 BILATERAL IMPACTED CERUMEN: Primary | ICD-10-CM

## 2019-04-09 PROCEDURE — 1036F TOBACCO NON-USER: CPT | Performed by: NURSE PRACTITIONER

## 2019-04-09 PROCEDURE — G8427 DOCREV CUR MEDS BY ELIG CLIN: HCPCS | Performed by: NURSE PRACTITIONER

## 2019-04-09 PROCEDURE — 99213 OFFICE O/P EST LOW 20 MIN: CPT | Performed by: NURSE PRACTITIONER

## 2019-04-09 PROCEDURE — 69210 REMOVE IMPACTED EAR WAX UNI: CPT | Performed by: NURSE PRACTITIONER

## 2019-04-09 PROCEDURE — 3017F COLORECTAL CA SCREEN DOC REV: CPT | Performed by: NURSE PRACTITIONER

## 2019-04-09 PROCEDURE — G8417 CALC BMI ABV UP PARAM F/U: HCPCS | Performed by: NURSE PRACTITIONER

## 2019-04-09 PROCEDURE — 4130F TOPICAL PREP RX AOE: CPT | Performed by: NURSE PRACTITIONER

## 2019-04-09 RX ORDER — OFLOXACIN 3 MG/ML
5 SOLUTION AURICULAR (OTIC) 2 TIMES DAILY
Qty: 5 ML | Refills: 0 | Status: SHIPPED | OUTPATIENT
Start: 2019-04-09 | End: 2019-04-19

## 2019-04-09 ASSESSMENT — ENCOUNTER SYMPTOMS
ALLERGIC/IMMUNOLOGIC NEGATIVE: 1
GASTROINTESTINAL NEGATIVE: 1
TROUBLE SWALLOWING: 0
EYES NEGATIVE: 1
SHORTNESS OF BREATH: 0
SINUS PAIN: 0
COUGH: 1
SORE THROAT: 0
SINUS PRESSURE: 0

## 2019-04-09 NOTE — PATIENT INSTRUCTIONS
1 Use drops as prescribed  2. Take mucinex and zyrtec OTC daily  3. Use flonase OTC daily  4. Follow up with ENT appt  5.  Return to  as needed

## 2019-04-09 NOTE — PROGRESS NOTES
[address] [facility]  [address] [facility] [address]  Dept: [phone]  Loc: [phone]     Elsa Fagan is a 59 y.o. female who presents today for her medical conditions/complaintsas noted below. Elsa Fagan is c/o of Other (Both ears has been stopped up since a week.  started hurting and some drainage)        HPI:     HPI  Pt presents with jase hearing loss that started on . States she has been congested before but never lost hearing. Denies treatment. Denies sore throat, sinus pressure, fever, SOB, or any other symptom. Past Medical History:   Diagnosis Date    Atrial flutter by electrocardiogram ([address]     Constipation     Coronary artery disease     Diabetes mellitus (HCC)     Essential hypertension     Hyperlipidemia     IBS (irritable bowel syndrome)     Iron deficiency anemia     Menopause     Paroxysmal SVT (supraventricular tachycardia) (HCC)     Rheumatoid arthritis (HCC)       Past Surgical History:   Procedure Laterality Date    CHOLECYSTECTOMY      CORONARY ARTERY BYPASS GRAFT      TOTAL KNEE ARTHROPLASTY         Family History   Problem Relation Age of Onset    Heart Attack Father     Prostate Cancer Father     Stroke Father     Heart Attack Brother     Stroke Brother        Social History     Tobacco Use    Smoking status: Former Smoker     Packs/day: 0.50     Years: 20.00     Pack years: 10.00     Last attempt to quit: 2000     Years since quittin.5    Smokeless tobacco: Never Used   Substance Use Topics    Alcohol use: No      Current Outpatient Medications   Medication Sig Dispense Refill    ofloxacin (FLOXIN) 0.3 % otic solution Place 5 drops into both ears 2 times daily for 10 days 5 mL 0    glyBURIDE (DIABETA) 5 MG tablet TAKE 1 TABLET TWICE DAILY.  (Patient taking differently: Takes once a day) 60 tablet 5    metoprolol tartrate (LOPRESSOR) 50 MG tablet TAKE 1 TABLET BY MOUTH 2 TIMES A DAY 60 tablet 5    pantoprazole (PROTONIX) 40 MG tablet TAKE 1 TABLET BY MOUTH 2 TIMES A DAY 60 tablet 5    isosorbide mononitrate (IMDUR) 30 MG extended release tablet TAKE 1 TABLET BY MOUTH DAILY. 30 tablet 5    simvastatin (ZOCOR) 40 MG tablet Take 1 tablet by mouth every evening 30 tablet 5    metFORMIN (GLUCOPHAGE) 500 MG tablet TAKE 1 TABLET BY MOUTH 3 TIMES DAILY 90 tablet 5    losartan (COZAAR) 25 MG tablet Take 1 tablet by mouth daily 30 tablet 5    apixaban (ELIQUIS) 5 MG TABS tablet Take 5 mg by mouth 2 times daily       Tofacitinib Citrate 11 MG TB24 Take 1 tablet by mouth daily       verapamil (CALAN SR) 240 MG extended release tablet Take 240 mg by mouth nightly       amiodarone (CORDARONE) 200 MG tablet Take 100 mg by mouth daily       FREESTYLE LITE strip USE 1 STRIP TWICE DAILY TO CHECK BLOOD SUGAR 100 strip 0    nitroGLYCERIN (NITROSTAT) 0.4 MG SL tablet Place 1 tablet under the tongue every 5 minutes as needed for Chest pain 25 tablet 2    FREESTYLE LANCETS MISC TEST SUGAR DAILY AS DIRECTED 100 each 2    predniSONE (DELTASONE) 5 MG tablet Take 5 mg by mouth Take 5 mg in the morning and 4 mg in the evening.  aspirin EC 81 MG EC tablet Take 81 mg by mouth daily      ondansetron (ZOFRAN) 8 MG tablet TAKE 1 TABLET BY MOUTH EVERY 8 HOURS AS NEEDED 21 tablet 0    folic acid (FOLVITE) 1 MG tablet Take 1 mg by mouth daily        No current facility-administered medications for this visit.       Allergies   Allergen Reactions    Codeine Other (See Comments)     Chest pain    Albuterol      Rapid heart rate    Augmentin [Amoxicillin-Pot Clavulanate] Palpitations    Sulfa Antibiotics Rash       Health Maintenance   Topic Date Due    Diabetic microalbuminuria test  04/14/2019 (Originally 8/27/2015)    Diabetic retinal exam  04/14/2019 (Originally 10/24/1966)    Shingles Vaccine (1 of 2) 04/14/2019 (Originally 10/24/2006)    Hepatitis C screen  04/14/2019 (Originally 1955)    HIV screen  04/14/2019 (Originally 10/24/1971)    TSH testing  01/24/2020 (Originally 1955)    Diabetic foot exam  04/24/2019    Flu vaccine (Season Ended) 09/01/2019    A1C test (Diabetic or Prediabetic)  02/05/2020    Lipid screen  02/05/2020    Potassium monitoring  02/05/2020    Creatinine monitoring  02/05/2020    Cervical cancer screen  11/14/2020    Breast cancer screen  12/06/2020    DTaP/Tdap/Td vaccine (2 - Td) 12/31/2022    Colon cancer screen colonoscopy  01/31/2023    Pneumococcal 0-64 years Vaccine  Completed       Subjective:     Review of Systems   Constitutional: Negative. Negative for fever. HENT: Positive for congestion and ear pain. Negative for hearing loss, sinus pressure, sinus pain, sore throat and trouble swallowing. Eyes: Negative. Respiratory: Positive for cough. Negative for shortness of breath. Cardiovascular: Negative. Gastrointestinal: Negative. Skin: Negative. Negative for rash. Allergic/Immunologic: Negative. Neurological: Negative. Hematological: Negative. Psychiatric/Behavioral: Negative. Objective:     Physical Exam   Constitutional: She is oriented to person, place, and time. Vital signs are normal. She appears well-developed and well-nourished. HENT:   Head: Normocephalic and atraumatic. Right Ear: There is tenderness. No drainage. Tympanic membrane is not injected and not erythematous. Decreased hearing is noted. Left Ear: There is tenderness. No drainage. Tympanic membrane is not injected and not erythematous. Decreased hearing is noted. Nose: Nose normal. Right sinus exhibits no maxillary sinus tenderness and no frontal sinus tenderness. Left sinus exhibits no maxillary sinus tenderness and no frontal sinus tenderness. Mouth/Throat: Uvula is midline, oropharynx is clear and moist and mucous membranes are normal. Tonsils are 0 on the right. Tonsils are 0 on the left. No tonsillar exudate.    Eyes: Pupils are equal, round, and reactive to light. Conjunctivae are normal.   Neck: Trachea normal and normal range of motion. Carotid bruit is not present. No thyroid mass and no thyromegaly present. Cardiovascular: Normal rate, regular rhythm and normal heart sounds. Pulmonary/Chest: Effort normal and breath sounds normal.   Abdominal: Soft. Normal appearance and bowel sounds are normal. No hernia. Musculoskeletal: Normal range of motion. Lymphadenopathy:        Head (right side): No submental, no submandibular, no tonsillar, no preauricular and no occipital adenopathy present. Head (left side): No submental, no submandibular, no tonsillar, no preauricular, no posterior auricular and no occipital adenopathy present. Neurological: She is alert and oriented to person, place, and time. She has normal strength. No cranial nerve deficit or sensory deficit. She displays a negative Romberg sign. Skin: Skin is warm and intact. Capillary refill takes less than 2 seconds. No cyanosis. Psychiatric: She has a normal mood and affect. Her behavior is normal. Judgment and thought content normal.   Nursing note and vitals reviewed. /70   Pulse 79   Temp 96.3 °F (35.7 °C) (Temporal)   Ht 5' 3\" (1.6 m)   Wt 203 lb (92.1 kg)   SpO2 95%   BMI 35.96 kg/m²     Assessment:          Diagnosis Orders   1. Bilateral impacted cerumen  44780 - [place] REMOVE IMPACTED EAR WAX   2.  Sudden hearing loss, bilateral  [facility]   3. Acute diffuse otitis externa of both ears  ofloxacin (FLOXIN) 0.3 % otic solution       Plan:      Orders Placed This Encounter   Procedures   [facility]in[facility] Thomson [facility]     Referral Priority:   Routine     Referral Type:   Eval and Treat     Referral Reason:   Specialty Services Required     Referred to Provider:   Lillian Harrell MD     Requested Specialty:   Otolaryngology     Number of Visits Requested:   1    62817 - [place] REMOVE IMPACTED EAR WAX        No follow-ups on file. Orders Placed This Encounter   Procedures   Darek Camacho MD, Otolaryngology, [facility]     Referral Priority:   Routine     Referral Type:   Eval and Treat     Referral Reason:   Specialty Services Required     Referred to Provider:   Swati Naranjo MD     Requested Specialty:   Otolaryngology     Number of Visits Requested:   1    43431 - [place] REMOVE IMPACTED EAR WAX     Orders Placed This Encounter   Medications    ofloxacin (FLOXIN) 0.3 % otic solution     Sig: Place 5 drops into both ears 2 times daily for 10 days     Dispense:  5 mL     Refill:  0       Patient given educationalmaterials - see patient instructions. Discussed use, benefit, and side effectsof prescribed medications. All patient questions answered. Pt voiced understanding. Reviewed health maintenance. Instructed to continue current medications, diet andexercise. Patient agreed with treatment plan. Follow up as directed. Patient Instructions   1 Use drops as prescribed  2. Take mucinex and zyrtec OTC daily  3. Use flonase OTC daily  4. Follow up with ENT appt  5.  Return to  as needed        Electronically signed by MARYANNE Ryan CNP on 4/9/2019 at 1:11 PM

## 2019-04-09 NOTE — PROGRESS NOTES
BILATERAL EARS IRRIGATED WITH  ELEPHANT EAR IRRIGATION SYSTEM. MODERATE AMOUNT OF EAR WAX REMOVED WITH WATER AND CURAGE.  PT TOLERATED WELL WITH PROVIDER RECHECKING EARS POST PROCEDURE

## 2019-04-11 ENCOUNTER — TELEPHONE (OUTPATIENT)
Dept: OTOLARYNGOLOGY | Age: 64
End: 2019-04-11

## 2019-04-11 ENCOUNTER — OFFICE VISIT (OUTPATIENT)
Dept: OTOLARYNGOLOGY | Age: 64
End: 2019-04-11
Payer: MEDICARE

## 2019-04-11 ENCOUNTER — PROCEDURE VISIT (OUTPATIENT)
Dept: OTOLARYNGOLOGY | Age: 64
End: 2019-04-11
Payer: MEDICARE

## 2019-04-11 VITALS
BODY MASS INDEX: 35.97 KG/M2 | WEIGHT: 203 LBS | RESPIRATION RATE: 18 BRPM | HEART RATE: 77 BPM | OXYGEN SATURATION: 96 % | DIASTOLIC BLOOD PRESSURE: 62 MMHG | TEMPERATURE: 96.8 F | HEIGHT: 63 IN | SYSTOLIC BLOOD PRESSURE: 94 MMHG

## 2019-04-11 DIAGNOSIS — H69.83 EUSTACHIAN TUBE DYSFUNCTION, BILATERAL: Primary | ICD-10-CM

## 2019-04-11 DIAGNOSIS — H90.6 MIXED CONDUCTIVE AND SENSORINEURAL HEARING LOSS OF BOTH EARS: ICD-10-CM

## 2019-04-11 DIAGNOSIS — H69.83 EUSTACHIAN TUBE DYSFUNCTION, BILATERAL: ICD-10-CM

## 2019-04-11 DIAGNOSIS — H90.6 MIXED HEARING LOSS, BILATERAL: Primary | ICD-10-CM

## 2019-04-11 PROCEDURE — G8417 CALC BMI ABV UP PARAM F/U: HCPCS | Performed by: OTOLARYNGOLOGY

## 2019-04-11 PROCEDURE — 99203 OFFICE O/P NEW LOW 30 MIN: CPT | Performed by: OTOLARYNGOLOGY

## 2019-04-11 PROCEDURE — 92567 TYMPANOMETRY: CPT | Performed by: AUDIOLOGIST

## 2019-04-11 PROCEDURE — G8427 DOCREV CUR MEDS BY ELIG CLIN: HCPCS | Performed by: OTOLARYNGOLOGY

## 2019-04-11 PROCEDURE — 3017F COLORECTAL CA SCREEN DOC REV: CPT | Performed by: OTOLARYNGOLOGY

## 2019-04-11 PROCEDURE — 1036F TOBACCO NON-USER: CPT | Performed by: OTOLARYNGOLOGY

## 2019-04-11 PROCEDURE — 92553 AUDIOMETRY AIR & BONE: CPT | Performed by: AUDIOLOGIST

## 2019-04-11 RX ORDER — DOXYCYCLINE HYCLATE 100 MG/1
100 CAPSULE ORAL 2 TIMES DAILY
Qty: 28 CAPSULE | Refills: 0 | Status: SHIPPED | OUTPATIENT
Start: 2019-04-11 | End: 2019-04-25

## 2019-04-11 NOTE — PROGRESS NOTES
[facility]  [address] [facility]  [address] [place]  Dept: [phone]  Dept Fax: [phone]  Loc: [phone]     Tootie Otto is a 59 y.o. female who presents today for her medical conditions/complaintsas noted below. Myra Beverage Day is c/o of New Patient (Referred by LUKAS Sifuentes for sudden hearing loss, bilateral ) and Hearing Problem (Patient reports it began  and got worse)      Past Medical History:   Diagnosis Date    Atrial flutter by electrocardiogram ([address]     Constipation     Coronary artery disease     Diabetes mellitus ([address]     Essential hypertension     Hyperlipidemia     IBS (irritable bowel syndrome)     Iron deficiency anemia     Menopause     Paroxysmal SVT (supraventricular tachycardia) (HCC)     Rheumatoid arthritis (HCC)       Past Surgical History:   Procedure Laterality Date    CHOLECYSTECTOMY      CORONARY ARTERY BYPASS GRAFT      TOTAL KNEE ARTHROPLASTY         Family History   Problem Relation Age of Onset    Heart Attack Father     Prostate Cancer Father     Stroke Father     Heart Attack Brother     Stroke Brother           Social History     Tobacco Use    Smoking status: Former Smoker     Packs/day: 0.50     Years: 20.00     Pack years: 10.00     Last attempt to quit: 2000     Years since quittin.5    Smokeless tobacco: Never Used   Substance Use Topics    Alcohol use: No         Current Outpatient Medications   Medication Sig Dispense Refill    ofloxacin (FLOXIN) 0.3 % otic solution Place 5 drops into both ears 2 times daily for 10 days 5 mL 0    glyBURIDE (DIABETA) 5 MG tablet TAKE 1 TABLET TWICE DAILY.  (Patient taking differently: Takes once a day) 60 tablet 5    metoprolol tartrate (LOPRESSOR) 50 MG tablet TAKE 1 TABLET BY MOUTH 2 TIMES A DAY 60 tablet 5    pantoprazole (PROTONIX) 40 MG tablet TAKE 1 TABLET BY MOUTH 2 TIMES A DAY 60 tablet 5    isosorbide mononitrate (IMDUR) 30 MG extended release tablet TAKE 1 TABLET BY MOUTH DAILY. 30 tablet 5    simvastatin (ZOCOR) 40 MG tablet Take 1 tablet by mouth every evening 30 tablet 5    metFORMIN (GLUCOPHAGE) 500 MG tablet TAKE 1 TABLET BY MOUTH 3 TIMES DAILY 90 tablet 5    losartan (COZAAR) 25 MG tablet Take 1 tablet by mouth daily 30 tablet 5    apixaban (ELIQUIS) 5 MG TABS tablet Take 5 mg by mouth 2 times daily       Tofacitinib Citrate 11 MG TB24 Take 1 tablet by mouth daily       verapamil (CALAN SR) 240 MG extended release tablet Take 240 mg by mouth nightly       amiodarone (CORDARONE) 200 MG tablet Take 100 mg by mouth daily       FREESTYLE LITE strip USE 1 STRIP TWICE DAILY TO CHECK BLOOD SUGAR 100 strip 0    nitroGLYCERIN (NITROSTAT) 0.4 MG SL tablet Place 1 tablet under the tongue every 5 minutes as needed for Chest pain 25 tablet 2    FREESTYLE LANCETS MISC TEST SUGAR DAILY AS DIRECTED 100 each 2    predniSONE (DELTASONE) 5 MG tablet Take 5 mg by mouth Take 5 mg in the morning and 4 mg in the evening.  aspirin EC 81 MG EC tablet Take 81 mg by mouth daily      folic acid (FOLVITE) 1 MG tablet Take 1 mg by mouth daily       ondansetron (ZOFRAN) 8 MG tablet TAKE 1 TABLET BY MOUTH EVERY 8 HOURS AS NEEDED 21 tablet 0     No current facility-administered medications for this visit. Allergies   Allergen Reactions    Codeine Other (See Comments)     Chest pain    Albuterol      Rapid heart rate    Augmentin [Amoxicillin-Pot Clavulanate] Palpitations    Sulfa Antibiotics Rash       Subjective:   Mild eustachian tube dysfunction in both ears on top of her already SNHL in both ears. Audiogram and Acoustic Immittance                        Review of Systems  A 12 point review of systems was completed, reviewed, and scanned to chart per staff. Patient medical history reviewed.     Objective:      Physical Exam  BP 94/62   Pulse 77   Temp 96.8 °F (36 °C)   Resp 18   Ht 5' 3\" (1.6 m)   Wt 203 lb (92.1 kg)   SpO2 96%   BMI 35.96 kg/m²   Physical Exam:     General appearance: Normally developed structures of the head and neck with no deformities. Ability to communicate: Normal voice with ability to convey appropriately the nature of their complaints. Head and Face: Normal appearance with no visible lesions of the skin. Sinus tenderness: None appreciated on exam. No areas of facial swelling. Facial strength: No weakness of the facial muscles appreciated. Microscope: Normal external ear canals and tympanic membranes. Hearing assessment:Sloping mixed hearing loss bilaterally. External ears and nose: normal.   Nasal exam: Anterior speculum exam normal with no polyps purulence or lesions. Oral:  Mucosa of buccal, floor of mouth, and palatal areas appear normal and free of any lesions. Lips, teeth, gingiva: Normal with no lesions. Oropharynx: Tongue reveals normal appearance and mobility. Oral mucosa of buccal, floor of mouth, and palatal areas appear normal and free of any lesions or ulcerations. Salivary:  Examination of the parotid and submandibular glands was normal with no palpable masses, nodules or irregularities. Neck: No gross swellings or deformities. No palpable lymphadenopathy. Thyroid normal without palpable masses. Respiratory:  Effort appears normal with no notable distress, stridor,accessory muscle usage or tachypnea         Assessment:       Diagnosis Orders   1. Mixed hearing loss, bilateral           Plan:      Patient was prescribed Doxycycline for 14 days. RTO 3 weeks. Sharon Engle am scribing for and in the presence of Dr. Delfino Blanco April 11, 2019/11:33 AM/Lilly Blanco. Edgar Tay MD,  I personally performed the services described in this documentation as scribed by Orlando Tejeda in my presence and it appears accurate and complete.

## 2019-04-11 NOTE — PROGRESS NOTES
History   Ashley Fagan is a 59 y.o. female who presented to the clinic this date with complaints of bilateral aural fullness and sudden bilateral hearing loss. She reported onset approximately 4 days ago. She also noted upper respiratory congestion. Summary   Tympanometry suggests middle ear effusion bilaterally. Pure tone testing indicates moderate to severe mixed hearing loss bilaterally. Could not mask BC thresholds due to severity of AC thresholds. Results   Otoscopy:    Right: Erythematous TM   Left: Erythematous TM    Audiometry:    Right: Moderateto severe sloping mixed hearing loss   Left: Moderate to severe sloping mixed hearing loss         Tympanometry:     Right: Type B   Left: Type B    Plan   Results of today's testing were discussed with Ms. Fagan and the following recommendations were made:    1. Follow up with ENT as scheduled. 2. Recheck hearing following medical management.         Audiogram and Acoustic Immittance

## 2019-05-02 ENCOUNTER — OFFICE VISIT (OUTPATIENT)
Dept: OTOLARYNGOLOGY | Age: 64
End: 2019-05-02
Payer: MEDICARE

## 2019-05-02 ENCOUNTER — PROCEDURE VISIT (OUTPATIENT)
Dept: OTOLARYNGOLOGY | Age: 64
End: 2019-05-02
Payer: MEDICARE

## 2019-05-02 VITALS
HEIGHT: 63 IN | BODY MASS INDEX: 35.96 KG/M2 | RESPIRATION RATE: 18 BRPM | TEMPERATURE: 97.6 F | HEART RATE: 73 BPM | DIASTOLIC BLOOD PRESSURE: 62 MMHG | OXYGEN SATURATION: 97 % | SYSTOLIC BLOOD PRESSURE: 104 MMHG

## 2019-05-02 DIAGNOSIS — H90.6 MIXED CONDUCTIVE AND SENSORINEURAL HEARING LOSS OF BOTH EARS: ICD-10-CM

## 2019-05-02 DIAGNOSIS — H90.3 SENSORINEURAL HEARING LOSS (SNHL) OF BOTH EARS: Primary | ICD-10-CM

## 2019-05-02 PROCEDURE — 92504 EAR MICROSCOPY EXAMINATION: CPT | Performed by: OTOLARYNGOLOGY

## 2019-05-02 PROCEDURE — G8427 DOCREV CUR MEDS BY ELIG CLIN: HCPCS | Performed by: OTOLARYNGOLOGY

## 2019-05-02 PROCEDURE — 92567 TYMPANOMETRY: CPT | Performed by: AUDIOLOGIST

## 2019-05-02 PROCEDURE — 92552 PURE TONE AUDIOMETRY AIR: CPT | Performed by: AUDIOLOGIST

## 2019-05-02 PROCEDURE — 1036F TOBACCO NON-USER: CPT | Performed by: OTOLARYNGOLOGY

## 2019-05-02 PROCEDURE — 99212 OFFICE O/P EST SF 10 MIN: CPT | Performed by: OTOLARYNGOLOGY

## 2019-05-02 PROCEDURE — 3017F COLORECTAL CA SCREEN DOC REV: CPT | Performed by: OTOLARYNGOLOGY

## 2019-05-02 PROCEDURE — G8417 CALC BMI ABV UP PARAM F/U: HCPCS | Performed by: OTOLARYNGOLOGY

## 2019-05-02 NOTE — PROGRESS NOTES
History   Hugh Fagan is a 59 y.o. female who presented to the clinic this date for a recheck of hearing following treatment for bilateral ear infection with conductive hearing loss. She reported an improvement in hearng following treatment. Summary   Tympanometry consistent with limited TM mobility bilaterally. Pure tone testing indicates mild to moderate SNHL bilaterally (based on BC threshold obtained prior to treatment.) When compared to pre-treatment audiogram, threshold improved to bone line bilaterally. Results   Otoscopy:    Right: Dull TM   Left: Dull TM    Audiometry:    Right: Mild to moderate sloping SNHL   Left: Mild to moderate sloping SNHL         Tympanometry:     Right: Type B   Left: Type B      Plan   Results of today's testing were discussed with Ms. Fagan and the following recommendations were made:    1. Follow up with ENT as scheduled. 2. Monitor hearing yearly, sooner with changes. 3. Hearing aid evaluation as desired. 4. Hearing protection as warranted.         Audiogram and Acoustic Immittance

## 2019-05-02 NOTE — PROGRESS NOTES
[facility]  [address] [facility]  [address] [place]  Dept: [phone]  Dept Fax: [phone]  Loc: [phone]     Shakir Waldron is a 59 y.o. female who presents today for her medical conditions/complaintsas noted below. Jonathan Baires Day is c/o of Hearing Problem (Patient reports hearing is better )      Current Outpatient Medications   Medication Sig Dispense Refill    glyBURIDE (DIABETA) 5 MG tablet TAKE 1 TABLET TWICE DAILY. (Patient taking differently: Takes once a day) 60 tablet 5    metoprolol tartrate (LOPRESSOR) 50 MG tablet TAKE 1 TABLET BY MOUTH 2 TIMES A DAY 60 tablet 5    pantoprazole (PROTONIX) 40 MG tablet TAKE 1 TABLET BY MOUTH 2 TIMES A DAY 60 tablet 5    isosorbide mononitrate (IMDUR) 30 MG extended release tablet TAKE 1 TABLET BY MOUTH DAILY. 30 tablet 5    simvastatin (ZOCOR) 40 MG tablet Take 1 tablet by mouth every evening 30 tablet 5    metFORMIN (GLUCOPHAGE) 500 MG tablet TAKE 1 TABLET BY MOUTH 3 TIMES DAILY 90 tablet 5    losartan (COZAAR) 25 MG tablet Take 1 tablet by mouth daily 30 tablet 5    apixaban (ELIQUIS) 5 MG TABS tablet Take 5 mg by mouth 2 times daily       Tofacitinib Citrate 11 MG TB24 Take 1 tablet by mouth daily       verapamil (CALAN SR) 240 MG extended release tablet Take 240 mg by mouth nightly       amiodarone (CORDARONE) 200 MG tablet Take 100 mg by mouth daily       FREESTYLE LITE strip USE 1 STRIP TWICE DAILY TO CHECK BLOOD SUGAR 100 strip 0    nitroGLYCERIN (NITROSTAT) 0.4 MG SL tablet Place 1 tablet under the tongue every 5 minutes as needed for Chest pain 25 tablet 2    FREESTYLE LANCETS MISC TEST SUGAR DAILY AS DIRECTED 100 each 2    predniSONE (DELTASONE) 5 MG tablet Take 5 mg by mouth Take 5 mg in the morning and 4 mg in the evening.       aspirin EC 81 MG EC tablet Take 81 mg by mouth daily      folic acid (FOLVITE) 1 MG tablet Take 1 mg by mouth daily       ondansetron (ZOFRAN) 8 MG tablet TAKE 1 TABLET BY MOUTH EVERY 8 HOURS AS NEEDED 21 tablet 0     No current facility-administered medications for this visit. Allergies   Allergen Reactions    Codeine Other (See Comments)     Chest pain    Albuterol      Rapid heart rate    Augmentin [Amoxicillin-Pot Clavulanate] Palpitations    Sulfa Antibiotics Rash       Subjective:   Hearing normalized and audiograms compared. Audiometry:   · Right: Mild to moderate sloping SNHL  · Left: Mild to moderate sloping SNHL         Audiogram and Acoustic Immittance                        Patient medical history reviewed. Objective:     Physical Exam  /62   Pulse 73   Temp 97.6 °F (36.4 °C)   Resp 18   Ht 5' 3\" (1.6 m)   SpO2 97%   BMI 35.96 kg/m²   Due to nature of patient complaint a microscopic exam of the ear was performed. Clody TM's with functional hearing. Assessment:      Diagnosis Orders   1. Sensorineural hearing loss (SNHL) of both ears             Plan:   Cond portion of HL resolved and has returned to baseline thus RTO prn.      Mary Ellen Sanderson am scribing for and in the presence of Dr. Pura Mena May 2, 2019/2:04 PM/Lilly Mena. Carrie Otto MD,  I personally performed the services described in this documentation as scribed by [facility] Staff in my presence and it appears accurate and complete.

## 2019-05-28 ENCOUNTER — OFFICE VISIT (OUTPATIENT)
Dept: CARDIOLOGY | Facility: CLINIC | Age: 64
End: 2019-05-28

## 2019-05-28 VITALS
SYSTOLIC BLOOD PRESSURE: 130 MMHG | HEART RATE: 75 BPM | HEIGHT: 64 IN | WEIGHT: 206 LBS | DIASTOLIC BLOOD PRESSURE: 60 MMHG | OXYGEN SATURATION: 98 % | BODY MASS INDEX: 35.17 KG/M2

## 2019-05-28 DIAGNOSIS — Z95.5 S/P CORONARY ARTERY STENT PLACEMENT: ICD-10-CM

## 2019-05-28 DIAGNOSIS — E11.9 TYPE 2 DIABETES MELLITUS WITHOUT COMPLICATION, WITHOUT LONG-TERM CURRENT USE OF INSULIN (HCC): ICD-10-CM

## 2019-05-28 DIAGNOSIS — E78.2 MIXED HYPERLIPIDEMIA: ICD-10-CM

## 2019-05-28 DIAGNOSIS — I48.3 TYPICAL ATRIAL FLUTTER (HCC): ICD-10-CM

## 2019-05-28 DIAGNOSIS — I48.0 PAF (PAROXYSMAL ATRIAL FIBRILLATION) (HCC): ICD-10-CM

## 2019-05-28 DIAGNOSIS — I48.91 ATRIAL FIBRILLATION WITH RAPID VENTRICULAR RESPONSE (HCC): ICD-10-CM

## 2019-05-28 DIAGNOSIS — R93.89 ABNORMAL FINDING ON IMAGING: Primary | ICD-10-CM

## 2019-05-28 DIAGNOSIS — I25.10 CORONARY ARTERY DISEASE INVOLVING NATIVE CORONARY ARTERY OF NATIVE HEART WITHOUT ANGINA PECTORIS: ICD-10-CM

## 2019-05-28 DIAGNOSIS — Z95.1 S/P CABG X 3: ICD-10-CM

## 2019-05-28 DIAGNOSIS — Z87.19 HISTORY OF ESOPHAGITIS: ICD-10-CM

## 2019-05-28 PROCEDURE — 99214 OFFICE O/P EST MOD 30 MIN: CPT | Performed by: INTERNAL MEDICINE

## 2019-05-28 PROCEDURE — 93000 ELECTROCARDIOGRAM COMPLETE: CPT | Performed by: INTERNAL MEDICINE

## 2019-05-28 NOTE — PROGRESS NOTES
Teresa Serna  [id number]  1955  63 y.o.  female    Referring Provider: Teresa Contreras MD    Reason for Follow-up Visit: Here for routine follow up     Paroxysmal atrial fibrillation now sinus rhythm  Saw Dr De Anda AF ablation tried but due to no good venous access not possible  Now  Maintaining sinus rhythm        Subjective      Feels much better     Mild chronic exertional shortness of breath on exertion relieved with rest  No significant cough or wheezing  Going on for several months or longer    Occasional palpitations now much better, once every morning lasting for less than 1 minute  No associated symptoms of dizziness, weakness, chest pain,  shortness of breath   No associated chest pain  Much less bipedal edema      No fever or chills  No significant expectoration    No hemoptysis  No presyncope or syncope     Easy fatiguability     Joint pain in small, medium and large joints  Chronic low back pain      On anticoagulation with Eliquis          History of present illness:  Teresa Serna is a 63 y.o. yo female with history of Paroxysmal atrial fibrillation who presents today for   Chief Complaint   Patient presents with   • Coronary Artery Disease     3 month follow up    .    History  Past Medical History:   Diagnosis Date   • Abnormal stress test    • Atrial flutter (CMS/HCC)    • CAD (coronary artery disease)    • CAD in native artery     CABG x 3   • Diabetes mellitus (CMS/HCC)    • Essential hypertension     Tricuspid valve insufficiency, unspecified etiology    • Hyperlipemia, mixed    • Hyperlipidemia    • Hypertension    • MI, old    • Mitral valve prolapse    • Near syncope    • Obesity, morbid (CMS/HCC)    • Palpitations    • Paroxysmal SVT (supraventricular tachycardia) (CMS/HCC)    • Paroxysmal SVT (supraventricular tachycardia) (CMS/HCC)    • Pedal edema    • Precordial pain    • Rheumatoid arthritis (CMS/HCC)    • Rheumatoid arthritis (CMS/HCC)    • S/P CABG x 3    ,   Past  Surgical History:   Procedure Laterality Date   • APPENDECTOMY     • CARDIAC CATHETERIZATION Left 2012    Intensify medical therapy   • CARDIAC CATHETERIZATION Left 2002    surgery   • CHOLECYSTECTOMY     • COLONOSCOPY  02/10/2012   • COLONOSCOPY N/A 2018    Procedure: COLONOSCOPY WITH ANESTHESIA;  Surgeon: Patricia Bo MD;  Location: [facility] ENDOSCOPY;  Service:    • CORONARY ARTERY BYPASS GRAFT  2002    LIMA to LAD, SVG to D1, SVG to OM1 - x3   • CORONARY STENT PLACEMENT  09/2009    X1 - Stent to LAD, Drug Eluting   • ENDOSCOPY N/A 2018    Procedure: ESOPHAGOGASTRODUODENOSCOPY WITH ANESTHESIA;  Surgeon: Patricia Bo MD;  Location: [facility] ENDOSCOPY;  Service:    • GALLBLADDER SURGERY     • REPLACEMENT TOTAL KNEE Right    • UPPER GASTROINTESTINAL ENDOSCOPY  2015   ,   Family History   Problem Relation Age of Onset   • Cancer Father    • Coronary artery disease Father    ,   Social History     Tobacco Use   • Smoking status: Former Smoker     Years: 20.00     Types: Cigarettes     Last attempt to quit: 2000     Years since quittin.3   • Smokeless tobacco: Never Used   Substance Use Topics   • Alcohol use: No   • Drug use: No   ,     Medications  Current Outpatient Medications   Medication Sig Dispense Refill   • amiodarone (PACERONE) 100 MG tablet TAKE 1 TABLET BY MOUTH DAILY 30 tablet 11   • amiodarone (PACERONE) 200 MG tablet Take 1 tablet by mouth Daily.     • apixaban (ELIQUIS) 5 MG tablet tablet Take 1 tablet by mouth Every 12 (Twelve) Hours. 60 tablet 11   • aspirin 81 MG EC tablet Take 81 mg by mouth Daily.     • folic acid (FOLVITE) 1 MG tablet Take 1 mg by mouth Daily.     • furosemide (LASIX) 20 MG tablet Take 1 tablet by mouth Daily. 90 tablet 3   • glyBURIDE (DIAbeta) 5 MG tablet Take 5 mg by mouth Daily With Breakfast.     • isosorbide mononitrate (IMDUR) 30 MG 24 hr tablet Take 30 mg by mouth Daily.     • losartan (COZAAR) 25 MG tablet Take 25 mg by mouth  Daily.     • meclizine (ANTIVERT) 12.5 MG tablet Take 1 tablet by mouth 3 (Three) Times a Day As Needed for dizziness. 90 tablet 3   • metFORMIN (GLUCOPHAGE) 1000 MG tablet Take 1,000 mg by mouth Daily With Breakfast.     • metoprolol tartrate (LOPRESSOR) 50 MG tablet Take 50 mg by mouth 2 (Two) Times a Day.     • nitroglycerin (NITROSTAT) 0.4 MG SL tablet Place 0.4 mg under the tongue Every 5 (Five) Minutes As Needed for Chest Pain. Take no more than 3 doses in 15 minutes.     • pantoprazole (PROTONIX) 40 MG EC tablet Take 1 tablet by mouth Every 12 (Twelve) Hours. 60 tablet 1   • potassium chloride (K-DUR) 10 MEQ CR tablet Take 1 tablet by mouth Daily. 90 tablet 3   • predniSONE (DELTASONE) 1 MG tablet Take 4 mg by mouth Every Night.     • predniSONE (DELTASONE) 5 MG tablet Take 5 mg by mouth Every Morning.     • simvastatin (ZOCOR) 40 MG tablet Take 40 mg by mouth Every Night.     • Tofacitinib Citrate (XELJANZ XR) 11 MG tablet sustained-release 24 hour Take 1 tablet by mouth Daily.     • verapamil SR (CALAN-SR) 240 MG CR tablet Take 1 tablet by mouth Daily. 30 tablet 11     No current facility-administered medications for this visit.      Results for orders placed during the hospital encounter of 07/18/18   Adult Transthoracic Echo Complete W/ Cont if Necessary Per Protocol    Narrative · Left ventricular systolic function is normal. Estimated EF = 55%.  · Left ventricular diastolic dysfunction.  · Left atrial cavity size is mildly dilated.  · No evidence of pulmonary hypertension is present.          Allergies:  Codeine; Sulfa antibiotics; Albuterol; and Amoxicillin-pot clavulanate    Review of Systems  Review of Systems   Constitution: Positive for weakness and malaise/fatigue.   HENT: Negative.    Eyes: Negative.    Cardiovascular: Positive for dyspnea on exertion, leg swelling and palpitations. Negative for chest pain, claudication, cyanosis, irregular heartbeat, near-syncope, orthopnea, paroxysmal nocturnal  "dyspnea and syncope.   Respiratory: Negative.    Endocrine: Negative.    Hematologic/Lymphatic: Negative.    Skin: Negative.    Musculoskeletal: Positive for arthritis, back pain and joint pain.   Gastrointestinal: Negative for anorexia.   Genitourinary: Negative.    Neurological: Positive for dizziness.   Psychiatric/Behavioral: Negative.        Objective     Physical Exam:  /60 (BP Location: Right arm, Patient Position: Sitting, Cuff Size: Adult)   Pulse 75   Ht 162.6 cm (64\")   Wt 93.4 kg (206 lb)   SpO2 98%   BMI 35.36 kg/m²   Physical Exam   Constitutional: She appears well-developed.   HENT:   Head: Normocephalic.   Neck: Normal carotid pulses and no JVD present. No tracheal tenderness present. Carotid bruit is not present. No tracheal deviation and no edema present.   Cardiovascular: Regular rhythm and normal pulses.   Murmur heard.   Systolic murmur is present with a grade of 2/6.  Pulmonary/Chest: Effort normal. No stridor. She has wheezes.   Abdominal: Soft.     Vascular Status -  Her right foot exhibits abnormal foot edema. Her left foot exhibits abnormal foot edema.  Neurological: She is alert. She has normal strength. No cranial nerve deficit or sensory deficit.   Skin: Skin is warm.   Psychiatric: She has a normal mood and affect. Her speech is normal and behavior is normal.   Lower extremity: 2 plus pitting edema.     Results Review:       ECG 12 Lead  Date/Time: 5/28/2019 10:59 AM  Performed by: Jagdeep Reyes MD  Authorized by: Jagdeep Reyes MD   Comparison: compared with previous ECG from 2/10/2019  Similar to previous ECG  Rhythm: sinus rhythm  Ectopy: atrial premature contractions  Rate: normal  T inversion: V4, V5, V6 and V3  QRS axis: normal    Clinical impression: abnormal EKG            Assessment/Plan   Patient Active Problem List   Diagnosis   • CAD (coronary artery disease)   • Atrial flutter (CMS/HCC)   • Type 2 diabetes mellitus without complication, without long-term current " use of insulin (CMS/HCC)   • Mixed hyperlipidemia   • S/P CABG x 3 2000 Dr Larsen   • Shoulder blade pain   • Anemia   • Abnormal finding on imaging   • History of esophagitis   • Essential hypertension   • S/P coronary artery stent placement   • Gastric polyp   • Colon polyps   • Atrial fibrillation with rapid ventricular response (CMS/HCC)   • PAF (paroxysmal atrial fibrillation) (CMS/HCC)     Low risk stress echo with normal LVEF by echo cardiogram.         Plan    The current medical regimen is effective;  continue present plan and medications.     Weigh yourself frequently, at least weekly, preferably daily, call me if more than 2 pounds a day or 5 pounds a week weight gain.  Flexible diuretic dosing     Monitor CBC, CMP, TSH and Lipid Panel by primary  Eye exam, pulmonary function tests   Discussed Amiodarone induced toxicity and required monitoring and close follow up for this       Recommend evaluation for obstructive sleep apnea given snoring and daytime somnolence and fatigue by primary provider  In addition has body habitus including oopharyngeal crowding increasing the likelihood of obstructive sleep apnea   ____________________________________________________________________________________________________________________________________________  Health maintenance and recommendations    Similar recommendations as last visit       Offered to give patient  a copy      Questions were encouraged, asked and answered to the patient's  understanding and satisfaction. Questions if any regarding current medications and side effects, need for refills and importance of compliance to medications stressed.    Reviewed available prior notes, consults, prior visits, laboratory findings, radiology and cardiology relevant reports. Updated chart as applicable. I have reviewed the patient's medical history in detail and updated the computerized patient record as relevant.      Updated patient regarding any new or relevant  abnormalities on review of records or any new findings on physical exam. Mentioned to patient about purpose of visit and desirable health short and long term goals and objectives.    Primary to monitor CBC CMP Lipid panel and TSH as applicable    ___________________________________________________________________________________________________________________________________________       Return in about 6 months (around 11/28/2019).

## 2019-06-05 RX ORDER — VERAPAMIL HYDROCHLORIDE 240 MG/1
240 TABLET, FILM COATED, EXTENDED RELEASE ORAL DAILY
Qty: 30 TABLET | Refills: 11 | Status: SHIPPED | OUTPATIENT
Start: 2019-06-05 | End: 2020-02-24 | Stop reason: SDUPTHER

## 2019-06-05 RX ORDER — APIXABAN 5 MG/1
TABLET, FILM COATED ORAL
Qty: 60 TABLET | Refills: 0 | Status: SHIPPED | OUTPATIENT
Start: 2019-06-05 | End: 2019-08-26 | Stop reason: SDUPTHER

## 2019-06-06 RX ORDER — SIMVASTATIN 40 MG
TABLET ORAL
Qty: 90 TABLET | Refills: 0 | Status: SHIPPED | OUTPATIENT
Start: 2019-06-06 | End: 2019-07-27 | Stop reason: SDUPTHER

## 2019-06-06 RX ORDER — PANTOPRAZOLE SODIUM 40 MG/1
TABLET, DELAYED RELEASE ORAL
Qty: 180 TABLET | Refills: 0 | Status: SHIPPED | OUTPATIENT
Start: 2019-06-06 | End: 2019-07-05 | Stop reason: SDUPTHER

## 2019-06-06 RX ORDER — ISOSORBIDE MONONITRATE 30 MG/1
TABLET, EXTENDED RELEASE ORAL
Qty: 90 TABLET | Refills: 0 | Status: SHIPPED | OUTPATIENT
Start: 2019-06-06 | End: 2019-07-26 | Stop reason: SDUPTHER

## 2019-06-06 RX ORDER — POTASSIUM CHLORIDE 750 MG/1
TABLET, FILM COATED, EXTENDED RELEASE ORAL
Qty: 30 TABLET | Refills: 0 | OUTPATIENT
Start: 2019-06-06

## 2019-06-13 RX ORDER — POTASSIUM CHLORIDE 750 MG/1
TABLET, FILM COATED, EXTENDED RELEASE ORAL
Qty: 30 TABLET | Refills: 0 | OUTPATIENT
Start: 2019-06-13

## 2019-06-17 ENCOUNTER — TELEPHONE (OUTPATIENT)
Dept: OBGYN | Age: 64
End: 2019-06-17

## 2019-06-17 NOTE — TELEPHONE ENCOUNTER
Pt  is having post menopausal bleeding and needs to sched. a appt to be seen. Please call pt back to sched.

## 2019-06-24 NOTE — PLAN OF CARE
Problem: Patient Care Overview  Goal: Plan of Care Review  Outcome: Ongoing (interventions implemented as appropriate)   10/12/18 5155   Coping/Psychosocial   Plan of Care Reviewed With patient   Plan of Care Review   Progress improving   OTHER   Outcome Summary No c/o's pain or discomfort. Patient remains sinus per tele. Cardizem drip remains at 5 per MD orders. Overnight pulse ox in process. VSS. Cont. to monitor. Call MD with complaints or concerns.           Detail Level: Detailed Duration Of Freeze Thaw-Cycle (Seconds): 5 Consent: Verbal consent obtained from patient Render Post-Care Instructions In Note?: yes Post-Care Instructions: Written wound care provided Render Note In Bullet Format When Appropriate: No Number Of Freeze-Thaw Cycles: 1 freeze-thaw cycle

## 2019-06-26 ENCOUNTER — OFFICE VISIT (OUTPATIENT)
Dept: OBGYN | Age: 64
End: 2019-06-26
Payer: MEDICARE

## 2019-06-26 VITALS
HEIGHT: 64 IN | DIASTOLIC BLOOD PRESSURE: 70 MMHG | SYSTOLIC BLOOD PRESSURE: 122 MMHG | WEIGHT: 206 LBS | BODY MASS INDEX: 35.17 KG/M2

## 2019-06-26 DIAGNOSIS — N89.8 ITCHING IN THE VAGINAL AREA: ICD-10-CM

## 2019-06-26 DIAGNOSIS — L90.0 LICHEN SCLEROSUS: ICD-10-CM

## 2019-06-26 DIAGNOSIS — N95.0 PMB (POSTMENOPAUSAL BLEEDING): Primary | ICD-10-CM

## 2019-06-26 PROCEDURE — 99214 OFFICE O/P EST MOD 30 MIN: CPT | Performed by: OBSTETRICS & GYNECOLOGY

## 2019-06-26 PROCEDURE — 3017F COLORECTAL CA SCREEN DOC REV: CPT | Performed by: OBSTETRICS & GYNECOLOGY

## 2019-06-26 PROCEDURE — G8427 DOCREV CUR MEDS BY ELIG CLIN: HCPCS | Performed by: OBSTETRICS & GYNECOLOGY

## 2019-06-26 PROCEDURE — 1036F TOBACCO NON-USER: CPT | Performed by: OBSTETRICS & GYNECOLOGY

## 2019-06-26 PROCEDURE — G8417 CALC BMI ABV UP PARAM F/U: HCPCS | Performed by: OBSTETRICS & GYNECOLOGY

## 2019-06-26 RX ORDER — CLOBETASOL PROPIONATE 0.5 MG/G
OINTMENT TOPICAL
Qty: 1 TUBE | Refills: 3 | Status: SHIPPED | OUTPATIENT
Start: 2019-06-26 | End: 2020-11-17

## 2019-06-26 ASSESSMENT — ENCOUNTER SYMPTOMS
ALLERGIC/IMMUNOLOGIC NEGATIVE: 1
GASTROINTESTINAL NEGATIVE: 1
EYES NEGATIVE: 1
RESPIRATORY NEGATIVE: 1

## 2019-07-05 RX ORDER — PANTOPRAZOLE SODIUM 40 MG/1
TABLET, DELAYED RELEASE ORAL
Qty: 60 TABLET | Refills: 0 | Status: SHIPPED | OUTPATIENT
Start: 2019-07-05 | End: 2019-08-26 | Stop reason: SDUPTHER

## 2019-07-05 RX ORDER — LANCETS 28 GAUGE
EACH MISCELLANEOUS
Qty: 4 EACH | Refills: 0 | Status: SHIPPED | OUTPATIENT
Start: 2019-07-05 | End: 2021-01-01 | Stop reason: SDUPTHER

## 2019-07-05 NOTE — TELEPHONE ENCOUNTER
Lyn Haley Day called to request a refill on her medication.       Last office visit : 4/2/2019   Next office visit : 7/25/2019     Requested Prescriptions     Signed Prescriptions Disp Refills    FREESTYLE LANCETS MISC 4 each 0     Sig: TEST SUGAR DAILY AS DIRECTED UP TO 4 TIMES A DAY     Authorizing Provider: Berna Muse     Ordering User: Geraldo Gonzales    pantoprazole (PROTONIX) 40 MG tablet 60 tablet 0     Sig: TAKE 1 TABLET BY MOUTH 2 TIMES A DAY     Authorizing Provider: Berna Muse     Ordering User: Renetta Wolfe

## 2019-07-23 ENCOUNTER — TELEPHONE (OUTPATIENT)
Dept: OBGYN | Age: 64
End: 2019-07-23

## 2019-07-23 RX ORDER — MISOPROSTOL 100 UG/1
100 TABLET ORAL ONCE
Qty: 1 TABLET | Refills: 0 | Status: SHIPPED | OUTPATIENT
Start: 2019-07-23 | End: 2019-11-21

## 2019-07-24 ENCOUNTER — HOSPITAL ENCOUNTER (OUTPATIENT)
Dept: PREADMISSION TESTING | Age: 64
Discharge: HOME | End: 2019-07-28
Payer: MEDICARE

## 2019-07-24 ENCOUNTER — OFFICE VISIT (OUTPATIENT)
Dept: OBGYN | Age: 64
End: 2019-07-24

## 2019-07-24 VITALS
BODY MASS INDEX: 35.17 KG/M2 | WEIGHT: 206 LBS | HEIGHT: 64 IN | SYSTOLIC BLOOD PRESSURE: 102 MMHG | DIASTOLIC BLOOD PRESSURE: 68 MMHG

## 2019-07-24 VITALS — HEIGHT: 64 IN | WEIGHT: 208 LBS | BODY MASS INDEX: 35.51 KG/M2

## 2019-07-24 DIAGNOSIS — E78.01 FAMILIAL HYPERCHOLESTEROLEMIA: ICD-10-CM

## 2019-07-24 DIAGNOSIS — E11.9 TYPE 2 DIABETES MELLITUS WITHOUT COMPLICATION, WITHOUT LONG-TERM CURRENT USE OF INSULIN (HCC): ICD-10-CM

## 2019-07-24 DIAGNOSIS — N95.0 PMB (POSTMENOPAUSAL BLEEDING): ICD-10-CM

## 2019-07-24 DIAGNOSIS — Z01.818 PRE-OP EXAM: Primary | ICD-10-CM

## 2019-07-24 DIAGNOSIS — I10 ESSENTIAL HYPERTENSION: ICD-10-CM

## 2019-07-24 LAB
ALBUMIN SERPL-MCNC: 3.9 G/DL (ref 3.5–5.2)
ALP BLD-CCNC: 57 U/L (ref 35–104)
ALT SERPL-CCNC: 22 U/L (ref 5–33)
ANION GAP SERPL CALCULATED.3IONS-SCNC: 14 MMOL/L (ref 7–19)
APTT: 27.5 SEC (ref 26–36.2)
AST SERPL-CCNC: 16 U/L (ref 5–32)
BASOPHILS ABSOLUTE: 0.1 K/UL (ref 0–0.2)
BASOPHILS RELATIVE PERCENT: 0.5 % (ref 0–1)
BILIRUB SERPL-MCNC: 0.9 MG/DL (ref 0.2–1.2)
BUN BLDV-MCNC: 21 MG/DL (ref 8–23)
CALCIUM SERPL-MCNC: 9.5 MG/DL (ref 8.8–10.2)
CHLORIDE BLD-SCNC: 102 MMOL/L (ref 98–111)
CHOLESTEROL, TOTAL: 151 MG/DL (ref 160–199)
CO2: 26 MMOL/L (ref 22–29)
CREAT SERPL-MCNC: <0.5 MG/DL (ref 0.5–0.9)
EKG P AXIS: 22 DEGREES
EKG P-R INTERVAL: 146 MS
EKG Q-T INTERVAL: 430 MS
EKG QRS DURATION: 88 MS
EKG QTC CALCULATION (BAZETT): 440 MS
EKG T AXIS: -15 DEGREES
EOSINOPHILS ABSOLUTE: 0.1 K/UL (ref 0–0.6)
EOSINOPHILS RELATIVE PERCENT: 0.4 % (ref 0–5)
GFR NON-AFRICAN AMERICAN: >60
GLUCOSE BLD-MCNC: 100 MG/DL (ref 74–109)
HBA1C MFR BLD: 8.8 % (ref 4–6)
HCT VFR BLD CALC: 43.6 % (ref 37–47)
HDLC SERPL-MCNC: 89 MG/DL (ref 65–121)
HEMOGLOBIN: 13.3 G/DL (ref 12–16)
INR BLD: 1.18 (ref 0.88–1.18)
LDL CHOLESTEROL CALCULATED: 43 MG/DL
LYMPHOCYTES ABSOLUTE: 1.8 K/UL (ref 1.1–4.5)
LYMPHOCYTES RELATIVE PERCENT: 14.6 % (ref 20–40)
MCH RBC QN AUTO: 30.9 PG (ref 27–31)
MCHC RBC AUTO-ENTMCNC: 30.5 G/DL (ref 33–37)
MCV RBC AUTO: 101.2 FL (ref 81–99)
MONOCYTES ABSOLUTE: 0.9 K/UL (ref 0–0.9)
MONOCYTES RELATIVE PERCENT: 7 % (ref 0–10)
NEUTROPHILS ABSOLUTE: 9.3 K/UL (ref 1.5–7.5)
NEUTROPHILS RELATIVE PERCENT: 76.4 % (ref 50–65)
PDW BLD-RTO: 14.8 % (ref 11.5–14.5)
PLATELET # BLD: 220 K/UL (ref 130–400)
PMV BLD AUTO: 11.7 FL (ref 9.4–12.3)
POTASSIUM SERPL-SCNC: 4 MMOL/L (ref 3.5–5)
PROTHROMBIN TIME: 14.4 SEC (ref 12–14.6)
RBC # BLD: 4.31 M/UL (ref 4.2–5.4)
SODIUM BLD-SCNC: 142 MMOL/L (ref 136–145)
TOTAL PROTEIN: 6.6 G/DL (ref 6.6–8.7)
TRIGL SERPL-MCNC: 96 MG/DL (ref 0–149)
WBC # BLD: 12.2 K/UL (ref 4.8–10.8)

## 2019-07-24 PROCEDURE — 80053 COMPREHEN METABOLIC PANEL: CPT

## 2019-07-24 PROCEDURE — 85025 COMPLETE CBC W/AUTO DIFF WBC: CPT

## 2019-07-24 PROCEDURE — 85610 PROTHROMBIN TIME: CPT

## 2019-07-24 PROCEDURE — 80061 LIPID PANEL: CPT

## 2019-07-24 PROCEDURE — 85730 THROMBOPLASTIN TIME PARTIAL: CPT

## 2019-07-24 PROCEDURE — 93005 ELECTROCARDIOGRAM TRACING: CPT

## 2019-07-24 PROCEDURE — PREOPEXAM PRE-OP EXAM: Performed by: OBSTETRICS & GYNECOLOGY

## 2019-07-24 PROCEDURE — 83036 HEMOGLOBIN GLYCOSYLATED A1C: CPT

## 2019-07-24 RX ORDER — POTASSIUM CHLORIDE 1.5 G/1.77G
20 POWDER, FOR SOLUTION ORAL DAILY
COMMUNITY
End: 2019-11-21

## 2019-07-24 RX ORDER — FUROSEMIDE 20 MG/1
20 TABLET ORAL 2 TIMES DAILY
COMMUNITY

## 2019-07-24 NOTE — PATIENT INSTRUCTIONS
do.     · Be safe with medicines. Read and follow all instructions on the label. ? If the doctor gave you a prescription medicine for pain, take it as prescribed. ? If you are not taking a prescription pain medicine, ask your doctor if you can take an over-the-counter medicine.     · If your doctor prescribed antibiotics, take them as directed. Do not stop taking them just because you feel better. You need to take the full course of antibiotics. Other instructions    · You may have some light vaginal bleeding. Wear sanitary pads if needed. Do not use tampons until your doctor says it is okay.     · You may want to use a heating pad on your belly to help with pain. Use a low heat setting.     · Talk about birth control with your doctor. Do not try to become pregnant until your doctor says it is okay. Follow-up care is a key part of your treatment and safety. Be sure to make and go to all appointments, and call your doctor if you are having problems. It's also a good idea to know your test results and keep a list of the medicines you take. When should you call for help? Call 911 anytime you think you may need emergency care. For example, call if:    · You passed out (lost consciousness).     · You have chest pain, are short of breath, or cough up blood.    Call your doctor now or seek immediate medical care if:    · You have pain that does not get better after you take pain medicine.     · You cannot pass stools or gas.     · You have vaginal discharge that has increased in amount or smells bad.     · You are sick to your stomach or cannot drink fluids.     · You have signs of infection, such as:  ? Increased pain, swelling, warmth, or redness.   ? A fever.     · You have bright red vaginal bleeding that soaks one or more pads in an hour, or you have large clots.     · You have signs of a blood clot in your leg (called a deep vein thrombosis), such as:  ? Pain in your calf, back of the knee, thigh, or groin.  ? Redness and swelling in your leg.    Watch closely for changes in your health, and be sure to contact your doctor if you have any problems. Where can you learn more? Go to https://[email or web address] [facility].[email or web address]. org and sign in to your [employer] account. Enter I360 in the [facility] box to learn more about \"Hysteroscopy: What to Expect at Home. \"     If you do not have an account, please click on the \"Sign Up Now\" link. Current as of: May 14, 2018  Content Version: 12.0  © 5581-8339 Healthwise, Incorporated. Care instructions adapted under license by [facility] ([facility]). If you have questions about a medical condition or this instruction, always ask your healthcare professional. [address] any warranty or liability for your use of this information.

## 2019-07-24 NOTE — PROGRESS NOTES
Curtis Garcia is a 59 y.o. who presents today for her pre op exam for Hysteroscopy, Myosure, D&C on 19 for PMB. Review of Systems   Constitutional: Negative. HENT: Negative. Eyes: Negative. Respiratory: Negative. Cardiovascular: Negative. Gastrointestinal: Negative. Endocrine: Negative. Genitourinary: Positive for vaginal bleeding. Musculoskeletal: Negative. Skin: Negative. Allergic/Immunologic: Negative. Neurological: Negative. Hematological: Negative. Psychiatric/Behavioral: Negative.         Past Medical History:   Diagnosis Date    Atrial flutter by electrocardiogram ([facility] [address]     Constipation     Coronary artery disease     Diabetes mellitus (HCC)     Essential hypertension     Hyperlipidemia     IBS (irritable bowel syndrome)     Iron deficiency anemia     Menopause     Paroxysmal SVT (supraventricular tachycardia) (HCC)     RA (rheumatoid arthritis) (HCC)     Rheumatoid arthritis (HCC)        Past Surgical History:   Procedure Laterality Date    CARDIAC SURGERY      CHOLECYSTECTOMY      CORONARY ARTERY BYPASS GRAFT      TOTAL KNEE ARTHROPLASTY         Family History   Problem Relation Age of Onset    Heart Attack Father     Prostate Cancer Father     Stroke Father     Heart Attack Brother     Stroke Brother        Social History     Socioeconomic History    Marital status:      Spouse name: Not on file    Number of children: Not on file    Years of education: Not on file    Highest education level: Not on file   Occupational History    Not on file   Social Needs    Financial resource strain: Not on file    Food insecurity:     Worry: Not on file     Inability: Not on file    Transportation needs:     Medical: Not on file     Non-medical: Not on file   Tobacco Use    Smoking status: Former Smoker     Packs/day: 0.50     Years: 20.00     Pack years: 10.00     Last attempt to quit: 2000     Years since quittin.8    reactive to light. Conjunctivae and EOM are normal.   Neck: Normal range of motion. Neck supple. Cardiovascular: Normal rate and regular rhythm. Pulmonary/Chest: Effort normal and breath sounds normal. No respiratory distress. Abdominal: Soft. She exhibits no distension. There is no tenderness. Musculoskeletal: Normal range of motion. She exhibits no edema or tenderness. Neurological: She is alert and oriented to person, place, and time. Skin: Skin is warm and dry. Psychiatric: She has a normal mood and affect. Her behavior is normal. Judgment and thought content normal.             Assessment   Diagnosis Orders   1. Pre-op exam     2. PMB (postmenopausal bleeding)         Plan     1. Consent signed for Hysteroscopy, Myosure, dilation and curettage. Procedure and risks explained. Risks include bleeding, infection, perforation of the uterus which would result in the inability to complete the procedure. Pre op and post op instructions given. All questions answered. 2. RTC 2 weeks post op. I, Dr. Tong Ware, personally performed the services described in this documentation as scribed by Lindsey Graham in my presence, and it is both accurate and complete.

## 2019-07-25 ENCOUNTER — TELEPHONE (OUTPATIENT)
Dept: OBGYN | Age: 64
End: 2019-07-25

## 2019-07-26 ENCOUNTER — OFFICE VISIT (OUTPATIENT)
Dept: FAMILY MEDICINE CLINIC | Age: 64
End: 2019-07-26
Payer: MEDICARE

## 2019-07-26 ENCOUNTER — TELEPHONE (OUTPATIENT)
Dept: CARDIOLOGY | Facility: CLINIC | Age: 64
End: 2019-07-26

## 2019-07-26 VITALS
BODY MASS INDEX: 36.82 KG/M2 | DIASTOLIC BLOOD PRESSURE: 72 MMHG | TEMPERATURE: 97.4 F | SYSTOLIC BLOOD PRESSURE: 112 MMHG | HEART RATE: 62 BPM | OXYGEN SATURATION: 97 % | WEIGHT: 214.5 LBS

## 2019-07-26 DIAGNOSIS — I48.0 PAROXYSMAL ATRIAL FIBRILLATION (HCC): ICD-10-CM

## 2019-07-26 DIAGNOSIS — I10 ESSENTIAL HYPERTENSION: ICD-10-CM

## 2019-07-26 DIAGNOSIS — E11.9 TYPE 2 DIABETES MELLITUS WITHOUT COMPLICATION, WITHOUT LONG-TERM CURRENT USE OF INSULIN (HCC): Primary | ICD-10-CM

## 2019-07-26 DIAGNOSIS — R07.9 CHEST PAIN, UNSPECIFIED TYPE: ICD-10-CM

## 2019-07-26 DIAGNOSIS — E78.01 FAMILIAL HYPERCHOLESTEROLEMIA: ICD-10-CM

## 2019-07-26 PROBLEM — J01.10 ACUTE NON-RECURRENT FRONTAL SINUSITIS: Status: RESOLVED | Noted: 2018-11-05 | Resolved: 2019-07-26

## 2019-07-26 PROCEDURE — 99214 OFFICE O/P EST MOD 30 MIN: CPT | Performed by: CLINICAL NURSE SPECIALIST

## 2019-07-26 PROCEDURE — 3017F COLORECTAL CA SCREEN DOC REV: CPT | Performed by: CLINICAL NURSE SPECIALIST

## 2019-07-26 PROCEDURE — 1036F TOBACCO NON-USER: CPT | Performed by: CLINICAL NURSE SPECIALIST

## 2019-07-26 PROCEDURE — 2022F DILAT RTA XM EVC RTNOPTHY: CPT | Performed by: CLINICAL NURSE SPECIALIST

## 2019-07-26 PROCEDURE — G8427 DOCREV CUR MEDS BY ELIG CLIN: HCPCS | Performed by: CLINICAL NURSE SPECIALIST

## 2019-07-26 PROCEDURE — 3045F PR MOST RECENT HEMOGLOBIN A1C LEVEL 7.0-9.0%: CPT | Performed by: CLINICAL NURSE SPECIALIST

## 2019-07-26 PROCEDURE — G8417 CALC BMI ABV UP PARAM F/U: HCPCS | Performed by: CLINICAL NURSE SPECIALIST

## 2019-07-26 RX ORDER — ISOSORBIDE MONONITRATE 30 MG/1
30 TABLET, EXTENDED RELEASE ORAL DAILY
Qty: 30 TABLET | Refills: 5 | Status: SHIPPED | OUTPATIENT
Start: 2019-07-26 | End: 2020-01-23

## 2019-07-26 RX ORDER — GLYBURIDE 5 MG/1
5 TABLET ORAL 2 TIMES DAILY WITH MEALS
Qty: 60 TABLET | Refills: 5 | Status: SHIPPED | OUTPATIENT
Start: 2019-07-26 | End: 2020-07-13

## 2019-07-26 ASSESSMENT — ENCOUNTER SYMPTOMS
COLOR CHANGE: 0
WHEEZING: 0
SINUS PRESSURE: 0
SHORTNESS OF BREATH: 0
EYE REDNESS: 0
FACIAL SWELLING: 0
DIARRHEA: 0
EYE DISCHARGE: 0
COUGH: 0
ABDOMINAL PAIN: 0
TROUBLE SWALLOWING: 0
EYE PAIN: 0
BACK PAIN: 0
CONSTIPATION: 0
CHEST TIGHTNESS: 0
SORE THROAT: 0

## 2019-07-26 NOTE — TELEPHONE ENCOUNTER
[facility]   IS REQUESTING CLEARANCE FORHYSTEROSCOPY, MYOSURE AND D & C  WITH DR MCDONNELL.     THIS IS SCHEDULED FOR 08/02/2019.     PT HAS AFIB, CAD & WAS BYPASS X 3 2000.     ON ELIQUIS AND ASA - LOV WITH YOU WAS 05/28/2019     PLEASE ADVISE

## 2019-07-26 NOTE — PROGRESS NOTES
SUBJECTIVE:  Reji Reyes is a 59 y.o. who presents today for 3 Month Follow-Up      HPI    Ms Fagan presents today for 3 month follow up. She has recovered from recent ear infection/effusion. Is scheduled for D&C next Friday for vaginal bleeding, Dr Karlie Schulte. Type 2 DM without complications, blood sugar running high. No hypos. a1c 8.8  Is on daily steroids, has had more steroids with illness lately. No foot complaints. Due for eye exam, needs to schedule. Taking metformin 1000mg in AM, 500mg in PM  Glyburide 5mg once daily    Essential hypertension, seems well controlled on current regimen without side effects to regimen. No syncope or dizziness. Renal function and elytes ok. Familial hypercholesterolemia, on statin therapy. No adverse effects noted. LFT ok. PAF, NSR on exam.  Has not had afib since October. Followed by Dr August Back. Is on amiodarone, CCB, BB and Eliquis. She will need to hold aspirin and Eliquis for procedure next week. Chest pain, doing well on imdur. No chest pain lately.      Past Medical History:   Diagnosis Date    At[facility] flutter [facility] ([facility] [address]     Constipation     Coronary artery disease     Diabetes mellitus (HCC)     Essential hypertension     Hyperlipidemia     IBS (irritable bowel syndrome)     Iron deficiency anemia     Menopause     Paroxysmal SVT (supraventricular tachycardia) (HCC)     RA (rheumatoid arthritis) (HCC)     Rheumatoid arthritis (HCC)      Past Surgical History:   Procedure Laterality Date    CARDIAC SURGERY      CHOLECYSTECTOMY      CORONARY ARTERY BYPASS GRAFT  2000    TOTAL KNEE ARTHROPLASTY       Family History   Problem Relation Age of Onset    Heart Attack Father     Prostate Cancer Father     Stroke Father     Heart Attack Brother     Stroke Brother      Social History     Tobacco Use    Smoking status: Former Smoker     Packs/day: 0.50     Years: 20.00     Pack years: 10.00     Last attempt to quit: 9/24/2000 Years since quittin.8    Smokeless tobacco: Never Used   Substance Use Topics    Alcohol use: No     Current Outpatient Medications   Medication Sig Dispense Refill    glyBURIDE (DIABETA) 5 MG tablet Take 1 tablet by mouth 2 times daily (with meals) 60 tablet 5    metFORMIN (GLUCOPHAGE) 500 MG tablet Take 2 tablets by mouth 2 times daily (with meals) 120 tablet 5    isosorbide mononitrate (IMDUR) 30 MG extended release tablet Take 1 tablet by mouth daily 30 tablet 5    Tofacitinib Citrate (XELJANZ XR) 11 MG TB24 Take 11 mg by mouth daily      furosemide (LASIX) 20 MG tablet Take 20 mg by mouth daily      potassium chloride (KLOR-CON) 20 MEQ packet Take 20 mEq by mouth daily      FREESTYLE LANCETS MISC TEST SUGAR DAILY AS DIRECTED UP TO 4 TIMES A DAY 4 each 0    pantoprazole (PROTONIX) 40 MG tablet TAKE 1 TABLET BY MOUTH 2 TIMES A DAY (Patient taking differently: Take 40 mg by mouth 2 times daily TAKE 1 TABLET BY MOUTH 2 TIMES A DAY) 60 tablet 0    metoprolol tartrate (LOPRESSOR) 50 MG tablet TAKE 1 TABLET BY MOUTH 2 TIMES A DAY (Patient taking differently: Take 50 mg by mouth 2 times daily ) 60 tablet 3    simvastatin (ZOCOR) 40 MG tablet TAKE 1 TABLET BY MOUTH EVERY EVERNING (Patient taking differently: Take 40 mg by mouth nightly ) 90 tablet 0    FREESTYLE LITE strip USE 1 STRIP TWICE DAILY TO CHECK BLOOD SUGAR 100 strip 1    losartan (COZAAR) 25 MG tablet TAKE 1 TABLET BY MOUTH DAILY 30 tablet 5    apixaban (ELIQUIS) 5 MG TABS tablet Take 5 mg by mouth 2 times daily       verapamil (CALAN SR) 240 MG extended release tablet Take 240 mg by mouth daily       amiodarone (PACERONE) 100 MG tablet Take 100 mg by mouth daily       nitroGLYCERIN (NITROSTAT) 0.4 MG SL tablet Place 1 tablet under the tongue every 5 minutes as needed for Chest pain 25 tablet 2    FREESTYLE LANCETS MISC TEST SUGAR DAILY AS DIRECTED 100 each 2    predniSONE (DELTASONE) 5 MG tablet Take 5 mg by mouth Take 5 mg in Component Value Date     07/24/2019    K 4.0 07/24/2019     07/24/2019    CO2 26 07/24/2019    BUN 21 07/24/2019    CREATININE <0.5 07/24/2019    GLUCOSE 100 07/24/2019    CALCIUM 9.5 07/24/2019    PROT 6.6 07/24/2019    LABALBU 3.9 07/24/2019    BILITOT 0.9 07/24/2019    ALKPHOS 57 07/24/2019    AST 16 07/24/2019    ALT 22 07/24/2019    LABGLOM >60 07/24/2019         ASSESSMENT/PLAN:  1. Type 2 diabetes mellitus without complication, without long-term current use of insulin (HCC)  Uncontrolled  Max dosing of metformin and glyburide  Encouraged compliance with ADA diet  May need to add another oral next visit  Recommend diabetic eye exam  - glyBURIDE (DIABETA) 5 MG tablet; Take 1 tablet by mouth 2 times daily (with meals)  Dispense: 60 tablet; Refill: 5  - metFORMIN (GLUCOPHAGE) 500 MG tablet; Take 2 tablets by mouth 2 times daily (with meals)  Dispense: 120 tablet; Refill: 5  - CBC; Future  - Comprehensive Metabolic Panel; Future  - Hemoglobin A1C; Future  - Microalbumin / Creatinine Urine Ratio; Future    2. Chest pain, unspecified type  controlled  - isosorbide mononitrate (IMDUR) 30 MG extended release tablet; Take 1 tablet by mouth daily  Dispense: 30 tablet; Refill: 5    3. Essential hypertension  controlled    4. Familial hypercholesterolemia  controlled    5. Paroxysmal atrial fibrillation (HCC)  Stable  To call Dr Mary Paul today about coming off Eliquis  Educated aspirin is for 7 days and eliquis is generally 2-3 days          Return in about 3 months (around 10/26/2019) for Labs one week prior. Ellen Galeano

## 2019-07-29 RX ORDER — FUROSEMIDE 20 MG/1
20 TABLET ORAL DAILY
Qty: 30 TABLET | Refills: 0 | Status: SHIPPED | OUTPATIENT
Start: 2019-07-29 | End: 2019-08-26 | Stop reason: SDUPTHER

## 2019-07-29 RX ORDER — SIMVASTATIN 40 MG
40 TABLET ORAL NIGHTLY
Qty: 90 TABLET | Refills: 3 | Status: SHIPPED | OUTPATIENT
Start: 2019-07-29 | End: 2020-09-08

## 2019-07-30 ENCOUNTER — TELEPHONE (OUTPATIENT)
Dept: OBGYN | Age: 64
End: 2019-07-30

## 2019-08-04 ASSESSMENT — ENCOUNTER SYMPTOMS
RESPIRATORY NEGATIVE: 1
EYES NEGATIVE: 1
ALLERGIC/IMMUNOLOGIC NEGATIVE: 1
GASTROINTESTINAL NEGATIVE: 1

## 2019-08-15 ENCOUNTER — TELEPHONE (OUTPATIENT)
Dept: OBGYN | Age: 64
End: 2019-08-15

## 2019-08-16 ENCOUNTER — HOSPITAL ENCOUNTER (OUTPATIENT)
Age: 64
Setting detail: OUTPATIENT SURGERY
Discharge: HOME | End: 2019-08-16
Attending: OBSTETRICS & GYNECOLOGY | Admitting: OBSTETRICS & GYNECOLOGY
Payer: MEDICARE

## 2019-08-16 ENCOUNTER — ANESTHESIA (OUTPATIENT)
Dept: OPERATING ROOM | Age: 64
End: 2019-08-16
Payer: MEDICARE

## 2019-08-16 ENCOUNTER — ANESTHESIA EVENT (OUTPATIENT)
Dept: OPERATING ROOM | Age: 64
End: 2019-08-16
Payer: MEDICARE

## 2019-08-16 VITALS
SYSTOLIC BLOOD PRESSURE: 87 MMHG | RESPIRATION RATE: 6 BRPM | TEMPERATURE: 97 F | DIASTOLIC BLOOD PRESSURE: 49 MMHG | OXYGEN SATURATION: 97 %

## 2019-08-16 VITALS
TEMPERATURE: 97.8 F | SYSTOLIC BLOOD PRESSURE: 103 MMHG | HEIGHT: 64 IN | DIASTOLIC BLOOD PRESSURE: 44 MMHG | OXYGEN SATURATION: 96 % | WEIGHT: 210 LBS | RESPIRATION RATE: 16 BRPM | HEART RATE: 62 BPM | BODY MASS INDEX: 35.85 KG/M2

## 2019-08-16 LAB
GLUCOSE BLD-MCNC: 96 MG/DL (ref 70–99)
PERFORMED ON: NORMAL

## 2019-08-16 PROCEDURE — 7100000001 HC PACU RECOVERY - ADDTL 15 MIN: Performed by: OBSTETRICS & GYNECOLOGY

## 2019-08-16 PROCEDURE — 7100000000 HC PACU RECOVERY - FIRST 15 MIN: Performed by: OBSTETRICS & GYNECOLOGY

## 2019-08-16 PROCEDURE — 82948 REAGENT STRIP/BLOOD GLUCOSE: CPT

## 2019-08-16 PROCEDURE — 2709999900 HC NON-CHARGEABLE SUPPLY: Performed by: OBSTETRICS & GYNECOLOGY

## 2019-08-16 PROCEDURE — 3700000000 HC ANESTHESIA ATTENDED CARE: Performed by: OBSTETRICS & GYNECOLOGY

## 2019-08-16 PROCEDURE — 3600000014 HC SURGERY LEVEL 4 ADDTL 15MIN: Performed by: OBSTETRICS & GYNECOLOGY

## 2019-08-16 PROCEDURE — 3700000001 HC ADD 15 MINUTES (ANESTHESIA): Performed by: OBSTETRICS & GYNECOLOGY

## 2019-08-16 PROCEDURE — 88305 TISSUE EXAM BY PATHOLOGIST: CPT

## 2019-08-16 PROCEDURE — 2720000010 HC SURG SUPPLY STERILE: Performed by: OBSTETRICS & GYNECOLOGY

## 2019-08-16 PROCEDURE — 2580000003 HC RX 258: Performed by: OBSTETRICS & GYNECOLOGY

## 2019-08-16 PROCEDURE — 2500000003 HC RX 250 WO HCPCS: Performed by: NURSE ANESTHETIST, CERTIFIED REGISTERED

## 2019-08-16 PROCEDURE — 6360000002 HC RX W HCPCS: Performed by: NURSE ANESTHETIST, CERTIFIED REGISTERED

## 2019-08-16 PROCEDURE — 7100000010 HC PHASE II RECOVERY - FIRST 15 MIN: Performed by: OBSTETRICS & GYNECOLOGY

## 2019-08-16 PROCEDURE — 3600000004 HC SURGERY LEVEL 4 BASE: Performed by: OBSTETRICS & GYNECOLOGY

## 2019-08-16 PROCEDURE — 58558 HYSTEROSCOPY BIOPSY: CPT | Performed by: OBSTETRICS & GYNECOLOGY

## 2019-08-16 RX ORDER — SODIUM CHLORIDE, SODIUM LACTATE, POTASSIUM CHLORIDE, CALCIUM CHLORIDE 600; 310; 30; 20 MG/100ML; MG/100ML; MG/100ML; MG/100ML
INJECTION, SOLUTION INTRAVENOUS CONTINUOUS
Status: DISCONTINUED | OUTPATIENT
Start: 2019-08-16 | End: 2019-08-16 | Stop reason: HOSPADM

## 2019-08-16 RX ORDER — METOCLOPRAMIDE HYDROCHLORIDE 5 MG/ML
10 INJECTION INTRAMUSCULAR; INTRAVENOUS
Status: DISCONTINUED | OUTPATIENT
Start: 2019-08-16 | End: 2019-08-16 | Stop reason: HOSPADM

## 2019-08-16 RX ORDER — MORPHINE SULFATE 2 MG/ML
2 INJECTION, SOLUTION INTRAMUSCULAR; INTRAVENOUS EVERY 5 MIN PRN
Status: DISCONTINUED | OUTPATIENT
Start: 2019-08-16 | End: 2019-08-16 | Stop reason: HOSPADM

## 2019-08-16 RX ORDER — FENTANYL CITRATE 50 UG/ML
25 INJECTION, SOLUTION INTRAMUSCULAR; INTRAVENOUS
Status: DISCONTINUED | OUTPATIENT
Start: 2019-08-16 | End: 2019-08-16 | Stop reason: HOSPADM

## 2019-08-16 RX ORDER — DEXAMETHASONE SODIUM PHOSPHATE 10 MG/ML
INJECTION INTRAMUSCULAR; INTRAVENOUS PRN
Status: DISCONTINUED | OUTPATIENT
Start: 2019-08-16 | End: 2019-08-16 | Stop reason: SDUPTHER

## 2019-08-16 RX ORDER — MORPHINE SULFATE 2 MG/ML
4 INJECTION, SOLUTION INTRAMUSCULAR; INTRAVENOUS EVERY 5 MIN PRN
Status: DISCONTINUED | OUTPATIENT
Start: 2019-08-16 | End: 2019-08-16 | Stop reason: HOSPADM

## 2019-08-16 RX ORDER — LIDOCAINE HYDROCHLORIDE 10 MG/ML
1 INJECTION, SOLUTION EPIDURAL; INFILTRATION; INTRACAUDAL; PERINEURAL
Status: DISCONTINUED | OUTPATIENT
Start: 2019-08-16 | End: 2019-08-16 | Stop reason: HOSPADM

## 2019-08-16 RX ORDER — PROPOFOL 10 MG/ML
INJECTION, EMULSION INTRAVENOUS PRN
Status: DISCONTINUED | OUTPATIENT
Start: 2019-08-16 | End: 2019-08-16 | Stop reason: SDUPTHER

## 2019-08-16 RX ORDER — LABETALOL 20 MG/4 ML (5 MG/ML) INTRAVENOUS SYRINGE
5 EVERY 10 MIN PRN
Status: DISCONTINUED | OUTPATIENT
Start: 2019-08-16 | End: 2019-08-16 | Stop reason: HOSPADM

## 2019-08-16 RX ORDER — FENTANYL CITRATE 50 UG/ML
50 INJECTION, SOLUTION INTRAMUSCULAR; INTRAVENOUS
Status: DISCONTINUED | OUTPATIENT
Start: 2019-08-16 | End: 2019-08-16 | Stop reason: HOSPADM

## 2019-08-16 RX ORDER — IBUPROFEN 800 MG/1
800 TABLET ORAL EVERY 8 HOURS PRN
Qty: 90 TABLET | Refills: 0 | Status: SHIPPED | OUTPATIENT
Start: 2019-08-16 | End: 2020-02-25 | Stop reason: ALTCHOICE

## 2019-08-16 RX ORDER — SCOLOPAMINE TRANSDERMAL SYSTEM 1 MG/1
1 PATCH, EXTENDED RELEASE TRANSDERMAL
Status: DISCONTINUED | OUTPATIENT
Start: 2019-08-16 | End: 2019-08-16 | Stop reason: HOSPADM

## 2019-08-16 RX ORDER — SODIUM CHLORIDE 0.9 % (FLUSH) 0.9 %
10 SYRINGE (ML) INJECTION EVERY 12 HOURS SCHEDULED
Status: DISCONTINUED | OUTPATIENT
Start: 2019-08-16 | End: 2019-08-16 | Stop reason: HOSPADM

## 2019-08-16 RX ORDER — ENALAPRILAT 2.5 MG/2ML
1.25 INJECTION INTRAVENOUS
Status: DISCONTINUED | OUTPATIENT
Start: 2019-08-16 | End: 2019-08-16 | Stop reason: HOSPADM

## 2019-08-16 RX ORDER — MIDAZOLAM HYDROCHLORIDE 1 MG/ML
2 INJECTION INTRAMUSCULAR; INTRAVENOUS
Status: DISCONTINUED | OUTPATIENT
Start: 2019-08-16 | End: 2019-08-16 | Stop reason: HOSPADM

## 2019-08-16 RX ORDER — FENTANYL CITRATE 50 UG/ML
INJECTION, SOLUTION INTRAMUSCULAR; INTRAVENOUS PRN
Status: DISCONTINUED | OUTPATIENT
Start: 2019-08-16 | End: 2019-08-16 | Stop reason: SDUPTHER

## 2019-08-16 RX ORDER — ONDANSETRON 2 MG/ML
INJECTION INTRAMUSCULAR; INTRAVENOUS PRN
Status: DISCONTINUED | OUTPATIENT
Start: 2019-08-16 | End: 2019-08-16 | Stop reason: SDUPTHER

## 2019-08-16 RX ORDER — DIPHENHYDRAMINE HYDROCHLORIDE 50 MG/ML
12.5 INJECTION INTRAMUSCULAR; INTRAVENOUS
Status: DISCONTINUED | OUTPATIENT
Start: 2019-08-16 | End: 2019-08-16 | Stop reason: HOSPADM

## 2019-08-16 RX ORDER — MIDAZOLAM HYDROCHLORIDE 1 MG/ML
INJECTION INTRAMUSCULAR; INTRAVENOUS PRN
Status: DISCONTINUED | OUTPATIENT
Start: 2019-08-16 | End: 2019-08-16 | Stop reason: SDUPTHER

## 2019-08-16 RX ORDER — LIDOCAINE HYDROCHLORIDE 10 MG/ML
INJECTION, SOLUTION EPIDURAL; INFILTRATION; INTRACAUDAL; PERINEURAL PRN
Status: DISCONTINUED | OUTPATIENT
Start: 2019-08-16 | End: 2019-08-16 | Stop reason: SDUPTHER

## 2019-08-16 RX ORDER — SODIUM CHLORIDE 0.9 % (FLUSH) 0.9 %
10 SYRINGE (ML) INJECTION PRN
Status: DISCONTINUED | OUTPATIENT
Start: 2019-08-16 | End: 2019-08-16 | Stop reason: HOSPADM

## 2019-08-16 RX ORDER — PROMETHAZINE HYDROCHLORIDE 25 MG/ML
6.25 INJECTION, SOLUTION INTRAMUSCULAR; INTRAVENOUS
Status: DISCONTINUED | OUTPATIENT
Start: 2019-08-16 | End: 2019-08-16 | Stop reason: HOSPADM

## 2019-08-16 RX ORDER — KETOROLAC TROMETHAMINE 30 MG/ML
INJECTION, SOLUTION INTRAMUSCULAR; INTRAVENOUS PRN
Status: DISCONTINUED | OUTPATIENT
Start: 2019-08-16 | End: 2019-08-16 | Stop reason: SDUPTHER

## 2019-08-16 RX ORDER — MEPERIDINE HYDROCHLORIDE 50 MG/ML
12.5 INJECTION INTRAMUSCULAR; INTRAVENOUS; SUBCUTANEOUS EVERY 5 MIN PRN
Status: DISCONTINUED | OUTPATIENT
Start: 2019-08-16 | End: 2019-08-16 | Stop reason: HOSPADM

## 2019-08-16 RX ORDER — HYDRALAZINE HYDROCHLORIDE 20 MG/ML
5 INJECTION INTRAMUSCULAR; INTRAVENOUS EVERY 10 MIN PRN
Status: DISCONTINUED | OUTPATIENT
Start: 2019-08-16 | End: 2019-08-16 | Stop reason: HOSPADM

## 2019-08-16 RX ADMIN — FENTANYL CITRATE 50 MCG: 50 INJECTION INTRAMUSCULAR; INTRAVENOUS at 11:37

## 2019-08-16 RX ADMIN — SODIUM CHLORIDE, SODIUM LACTATE, POTASSIUM CHLORIDE, AND CALCIUM CHLORIDE: 600; 310; 30; 20 INJECTION, SOLUTION INTRAVENOUS at 11:26

## 2019-08-16 RX ADMIN — FENTANYL CITRATE 50 MCG: 50 INJECTION INTRAMUSCULAR; INTRAVENOUS at 11:33

## 2019-08-16 RX ADMIN — ONDANSETRON HYDROCHLORIDE 4 MG: 2 INJECTION, SOLUTION INTRAMUSCULAR; INTRAVENOUS at 11:30

## 2019-08-16 RX ADMIN — MIDAZOLAM 2 MG: 1 INJECTION INTRAMUSCULAR; INTRAVENOUS at 11:26

## 2019-08-16 RX ADMIN — DEXAMETHASONE SODIUM PHOSPHATE 10 MG: 10 INJECTION INTRAMUSCULAR; INTRAVENOUS at 11:30

## 2019-08-16 RX ADMIN — KETOROLAC TROMETHAMINE 30 MG: 30 INJECTION, SOLUTION INTRAMUSCULAR; INTRAVENOUS at 11:30

## 2019-08-16 RX ADMIN — LIDOCAINE HYDROCHLORIDE 5 ML: 10 INJECTION, SOLUTION EPIDURAL; INFILTRATION; INTRACAUDAL; PERINEURAL at 11:30

## 2019-08-16 RX ADMIN — PROPOFOL 150 MG: 10 INJECTION, EMULSION INTRAVENOUS at 11:30

## 2019-08-16 ASSESSMENT — PAIN SCALES - GENERAL
PAINLEVEL_OUTOF10: 0
PAINLEVEL_OUTOF10: 0

## 2019-08-16 ASSESSMENT — LIFESTYLE VARIABLES: SMOKING_STATUS: 0

## 2019-08-16 ASSESSMENT — PAIN SCALES - WONG BAKER: WONGBAKER_NUMERICALRESPONSE: 0

## 2019-08-19 ENCOUNTER — TELEPHONE (OUTPATIENT)
Dept: OBGYN | Age: 64
End: 2019-08-19

## 2019-08-26 RX ORDER — PANTOPRAZOLE SODIUM 40 MG/1
TABLET, DELAYED RELEASE ORAL
Qty: 180 TABLET | Refills: 0 | Status: SHIPPED | OUTPATIENT
Start: 2019-08-26 | End: 2020-02-24

## 2019-08-26 RX ORDER — POTASSIUM CHLORIDE 20 MEQ/1
TABLET, EXTENDED RELEASE ORAL
Qty: 30 TABLET | Refills: 0 | Status: SHIPPED | OUTPATIENT
Start: 2019-08-26 | End: 2019-09-26 | Stop reason: SDUPTHER

## 2019-08-27 RX ORDER — APIXABAN 5 MG/1
TABLET, FILM COATED ORAL
Qty: 60 TABLET | Refills: 0 | Status: SHIPPED | OUTPATIENT
Start: 2019-08-27 | End: 2019-09-27 | Stop reason: SDUPTHER

## 2019-08-28 RX ORDER — FUROSEMIDE 20 MG/1
20 TABLET ORAL DAILY
Qty: 30 TABLET | Refills: 0 | Status: SHIPPED | OUTPATIENT
Start: 2019-08-28 | End: 2020-02-24 | Stop reason: SDUPTHER

## 2019-08-30 ENCOUNTER — OFFICE VISIT (OUTPATIENT)
Dept: OBGYN | Age: 64
End: 2019-08-30
Payer: MEDICARE

## 2019-08-30 VITALS
DIASTOLIC BLOOD PRESSURE: 67 MMHG | BODY MASS INDEX: 35 KG/M2 | WEIGHT: 205 LBS | SYSTOLIC BLOOD PRESSURE: 117 MMHG | HEART RATE: 67 BPM | HEIGHT: 64 IN

## 2019-08-30 DIAGNOSIS — Z48.89 ENCOUNTER FOR POSTOPERATIVE CARE: ICD-10-CM

## 2019-08-30 DIAGNOSIS — N95.0 PMB (POSTMENOPAUSAL BLEEDING): Primary | ICD-10-CM

## 2019-08-30 PROCEDURE — 3017F COLORECTAL CA SCREEN DOC REV: CPT | Performed by: NURSE PRACTITIONER

## 2019-08-30 PROCEDURE — 1036F TOBACCO NON-USER: CPT | Performed by: NURSE PRACTITIONER

## 2019-08-30 PROCEDURE — G8427 DOCREV CUR MEDS BY ELIG CLIN: HCPCS | Performed by: NURSE PRACTITIONER

## 2019-08-30 PROCEDURE — 99213 OFFICE O/P EST LOW 20 MIN: CPT | Performed by: NURSE PRACTITIONER

## 2019-08-30 PROCEDURE — G8417 CALC BMI ABV UP PARAM F/U: HCPCS | Performed by: NURSE PRACTITIONER

## 2019-08-30 ASSESSMENT — ENCOUNTER SYMPTOMS
EYES NEGATIVE: 1
GASTROINTESTINAL NEGATIVE: 1
RESPIRATORY NEGATIVE: 1

## 2019-08-30 NOTE — PROGRESS NOTES
BY MOUTH 2 TIMES A  tablet 0    potassium chloride (KLOR-CON M) 20 MEQ extended release tablet TAKE 1 TABLET BY MOUTH DAILY. 30 tablet 0    ibuprofen (ADVIL;MOTRIN) 800 MG tablet Take 1 tablet by mouth every 8 hours as needed for Pain 90 tablet 0    simvastatin (ZOCOR) 40 MG tablet Take 1 tablet by mouth nightly 90 tablet 3    glyBURIDE (DIABETA) 5 MG tablet Take 1 tablet by mouth 2 times daily (with meals) 60 tablet 5    metFORMIN (GLUCOPHAGE) 500 MG tablet Take 2 tablets by mouth 2 times daily (with meals) 120 tablet 5    isosorbide mononitrate (IMDUR) 30 MG extended release tablet Take 1 tablet by mouth daily 30 tablet 5    Tofacitinib Citrate (XELJANZ XR) 11 MG TB24 Take 11 mg by mouth daily      furosemide (LASIX) 20 MG tablet Take 20 mg by mouth daily      potassium chloride (KLOR-CON) 20 MEQ packet Take 20 mEq by mouth daily      FREESTYLE LANCETS MISC TEST SUGAR DAILY AS DIRECTED UP TO 4 TIMES A DAY 4 each 0    clobetasol (TEMOVATE) 0.05 % ointment Apply topically 2 times daily for 6 weeks then as needed.  (Patient taking differently: Apply topically 2 times daily as needed Apply topically 2 times daily for 6 weeks then as needed.) 1 Tube 3    metoprolol tartrate (LOPRESSOR) 50 MG tablet TAKE 1 TABLET BY MOUTH 2 TIMES A DAY (Patient taking differently: Take 50 mg by mouth 2 times daily ) 60 tablet 3    FREESTYLE LITE strip USE 1 STRIP TWICE DAILY TO CHECK BLOOD SUGAR 100 strip 1    losartan (COZAAR) 25 MG tablet TAKE 1 TABLET BY MOUTH DAILY 30 tablet 5    apixaban (ELIQUIS) 5 MG TABS tablet Take 5 mg by mouth 2 times daily       verapamil (CALAN SR) 240 MG extended release tablet Take 240 mg by mouth daily       amiodarone (PACERONE) 100 MG tablet Take 100 mg by mouth daily       nitroGLYCERIN (NITROSTAT) 0.4 MG SL tablet Place 1 tablet under the tongue every 5 minutes as needed for Chest pain 25 tablet 2    FREESTYLE LANCETS MISC TEST SUGAR DAILY AS DIRECTED 100 each 2   

## 2019-09-26 RX ORDER — METOPROLOL TARTRATE 50 MG/1
TABLET, FILM COATED ORAL
Qty: 60 TABLET | Refills: 0 | Status: SHIPPED | OUTPATIENT
Start: 2019-09-26 | End: 2019-11-22 | Stop reason: SDUPTHER

## 2019-09-26 RX ORDER — POTASSIUM CHLORIDE 20 MEQ/1
TABLET, EXTENDED RELEASE ORAL
Qty: 30 TABLET | Refills: 0 | Status: SHIPPED | OUTPATIENT
Start: 2019-09-26 | End: 2020-01-24

## 2019-09-26 RX ORDER — APIXABAN 5 MG/1
TABLET, FILM COATED ORAL
Qty: 60 TABLET | Refills: 0 | Status: CANCELLED | OUTPATIENT
Start: 2019-09-26

## 2019-09-26 NOTE — TELEPHONE ENCOUNTER
Ana Moura Gracia called to request a refill on her medication. Last office visit : 7/26/2019   Next office visit : 11/21/2019     Requested Prescriptions     Signed Prescriptions Disp Refills    potassium chloride (KLOR-CON M) 20 MEQ extended release tablet 30 tablet 0     Sig: TAKE 1 TABLET BY MOUTH DAILY.      Authorizing Provider: Jocelin Howard     Ordering User: [facility]    metoprolol tartrate (LOPRESSOR) 50 MG tablet 60 tablet 0     Sig: TAKE 1 TABLET BY MOUTH 2 TIMES A DAY     Authorizing Provider: Jocelin Howard     Ordering User: Daphnie Holman

## 2019-10-30 RX ORDER — APIXABAN 5 MG/1
TABLET, FILM COATED ORAL
Qty: 60 TABLET | Refills: 0 | Status: CANCELLED | OUTPATIENT
Start: 2019-10-30

## 2019-11-18 DIAGNOSIS — E11.9 TYPE 2 DIABETES MELLITUS WITHOUT COMPLICATION, WITHOUT LONG-TERM CURRENT USE OF INSULIN (HCC): ICD-10-CM

## 2019-11-18 LAB
ALBUMIN SERPL-MCNC: 4 G/DL (ref 3.5–5.2)
ALP BLD-CCNC: 65 U/L (ref 35–104)
ALT SERPL-CCNC: 20 U/L (ref 5–33)
ANION GAP SERPL CALCULATED.3IONS-SCNC: 15 MMOL/L (ref 7–19)
AST SERPL-CCNC: 16 U/L (ref 5–32)
BILIRUB SERPL-MCNC: 1 MG/DL (ref 0.2–1.2)
BUN BLDV-MCNC: 12 MG/DL (ref 8–23)
CALCIUM SERPL-MCNC: 9 MG/DL (ref 8.8–10.2)
CHLORIDE BLD-SCNC: 102 MMOL/L (ref 98–111)
CO2: 27 MMOL/L (ref 22–29)
CREAT SERPL-MCNC: 0.5 MG/DL (ref 0.5–0.9)
CREATININE URINE: 171.1 MG/DL (ref 4.2–622)
GFR NON-AFRICAN AMERICAN: >60
GLUCOSE BLD-MCNC: 121 MG/DL (ref 74–109)
HBA1C MFR BLD: 7.8 % (ref 4–6)
HCT VFR BLD CALC: 42.3 % (ref 37–47)
HEMOGLOBIN: 12.9 G/DL (ref 12–16)
MCH RBC QN AUTO: 30.6 PG (ref 27–31)
MCHC RBC AUTO-ENTMCNC: 30.5 G/DL (ref 33–37)
MCV RBC AUTO: 100.5 FL (ref 81–99)
MICROALBUMIN UR-MCNC: <1.2 MG/DL (ref 0–19)
MICROALBUMIN/CREAT UR-RTO: NORMAL MG/G
PDW BLD-RTO: 16 % (ref 11.5–14.5)
PLATELET # BLD: 185 K/UL (ref 130–400)
PMV BLD AUTO: 10.8 FL (ref 9.4–12.3)
POTASSIUM SERPL-SCNC: 3.6 MMOL/L (ref 3.5–5)
RBC # BLD: 4.21 M/UL (ref 4.2–5.4)
SODIUM BLD-SCNC: 144 MMOL/L (ref 136–145)
TOTAL PROTEIN: 6.8 G/DL (ref 6.6–8.7)
WBC # BLD: 8.5 K/UL (ref 4.8–10.8)

## 2019-11-21 ENCOUNTER — OFFICE VISIT (OUTPATIENT)
Dept: FAMILY MEDICINE CLINIC | Age: 64
End: 2019-11-21
Payer: MEDICARE

## 2019-11-21 VITALS
BODY MASS INDEX: 36.05 KG/M2 | OXYGEN SATURATION: 98 % | DIASTOLIC BLOOD PRESSURE: 64 MMHG | HEART RATE: 60 BPM | TEMPERATURE: 97.8 F | SYSTOLIC BLOOD PRESSURE: 130 MMHG | WEIGHT: 210 LBS

## 2019-11-21 DIAGNOSIS — I10 ESSENTIAL HYPERTENSION: Primary | ICD-10-CM

## 2019-11-21 DIAGNOSIS — R15.9 INCONTINENCE OF FECES, UNSPECIFIED FECAL INCONTINENCE TYPE: ICD-10-CM

## 2019-11-21 DIAGNOSIS — D50.9 IRON DEFICIENCY ANEMIA, UNSPECIFIED IRON DEFICIENCY ANEMIA TYPE: ICD-10-CM

## 2019-11-21 DIAGNOSIS — E78.01 FAMILIAL HYPERCHOLESTEROLEMIA: ICD-10-CM

## 2019-11-21 DIAGNOSIS — H65.92 LEFT NON-SUPPURATIVE OTITIS MEDIA: ICD-10-CM

## 2019-11-21 DIAGNOSIS — E11.9 TYPE 2 DIABETES MELLITUS WITHOUT COMPLICATION, WITHOUT LONG-TERM CURRENT USE OF INSULIN (HCC): ICD-10-CM

## 2019-11-21 PROBLEM — H66.92 LEFT OTITIS MEDIA: Status: ACTIVE | Noted: 2019-11-21

## 2019-11-21 PROCEDURE — 1036F TOBACCO NON-USER: CPT | Performed by: FAMILY MEDICINE

## 2019-11-21 PROCEDURE — 3017F COLORECTAL CA SCREEN DOC REV: CPT | Performed by: FAMILY MEDICINE

## 2019-11-21 PROCEDURE — G8417 CALC BMI ABV UP PARAM F/U: HCPCS | Performed by: FAMILY MEDICINE

## 2019-11-21 PROCEDURE — 3045F PR MOST RECENT HEMOGLOBIN A1C LEVEL 7.0-9.0%: CPT | Performed by: FAMILY MEDICINE

## 2019-11-21 PROCEDURE — 99214 OFFICE O/P EST MOD 30 MIN: CPT | Performed by: FAMILY MEDICINE

## 2019-11-21 PROCEDURE — 2022F DILAT RTA XM EVC RTNOPTHY: CPT | Performed by: FAMILY MEDICINE

## 2019-11-21 PROCEDURE — G8484 FLU IMMUNIZE NO ADMIN: HCPCS | Performed by: FAMILY MEDICINE

## 2019-11-21 PROCEDURE — G8427 DOCREV CUR MEDS BY ELIG CLIN: HCPCS | Performed by: FAMILY MEDICINE

## 2019-11-21 RX ORDER — LOSARTAN POTASSIUM 25 MG/1
25 TABLET ORAL DAILY
Qty: 30 TABLET | Refills: 11 | Status: SHIPPED | OUTPATIENT
Start: 2019-11-21 | End: 2020-02-24

## 2019-11-21 RX ORDER — DOXYCYCLINE 100 MG/1
100 CAPSULE ORAL 2 TIMES DAILY
Qty: 20 CAPSULE | Refills: 0 | Status: SHIPPED | OUTPATIENT
Start: 2019-11-21 | End: 2019-12-01

## 2019-11-22 RX ORDER — METOPROLOL TARTRATE 50 MG/1
TABLET, FILM COATED ORAL
Qty: 60 TABLET | Refills: 0 | Status: SHIPPED | OUTPATIENT
Start: 2019-11-22 | End: 2020-01-23

## 2019-12-02 ENCOUNTER — OFFICE VISIT (OUTPATIENT)
Dept: CARDIOLOGY | Facility: CLINIC | Age: 64
End: 2019-12-02

## 2019-12-02 VITALS
SYSTOLIC BLOOD PRESSURE: 130 MMHG | BODY MASS INDEX: 35.34 KG/M2 | OXYGEN SATURATION: 95 % | HEIGHT: 64 IN | DIASTOLIC BLOOD PRESSURE: 70 MMHG | HEART RATE: 63 BPM | WEIGHT: 207 LBS

## 2019-12-02 DIAGNOSIS — E78.2 MIXED HYPERLIPIDEMIA: ICD-10-CM

## 2019-12-02 DIAGNOSIS — Z95.1 S/P CABG X 3: ICD-10-CM

## 2019-12-02 DIAGNOSIS — E11.9 TYPE 2 DIABETES MELLITUS WITHOUT COMPLICATION, WITHOUT LONG-TERM CURRENT USE OF INSULIN (HCC): Primary | ICD-10-CM

## 2019-12-02 DIAGNOSIS — K31.7 GASTRIC POLYP: ICD-10-CM

## 2019-12-02 DIAGNOSIS — I48.3 TYPICAL ATRIAL FLUTTER (HCC): ICD-10-CM

## 2019-12-02 DIAGNOSIS — Z87.19 HISTORY OF ESOPHAGITIS: ICD-10-CM

## 2019-12-02 DIAGNOSIS — I48.0 PAF (PAROXYSMAL ATRIAL FIBRILLATION) (HCC): ICD-10-CM

## 2019-12-02 DIAGNOSIS — Z95.5 S/P CORONARY ARTERY STENT PLACEMENT: ICD-10-CM

## 2019-12-02 DIAGNOSIS — D12.2 ADENOMATOUS POLYP OF ASCENDING COLON: ICD-10-CM

## 2019-12-02 PROBLEM — I48.91 ATRIAL FIBRILLATION WITH RAPID VENTRICULAR RESPONSE (HCC): Status: RESOLVED | Noted: 2018-10-11 | Resolved: 2019-12-02

## 2019-12-02 PROCEDURE — 99214 OFFICE O/P EST MOD 30 MIN: CPT | Performed by: INTERNAL MEDICINE

## 2019-12-02 PROCEDURE — 93000 ELECTROCARDIOGRAM COMPLETE: CPT | Performed by: INTERNAL MEDICINE

## 2019-12-02 ASSESSMENT — ENCOUNTER SYMPTOMS
COUGH: 0
DIARRHEA: 0
CONSTIPATION: 0
ABDOMINAL PAIN: 0
SHORTNESS OF BREATH: 0
SINUS PRESSURE: 1
CHEST TIGHTNESS: 0
ANAL BLEEDING: 0
NAUSEA: 0
SINUS PAIN: 1

## 2019-12-02 NOTE — PROGRESS NOTES
Teresa Serna  [id number]  1955  64 y.o.  female    Referring Provider: Teresa Contreras MD    Reason for Follow-up Visit: Here for routine follow up     Paroxysmal atrial fibrillation now sinus rhythm  Saw Dr De Anda AF ablation tried but due to no good venous access not possible  Now  Maintaining sinus rhythm        Subjective        Feels same overall as during last visit  No new events or complaints since last visit   Overall the patient feels no major change from baseline symptoms   Similar symptoms as during last visit      Mild chronic exertional shortness of breath on exertion relieved with rest  No significant cough or wheezing    Occasional palpitations, once every several days lasting for less than 1 minute  Usually resolves after she takes AM medications  No associated symptoms of dizziness, weakness, chest pain,  shortness of breath      No associated chest pain  Mild pedal edema  Dependent edema worse on sitting up towards evening     No fever or chills  No significant expectoration    No hemoptysis  No presyncope or syncope     Easy fatiguability     Joint pain in small, medium and large joints  Chronic low back pain      On anticoagulation with Eliquis     BP well controlled at home.            History of present illness:  Teresa Serna is a 64 y.o. yo female with history of Paroxysmal atrial fibrillation who presents today for   Chief Complaint   Patient presents with   • Atrial Fibrillation     6 month follow up    • Edema     Last 2 or 3 weeks patient stated has been haveing some leg swelling problems    .    History  Past Medical History:   Diagnosis Date   • Abnormal stress test    • Atrial flutter (CMS/HCC)    • CAD (coronary artery disease)    • CAD in native artery     CABG x 3   • Diabetes mellitus (CMS/HCC)    • Essential hypertension     Tricuspid valve insufficiency, unspecified etiology    • Hyperlipemia, mixed    • Hyperlipidemia    • Hypertension    • MI, old    • Mitral  valve prolapse    • Near syncope    • Obesity, morbid (CMS/[facility])    • Palpitations    • Paroxysmal SVT (supraventricular tachycardia) (CMS/HCC)    • Paroxysmal SVT (supraventricular tachycardia) (CMS/HCC)    • Pedal edema    • Precordial pain    • Rheumatoid arthritis (CMS/HCC)    • Rheumatoid arthritis (CMS/HCC)    • S/P CABG x 3    ,   Past Surgical History:   Procedure Laterality Date   • APPENDECTOMY     • CARDIAC CATHETERIZATION Left 2012    Intensify medical therapy   • CARDIAC CATHETERIZATION Left 2002    surgery   • CHOLECYSTECTOMY     • COLONOSCOPY  02/10/2012   • COLONOSCOPY N/A 2018    Procedure: COLONOSCOPY WITH ANESTHESIA;  Surgeon: Patricia Bo MD;  Location: [facility] ENDOSCOPY;  Service:    • CORONARY ARTERY BYPASS GRAFT  2002    LIMA to LAD, SVG to D1, SVG to OM1 - x3   • CORONARY STENT PLACEMENT  09/2009    X1 - Stent to LAD, Drug Eluting   • ENDOSCOPY N/A 2018    Procedure: ESOPHAGOGASTRODUODENOSCOPY WITH ANESTHESIA;  Surgeon: Patricia Bo MD;  Location: [facility] ENDOSCOPY;  Service:    • GALLBLADDER SURGERY     • REPLACEMENT TOTAL KNEE Right    • UPPER GASTROINTESTINAL ENDOSCOPY  2015   ,   Family History   Problem Relation Age of Onset   • Cancer Father    • Coronary artery disease Father    ,   Social History     Tobacco Use   • Smoking status: Former Smoker     Years: 20.00     Types: Cigarettes     Last attempt to quit: 2000     Years since quittin.8   • Smokeless tobacco: Never Used   Substance Use Topics   • Alcohol use: No   • Drug use: No   ,     Medications  Current Outpatient Medications   Medication Sig Dispense Refill   • amiodarone (PACERONE) 100 MG tablet TAKE 1 TABLET BY MOUTH DAILY 30 tablet 11   • amiodarone (PACERONE) 200 MG tablet Take 1 tablet by mouth Daily.     • apixaban (ELIQUIS) 5 MG tablet tablet Take 1 tablet by mouth Every 12 (Twelve) Hours. 180 tablet 3   • aspirin 81 MG EC tablet Take 81 mg by mouth Daily.     • folic acid  (FOLVITE) 1 MG tablet Take 1 mg by mouth Daily.     • furosemide (LASIX) 20 MG tablet TAKE 1 TABLET BY MOUTH DAILY 30 tablet 0   • glyBURIDE (DIAbeta) 5 MG tablet Take 5 mg by mouth Daily With Breakfast.     • isosorbide mononitrate (IMDUR) 30 MG 24 hr tablet Take 30 mg by mouth Daily.     • losartan (COZAAR) 25 MG tablet Take 25 mg by mouth Daily.     • meclizine (ANTIVERT) 12.5 MG tablet Take 1 tablet by mouth 3 (Three) Times a Day As Needed for dizziness. 90 tablet 3   • metFORMIN (GLUCOPHAGE) 1000 MG tablet Take 1,000 mg by mouth Daily With Breakfast.     • metoprolol tartrate (LOPRESSOR) 50 MG tablet Take 50 mg by mouth 2 (Two) Times a Day.     • nitroglycerin (NITROSTAT) 0.4 MG SL tablet Place 0.4 mg under the tongue Every 5 (Five) Minutes As Needed for Chest Pain. Take no more than 3 doses in 15 minutes.     • pantoprazole (PROTONIX) 40 MG EC tablet Take 1 tablet by mouth Every 12 (Twelve) Hours. 60 tablet 1   • potassium chloride (K-DUR) 10 MEQ CR tablet Take 1 tablet by mouth Daily. 90 tablet 3   • predniSONE (DELTASONE) 1 MG tablet Take 4 mg by mouth Every Night.     • predniSONE (DELTASONE) 5 MG tablet Take 5 mg by mouth Every Morning.     • simvastatin (ZOCOR) 40 MG tablet Take 40 mg by mouth Every Night.     • Tofacitinib Citrate (XELJANZ XR) 11 MG tablet sustained-release 24 hour Take 1 tablet by mouth Daily.     • verapamil SR (CALAN-SR) 240 MG CR tablet TAKE 1 TABLET BY MOUTH DAILY 30 tablet 11     No current facility-administered medications for this visit.      Results for orders placed during the hospital encounter of 07/18/18   Adult Transthoracic Echo Complete W/ Cont if Necessary Per Protocol    Narrative · Left ventricular systolic function is normal. Estimated EF = 55%.  · Left ventricular diastolic dysfunction.  · Left atrial cavity size is mildly dilated.  · No evidence of pulmonary hypertension is present.          Allergies:  Codeine; Sulfa antibiotics; Albuterol; and Amoxicillin-pot  "clavulanate    Review of Systems  Review of Systems   Constitution: Positive for weakness and malaise/fatigue.   HENT: Negative.    Eyes: Negative.    Cardiovascular: Positive for dyspnea on exertion, leg swelling and palpitations. Negative for chest pain, claudication, cyanosis, irregular heartbeat, near-syncope, orthopnea, paroxysmal nocturnal dyspnea and syncope.   Respiratory: Negative.    Endocrine: Negative.    Hematologic/Lymphatic: Negative.    Skin: Negative.    Musculoskeletal: Positive for arthritis, back pain and joint pain.   Gastrointestinal: Negative for anorexia.   Genitourinary: Negative.    Neurological: Positive for dizziness.   Psychiatric/Behavioral: Negative.        Objective     Physical Exam:  /70   Pulse 63   Ht 162.6 cm (64\")   Wt 93.9 kg (207 lb)   SpO2 95%   BMI 35.53 kg/m²   Physical Exam   Constitutional: She appears well-developed.   HENT:   Head: Normocephalic.   Neck: Normal carotid pulses and no JVD present. No tracheal tenderness present. Carotid bruit is not present. No tracheal deviation and no edema present.   Cardiovascular: Regular rhythm and normal pulses.   Murmur heard.   Systolic murmur is present with a grade of 2/6.  Pulmonary/Chest: Effort normal. No stridor. She has no wheezes.   Abdominal: Soft. She exhibits no distension. There is no hepatosplenomegaly. There is no tenderness.     Vascular Status -  Her right foot exhibits abnormal foot edema. Her left foot exhibits abnormal foot edema.  Neurological: She is alert. She has normal strength. No cranial nerve deficit or sensory deficit.   Skin: Skin is warm.   Psychiatric: She has a normal mood and affect. Her speech is normal and behavior is normal.   Lower extremity: 2 plus pitting edema.     Results Review:       ECG 12 Lead  Date/Time: 12/2/2019 9:54 AM  Performed by: Jagdeep Reyes MD  Authorized by: Jagdeep Reyes MD   Comparison: compared with previous ECG from 3/17/2017  Similar to previous ECG  Rhythm: " sinus rhythm  Rate: normal  Conduction: conduction normal  T inversion: V1, V2 and V3  QRS axis: normal  Other: no other findings    Clinical impression: abnormal EKG            Assessment/Plan   Patient Active Problem List   Diagnosis   • CAD (coronary artery disease)   • Atrial flutter (CMS/HCC)   • Type 2 diabetes mellitus without complication, without long-term current use of insulin (CMS/HCC)   • Mixed hyperlipidemia   • S/P CABG x 3 2000 Dr Larsen   • Shoulder blade pain   • Anemia   • Abnormal finding on imaging   • History of esophagitis   • Essential hypertension   • S/P coronary artery stent placement   • Gastric polyp   • Colon polyps   • PAF (paroxysmal atrial fibrillation) (CMS/HCC)     Low risk stress echo with normal LVEF by echo cardiogram.         Plan    The current medical regimen is effective;  continue present plan and medications.     Weigh yourself frequently, at least weekly, preferably daily, call me if more than 2 pounds a day or 5 pounds a week weight gain.  Flexible diuretic dosing     She wants to continue Amiodarone as has Paroxysmal atrial fibrillation     Monitor CBC, CMP, TSH and Lipid Panel by primary  Eye exam, pulmonary function tests   Discussed Amiodarone induced toxicity and required monitoring and close follow up for this       Keep A1c less than 7 Primary to monitor  Keep LDL below 70 mg/dl. Monitor liver and renal functions.   Monitor CBC, CMP, TSH (as indicated) and Lipid Panel by primary      Recent CBC CMP TSH OK    ____________________________________________________________________________________________________________________________________________  Health maintenance and recommendations    Similar recommendations as last visit     Compression stockings,       Offered to give patient  a copy      Questions were encouraged, asked and answered to the patient's  understanding and satisfaction. Questions if any regarding current medications and side effects, need for  refills and importance of compliance to medications stressed.    Reviewed available prior notes, consults, prior visits, laboratory findings, radiology and cardiology relevant reports. Updated chart as applicable. I have reviewed the patient's medical history in detail and updated the computerized patient record as relevant.      Updated patient regarding any new or relevant abnormalities on review of records or any new findings on physical exam. Mentioned to patient about purpose of visit and desirable health short and long term goals and objectives.    Primary to monitor CBC CMP Lipid panel and TSH as applicable    ___________________________________________________________________________________________________________________________________________           Return in about 4 months (around 4/2/2020).

## 2019-12-09 ENCOUNTER — HOSPITAL ENCOUNTER (OUTPATIENT)
Dept: WOMENS IMAGING | Age: 64
Discharge: HOME | End: 2019-12-09
Payer: MEDICARE

## 2019-12-09 DIAGNOSIS — Z12.31 BREAST CANCER SCREENING BY MAMMOGRAM: ICD-10-CM

## 2019-12-09 PROCEDURE — 77063 BREAST TOMOSYNTHESIS BI: CPT

## 2019-12-27 DIAGNOSIS — E11.9 TYPE 2 DIABETES MELLITUS WITHOUT COMPLICATION, WITHOUT LONG-TERM CURRENT USE OF INSULIN (HCC): ICD-10-CM

## 2020-01-23 RX ORDER — METOPROLOL TARTRATE 50 MG/1
TABLET, FILM COATED ORAL
Qty: 60 TABLET | Refills: 0 | Status: SHIPPED | OUTPATIENT
Start: 2020-01-23 | End: 2020-01-24 | Stop reason: SDUPTHER

## 2020-01-24 RX ORDER — AMIODARONE HYDROCHLORIDE 100 MG/1
100 TABLET ORAL DAILY
Qty: 90 TABLET | Refills: 4 | Status: SHIPPED | OUTPATIENT
Start: 2020-01-24 | End: 2021-01-05 | Stop reason: SDUPTHER

## 2020-01-24 RX ORDER — AMIODARONE HYDROCHLORIDE 100 MG/1
TABLET ORAL
Qty: 30 TABLET | Refills: 0 | OUTPATIENT
Start: 2020-01-24

## 2020-01-24 RX ORDER — METOPROLOL TARTRATE 50 MG/1
TABLET, FILM COATED ORAL
Qty: 180 TABLET | Refills: 3 | Status: SHIPPED | OUTPATIENT
Start: 2020-01-24 | End: 2020-12-28

## 2020-01-24 RX ORDER — POTASSIUM CHLORIDE 20 MEQ/1
TABLET, EXTENDED RELEASE ORAL
Qty: 90 TABLET | Refills: 3 | Status: SHIPPED | OUTPATIENT
Start: 2020-01-24 | End: 2021-01-04

## 2020-01-24 RX ORDER — ISOSORBIDE MONONITRATE 30 MG/1
30 TABLET, EXTENDED RELEASE ORAL DAILY
Qty: 90 TABLET | Refills: 3 | Status: SHIPPED | OUTPATIENT
Start: 2020-01-24 | End: 2020-01-24

## 2020-01-24 NOTE — TELEPHONE ENCOUNTER
Edmondson Share Day called to request a refill on her medication.       Last office visit : 11/21/2019   Next office visit : 3/16/2020     Requested Prescriptions     Pending Prescriptions Disp Refills    isosorbide mononitrate (IMDUR) 30 MG extended release tablet 90 tablet 3     Sig: Take 1 tablet by mouth daily    metoprolol tartrate (LOPRESSOR) 50 MG tablet 180 tablet 3     Sig: TAKE 1 TABLET BY MOUTH 2 TIMES A DAY            Bianca Harper

## 2020-02-24 RX ORDER — FUROSEMIDE 20 MG/1
20 TABLET ORAL DAILY
Qty: 90 TABLET | Refills: 4 | Status: SHIPPED | OUTPATIENT
Start: 2020-02-24 | End: 2021-01-05 | Stop reason: SDUPTHER

## 2020-02-24 RX ORDER — FUROSEMIDE 20 MG/1
20 TABLET ORAL DAILY
Qty: 30 TABLET | Refills: 0 | OUTPATIENT
Start: 2020-02-24

## 2020-02-24 RX ORDER — LOSARTAN POTASSIUM 25 MG/1
25 TABLET ORAL DAILY
Qty: 90 TABLET | Refills: 0 | Status: SHIPPED | OUTPATIENT
Start: 2020-02-24 | End: 2020-10-08

## 2020-02-24 RX ORDER — VERAPAMIL HYDROCHLORIDE 240 MG/1
240 TABLET, FILM COATED, EXTENDED RELEASE ORAL DAILY
Qty: 90 TABLET | Refills: 0 | OUTPATIENT
Start: 2020-02-24

## 2020-02-24 RX ORDER — PANTOPRAZOLE SODIUM 40 MG/1
TABLET, DELAYED RELEASE ORAL
Qty: 180 TABLET | Refills: 0 | Status: SHIPPED | OUTPATIENT
Start: 2020-02-24 | End: 2020-10-08

## 2020-02-24 RX ORDER — VERAPAMIL HYDROCHLORIDE 240 MG/1
240 TABLET, FILM COATED, EXTENDED RELEASE ORAL DAILY
Qty: 90 TABLET | Refills: 4 | Status: SHIPPED | OUTPATIENT
Start: 2020-02-24 | End: 2021-04-01 | Stop reason: SDUPTHER

## 2020-02-25 ENCOUNTER — OFFICE VISIT (OUTPATIENT)
Dept: FAMILY MEDICINE CLINIC | Age: 65
End: 2020-02-25
Payer: MEDICARE

## 2020-02-25 VITALS
TEMPERATURE: 96.9 F | OXYGEN SATURATION: 96 % | HEIGHT: 64 IN | BODY MASS INDEX: 35.34 KG/M2 | SYSTOLIC BLOOD PRESSURE: 136 MMHG | WEIGHT: 207 LBS | HEART RATE: 67 BPM | DIASTOLIC BLOOD PRESSURE: 80 MMHG | RESPIRATION RATE: 18 BRPM

## 2020-02-25 PROCEDURE — G8484 FLU IMMUNIZE NO ADMIN: HCPCS | Performed by: NURSE PRACTITIONER

## 2020-02-25 PROCEDURE — 2022F DILAT RTA XM EVC RTNOPTHY: CPT | Performed by: NURSE PRACTITIONER

## 2020-02-25 PROCEDURE — G8417 CALC BMI ABV UP PARAM F/U: HCPCS | Performed by: NURSE PRACTITIONER

## 2020-02-25 PROCEDURE — 1036F TOBACCO NON-USER: CPT | Performed by: NURSE PRACTITIONER

## 2020-02-25 PROCEDURE — 99214 OFFICE O/P EST MOD 30 MIN: CPT | Performed by: NURSE PRACTITIONER

## 2020-02-25 PROCEDURE — G8427 DOCREV CUR MEDS BY ELIG CLIN: HCPCS | Performed by: NURSE PRACTITIONER

## 2020-02-25 PROCEDURE — 3046F HEMOGLOBIN A1C LEVEL >9.0%: CPT | Performed by: NURSE PRACTITIONER

## 2020-02-25 PROCEDURE — 3017F COLORECTAL CA SCREEN DOC REV: CPT | Performed by: NURSE PRACTITIONER

## 2020-02-25 RX ORDER — NYSTATIN 100000 U/G
CREAM TOPICAL
Qty: 30 G | Refills: 2 | Status: SHIPPED | OUTPATIENT
Start: 2020-02-25

## 2020-02-25 ASSESSMENT — ENCOUNTER SYMPTOMS
DIARRHEA: 0
SORE THROAT: 0
NAUSEA: 0
ABDOMINAL PAIN: 0
COUGH: 0
CHEST TIGHTNESS: 0
WHEEZING: 0
SHORTNESS OF BREATH: 0

## 2020-02-25 NOTE — PROGRESS NOTES
Ms.Teresa CHERYL Fagan is a 62 y.o. female who presents today for  Chief Complaint   Patient presents with    Rash     rash on legs for afew weeks        HPI:  She has had a rash to bilateral groin region for 3 weeks, red and confluent. Initially pruritic but this has improved some. No pain. No fever or chills. No rash to other areas of body. She has had no recent change in detergents, soaps, lotions. No change to medications. She has not tried any OTC topical meds. PAF has been stable on amiodarone, Eliquis. Rate controlled. Followed by Dr. Roxann Lyn. Blood pressure has been stable/controlled on current medication. DM II has been stable. No problems with medications, taking as prescribed. FBG has been 100-120 on average. Review of Systems   Constitutional: Negative for chills and fever. HENT: Negative for congestion, ear pain and sore throat. Respiratory: Negative for cough, chest tightness, shortness of breath and wheezing. Cardiovascular: Negative for chest pain. Gastrointestinal: Negative for abdominal pain, diarrhea and nausea. Genitourinary: Negative. Musculoskeletal: Negative for arthralgias and myalgias. Skin: Positive for rash (groin). Past Medical History:   Diagnosis Date    Atrial flutter by electrocardiogram ([address]     Constipation     Coronary artery disease     Diabetes mellitus (HCC)     Essential hypertension     Hyperlipidemia     IBS (irritable bowel syndrome)     Iron deficiency anemia     Menopause     Paroxysmal SVT (supraventricular tachycardia) (HCC)     RA (rheumatoid arthritis) (HCC)     Rheumatoid arthritis (HCC)        Current Outpatient Medications   Medication Sig Dispense Refill    nystatin (MYCOSTATIN) 904179 UNIT/GM cream Apply topically 2 times daily.  30 g 2    losartan (COZAAR) 25 MG tablet TAKE 1 TABLET BY MOUTH DAILY 90 tablet 0    pantoprazole (PROTONIX) 40 MG tablet TAKE 1 TABLET BY MOUTH 2 TIMES A  tablet 0    metoprolol visit. Allergies   Allergen Reactions    Codeine Other (See Comments)     Chest pain    Albuterol      Rapid heart rate    Augmentin [Amoxicillin-Pot Clavulanate] Palpitations    Sulfa Antibiotics Rash       Past Surgical History:   Procedure Laterality Date    CARDIAC SURGERY      CABG (Dr. Amarjit Heaton) [facility]  2009    Stent     CHOLECYSTECTOMY      CORONARY ARTERY BYPASS GRAFT      DILATION AND CURETTAGE OF UTERUS N/A 2019    DILATATION AND CURETTAGE HYSTEROSCOPY W/ Justice Mediate performed by Milady Briggs MD at [address] [place] [address] TOTAL KNEE ARTHROPLASTY         Social History     Tobacco Use    Smoking status: Former Smoker     Packs/day: 0.50     Years: 20.00     Pack years: 10.00     Types: Cigarettes     Last attempt to quit: 2000     Years since quittin.4    Smokeless tobacco: Never Used   Substance Use Topics    Alcohol use: No    Drug use: No       Family History   Problem Relation Age of Onset    Heart Attack Father     Prostate Cancer Father     Stroke Father     Heart Attack Brother     Stroke Brother        /80   Pulse 67   Temp 96.9 °F (36.1 °C) (Temporal)   Resp 18   Ht 5' 4\" (1.626 m)   Wt 207 lb (93.9 kg)   SpO2 96%   BMI 35.53 kg/m²     Physical Exam  Vitals signs reviewed. Constitutional:       General: She is not in acute distress. Appearance: Normal appearance. She is well-developed. HENT:      Head: Normocephalic. Eyes:      Conjunctiva/sclera: Conjunctivae normal.      Pupils: Pupils are equal, round, and reactive to light. Neck:      Musculoskeletal: Normal range of motion and neck supple. Thyroid: No thyromegaly. Vascular: No carotid bruit or JVD. Trachea: No tracheal deviation. Cardiovascular:      Rate and Rhythm: Normal rate and regular rhythm. Heart sounds: Normal heart sounds. No murmur. Pulmonary:      Effort: Pulmonary effort is normal. No respiratory distress.       Breath sounds: Normal breath sounds. No wheezing or rhonchi. Musculoskeletal: Normal range of motion. Lymphadenopathy:      Cervical: No cervical adenopathy. Skin:     General: Skin is warm and dry. Findings: Rash present. Comments: Confluent erythematous rash noted to groin/intertriginous region. No satellite lesions. No excoriation. Neurological:      Mental Status: She is alert. ASSESSMENT/PLAN:  1. Type 2 diabetes mellitus without complication, without long-term current use of insulin (HCC)  - Stable, continue current medication, continue to monitor blood sugar at home. 2. Paroxysmal atrial fibrillation (HCC)  - Stable on current medication, continue routine follow-up with cardiology. 3. Essential hypertension  - Stable on current medication, no changes today. Continue to monitor at home    4. Candidal intertrigo  - Rash is consistent with candidal intertrigo. Treat with nystatin twice daily.  - Need to avoid fluconazole due to potential interaction with amiodarone. - If not improving with nystatin, may consider clotrimazole cream.  - May use [employer] powder/cornstarch powder to intertriginous region and groin to prevent moisture. Advised to  report any acute worsening or no improvement. Follow-up as scheduled. No follow-ups on file. Kaz Bhagat was seen today for rash. Diagnoses and all orders for this visit:    Type 2 diabetes mellitus without complication, without long-term current use of insulin (HCC)    Paroxysmal atrial fibrillation (HCC)    Essential hypertension    Candidal intertrigo    Other orders  -     nystatin (MYCOSTATIN) 223541 UNIT/GM cream; Apply topically 2 times daily. Medications Discontinued During This Encounter   Medication Reason    ibuprofen (ADVIL;MOTRIN) 800 MG tablet Therapy completed     There are no Patient Instructions on file for this visit.     Patient voicesunderstanding and agrees to plans along with risks and benefits of plan.    Counseling:  Jessa Fagan's case, medications and options were discussed in detail. Patient was instructed to call the office if she questionsregarding her treatment. Should her conditions worsen, she should return to office to be reassessed by MARYANNE Hoffmann. she Should to go the closest Emergency Department for any emergency. They verbalizedunderstanding the above instructions. No follow-ups on file.

## 2020-03-12 DIAGNOSIS — E11.9 TYPE 2 DIABETES MELLITUS WITHOUT COMPLICATION, WITHOUT LONG-TERM CURRENT USE OF INSULIN (HCC): ICD-10-CM

## 2020-03-12 DIAGNOSIS — D50.9 IRON DEFICIENCY ANEMIA, UNSPECIFIED IRON DEFICIENCY ANEMIA TYPE: ICD-10-CM

## 2020-03-12 LAB
ALBUMIN SERPL-MCNC: 4.5 G/DL (ref 3.5–5.2)
ALP BLD-CCNC: 63 U/L (ref 35–104)
ALT SERPL-CCNC: 20 U/L (ref 5–33)
ANION GAP SERPL CALCULATED.3IONS-SCNC: 17 MMOL/L (ref 7–19)
AST SERPL-CCNC: 15 U/L (ref 5–32)
BASOPHILS ABSOLUTE: 0.1 K/UL (ref 0–0.2)
BASOPHILS RELATIVE PERCENT: 0.5 % (ref 0–1)
BILIRUB SERPL-MCNC: 1 MG/DL (ref 0.2–1.2)
BUN BLDV-MCNC: 12 MG/DL (ref 8–23)
CALCIUM SERPL-MCNC: 9.5 MG/DL (ref 8.8–10.2)
CHLORIDE BLD-SCNC: 102 MMOL/L (ref 98–111)
CHOLESTEROL, TOTAL: 136 MG/DL (ref 160–199)
CO2: 25 MMOL/L (ref 22–29)
CREAT SERPL-MCNC: 0.5 MG/DL (ref 0.5–0.9)
EOSINOPHILS ABSOLUTE: 0 K/UL (ref 0–0.6)
EOSINOPHILS RELATIVE PERCENT: 0.4 % (ref 0–5)
GFR NON-AFRICAN AMERICAN: >60
GLUCOSE BLD-MCNC: 108 MG/DL (ref 74–109)
HBA1C MFR BLD: 9.1 % (ref 4–6)
HCT VFR BLD CALC: 44.5 % (ref 37–47)
HDLC SERPL-MCNC: 82 MG/DL (ref 65–121)
HEMOGLOBIN: 13.5 G/DL (ref 12–16)
IMMATURE GRANULOCYTES #: 0.2 K/UL
LDL CHOLESTEROL CALCULATED: 37 MG/DL
LYMPHOCYTES ABSOLUTE: 1.8 K/UL (ref 1.1–4.5)
LYMPHOCYTES RELATIVE PERCENT: 19.5 % (ref 20–40)
MCH RBC QN AUTO: 28.8 PG (ref 27–31)
MCHC RBC AUTO-ENTMCNC: 30.3 G/DL (ref 33–37)
MCV RBC AUTO: 94.9 FL (ref 81–99)
MONOCYTES ABSOLUTE: 0.5 K/UL (ref 0–0.9)
MONOCYTES RELATIVE PERCENT: 5.8 % (ref 0–10)
NEUTROPHILS ABSOLUTE: 6.7 K/UL (ref 1.5–7.5)
NEUTROPHILS RELATIVE PERCENT: 71.7 % (ref 50–65)
PDW BLD-RTO: 17.1 % (ref 11.5–14.5)
PLATELET # BLD: 209 K/UL (ref 130–400)
PMV BLD AUTO: 10.7 FL (ref 9.4–12.3)
POTASSIUM SERPL-SCNC: 4.2 MMOL/L (ref 3.5–5)
RBC # BLD: 4.69 M/UL (ref 4.2–5.4)
SODIUM BLD-SCNC: 144 MMOL/L (ref 136–145)
TOTAL PROTEIN: 6.8 G/DL (ref 6.6–8.7)
TRIGL SERPL-MCNC: 85 MG/DL (ref 0–149)
WBC # BLD: 9.4 K/UL (ref 4.8–10.8)

## 2020-05-14 RX ORDER — NITROGLYCERIN 0.4 MG/1
0.4 TABLET SUBLINGUAL EVERY 5 MIN PRN
Qty: 25 TABLET | Refills: 0 | Status: SHIPPED | OUTPATIENT
Start: 2020-05-14 | End: 2022-01-01

## 2020-06-09 RX ORDER — LANCETS 28 GAUGE
1 EACH MISCELLANEOUS DAILY
Qty: 100 EACH | Refills: 3 | Status: SHIPPED | OUTPATIENT
Start: 2020-06-09 | End: 2021-01-06

## 2020-06-09 RX ORDER — SYRINGE AND NEEDLE,INSULIN,1ML 31 GX5/16"
1 SYRINGE, EMPTY DISPOSABLE MISCELLANEOUS DAILY
Qty: 1 KIT | Refills: 0 | Status: SHIPPED | OUTPATIENT
Start: 2020-06-09

## 2020-06-09 RX ORDER — BLOOD SUGAR DIAGNOSTIC
1 STRIP MISCELLANEOUS DAILY
Qty: 100 EACH | Refills: 3 | Status: SHIPPED | OUTPATIENT
Start: 2020-06-09

## 2020-06-16 ENCOUNTER — OFFICE VISIT (OUTPATIENT)
Dept: CARDIOLOGY | Facility: CLINIC | Age: 65
End: 2020-06-16

## 2020-06-16 VITALS
SYSTOLIC BLOOD PRESSURE: 140 MMHG | HEART RATE: 68 BPM | OXYGEN SATURATION: 98 % | HEIGHT: 64 IN | WEIGHT: 205 LBS | DIASTOLIC BLOOD PRESSURE: 70 MMHG | BODY MASS INDEX: 35 KG/M2

## 2020-06-16 DIAGNOSIS — E78.2 MIXED HYPERLIPIDEMIA: ICD-10-CM

## 2020-06-16 DIAGNOSIS — E11.9 TYPE 2 DIABETES MELLITUS WITHOUT COMPLICATION, WITHOUT LONG-TERM CURRENT USE OF INSULIN (HCC): ICD-10-CM

## 2020-06-16 DIAGNOSIS — R93.89 ABNORMAL FINDING ON IMAGING: ICD-10-CM

## 2020-06-16 DIAGNOSIS — I25.10 CORONARY ARTERY DISEASE INVOLVING NATIVE CORONARY ARTERY OF NATIVE HEART WITHOUT ANGINA PECTORIS: ICD-10-CM

## 2020-06-16 DIAGNOSIS — I10 ESSENTIAL HYPERTENSION: ICD-10-CM

## 2020-06-16 DIAGNOSIS — I48.3 TYPICAL ATRIAL FLUTTER (HCC): ICD-10-CM

## 2020-06-16 DIAGNOSIS — I48.0 PAF (PAROXYSMAL ATRIAL FIBRILLATION) (HCC): ICD-10-CM

## 2020-06-16 DIAGNOSIS — Z95.5 S/P CORONARY ARTERY STENT PLACEMENT: Primary | ICD-10-CM

## 2020-06-16 DIAGNOSIS — Z87.19 HISTORY OF ESOPHAGITIS: ICD-10-CM

## 2020-06-16 DIAGNOSIS — D50.0 IRON DEFICIENCY ANEMIA DUE TO CHRONIC BLOOD LOSS: ICD-10-CM

## 2020-06-16 DIAGNOSIS — Z95.1 S/P CABG X 3: ICD-10-CM

## 2020-06-16 PROCEDURE — 93000 ELECTROCARDIOGRAM COMPLETE: CPT | Performed by: INTERNAL MEDICINE

## 2020-06-16 PROCEDURE — 99214 OFFICE O/P EST MOD 30 MIN: CPT | Performed by: INTERNAL MEDICINE

## 2020-06-16 NOTE — PROGRESS NOTES
Teresa Serna  [id number]  1955  64 y.o.  female    Referring Provider: Teresa Contreras MD    Reason for Follow-up Visit: Here for routine follow up     Paroxysmal atrial fibrillation now sinus rhythm  Saw Dr De Anda AF ablation tried but due to no good venous access not possible  Now  Maintaining sinus rhythm      Subjective    Overall feels the same    No new events or complaints since last visit   Overall the patient feels no major change from baseline symptoms   Similar symptoms as during last visit     Tolerating current medications well with no untoward side effects   Compliant with prescribed medication regimen. Tries to adhere to cardiac diet.      Still has occasional palpitations, once every several days lasting for less than 1 minute  Usually resolves after she takes AM medications  No associated symptoms of dizziness, weakness, chest pain,  shortness of breath      Mild to moderate exertional  shortness of breath     No associated chest pain  Mild pedal edema  Dependent edema worse on sitting up towards evening     No fever or chills  No significant expectoration    No hemoptysis  No presyncope or syncope     Easy fatiguability     Joint pain in small, medium and large joints  Chronic low back pain      On anticoagulation with Eliquis     BP well controlled at home.     Saw arthritis MD and blood work was prescribed    Elevated A1c 8.9  Lipids under good control      History of present illness:  Teresa Serna is a 64 y.o. yo female with history of Paroxysmal atrial fibrillation who presents today for   Chief Complaint   Patient presents with   • Atrial Fibrillation     6 MO FU    .    History  Past Medical History:   Diagnosis Date   • Abnormal stress test    • Atrial flutter (CMS/HCC)    • CAD (coronary artery disease)    • CAD in native artery     CABG x 3   • Diabetes mellitus (CMS/HCC)    • Essential hypertension     Tricuspid valve insufficiency, unspecified etiology    • Hyperlipemia,  mixed    • Hyperlipidemia    • Hypertension    • MI, old    • Mitral valve prolapse    • Near syncope    • Obesity, morbid (CMS/[facility])    • Palpitations    • Paroxysmal SVT (supraventricular tachycardia) (CMS/HCC)    • Paroxysmal SVT (supraventricular tachycardia) (CMS/HCC)    • Pedal edema    • Precordial pain    • Rheumatoid arthritis (CMS/HCC)    • Rheumatoid arthritis (CMS/HCC)    • S/P CABG x 3    ,   Past Surgical History:   Procedure Laterality Date   • APPENDECTOMY     • CARDIAC CATHETERIZATION Left 2012    Intensify medical therapy   • CARDIAC CATHETERIZATION Left 2002    surgery   • CHOLECYSTECTOMY     • COLONOSCOPY  02/10/2012   • COLONOSCOPY N/A 2018    Procedure: COLONOSCOPY WITH ANESTHESIA;  Surgeon: Patricia Bo MD;  Location: [facility] ENDOSCOPY;  Service:    • CORONARY ARTERY BYPASS GRAFT  2002    LIMA to LAD, SVG to D1, SVG to OM1 - x3   • CORONARY STENT PLACEMENT  09/2009    X1 - Stent to LAD, Drug Eluting   • ENDOSCOPY N/A 2018    Procedure: ESOPHAGOGASTRODUODENOSCOPY WITH ANESTHESIA;  Surgeon: Patricia Bo MD;  Location: [facility] ENDOSCOPY;  Service:    • GALLBLADDER SURGERY     • REPLACEMENT TOTAL KNEE Right    • UPPER GASTROINTESTINAL ENDOSCOPY  2015   ,   Family History   Problem Relation Age of Onset   • Cancer Father    • Coronary artery disease Father    ,   Social History     Tobacco Use   • Smoking status: Former Smoker     Years: 20.00     Types: Cigarettes     Last attempt to quit: 2000     Years since quittin.3   • Smokeless tobacco: Never Used   Substance Use Topics   • Alcohol use: No   • Drug use: No   ,     Medications  Current Outpatient Medications   Medication Sig Dispense Refill   • amiodarone (PACERONE) 100 MG tablet Take 1 tablet by mouth Daily. 90 tablet 4   • apixaban (ELIQUIS) 5 MG tablet tablet Take 1 tablet by mouth Every 12 (Twelve) Hours. 180 tablet 3   • aspirin 81 MG EC tablet Take 81 mg by mouth Daily.     • folic acid  (FOLVITE) 1 MG tablet Take 1 mg by mouth Daily.     • furosemide (LASIX) 20 MG tablet Take 1 tablet by mouth Daily. 90 tablet 4   • glyBURIDE (DIAbeta) 5 MG tablet Take 5 mg by mouth Daily With Breakfast.     • isosorbide mononitrate (IMDUR) 30 MG 24 hr tablet Take 30 mg by mouth Daily.     • losartan (COZAAR) 25 MG tablet Take 25 mg by mouth Daily.     • meclizine (ANTIVERT) 12.5 MG tablet Take 1 tablet by mouth 3 (Three) Times a Day As Needed for dizziness. 90 tablet 3   • metFORMIN (GLUCOPHAGE) 1000 MG tablet Take 1,000 mg by mouth Daily With Breakfast.     • metoprolol tartrate (LOPRESSOR) 50 MG tablet Take 50 mg by mouth 2 (Two) Times a Day.     • nitroglycerin (NITROSTAT) 0.4 MG SL tablet Place 0.4 mg under the tongue Every 5 (Five) Minutes As Needed for Chest Pain. Take no more than 3 doses in 15 minutes.     • pantoprazole (PROTONIX) 40 MG EC tablet Take 1 tablet by mouth Every 12 (Twelve) Hours. 60 tablet 1   • potassium chloride (K-DUR) 10 MEQ CR tablet Take 1 tablet by mouth Daily. 90 tablet 3   • predniSONE (DELTASONE) 1 MG tablet Take 4 mg by mouth Every Night.     • predniSONE (DELTASONE) 5 MG tablet Take 5 mg by mouth Every Morning.     • simvastatin (ZOCOR) 40 MG tablet Take 40 mg by mouth Every Night.     • Tofacitinib Citrate (XELJANZ XR) 11 MG tablet sustained-release 24 hour Take 1 tablet by mouth Daily.     • verapamil SR (CALAN-SR) 240 MG CR tablet Take 1 tablet by mouth Daily. 90 tablet 4     No current facility-administered medications for this visit.      Results for orders placed during the hospital encounter of 07/18/18   Adult Transthoracic Echo Complete W/ Cont if Necessary Per Protocol    Narrative · Left ventricular systolic function is normal. Estimated EF = 55%.  · Left ventricular diastolic dysfunction.  · Left atrial cavity size is mildly dilated.  · No evidence of pulmonary hypertension is present.          Allergies:  Codeine; Sulfa antibiotics; Albuterol; and Amoxicillin-pot  "clavulanate    Review of Systems  Review of Systems   Constitution: Positive for malaise/fatigue.   HENT: Negative.    Eyes: Negative.    Cardiovascular: Positive for dyspnea on exertion, leg swelling and palpitations. Negative for chest pain, claudication, cyanosis, irregular heartbeat, near-syncope, orthopnea, paroxysmal nocturnal dyspnea and syncope.   Respiratory: Negative.    Endocrine: Negative.    Hematologic/Lymphatic: Negative.    Skin: Negative.    Musculoskeletal: Positive for arthritis, back pain and joint pain.   Gastrointestinal: Negative for anorexia.   Genitourinary: Negative.    Neurological: Positive for dizziness and weakness.   Psychiatric/Behavioral: Negative.        Objective     Physical Exam:  /70 (BP Location: Left arm, Patient Position: Sitting, Cuff Size: Adult)   Pulse 68   Ht 162.6 cm (64.02\")   Wt 93 kg (205 lb)   SpO2 98%   BMI 35.17 kg/m²   Physical Exam   Constitutional: She appears well-developed.   HENT:   Head: Normocephalic.   Neck: Normal carotid pulses and no JVD present. No tracheal tenderness present. Carotid bruit is not present. No tracheal deviation and no edema present.   Cardiovascular: Regular rhythm and normal pulses.   Murmur heard.   Systolic murmur is present with a grade of 2/6.  Pulmonary/Chest: Effort normal. No stridor. She has no wheezes.   Abdominal: Soft. She exhibits no distension. There is no hepatosplenomegaly. There is no tenderness.     Vascular Status -  Her right foot exhibits abnormal foot edema. Her left foot exhibits abnormal foot edema.  Neurological: She is alert. She has normal strength. No cranial nerve deficit or sensory deficit.   Skin: Skin is warm.   Psychiatric: She has a normal mood and affect. Her speech is normal and behavior is normal.   Lower extremity: 1 plus pitting edema.     Results Review:       ECG 12 Lead  Date/Time: 6/16/2020 11:48 AM  Performed by: Jagdeep Reyes MD  Authorized by: Jagdeep Reyes MD   Comparison: " compared with previous ECG from 3/17/2017  Similar to previous ECG  Rhythm: sinus rhythm  Rate: normal  QRS axis: normal  Other findings: non-specific ST-T wave changes    Clinical impression: abnormal EKG            Assessment/Plan   Patient Active Problem List   Diagnosis   • CAD (coronary artery disease)   • Atrial flutter (CMS/HCC)   • Type 2 diabetes mellitus without complication, without long-term current use of insulin (CMS/HCC)   • Mixed hyperlipidemia   • S/P CABG x 3 2000 Dr Larsen   • Shoulder blade pain   • Anemia   • Abnormal finding on imaging   • History of esophagitis   • Essential hypertension   • S/P coronary artery stent placement   • Gastric polyp   • Colon polyps   • PAF (paroxysmal atrial fibrillation) (CMS/HCC)     Low risk stress echo with normal LVEF by echo cardiogram.       ____________________________________________________________________________________________________________________________________________  Health maintenance and recommendations    Similar recommendations as last visit     Compression stockings,       Offered to give patient  a copy      Questions were encouraged, asked and answered to the patient's  understanding and satisfaction. Questions if any regarding current medications and side effects, need for refills and importance of compliance to medications stressed.    Reviewed available prior notes, consults, prior visits, laboratory findings, radiology and cardiology relevant reports. Updated chart as applicable. I have reviewed the patient's medical history in detail and updated the computerized patient record as relevant.      Updated patient regarding any new or relevant abnormalities on review of records or any new findings on physical exam. Mentioned to patient about purpose of visit and desirable health short and long term goals and objectives.    Primary to monitor CBC CMP Lipid panel and TSH as  applicable    ___________________________________________________________________________________________________________________________________________     Plan    The current medical regimen is effective;  continue present plan and medications.     Weigh yourself frequently, at least weekly, preferably daily, call me if more than 2 pounds a day or 5 pounds a week weight gain.  Flexible diuretic dosing     She wants to continue Amiodarone as has Paroxysmal atrial fibrillation     Monitor CBC, CMP, TSH and Lipid Panel by primary  Eye exam, pulmonary function tests   Discussed Amiodarone induced toxicity and required monitoring and close follow up for this       Keep A1c less than 7 Primary to monitor  Keep LDL below 70 mg/dl. Monitor liver and renal functions.   Monitor CBC, CMP, TSH (as indicated) and Lipid Panel by primary         Orders Placed This Encounter   Procedures   • ECG 12 Lead     This order was created via procedure documentation   • Adult Transthoracic Echo Complete W/ Cont if Necessary Per Protocol     Standing Status:   Future     Standing Expiration Date:   6/16/2021     Order Specific Question:   Reason for exam?     Answer:   Dyspnea        Return in about 6 months (around 12/16/2020).

## 2020-07-07 ENCOUNTER — HOSPITAL ENCOUNTER (OUTPATIENT)
Dept: CARDIOLOGY | Facility: HOSPITAL | Age: 65
Discharge: HOME | End: 2020-07-07
Admitting: INTERNAL MEDICINE

## 2020-07-07 VITALS
DIASTOLIC BLOOD PRESSURE: 71 MMHG | SYSTOLIC BLOOD PRESSURE: 149 MMHG | WEIGHT: 205.03 LBS | HEIGHT: 64 IN | BODY MASS INDEX: 35 KG/M2

## 2020-07-07 DIAGNOSIS — I48.0 PAF (PAROXYSMAL ATRIAL FIBRILLATION) (HCC): ICD-10-CM

## 2020-07-07 PROCEDURE — 93306 TTE W/DOPPLER COMPLETE: CPT

## 2020-07-07 PROCEDURE — 25010000002 PERFLUTREN 6.52 MG/ML SUSPENSION: Performed by: INTERNAL MEDICINE

## 2020-07-07 PROCEDURE — 93306 TTE W/DOPPLER COMPLETE: CPT | Performed by: INTERNAL MEDICINE

## 2020-07-07 RX ADMIN — PERFLUTREN 8.48 MG: 6.52 INJECTION, SUSPENSION INTRAVENOUS at 09:29

## 2020-07-09 DIAGNOSIS — E11.9 TYPE 2 DIABETES MELLITUS WITHOUT COMPLICATION, WITHOUT LONG-TERM CURRENT USE OF INSULIN (HCC): ICD-10-CM

## 2020-07-09 DIAGNOSIS — I10 ESSENTIAL HYPERTENSION: ICD-10-CM

## 2020-07-09 DIAGNOSIS — D50.9 IRON DEFICIENCY ANEMIA, UNSPECIFIED IRON DEFICIENCY ANEMIA TYPE: ICD-10-CM

## 2020-07-09 DIAGNOSIS — E78.01 FAMILIAL HYPERCHOLESTEROLEMIA: ICD-10-CM

## 2020-07-09 LAB
ALBUMIN SERPL-MCNC: 4 G/DL (ref 3.5–5.2)
ALP BLD-CCNC: 57 U/L (ref 35–104)
ALT SERPL-CCNC: 28 U/L (ref 5–33)
ANION GAP SERPL CALCULATED.3IONS-SCNC: 12 MMOL/L (ref 7–19)
AST SERPL-CCNC: 13 U/L (ref 5–32)
BASOPHILS ABSOLUTE: 0 K/UL (ref 0–0.2)
BASOPHILS RELATIVE PERCENT: 0.4 % (ref 0–1)
BH CV ECHO MEAS - AO MAX PG (FULL): 3.9 MMHG
BH CV ECHO MEAS - AO MAX PG: 8.2 MMHG
BH CV ECHO MEAS - AO MEAN PG (FULL): 2 MMHG
BH CV ECHO MEAS - AO MEAN PG: 4 MMHG
BH CV ECHO MEAS - AO ROOT AREA (BSA CORRECTED): 1.3
BH CV ECHO MEAS - AO ROOT AREA: 4.9 CM^2
BH CV ECHO MEAS - AO ROOT DIAM: 2.5 CM
BH CV ECHO MEAS - AO V2 MAX: 143 CM/SEC
BH CV ECHO MEAS - AO V2 MEAN: 92.7 CM/SEC
BH CV ECHO MEAS - AO V2 VTI: 28.7 CM
BH CV ECHO MEAS - AVA(I,A): 1.9 CM^2
BH CV ECHO MEAS - AVA(I,D): 1.9 CM^2
BH CV ECHO MEAS - AVA(V,A): 1.9 CM^2
BH CV ECHO MEAS - AVA(V,D): 1.9 CM^2
BH CV ECHO MEAS - BSA(HAYCOCK): 2.1 M^2
BH CV ECHO MEAS - BSA: 2 M^2
BH CV ECHO MEAS - BZI_BMI: 35.2 KILOGRAMS/M^2
BH CV ECHO MEAS - BZI_METRIC_HEIGHT: 162.6 CM
BH CV ECHO MEAS - BZI_METRIC_WEIGHT: 93 KG
BH CV ECHO MEAS - EDV(CUBED): 79 ML
BH CV ECHO MEAS - EDV(MOD-SP4): 122 ML
BH CV ECHO MEAS - EDV(TEICH): 82.6 ML
BH CV ECHO MEAS - EF(CUBED): 53.2 %
BH CV ECHO MEAS - EF(MOD-SP4): 68.4 %
BH CV ECHO MEAS - EF(TEICH): 45.4 %
BH CV ECHO MEAS - ESV(CUBED): 36.9 ML
BH CV ECHO MEAS - ESV(MOD-SP4): 38.5 ML
BH CV ECHO MEAS - ESV(TEICH): 45.1 ML
BH CV ECHO MEAS - FS: 22.4 %
BH CV ECHO MEAS - IVS/LVPW: 0.91
BH CV ECHO MEAS - IVSD: 1 CM
BH CV ECHO MEAS - LA DIMENSION: 5.1 CM
BH CV ECHO MEAS - LA/AO: 2
BH CV ECHO MEAS - LAT PEAK E' VEL: 7.5 CM/SEC
BH CV ECHO MEAS - LV DIASTOLIC VOL/BSA (35-75): 61.7 ML/M^2
BH CV ECHO MEAS - LV MASS(C)D: 150.5 GRAMS
BH CV ECHO MEAS - LV MASS(C)DI: 76.1 GRAMS/M^2
BH CV ECHO MEAS - LV MAX PG: 4.3 MMHG
BH CV ECHO MEAS - LV MEAN PG: 2 MMHG
BH CV ECHO MEAS - LV SYSTOLIC VOL/BSA (12-30): 19.5 ML/M^2
BH CV ECHO MEAS - LV V1 MAX: 104 CM/SEC
BH CV ECHO MEAS - LV V1 MEAN: 66.4 CM/SEC
BH CV ECHO MEAS - LV V1 VTI: 21.5 CM
BH CV ECHO MEAS - LVIDD: 4.3 CM
BH CV ECHO MEAS - LVIDS: 3.3 CM
BH CV ECHO MEAS - LVLD AP4: 7.7 CM
BH CV ECHO MEAS - LVLS AP4: 6.1 CM
BH CV ECHO MEAS - LVOT AREA (M): 2.5 CM^2
BH CV ECHO MEAS - LVOT AREA: 2.5 CM^2
BH CV ECHO MEAS - LVOT DIAM: 1.8 CM
BH CV ECHO MEAS - LVPWD: 1.1 CM
BH CV ECHO MEAS - MED PEAK E' VEL: 8.27 CM/SEC
BH CV ECHO MEAS - MV A MAX VEL: 53.4 CM/SEC
BH CV ECHO MEAS - MV DEC SLOPE: 651 CM/SEC^2
BH CV ECHO MEAS - MV DEC TIME: 0.17 SEC
BH CV ECHO MEAS - MV E MAX VEL: 108 CM/SEC
BH CV ECHO MEAS - MV E/A: 2
BH CV ECHO MEAS - RAP SYSTOLE: 5 MMHG
BH CV ECHO MEAS - RVSP: 37 MMHG
BH CV ECHO MEAS - SI(AO): 71.3 ML/M^2
BH CV ECHO MEAS - SI(CUBED): 21.3 ML/M^2
BH CV ECHO MEAS - SI(LVOT): 27.7 ML/M^2
BH CV ECHO MEAS - SI(MOD-SP4): 42.2 ML/M^2
BH CV ECHO MEAS - SI(TEICH): 19 ML/M^2
BH CV ECHO MEAS - SV(AO): 140.9 ML
BH CV ECHO MEAS - SV(CUBED): 42 ML
BH CV ECHO MEAS - SV(LVOT): 54.7 ML
BH CV ECHO MEAS - SV(MOD-SP4): 83.5 ML
BH CV ECHO MEAS - SV(TEICH): 37.5 ML
BH CV ECHO MEAS - TR MAX VEL: 284 CM/SEC
BH CV ECHO MEASUREMENTS AVERAGE E/E' RATIO: 13.7
BILIRUB SERPL-MCNC: 1.2 MG/DL (ref 0.2–1.2)
BUN BLDV-MCNC: 19 MG/DL (ref 8–23)
CALCIUM SERPL-MCNC: 9.3 MG/DL (ref 8.8–10.2)
CHLORIDE BLD-SCNC: 103 MMOL/L (ref 98–111)
CHOLESTEROL, TOTAL: 138 MG/DL (ref 160–199)
CO2: 28 MMOL/L (ref 22–29)
CREAT SERPL-MCNC: 0.5 MG/DL (ref 0.5–0.9)
EOSINOPHILS ABSOLUTE: 0 K/UL (ref 0–0.6)
EOSINOPHILS RELATIVE PERCENT: 0.5 % (ref 0–5)
GFR NON-AFRICAN AMERICAN: >60
GLUCOSE BLD-MCNC: 95 MG/DL (ref 74–109)
HBA1C MFR BLD: 8.1 % (ref 4–6)
HCT VFR BLD CALC: 40.8 % (ref 37–47)
HDLC SERPL-MCNC: 78 MG/DL (ref 65–121)
HEMOGLOBIN: 12.8 G/DL (ref 12–16)
IMMATURE GRANULOCYTES #: 0.1 K/UL
LDL CHOLESTEROL CALCULATED: 44 MG/DL
LEFT ATRIUM VOLUME INDEX: 30.4 ML/M2
LEFT ATRIUM VOLUME: 60.1 CM3
LV EF 2D ECHO EST: 60 %
LYMPHOCYTES ABSOLUTE: 1.3 K/UL (ref 1.1–4.5)
LYMPHOCYTES RELATIVE PERCENT: 16.4 % (ref 20–40)
MCH RBC QN AUTO: 31.7 PG (ref 27–31)
MCHC RBC AUTO-ENTMCNC: 31.4 G/DL (ref 33–37)
MCV RBC AUTO: 101 FL (ref 81–99)
MONOCYTES ABSOLUTE: 0.5 K/UL (ref 0–0.9)
MONOCYTES RELATIVE PERCENT: 6.2 % (ref 0–10)
NEUTROPHILS ABSOLUTE: 5.8 K/UL (ref 1.5–7.5)
NEUTROPHILS RELATIVE PERCENT: 75.8 % (ref 50–65)
PDW BLD-RTO: 15.6 % (ref 11.5–14.5)
PLATELET # BLD: 191 K/UL (ref 130–400)
PMV BLD AUTO: 11.6 FL (ref 9.4–12.3)
POTASSIUM SERPL-SCNC: 3.9 MMOL/L (ref 3.5–5)
RBC # BLD: 4.04 M/UL (ref 4.2–5.4)
SODIUM BLD-SCNC: 143 MMOL/L (ref 136–145)
TOTAL PROTEIN: 6.4 G/DL (ref 6.6–8.7)
TRIGL SERPL-MCNC: 79 MG/DL (ref 0–149)
WBC # BLD: 7.6 K/UL (ref 4.8–10.8)

## 2020-07-13 ENCOUNTER — VIRTUAL VISIT (OUTPATIENT)
Dept: FAMILY MEDICINE CLINIC | Age: 65
End: 2020-07-13
Payer: MEDICARE

## 2020-07-13 VITALS
HEART RATE: 66 BPM | DIASTOLIC BLOOD PRESSURE: 65 MMHG | SYSTOLIC BLOOD PRESSURE: 129 MMHG | HEIGHT: 64 IN | BODY MASS INDEX: 35.17 KG/M2 | OXYGEN SATURATION: 99 % | WEIGHT: 206 LBS

## 2020-07-13 PROBLEM — H66.92 LEFT OTITIS MEDIA: Status: RESOLVED | Noted: 2019-11-21 | Resolved: 2020-07-13

## 2020-07-13 PROCEDURE — 3052F HG A1C>EQUAL 8.0%<EQUAL 9.0%: CPT | Performed by: FAMILY MEDICINE

## 2020-07-13 PROCEDURE — 3017F COLORECTAL CA SCREEN DOC REV: CPT | Performed by: FAMILY MEDICINE

## 2020-07-13 PROCEDURE — G0439 PPPS, SUBSEQ VISIT: HCPCS | Performed by: FAMILY MEDICINE

## 2020-07-13 RX ORDER — EMPAGLIFLOZIN 25 MG/1
25 TABLET, FILM COATED ORAL DAILY
Qty: 30 TABLET | Refills: 5 | Status: SHIPPED | OUTPATIENT
Start: 2020-07-13 | End: 2020-07-29 | Stop reason: SINTOL

## 2020-07-13 RX ORDER — GLIPIZIDE 10 MG/1
10 TABLET ORAL DAILY
Qty: 30 TABLET | Refills: 5 | Status: SHIPPED | OUTPATIENT
Start: 2020-07-13 | End: 2020-08-19 | Stop reason: SINTOL

## 2020-07-13 ASSESSMENT — LIFESTYLE VARIABLES: HOW OFTEN DO YOU HAVE A DRINK CONTAINING ALCOHOL: 0

## 2020-07-13 ASSESSMENT — PATIENT HEALTH QUESTIONNAIRE - PHQ9
SUM OF ALL RESPONSES TO PHQ QUESTIONS 1-9: 0
SUM OF ALL RESPONSES TO PHQ QUESTIONS 1-9: 0

## 2020-07-13 NOTE — PROGRESS NOTES
Medicare Annual Wellness Visit  Are Name: Voncile Merlin Date: 2020   MRN: [id number] Sex: Female   Age: 62 y.o. Ethnicity: Non-/Non    : 1955 Race: Krissy Fagan is here for Medicare AWV    Screenings for behavioral, psychosocial and functional/safety risks, and cognitive dysfunction are all negative except as indicated below. These results, as well as other patient data from the [address] form, are documented in Flowsheets linked to this Encounter. She had A1C 8.1. She states blood sugar running better, about 95-130s. Mammogram UTD, due in December. Cscope UTD. Immunizations UTD.      Lab Results   Component Value Date    WBC 7.6 2020    HGB 12.8 2020    HCT 40.8 2020    .0 (H) 2020     2020     Lab Results   Component Value Date     2020    K 3.9 2020     2020    CO2 28 2020    BUN 19 2020    CREATININE 0.5 2020    GLUCOSE 95 2020    CALCIUM 9.3 2020    PROT 6.4 (L) 2020    LABALBU 4.0 2020    BILITOT 1.2 2020    ALKPHOS 57 2020    AST 13 2020    ALT 28 2020    LABGLOM >60 2020       Lab Results   Component Value Date    CHOL 138 (L) 2020    CHOL 136 (L) 2020    CHOL 151 (L) 2019     Lab Results   Component Value Date    TRIG 79 2020    TRIG 85 2020    TRIG 96 2019     Lab Results   Component Value Date    HDL 78 2020    HDL 82 2020    HDL 89 2019     Lab Results   Component Value Date    LDLCALC 44 2020    LDLCALC 37 2020    LDLCALC 43 2019     No results found for: LABVLDL, VLDL  No results found for: CHOLHDLRATIO      Allergies   Allergen Reactions    Codeine Other (See Comments)     Chest pain    Albuterol      Rapid heart rate    Augmentin [Amoxicillin-Pot Clavulanate] Palpitations    Sulfa Antibiotics Rash         Prior to Visit Medications    Medication Sig Taking? Authorizing Provider   empagliflozin (JARDIANCE) 25 MG tablet Take 25 mg by mouth daily Yes Laxmi Guerrero MD   metFORMIN (GLUCOPHAGE) 500 MG tablet Take 1 tablet by mouth every morning AND 1 tablet every evening. Yes Laxmi Guerrero MD   glipiZIDE (GLUCOTROL) 10 MG tablet Take 1 tablet by mouth daily Yes Laxmi Guerrero MD   Blood Glucose Monitoring Suppl (FREESTYLE INSULINX SYSTEM) w/Device KIT 1 each by Does not apply route daily  Laxmi Guerrero MD   blood glucose test strips (FREESTYLE INSULINX TEST) strip 1 each by In Vitro route daily As needed. MD Honorio AcharyaStyle Lancets MISC 1 each by Does not apply route daily  Laxmi Guerrero MD   nitroGLYCERIN (NITROSTAT) 0.4 MG SL tablet PLACE 1 TABLET UNDER THE TONGUE EVERY 5 MINUTES AS NEEDED FOR CHEST PAIN  Laxmi Guerrero MD   nystatin (MYCOSTATIN) 530289 UNIT/GM cream Apply topically 2 times daily. Robina Essex, APRN   losartan (COZAAR) 25 MG tablet TAKE 1 TABLET BY MOUTH DAILY  Laxmi Guerrero MD   pantoprazole (PROTONIX) 40 MG tablet TAKE 1 TABLET BY MOUTH 2 TIMES A DAY  Laxmi Guerrero MD   metoprolol tartrate (LOPRESSOR) 50 MG tablet TAKE 1 TABLET BY MOUTH 2 TIMES A DAY  Laxmi Guerrero MD   potassium chloride (KLOR-CON M) 20 MEQ extended release tablet TAKE 1 TABLET BY MOUTH DAILY. Laxmi Guerrero MD   isosorbide mononitrate (IMDUR) 30 MG extended release tablet TAKE 1 TABLET BY MOUTH DAILY  MARYANNE Reeder   simvastatin (ZOCOR) 40 MG tablet Take 1 tablet by mouth nightly  Laxmi Guerrero MD   Tofacitinib Citrate (XELJANZ XR) 11 MG TB24 Take 11 mg by mouth daily  Historical Provider, MD   furosemide (LASIX) 20 MG tablet Take 20 mg by mouth daily  Historical Provider, MD   FREESTYLE LANCETS MISC TEST SUGAR DAILY AS DIRECTED UP TO [facility] Libra Pardo MD   clobetasol (TEMOVATE) 0.05 % ointment Apply topically 2 times daily for 6 weeks then as needed.   Patient taking differently: Apply topically 2 times daily as needed Apply topically 2 times daily for 6 weeks then as needed. MD VIRAL SargentSTYLE LITE strip USE 1 STRIP TWICE DAILY TO CHECK BLOOD SUGAR  Bettye Spivey MD   apixaban (ELIQUIS) 5 MG TABS tablet Take 5 mg by mouth 2 times daily   Historical Provider, MD   verapamil (CALAN SR) 240 MG extended release tablet Take 240 mg by mouth daily   Historical Provider, MD   amiodarone (PACERONE) 100 MG tablet Take 100 mg by mouth daily   Historical Provider, MD   FREESTYLE LANCETS MISC TEST SUGAR DAILY AS DIRECTED  Bettye Spivey MD   predniSONE (DELTASONE) 5 MG tablet Take 5 mg by mouth 2 times daily Take 5 mg in the morning and 4 mg in the evening.   Historical Provider, MD   aspirin EC 81 MG EC tablet Take 81 mg by mouth daily  Historical Provider, MD   folic acid (FOLVITE) 1 MG tablet Take 1 mg by mouth daily   Historical Provider, MD         Past Medical History:   Diagnosis Date    Atrial flutter by electrocardiogram ([address]     Constipation     Coronary artery disease     Diabetes mellitus ([address]     Essential hypertension     Hyperlipidemia     IBS (irritable bowel syndrome)     Iron deficiency anemia     Menopause     Paroxysmal SVT (supraventricular tachycardia) (HCC)     RA (rheumatoid arthritis) ([address]     Rheumatoid arthritis ([address]        Past Surgical History:   Procedure Laterality Date    CARDIAC SURGERY  2000    CABG (Dr. Silverio Hernandes) [address]  2009    Stent     CHOLECYSTECTOMY      CORONARY ARTERY BYPASS GRAFT  2000    DILATION AND CURETTAGE OF UTERUS N/A 8/16/2019    DILATATION AND CURETTAGE HYSTEROSCOPY W/ Cherylene Sims performed by [facility] at [facility] OR    TOTAL KNEE ARTHROPLASTY           Family History   Problem Relation Age of Onset    Heart Attack Father     Prostate Cancer Father     Stroke Father     Heart Attack Brother     Stroke Brother        [facility] (Including outside Panel; Future  Blood pressure is stable. Continue current medications. Monitor ambulatory bp readings, if persistently >140/90, return to clinic. Paroxysmal atrial fibrillation (HCC)  Paroxysmal SVT (supraventricular tachycardia) (HCC)  Stable, continue same. Type 2 diabetes mellitus without complication, without long-term current use of insulin (HCC)  -     metFORMIN (GLUCOPHAGE) 500 MG tablet; Take 1 tablet by mouth every morning AND 1 tablet every evening.  -     Hemoglobin A1C; Future  -     MICROALBUMIN, RANDOM URINE (W/O CREATININE)  Metformin daily, add jardiance. SE profile discussed. Rheumatoid arthritis involving multiple sites, unspecified rheumatoid factor presence (HCC)  Stable, continue same. Morbid obesity with BMI of 40.0-44.9, adult ([facility]  Must work on diet and exercise. Familial hypercholesterolemia  Stable, continue same. Leukocytosis, unspecified type  Stable, continue same. Other orders  -     empagliflozin (JARDIANCE) 25 MG tablet; Take 25 mg by mouth daily  -     glipiZIDE (GLUCOTROL) 10 MG tablet; Take 1 tablet by mouth daily              Vivi Fagan is a 62 y.o. female being evaluated by a Virtual Visit (phone) encounter to address concerns as mentioned above. A caregiver was present when appropriate. Due to this being a TeleHealth encounter (During [facility]- public health emergency), evaluation of the following organ systems was limited: Vitals/Constitutional/EENT/Resp/CV/GI//MS/Neuro/Skin/Heme-Lymph-Imm. Pursuant to the emergency declaration under the [address] [place] [address] authority and the [employer] Act, this Virtual Visit was conducted with patient's (and/or legal guardian's) consent, to reduce the patient's risk of exposure to COVID-19 and provide necessary medical care.   The patient (and/or legal guardian) has also been advised to contact this office for worsening conditions or problems, and seek emergency medical treatment and/or call 911 if deemed necessary. Patient identification was verified at the start of the visit: Yes    Services were provided through phone to substitute for in-person clinic visit. Patient and provider were located at their individual homes. --Balta Tomlin MD on 7/13/2020 at 11:05 AM    An electronic signature was used to authenticate this note.

## 2020-07-29 ENCOUNTER — TELEPHONE (OUTPATIENT)
Dept: FAMILY MEDICINE CLINIC | Age: 65
End: 2020-07-29

## 2020-07-29 RX ORDER — EMPAGLIFLOZIN 10 MG/1
10 TABLET, FILM COATED ORAL DAILY
Qty: 30 TABLET | Refills: 3 | Status: SHIPPED | OUTPATIENT
Start: 2020-07-29 | End: 2020-10-08 | Stop reason: SDUPTHER

## 2020-07-29 NOTE — TELEPHONE ENCOUNTER
Pt called and states since she was started on Jardiance she is having diarrhea and her Blood sugars are running on the low side. First thing in the am before eating it is running in the 60's and 70's. There have been a couple of times in the afternoon where it was in the 50's before eating dinner. . Please advise.

## 2020-08-18 ENCOUNTER — TELEPHONE (OUTPATIENT)
Dept: FAMILY MEDICINE CLINIC | Age: 65
End: 2020-08-18

## 2020-08-18 NOTE — TELEPHONE ENCOUNTER
Pt calls today because about 20 minutes after she took her Glipizide yesterday morning she had some pain across her back and shoulders and had some SOB. This lasted about 10 minutes. She states this is the first time that has happened since she started the Glipizide. She wants to know if she should continue this medication.  Please advise

## 2020-09-01 ENCOUNTER — TELEPHONE (OUTPATIENT)
Dept: FAMILY MEDICINE CLINIC | Age: 65
End: 2020-09-01

## 2020-09-01 ENCOUNTER — OFFICE VISIT (OUTPATIENT)
Dept: FAMILY MEDICINE CLINIC | Age: 65
End: 2020-09-01
Payer: MEDICARE

## 2020-09-01 VITALS
WEIGHT: 206.6 LBS | BODY MASS INDEX: 35.46 KG/M2 | OXYGEN SATURATION: 97 % | TEMPERATURE: 97 F | DIASTOLIC BLOOD PRESSURE: 62 MMHG | HEART RATE: 70 BPM | SYSTOLIC BLOOD PRESSURE: 128 MMHG

## 2020-09-01 LAB
APPEARANCE FLUID: ABNORMAL
BILIRUBIN, POC: NEGATIVE
BLOOD URINE, POC: ABNORMAL
CLARITY, POC: ABNORMAL
COLOR, POC: ABNORMAL
GLUCOSE URINE, POC: ABNORMAL
KETONES, POC: ABNORMAL
LEUKOCYTE EST, POC: NEGATIVE
NITRITE, POC: NEGATIVE
PH, POC: 5
PROTEIN, POC: NEGATIVE
SPECIFIC GRAVITY, POC: 1.01
UROBILINOGEN, POC: ABNORMAL

## 2020-09-01 PROCEDURE — 1036F TOBACCO NON-USER: CPT | Performed by: CLINICAL NURSE SPECIALIST

## 2020-09-01 PROCEDURE — G8427 DOCREV CUR MEDS BY ELIG CLIN: HCPCS | Performed by: CLINICAL NURSE SPECIALIST

## 2020-09-01 PROCEDURE — 3017F COLORECTAL CA SCREEN DOC REV: CPT | Performed by: CLINICAL NURSE SPECIALIST

## 2020-09-01 PROCEDURE — G8417 CALC BMI ABV UP PARAM F/U: HCPCS | Performed by: CLINICAL NURSE SPECIALIST

## 2020-09-01 PROCEDURE — 81002 URINALYSIS NONAUTO W/O SCOPE: CPT | Performed by: CLINICAL NURSE SPECIALIST

## 2020-09-01 PROCEDURE — 99213 OFFICE O/P EST LOW 20 MIN: CPT | Performed by: CLINICAL NURSE SPECIALIST

## 2020-09-01 RX ORDER — NYSTATIN 100000 U/G
CREAM TOPICAL
Qty: 30 G | Refills: 5 | Status: SHIPPED | OUTPATIENT
Start: 2020-09-01 | End: 2020-09-10

## 2020-09-01 RX ORDER — NYSTATIN 100000 [USP'U]/G
POWDER TOPICAL
Qty: 30 G | Refills: 5 | Status: SHIPPED | OUTPATIENT
Start: 2020-09-01

## 2020-09-01 NOTE — PROGRESS NOTES
SUBJECTIVE:  Kalli Simon is a 62 y.o. who presents today for Rash (Rash in the groin area.)      KEV Burgess presents today with a rash in her bilateral groin folds. She developed the rash to her left groin approximately 2 weeks ago. It started to spread and worsen and now with similar symptoms to her right groin fold. She also has had vaginal itching and irritation, no discharge. She has used aquaphor to her groin folds without improvement. Relates mild dysuria and chronic urinary frequency. Started on jardiance over a month ago.     Past Medical History:   Diagnosis Date    Atrial flutter by electrocardiogram ([facility] [address]     Constipation     Coronary artery disease     Diabetes mellitus (HCC)     Essential hypertension     Hyperlipidemia     IBS (irritable bowel syndrome)     Iron deficiency anemia     Menopause     Paroxysmal SVT (supraventricular tachycardia) (HCC)     RA (rheumatoid arthritis) (HCC)     Rheumatoid arthritis ([facility] [address]      Past Surgical History:   Procedure Laterality Date    CARDIAC SURGERY      [facility] (Dr. Sagrario Kim) [address]  2009    Stent     CHOLECYSTECTOMY      CORONARY ARTERY BYPASS GRAFT      DILATION AND CURETTAGE OF UTERUS N/A 2019    DILATATION AND CURETTAGE HYSTEROSCOPY W/ Dea Mann performed by Brian Luz MD at [facility] OR    TOTAL KNEE ARTHROPLASTY       Family History   Problem Relation Age of Onset    Heart Attack Father     Prostate Cancer Father     Stroke Father     Heart Attack Brother     Stroke Brother      Social History     Tobacco Use    Smoking status: Former Smoker     Packs/day: 0.50     Years: 20.00     Pack years: 10.00     Types: Cigarettes     Last attempt to quit: 2000     Years since quittin.9    Smokeless tobacco: Never Used   Substance Use Topics    Alcohol use: No     Current Outpatient Medications   Medication Sig Dispense Refill    nystatin (MYCOSTATIN) 371143 UNIT/GM cream Apply topically 2 times daily. 30 g 5    nystatin (MYCOSTATIN) 767521 UNIT/GM powder Apply topically 4 times daily. 30 g 5    miconazole (MICOTIN) 2 % vaginal cream Place vaginally nightly for 7 nights 45 g 0    SITagliptin (JANUVIA) 25 MG tablet Take 1 tablet by mouth daily 30 tablet 5    empagliflozin (JARDIANCE) 10 MG tablet Take 1 tablet by mouth daily 30 tablet 3    metFORMIN (GLUCOPHAGE) 500 MG tablet Take 1 tablet by mouth every morning AND 1 tablet every evening. 90 tablet 5    Blood Glucose Monitoring Suppl (FREESTYLE INSULINX SYSTEM) w/Device KIT 1 each by Does not apply route daily 1 kit 0    blood glucose test strips (FREESTYLE INSULINX TEST) strip 1 each by In Vitro route daily As needed. 100 each 3    FreeStyle Lancets MISC 1 each by Does not apply route daily 100 each 3    nitroGLYCERIN (NITROSTAT) 0.4 MG SL tablet PLACE 1 TABLET UNDER THE TONGUE EVERY 5 MINUTES AS NEEDED FOR CHEST PAIN 25 tablet 0    nystatin (MYCOSTATIN) 111086 UNIT/GM cream Apply topically 2 times daily. 30 g 2    losartan (COZAAR) 25 MG tablet TAKE 1 TABLET BY MOUTH DAILY 90 tablet 0    pantoprazole (PROTONIX) 40 MG tablet TAKE 1 TABLET BY MOUTH 2 TIMES A  tablet 0    metoprolol tartrate (LOPRESSOR) 50 MG tablet TAKE 1 TABLET BY MOUTH 2 TIMES A  tablet 3    potassium chloride (KLOR-CON M) 20 MEQ extended release tablet TAKE 1 TABLET BY MOUTH DAILY. 90 tablet 3    isosorbide mononitrate (IMDUR) 30 MG extended release tablet TAKE 1 TABLET BY MOUTH DAILY 90 tablet 3    simvastatin (ZOCOR) 40 MG tablet Take 1 tablet by mouth nightly 90 tablet 3    Tofacitinib Citrate (XELJANZ XR) 11 MG TB24 Take 11 mg by mouth daily      furosemide (LASIX) 20 MG tablet Take 20 mg by mouth daily      FREESTYLE LANCETS MISC TEST SUGAR DAILY AS DIRECTED UP TO 4 TIMES A DAY 4 each 0    clobetasol (TEMOVATE) 0.05 % ointment Apply topically 2 times daily for 6 weeks then as needed.  (Patient taking differently: Apply topically 2 times daily as needed Apply topically 2 times daily for 6 weeks then as needed.) 1 Tube 3    FREESTYLE LITE strip USE 1 STRIP TWICE DAILY TO CHECK BLOOD SUGAR 100 strip 1    apixaban (ELIQUIS) 5 MG TABS tablet Take 5 mg by mouth 2 times daily       verapamil (CALAN SR) 240 MG extended release tablet Take 240 mg by mouth daily       amiodarone (PACERONE) 100 MG tablet Take 100 mg by mouth daily       FREESTYLE LANCETS MISC TEST SUGAR DAILY AS DIRECTED 100 each 2    predniSONE (DELTASONE) 5 MG tablet Take 5 mg by mouth 2 times daily Take 5 mg in the morning and 4 mg in the evening.  aspirin EC 81 MG EC tablet Take 81 mg by mouth daily      folic acid (FOLVITE) 1 MG tablet Take 1 mg by mouth daily        No current facility-administered medications for this visit. Allergies   Allergen Reactions    Codeine Other (See Comments)     Chest pain    Albuterol      Rapid heart rate    Augmentin [Amoxicillin-Pot Clavulanate] Palpitations    Sulfa Antibiotics Rash       Review of Systems   Genitourinary: Positive for dysuria, frequency and vaginal pain (itching). Negative for difficulty urinating, genital sores, hematuria, urgency, vaginal bleeding and vaginal discharge. Skin: Positive for rash. OBJECTIVE:  /62   Pulse 70   Temp 97 °F (36.1 °C) (Temporal)   Wt 206 lb 9.6 oz (93.7 kg)   SpO2 97%   BMI 35.46 kg/m²    Physical Exam  Vitals signs reviewed. Constitutional:       General: She is not in acute distress. Appearance: She is well-developed. She is obese. She is not ill-appearing or toxic-appearing. HENT:      Head: Normocephalic and atraumatic. Eyes:      General:         Right eye: No discharge. Left eye: No discharge. Conjunctiva/sclera: Conjunctivae normal.      Pupils: Pupils are equal, round, and reactive to light. Neck:      Musculoskeletal: Normal range of motion and neck supple. Thyroid: No thyromegaly. Trachea: No tracheal deviation. Cardiovascular:      Rate and Rhythm: Normal rate and regular rhythm. Heart sounds: No murmur. Pulmonary:      Effort: Pulmonary effort is normal. No respiratory distress. Breath sounds: Normal breath sounds. No wheezing or rales. Genitourinary:     Vagina: Normal.   Musculoskeletal: Normal range of motion. General: Swelling (chronic RA) present. No signs of injury. Right lower leg: Edema present. Left lower leg: Edema present. Skin:     General: Skin is warm and dry. Findings: Erythema (bilateral groin) and rash (bilateral groin) present. Neurological:      General: No focal deficit present. Mental Status: She is alert and oriented to person, place, and time. Mental status is at baseline. Psychiatric:         Mood and Affect: Mood normal.         Behavior: Behavior normal.         Thought Content: Thought content normal.         Judgment: Judgment normal.         ASSESSMENT/PLAN:  1. Candidal skin infection  - nystatin (MYCOSTATIN) 355692 UNIT/GM cream; Apply topically 2 times daily. Dispense: 30 g; Refill: 5  - nystatin (MYCOSTATIN) 497583 UNIT/GM powder; Apply topically 4 times daily. Dispense: 30 g; Refill: 5    2. Vagina, candidiasis  - POCT Urinalysis no Micro  - miconazole (MICOTIN) 2 % vaginal cream; Place vaginally nightly for 7 nights  Dispense: 45 g; Refill: 0    Suspected 2/2 jardiance. Will treat. Gave supportive and preventive measures as well. If recurrent, may need to stop jardiance. Return for already scheduled.

## 2020-09-02 ENCOUNTER — TELEPHONE (OUTPATIENT)
Dept: FAMILY MEDICINE CLINIC | Age: 65
End: 2020-09-02

## 2020-09-04 ENCOUNTER — TELEPHONE (OUTPATIENT)
Dept: FAMILY MEDICINE CLINIC | Age: 65
End: 2020-09-04

## 2020-09-04 NOTE — TELEPHONE ENCOUNTER
Pt calls today because she received a jury summons and wants to know if she can get a medical excuse. Leonidas Best for letter?

## 2020-09-08 ENCOUNTER — OFFICE VISIT (OUTPATIENT)
Dept: OBGYN | Age: 65
End: 2020-09-08
Payer: MEDICARE

## 2020-09-08 VITALS
HEART RATE: 73 BPM | SYSTOLIC BLOOD PRESSURE: 105 MMHG | WEIGHT: 206 LBS | BODY MASS INDEX: 35.17 KG/M2 | HEIGHT: 64 IN | DIASTOLIC BLOOD PRESSURE: 65 MMHG

## 2020-09-08 DIAGNOSIS — R31.0 GROSS HEMATURIA: ICD-10-CM

## 2020-09-08 LAB
BACTERIA: NEGATIVE /HPF
BILIRUBIN URINE: NEGATIVE
BLOOD, URINE: ABNORMAL
CLARITY: CLEAR
COLOR: YELLOW
CRYSTALS, UA: ABNORMAL /HPF
EPITHELIAL CELLS, UA: 3 /HPF (ref 0–5)
GLUCOSE URINE: >=1000 MG/DL
HYALINE CASTS: 2 /HPF (ref 0–8)
KETONES, URINE: NEGATIVE MG/DL
LEUKOCYTE ESTERASE, URINE: NEGATIVE
NITRITE, URINE: NEGATIVE
PH UA: 6 (ref 5–8)
PROTEIN UA: NEGATIVE MG/DL
RBC UA: 1 /HPF (ref 0–4)
SPECIFIC GRAVITY UA: 1.04 (ref 1–1.03)
UROBILINOGEN, URINE: 1 E.U./DL
WBC UA: 1 /HPF (ref 0–5)

## 2020-09-08 PROCEDURE — 99214 OFFICE O/P EST MOD 30 MIN: CPT | Performed by: NURSE PRACTITIONER

## 2020-09-08 PROCEDURE — G0101 CA SCREEN;PELVIC/BREAST EXAM: HCPCS | Performed by: NURSE PRACTITIONER

## 2020-09-08 PROCEDURE — 3017F COLORECTAL CA SCREEN DOC REV: CPT | Performed by: NURSE PRACTITIONER

## 2020-09-08 PROCEDURE — G8427 DOCREV CUR MEDS BY ELIG CLIN: HCPCS | Performed by: NURSE PRACTITIONER

## 2020-09-08 PROCEDURE — G8417 CALC BMI ABV UP PARAM F/U: HCPCS | Performed by: NURSE PRACTITIONER

## 2020-09-08 PROCEDURE — 1036F TOBACCO NON-USER: CPT | Performed by: NURSE PRACTITIONER

## 2020-09-08 ASSESSMENT — ENCOUNTER SYMPTOMS
GASTROINTESTINAL NEGATIVE: 1
EYES NEGATIVE: 1
RESPIRATORY NEGATIVE: 1

## 2020-09-08 NOTE — PROGRESS NOTES
Refill    nystatin (MYCOSTATIN) 530797 UNIT/GM cream Apply topically 2 times daily. 30 g 5    nystatin (MYCOSTATIN) 251303 UNIT/GM powder Apply topically 4 times daily. 30 g 5    miconazole (MICOTIN) 2 % vaginal cream Place vaginally nightly for 7 nights 45 g 0    SITagliptin (JANUVIA) 25 MG tablet Take 1 tablet by mouth daily 30 tablet 5    empagliflozin (JARDIANCE) 10 MG tablet Take 1 tablet by mouth daily 30 tablet 3    metFORMIN (GLUCOPHAGE) 500 MG tablet Take 1 tablet by mouth every morning AND 1 tablet every evening. 90 tablet 5    Blood Glucose Monitoring Suppl (FREESTYLE INSULINX SYSTEM) w/Device KIT 1 each by Does not apply route daily 1 kit 0    blood glucose test strips (FREESTYLE INSULINX TEST) strip 1 each by In Vitro route daily As needed. 100 each 3    FreeStyle Lancets MISC 1 each by Does not apply route daily 100 each 3    nitroGLYCERIN (NITROSTAT) 0.4 MG SL tablet PLACE 1 TABLET UNDER THE TONGUE EVERY 5 MINUTES AS NEEDED FOR CHEST PAIN 25 tablet 0    nystatin (MYCOSTATIN) 005408 UNIT/GM cream Apply topically 2 times daily. 30 g 2    losartan (COZAAR) 25 MG tablet TAKE 1 TABLET BY MOUTH DAILY 90 tablet 0    pantoprazole (PROTONIX) 40 MG tablet TAKE 1 TABLET BY MOUTH 2 TIMES A  tablet 0    metoprolol tartrate (LOPRESSOR) 50 MG tablet TAKE 1 TABLET BY MOUTH 2 TIMES A  tablet 3    potassium chloride (KLOR-CON M) 20 MEQ extended release tablet TAKE 1 TABLET BY MOUTH DAILY. 90 tablet 3    isosorbide mononitrate (IMDUR) 30 MG extended release tablet TAKE 1 TABLET BY MOUTH DAILY 90 tablet 3    Tofacitinib Citrate (XELJANZ XR) 11 MG TB24 Take 11 mg by mouth daily      furosemide (LASIX) 20 MG tablet Take 20 mg by mouth daily      FREESTYLE LANCETS MISC TEST SUGAR DAILY AS DIRECTED UP TO 4 TIMES A DAY 4 each 0    clobetasol (TEMOVATE) 0.05 % ointment Apply topically 2 times daily for 6 weeks then as needed.  (Patient taking differently: Apply topically 2 times daily as needed Apply topically 2 times daily for 6 weeks then as needed.) 1 Tube 3    FREESTYLE LITE strip USE 1 STRIP TWICE DAILY TO CHECK BLOOD SUGAR 100 strip 1    apixaban (ELIQUIS) 5 MG TABS tablet Take 5 mg by mouth 2 times daily       verapamil (CALAN SR) 240 MG extended release tablet Take 240 mg by mouth daily       amiodarone (PACERONE) 100 MG tablet Take 100 mg by mouth daily       FREESTYLE LANCETS MISC TEST SUGAR DAILY AS DIRECTED 100 each 2    predniSONE (DELTASONE) 5 MG tablet Take 5 mg by mouth 2 times daily Take 5 mg in the morning and 4 mg in the evening.  aspirin EC 81 MG EC tablet Take 81 mg by mouth daily      folic acid (FOLVITE) 1 MG tablet Take 1 mg by mouth daily       simvastatin (ZOCOR) 40 MG tablet TAKE 1 TABLET BY MOUTH NIGHTLY 90 tablet 2     No current facility-administered medications for this visit. Allergies   Allergen Reactions    Codeine Other (See Comments)     Chest pain    Albuterol      Rapid heart rate    Augmentin [Amoxicillin-Pot Clavulanate] Palpitations    Sulfa Antibiotics Rash     Vitals:    09/08/20 1107   BP: 105/65   Pulse: 73     Body mass index is 35.36 kg/m². Review of Systems   Constitutional: Negative. HENT: Negative. Eyes: Negative. Respiratory: Negative. Cardiovascular: Positive for leg swelling (bilateral lower extremities). Gastrointestinal: Negative. Genitourinary: Negative for difficulty urinating, dyspareunia, dysuria, enuresis, frequency, hematuria, menstrual problem, pelvic pain, urgency and vaginal discharge. Musculoskeletal: Negative. Skin: Negative. Itching/yeast to groins     Neurological: Negative. Psychiatric/Behavioral: Negative. Physical Exam  Vitals signs and nursing note reviewed. Constitutional:       General: She is not in acute distress. Appearance: She is well-developed. She is not diaphoretic. HENT:      Head: Normocephalic and atraumatic.       Right Ear: External ear normal. Left Ear: External ear normal.      Nose: Nose normal.   Eyes:      General: Lids are normal.         Right eye: No discharge. Left eye: No discharge. Conjunctiva/sclera: Conjunctivae normal.      Pupils: Pupils are equal, round, and reactive to light. Neck:      Musculoskeletal: Normal range of motion and neck supple. Thyroid: No thyroid mass or thyromegaly. Trachea: No tracheal deviation. Cardiovascular:      Rate and Rhythm: Normal rate and regular rhythm. Heart sounds: Normal heart sounds. No murmur. Pulmonary:      Effort: Pulmonary effort is normal.      Breath sounds: Normal breath sounds. No wheezing. Chest:      Breasts: Breasts are symmetrical.         Right: No inverted nipple, mass, nipple discharge, skin change or tenderness. Left: No inverted nipple, mass, nipple discharge, skin change or tenderness. Abdominal:      General: Bowel sounds are normal. There is no distension. Palpations: Abdomen is soft. There is no mass. Tenderness: There is no abdominal tenderness. Hernia: There is no hernia in the left inguinal area. Genitourinary:     Labia:         Right: No rash, tenderness or lesion. Left: No rash, tenderness or lesion. Vagina: No vaginal discharge or tenderness. Cervix: No cervical motion tenderness or discharge (normal cervical mucosa). Uterus: Not enlarged and not tender. Adnexa:         Right: No mass, tenderness or fullness. Left: No mass, tenderness or fullness. Rectum: No external hemorrhoid. Comments: Pap collected for cervical cytology  Musculoskeletal: Normal range of motion. General: No tenderness. Lymphadenopathy:      Cervical:      Right cervical: No superficial, deep or posterior cervical adenopathy. Left cervical: No superficial, deep or posterior cervical adenopathy.       Upper Body:      Right upper body: No supraclavicular, pectoral or epitrochlear adenopathy. Left upper body: No supraclavicular, pectoral or epitrochlear adenopathy. Skin:     General: Skin is warm and dry. Findings: No rash. Comments: Rash to groins, low extremities swollen, red, tight. No weeping yet, but close on left   Neurological:      Mental Status: She is alert and oriented to person, place, and time. She is not disoriented. Sensory: No sensory deficit. Coordination: Coordination normal.      Gait: Gait normal.      Deep Tendon Reflexes: Reflexes are normal and symmetric. Psychiatric:         Speech: Speech normal.         Behavior: Behavior normal.         Thought Content: Thought content normal.         Judgment: Judgment normal.          Diagnosis Orders   1. Gynecologic exam normal  CO CA SCREEN;PELVIC/BREAST EXAM   2. Screening for cervical cancer  PAP SMEAR    CO CA SCREEN;PELVIC/BREAST EXAM   3. Screening mammogram, encounter for  STEVE DIGITAL SCREEN W OR WO CAD BILATERAL    CO CA SCREEN;PELVIC/BREAST EXAM   4. Gross hematuria  Urinalysis Reflex to Culture   5. Yeast dermatitis     6. Swelling of lower extremity         MEDICATIONS:  No orders of the defined types were placed in this encounter. ORDERS:  Orders Placed This Encounter   Procedures    STEVE DIGITAL SCREEN W OR WO CAD BILATERAL    PAP SMEAR    Urinalysis Reflex to Culture    CO CA SCREEN;PELVIC/BREAST EXAM       PLAN:  Pap collected  Will check urine  To call pcp and let her know about legs. Patient Instructions   Patient Education        Breast Self-Exam: Care Instructions  Your Care Instructions     A breast self-exam is when you check your breasts for lumps or changes. This regular exam helps you learn how your breasts normally look and feel. Most breast problems or changes are not because of cancer.   Breast self-exam is not a substitute for a mammogram. Having regular breast exams by your doctor and regular mammograms improve your chances of finding any problems with your breasts. Some women set a time each month to do a step-by-step breast self-exam. Other women like a less formal system. They might look at their breasts as they brush their teeth, or feel their breasts once in a while in the shower. If you notice a change in your breast, tell your doctor. Follow-up care is a key part of your treatment and safety. Be sure to make and go to all appointments, and call your doctor if you are having problems. It's also a good idea to know your test results and keep a list of the medicines you take. How do you do a breast self-exam?  · The best time to examine your breasts is usually one week after your menstrual period begins. Your breasts should not be tender then. If you do not have periods, you might do your exam on a day of the month that is easy to remember. · To examine your breasts:  ? Remove all your clothes above the waist and lie down. When you are lying down, your breast tissue spreads evenly over your chest wall, which makes it easier to feel all your breast tissue. ? Use the pads--not the fingertips--of the 3 middle fingers of your left hand to check your right breast. Move your fingers slowly in small coin-sized circles that overlap. ? Use three levels of pressure to feel of all your breast tissue. Use light pressure to feel the tissue close to the skin surface. Use medium pressure to feel a little deeper. Use firm pressure to feel your tissue close to your breastbone and ribs. Use each pressure level to feel your breast tissue before moving on to the next spot. ? Check your entire breast, moving up and down as if following a strip from the collarbone to the bra line, and from the armpit to the ribs. Repeat until you have covered the entire breast.  ? Repeat this procedure for your left breast, using the pads of the 3 middle fingers of your right hand. · To examine your breasts while in the shower:  ?  Place one arm over your head and lightly soap your breast on that side. ? Using the pads of your fingers, gently move your hand over your breast (in the strip pattern described above), feeling carefully for any lumps or changes. ? Repeat for the other breast.  · Have your doctor inspect anything you notice to see if you need further testing. Where can you learn more? Go to [email or web address]://[email or web address].com. org and sign in to your [facility] account. Enter P148 in the Soft[employer]k box to learn more about \"Breast Self-Exam: Care Instructions. \"     If you do not have an account, please click on the \"Sign Up Now\" link. Current as of: August 22, 2019               Content Version: 12.5  © 0198-0208 [employer]. Care instructions adapted under license by [facility] ([facility]). If you have questions about a medical condition or this instruction, always ask your healthcare professional. [place] [address] any warranty or liability for your use of this information. Patient Education        Well Visit, Ages 25 to 48: Care Instructions  Your Care Instructions     Physical exams can help you stay healthy. Your doctor has checked your overall health and may have suggested ways to take good care of yourself. He or she also may have recommended tests. At home, you can help prevent illness with healthy eating, regular exercise, and other steps. Follow-up care is a key part of your treatment and safety. Be sure to make and go to all appointments, and call your doctor if you are having problems. It's also a good idea to know your test results and keep a list of the medicines you take. How can you care for yourself at home? · Reach and stay at a healthy weight. This will lower your risk for many problems, such as obesity, diabetes, heart disease, and high blood pressure. · Get at least 30 minutes of physical activity on most days of the week. Walking is a good choice.  You also may want to do other activities, such as running, swimming, cycling, at age 76. Others say to start before age 48 or continue after age 76. Talk with your doctor about your risk and when to start and stop screening. For women  · Breast exam and mammogram. Talk to your doctor about when you should have a clinical breast exam and a mammogram. Medical experts differ on whether and how often women under 50 should have these tests. Your doctor can help you decide what is right for you. · Cervical cancer screening test and pelvic exam. Begin with a Pap test at age 24. The test often is part of a pelvic exam. Starting at age 27, you may choose to have a Pap test, an HPV test, or both tests at the same time (called co-testing). Talk with your doctor about how often to have testing. · Tests for sexually transmitted infections (STIs). Ask whether you should have tests for STIs. You may be at risk if you have sex with more than one person, especially if your partners do not wear condoms. For men  · Tests for sexually transmitted infections (STIs). Ask whether you should have tests for STIs. You may be at risk if you have sex with more than one person, especially if you do not wear a condom. · Testicular cancer exam. Ask your doctor whether you should check your testicles regularly. · Prostate exam. Talk to your doctor about whether you should have a blood test (called a PSA test) for prostate cancer. Experts differ on whether and when men should have this test. Some experts suggest it if you are older than 39 and are -American or have a father or brother who got prostate cancer when he was younger than 72. When should you call for help? Watch closely for changes in your health, and be sure to contact your doctor if you have any problems or symptoms that concern you. Where can you learn more? Go to https://alfonzo.health-partners. org and sign in to your [employer] account.  Enter P072 in the Grow box to learn more about \"Well Visit, Ages 25 to 48: Care Instructions. \"     If you do not have an account, please click on the \"Sign Up Now\" link. Current as of: August 22, 2019               Content Version: 12.5  © 5807-0616 [employer]. Care instructions adapted under license by [facility] ([facility]). If you have questions about a medical condition or this instruction, always ask your healthcare professional. [address] any warranty or liability for your use of this information. We are committed to providing you with the best care possible. In order to help us achieve these goals please remember to bring all medications, herbal products, and over the counter supplements with you to each visit. If your provider has ordered testing for you, please be sure to follow up with our office if you have not received results within 7 days after testing took place. *If you receive a survey after visiting one of our offices, please take the time to share your experience concerning your physician office visit. These surveys are confidential and no health information about you is shared. We are eager to improve for you and we are counting on your feedback to help make that happen.

## 2020-09-08 NOTE — PATIENT INSTRUCTIONS
Patient Education        Breast Self-Exam: Care Instructions  Your Care Instructions     A breast self-exam is when you check your breasts for lumps or changes. This regular exam helps you learn how your breasts normally look and feel. Most breast problems or changes are not because of cancer. Breast self-exam is not a substitute for a mammogram. Having regular breast exams by your doctor and regular mammograms improve your chances of finding any problems with your breasts. Some women set a time each month to do a step-by-step breast self-exam. Other women like a less formal system. They might look at their breasts as they brush their teeth, or feel their breasts once in a while in the shower. If you notice a change in your breast, tell your doctor. Follow-up care is a key part of your treatment and safety. Be sure to make and go to all appointments, and call your doctor if you are having problems. It's also a good idea to know your test results and keep a list of the medicines you take. How do you do a breast self-exam?  · The best time to examine your breasts is usually one week after your menstrual period begins. Your breasts should not be tender then. If you do not have periods, you might do your exam on a day of the month that is easy to remember. · To examine your breasts:  ? Remove all your clothes above the waist and lie down. When you are lying down, your breast tissue spreads evenly over your chest wall, which makes it easier to feel all your breast tissue. ? Use the pads--not the fingertips--of the 3 middle fingers of your left hand to check your right breast. Move your fingers slowly in small coin-sized circles that overlap. ? Use three levels of pressure to feel of all your breast tissue. Use light pressure to feel the tissue close to the skin surface. Use medium pressure to feel a little deeper. Use firm pressure to feel your tissue close to your breastbone and ribs.  Use each pressure level to problems. It's also a good idea to know your test results and keep a list of the medicines you take. How can you care for yourself at home? · Reach and stay at a healthy weight. This will lower your risk for many problems, such as obesity, diabetes, heart disease, and high blood pressure. · Get at least 30 minutes of physical activity on most days of the week. Walking is a good choice. You also may want to do other activities, such as running, swimming, cycling, or playing tennis or team sports. Discuss any changes in your exercise program with your doctor. · Do not smoke or allow others to smoke around you. If you need help quitting, talk to your doctor about stop-smoking programs and medicines. These can increase your chances of quitting for good. · Talk to your doctor about whether you have any risk factors for sexually transmitted infections (STIs). Having one sex partner (who does not have STIs and does not have sex with anyone else) is a good way to avoid these infections. · Use birth control if you do not want to have children at this time. Talk with your doctor about the choices available and what might be best for you. · Protect your skin from too much sun. When you're outdoors from 10 a.m. to 4 p.m., stay in the shade or cover up with clothing and a hat with a wide brim. Wear sunglasses that block UV rays. Even when it's cloudy, put broad-spectrum sunscreen (SPF 30 or higher) on any exposed skin. · See a dentist one or two times a year for checkups and to have your teeth cleaned. · Wear a seat belt in the car. Follow your doctor's advice about when to have certain tests. These tests can spot problems early. For everyone  · Cholesterol. Have the fat (cholesterol) in your blood tested after age 21. Your doctor will tell you how often to have this done based on your age, family history, or other things that can increase your risk for heart disease. · Blood pressure.  Have your blood pressure checked during a routine doctor visit. Your doctor will tell you how often to check your blood pressure based on your age, your blood pressure results, and other factors. · Vision. Talk with your doctor about how often to have a glaucoma test.  · Diabetes. Ask your doctor whether you should have tests for diabetes. · Colon cancer. Your risk for colorectal cancer gets higher as you get older. Some experts say that adults should start regular screening at age 48 and stop at age 76. Others say to start before age 48 or continue after age 76. Talk with your doctor about your risk and when to start and stop screening. For women  · Breast exam and mammogram. Talk to your doctor about when you should have a clinical breast exam and a mammogram. Medical experts differ on whether and how often women under 50 should have these tests. Your doctor can help you decide what is right for you. · Cervical cancer screening test and pelvic exam. Begin with a Pap test at age 24. The test often is part of a pelvic exam. Starting at age 27, you may choose to have a Pap test, an HPV test, or both tests at the same time (called co-testing). Talk with your doctor about how often to have testing. · Tests for sexually transmitted infections (STIs). Ask whether you should have tests for STIs. You may be at risk if you have sex with more than one person, especially if your partners do not wear condoms. For men  · Tests for sexually transmitted infections (STIs). Ask whether you should have tests for STIs. You may be at risk if you have sex with more than one person, especially if you do not wear a condom. · Testicular cancer exam. Ask your doctor whether you should check your testicles regularly. · Prostate exam. Talk to your doctor about whether you should have a blood test (called a PSA test) for prostate cancer.  Experts differ on whether and when men should have this test. Some experts suggest it if you are older than 39 and are -American or have a father or brother who got prostate cancer when he was younger than 72. When should you call for help? Watch closely for changes in your health, and be sure to contact your doctor if you have any problems or symptoms that concern you. Where can you learn more? Go to https://chpepiceweb.[email or web address] org and sign in to your [employer] account. Enter P072 in the ContestMachine box to learn more about \"Well Visit, Ages 25 to 48: Care Instructions. \"     If you do not have an account, please click on the \"Sign Up Now\" link. Current as of: August 22, 2019               Content Version: 12.5  © 3110-4906 Healthwise, Incorporated. Care instructions adapted under license by [facility] ([facility]). If you have questions about a medical condition or this instruction, always ask your healthcare professional. [address] any warranty or liability for your use of this information. We are committed to providing you with the best care possible. In order to help us achieve these goals please remember to bring all medications, herbal products, and over the counter supplements with you to each visit. If your provider has ordered testing for you, please be sure to follow up with our office if you have not received results within 7 days after testing took place. *If you receive a survey after visiting one of our offices, please take the time to share your experience concerning your physician office visit. These surveys are confidential and no health information about you is shared. We are eager to improve for you and we are counting on your feedback to help make that happen.

## 2020-09-10 ENCOUNTER — OFFICE VISIT (OUTPATIENT)
Dept: FAMILY MEDICINE CLINIC | Age: 65
End: 2020-09-10
Payer: MEDICARE

## 2020-09-10 VITALS
HEART RATE: 62 BPM | WEIGHT: 205.6 LBS | SYSTOLIC BLOOD PRESSURE: 132 MMHG | DIASTOLIC BLOOD PRESSURE: 64 MMHG | TEMPERATURE: 97.4 F | OXYGEN SATURATION: 97 % | BODY MASS INDEX: 35.29 KG/M2

## 2020-09-10 DIAGNOSIS — R60.9 PERIPHERAL EDEMA: ICD-10-CM

## 2020-09-10 DIAGNOSIS — R06.02 SHORTNESS OF BREATH: ICD-10-CM

## 2020-09-10 DIAGNOSIS — I48.0 PAROXYSMAL ATRIAL FIBRILLATION (HCC): ICD-10-CM

## 2020-09-10 DIAGNOSIS — E11.9 TYPE 2 DIABETES MELLITUS WITHOUT COMPLICATION, WITHOUT LONG-TERM CURRENT USE OF INSULIN (HCC): ICD-10-CM

## 2020-09-10 LAB
ALBUMIN SERPL-MCNC: 4.3 G/DL (ref 3.5–5.2)
ALP BLD-CCNC: 74 U/L (ref 35–104)
ALT SERPL-CCNC: 22 U/L (ref 5–33)
ANION GAP SERPL CALCULATED.3IONS-SCNC: 18 MMOL/L (ref 7–19)
AST SERPL-CCNC: 15 U/L (ref 5–32)
BASOPHILS ABSOLUTE: 0 K/UL (ref 0–0.2)
BASOPHILS RELATIVE PERCENT: 0.4 % (ref 0–1)
BILIRUB SERPL-MCNC: 1.2 MG/DL (ref 0.2–1.2)
BUN BLDV-MCNC: 13 MG/DL (ref 8–23)
CALCIUM SERPL-MCNC: 9.1 MG/DL (ref 8.8–10.2)
CHLORIDE BLD-SCNC: 98 MMOL/L (ref 98–111)
CO2: 25 MMOL/L (ref 22–29)
CREAT SERPL-MCNC: 0.6 MG/DL (ref 0.5–0.9)
EOSINOPHILS ABSOLUTE: 0 K/UL (ref 0–0.6)
EOSINOPHILS RELATIVE PERCENT: 0.4 % (ref 0–5)
GFR AFRICAN AMERICAN: >59
GFR NON-AFRICAN AMERICAN: >60
GLUCOSE BLD-MCNC: 232 MG/DL (ref 74–109)
HBA1C MFR BLD: 8.3 % (ref 4–6)
HCT VFR BLD CALC: 44 % (ref 37–47)
HEMOGLOBIN: 13.5 G/DL (ref 12–16)
IMMATURE GRANULOCYTES #: 0.1 K/UL
LYMPHOCYTES ABSOLUTE: 0.6 K/UL (ref 1.1–4.5)
LYMPHOCYTES RELATIVE PERCENT: 6.3 % (ref 20–40)
MCH RBC QN AUTO: 31 PG (ref 27–31)
MCHC RBC AUTO-ENTMCNC: 30.7 G/DL (ref 33–37)
MCV RBC AUTO: 100.9 FL (ref 81–99)
MONOCYTES ABSOLUTE: 0.8 K/UL (ref 0–0.9)
MONOCYTES RELATIVE PERCENT: 7.9 % (ref 0–10)
NEUTROPHILS ABSOLUTE: 8.3 K/UL (ref 1.5–7.5)
NEUTROPHILS RELATIVE PERCENT: 83.6 % (ref 50–65)
PDW BLD-RTO: 15.8 % (ref 11.5–14.5)
PLATELET # BLD: 184 K/UL (ref 130–400)
PMV BLD AUTO: 11.6 FL (ref 9.4–12.3)
POTASSIUM SERPL-SCNC: 3.7 MMOL/L (ref 3.5–5)
PRO-BNP: 343 PG/ML (ref 0–900)
RBC # BLD: 4.36 M/UL (ref 4.2–5.4)
SODIUM BLD-SCNC: 141 MMOL/L (ref 136–145)
TOTAL PROTEIN: 7 G/DL (ref 6.6–8.7)
TSH SERPL DL<=0.05 MIU/L-ACNC: 2.13 UIU/ML (ref 0.27–4.2)
WBC # BLD: 9.9 K/UL (ref 4.8–10.8)

## 2020-09-10 PROCEDURE — 2022F DILAT RTA XM EVC RTNOPTHY: CPT | Performed by: CLINICAL NURSE SPECIALIST

## 2020-09-10 PROCEDURE — 1036F TOBACCO NON-USER: CPT | Performed by: CLINICAL NURSE SPECIALIST

## 2020-09-10 PROCEDURE — G8427 DOCREV CUR MEDS BY ELIG CLIN: HCPCS | Performed by: CLINICAL NURSE SPECIALIST

## 2020-09-10 PROCEDURE — 3017F COLORECTAL CA SCREEN DOC REV: CPT | Performed by: CLINICAL NURSE SPECIALIST

## 2020-09-10 PROCEDURE — G8417 CALC BMI ABV UP PARAM F/U: HCPCS | Performed by: CLINICAL NURSE SPECIALIST

## 2020-09-10 PROCEDURE — 3052F HG A1C>EQUAL 8.0%<EQUAL 9.0%: CPT | Performed by: CLINICAL NURSE SPECIALIST

## 2020-09-10 PROCEDURE — 99214 OFFICE O/P EST MOD 30 MIN: CPT | Performed by: CLINICAL NURSE SPECIALIST

## 2020-09-10 ASSESSMENT — ENCOUNTER SYMPTOMS
CHEST TIGHTNESS: 0
COUGH: 0
CONSTIPATION: 0
BACK PAIN: 0
EYE PAIN: 0
WHEEZING: 0
ABDOMINAL PAIN: 0
DIARRHEA: 0
SORE THROAT: 0
TROUBLE SWALLOWING: 0
SHORTNESS OF BREATH: 1
SINUS PRESSURE: 0
COLOR CHANGE: 0
FACIAL SWELLING: 0
EYE DISCHARGE: 0
EYE REDNESS: 0

## 2020-09-10 NOTE — PROGRESS NOTES
SUBJECTIVE:  Angeles Reddy is a 62 y.o. who presents today for Leg Swelling (bilateral) and Leg Pain      HPI    Ms Day presents today with continued and worsening peripheral edema to BLE. She has had for at least several weeks. It seems to not be getting any better. She typically has a mild peripheral edema that will improve with elevation, but this is constant. There is no weeping or redness. No abdominal swelling or bloating. She has chronic shortness of breath, but unchanged. She was started on [place] about 4-6 weeks ago for uncontrolled type 2 DM. Her glipizide was stopped due to hypoglycemia. She has been dealing with some candidal skin infections but those are improving with treatment. PAF, NSR on exam.  No worsening or persistent palpitations noted. Blood pressure is stable. She notes urinary frequency on new medications.     Past Medical History:   Diagnosis Date    Atrial flutter by electrocardiogram ([address]     Constipation     Coronary artery disease     Diabetes mellitus (HCC)     Essential hypertension     Hyperlipidemia     IBS (irritable bowel syndrome)     Iron deficiency anemia     Menopause     Paroxysmal SVT (supraventricular tachycardia) (HCC)     RA (rheumatoid arthritis) (HCC)     Rheumatoid arthritis ([address]      Past Surgical History:   Procedure Laterality Date    CARDIAC SURGERY  2000    [facility] (Dr. Nandini Thakur) [address]  2009    Stent     CHOLECYSTECTOMY      CORONARY ARTERY BYPASS GRAFT  2000    DILATION AND CURETTAGE OF UTERUS N/A 8/16/2019    DILATATION AND CURETTAGE HYSTEROSCOPY W/ MYOSURE performed by Azul [facility] at [facility] OR    TOTAL KNEE ARTHROPLASTY       Family History   Problem Relation Age of Onset    Heart Attack Father     Prostate Cancer Father     Stroke Father     Heart Attack Brother     Stroke Brother      Social History     Tobacco Use    Smoking status: Former Smoker     Packs/day: 0.50     Years: 20.00     Pack years: 10.00     Types: Cigarettes     Last attempt to quit: 2000     Years since quittin.9    Smokeless tobacco: Never Used   Substance Use Topics    Alcohol use: No     Current Outpatient Medications   Medication Sig Dispense Refill    simvastatin (ZOCOR) 40 MG tablet TAKE 1 TABLET BY MOUTH NIGHTLY 90 tablet 2    nystatin (MYCOSTATIN) 877549 UNIT/GM powder Apply topically 4 times daily. 30 g 5    SITagliptin (JANUVIA) 25 MG tablet Take 1 tablet by mouth daily 30 tablet 5    empagliflozin (JARDIANCE) 10 MG tablet Take 1 tablet by mouth daily 30 tablet 3    metFORMIN (GLUCOPHAGE) 500 MG tablet Take 1 tablet by mouth every morning AND 1 tablet every evening. 90 tablet 5    Blood Glucose Monitoring Suppl (FREESTYLE INSULINX SYSTEM) w/Device KIT 1 each by Does not apply route daily 1 kit 0    blood glucose test strips (FREESTYLE INSULINX TEST) strip 1 each by In Vitro route daily As needed. 100 each 3    FreeStyle Lancets MISC 1 each by Does not apply route daily 100 each 3    nitroGLYCERIN (NITROSTAT) 0.4 MG SL tablet PLACE 1 TABLET UNDER THE TONGUE EVERY 5 MINUTES AS NEEDED FOR CHEST PAIN 25 tablet 0    nystatin (MYCOSTATIN) 286031 UNIT/GM cream Apply topically 2 times daily. 30 g 2    losartan (COZAAR) 25 MG tablet TAKE 1 TABLET BY MOUTH DAILY 90 tablet 0    pantoprazole (PROTONIX) 40 MG tablet TAKE 1 TABLET BY MOUTH 2 TIMES A  tablet 0    metoprolol tartrate (LOPRESSOR) 50 MG tablet TAKE 1 TABLET BY MOUTH 2 TIMES A  tablet 3    potassium chloride (KLOR-CON M) 20 MEQ extended release tablet TAKE 1 TABLET BY MOUTH DAILY.  90 tablet 3    isosorbide mononitrate (IMDUR) 30 MG extended release tablet TAKE 1 TABLET BY MOUTH DAILY 90 tablet 3    Tofacitinib Citrate (XELJANZ XR) 11 MG TB24 Take 11 mg by mouth daily      furosemide (LASIX) 20 MG tablet Take 20 mg by mouth daily      FREESTYLE LANCETS MISC TEST SUGAR DAILY AS DIRECTED UP TO 4 TIMES A DAY 4 each 0   

## 2020-09-12 ENCOUNTER — OFFICE VISIT (OUTPATIENT)
Dept: URGENT CARE | Age: 65
End: 2020-09-12
Payer: MEDICARE

## 2020-09-12 VITALS
SYSTOLIC BLOOD PRESSURE: 124 MMHG | DIASTOLIC BLOOD PRESSURE: 60 MMHG | RESPIRATION RATE: 20 BRPM | OXYGEN SATURATION: 96 % | TEMPERATURE: 98.6 F | WEIGHT: 202.4 LBS | HEART RATE: 68 BPM | HEIGHT: 64 IN | BODY MASS INDEX: 34.56 KG/M2

## 2020-09-12 PROCEDURE — 99213 OFFICE O/P EST LOW 20 MIN: CPT | Performed by: NURSE PRACTITIONER

## 2020-09-12 RX ORDER — CEPHALEXIN 500 MG/1
500 CAPSULE ORAL 3 TIMES DAILY
Qty: 30 CAPSULE | Refills: 0 | Status: SHIPPED | OUTPATIENT
Start: 2020-09-12 | End: 2020-09-22

## 2020-09-12 RX ORDER — DOXYCYCLINE 100 MG/1
100 CAPSULE ORAL 2 TIMES DAILY
Qty: 28 CAPSULE | Refills: 0 | Status: SHIPPED | OUTPATIENT
Start: 2020-09-12 | End: 2020-09-26

## 2020-09-12 ASSESSMENT — ENCOUNTER SYMPTOMS
EYES NEGATIVE: 1
DIARRHEA: 0
CONSTIPATION: 0
CHEST TIGHTNESS: 0
SORE THROAT: 0
SHORTNESS OF BREATH: 0
WHEEZING: 0
VOMITING: 0
COLOR CHANGE: 1
NAUSEA: 0

## 2020-09-12 NOTE — PATIENT INSTRUCTIONS
1. Make appt with PCP Mahnaz Zarco next week for f/u of leg swelling and cellulitis. 2. Take both antibiotics as prescribed. 3. Monitor for worsening symptoms of infection as we discussed (red streaking, fever, fatigue)    Patient Education        Cellulitis: Care Instructions  Your Care Instructions     Cellulitis is a skin infection caused by bacteria, most often strep or staph. It often occurs after a break in the skin from a scrape, cut, bite, or puncture, or after a rash. Cellulitis may be treated without doing tests to find out what caused it. But your doctor may do tests, if needed, to look for a specific bacteria, like methicillin-resistant Staphylococcus aureus (MRSA). The doctor has checked you carefully, but problems can develop later. If you notice any problems or new symptoms, get medical treatment right away. Follow-up care is a key part of your treatment and safety. Be sure to make and go to all appointments, and call your doctor if you are having problems. It's also a good idea to know your test results and keep a list of the medicines you take. How can you care for yourself at home? · Take your antibiotics as directed. Do not stop taking them just because you feel better. You need to take the full course of antibiotics. · Prop up the infected area on pillows to reduce pain and swelling. Try to keep the area above the level of your heart as often as you can. · If your doctor told you how to care for your wound, follow your doctor's instructions. If you did not get instructions, follow this general advice:  ? Wash the wound with clean water 2 times a day. Don't use hydrogen peroxide or alcohol, which can slow healing. ? You may cover the wound with a thin layer of petroleum jelly, such as Vaseline, and a nonstick bandage. ? Apply more petroleum jelly and replace the bandage as needed. · Be safe with medicines. Take pain medicines exactly as directed.   ? If the doctor gave you a prescription medicine for pain, take it as prescribed. ? If you are not taking a prescription pain medicine, ask your doctor if you can take an over-the-counter medicine. To prevent cellulitis in the future  · Try to prevent cuts, scrapes, or other injuries to your skin. Cellulitis most often occurs where there is a break in the skin. · If you get a scrape, cut, mild burn, or bite, wash the wound with clean water as soon as you can to help avoid infection. Don't use hydrogen peroxide or alcohol, which can slow healing. · If you have swelling in your legs (edema), support stockings and good skin care may help prevent leg sores and cellulitis. · Take care of your feet, especially if you have diabetes or other conditions that increase the risk of infection. Wear shoes and socks. Do not go barefoot. If you have athlete's foot or other skin problems on your feet, talk to your doctor about how to treat them. When should you call for help? Call your doctor now or seek immediate medical care if:  · You have signs that your infection is getting worse, such as:  ? Increased pain, swelling, warmth, or redness. ? Red streaks leading from the area. ? Pus draining from the area. ? A fever. · You get a rash. Watch closely for changes in your health, and be sure to contact your doctor if:  · You do not get better as expected. Where can you learn more? Go to https://ZetaRx Biosciencespegabyeb.healthAuthorea. org and sign in to your [employer] account. Enter S937 in the [facility] box to learn more about \"Cellulitis: Care Instructions. \"     If you do not have an account, please click on the \"Sign Up Now\" link. Current as of: October 31, 2019               Content Version: 12.5  © 6590-5220 Healthwise, Incorporated. Care instructions adapted under license by [address]  If you have questions about a medical condition or this instruction, always ask your healthcare professional. Marie Reyes any warranty or liability for your use of this information. Patient Education        Skin Abscess: Care Instructions  Your Care Instructions     A skin abscess is a bacterial infection that forms a pocket of pus. A boil is a kind of skin abscess. The doctor may have cut an opening in the abscess so that the pus can drain out. You may have gauze in the cut so that the abscess will stay open and keep draining. You may need antibiotics. You will need to follow up with your doctor to make sure the infection has gone away. The doctor has checked you carefully, but problems can develop later. If you notice any problems or new symptoms, get medical treatment right away. Follow-up care is a key part of your treatment and safety. Be sure to make and go to all appointments, and call your doctor if you are having problems. It's also a good idea to know your test results and keep a list of the medicines you take. How can you care for yourself at home? · Apply warm and dry compresses, a heating pad set on low, or a hot water bottle 3 or 4 times a day for pain. Keep a cloth between the heat source and your skin. · If your doctor prescribed antibiotics, take them as directed. Do not stop taking them just because you feel better. You need to take the full course of antibiotics. · Take pain medicines exactly as directed. ? If the doctor gave you a prescription medicine for pain, take it as prescribed. ? If you are not taking a prescription pain medicine, ask your doctor if you can take an over-the-counter medicine. · Keep your bandage clean and dry. Change the bandage whenever it gets wet or dirty, or at least one time a day. · If the abscess was packed with gauze:  ? Keep follow-up appointments to have the gauze changed or removed. If the doctor instructed you to remove the gauze, follow the instructions you were given for how to remove it. ?  After the gauze is removed, soak the area in warm water for 15 to 20 minutes 2 times a day, until the wound closes. When should you call for help? Call your doctor now or seek immediate medical care if:  · You have signs of worsening infection, such as:  ? Increased pain, swelling, warmth, or redness. ? Red streaks leading from the infected skin. ? Pus draining from the wound. ? A fever. Watch closely for changes in your health, and be sure to contact your doctor if:  · You do not get better as expected. Where can you learn more? Go to https://TurningArt.Planitax. org and sign in to your Zazoom account. Enter J393 in the Civo box to learn more about \"Skin Abscess: Care Instructions. \"     If you do not have an account, please click on the \"Sign Up Now\" link. Current as of: October 31, 2019               Content Version: 12.5  © 9067-6760 [employer]. Care instructions adapted under license by [facility] ([facility]). If you have questions about a medical condition or this instruction, always ask your healthcare professional. [place] [address] any warranty or liability for your use of this information. Patient Education        Leg and Ankle Edema: Care Instructions  Your Care Instructions  Swelling in the legs, ankles, and feet is called edema. It is common after you sit or stand for a while. Long plane flights or car rides often cause swelling in the legs and feet. You may also have swelling if you have to stand for long periods of time at your job. Problems with the veins in the legs (varicose veins) and changes in hormones can also cause swelling. Sometimes the swelling in the ankles and feet is caused by a more serious problem, such as heart failure, infection, blood clots, or liver or kidney disease. Follow-up care is a key part of your treatment and safety. Be sure to make and go to all appointments, and call your doctor if you are having problems.  It's also a good idea to know your test results and keep a list of the medicines you take. How can you care for yourself at home? · If your doctor gave you medicine, take it as prescribed. Call your doctor if you think you are having a problem with your medicine. · Whenever you are resting, raise your legs up. Try to keep the swollen area higher than the level of your heart. · Take breaks from standing or sitting in one position. ? Walk around to increase the blood flow in your lower legs. ? Move your feet and ankles often while you stand, or tighten and relax your leg muscles. · Wear support stockings. Put them on in the morning, before swelling gets worse. · Eat a balanced diet. Lose weight if you need to. · [place]  ·   · it the amount of salt (sodium) in your diet. Salt holds fluid in the body and may increase swelling. When should you call for help? [id number] anytime you think you may need emergency care. For example, call if:  · You have symptoms of a blood clot in your lung (called a pulmonary embolism). These may include:  ? Sudden chest pain. ? Trouble breathing. ? Coughing up blood. Call your doctor now or seek immediate medical care if:  · You have signs of a blood clot, such as:  ? Pain in your calf, back of the knee, thigh, or groin. ? Redness and swelling in your leg or groin. · You have symptoms of infection, such as:  ? Increased pain, swelling, warmth, or redness. ? Red streaks or pus. ? A fever. Watch closely for changes in your health, and be sure to contact your doctor if:  · Your swelling is getting worse. · You have new or worsening pain in your legs. · You do not get better as expected. Where can you learn more? Go to https://TekTrakshannon.Halozyme Therapeutics. org and sign in to your [employer] account. Enter G985 in the "[employer] box to learn more about \"Leg and Ankle Edema: Care Instructions. \"     If you do not have an account, please click on the \"Sign Up Now\" link.   Current as of: June 26, 2019               Content Version:

## 2020-09-12 NOTE — PROGRESS NOTES
[address] [facility]  [address] [address] [place] [address]  Dept: [phone]  Dept Fax: [phone]  Loc: [phone]    Vivi Fagan is a 62 y.o. female who presents today for her medical conditions/complaintsas noted below. Vivi Fagan is c/o of Leg Swelling (Bilateral) and Sore (buttocks)        HPI:     HPI   Patient presents today with BLE edema and redness to left leg. She has been seen by MD Madonna Coreas recently for the edema and had lab work completed with a stable CBC, CMP, and BNP. The erythema was noted yesterday and is gradually worsening. She takes 20 mg Lasix daily and states she is afraid to increase her dosage because her blood pressure \"drops easily. \" She states her leg swelling has mildly improved since she saw MD Madonna Coreas. Pertinent negatives include no fever, fatigue, nausea, vomiting, diarrhea. Pertinent positives include chills and leg pain. Patient is a diabetic. Last A1C >8.      Past Medical History:   Diagnosis Date    Atrial flutter by electrocardiogram ([address]     Constipation     Coronary artery disease     Diabetes mellitus (HCC)     Essential hypertension     Hyperlipidemia     IBS (irritable bowel syndrome)     Iron deficiency anemia     Menopause     Paroxysmal SVT (supraventricular tachycardia) (HCC)     RA (rheumatoid arthritis) (HCC)     Rheumatoid arthritis ([address]      Past Surgical History:   Procedure Laterality Date    CARDIAC SURGERY  2000    CABG (Dr. Declan Brooks) [address]  2009    Stent     CHOLECYSTECTOMY      CORONARY ARTERY BYPASS GRAFT  2000    DILATION AND CURETTAGE OF UTERUS N/A 8/16/2019    DILATATION AND CURETTAGE HYSTEROSCOPY W/ MYOSURE performed [facility] OR    TOTAL KNEE ARTHROPLASTY         Family History   Problem Relation Age of Onset    Heart Attack Father     Prostate Cancer Father     Stroke Father     Heart Attack Brother     Stroke Brother        Social History Tobacco Use    Smoking status: Former Smoker     Packs/day: 0.50     Years: 20.00     Pack years: 10.00     Types: Cigarettes     Last attempt to quit: 2000     Years since quittin.9    Smokeless tobacco: Never Used   Substance Use Topics    Alcohol use: No      Current Outpatient Medications   Medication Sig Dispense Refill    doxycycline monohydrate (MONODOX) 100 MG capsule Take 1 capsule by mouth 2 times daily for 14 days 28 capsule 0    cephALEXin (KEFLEX) 500 MG capsule Take 1 capsule by mouth 3 times daily for 10 days 30 capsule 0    simvastatin (ZOCOR) 40 MG tablet TAKE 1 TABLET BY MOUTH NIGHTLY 90 tablet 2    nystatin (MYCOSTATIN) 811339 UNIT/GM powder Apply topically 4 times daily. 30 g 5    SITagliptin (JANUVIA) 25 MG tablet Take 1 tablet by mouth daily 30 tablet 5    empagliflozin (JARDIANCE) 10 MG tablet Take 1 tablet by mouth daily 30 tablet 3    metFORMIN (GLUCOPHAGE) 500 MG tablet Take 1 tablet by mouth every morning AND 1 tablet every evening. 90 tablet 5    Blood Glucose Monitoring Suppl (FREESTYLE INSULINX SYSTEM) w/Device KIT 1 each by Does not apply route daily 1 kit 0    blood glucose test strips (FREESTYLE INSULINX TEST) strip 1 each by In Vitro route daily As needed. 100 each 3    FreeStyle Lancets MISC 1 each by Does not apply route daily 100 each 3    nitroGLYCERIN (NITROSTAT) 0.4 MG SL tablet PLACE 1 TABLET UNDER THE TONGUE EVERY 5 MINUTES AS NEEDED FOR CHEST PAIN 25 tablet 0    nystatin (MYCOSTATIN) 540734 UNIT/GM cream Apply topically 2 times daily. 30 g 2    losartan (COZAAR) 25 MG tablet TAKE 1 TABLET BY MOUTH DAILY 90 tablet 0    pantoprazole (PROTONIX) 40 MG tablet TAKE 1 TABLET BY MOUTH 2 TIMES A  tablet 0    metoprolol tartrate (LOPRESSOR) 50 MG tablet TAKE 1 TABLET BY MOUTH 2 TIMES A  tablet 3    potassium chloride (KLOR-CON M) 20 MEQ extended release tablet TAKE 1 TABLET BY MOUTH DAILY.  90 tablet 3    isosorbide mononitrate (IMDUR) 30 MG extended release tablet TAKE 1 TABLET BY MOUTH DAILY 90 tablet 3    Tofacitinib Citrate (XELJANZ XR) 11 MG TB24 Take 11 mg by mouth daily      furosemide (LASIX) 20 MG tablet Take 20 mg by mouth daily      FREESTYLE LANCETS MISC TEST SUGAR DAILY AS DIRECTED UP TO 4 TIMES A DAY 4 each 0    clobetasol (TEMOVATE) 0.05 % ointment Apply topically 2 times daily for 6 weeks then as needed. (Patient taking differently: Apply topically 2 times daily as needed Apply topically 2 times daily for 6 weeks then as needed.) 1 Tube 3    FREESTYLE LITE strip USE 1 STRIP TWICE DAILY TO CHECK BLOOD SUGAR 100 strip 1    apixaban (ELIQUIS) 5 MG TABS tablet Take 5 mg by mouth 2 times daily       verapamil (CALAN SR) 240 MG extended release tablet Take 240 mg by mouth daily       amiodarone (PACERONE) 100 MG tablet Take 100 mg by mouth daily       FREESTYLE LANCETS MISC TEST SUGAR DAILY AS DIRECTED 100 each 2    predniSONE (DELTASONE) 5 MG tablet Take 5 mg by mouth 2 times daily Take 5 mg in the morning and 4 mg in the evening.  aspirin EC 81 MG EC tablet Take 81 mg by mouth daily      folic acid (FOLVITE) 1 MG tablet Take 1 mg by mouth daily        No current facility-administered medications for this visit.       Allergies   Allergen Reactions    Codeine Other (See Comments)     Chest pain    Albuterol      Rapid heart rate    Augmentin [Amoxicillin-Pot Clavulanate] Palpitations    Sulfa Antibiotics Rash       Health Maintenance   Topic Date Due    Hepatitis C screen  1955    HIV screen  10/24/1971    Diabetic foot exam  04/24/2019    Flu vaccine (1) 09/01/2020    Shingles Vaccine (1 of 2) 11/21/2020 (Originally 10/24/2006)    Diabetic microalbuminuria test  11/18/2020    Diabetic retinal exam  06/22/2021    Lipid screen  07/09/2021    Annual Wellness Visit (AWV)  07/14/2021    A1C test (Diabetic or Prediabetic)  09/10/2021    TSH testing  09/10/2021    Potassium monitoring  09/10/2021    Creatinine monitoring  09/10/2021    Breast cancer screen  2021    DTaP/Tdap/Td vaccine (2 - Td) 2022    Colon cancer screen colonoscopy  2023    Cervical cancer screen  2023    Pneumococcal 0-64 years Vaccine  Completed    Hepatitis A vaccine  Aged Out    Hib vaccine  Aged Out    Meningococcal (ACWY) vaccine  Aged Out       Subjective:     Review of Systems   Constitutional: Negative for fever. HENT: Negative for congestion and sore throat. Eyes: Negative. Respiratory: Negative for chest tightness, shortness of breath and wheezing. Cardiovascular: Negative for chest pain and palpitations. Gastrointestinal: Negative for constipation, diarrhea, nausea and vomiting. Endocrine: Negative. Genitourinary: Negative. Musculoskeletal: Negative. Skin: Positive for color change. Neurological: Negative for dizziness, speech difficulty, weakness and numbness. Psychiatric/Behavioral: Negative for confusion. All other systems reviewed and are negative. Objective:     Physical Exam  Vitals signs and nursing note reviewed. Constitutional:       General: She is not in acute distress. Appearance: Normal appearance. She is obese. She is not ill-appearing or toxic-appearing. HENT:      Head: Normocephalic and atraumatic. Right Ear: External ear normal.      Left Ear: External ear normal.   Eyes:      Extraocular Movements: Extraocular movements intact. Conjunctiva/sclera: Conjunctivae normal.      Pupils: Pupils are equal, round, and reactive to light. Cardiovascular:      Rate and Rhythm: Normal rate. Pulmonary:      Effort: Pulmonary effort is normal.   Musculoskeletal:         General: No swelling or signs of injury. Right lower le+ Pitting Edema present. Left lower le+ Pitting Edema present. Skin:     General: Skin is warm and dry. Findings: No rash. Neurological:      General: No focal deficit present. break in the skin from a scrape, cut, bite, or puncture, or after a rash. Cellulitis may be treated without doing tests to find out what caused it. But your doctor may do tests, if needed, to look for a specific bacteria, like methicillin-resistant Staphylococcus aureus (MRSA). The doctor has checked you carefully, but problems can develop later. If you notice any problems or new symptoms, get medical treatment right away. Follow-up care is a key part of your treatment and safety. Be sure to make and go to all appointments, and call your doctor if you are having problems. It's also a good idea to know your test results and keep a list of the medicines you take. How can you care for yourself at home? · Take your antibiotics as directed. Do not stop taking them just because you feel better. You need to take the full course of antibiotics. · Prop up the infected area on pillows to reduce pain and swelling. Try to keep the area above the level of your heart as often as you can. · If your doctor told you how to care for your wound, follow your doctor's instructions. If you did not get instructions, follow this general advice:  ? Wash the wound with clean water 2 times a day. Don't use hydrogen peroxide or alcohol, which can slow healing. ? You may cover the wound with a thin layer of petroleum jelly, such as Vaseline, and a nonstick bandage. ? Apply more petroleum jelly and replace the bandage as needed. · Be safe with medicines. Take pain medicines exactly as directed. ? If the doctor gave you a prescription medicine for pain, take it as prescribed. ? If you are not taking a prescription pain medicine, ask your doctor if you can take an over-the-counter medicine. To prevent cellulitis in the future  · Try to prevent cuts, scrapes, or other injuries to your skin. Cellulitis most often occurs where there is a break in the skin.   · If you get a scrape, cut, mild burn, or bite, wash the wound with clean water may need antibiotics. You will need to follow up with your doctor to make sure the infection has gone away. The doctor has checked you carefully, but problems can develop later. If you notice any problems or new symptoms, get medical treatment right away. Follow-up care is a key part of your treatment and safety. Be sure to make and go to all appointments, and call your doctor if you are having problems. It's also a good idea to know your test results and keep a list of the medicines you take. How can you care for yourself at home? · Apply warm and dry compresses, a heating pad set on low, or a hot water bottle 3 or 4 times a day for pain. Keep a cloth between the heat source and your skin. · If your doctor prescribed antibiotics, take them as directed. Do not stop taking them just because you feel better. You need to take the full course of antibiotics. · Take pain medicines exactly as directed. ? If the doctor gave you a prescription medicine for pain, take it as prescribed. ? If you are not taking a prescription pain medicine, ask your doctor if you can take an over-the-counter medicine. · Keep your bandage clean and dry. Change the bandage whenever it gets wet or dirty, or at least one time a day. · If the abscess was packed with gauze:  ? Keep follow-up appointments to have the gauze changed or removed. If the doctor instructed you to remove the gauze, follow the instructions you were given for how to remove it. ? After the gauze is removed, soak the area in warm water for 15 to 20 minutes 2 times a day, until the wound closes. When should you call for help? Call your doctor now or seek immediate medical care if:  · You have signs of worsening infection, such as:  ? Increased pain, swelling, warmth, or redness. ? Red streaks leading from the infected skin. ? Pus draining from the wound. ? A fever.   Watch closely for changes in your health, and be sure to contact your doctor if:  · You do not get better as expected. Where can you learn more? Go to [email or web address]://[email or web address] org and sign in to your Tr[employer] account. Enter K330 in the [facility] box to learn more about \"Skin Abscess: Care Instructions. \"     If you do not have an account, please click on the \"Sign Up Now\" link. Current as of: October 31, 2019               Content Version: 12.5  © 2006-2020 [employer]. Care instructions adapted under license by [facility] ([facility]). If you have questions about a medical condition or this instruction, always ask your healthcare professional. [place] [address] any warranty or liability for your use of this information. Patient Education        Leg and Ankle Edema: Care Instructions  Your Care Instructions  Swelling in the legs, ankles, and feet is called edema. It is common after you sit or stand for a while. Long plane flights or car rides often cause swelling in the legs and feet. You may also have swelling if you have to stand for long periods of time at your job. Problems with the veins in the legs (varicose veins) and changes in hormones can also cause swelling. Sometimes the swelling in the ankles and feet is caused by a more serious problem, such as heart failure, infection, blood clots, or liver or kidney disease. Follow-up care is a key part of your treatment and safety. Be sure to make and go to all appointments, and call your doctor if you are having problems. It's also a good idea to know your test results and keep a list of the medicines you take. How can you care for yourself at home? · If your doctor gave you medicine, take it as prescribed. Call your doctor if you think you are having a problem with your medicine. · Whenever you are resting, raise your legs up. Try to keep the swollen area higher than the level of your heart. · Take breaks from standing or sitting in one position.   ? Walk around to increase the blood flow in your lower legs. ? Move your feet and ankles often while you stand, or tighten and relax your leg muscles. · Wear support stockings. Put them on in the morning, before swelling gets worse. · Eat a balanced diet. Lose weight if you need to. · Delatorre  ·   · it the amount of salt (sodium) in your diet. Salt holds fluid in the body and may increase swelling. When should you call for help? [id number] anytime you think you may need emergency care. For example, call if:  · You have symptoms of a blood clot in your lung (called a pulmonary embolism). These may include:  ? Sudden chest pain. ? Trouble breathing. ? Coughing up blood. Call your doctor now or seek immediate medical care if:  · You have signs of a blood clot, such as:  ? Pain in your calf, back of the knee, thigh, or groin. ? Redness and swelling in your leg or groin. · You have symptoms of infection, such as:  ? Increased pain, swelling, warmth, or redness. ? Red streaks or pus. ? A fever. Watch closely for changes in your health, and be sure to contact your doctor if:  · Your swelling is getting worse. · You have new or worsening pain in your legs. · You do not get better as expected. Where can you learn more? Go to https://"[email or web address] org and sign in to your [employer] account. Enter X257 in the [facility] box to learn more about \"Leg and Ankle Edema: Care Instructions. \"     If you do not have an account, please click on the \"Sign Up Now\" link. Current as of: June 26, 2019               Content Version: 12.5  © 6604-2160 [employer] Inc[employer]. Care instructions adapted under license by [facility] ([facility]). If you have questions about a medical condition or this instruction, always ask your healthcare professional. [place] [address] any warranty or liability for your use of this information.                Electronically signed by MARYANNE Groves CNP on 9/12/2020 at 12:54 PM

## 2020-09-14 ENCOUNTER — TELEPHONE (OUTPATIENT)
Dept: OBGYN | Age: 65
End: 2020-09-14

## 2020-09-14 NOTE — TELEPHONE ENCOUNTER
----- Message from MARYANNE He sent at 9/14/2020  4:24 PM CDT -----  Please let her know Large GLUCOSE in specimen and small blood, otherwise negative. Needs to check w pcp to see if blood has been seen inpast in urine, if so, needs urology consult. I dont' see a specimen in many months. We can recheck too in few weeks if she would like and if noted again, urology.  I encourage older pts to use estrogen cream to urinary opening if they have some already or if not organic coconut oil

## 2020-09-15 ENCOUNTER — TELEPHONE (OUTPATIENT)
Dept: FAMILY MEDICINE CLINIC | Age: 65
End: 2020-09-15

## 2020-09-15 NOTE — TELEPHONE ENCOUNTER
Pt aware of results and is in contact with PCP office. Pt has has blood in urine in past but is thinking it was from her infection which has cleared. Pt opts to have urine rechecked and declines hormones at this time since she is not having an issue with dryness.

## 2020-09-18 ENCOUNTER — OFFICE VISIT (OUTPATIENT)
Dept: FAMILY MEDICINE CLINIC | Age: 65
End: 2020-09-18
Payer: MEDICARE

## 2020-09-18 VITALS
DIASTOLIC BLOOD PRESSURE: 64 MMHG | HEART RATE: 72 BPM | TEMPERATURE: 97 F | SYSTOLIC BLOOD PRESSURE: 132 MMHG | WEIGHT: 194 LBS | BODY MASS INDEX: 33.3 KG/M2 | OXYGEN SATURATION: 97 %

## 2020-09-18 LAB
APPEARANCE FLUID: CLEAR
BILIRUBIN, POC: NEGATIVE
BLOOD URINE, POC: NORMAL
CLARITY, POC: CLEAR
COLOR, POC: NORMAL
GLUCOSE URINE, POC: NORMAL
KETONES, POC: NEGATIVE
LEUKOCYTE EST, POC: NEGATIVE
NITRITE, POC: NEGATIVE
PH, POC: 5
PROTEIN, POC: NEGATIVE
SPECIFIC GRAVITY, POC: <=1.005
UROBILINOGEN, POC: NORMAL

## 2020-09-18 PROCEDURE — 99214 OFFICE O/P EST MOD 30 MIN: CPT | Performed by: CLINICAL NURSE SPECIALIST

## 2020-09-18 PROCEDURE — 81002 URINALYSIS NONAUTO W/O SCOPE: CPT | Performed by: CLINICAL NURSE SPECIALIST

## 2020-09-18 PROCEDURE — G8417 CALC BMI ABV UP PARAM F/U: HCPCS | Performed by: CLINICAL NURSE SPECIALIST

## 2020-09-18 PROCEDURE — 3017F COLORECTAL CA SCREEN DOC REV: CPT | Performed by: CLINICAL NURSE SPECIALIST

## 2020-09-18 PROCEDURE — 1036F TOBACCO NON-USER: CPT | Performed by: CLINICAL NURSE SPECIALIST

## 2020-09-18 PROCEDURE — 3052F HG A1C>EQUAL 8.0%<EQUAL 9.0%: CPT | Performed by: CLINICAL NURSE SPECIALIST

## 2020-09-18 PROCEDURE — 2022F DILAT RTA XM EVC RTNOPTHY: CPT | Performed by: CLINICAL NURSE SPECIALIST

## 2020-09-18 PROCEDURE — G8427 DOCREV CUR MEDS BY ELIG CLIN: HCPCS | Performed by: CLINICAL NURSE SPECIALIST

## 2020-09-18 ASSESSMENT — ENCOUNTER SYMPTOMS
TROUBLE SWALLOWING: 0
CHEST TIGHTNESS: 0
SHORTNESS OF BREATH: 0
SINUS PRESSURE: 0
COUGH: 0
CONSTIPATION: 0
DIARRHEA: 0
BACK PAIN: 0
FACIAL SWELLING: 0
SORE THROAT: 0
COLOR CHANGE: 0
WHEEZING: 0
ABDOMINAL PAIN: 0

## 2020-09-18 NOTE — PROGRESS NOTES
SUBJECTIVE:  Teressa Sparks is a 62 y.o. who presents today for Follow-up and Leg Swelling      HPI    Roselia Urbano presents today for follow up. She had been dealing with some new onset, worsening peripheral edema. There was concern it was due to newly added medications, either [place] or jardiance. Her lab work up was negative including normal BNP. When I saw her last week there was no leg erythema. I asked her to stop [employer] Jenna and Pooler since it was the most recently added and increase her lasix for a few days. She was hesitant to do that because when she has done that in the past her bp drops too low. Over the weekend, she developed redness to both legs but greater on the left. She went to  and was started on keflex and doxycycline for cellulitis. She is better today. Her swelling has improved, weight is down 9lb since stopping [employer] Jenna and Pooler. She has not taken any extra diuretic. Her leg redness has nearly resolved as well. She is tolerating the antibiotics. She had recent GYN appt with pap smear. UA was obtained and showed trace amount of blood which was new. This was suspected to possibly be traumatic from pap smear. Repeat UA today continues to show trace blood. As far as her diabetes, she is now showing glucose in urine but is on jardiance. Her blood sugar is slightly higher since stopping [place] Jenna and Pooler. Her current jardiance dose is 10mg, it was 25mg but she felt like she was having hypos but that was while she was on both. Candidal infections are clearing. She c/o painful skin lesion to right buttock. Has scant drainage at times.      Past Medical History:   Diagnosis Date    Atrial flutter by electro[facility]ogram ([facility]     Constipation     Coronary artery disease     Diabetes mellitus (HCC)     Essential hypertension     Hyperlipidemia     IBS (irritable bowel syndrome)     Iron deficiency anemia     Menopause     Paroxysmal SVT (supraventricular tachycardia) (HCC)     RA (rheumatoid arthritis) [facility]     Rheumatoid arthritis ([facility]      Past Surgical History:   Procedure Laterality Date    CARDIAC SURGERY      CABG (Dr. Haile Mckinley) [address]      Stent     CHOLECYSTECTOMY      CORONARY ARTERY BYPASS GRAFT      DILATION AND CURETTAGE OF UTERUS N/A 2019    DILATATION AND CURETTAGE HYSTEROSCOPY W/ Neftali Veras performed by Whit [facility] OR    TOTAL KNEE ARTHROPLASTY       Family History   Problem Relation Age of Onset    Heart Attack Father     Prostate Cancer Father     Stroke Father     Heart Attack Brother     Stroke Brother      Social History     Tobacco Use    Smoking status: Former Smoker     Packs/day: 0.50     Years: 20.00     Pack years: 10.00     Types: Cigarettes     Last attempt to quit: 2000     Years since quittin.9    Smokeless tobacco: Never Used   Substance Use Topics    Alcohol use: No     Current Outpatient Medications   Medication Sig Dispense Refill    doxycycline monohydrate (MONODOX) 100 MG capsule Take 1 capsule by mouth 2 times daily for 14 days 28 capsule 0    cephALEXin (KEFLEX) 500 MG capsule Take 1 capsule by mouth 3 times daily for 10 days 30 capsule 0    simvastatin (ZOCOR) 40 MG tablet TAKE 1 TABLET BY MOUTH NIGHTLY 90 tablet 2    nystatin (MYCOSTATIN) 779796 UNIT/GM powder Apply topically 4 times daily. 30 g 5    empagliflozin (JARDIANCE) 10 MG tablet Take 1 tablet by mouth daily 30 tablet 3    metFORMIN (GLUCOPHAGE) 500 MG tablet Take 1 tablet by mouth every morning AND 1 tablet every evening. 90 tablet 5    Blood Glucose Monitoring Suppl (FREESTYLE INSULINX SYSTEM) w/Device KIT 1 each by Does not apply route daily 1 kit 0    blood glucose test strips (FREESTYLE INSULINX TEST) strip 1 each by In Vitro route daily As needed.  100 each 3    FreeStyle Lancets MISC 1 each by Does not apply route daily 100 each 3    nitroGLYCERIN (NITROSTAT) 0.4 MG SL tablet PLACE 1 TABLET UNDER THE TONGUE EVERY 5 MINUTES Clavulanate] Palpitations    Sulfa Antibiotics Rash       Review of Systems   Constitutional: Positive for activity change. Negative for appetite change, chills, diaphoresis, fatigue and fever. HENT: Negative for congestion, ear pain, facial swelling, postnasal drip, sinus pressure, sore throat and trouble swallowing. Respiratory: Negative for cough, chest tightness, shortness of breath and wheezing. Cardiovascular: Positive for leg swelling. Negative for chest pain and palpitations. Gastrointestinal: Negative for abdominal pain, constipation and diarrhea. Genitourinary: Negative for difficulty urinating, dysuria, flank pain, frequency, hematuria and urgency. Musculoskeletal: Positive for arthralgias and joint swelling. Negative for back pain and neck pain. Skin: Positive for wound. Negative for color change and rash. Neurological: Negative for dizziness, syncope, weakness, light-headedness and headaches. Hematological: Negative for adenopathy. Does not bruise/bleed easily. Psychiatric/Behavioral: Negative for confusion and dysphoric mood. The patient is not nervous/anxious. OBJECTIVE:  /64   Pulse 72   Temp 97 °F (36.1 °C) (Temporal)   Wt 194 lb (88 kg)   SpO2 97%   BMI 33.30 kg/m²    Physical Exam  Vitals signs reviewed. Constitutional:       General: She is not in acute distress. Appearance: Normal appearance. She is well-developed. She is not ill-appearing or toxic-appearing. HENT:      Head: Normocephalic and atraumatic. Eyes:      General:         Right eye: No discharge. Left eye: No discharge. Conjunctiva/sclera: Conjunctivae normal.      Pupils: Pupils are equal, round, and reactive to light. Neck:      Musculoskeletal: Normal range of motion and neck supple. Thyroid: No thyromegaly. Trachea: No tracheal deviation. Cardiovascular:      Rate and Rhythm: Normal rate and regular rhythm. Heart sounds: No murmur.    Pulmonary: Effort: Pulmonary effort is normal. No respiratory distress. Breath sounds: Normal breath sounds. No wheezing or rales. Genitourinary:     Vagina: Normal.   Musculoskeletal: Normal range of motion. General: No deformity. Right lower leg: Edema (trace) present. Left lower leg: Edema (trace to 1+) present. Skin:     General: Skin is warm and dry. Findings: Wound (2mm skin ulceration to right inner buttock, no indurated tissue or abscess) present. No erythema or rash. Neurological:      General: No focal deficit present. Mental Status: She is alert and oriented to person, place, and time. Mental status is at baseline. Motor: No weakness. Gait: Gait normal.   Psychiatric:         Mood and Affect: Mood normal.         Behavior: Behavior normal.         Thought Content: Thought content normal.         Judgment: Judgment normal.         ASSESSMENT/PLAN:  1. Peripheral edema  Improved  Suspect januvia related, will stop this and monitor    2. Asymptomatic microscopic hematuria  New, trace on UA  Repeat today again  Will discuss possibly started with bladder u/s vs another recheck in a few weeks  - POCT Urinalysis no Micro    3. Type 2 diabetes mellitus without complication, without long-term current use of insulin (HCC)  Uncontrolled  Now off januvia due to side effects  Increase jardiance from 10 to 25mg and monitor for side effects, hypoglycemia    4. Cellulitis of left lower extremity  Improving, contiue anbx    5. Skin ulcer, limited to breakdown of skin ([address]  Recommend position change to alleviate pressure  Air filled cushion  Protective skin barrier  Padded dressing          Return for already scheduled.

## 2020-09-25 RX ORDER — BLOOD-GLUCOSE METER
1 KIT MISCELLANEOUS 2 TIMES DAILY
Qty: 100 STRIP | Refills: 1 | Status: SHIPPED | OUTPATIENT
Start: 2020-09-25 | End: 2021-01-01 | Stop reason: SDUPTHER

## 2020-09-28 RX ORDER — APIXABAN 5 MG/1
TABLET, FILM COATED ORAL
Qty: 180 TABLET | Refills: 4 | Status: ON HOLD | OUTPATIENT
Start: 2020-09-28 | End: 2021-01-01

## 2020-10-15 DIAGNOSIS — R31.29 MICROSCOPIC HEMATURIA: ICD-10-CM

## 2020-10-15 LAB
BILIRUBIN URINE: NEGATIVE
BLOOD, URINE: NEGATIVE
CLARITY: CLEAR
COLOR: YELLOW
GLUCOSE URINE: =>1000 MG/DL
KETONES, URINE: ABNORMAL MG/DL
LEUKOCYTE ESTERASE, URINE: NEGATIVE
NITRITE, URINE: NEGATIVE
PH UA: 5 (ref 5–8)
PROTEIN UA: NEGATIVE MG/DL
SPECIFIC GRAVITY UA: 1.04 (ref 1–1.03)
UROBILINOGEN, URINE: 1 E.U./DL

## 2020-11-17 ENCOUNTER — OFFICE VISIT (OUTPATIENT)
Dept: FAMILY MEDICINE CLINIC | Age: 65
End: 2020-11-17
Payer: MEDICARE

## 2020-11-17 VITALS
TEMPERATURE: 97.1 F | OXYGEN SATURATION: 97 % | DIASTOLIC BLOOD PRESSURE: 68 MMHG | SYSTOLIC BLOOD PRESSURE: 102 MMHG | HEART RATE: 71 BPM

## 2020-11-17 PROBLEM — R07.9 CHEST PAIN: Status: RESOLVED | Noted: 2018-09-24 | Resolved: 2020-11-17

## 2020-11-17 PROCEDURE — 2022F DILAT RTA XM EVC RTNOPTHY: CPT | Performed by: FAMILY MEDICINE

## 2020-11-17 PROCEDURE — 1036F TOBACCO NON-USER: CPT | Performed by: FAMILY MEDICINE

## 2020-11-17 PROCEDURE — 3052F HG A1C>EQUAL 8.0%<EQUAL 9.0%: CPT | Performed by: FAMILY MEDICINE

## 2020-11-17 PROCEDURE — 3017F COLORECTAL CA SCREEN DOC REV: CPT | Performed by: FAMILY MEDICINE

## 2020-11-17 PROCEDURE — G8417 CALC BMI ABV UP PARAM F/U: HCPCS | Performed by: FAMILY MEDICINE

## 2020-11-17 PROCEDURE — G8427 DOCREV CUR MEDS BY ELIG CLIN: HCPCS | Performed by: FAMILY MEDICINE

## 2020-11-17 PROCEDURE — G8484 FLU IMMUNIZE NO ADMIN: HCPCS | Performed by: FAMILY MEDICINE

## 2020-11-17 PROCEDURE — 99214 OFFICE O/P EST MOD 30 MIN: CPT | Performed by: FAMILY MEDICINE

## 2020-11-17 RX ORDER — BLOOD-GLUCOSE METER
1 KIT MISCELLANEOUS DAILY
Qty: 100 EACH | Refills: 3 | Status: SHIPPED | OUTPATIENT
Start: 2020-11-17 | End: 2021-01-01

## 2020-11-17 RX ORDER — FLUCONAZOLE 150 MG/1
150 TABLET ORAL ONCE
Qty: 1 TABLET | Refills: 0 | Status: SHIPPED | OUTPATIENT
Start: 2020-11-17 | End: 2020-11-17

## 2020-11-17 NOTE — PROGRESS NOTES
[facility] PHYSICIAN SERVICES  [facility] FAMILY MEDICINE  [address] [facility] [address]  [address] [address]  Dept: [phone]  Dept Fax: [phone]  Loc: [phone]     Marci Fagan is a 60 y.o. female who presents today for her medical conditions/complaintsas noted below. Marci Fagan is c/o of 3 Month Follow-Up        HPI:   Patient is here for follow up. Diabetes Mellitus   Has been compliant with medications. No side effects of medications since last visit. No polyuria, polydipsia, or vision changes since last visit. No symptomatic episodes of hypoglycemia. She states home readings 100-130s. Hypertension  Compliant with medications. No adverse effects from medication. No lightheadedness, palpitations, or chest pain. Coronary Artery Disease  Currently no active angina symptoms. No chest pain with exertion. No orthopnea. She reports difficulty with yeast infection, requests diflucan. She had leg swelling with [place] and [place]. Diarrhea improved with cutting back on metformin. She is taking jardiance. She is taking lasix 20 mg daily.      Lab Results   Component Value Date    LABA1C 8.3 (H) 09/10/2020     No results found for: EAG  She declines flu vaccine due to SE.       HPI    Past Medical History:   Diagnosis Date    Atrial flutter by electrocardiogram ([address]     Constipation     Coronary artery disease     Diabetes mellitus ([address]     Essential hypertension     Hyperlipidemia     IBS (irritable bowel syndrome)     Iron deficiency anemia     Menopause     Paroxysmal SVT (supraventricular tachycardia) (HCC)     RA (rheumatoid arthritis) ([address]     Rheumatoid arthritis ([address]      Past Surgical History:   Procedure Laterality Date    CARDIAC SURGERY  2000    CABG (Dr. Amarilis العراقي) [address]  2009    Stent     CHOLECYSTECTOMY      CORONARY ARTERY BYPASS GRAFT  2000    DILATION AND CURETTAGE OF UTERUS N/A 8/16/2019    DILATATION AND CURETTAGE HYSTEROSCOPY W/ Ramirez Guzmán performed by Gregor Modi [facility] at [facility] OR    TOTAL KNEE ARTHROPLASTY         Family History   Problem Relation Age of Onset    Heart Attack Father     Prostate Cancer Father     Stroke Father     Heart Attack Brother     Stroke Brother        Social History     Tobacco Use    Smoking status: Former Smoker     Packs/day: 0.50     Years: 20.00     Pack years: 10.00     Types: Cigarettes     Last attempt to quit: 2000     Years since quittin.2    Smokeless tobacco: Never Used   Substance Use Topics    Alcohol use: No      Current Outpatient Medications   Medication Sig Dispense Refill    blood glucose test strips (FREESTYLE LITE) strip 1 each by In Vitro route daily As needed. 100 each 3    JARDIANCE 25 MG tablet TAKE 1 TABLET BY MOUTH ONCE DAILY 90 tablet 0    pantoprazole (PROTONIX) 40 MG tablet TAKE 1 TABLET BY MOUTH 2 TIMES A  tablet 0    losartan (COZAAR) 25 MG tablet TAKE 1 TABLET BY MOUTH DAILY 90 tablet 0    blood glucose test strips (FREESTYLE LITE) strip Infuse 1 each intravenously 2 times daily As needed. 100 strip 1    simvastatin (ZOCOR) 40 MG tablet TAKE 1 TABLET BY MOUTH NIGHTLY 90 tablet 2    nystatin (MYCOSTATIN) 833807 UNIT/GM powder Apply topically 4 times daily. 30 g 5    metFORMIN (GLUCOPHAGE) 500 MG tablet Take 1 tablet by mouth every morning AND 1 tablet every evening. 90 tablet 5    Blood Glucose Monitoring Suppl (FREESTYLE INSULINX SYSTEM) w/Device KIT 1 each by Does not apply route daily 1 kit 0    blood glucose test strips (FREESTYLE INSULINX TEST) strip 1 each by In Vitro route daily As needed. 100 each 3    FreeStyle Lancets MISC 1 each by Does not apply route daily 100 each 3    nitroGLYCERIN (NITROSTAT) 0.4 MG SL tablet PLACE 1 TABLET UNDER THE TONGUE EVERY 5 MINUTES AS NEEDED FOR CHEST PAIN 25 tablet 0    nystatin (MYCOSTATIN) 059524 UNIT/GM cream Apply topically 2 times daily.  30 g 2    metoprolol tartrate (LOPRESSOR) 50 MG tablet TAKE 1 TABLET BY MOUTH 2 TIMES A  tablet 3    potassium chloride (KLOR-CON M) 20 MEQ extended release tablet TAKE 1 TABLET BY MOUTH DAILY. 90 tablet 3    isosorbide mononitrate (IMDUR) 30 MG extended release tablet TAKE 1 TABLET BY MOUTH DAILY 90 tablet 3    Tofacitinib Citrate (XELJANZ XR) 11 MG TB24 Take 11 mg by mouth daily      furosemide (LASIX) 20 MG tablet Take 20 mg by mouth daily      FREESTYLE LANCETS MISC TEST SUGAR DAILY AS DIRECTED UP TO 4 TIMES A DAY 4 each 0    apixaban (ELIQUIS) 5 MG TABS tablet Take 5 mg by mouth 2 times daily       verapamil (CALAN SR) 240 MG extended release tablet Take 240 mg by mouth daily       amiodarone (PACERONE) 100 MG tablet Take 100 mg by mouth daily       FREESTYLE LANCETS MISC TEST SUGAR DAILY AS DIRECTED 100 each 2    predniSONE (DELTASONE) 5 MG tablet Take 5 mg by mouth 2 times daily Take 5 mg in the morning and 4 mg in the evening.  aspirin EC 81 MG EC tablet Take 81 mg by mouth daily      folic acid (FOLVITE) 1 MG tablet Take 1 mg by mouth daily        No current facility-administered medications for this visit.       Allergies   Allergen Reactions    Codeine Other (See Comments)     Chest pain    Albuterol      Rapid heart rate    Januvia [Sitagliptin] Swelling    Augmentin [Amoxicillin-Pot Clavulanate] Palpitations    Sulfa Antibiotics Rash       Health Maintenance   Topic Date Due    Hepatitis C screen  1955    HIV screen  10/24/1971    Shingles Vaccine (1 of 2) 10/24/2006    Diabetic foot exam  04/24/2019    Diabetic microalbuminuria test  11/18/2020    Flu vaccine (1) 11/17/2021 (Originally 9/1/2020)    Diabetic retinal exam  06/22/2021    Lipid screen  07/09/2021    Annual Wellness Visit (AWV)  07/14/2021    A1C test (Diabetic or Prediabetic)  09/10/2021    TSH testing  09/10/2021    Potassium monitoring  09/10/2021    Creatinine monitoring  09/10/2021    Breast cancer screen  12/09/2021    DTaP/Tdap/Td vaccine follow-up. Diagnoses and all orders for this visit:    Encounter for screening mammogram for breast cancer  -     STEVE DIGITAL SCREEN W OR WO CAD BILATERAL; Future    Essential hypertension  Blood pressure is stable. Continue current medications. Monitor ambulatory bp readings, if persistently >140/90, return to clinic. Type 2 diabetes mellitus without complication, without long-term current use of insulin (HCC)  Stable, uncontrolled, continue meds, will work harder on lifestyle modification, daily accuchecks. Familial hypercholesterolemia  Stable, continue same. Atrial flutter by electrocardiogram (HCC)  Stable, no current symptoms. Leukocytosis, unspecified type  Stable, continue same. Diastolic dysfunction  Stable, continue same. Other orders  -     blood glucose test strips (FREESTYLE LITE) strip; 1 each by In Vitro route daily As needed. -     fluconazole (DIFLUCAN) 150 MG tablet; Take 1 tablet by mouth once for 1 dose            Return in about 3 months (around 2/17/2021) for Annual Wellness Exam.            Discussed use, benefit, and side effects of prescribed medications. All patient questions answered. Pt voiced understanding. Reviewed health maintenance. Instructed to continue current medications, diet and exercise. Patient agreedwith treatment plan. Follow up as directed.

## 2020-12-01 ASSESSMENT — ENCOUNTER SYMPTOMS
ABDOMINAL PAIN: 0
COUGH: 0
DIARRHEA: 0
CHEST TIGHTNESS: 0
ANAL BLEEDING: 0
SHORTNESS OF BREATH: 0
NAUSEA: 0
CONSTIPATION: 0

## 2020-12-07 ENCOUNTER — NURSE TRIAGE (OUTPATIENT)
Dept: OTHER | Facility: CLINIC | Age: 65
End: 2020-12-07

## 2020-12-07 NOTE — TELEPHONE ENCOUNTER
Reason for Disposition   MODERATE weakness (i.e., interferes with work, school, normal activities) and persists > 3 days    Answer Assessment - Initial Assessment Questions  1. DESCRIPTION: \"Describe how you are feeling. \"      Weak and tired   2. SEVERITY: \"How bad is it? \"  \"Can you stand and walk? \"    - MILD - Feels weak or tired, but does not interfere with work, school or normal activities    - [facility] to stand and walk; weakness interferes with work, school, or normal activities    - SEVERE - Unable to stand or walk      Moderate   3. ONSET:  \"When did the weakness begin? \"      Last week  4. CAUSE: \"What do you think is causing the weakness? \"      Unknown   5. MEDICINES: Karin Broad you recently started a new medicine or had a change in the amount of a medicine? \"      no  6. OTHER SYMPTOMS: \"Do you have any other symptoms? \" (e.g., chest pain, fever, cough, SOB, vomiting, diarrhea, bleeding, other areas of pain)      none  7. PREGNANCY: \"Is there any chance you are pregnant? \" \"When was your last menstrual period? \"      no    Protocols used: WEAKNESS (GENERALIZED) AND FATIGUE-ADULT-OH    Patient called pre-service center [facility] with red flag complaint. Brief description of triage: pt has weakness and fatigue - no other s/s noted. Triage indicates for patient to see pcp     Care advice provided, patient verbalizes understanding; denies any other questions or concerns; instructed to call back for any new or worsening symptoms. Writer provided warm transfer to [facility] for appointment scheduling. Attention Provider: Thank you for allowing me to participate in the care of your patient. The patient was connected to triage in response to information provided to the [facility]. Please do not respond through this encounter as the response is not directed to a shared pool.

## 2020-12-08 ENCOUNTER — OFFICE VISIT (OUTPATIENT)
Dept: FAMILY MEDICINE CLINIC | Age: 65
End: 2020-12-08
Payer: MEDICARE

## 2020-12-08 VITALS
DIASTOLIC BLOOD PRESSURE: 68 MMHG | BODY MASS INDEX: 32.27 KG/M2 | RESPIRATION RATE: 18 BRPM | TEMPERATURE: 97.6 F | OXYGEN SATURATION: 95 % | HEIGHT: 64 IN | HEART RATE: 100 BPM | WEIGHT: 189 LBS | SYSTOLIC BLOOD PRESSURE: 116 MMHG

## 2020-12-08 DIAGNOSIS — E78.01 FAMILIAL HYPERCHOLESTEROLEMIA: ICD-10-CM

## 2020-12-08 DIAGNOSIS — R53.83 FATIGUE, UNSPECIFIED TYPE: ICD-10-CM

## 2020-12-08 DIAGNOSIS — R10.10 PAIN OF UPPER ABDOMEN: ICD-10-CM

## 2020-12-08 DIAGNOSIS — D50.9 IRON DEFICIENCY ANEMIA, UNSPECIFIED IRON DEFICIENCY ANEMIA TYPE: ICD-10-CM

## 2020-12-08 LAB
ALBUMIN SERPL-MCNC: 4.4 G/DL (ref 3.5–5.2)
ALP BLD-CCNC: 75 U/L (ref 35–104)
ALT SERPL-CCNC: 23 U/L (ref 5–33)
AMYLASE: 22 U/L (ref 28–100)
ANION GAP SERPL CALCULATED.3IONS-SCNC: 14 MMOL/L (ref 7–19)
AST SERPL-CCNC: 17 U/L (ref 5–32)
BASOPHILS ABSOLUTE: 0 K/UL (ref 0–0.2)
BASOPHILS RELATIVE PERCENT: 0.3 % (ref 0–1)
BILIRUB SERPL-MCNC: 1.8 MG/DL (ref 0.2–1.2)
BILIRUBIN URINE: NEGATIVE
BLOOD, URINE: NEGATIVE
BUN BLDV-MCNC: 14 MG/DL (ref 8–23)
CALCIUM SERPL-MCNC: 9.3 MG/DL (ref 8.8–10.2)
CHLORIDE BLD-SCNC: 102 MMOL/L (ref 98–111)
CHOLESTEROL, TOTAL: 158 MG/DL (ref 160–199)
CLARITY: CLEAR
CO2: 25 MMOL/L (ref 22–29)
COLOR: YELLOW
CREAT SERPL-MCNC: 0.5 MG/DL (ref 0.5–0.9)
EOSINOPHILS ABSOLUTE: 0 K/UL (ref 0–0.6)
EOSINOPHILS RELATIVE PERCENT: 0.3 % (ref 0–5)
FERRITIN: 36.8 NG/ML (ref 13–150)
GFR AFRICAN AMERICAN: >59
GFR NON-AFRICAN AMERICAN: >60
GLUCOSE BLD-MCNC: 151 MG/DL (ref 74–109)
GLUCOSE URINE: =>1000 MG/DL
HCT VFR BLD CALC: 48.6 % (ref 37–47)
HDLC SERPL-MCNC: 90 MG/DL (ref 65–121)
HEMOGLOBIN: 15.1 G/DL (ref 12–16)
IMMATURE GRANULOCYTES #: 0.1 K/UL
IRON SATURATION: 24 % (ref 14–50)
IRON: 94 UG/DL (ref 37–145)
KETONES, URINE: 40 MG/DL
LDL CHOLESTEROL CALCULATED: 46 MG/DL
LEUKOCYTE ESTERASE, URINE: NEGATIVE
LIPASE: 15 U/L (ref 13–60)
LYMPHOCYTES ABSOLUTE: 1.1 K/UL (ref 1.1–4.5)
LYMPHOCYTES RELATIVE PERCENT: 10.3 % (ref 20–40)
MCH RBC QN AUTO: 30 PG (ref 27–31)
MCHC RBC AUTO-ENTMCNC: 31.1 G/DL (ref 33–37)
MCV RBC AUTO: 96.6 FL (ref 81–99)
MONOCYTES ABSOLUTE: 0.7 K/UL (ref 0–0.9)
MONOCYTES RELATIVE PERCENT: 6.6 % (ref 0–10)
NEUTROPHILS ABSOLUTE: 8.9 K/UL (ref 1.5–7.5)
NEUTROPHILS RELATIVE PERCENT: 81.6 % (ref 50–65)
NITRITE, URINE: NEGATIVE
PDW BLD-RTO: 16.5 % (ref 11.5–14.5)
PH UA: 5 (ref 5–8)
PLATELET # BLD: 241 K/UL (ref 130–400)
PMV BLD AUTO: 11.8 FL (ref 9.4–12.3)
POTASSIUM SERPL-SCNC: 3.9 MMOL/L (ref 3.5–5)
PROTEIN UA: NEGATIVE MG/DL
RBC # BLD: 5.03 M/UL (ref 4.2–5.4)
SODIUM BLD-SCNC: 141 MMOL/L (ref 136–145)
SPECIFIC GRAVITY UA: 1.04 (ref 1–1.03)
T4 FREE: 1.56 NG/DL (ref 0.93–1.7)
TOTAL IRON BINDING CAPACITY: 390 UG/DL (ref 250–400)
TOTAL PROTEIN: 7.1 G/DL (ref 6.6–8.7)
TRIGL SERPL-MCNC: 109 MG/DL (ref 0–149)
TSH SERPL DL<=0.05 MIU/L-ACNC: 1.32 UIU/ML (ref 0.27–4.2)
UROBILINOGEN, URINE: 1 E.U./DL
VITAMIN B-12: 257 PG/ML (ref 211–946)
WBC # BLD: 10.9 K/UL (ref 4.8–10.8)

## 2020-12-08 PROCEDURE — G8484 FLU IMMUNIZE NO ADMIN: HCPCS | Performed by: NURSE PRACTITIONER

## 2020-12-08 PROCEDURE — 3052F HG A1C>EQUAL 8.0%<EQUAL 9.0%: CPT | Performed by: NURSE PRACTITIONER

## 2020-12-08 PROCEDURE — 1036F TOBACCO NON-USER: CPT | Performed by: NURSE PRACTITIONER

## 2020-12-08 PROCEDURE — G8417 CALC BMI ABV UP PARAM F/U: HCPCS | Performed by: NURSE PRACTITIONER

## 2020-12-08 PROCEDURE — 99214 OFFICE O/P EST MOD 30 MIN: CPT | Performed by: NURSE PRACTITIONER

## 2020-12-08 PROCEDURE — G8427 DOCREV CUR MEDS BY ELIG CLIN: HCPCS | Performed by: NURSE PRACTITIONER

## 2020-12-08 PROCEDURE — 3017F COLORECTAL CA SCREEN DOC REV: CPT | Performed by: NURSE PRACTITIONER

## 2020-12-08 PROCEDURE — 2022F DILAT RTA XM EVC RTNOPTHY: CPT | Performed by: NURSE PRACTITIONER

## 2020-12-08 ASSESSMENT — ENCOUNTER SYMPTOMS
WHEEZING: 0
NAUSEA: 1
CHEST TIGHTNESS: 0
BLOOD IN STOOL: 0
COUGH: 0
ABDOMINAL PAIN: 1
VOMITING: 0
SHORTNESS OF BREATH: 0
SORE THROAT: 0
DIARRHEA: 0

## 2020-12-08 NOTE — PROGRESS NOTES
Ms.Teresa CHERYL Fagan is a 60 y.o. female who presents today for  Chief Complaint   Patient presents with    Fatigue    Nausea       HPI:  She started feeling poorly 4 to 5 days ago with nausea and upper mid abdominal pain. No vomiting. No fever or chills. Her nausea and pain have improved and are just occurring intermittently now with no clear trigger. She has had no bowel changes. She has been more fatigued for the past 3 days. She thought she noticed \"specks\" in urine, concerned about possible blood. No gross hematuria otherwise. She has had gradual unintentional weight loss as well for the past several weeks. She takes Protonix once daily chronically. Had egd and cscope per Dr. Bettye Reyes in 2018. Colonoscopy showed diverticulosis and a small polyp. EGD showed short segment Pryor's esophagus, 2 gastric polyps    Has history of paroxysmal atrial fib/flutter which has been stable. She has occasional palpitations, no change. No increased shortness of breath. No chest pain. Remains on amiodarone and Eliquis. She is on chronic prednisone therapy for RA, stable. Taking 5 mg every morning, 4 mg every afternoon. Followed by rheumatology in [facility] Hyperlipidemia  Tolerating current cholesterol medication without side effects. No body aches. Attempting to reduce processed sugar and cholesterol from diet. Hypertension  Compliant with medications. No adverse effects from medication. No lightheadedness, palpitations, or chest pain. Diabetes Mellitus  Has been compliant with medications. No side effects of medications since last visit. No polyuria, polydipsia, or vision changes since last visit. No symptomatic episodes of hypoglycemia. Review of Systems   Constitutional: Positive for fatigue. Negative for chills and fever. HENT: Negative for congestion, ear pain and sore throat. Respiratory: Negative for cough, chest tightness, shortness of breath and wheezing. Cardiovascular: Negative for chest pain. Gastrointestinal: Positive for abdominal pain and nausea. Negative for blood in stool, diarrhea and vomiting. Musculoskeletal: Negative for arthralgias and myalgias. Skin: Negative for rash. Past Medical History:   Diagnosis Date    Atrial flutter by electrocardiogram ([address]     Constipation     Coronary artery disease     Diabetes mellitus (HCC)     Essential hypertension     Hyperlipidemia     IBS (irritable bowel syndrome)     Iron deficiency anemia     Menopause     Paroxysmal SVT (supraventricular tachycardia) (HCC)     RA (rheumatoid arthritis) (HCC)     Rheumatoid arthritis (HCC)        Current Outpatient Medications   Medication Sig Dispense Refill    blood glucose test strips (FREESTYLE LITE) strip 1 each by In Vitro route daily As needed. 100 each 3    JARDIANCE 25 MG tablet TAKE 1 TABLET BY MOUTH ONCE DAILY 90 tablet 0    pantoprazole (PROTONIX) 40 MG tablet TAKE 1 TABLET BY MOUTH 2 TIMES A  tablet 0    losartan (COZAAR) 25 MG tablet TAKE 1 TABLET BY MOUTH DAILY 90 tablet 0    blood glucose test strips (FREESTYLE LITE) strip Infuse 1 each intravenously 2 times daily As needed. 100 strip 1    simvastatin (ZOCOR) 40 MG tablet TAKE 1 TABLET BY MOUTH NIGHTLY 90 tablet 2    nystatin (MYCOSTATIN) 575094 UNIT/GM powder Apply topically 4 times daily. 30 g 5    metFORMIN (GLUCOPHAGE) 500 MG tablet Take 1 tablet by mouth every morning AND 1 tablet every evening. 90 tablet 5    Blood Glucose Monitoring Suppl (FREESTYLE INSULINX SYSTEM) w/Device KIT 1 each by Does not apply route daily 1 kit 0    blood glucose test strips (FREESTYLE INSULINX TEST) strip 1 each by In Vitro route daily As needed.  100 each 3    FreeStyle Lancets MISC 1 each by Does not apply route daily 100 each 3    nitroGLYCERIN (NITROSTAT) 0.4 MG SL tablet PLACE 1 TABLET UNDER THE TONGUE EVERY 5 MINUTES AS NEEDED FOR CHEST PAIN 25 tablet 0    nystatin (MYCOSTATIN) 513394 UNIT/GM cream Apply topically 2 times daily. 30 g 2    metoprolol tartrate (LOPRESSOR) 50 MG tablet TAKE 1 TABLET BY MOUTH 2 TIMES A  tablet 3    potassium chloride (KLOR-CON M) 20 MEQ extended release tablet TAKE 1 TABLET BY MOUTH DAILY. 90 tablet 3    isosorbide mononitrate (IMDUR) 30 MG extended release tablet TAKE 1 TABLET BY MOUTH DAILY 90 tablet 3    Tofacitinib Citrate (XELJANZ XR) 11 MG TB24 Take 11 mg by mouth daily      furosemide (LASIX) 20 MG tablet Take 20 mg by mouth daily      FREESTYLE LANCETS MISC TEST SUGAR DAILY AS DIRECTED UP TO 4 TIMES A DAY 4 each 0    apixaban (ELIQUIS) 5 MG TABS tablet Take 5 mg by mouth 2 times daily       verapamil (CALAN SR) 240 MG extended release tablet Take 240 mg by mouth daily       amiodarone (PACERONE) 100 MG tablet Take 100 mg by mouth daily       FREESTYLE LANCETS MISC TEST SUGAR DAILY AS DIRECTED 100 each 2    predniSONE (DELTASONE) 5 MG tablet Take 5 mg by mouth 2 times daily Take 5 mg in the morning and 4 mg in the evening.  aspirin EC 81 MG EC tablet Take 81 mg by mouth daily      folic acid (FOLVITE) 1 MG tablet Take 1 mg by mouth daily        No current facility-administered medications for this visit.         Allergies   Allergen Reactions    Codeine Other (See Comments)     Chest pain    Albuterol      Rapid heart rate    Januvia [Sitagliptin] Swelling    Augmentin [Amoxicillin-Pot Clavulanate] Palpitations    Sulfa Antibiotics Rash       Past Surgical History:   Procedure Laterality Date    CARDIAC SURGERY  2000    CABG (Dr. Wallace Body) [address]  2009    Stent     CHOLECYSTECTOMY      CORONARY ARTERY BYPASS GRAFT  2000    DILATION AND CURETTAGE OF UTERUS N/A 8/16/2019    DILATATION AND CURETTAGE HYSTEROSCOPY W/ Deena Patel performed by [facility] TOTAL KNEE ARTHROPLASTY         Social History     Tobacco Use    Smoking status: Former Smoker     Packs/day: 0.50     Years: 20.00 Pack years: 10.00     Types: Cigarettes     Last attempt to quit: 2000     Years since quittin.2    Smokeless tobacco: Never Used   Substance Use Topics    Alcohol use: No    Drug use: No       Family History   Problem Relation Age of Onset    Heart Attack Father     Prostate Cancer Father     Stroke Father     Heart Attack Brother     Stroke Brother        /68   Pulse 100   Temp 97.6 °F (36.4 °C) (Temporal)   Resp 18   Ht 5' 4\" (1.626 m)   Wt 189 lb (85.7 kg)   SpO2 95%   BMI 32.44 kg/m²     Physical Exam  Vitals signs reviewed. Constitutional:       General: She is not in acute distress. Appearance: Normal appearance. She is well-developed. HENT:      Head: Normocephalic. Right Ear: Tympanic membrane and external ear normal.      Left Ear: Tympanic membrane and external ear normal.      Nose: Nose normal.      Mouth/Throat:      Mouth: Mucous membranes are moist.      Pharynx: No oropharyngeal exudate or posterior oropharyngeal erythema. Eyes:      Conjunctiva/sclera: Conjunctivae normal.      Pupils: Pupils are equal, round, and reactive to light. Neck:      Musculoskeletal: Normal range of motion and neck supple. Thyroid: No thyromegaly. Vascular: No carotid bruit or JVD. Trachea: No tracheal deviation. Cardiovascular:      Rate and Rhythm: Normal rate and regular rhythm. Heart sounds: Normal heart sounds. No murmur. Pulmonary:      Effort: Pulmonary effort is normal. No respiratory distress. Breath sounds: Normal breath sounds. No wheezing or rhonchi. Abdominal:      General: Abdomen is flat. Bowel sounds are normal. There is no distension. Palpations: Abdomen is soft. There is no mass. Tenderness: There is abdominal tenderness (mild tenderness mid upper abd). There is no guarding or rebound. Hernia: No hernia is present. Musculoskeletal: Normal range of motion.    Lymphadenopathy:      Cervical: No cervical adenopathy. Skin:     General: Skin is warm and dry. Findings: No rash. Neurological:      Mental Status: She is alert. Psychiatric:         Mood and Affect: Mood normal.         Behavior: Behavior normal.         Thought Content: Thought content normal.         ASSESSMENT/PLAN:  1. Pain of upper abdomen  -Check labs ordered, may need further imaging and/or GI referral pending labs. -Continue Protonix twice daily  - Amylase; Future  - Lipase; Future    2. Fatigue, unspecified type  -Check labs as ordered, may be multifactorial secondary to underlying medical issues including RA.  - CBC Auto Differential; Future  - Comprehensive Metabolic Panel; Future  - T4, Free; Future  - TSH without Reflex; Future  - Urinalysis; Future  - Culture, Urine; Future  - Vitamin B12; Future    3. Essential hypertension  -Stable, controlled. Continue current medication. 4. Type 2 diabetes mellitus without complication, without long-term current use of insulin (HCC)  -Stable, tolerating current medication. No dose changes. 5. Atrial flutter by electrocardiogram ([facility] [address]  -Stable, continue current medications. 6. Iron deficiency anemia, unspecified iron deficiency anemia type  -History of MARTHA, check iron levels today. - Ferritin; Future  - Iron and TIBC; Future    7. Rheumatoid arthritis involving multiple sites, unspecified whether rheumatoid factor present ([facility] [address]  -Stable on current regimen, continue routine follow-up with rheumatology    8. Familial hypercholesterolemia  -Stable, check fasting lipid today. Continue current medication.  - Lipid Panel; Future         Return for as scheduled. Lily Carr was seen today for fatigue and nausea. Diagnoses and all orders for this visit:    Essential hypertension    Pain of upper abdomen  -     Amylase; Future  -     Lipase; Future    Fatigue, unspecified type  -     CBC Auto Differential; Future  -     Comprehensive Metabolic Panel;  Future  -     T4, Free; Future  - TSH without Reflex; Future  -     Urinalysis; Future  -     Culture, Urine; Future  -     Vitamin B12; Future    Type 2 diabetes mellitus without complication, without long-term current use of insulin (HCC)    Atrial flutter by electrocardiogram (HCC)    Iron deficiency anemia, unspecified iron deficiency anemia type  -     Ferritin; Future  -     Iron and TIBC; Future    Rheumatoid arthritis involving multiple sites, unspecified whether rheumatoid factor present ([facility] [address]    Familial hypercholesterolemia  -     Lipid Panel; Future      There are no discontinued medications. There are no Patient Instructions on file for this visit. Patient voicesunderstanding and agrees to plans along with risks and benefits of plan. Counseling:  Luz Maria Baba Fagan's case, medications and options were discussed in detail. Patient was instructed to call the office if she questionsregarding her treatment. Should her conditions worsen, she should return to office to be reassessed by MARYANNE Manzano. she Should to go the closest Emergency Department for any emergency. They verbalizedunderstanding the above instructions. Return for as scheduled.

## 2020-12-10 LAB
ORGANISM: ABNORMAL
URINE CULTURE, ROUTINE: ABNORMAL
URINE CULTURE, ROUTINE: ABNORMAL

## 2020-12-16 ENCOUNTER — OFFICE VISIT (OUTPATIENT)
Dept: CARDIOLOGY | Facility: CLINIC | Age: 65
End: 2020-12-16

## 2020-12-16 ENCOUNTER — HOSPITAL ENCOUNTER (OUTPATIENT)
Dept: WOMENS IMAGING | Age: 65
Discharge: HOME | End: 2020-12-16
Payer: MEDICARE

## 2020-12-16 VITALS
SYSTOLIC BLOOD PRESSURE: 140 MMHG | WEIGHT: 191 LBS | OXYGEN SATURATION: 98 % | HEART RATE: 74 BPM | DIASTOLIC BLOOD PRESSURE: 70 MMHG | BODY MASS INDEX: 32.61 KG/M2 | HEIGHT: 64 IN

## 2020-12-16 DIAGNOSIS — E11.9 TYPE 2 DIABETES MELLITUS WITHOUT COMPLICATION, WITHOUT LONG-TERM CURRENT USE OF INSULIN (HCC): ICD-10-CM

## 2020-12-16 DIAGNOSIS — E78.2 MIXED HYPERLIPIDEMIA: ICD-10-CM

## 2020-12-16 DIAGNOSIS — I48.3 TYPICAL ATRIAL FLUTTER (HCC): ICD-10-CM

## 2020-12-16 DIAGNOSIS — I48.0 PAF (PAROXYSMAL ATRIAL FIBRILLATION) (HCC): ICD-10-CM

## 2020-12-16 DIAGNOSIS — Z95.5 S/P CORONARY ARTERY STENT PLACEMENT: Primary | ICD-10-CM

## 2020-12-16 DIAGNOSIS — Z95.1 S/P CABG X 3: ICD-10-CM

## 2020-12-16 DIAGNOSIS — I25.10 CORONARY ARTERY DISEASE INVOLVING NATIVE CORONARY ARTERY OF NATIVE HEART WITHOUT ANGINA PECTORIS: ICD-10-CM

## 2020-12-16 PROCEDURE — 99214 OFFICE O/P EST MOD 30 MIN: CPT | Performed by: INTERNAL MEDICINE

## 2020-12-16 PROCEDURE — 77067 SCR MAMMO BI INCL CAD: CPT

## 2020-12-16 PROCEDURE — 93000 ELECTROCARDIOGRAM COMPLETE: CPT | Performed by: INTERNAL MEDICINE

## 2020-12-16 RX ORDER — EMPAGLIFLOZIN 25 MG/1
TABLET, FILM COATED ORAL
COMMUNITY
Start: 2020-10-08 | End: 2021-01-01

## 2020-12-16 NOTE — PROGRESS NOTES
Teresa Serna  [id number]  1955  65 y.o.  female    Referring Provider: Teresa Contreras MD    Reason for Follow-up Visit: Here for routine follow up     Paroxysmal atrial fibrillation now sinus rhythm  Saw Dr De Anda AF ablation tried but due to no good venous access not possible  Now  maintaining sinus rhythm    Cardiac workup test results as below     Subjective    Overall feels the same    No new events or complaints since last visit   Overall the patient feels no major change from baseline symptoms   Similar symptoms as during last visit     Tolerating current medications well with no untoward side effects   Compliant with prescribed medication regimen. Tries to adhere to cardiac diet.      Still has intermittent palpitations, once every several days lasting for less than 1 minute  Usually resolves after she takes AM medications  No associated symptoms of dizziness, weakness, chest pain,  shortness of breath      Mild to moderate exertional  shortness of breath     No associated chest pain  Leg swelling much better after stopping Januvia   Mild pedal edema  Dependent edema worse on sitting up towards evening     No fever or chills  No significant expectoration    No hemoptysis  No presyncope or syncope     Easy fatiguability     Joint pain in small, medium and large joints  Chronic low back pain      On anticoagulation with Eliquis   BP well controlled at home.     Saw arthritis MD and blood work was prescribed  Lipids under good control      History of present illness:  Teresa Serna is a 65 y.o. yo female with history of Paroxysmal atrial fibrillation who presents today for   Chief Complaint   Patient presents with   • Coronary Artery Disease     6 month follow up    .    History  Past Medical History:   Diagnosis Date   • Abnormal stress test    • Atrial flutter (CMS/HCC)    • CAD (coronary artery disease)    • CAD in native artery     CABG x 3   • Diabetes mellitus (CMS/HCC)    • Essential  hypertension     Tricuspid valve insufficiency, unspecified etiology    • Hyperlipemia, mixed    • Hyperlipidemia    • Hypertension    • MI, old    • Mitral valve prolapse    • Near syncope    • Obesity, morbid (CMS/[facility])    • Palpitations    • Paroxysmal SVT (supraventricular tachycardia) (CMS/[facility])    • Paroxysmal SVT (supraventricular tachycardia) (CMS/[facility])    • Pedal edema    • Precordial pain    • Rheumatoid arthritis (CMS/[facility])    • Rheumatoid arthritis (CMS/[facility])    • S/P CABG x 3    ,   Past Surgical History:   Procedure Laterality Date   • APPENDECTOMY     • CARDIAC CATHETERIZATION Left 2012    Intensify medical therapy   • CARDIAC CATHETERIZATION Left 2002    surgery   • CHOLECYSTECTOMY     • COLONOSCOPY  02/10/2012   • COLONOSCOPY N/A 2018    Procedure: COLONOSCOPY WITH ANESTHESIA;  Surgeon: Patricia Bo MD;  Location: [facility] ENDOSCOPY;  Service:    • CORONARY ARTERY BYPASS GRAFT  2002    LIMA to LAD, SVG to D1, SVG to OM1 - x3   • CORONARY STENT PLACEMENT  09/2009    X1 - Stent to LAD, Drug Eluting   • ENDOSCOPY N/A 2018    Procedure: ESOPHAGOGASTRODUODENOSCOPY WITH ANESTHESIA;  Surgeon: Patricia oB MD;  Location: [facility] ENDOSCOPY;  Service:    • GALLBLADDER SURGERY     • REPLACEMENT TOTAL KNEE Right    • UPPER GASTROINTESTINAL ENDOSCOPY  2015   ,   Family History   Problem Relation Age of Onset   • Cancer Father    • Coronary artery disease Father    ,   Social History     Tobacco Use   • Smoking status: Former Smoker     Years: 20.00     Types: Cigarettes     Quit date: 2000     Years since quittin.8   • Smokeless tobacco: Never Used   Substance Use Topics   • Alcohol use: No   • Drug use: No   ,     Medications  Current Outpatient Medications   Medication Sig Dispense Refill   • amiodarone (PACERONE) 100 MG tablet Take 1 tablet by mouth Daily. 90 tablet 4   • aspirin 81 MG EC tablet Take 81 mg by mouth Daily.     • Eliquis 5 MG tablet tablet TAKE 1 TABLET  BY MOUTH EVERY 12 HOURS 180 tablet 4   • Empagliflozin (Jardiance) 25 MG tablet Jardiance 25 mg tablet   Take 1 tablet every day by oral route.     • folic acid (FOLVITE) 1 MG tablet Take 1 mg by mouth Daily.     • furosemide (LASIX) 20 MG tablet Take 1 tablet by mouth Daily. 90 tablet 4   • isosorbide mononitrate (IMDUR) 30 MG 24 hr tablet Take 30 mg by mouth Daily.     • losartan (COZAAR) 25 MG tablet Take 25 mg by mouth Daily.     • meclizine (ANTIVERT) 12.5 MG tablet Take 1 tablet by mouth 3 (Three) Times a Day As Needed for dizziness. 90 tablet 3   • metFORMIN (GLUCOPHAGE) 1000 MG tablet Take 1,000 mg by mouth Daily With Breakfast.     • metoprolol tartrate (LOPRESSOR) 50 MG tablet Take 50 mg by mouth 2 (Two) Times a Day.     • nitroglycerin (NITROSTAT) 0.4 MG SL tablet Place 0.4 mg under the tongue Every 5 (Five) Minutes As Needed for Chest Pain. Take no more than 3 doses in 15 minutes.     • pantoprazole (PROTONIX) 40 MG EC tablet Take 1 tablet by mouth Every 12 (Twelve) Hours. 60 tablet 1   • potassium chloride (K-DUR) 10 MEQ CR tablet Take 1 tablet by mouth Daily. 90 tablet 3   • predniSONE (DELTASONE) 1 MG tablet Take 4 mg by mouth Every Night.     • predniSONE (DELTASONE) 5 MG tablet Take 5 mg by mouth Every Morning.     • simvastatin (ZOCOR) 40 MG tablet Take 40 mg by mouth Every Night.     • Tofacitinib Citrate (XELJANZ XR) 11 MG tablet sustained-release 24 hour Take 1 tablet by mouth Daily.     • verapamil SR (CALAN-SR) 240 MG CR tablet Take 1 tablet by mouth Daily. 90 tablet 4     No current facility-administered medications for this visit.      Results for orders placed during the hospital encounter of 07/07/20   Adult Transthoracic Echo Complete W/ Cont if Necessary Per Protocol    Narrative · Estimated EF = 60%.  · Left ventricular systolic function is normal.  · Mild tricuspid valve regurgitation is present.  · Left ventricular diastolic function is normal.  · Mild pulmonary hypertension is  "present.          Allergies:  Codeine, Sulfa antibiotics, Albuterol, Exenatide, Sitagliptin, and Amoxicillin-pot clavulanate    Review of Systems  Review of Systems   Constitution: Positive for malaise/fatigue.   HENT: Negative.    Eyes: Negative.    Cardiovascular: Positive for dyspnea on exertion, leg swelling and palpitations. Negative for chest pain, claudication, cyanosis, irregular heartbeat, near-syncope, orthopnea, paroxysmal nocturnal dyspnea and syncope.   Respiratory: Negative.    Endocrine: Negative.    Hematologic/Lymphatic: Negative.    Skin: Negative.    Musculoskeletal: Positive for arthritis, back pain and joint pain.   Gastrointestinal: Negative for anorexia.   Genitourinary: Negative.    Neurological: Positive for dizziness and weakness.   Psychiatric/Behavioral: Negative.    Same review of system and physical exam as last visit        Objective     Physical Exam:  /70   Pulse 74   Ht 162.6 cm (64\")   Wt 86.6 kg (191 lb)   SpO2 98%   BMI 32.79 kg/m²   Physical Exam   Constitutional: She appears well-developed.   HENT:   Head: Normocephalic.   Neck: Normal carotid pulses and no JVD present. No tracheal tenderness present. Carotid bruit is not present. No tracheal deviation and no edema present.   Cardiovascular: Regular rhythm and normal pulses.   Murmur heard.   Systolic murmur is present with a grade of 2/6.  Pulmonary/Chest: Effort normal. No stridor. She has no wheezes.   Abdominal: Soft. She exhibits no distension. There is no abdominal tenderness.   Musculoskeletal:      Right lower le+ Pitting Edema present.      Left lower le+ Pitting Edema present.     Vascular Status -  Her right foot exhibits abnormal foot edema. Her left foot exhibits abnormal foot edema.  Neurological: She is alert. No cranial nerve deficit or sensory deficit.   Skin: Skin is warm.   Psychiatric: Her speech is normal and behavior is normal.   Lower extremity: 1 plus pitting edema.     Results " Review:       ECG 12 Lead    Date/Time: 12/16/2020 11:15 AM  Performed by: Jagdeep Reyes MD  Authorized by: Jagdeep Reyes MD   Comparison: compared with previous ECG from 6/16/2020  Similar to previous ECG  Rhythm: sinus rhythm  Rate: normal  Conduction: conduction normal  ST Segments: ST segments normal  T Waves: T waves normal  QRS axis: normal  Other findings: non-specific ST-T wave changes    Clinical impression: abnormal EKG        ____________________________________________________________________________________________________________________________________________  Health maintenance and recommendations  Similar recommendations as last visit     Offered to give patient  a copy      Questions were encouraged, asked and answered to the patient's  understanding and satisfaction. Questions if any regarding current medications and side effects, need for refills and importance of compliance to medications stressed.    Reviewed available prior notes, consults, prior visits, laboratory findings, radiology and cardiology relevant reports. Updated chart as applicable. I have reviewed the patient's medical history in detail and updated the computerized patient record as relevant.      Updated patient regarding any new or relevant abnormalities on review of records or any new findings on physical exam. Mentioned to patient about purpose of visit and desirable health short and long term goals and objectives.    Primary to monitor CBC CMP Lipid panel and TSH as applicable    ___________________________________________________________________________________________________________________________________________        Assessment/Plan   Patient Active Problem List   Diagnosis   • CAD (coronary artery disease)   • Atrial flutter (CMS/HCC)   • Type 2 diabetes mellitus without complication, without long-term current use of insulin (CMS/HCC)   • Mixed hyperlipidemia   • S/P CABG x 3 2000 Dr Larsen   • Shoulder blade pain   •  Anemia   • Abnormal finding on imaging   • History of esophagitis   • Essential hypertension   • S/P coronary artery stent placement   • Gastric polyp   • Colon polyps   • PAF (paroxysmal atrial fibrillation) (CMS/HCC)           Plan        Orders Placed This Encounter   Procedures   • Holter Monitor - 72 Hour Up To 21 Days     Standing Status:   Future     Standing Expiration Date:   12/16/2021     Order Specific Question:   Reason for exam?     Answer:   Palpitations   • ECG 12 Lead     This order was created via procedure documentation      Discussed results of prior testing with patient: echo electrocardiogram from today    Patient expressed understanding  Encouraged and answered all questions   Discussed with the patient and all questioned fully answered. She will call me if any problems arise.      The current medical regimen is effective;  continue present plan and medications.     Check BP and heart rates twice daily at least 3x / week at home and bring a recording for me to review next visit  If BP >140/90 or < 100/60 persistently over 3 reading 30 mins apart call sooner      I support the patient's decision to take the Covid -19 vaccine           Return in about 3 months (around 3/16/2021).

## 2020-12-28 RX ORDER — EMPAGLIFLOZIN 25 MG/1
TABLET, FILM COATED ORAL
Qty: 90 TABLET | Refills: 0 | Status: SHIPPED | OUTPATIENT
Start: 2020-12-28 | End: 2021-02-22

## 2020-12-28 RX ORDER — ISOSORBIDE MONONITRATE 30 MG/1
30 TABLET, EXTENDED RELEASE ORAL DAILY
Qty: 90 TABLET | Refills: 0 | Status: SHIPPED | OUTPATIENT
Start: 2020-12-28 | End: 2021-03-31

## 2020-12-28 RX ORDER — LOSARTAN POTASSIUM 25 MG/1
25 TABLET ORAL DAILY
Qty: 90 TABLET | Refills: 0 | Status: SHIPPED | OUTPATIENT
Start: 2020-12-28 | End: 2021-03-31

## 2020-12-28 RX ORDER — PANTOPRAZOLE SODIUM 40 MG/1
TABLET, DELAYED RELEASE ORAL
Qty: 180 TABLET | Refills: 0 | Status: SHIPPED | OUTPATIENT
Start: 2020-12-28 | End: 2021-03-31

## 2020-12-28 RX ORDER — METOPROLOL TARTRATE 50 MG/1
TABLET, FILM COATED ORAL
Qty: 180 TABLET | Refills: 0 | Status: SHIPPED | OUTPATIENT
Start: 2020-12-28 | End: 2021-03-31

## 2021-01-01 ENCOUNTER — OFFICE VISIT (OUTPATIENT)
Dept: OBGYN CLINIC | Age: 66
End: 2021-01-01
Payer: MEDICARE

## 2021-01-01 ENCOUNTER — OFFICE VISIT (OUTPATIENT)
Dept: FAMILY MEDICINE CLINIC | Age: 66
End: 2021-01-01
Payer: MEDICARE

## 2021-01-01 ENCOUNTER — TELEPHONE (OUTPATIENT)
Dept: FAMILY MEDICINE CLINIC | Age: 66
End: 2021-01-01

## 2021-01-01 ENCOUNTER — APPOINTMENT (OUTPATIENT)
Dept: GENERAL RADIOLOGY | Facility: HOSPITAL | Age: 66
End: 2021-01-01

## 2021-01-01 ENCOUNTER — HOSPITAL ENCOUNTER (EMERGENCY)
Facility: HOSPITAL | Age: 66
Discharge: ANOTHER HOSPITAL | End: 2021-11-27
Attending: EMERGENCY MEDICINE | Admitting: EMERGENCY MEDICINE

## 2021-01-01 ENCOUNTER — APPOINTMENT (OUTPATIENT)
Dept: CT IMAGING | Facility: HOSPITAL | Age: 66
End: 2021-01-01

## 2021-01-01 ENCOUNTER — TELEPHONE (OUTPATIENT)
Dept: INTERNAL MEDICINE | Age: 66
End: 2021-01-01

## 2021-01-01 ENCOUNTER — HOSPITAL ENCOUNTER (EMERGENCY)
Facility: HOSPITAL | Age: 66
Discharge: HOME | End: 2021-12-23
Admitting: EMERGENCY MEDICINE

## 2021-01-01 ENCOUNTER — HOSPITAL ENCOUNTER (INPATIENT)
Facility: HOSPITAL | Age: 66
LOS: 1 days | Discharge: HOME | End: 2021-10-22
Attending: FAMILY MEDICINE | Admitting: FAMILY MEDICINE

## 2021-01-01 ENCOUNTER — READMISSION MANAGEMENT (OUTPATIENT)
Dept: CALL CENTER | Facility: HOSPITAL | Age: 66
End: 2021-01-01

## 2021-01-01 ENCOUNTER — LAB (OUTPATIENT)
Dept: LAB | Facility: HOSPITAL | Age: 66
End: 2021-01-01

## 2021-01-01 ENCOUNTER — TRANSCRIBE ORDERS (OUTPATIENT)
Dept: ADMINISTRATIVE | Facility: HOSPITAL | Age: 66
End: 2021-01-01

## 2021-01-01 ENCOUNTER — HOSPITAL ENCOUNTER (OUTPATIENT)
Dept: CT IMAGING | Age: 66
Discharge: HOME | End: 2021-12-22
Payer: MEDICARE

## 2021-01-01 ENCOUNTER — OFFICE VISIT (OUTPATIENT)
Dept: CARDIOLOGY | Facility: CLINIC | Age: 66
End: 2021-01-01

## 2021-01-01 ENCOUNTER — HOSPITAL ENCOUNTER (OUTPATIENT)
Dept: GENERAL RADIOLOGY | Age: 66
Discharge: HOME | End: 2021-12-22
Payer: MEDICARE

## 2021-01-01 ENCOUNTER — NURSE TRIAGE (OUTPATIENT)
Dept: OTHER | Facility: CLINIC | Age: 66
End: 2021-01-01

## 2021-01-01 ENCOUNTER — TELEPHONE (OUTPATIENT)
Dept: CARDIOLOGY | Facility: CLINIC | Age: 66
End: 2021-01-01

## 2021-01-01 ENCOUNTER — CARE COORDINATION (OUTPATIENT)
Dept: CARE COORDINATION | Age: 66
End: 2021-01-01

## 2021-01-01 ENCOUNTER — HOSPITAL ENCOUNTER (OUTPATIENT)
Dept: CARDIOLOGY | Facility: HOSPITAL | Age: 66
Setting detail: HOSPITAL OUTPATIENT SURGERY
Discharge: HOME | End: 2021-08-27

## 2021-01-01 ENCOUNTER — ANESTHESIA (OUTPATIENT)
Dept: CARDIOLOGY | Facility: HOSPITAL | Age: 66
End: 2021-01-01

## 2021-01-01 ENCOUNTER — TELEPHONE (OUTPATIENT)
Dept: INTERNAL MEDICINE CLINIC | Age: 66
End: 2021-01-01

## 2021-01-01 ENCOUNTER — HOSPITAL ENCOUNTER (EMERGENCY)
Facility: HOSPITAL | Age: 66
Discharge: HOME | End: 2021-10-01
Attending: EMERGENCY MEDICINE | Admitting: EMERGENCY MEDICINE

## 2021-01-01 ENCOUNTER — ANESTHESIA EVENT (OUTPATIENT)
Dept: CARDIOLOGY | Facility: HOSPITAL | Age: 66
End: 2021-01-01

## 2021-01-01 ENCOUNTER — LAB REQUISITION (OUTPATIENT)
Dept: LAB | Facility: HOSPITAL | Age: 66
End: 2021-01-01

## 2021-01-01 VITALS
DIASTOLIC BLOOD PRESSURE: 61 MMHG | SYSTOLIC BLOOD PRESSURE: 121 MMHG | WEIGHT: 200 LBS | OXYGEN SATURATION: 95 % | BODY MASS INDEX: 34.15 KG/M2 | TEMPERATURE: 98.8 F | HEART RATE: 81 BPM | RESPIRATION RATE: 12 BRPM | HEIGHT: 64 IN

## 2021-01-01 VITALS
SYSTOLIC BLOOD PRESSURE: 120 MMHG | HEART RATE: 59 BPM | HEIGHT: 64 IN | WEIGHT: 204 LBS | BODY MASS INDEX: 34.83 KG/M2 | OXYGEN SATURATION: 99 % | DIASTOLIC BLOOD PRESSURE: 66 MMHG | TEMPERATURE: 96.9 F

## 2021-01-01 VITALS
OXYGEN SATURATION: 95 % | WEIGHT: 196 LBS | HEART RATE: 102 BPM | BODY MASS INDEX: 33.46 KG/M2 | SYSTOLIC BLOOD PRESSURE: 124 MMHG | DIASTOLIC BLOOD PRESSURE: 70 MMHG | HEIGHT: 64 IN

## 2021-01-01 VITALS
SYSTOLIC BLOOD PRESSURE: 125 MMHG | BODY MASS INDEX: 33.29 KG/M2 | WEIGHT: 195 LBS | OXYGEN SATURATION: 94 % | HEART RATE: 83 BPM | HEIGHT: 64 IN | RESPIRATION RATE: 18 BRPM | DIASTOLIC BLOOD PRESSURE: 69 MMHG | TEMPERATURE: 97.6 F

## 2021-01-01 VITALS
SYSTOLIC BLOOD PRESSURE: 112 MMHG | DIASTOLIC BLOOD PRESSURE: 71 MMHG | BODY MASS INDEX: 33.46 KG/M2 | RESPIRATION RATE: 20 BRPM | WEIGHT: 196 LBS | OXYGEN SATURATION: 99 % | HEART RATE: 102 BPM | HEIGHT: 64 IN | TEMPERATURE: 97.6 F

## 2021-01-01 VITALS
TEMPERATURE: 97.5 F | BODY MASS INDEX: 32.96 KG/M2 | WEIGHT: 192 LBS | DIASTOLIC BLOOD PRESSURE: 62 MMHG | HEART RATE: 64 BPM | OXYGEN SATURATION: 97 % | SYSTOLIC BLOOD PRESSURE: 122 MMHG

## 2021-01-01 VITALS
RESPIRATION RATE: 18 BRPM | WEIGHT: 207 LBS | HEIGHT: 64 IN | HEART RATE: 80 BPM | SYSTOLIC BLOOD PRESSURE: 130 MMHG | BODY MASS INDEX: 35.34 KG/M2 | DIASTOLIC BLOOD PRESSURE: 70 MMHG | TEMPERATURE: 98.9 F | OXYGEN SATURATION: 97 %

## 2021-01-01 VITALS
TEMPERATURE: 98.6 F | RESPIRATION RATE: 16 BRPM | WEIGHT: 186 LBS | DIASTOLIC BLOOD PRESSURE: 78 MMHG | OXYGEN SATURATION: 99 % | BODY MASS INDEX: 31.76 KG/M2 | HEIGHT: 64 IN | HEART RATE: 66 BPM | SYSTOLIC BLOOD PRESSURE: 122 MMHG

## 2021-01-01 VITALS
HEIGHT: 64 IN | OXYGEN SATURATION: 99 % | SYSTOLIC BLOOD PRESSURE: 124 MMHG | BODY MASS INDEX: 32.95 KG/M2 | WEIGHT: 193 LBS | DIASTOLIC BLOOD PRESSURE: 68 MMHG | HEART RATE: 62 BPM

## 2021-01-01 VITALS
OXYGEN SATURATION: 97 % | BODY MASS INDEX: 33.8 KG/M2 | WEIGHT: 198 LBS | HEART RATE: 64 BPM | DIASTOLIC BLOOD PRESSURE: 42 MMHG | SYSTOLIC BLOOD PRESSURE: 88 MMHG | HEIGHT: 64 IN

## 2021-01-01 VITALS
TEMPERATURE: 98 F | HEART RATE: 61 BPM | BODY MASS INDEX: 33.46 KG/M2 | SYSTOLIC BLOOD PRESSURE: 125 MMHG | WEIGHT: 196 LBS | OXYGEN SATURATION: 99 % | HEIGHT: 64 IN | DIASTOLIC BLOOD PRESSURE: 47 MMHG | RESPIRATION RATE: 17 BRPM

## 2021-01-01 VITALS
WEIGHT: 207 LBS | SYSTOLIC BLOOD PRESSURE: 121 MMHG | DIASTOLIC BLOOD PRESSURE: 68 MMHG | HEART RATE: 62 BPM | HEIGHT: 64 IN | BODY MASS INDEX: 35.34 KG/M2

## 2021-01-01 VITALS
WEIGHT: 186 LBS | TEMPERATURE: 97.4 F | HEART RATE: 65 BPM | HEIGHT: 64 IN | DIASTOLIC BLOOD PRESSURE: 74 MMHG | OXYGEN SATURATION: 98 % | SYSTOLIC BLOOD PRESSURE: 122 MMHG | BODY MASS INDEX: 31.76 KG/M2

## 2021-01-01 DIAGNOSIS — E11.9 TYPE 2 DIABETES MELLITUS WITHOUT COMPLICATION, WITHOUT LONG-TERM CURRENT USE OF INSULIN (HCC): ICD-10-CM

## 2021-01-01 DIAGNOSIS — E78.2 MIXED HYPERLIPIDEMIA: ICD-10-CM

## 2021-01-01 DIAGNOSIS — I48.3 TYPICAL ATRIAL FLUTTER (HCC): ICD-10-CM

## 2021-01-01 DIAGNOSIS — J01.40 ACUTE NON-RECURRENT PANSINUSITIS: ICD-10-CM

## 2021-01-01 DIAGNOSIS — S36.892A TRAUMATIC RETROPERITONEAL HEMATOMA, INITIAL ENCOUNTER: ICD-10-CM

## 2021-01-01 DIAGNOSIS — R53.1 WEAKNESS: ICD-10-CM

## 2021-01-01 DIAGNOSIS — U09.9 POST-COVID CHRONIC DECREASED MOBILITY AND ENDURANCE: ICD-10-CM

## 2021-01-01 DIAGNOSIS — Z95.5 S/P CORONARY ARTERY STENT PLACEMENT: ICD-10-CM

## 2021-01-01 DIAGNOSIS — I10 ESSENTIAL HYPERTENSION: ICD-10-CM

## 2021-01-01 DIAGNOSIS — E11.65 TYPE 2 DIABETES MELLITUS WITH HYPERGLYCEMIA, WITHOUT LONG-TERM CURRENT USE OF INSULIN (HCC): ICD-10-CM

## 2021-01-01 DIAGNOSIS — N81.4 UTERINE PROLAPSE: ICD-10-CM

## 2021-01-01 DIAGNOSIS — E11.9 TYPE 2 DIABETES MELLITUS WITHOUT COMPLICATION, WITHOUT LONG-TERM CURRENT USE OF INSULIN (HCC): Primary | ICD-10-CM

## 2021-01-01 DIAGNOSIS — R58 RETROPERITONEAL BLEED: Primary | ICD-10-CM

## 2021-01-01 DIAGNOSIS — U07.1 COVID-19: ICD-10-CM

## 2021-01-01 DIAGNOSIS — F41.9 ANXIETY: ICD-10-CM

## 2021-01-01 DIAGNOSIS — E78.01 FAMILIAL HYPERCHOLESTEROLEMIA: ICD-10-CM

## 2021-01-01 DIAGNOSIS — R07.9 CHEST PAIN, UNSPECIFIED TYPE: ICD-10-CM

## 2021-01-01 DIAGNOSIS — Z01.419 ENCOUNTER FOR ROUTINE GYNECOLOGIC EXAMINATION IN MEDICARE PATIENT: Primary | ICD-10-CM

## 2021-01-01 DIAGNOSIS — D50.9 IRON DEFICIENCY ANEMIA, UNSPECIFIED IRON DEFICIENCY ANEMIA TYPE: Primary | ICD-10-CM

## 2021-01-01 DIAGNOSIS — I25.10 CORONARY ARTERY DISEASE INVOLVING NATIVE CORONARY ARTERY OF NATIVE HEART WITHOUT ANGINA PECTORIS: ICD-10-CM

## 2021-01-01 DIAGNOSIS — Z95.1 S/P CABG X 3: Primary | ICD-10-CM

## 2021-01-01 DIAGNOSIS — H66.002 NON-RECURRENT ACUTE SUPPURATIVE OTITIS MEDIA OF LEFT EAR WITHOUT SPONTANEOUS RUPTURE OF TYMPANIC MEMBRANE: Primary | ICD-10-CM

## 2021-01-01 DIAGNOSIS — D50.9 IRON DEFICIENCY ANEMIA, UNSPECIFIED IRON DEFICIENCY ANEMIA TYPE: ICD-10-CM

## 2021-01-01 DIAGNOSIS — R53.83 FATIGUE, UNSPECIFIED TYPE: ICD-10-CM

## 2021-01-01 DIAGNOSIS — R58 RETROPERITONEAL HEMORRHAGE: ICD-10-CM

## 2021-01-01 DIAGNOSIS — I48.0 PAF (PAROXYSMAL ATRIAL FIBRILLATION) (HCC): Primary | ICD-10-CM

## 2021-01-01 DIAGNOSIS — J98.11 ATELECTASIS: ICD-10-CM

## 2021-01-01 DIAGNOSIS — J96.01 ACUTE RESPIRATORY FAILURE WITH HYPOXIA (HCC): Primary | ICD-10-CM

## 2021-01-01 DIAGNOSIS — Z00.00 ENCOUNTER FOR GENERAL ADULT MEDICAL EXAMINATION WITHOUT ABNORMAL FINDINGS: ICD-10-CM

## 2021-01-01 DIAGNOSIS — N81.11 MIDLINE CYSTOCELE: ICD-10-CM

## 2021-01-01 DIAGNOSIS — Z95.1 S/P CABG X 3: ICD-10-CM

## 2021-01-01 DIAGNOSIS — Z01.818 PREOP TESTING: Primary | ICD-10-CM

## 2021-01-01 DIAGNOSIS — U07.1 COVID-19: Primary | ICD-10-CM

## 2021-01-01 DIAGNOSIS — Z91.81 HISTORY OF FALL: Primary | ICD-10-CM

## 2021-01-01 DIAGNOSIS — R53.1 GENERALIZED WEAKNESS: ICD-10-CM

## 2021-01-01 DIAGNOSIS — Z74.09 POST-COVID CHRONIC DECREASED MOBILITY AND ENDURANCE: ICD-10-CM

## 2021-01-01 DIAGNOSIS — M06.9 RHEUMATOID ARTHRITIS INVOLVING MULTIPLE SITES, UNSPECIFIED WHETHER RHEUMATOID FACTOR PRESENT (HCC): ICD-10-CM

## 2021-01-01 DIAGNOSIS — M06.9 RHEUMATOID ARTHRITIS INVOLVING MULTIPLE SITES, UNSPECIFIED WHETHER RHEUMATOID FACTOR PRESENT (HCC): Primary | ICD-10-CM

## 2021-01-01 DIAGNOSIS — I48.0 PAROXYSMAL ATRIAL FIBRILLATION (HCC): ICD-10-CM

## 2021-01-01 DIAGNOSIS — R50.9 FEVER, UNSPECIFIED FEVER CAUSE: ICD-10-CM

## 2021-01-01 DIAGNOSIS — Z78.9 DECREASED ACTIVITIES OF DAILY LIVING (ADL): ICD-10-CM

## 2021-01-01 DIAGNOSIS — R06.09 DOE (DYSPNEA ON EXERTION): ICD-10-CM

## 2021-01-01 LAB
ABO GROUP BLD: NORMAL
ALBUMIN SERPL-MCNC: 3.4 G/DL (ref 3.5–5.2)
ALBUMIN SERPL-MCNC: 3.6 G/DL (ref 3.5–5.2)
ALBUMIN SERPL-MCNC: 3.9 G/DL (ref 3.5–5.2)
ALBUMIN SERPL-MCNC: 4 G/DL (ref 3.5–5.2)
ALBUMIN SERPL-MCNC: 4 G/DL (ref 3.5–5.2)
ALBUMIN SERPL-MCNC: 4.2 G/DL (ref 3.5–5.2)
ALBUMIN/GLOB SERPL: 1.4 G/DL
ALBUMIN/GLOB SERPL: 1.4 G/DL
ALBUMIN/GLOB SERPL: 1.5 G/DL
ALBUMIN/GLOB SERPL: 1.6 G/DL
ALBUMIN/GLOB SERPL: 1.7 G/DL
ALP BLD-CCNC: 100 U/L (ref 35–104)
ALP SERPL-CCNC: 106 U/L (ref 39–117)
ALP SERPL-CCNC: 114 U/L (ref 39–117)
ALP SERPL-CCNC: 82 U/L (ref 39–117)
ALP SERPL-CCNC: 90 U/L (ref 39–117)
ALP SERPL-CCNC: 94 U/L (ref 39–117)
ALT SERPL W P-5'-P-CCNC: 16 U/L (ref 1–33)
ALT SERPL W P-5'-P-CCNC: 16 U/L (ref 1–33)
ALT SERPL W P-5'-P-CCNC: 29 U/L (ref 1–33)
ALT SERPL W P-5'-P-CCNC: 38 U/L (ref 1–33)
ALT SERPL W P-5'-P-CCNC: 47 U/L (ref 1–33)
ALT SERPL-CCNC: 19 U/L (ref 5–33)
ANION GAP SERPL CALCULATED.3IONS-SCNC: 10 MMOL/L (ref 5–15)
ANION GAP SERPL CALCULATED.3IONS-SCNC: 12 MMOL/L (ref 5–15)
ANION GAP SERPL CALCULATED.3IONS-SCNC: 19 MMOL/L (ref 7–19)
APTT PPP: 26 SECONDS (ref 24.1–35)
APTT PPP: 29.8 SECONDS (ref 24.1–35)
ARTERIAL PATENCY WRIST A: POSITIVE
ARTERIAL PATENCY WRIST A: POSITIVE
AST SERPL-CCNC: 19 U/L (ref 1–32)
AST SERPL-CCNC: 21 U/L (ref 1–32)
AST SERPL-CCNC: 29 U/L (ref 1–32)
AST SERPL-CCNC: 31 U/L (ref 1–32)
AST SERPL-CCNC: 33 U/L (ref 5–32)
AST SERPL-CCNC: 36 U/L (ref 1–32)
ATMOSPHERIC PRESS: 751 MMHG
ATMOSPHERIC PRESS: 754 MMHG
BASE EXCESS BLDA CALC-SCNC: 0.4 MMOL/L (ref 0–2)
BASE EXCESS BLDA CALC-SCNC: 1 MMOL/L (ref 0–2)
BASOPHILS # BLD AUTO: 0.01 10*3/MM3 (ref 0–0.2)
BASOPHILS # BLD AUTO: 0.02 10*3/MM3 (ref 0–0.2)
BASOPHILS # BLD AUTO: 0.02 10*3/MM3 (ref 0–0.2)
BASOPHILS # BLD AUTO: 0.03 10*3/MM3 (ref 0–0.2)
BASOPHILS # BLD AUTO: 0.05 10*3/MM3 (ref 0–0.2)
BASOPHILS ABSOLUTE: 0.1 K/UL (ref 0–0.2)
BASOPHILS NFR BLD AUTO: 0.1 % (ref 0–1.5)
BASOPHILS NFR BLD AUTO: 0.2 % (ref 0–1.5)
BASOPHILS NFR BLD AUTO: 0.2 % (ref 0–1.5)
BASOPHILS NFR BLD AUTO: 0.3 % (ref 0–1.5)
BASOPHILS NFR BLD AUTO: 0.5 % (ref 0–1.5)
BASOPHILS RELATIVE PERCENT: 0.6 % (ref 0–1)
BDY SITE: ABNORMAL
BDY SITE: ABNORMAL
BH BB BLOOD EXPIRATION DATE: NORMAL
BH BB BLOOD TYPE BARCODE: 8400
BH BB DISPENSE STATUS: NORMAL
BH BB PRODUCT CODE: NORMAL
BH BB UNIT NUMBER: NORMAL
BILIRUB SERPL-MCNC: 1.3 MG/DL (ref 0–1.2)
BILIRUB SERPL-MCNC: 1.4 MG/DL (ref 0–1.2)
BILIRUB SERPL-MCNC: 1.6 MG/DL (ref 0–1.2)
BILIRUB SERPL-MCNC: 2.4 MG/DL (ref 0–1.2)
BILIRUB SERPL-MCNC: 2.4 MG/DL (ref 0–1.2)
BILIRUB SERPL-MCNC: 3.3 MG/DL (ref 0.2–1.2)
BILIRUB UR QL STRIP: NEGATIVE
BLD GP AB SCN SERPL QL: NEGATIVE
BODY TEMPERATURE: 37 C
BODY TEMPERATURE: 37 C
BUN BLDV-MCNC: 13 MG/DL (ref 8–23)
BUN SERPL-MCNC: 10 MG/DL (ref 8–23)
BUN SERPL-MCNC: 11 MG/DL (ref 8–23)
BUN SERPL-MCNC: 12 MG/DL (ref 8–23)
BUN SERPL-MCNC: 18 MG/DL (ref 8–23)
BUN SERPL-MCNC: 7 MG/DL (ref 8–23)
BUN SERPL-MCNC: 7 MG/DL (ref 8–23)
BUN/CREAT SERPL: 17.5 (ref 7–25)
BUN/CREAT SERPL: 19.4 (ref 7–25)
BUN/CREAT SERPL: 25 (ref 7–25)
BUN/CREAT SERPL: 28.2 (ref 7–25)
BUN/CREAT SERPL: 37 (ref 7–25)
BUN/CREAT SERPL: 38.3 (ref 7–25)
CALCIUM SERPL-MCNC: 9.6 MG/DL (ref 8.8–10.2)
CALCIUM SPEC-SCNC: 8.5 MG/DL (ref 8.6–10.5)
CALCIUM SPEC-SCNC: 8.7 MG/DL (ref 8.6–10.5)
CALCIUM SPEC-SCNC: 8.7 MG/DL (ref 8.6–10.5)
CALCIUM SPEC-SCNC: 8.8 MG/DL (ref 8.6–10.5)
CALCIUM SPEC-SCNC: 9 MG/DL (ref 8.6–10.5)
CALCIUM SPEC-SCNC: 9.1 MG/DL (ref 8.6–10.5)
CHLORIDE BLD-SCNC: 96 MMOL/L (ref 98–111)
CHLORIDE SERPL-SCNC: 101 MMOL/L (ref 98–107)
CHLORIDE SERPL-SCNC: 102 MMOL/L (ref 98–107)
CHLORIDE SERPL-SCNC: 102 MMOL/L (ref 98–107)
CHLORIDE SERPL-SCNC: 106 MMOL/L (ref 98–107)
CHLORIDE SERPL-SCNC: 107 MMOL/L (ref 98–107)
CHLORIDE SERPL-SCNC: 99 MMOL/L (ref 98–107)
CHOLEST SERPL-MCNC: 117 MG/DL (ref 0–200)
CHOLESTEROL, TOTAL: 194 MG/DL (ref 160–199)
CLARITY UR: CLEAR
CO2 SERPL-SCNC: 24 MMOL/L (ref 22–29)
CO2 SERPL-SCNC: 25 MMOL/L (ref 22–29)
CO2 SERPL-SCNC: 25 MMOL/L (ref 22–29)
CO2 SERPL-SCNC: 26 MMOL/L (ref 22–29)
CO2 SERPL-SCNC: 26 MMOL/L (ref 22–29)
CO2 SERPL-SCNC: 28 MMOL/L (ref 22–29)
CO2: 23 MMOL/L (ref 22–29)
COLOR UR: YELLOW
CREAT BLDA-MCNC: 0.5 MG/DL (ref 0.6–1.3)
CREAT SERPL-MCNC: 0.27 MG/DL (ref 0.57–1)
CREAT SERPL-MCNC: 0.3 MG/DL (ref 0.5–0.9)
CREAT SERPL-MCNC: 0.36 MG/DL (ref 0.57–1)
CREAT SERPL-MCNC: 0.39 MG/DL (ref 0.57–1)
CREAT SERPL-MCNC: 0.4 MG/DL (ref 0.57–1)
CREAT SERPL-MCNC: 0.47 MG/DL (ref 0.57–1)
CREAT SERPL-MCNC: 0.48 MG/DL (ref 0.57–1)
CROSSMATCH INTERPRETATION: NORMAL
CROSSMATCH INTERPRETATION: NORMAL
CRP SERPL-MCNC: 13.7 MG/DL (ref 0–0.5)
D DIMER PPP FEU-MCNC: <0.22 MG/L (FEU) (ref 0–0.5)
D DIMER PPP FEU-MCNC: <0.22 MG/L (FEU) (ref 0–0.5)
D-LACTATE SERPL-SCNC: 1.5 MMOL/L (ref 0.5–2)
DEPRECATED RDW RBC AUTO: 52.1 FL (ref 37–54)
DEPRECATED RDW RBC AUTO: 56.7 FL (ref 37–54)
DEPRECATED RDW RBC AUTO: 58.1 FL (ref 37–54)
DEPRECATED RDW RBC AUTO: 59.4 FL (ref 37–54)
DEPRECATED RDW RBC AUTO: 63.6 FL (ref 37–54)
DEPRECATED RDW RBC AUTO: 70 FL (ref 37–54)
EOSINOPHIL # BLD AUTO: 0.02 10*3/MM3 (ref 0–0.4)
EOSINOPHIL # BLD AUTO: 0.03 10*3/MM3 (ref 0–0.4)
EOSINOPHIL # BLD AUTO: 0.03 10*3/MM3 (ref 0–0.4)
EOSINOPHIL # BLD AUTO: 0.07 10*3/MM3 (ref 0–0.4)
EOSINOPHIL # BLD AUTO: 0.08 10*3/MM3 (ref 0–0.4)
EOSINOPHIL NFR BLD AUTO: 0.2 % (ref 0.3–6.2)
EOSINOPHIL NFR BLD AUTO: 0.3 % (ref 0.3–6.2)
EOSINOPHIL NFR BLD AUTO: 0.4 % (ref 0.3–6.2)
EOSINOPHIL NFR BLD AUTO: 0.7 % (ref 0.3–6.2)
EOSINOPHIL NFR BLD AUTO: 0.9 % (ref 0.3–6.2)
EOSINOPHILS ABSOLUTE: 0.1 K/UL (ref 0–0.6)
EOSINOPHILS RELATIVE PERCENT: 0.5 % (ref 0–5)
ERYTHROCYTE [DISTWIDTH] IN BLOOD BY AUTOMATED COUNT: 14.8 % (ref 12.3–15.4)
ERYTHROCYTE [DISTWIDTH] IN BLOOD BY AUTOMATED COUNT: 15.9 % (ref 12.3–15.4)
ERYTHROCYTE [DISTWIDTH] IN BLOOD BY AUTOMATED COUNT: 16 % (ref 12.3–15.4)
ERYTHROCYTE [DISTWIDTH] IN BLOOD BY AUTOMATED COUNT: 16.1 % (ref 12.3–15.4)
ERYTHROCYTE [DISTWIDTH] IN BLOOD BY AUTOMATED COUNT: 18.6 % (ref 12.3–15.4)
ERYTHROCYTE [DISTWIDTH] IN BLOOD BY AUTOMATED COUNT: 19.3 % (ref 12.3–15.4)
FERRITIN SERPL-MCNC: 234.4 NG/ML (ref 13–150)
FERRITIN: 490 NG/ML (ref 13–150)
GFR AFRICAN AMERICAN: >59
GFR NON-AFRICAN AMERICAN: >60
GFR SERPL CREATININE-BSD FRML MDRD: 130 ML/MIN/1.73
GFR SERPL CREATININE-BSD FRML MDRD: 133 ML/MIN/1.73
GFR SERPL CREATININE-BSD FRML MDRD: >150 ML/MIN/1.73
GLOBULIN UR ELPH-MCNC: 2.2 GM/DL
GLOBULIN UR ELPH-MCNC: 2.3 GM/DL
GLOBULIN UR ELPH-MCNC: 2.4 GM/DL
GLOBULIN UR ELPH-MCNC: 2.8 GM/DL
GLOBULIN UR ELPH-MCNC: 2.9 GM/DL
GLUCOSE BLD-MCNC: 166 MG/DL (ref 74–109)
GLUCOSE BLDC GLUCOMTR-MCNC: 112 MG/DL (ref 70–130)
GLUCOSE BLDC GLUCOMTR-MCNC: 194 MG/DL (ref 70–130)
GLUCOSE BLDC GLUCOMTR-MCNC: 202 MG/DL (ref 70–130)
GLUCOSE BLDC GLUCOMTR-MCNC: 215 MG/DL (ref 70–130)
GLUCOSE SERPL-MCNC: 150 MG/DL (ref 65–99)
GLUCOSE SERPL-MCNC: 242 MG/DL (ref 65–99)
GLUCOSE SERPL-MCNC: 247 MG/DL (ref 65–99)
GLUCOSE SERPL-MCNC: 267 MG/DL (ref 65–99)
GLUCOSE SERPL-MCNC: 65 MG/DL (ref 65–99)
GLUCOSE SERPL-MCNC: 94 MG/DL (ref 65–99)
GLUCOSE UR STRIP-MCNC: ABNORMAL MG/DL
HBA1C MFR BLD: 5.7 % (ref 4–6)
HBA1C MFR BLD: 9 % (ref 4.8–5.6)
HCO3 BLDA-SCNC: 24.4 MMOL/L (ref 20–26)
HCO3 BLDA-SCNC: 24.5 MMOL/L (ref 20–26)
HCT VFR BLD AUTO: 27.7 % (ref 34–46.6)
HCT VFR BLD AUTO: 32.2 % (ref 34–46.6)
HCT VFR BLD AUTO: 37.5 % (ref 34–46.6)
HCT VFR BLD AUTO: 39.5 % (ref 34–46.6)
HCT VFR BLD AUTO: 40.7 % (ref 34–46.6)
HCT VFR BLD AUTO: 44 % (ref 34–46.6)
HCT VFR BLD CALC: 42.5 % (ref 37–47)
HDLC SERPL-MCNC: 69 MG/DL (ref 40–60)
HDLC SERPL-MCNC: 87 MG/DL (ref 65–121)
HEMOGLOBIN: 12.4 G/DL (ref 12–16)
HGB BLD-MCNC: 11.8 G/DL (ref 12–15.9)
HGB BLD-MCNC: 11.9 G/DL (ref 12–15.9)
HGB BLD-MCNC: 12.8 G/DL (ref 12–15.9)
HGB BLD-MCNC: 14 G/DL (ref 12–15.9)
HGB BLD-MCNC: 8.1 G/DL (ref 12–15.9)
HGB BLD-MCNC: 9.9 G/DL (ref 12–15.9)
HGB UR QL STRIP.AUTO: NEGATIVE
IMM GRANULOCYTES # BLD AUTO: 0.07 10*3/MM3 (ref 0–0.05)
IMM GRANULOCYTES # BLD AUTO: 0.08 10*3/MM3 (ref 0–0.05)
IMM GRANULOCYTES # BLD AUTO: 0.12 10*3/MM3 (ref 0–0.05)
IMM GRANULOCYTES # BLD AUTO: 0.36 10*3/MM3 (ref 0–0.05)
IMM GRANULOCYTES NFR BLD AUTO: 0.7 % (ref 0–0.5)
IMM GRANULOCYTES NFR BLD AUTO: 0.8 % (ref 0–0.5)
IMM GRANULOCYTES NFR BLD AUTO: 1.2 % (ref 0–0.5)
IMM GRANULOCYTES NFR BLD AUTO: 4.3 % (ref 0–0.5)
IMMATURE GRANULOCYTES #: 0.1 K/UL
INR PPP: 1.08 (ref 0.91–1.09)
INR PPP: 1.29 (ref 0.91–1.09)
INR PPP: 1.39 (ref 0.91–1.09)
IRON % SATURATION: 27 % (ref 14–50)
IRON: 84 UG/DL (ref 37–145)
KETONES UR QL STRIP: ABNORMAL
LDH SERPL-CCNC: 327 U/L (ref 135–214)
LDL CHOLESTEROL CALCULATED: 83 MG/DL
LDLC SERPL CALC-MCNC: 32 MG/DL (ref 0–100)
LDLC/HDLC SERPL: 0.47 {RATIO}
LEUKOCYTE ESTERASE UR QL STRIP.AUTO: NEGATIVE
LIPASE SERPL-CCNC: 20 U/L (ref 13–60)
LYMPHOCYTES # BLD AUTO: 0.57 10*3/MM3 (ref 0.7–3.1)
LYMPHOCYTES # BLD AUTO: 0.66 10*3/MM3 (ref 0.7–3.1)
LYMPHOCYTES # BLD AUTO: 0.68 10*3/MM3 (ref 0.7–3.1)
LYMPHOCYTES # BLD AUTO: 1.31 10*3/MM3 (ref 0.7–3.1)
LYMPHOCYTES # BLD AUTO: 1.74 10*3/MM3 (ref 0.7–3.1)
LYMPHOCYTES ABSOLUTE: 0.6 K/UL (ref 1.1–4.5)
LYMPHOCYTES NFR BLD AUTO: 11.9 % (ref 19.6–45.3)
LYMPHOCYTES NFR BLD AUTO: 20.6 % (ref 19.6–45.3)
LYMPHOCYTES NFR BLD AUTO: 5.7 % (ref 19.6–45.3)
LYMPHOCYTES NFR BLD AUTO: 7.8 % (ref 19.6–45.3)
LYMPHOCYTES NFR BLD AUTO: 9.4 % (ref 19.6–45.3)
LYMPHOCYTES RELATIVE PERCENT: 5.9 % (ref 20–40)
Lab: ABNORMAL
MCH RBC QN AUTO: 29.7 PG (ref 26.6–33)
MCH RBC QN AUTO: 30.3 PG (ref 26.6–33)
MCH RBC QN AUTO: 30.4 PG (ref 26.6–33)
MCH RBC QN AUTO: 30.7 PG (ref 26.6–33)
MCH RBC QN AUTO: 30.8 PG (ref 26.6–33)
MCH RBC QN AUTO: 31.2 PG (ref 26.6–33)
MCH RBC QN AUTO: 31.2 PG (ref 27–31)
MCHC RBC AUTO-ENTMCNC: 29.2 G/DL (ref 31.5–35.7)
MCHC RBC AUTO-ENTMCNC: 29.2 G/DL (ref 33–37)
MCHC RBC AUTO-ENTMCNC: 30.1 G/DL (ref 31.5–35.7)
MCHC RBC AUTO-ENTMCNC: 30.7 G/DL (ref 31.5–35.7)
MCHC RBC AUTO-ENTMCNC: 31.4 G/DL (ref 31.5–35.7)
MCHC RBC AUTO-ENTMCNC: 31.5 G/DL (ref 31.5–35.7)
MCHC RBC AUTO-ENTMCNC: 31.8 G/DL (ref 31.5–35.7)
MCV RBC AUTO: 100.3 FL (ref 79–97)
MCV RBC AUTO: 101.5 FL (ref 79–97)
MCV RBC AUTO: 101.8 FL (ref 79–97)
MCV RBC AUTO: 106.8 FL (ref 81–99)
MCV RBC AUTO: 95.4 FL (ref 79–97)
MCV RBC AUTO: 96.4 FL (ref 79–97)
MCV RBC AUTO: 99.2 FL (ref 79–97)
MODALITY: ABNORMAL
MODALITY: ABNORMAL
MONOCYTES # BLD AUTO: 0.2 10*3/MM3 (ref 0.1–0.9)
MONOCYTES # BLD AUTO: 0.48 10*3/MM3 (ref 0.1–0.9)
MONOCYTES # BLD AUTO: 0.62 10*3/MM3 (ref 0.1–0.9)
MONOCYTES # BLD AUTO: 0.67 10*3/MM3 (ref 0.1–0.9)
MONOCYTES # BLD AUTO: 0.67 10*3/MM3 (ref 0.1–0.9)
MONOCYTES ABSOLUTE: 0.8 K/UL (ref 0–0.9)
MONOCYTES NFR BLD AUTO: 2.8 % (ref 5–12)
MONOCYTES NFR BLD AUTO: 5.5 % (ref 5–12)
MONOCYTES NFR BLD AUTO: 5.6 % (ref 5–12)
MONOCYTES NFR BLD AUTO: 6.7 % (ref 5–12)
MONOCYTES NFR BLD AUTO: 7.9 % (ref 5–12)
MONOCYTES RELATIVE PERCENT: 7.6 % (ref 0–10)
NEUTROPHILS ABSOLUTE: 8.6 K/UL (ref 1.5–7.5)
NEUTROPHILS NFR BLD AUTO: 5.56 10*3/MM3 (ref 1.7–7)
NEUTROPHILS NFR BLD AUTO: 6.1 10*3/MM3 (ref 1.7–7)
NEUTROPHILS NFR BLD AUTO: 66.1 % (ref 42.7–76)
NEUTROPHILS NFR BLD AUTO: 7.43 10*3/MM3 (ref 1.7–7)
NEUTROPHILS NFR BLD AUTO: 8.59 10*3/MM3 (ref 1.7–7)
NEUTROPHILS NFR BLD AUTO: 8.93 10*3/MM3 (ref 1.7–7)
NEUTROPHILS NFR BLD AUTO: 81.4 % (ref 42.7–76)
NEUTROPHILS NFR BLD AUTO: 85.2 % (ref 42.7–76)
NEUTROPHILS NFR BLD AUTO: 85.3 % (ref 42.7–76)
NEUTROPHILS NFR BLD AUTO: 86.7 % (ref 42.7–76)
NEUTROPHILS RELATIVE PERCENT: 84.3 % (ref 50–65)
NITRITE UR QL STRIP: NEGATIVE
NOTIFIED BY: ABNORMAL
NOTIFIED WHO: ABNORMAL
NRBC BLD AUTO-RTO: 0 /100 WBC (ref 0–0.2)
NRBC BLD AUTO-RTO: 2 /100 WBC (ref 0–0.2)
NT-PROBNP SERPL-MCNC: 401.5 PG/ML (ref 0–900)
PCO2 BLDA: 34.3 MM HG (ref 35–45)
PCO2 BLDA: 37 MM HG (ref 35–45)
PCO2 TEMP ADJ BLD: 34.3 MM HG (ref 35–45)
PCO2 TEMP ADJ BLD: 37 MM HG (ref 35–45)
PDW BLD-RTO: 19.1 % (ref 11.5–14.5)
PH BLDA: 7.43 PH UNITS (ref 7.35–7.45)
PH BLDA: 7.46 PH UNITS (ref 7.35–7.45)
PH UR STRIP.AUTO: <=5 [PH] (ref 5–8)
PH, TEMP CORRECTED: 7.43 PH UNITS (ref 7.35–7.45)
PH, TEMP CORRECTED: 7.46 PH UNITS (ref 7.35–7.45)
PLATELET # BLD AUTO: 115 10*3/MM3 (ref 140–450)
PLATELET # BLD AUTO: 123 10*3/MM3 (ref 140–450)
PLATELET # BLD AUTO: 144 10*3/MM3 (ref 140–450)
PLATELET # BLD AUTO: 204 10*3/MM3 (ref 140–450)
PLATELET # BLD AUTO: 267 10*3/MM3 (ref 140–450)
PLATELET # BLD AUTO: 281 10*3/MM3 (ref 140–450)
PLATELET # BLD: 317 K/UL (ref 130–400)
PMV BLD AUTO: 10.5 FL (ref 6–12)
PMV BLD AUTO: 10.8 FL (ref 6–12)
PMV BLD AUTO: 11.1 FL (ref 6–12)
PMV BLD AUTO: 11.1 FL (ref 6–12)
PMV BLD AUTO: 9.3 FL (ref 6–12)
PMV BLD AUTO: 9.5 FL (ref 6–12)
PMV BLD AUTO: 9.8 FL (ref 9.4–12.3)
PO2 BLDA: 54.3 MM HG (ref 83–108)
PO2 BLDA: 63.8 MM HG (ref 83–108)
PO2 TEMP ADJ BLD: 54.3 MM HG (ref 83–108)
PO2 TEMP ADJ BLD: 63.8 MM HG (ref 83–108)
POTASSIUM SERPL-SCNC: 3.5 MMOL/L (ref 3.5–5.2)
POTASSIUM SERPL-SCNC: 3.5 MMOL/L (ref 3.5–5.2)
POTASSIUM SERPL-SCNC: 3.6 MMOL/L (ref 3.5–5.2)
POTASSIUM SERPL-SCNC: 3.6 MMOL/L (ref 3.5–5.2)
POTASSIUM SERPL-SCNC: 3.7 MMOL/L (ref 3.5–5)
POTASSIUM SERPL-SCNC: 4.3 MMOL/L (ref 3.5–5.2)
POTASSIUM SERPL-SCNC: 4.4 MMOL/L (ref 3.5–5.2)
PROCALCITONIN SERPL-MCNC: 0.05 NG/ML (ref 0–0.25)
PROT SERPL-MCNC: 5.7 G/DL (ref 6–8.5)
PROT SERPL-MCNC: 5.8 G/DL (ref 6–8.5)
PROT SERPL-MCNC: 6.4 G/DL (ref 6–8.5)
PROT SERPL-MCNC: 6.7 G/DL (ref 6–8.5)
PROT SERPL-MCNC: 7.1 G/DL (ref 6–8.5)
PROT UR QL STRIP: NEGATIVE
PROTHROMBIN TIME: 13.6 SECONDS (ref 11.9–14.6)
PROTHROMBIN TIME: 15.6 SECONDS (ref 11.9–14.6)
PROTHROMBIN TIME: 16.5 SECONDS (ref 11.9–14.6)
QT INTERVAL: 342 MS
QT INTERVAL: 428 MS
QT INTERVAL: 480 MS
QT INTERVAL: 490 MS
QTC INTERVAL: 445 MS
QTC INTERVAL: 487 MS
QTC INTERVAL: 493 MS
QTC INTERVAL: 509 MS
RBC # BLD AUTO: 2.73 10*6/MM3 (ref 3.77–5.28)
RBC # BLD AUTO: 3.21 10*6/MM3 (ref 3.77–5.28)
RBC # BLD AUTO: 3.78 10*6/MM3 (ref 3.77–5.28)
RBC # BLD AUTO: 3.88 10*6/MM3 (ref 3.77–5.28)
RBC # BLD AUTO: 4.22 10*6/MM3 (ref 3.77–5.28)
RBC # BLD AUTO: 4.61 10*6/MM3 (ref 3.77–5.28)
RBC # BLD: 3.98 M/UL (ref 4.2–5.4)
RH BLD: POSITIVE
SAO2 % BLDCOA: 89.6 % (ref 94–99)
SAO2 % BLDCOA: 94.2 % (ref 94–99)
SARS-COV-2 RNA PNL SPEC NAA+PROBE: DETECTED
SARS-COV-2 RNA PNL SPEC NAA+PROBE: NOT DETECTED
SARS-COV-2, PCR: DETECTED
SODIUM BLD-SCNC: 138 MMOL/L (ref 136–145)
SODIUM SERPL-SCNC: 137 MMOL/L (ref 136–145)
SODIUM SERPL-SCNC: 139 MMOL/L (ref 136–145)
SODIUM SERPL-SCNC: 142 MMOL/L (ref 136–145)
SODIUM SERPL-SCNC: 145 MMOL/L (ref 136–145)
SP GR UR STRIP: >1.03 (ref 1–1.03)
T&S EXPIRATION DATE: NORMAL
TOTAL IRON BINDING CAPACITY: 306 UG/DL (ref 250–400)
TOTAL PROTEIN: 7.2 G/DL (ref 6.6–8.7)
TRANSFERRIN: 258 MG/DL (ref 200–400)
TRIGL SERPL-MCNC: 118 MG/DL (ref 0–149)
TRIGL SERPL-MCNC: 78 MG/DL (ref 0–150)
TROPONIN T SERPL-MCNC: <0.01 NG/ML (ref 0–0.03)
TSH SERPL DL<=0.05 MIU/L-ACNC: 1.21 UIU/ML (ref 0.27–4.2)
UNIT  ABO: NORMAL
UNIT  RH: NORMAL
UROBILINOGEN UR QL STRIP: ABNORMAL
VENTILATOR MODE: ABNORMAL
VENTILATOR MODE: ABNORMAL
VLDLC SERPL-MCNC: 16 MG/DL (ref 5–40)
WBC # BLD AUTO: 4.94 10*3/MM3 (ref 3.4–10.8)
WBC # BLD AUTO: 7.04 10*3/MM3 (ref 3.4–10.8)
WBC # BLD AUTO: 8.72 10*3/MM3 (ref 3.4–10.8)
WBC # BLD: 10.2 K/UL (ref 4.8–10.8)
WBC NRBC COR # BLD: 10.07 10*3/MM3 (ref 3.4–10.8)
WBC NRBC COR # BLD: 10.98 10*3/MM3 (ref 3.4–10.8)
WBC NRBC COR # BLD: 8.43 10*3/MM3 (ref 3.4–10.8)

## 2021-01-01 PROCEDURE — 1036F TOBACCO NON-USER: CPT | Performed by: NURSE PRACTITIONER

## 2021-01-01 PROCEDURE — 63710000001 ONDANSETRON ODT 4 MG TABLET DISPERSIBLE: Performed by: EMERGENCY MEDICINE

## 2021-01-01 PROCEDURE — 71250 CT THORAX DX C-: CPT

## 2021-01-01 PROCEDURE — 25010000002 METOCLOPRAMIDE PER 10 MG: Performed by: EMERGENCY MEDICINE

## 2021-01-01 PROCEDURE — 25010000002 MORPHINE PER 10 MG: Performed by: EMERGENCY MEDICINE

## 2021-01-01 PROCEDURE — 82803 BLOOD GASES ANY COMBINATION: CPT

## 2021-01-01 PROCEDURE — G8417 CALC BMI ABV UP PARAM F/U: HCPCS | Performed by: NURSE PRACTITIONER

## 2021-01-01 PROCEDURE — 71045 X-RAY EXAM CHEST 1 VIEW: CPT

## 2021-01-01 PROCEDURE — 85025 COMPLETE CBC W/AUTO DIFF WBC: CPT | Performed by: EMERGENCY MEDICINE

## 2021-01-01 PROCEDURE — 86140 C-REACTIVE PROTEIN: CPT | Performed by: EMERGENCY MEDICINE

## 2021-01-01 PROCEDURE — G8417 CALC BMI ABV UP PARAM F/U: HCPCS | Performed by: FAMILY MEDICINE

## 2021-01-01 PROCEDURE — 85027 COMPLETE CBC AUTOMATED: CPT | Performed by: NURSE PRACTITIONER

## 2021-01-01 PROCEDURE — 94618 PULMONARY STRESS TESTING: CPT

## 2021-01-01 PROCEDURE — 96375 TX/PRO/DX INJ NEW DRUG ADDON: CPT

## 2021-01-01 PROCEDURE — 36600 WITHDRAWAL OF ARTERIAL BLOOD: CPT

## 2021-01-01 PROCEDURE — 81003 URINALYSIS AUTO W/O SCOPE: CPT | Performed by: EMERGENCY MEDICINE

## 2021-01-01 PROCEDURE — 80053 COMPREHEN METABOLIC PANEL: CPT | Performed by: NURSE PRACTITIONER

## 2021-01-01 PROCEDURE — 87635 SARS-COV-2 COVID-19 AMP PRB: CPT | Performed by: NURSE PRACTITIONER

## 2021-01-01 PROCEDURE — 3046F HEMOGLOBIN A1C LEVEL >9.0%: CPT | Performed by: NURSE PRACTITIONER

## 2021-01-01 PROCEDURE — 86901 BLOOD TYPING SEROLOGIC RH(D): CPT | Performed by: EMERGENCY MEDICINE

## 2021-01-01 PROCEDURE — 63710000001 INSULIN LISPRO (HUMAN) PER 5 UNITS: Performed by: NURSE PRACTITIONER

## 2021-01-01 PROCEDURE — 82565 ASSAY OF CREATININE: CPT

## 2021-01-01 PROCEDURE — G0101 CA SCREEN;PELVIC/BREAST EXAM: HCPCS | Performed by: NURSE PRACTITIONER

## 2021-01-01 PROCEDURE — 96374 THER/PROPH/DIAG INJ IV PUSH: CPT

## 2021-01-01 PROCEDURE — 80053 COMPREHEN METABOLIC PANEL: CPT | Performed by: EMERGENCY MEDICINE

## 2021-01-01 PROCEDURE — 36430 TRANSFUSION BLD/BLD COMPNT: CPT

## 2021-01-01 PROCEDURE — 90715 TDAP VACCINE 7 YRS/> IM: CPT | Performed by: EMERGENCY MEDICINE

## 2021-01-01 PROCEDURE — 99214 OFFICE O/P EST MOD 30 MIN: CPT | Performed by: INTERNAL MEDICINE

## 2021-01-01 PROCEDURE — G8427 DOCREV CUR MEDS BY ELIG CLIN: HCPCS | Performed by: NURSE PRACTITIONER

## 2021-01-01 PROCEDURE — 90471 IMMUNIZATION ADMIN: CPT | Performed by: EMERGENCY MEDICINE

## 2021-01-01 PROCEDURE — 36415 COLL VENOUS BLD VENIPUNCTURE: CPT

## 2021-01-01 PROCEDURE — 86920 COMPATIBILITY TEST SPIN: CPT

## 2021-01-01 PROCEDURE — P9016 RBC LEUKOCYTES REDUCED: HCPCS

## 2021-01-01 PROCEDURE — 87635 SARS-COV-2 COVID-19 AMP PRB: CPT | Performed by: EMERGENCY MEDICINE

## 2021-01-01 PROCEDURE — 82962 GLUCOSE BLOOD TEST: CPT

## 2021-01-01 PROCEDURE — 1090F PRES/ABSN URINE INCON ASSESS: CPT | Performed by: FAMILY MEDICINE

## 2021-01-01 PROCEDURE — 99284 EMERGENCY DEPT VISIT MOD MDM: CPT

## 2021-01-01 PROCEDURE — 93005 ELECTROCARDIOGRAM TRACING: CPT | Performed by: NURSE PRACTITIONER

## 2021-01-01 PROCEDURE — 83036 HEMOGLOBIN GLYCOSYLATED A1C: CPT | Performed by: NURSE PRACTITIONER

## 2021-01-01 PROCEDURE — 93005 ELECTROCARDIOGRAM TRACING: CPT | Performed by: EMERGENCY MEDICINE

## 2021-01-01 PROCEDURE — 97165 OT EVAL LOW COMPLEX 30 MIN: CPT | Performed by: OCCUPATIONAL THERAPIST

## 2021-01-01 PROCEDURE — 1123F ACP DISCUSS/DSCN MKR DOCD: CPT | Performed by: FAMILY MEDICINE

## 2021-01-01 PROCEDURE — 93010 ELECTROCARDIOGRAM REPORT: CPT | Performed by: INTERNAL MEDICINE

## 2021-01-01 PROCEDURE — 99282 EMERGENCY DEPT VISIT SF MDM: CPT

## 2021-01-01 PROCEDURE — G8400 PT W/DXA NO RESULTS DOC: HCPCS | Performed by: FAMILY MEDICINE

## 2021-01-01 PROCEDURE — 99214 OFFICE O/P EST MOD 30 MIN: CPT | Performed by: FAMILY MEDICINE

## 2021-01-01 PROCEDURE — 93005 ELECTROCARDIOGRAM TRACING: CPT | Performed by: INTERNAL MEDICINE

## 2021-01-01 PROCEDURE — 86900 BLOOD TYPING SEROLOGIC ABO: CPT | Performed by: EMERGENCY MEDICINE

## 2021-01-01 PROCEDURE — 85025 COMPLETE CBC W/AUTO DIFF WBC: CPT

## 2021-01-01 PROCEDURE — 63710000001 PREDNISONE PER 5 MG: Performed by: NURSE PRACTITIONER

## 2021-01-01 PROCEDURE — 85379 FIBRIN DEGRADATION QUANT: CPT | Performed by: NURSE PRACTITIONER

## 2021-01-01 PROCEDURE — 25010000002 FUROSEMIDE PER 20 MG: Performed by: NURSE PRACTITIONER

## 2021-01-01 PROCEDURE — 85730 THROMBOPLASTIN TIME PARTIAL: CPT | Performed by: NURSE PRACTITIONER

## 2021-01-01 PROCEDURE — 99214 OFFICE O/P EST MOD 30 MIN: CPT | Performed by: NURSE PRACTITIONER

## 2021-01-01 PROCEDURE — 99496 TRANSJ CARE MGMT HIGH F2F 7D: CPT | Performed by: NURSE PRACTITIONER

## 2021-01-01 PROCEDURE — 96372 THER/PROPH/DIAG INJ SC/IM: CPT

## 2021-01-01 PROCEDURE — 6360000004 HC RX CONTRAST MEDICATION: Performed by: FAMILY MEDICINE

## 2021-01-01 PROCEDURE — 84145 PROCALCITONIN (PCT): CPT | Performed by: EMERGENCY MEDICINE

## 2021-01-01 PROCEDURE — 85025 COMPLETE CBC W/AUTO DIFF WBC: CPT | Performed by: NURSE PRACTITIONER

## 2021-01-01 PROCEDURE — 1111F DSCHRG MED/CURRENT MED MERGE: CPT | Performed by: NURSE PRACTITIONER

## 2021-01-01 PROCEDURE — 3017F COLORECTAL CA SCREEN DOC REV: CPT | Performed by: NURSE PRACTITIONER

## 2021-01-01 PROCEDURE — 80048 BASIC METABOLIC PNL TOTAL CA: CPT | Performed by: EMERGENCY MEDICINE

## 2021-01-01 PROCEDURE — 2022F DILAT RTA XM EVC RTNOPTHY: CPT | Performed by: NURSE PRACTITIONER

## 2021-01-01 PROCEDURE — 85379 FIBRIN DEGRADATION QUANT: CPT | Performed by: EMERGENCY MEDICINE

## 2021-01-01 PROCEDURE — 85610 PROTHROMBIN TIME: CPT | Performed by: NURSE PRACTITIONER

## 2021-01-01 PROCEDURE — 87635 SARS-COV-2 COVID-19 AMP PRB: CPT | Performed by: INTERNAL MEDICINE

## 2021-01-01 PROCEDURE — G8484 FLU IMMUNIZE NO ADMIN: HCPCS | Performed by: FAMILY MEDICINE

## 2021-01-01 PROCEDURE — 93000 ELECTROCARDIOGRAM COMPLETE: CPT | Performed by: INTERNAL MEDICINE

## 2021-01-01 PROCEDURE — 82728 ASSAY OF FERRITIN: CPT | Performed by: EMERGENCY MEDICINE

## 2021-01-01 PROCEDURE — 83615 LACTATE (LD) (LDH) ENZYME: CPT | Performed by: EMERGENCY MEDICINE

## 2021-01-01 PROCEDURE — 36415 COLL VENOUS BLD VENIPUNCTURE: CPT | Performed by: NURSE PRACTITIONER

## 2021-01-01 PROCEDURE — 25010000002 ONDANSETRON PER 1 MG: Performed by: EMERGENCY MEDICINE

## 2021-01-01 PROCEDURE — 83880 ASSAY OF NATRIURETIC PEPTIDE: CPT | Performed by: NURSE PRACTITIONER

## 2021-01-01 PROCEDURE — 1036F TOBACCO NON-USER: CPT | Performed by: FAMILY MEDICINE

## 2021-01-01 PROCEDURE — 83605 ASSAY OF LACTIC ACID: CPT | Performed by: EMERGENCY MEDICINE

## 2021-01-01 PROCEDURE — C9803 HOPD COVID-19 SPEC COLLECT: HCPCS | Performed by: INTERNAL MEDICINE

## 2021-01-01 PROCEDURE — 70450 CT HEAD/BRAIN W/O DYE: CPT

## 2021-01-01 PROCEDURE — 3017F COLORECTAL CA SCREEN DOC REV: CPT | Performed by: FAMILY MEDICINE

## 2021-01-01 PROCEDURE — M0243 CASIRIVI AND IMDEVI INFUSION: HCPCS | Performed by: EMERGENCY MEDICINE

## 2021-01-01 PROCEDURE — 80061 LIPID PANEL: CPT | Performed by: NURSE PRACTITIONER

## 2021-01-01 PROCEDURE — 25010000006 INJECTION, CASIRIVIMAB AND IMDEVIMAB, 1200 MG: Performed by: EMERGENCY MEDICINE

## 2021-01-01 PROCEDURE — 71046 X-RAY EXAM CHEST 2 VIEWS: CPT

## 2021-01-01 PROCEDURE — 25010000002 PROPOFOL 10 MG/ML EMULSION: Performed by: NURSE ANESTHETIST, CERTIFIED REGISTERED

## 2021-01-01 PROCEDURE — 85610 PROTHROMBIN TIME: CPT | Performed by: EMERGENCY MEDICINE

## 2021-01-01 PROCEDURE — 83690 ASSAY OF LIPASE: CPT | Performed by: NURSE PRACTITIONER

## 2021-01-01 PROCEDURE — 25010000002 IOPAMIDOL 61 % SOLUTION: Performed by: EMERGENCY MEDICINE

## 2021-01-01 PROCEDURE — 94799 UNLISTED PULMONARY SVC/PX: CPT

## 2021-01-01 PROCEDURE — 80053 COMPREHEN METABOLIC PANEL: CPT

## 2021-01-01 PROCEDURE — 4040F PNEUMOC VAC/ADMIN/RCVD: CPT | Performed by: FAMILY MEDICINE

## 2021-01-01 PROCEDURE — 74177 CT ABD & PELVIS W/CONTRAST: CPT

## 2021-01-01 PROCEDURE — 25010000002 TETANUS-DIPHTH-ACELL PERTUSSIS 5-2.5-18.5 LF-MCG/0.5 SUSPENSION PREFILLED SYRINGE: Performed by: EMERGENCY MEDICINE

## 2021-01-01 PROCEDURE — 2022F DILAT RTA XM EVC RTNOPTHY: CPT | Performed by: FAMILY MEDICINE

## 2021-01-01 PROCEDURE — 84484 ASSAY OF TROPONIN QUANT: CPT | Performed by: NURSE PRACTITIONER

## 2021-01-01 PROCEDURE — 72128 CT CHEST SPINE W/O DYE: CPT

## 2021-01-01 PROCEDURE — 99213 OFFICE O/P EST LOW 20 MIN: CPT | Performed by: NURSE PRACTITIONER

## 2021-01-01 PROCEDURE — G8427 DOCREV CUR MEDS BY ELIG CLIN: HCPCS | Performed by: FAMILY MEDICINE

## 2021-01-01 PROCEDURE — 86900 BLOOD TYPING SEROLOGIC ABO: CPT

## 2021-01-01 PROCEDURE — 86850 RBC ANTIBODY SCREEN: CPT | Performed by: EMERGENCY MEDICINE

## 2021-01-01 PROCEDURE — P9017 PLASMA 1 DONOR FRZ W/IN 8 HR: HCPCS

## 2021-01-01 PROCEDURE — 86927 PLASMA FRESH FROZEN: CPT

## 2021-01-01 RX ORDER — LOSARTAN POTASSIUM 25 MG/1
25 TABLET ORAL DAILY
Qty: 90 TABLET | Refills: 0 | Status: SHIPPED | OUTPATIENT
Start: 2021-01-01 | End: 2022-01-01

## 2021-01-01 RX ORDER — ACETAMINOPHEN 325 MG/1
650 TABLET ORAL EVERY 6 HOURS PRN
Status: DISCONTINUED | OUTPATIENT
Start: 2021-01-01 | End: 2021-01-01 | Stop reason: HOSPADM

## 2021-01-01 RX ORDER — APIXABAN 5 MG/1
TABLET, FILM COATED ORAL
Qty: 180 TABLET | Refills: 4 | Status: SHIPPED | OUTPATIENT
Start: 2021-01-01

## 2021-01-01 RX ORDER — FUROSEMIDE 10 MG/ML
20 INJECTION INTRAMUSCULAR; INTRAVENOUS
Status: DISCONTINUED | OUTPATIENT
Start: 2021-01-01 | End: 2021-01-01 | Stop reason: HOSPADM

## 2021-01-01 RX ORDER — AMIODARONE HYDROCHLORIDE 200 MG/1
100 TABLET ORAL DAILY
Status: DISCONTINUED | OUTPATIENT
Start: 2021-01-01 | End: 2021-01-01 | Stop reason: HOSPADM

## 2021-01-01 RX ORDER — ALBUTEROL SULFATE 90 UG/1
2 AEROSOL, METERED RESPIRATORY (INHALATION) EVERY 4 HOURS PRN
COMMUNITY
End: 2022-01-01

## 2021-01-01 RX ORDER — ONDANSETRON 4 MG/1
4 TABLET, ORALLY DISINTEGRATING ORAL ONCE
Status: COMPLETED | OUTPATIENT
Start: 2021-01-01 | End: 2021-01-01

## 2021-01-01 RX ORDER — MORPHINE SULFATE 10 MG/ML
6 INJECTION INTRAMUSCULAR; INTRAVENOUS; SUBCUTANEOUS ONCE
Status: COMPLETED | OUTPATIENT
Start: 2021-01-01 | End: 2021-01-01

## 2021-01-01 RX ORDER — ISOSORBIDE MONONITRATE 30 MG/1
30 TABLET, EXTENDED RELEASE ORAL DAILY
Status: DISCONTINUED | OUTPATIENT
Start: 2021-01-01 | End: 2021-01-01 | Stop reason: HOSPADM

## 2021-01-01 RX ORDER — METOPROLOL TARTRATE 50 MG/1
TABLET, FILM COATED ORAL
Qty: 180 TABLET | Refills: 0 | Status: SHIPPED | OUTPATIENT
Start: 2021-01-01 | End: 2021-01-01

## 2021-01-01 RX ORDER — ONDANSETRON 4 MG/1
4 TABLET, FILM COATED ORAL EVERY 6 HOURS PRN
Status: DISCONTINUED | OUTPATIENT
Start: 2021-01-01 | End: 2021-01-01 | Stop reason: HOSPADM

## 2021-01-01 RX ORDER — PANTOPRAZOLE SODIUM 40 MG/1
40 TABLET, DELAYED RELEASE ORAL EVERY 12 HOURS SCHEDULED
Status: DISCONTINUED | OUTPATIENT
Start: 2021-01-01 | End: 2021-01-01 | Stop reason: HOSPADM

## 2021-01-01 RX ORDER — ACETAMINOPHEN 325 MG/1
650 TABLET ORAL ONCE
Status: CANCELLED
Start: 2021-01-01 | End: 2021-01-01

## 2021-01-01 RX ORDER — PROPOFOL 10 MG/ML
VIAL (ML) INTRAVENOUS AS NEEDED
Status: DISCONTINUED | OUTPATIENT
Start: 2021-01-01 | End: 2021-01-01 | Stop reason: SURG

## 2021-01-01 RX ORDER — ONDANSETRON 2 MG/ML
4 INJECTION INTRAMUSCULAR; INTRAVENOUS ONCE
Status: COMPLETED | OUTPATIENT
Start: 2021-01-01 | End: 2021-01-01

## 2021-01-01 RX ORDER — PREDNISONE 1 MG/1
5 TABLET ORAL EVERY MORNING
Status: DISCONTINUED | OUTPATIENT
Start: 2021-01-01 | End: 2021-01-01 | Stop reason: HOSPADM

## 2021-01-01 RX ORDER — MECLIZINE HYDROCHLORIDE 25 MG/1
12.5 TABLET ORAL 3 TIMES DAILY PRN
Status: DISCONTINUED | OUTPATIENT
Start: 2021-01-01 | End: 2021-01-01 | Stop reason: HOSPADM

## 2021-01-01 RX ORDER — ALBUTEROL SULFATE 90 UG/1
2 AEROSOL, METERED RESPIRATORY (INHALATION) EVERY 4 HOURS PRN
Qty: 18 G | Refills: 0 | Status: SHIPPED | OUTPATIENT
Start: 2021-01-01 | End: 2021-01-01

## 2021-01-01 RX ORDER — DIPHENHYDRAMINE HCL 50 MG
50 CAPSULE ORAL ONCE AS NEEDED
Status: DISCONTINUED | OUTPATIENT
Start: 2021-01-01 | End: 2021-01-01 | Stop reason: HOSPADM

## 2021-01-01 RX ORDER — SIMVASTATIN 40 MG
40 TABLET ORAL NIGHTLY
Qty: 90 TABLET | Refills: 0 | Status: ON HOLD | OUTPATIENT
Start: 2021-01-01 | End: 2022-01-01 | Stop reason: HOSPADM

## 2021-01-01 RX ORDER — KETOCONAZOLE 20 MG/G
CREAM TOPICAL
Qty: 60 G | Refills: 0 | Status: SHIPPED | OUTPATIENT
Start: 2021-01-01 | End: 2022-01-01

## 2021-01-01 RX ORDER — GLYBURIDE 5 MG/1
TABLET ORAL
Qty: 120 TABLET | Refills: 5
Start: 2021-01-01

## 2021-01-01 RX ORDER — SODIUM CHLORIDE 9 MG/ML
INJECTION, SOLUTION INTRAVENOUS CONTINUOUS PRN
Status: DISCONTINUED | OUTPATIENT
Start: 2021-01-01 | End: 2021-01-01 | Stop reason: SURG

## 2021-01-01 RX ORDER — AZITHROMYCIN 250 MG/1
TABLET, FILM COATED ORAL
Qty: 6 TABLET | Refills: 0 | Status: SHIPPED | OUTPATIENT
Start: 2021-01-01 | End: 2021-01-01

## 2021-01-01 RX ORDER — SODIUM CHLORIDE 0.9 % (FLUSH) 0.9 %
10 SYRINGE (ML) INJECTION AS NEEDED
Status: DISCONTINUED | OUTPATIENT
Start: 2021-01-01 | End: 2021-01-01 | Stop reason: HOSPADM

## 2021-01-01 RX ORDER — FLUTICASONE PROPIONATE 50 MCG
2 SPRAY, SUSPENSION (ML) NASAL DAILY
COMMUNITY
End: 2022-01-01

## 2021-01-01 RX ORDER — METOCLOPRAMIDE HYDROCHLORIDE 5 MG/ML
10 INJECTION INTRAMUSCULAR; INTRAVENOUS ONCE
Status: COMPLETED | OUTPATIENT
Start: 2021-01-01 | End: 2021-01-01

## 2021-01-01 RX ORDER — PANTOPRAZOLE SODIUM 40 MG/1
TABLET, DELAYED RELEASE ORAL
Qty: 180 TABLET | Refills: 0 | Status: SHIPPED | OUTPATIENT
Start: 2021-01-01 | End: 2022-01-01

## 2021-01-01 RX ORDER — FUROSEMIDE 20 MG/1
20 TABLET ORAL 2 TIMES DAILY
Qty: 60 TABLET | Refills: 4 | Status: SHIPPED | OUTPATIENT
Start: 2021-01-01 | End: 2022-01-01

## 2021-01-01 RX ORDER — DOXYCYCLINE HYCLATE 100 MG
100 TABLET ORAL 2 TIMES DAILY
Qty: 20 TABLET | Refills: 0 | Status: SHIPPED | OUTPATIENT
Start: 2021-01-01 | End: 2021-01-01

## 2021-01-01 RX ORDER — BLOOD-GLUCOSE METER
1 KIT MISCELLANEOUS 2 TIMES DAILY
Qty: 100 STRIP | Refills: 3 | Status: SHIPPED | OUTPATIENT
Start: 2021-01-01

## 2021-01-01 RX ORDER — SODIUM CHLORIDE 0.9 % (FLUSH) 0.9 %
5-40 SYRINGE (ML) INJECTION PRN
Status: CANCELLED | OUTPATIENT
Start: 2021-01-01

## 2021-01-01 RX ORDER — FOLIC ACID 1 MG/1
1 TABLET ORAL DAILY
Status: DISCONTINUED | OUTPATIENT
Start: 2021-01-01 | End: 2021-01-01 | Stop reason: HOSPADM

## 2021-01-01 RX ORDER — ACETAMINOPHEN 325 MG/1
650 TABLET ORAL EVERY 4 HOURS PRN
Status: DISCONTINUED | OUTPATIENT
Start: 2021-01-01 | End: 2021-01-01 | Stop reason: HOSPADM

## 2021-01-01 RX ORDER — ACETAMINOPHEN 160 MG/5ML
650 SOLUTION ORAL EVERY 4 HOURS PRN
Status: DISCONTINUED | OUTPATIENT
Start: 2021-01-01 | End: 2021-01-01 | Stop reason: HOSPADM

## 2021-01-01 RX ORDER — LANCETS 28 GAUGE
EACH MISCELLANEOUS
Qty: 100 EACH | Refills: 3 | Status: SHIPPED | OUTPATIENT
Start: 2021-01-01

## 2021-01-01 RX ORDER — ONDANSETRON 2 MG/ML
4 INJECTION INTRAMUSCULAR; INTRAVENOUS EVERY 6 HOURS PRN
Status: DISCONTINUED | OUTPATIENT
Start: 2021-01-01 | End: 2021-01-01 | Stop reason: HOSPADM

## 2021-01-01 RX ORDER — SODIUM CHLORIDE 9 MG/ML
INJECTION, SOLUTION INTRAVENOUS CONTINUOUS
Status: CANCELLED | OUTPATIENT
Start: 2021-01-01

## 2021-01-01 RX ORDER — ONDANSETRON 4 MG/1
4 TABLET, ORALLY DISINTEGRATING ORAL EVERY 8 HOURS PRN
Qty: 12 TABLET | Refills: 0 | Status: SHIPPED | OUTPATIENT
Start: 2021-01-01 | End: 2022-01-01

## 2021-01-01 RX ORDER — DIPHENHYDRAMINE HYDROCHLORIDE 50 MG/ML
25 INJECTION INTRAMUSCULAR; INTRAVENOUS ONCE
Status: CANCELLED
Start: 2021-01-01 | End: 2021-01-01

## 2021-01-01 RX ORDER — ATORVASTATIN CALCIUM 10 MG/1
20 TABLET, FILM COATED ORAL NIGHTLY
Status: DISCONTINUED | OUTPATIENT
Start: 2021-01-01 | End: 2021-01-01 | Stop reason: HOSPADM

## 2021-01-01 RX ORDER — EPINEPHRINE 1 MG/ML
0.3 INJECTION, SOLUTION, CONCENTRATE INTRAVENOUS PRN
Status: CANCELLED | OUTPATIENT
Start: 2021-01-01

## 2021-01-01 RX ORDER — ASPIRIN 81 MG/1
81 TABLET ORAL DAILY
Status: DISCONTINUED | OUTPATIENT
Start: 2021-01-01 | End: 2021-01-01 | Stop reason: HOSPADM

## 2021-01-01 RX ORDER — ISOSORBIDE MONONITRATE 30 MG/1
30 TABLET, EXTENDED RELEASE ORAL DAILY
Qty: 90 TABLET | Refills: 0 | Status: SHIPPED | OUTPATIENT
Start: 2021-01-01 | End: 2021-01-01

## 2021-01-01 RX ORDER — SIMVASTATIN 40 MG
40 TABLET ORAL NIGHTLY
Qty: 90 TABLET | Refills: 0 | Status: SHIPPED | OUTPATIENT
Start: 2021-01-01 | End: 2021-01-01

## 2021-01-01 RX ORDER — LORAZEPAM 0.5 MG/1
0.5 TABLET ORAL 2 TIMES DAILY PRN
COMMUNITY
End: 2022-01-01

## 2021-01-01 RX ORDER — METOPROLOL TARTRATE 50 MG/1
50 TABLET, FILM COATED ORAL EVERY 12 HOURS SCHEDULED
Status: DISCONTINUED | OUTPATIENT
Start: 2021-01-01 | End: 2021-01-01 | Stop reason: HOSPADM

## 2021-01-01 RX ORDER — NITROGLYCERIN 0.4 MG/1
0.4 TABLET SUBLINGUAL
Status: DISCONTINUED | OUTPATIENT
Start: 2021-01-01 | End: 2021-01-01 | Stop reason: HOSPADM

## 2021-01-01 RX ORDER — GLYBURIDE 5 MG/1
TABLET ORAL
Qty: 120 TABLET | Refills: 5 | Status: SHIPPED | OUTPATIENT
Start: 2021-01-01 | End: 2021-01-01 | Stop reason: SDUPTHER

## 2021-01-01 RX ORDER — VERAPAMIL HYDROCHLORIDE 240 MG/1
240 TABLET, FILM COATED, EXTENDED RELEASE ORAL DAILY
Status: DISCONTINUED | OUTPATIENT
Start: 2021-01-01 | End: 2021-01-01 | Stop reason: HOSPADM

## 2021-01-01 RX ORDER — KETOCONAZOLE 20 MG/G
CREAM TOPICAL
Qty: 60 G | Refills: 0 | Status: SHIPPED | OUTPATIENT
Start: 2021-01-01 | End: 2021-01-01

## 2021-01-01 RX ORDER — PREDNISONE 1 MG/1
4 TABLET ORAL EVERY EVENING
Status: DISCONTINUED | OUTPATIENT
Start: 2021-01-01 | End: 2021-01-01 | Stop reason: HOSPADM

## 2021-01-01 RX ORDER — GLYBURIDE 5 MG/1
5 TABLET ORAL 2 TIMES DAILY WITH MEALS
COMMUNITY

## 2021-01-01 RX ORDER — BLOOD-GLUCOSE METER
KIT MISCELLANEOUS
Qty: 100 STRIP | Refills: 3 | Status: SHIPPED | OUTPATIENT
Start: 2021-01-01

## 2021-01-01 RX ORDER — LOSARTAN POTASSIUM 25 MG/1
25 TABLET ORAL DAILY
Qty: 90 TABLET | Refills: 0 | Status: SHIPPED | OUTPATIENT
Start: 2021-01-01 | End: 2021-01-01

## 2021-01-01 RX ORDER — LORAZEPAM 0.5 MG/1
.25-.5 TABLET ORAL 2 TIMES DAILY PRN
Qty: 20 TABLET | Refills: 0 | Status: SHIPPED | OUTPATIENT
Start: 2021-01-01 | End: 2021-01-01

## 2021-01-01 RX ORDER — NICOTINE POLACRILEX 4 MG
15 LOZENGE BUCCAL
Status: DISCONTINUED | OUTPATIENT
Start: 2021-01-01 | End: 2021-01-01 | Stop reason: HOSPADM

## 2021-01-01 RX ORDER — SODIUM CHLORIDE 9 MG/ML
30 INJECTION, SOLUTION INTRAVENOUS ONCE
Status: COMPLETED | OUTPATIENT
Start: 2021-01-01 | End: 2021-01-01

## 2021-01-01 RX ORDER — SODIUM CHLORIDE 0.9 % (FLUSH) 0.9 %
10 SYRINGE (ML) INJECTION EVERY 12 HOURS SCHEDULED
Status: DISCONTINUED | OUTPATIENT
Start: 2021-01-01 | End: 2021-01-01 | Stop reason: HOSPADM

## 2021-01-01 RX ORDER — FLUTICASONE PROPIONATE 50 MCG
2 SPRAY, SUSPENSION (ML) NASAL DAILY
Qty: 16 G | Refills: 0 | Status: SHIPPED | OUTPATIENT
Start: 2021-01-01

## 2021-01-01 RX ORDER — DEXTROSE MONOHYDRATE 25 G/50ML
25 INJECTION, SOLUTION INTRAVENOUS
Status: DISCONTINUED | OUTPATIENT
Start: 2021-01-01 | End: 2021-01-01 | Stop reason: HOSPADM

## 2021-01-01 RX ORDER — DIPHENHYDRAMINE HYDROCHLORIDE 50 MG/ML
50 INJECTION INTRAMUSCULAR; INTRAVENOUS ONCE
Status: CANCELLED | OUTPATIENT
Start: 2021-01-01 | End: 2021-01-01

## 2021-01-01 RX ORDER — DIPHENHYDRAMINE HYDROCHLORIDE 50 MG/ML
50 INJECTION INTRAMUSCULAR; INTRAVENOUS ONCE AS NEEDED
Status: DISCONTINUED | OUTPATIENT
Start: 2021-01-01 | End: 2021-01-01 | Stop reason: HOSPADM

## 2021-01-01 RX ORDER — LOSARTAN POTASSIUM 50 MG/1
25 TABLET ORAL DAILY
Status: DISCONTINUED | OUTPATIENT
Start: 2021-01-01 | End: 2021-01-01 | Stop reason: HOSPADM

## 2021-01-01 RX ORDER — LANCETS 28 GAUGE
EACH MISCELLANEOUS
Qty: 100 EACH | Refills: 3 | Status: SHIPPED | OUTPATIENT
Start: 2021-01-01 | End: 2022-01-01

## 2021-01-01 RX ORDER — POTASSIUM CHLORIDE 750 MG/1
10 CAPSULE, EXTENDED RELEASE ORAL DAILY
Status: DISCONTINUED | OUTPATIENT
Start: 2021-01-01 | End: 2021-01-01 | Stop reason: HOSPADM

## 2021-01-01 RX ORDER — METHYLPREDNISOLONE SODIUM SUCCINATE 125 MG/2ML
125 INJECTION, POWDER, LYOPHILIZED, FOR SOLUTION INTRAMUSCULAR; INTRAVENOUS ONCE
Status: CANCELLED | OUTPATIENT
Start: 2021-01-01 | End: 2021-01-01

## 2021-01-01 RX ORDER — METHYLPREDNISOLONE SODIUM SUCCINATE 125 MG/2ML
125 INJECTION, POWDER, LYOPHILIZED, FOR SOLUTION INTRAMUSCULAR; INTRAVENOUS ONCE AS NEEDED
Status: DISCONTINUED | OUTPATIENT
Start: 2021-01-01 | End: 2021-01-01 | Stop reason: HOSPADM

## 2021-01-01 RX ORDER — EPINEPHRINE 0.3 MG/.3ML
0.3 INJECTION SUBCUTANEOUS ONCE AS NEEDED
Status: DISCONTINUED | OUTPATIENT
Start: 2021-01-01 | End: 2021-01-01 | Stop reason: HOSPADM

## 2021-01-01 RX ORDER — ROSUVASTATIN CALCIUM 5 MG/1
5 TABLET, COATED ORAL NIGHTLY
Qty: 30 TABLET | Refills: 3 | Status: SHIPPED | OUTPATIENT
Start: 2021-01-01 | End: 2022-01-01

## 2021-01-01 RX ORDER — PANTOPRAZOLE SODIUM 40 MG/1
TABLET, DELAYED RELEASE ORAL
Qty: 180 TABLET | Refills: 0 | Status: SHIPPED | OUTPATIENT
Start: 2021-01-01 | End: 2021-01-01

## 2021-01-01 RX ORDER — ISOSORBIDE MONONITRATE 30 MG/1
30 TABLET, EXTENDED RELEASE ORAL DAILY
Qty: 90 TABLET | Refills: 0 | Status: SHIPPED | OUTPATIENT
Start: 2021-01-01 | End: 2022-01-01

## 2021-01-01 RX ADMIN — PREDNISONE 4 MG: 1 TABLET ORAL at 18:40

## 2021-01-01 RX ADMIN — LOSARTAN POTASSIUM 25 MG: 50 TABLET, FILM COATED ORAL at 09:57

## 2021-01-01 RX ADMIN — MORPHINE SULFATE 4 MG: 4 INJECTION, SOLUTION INTRAMUSCULAR; INTRAVENOUS at 11:19

## 2021-01-01 RX ADMIN — ATORVASTATIN CALCIUM 20 MG: 10 TABLET, FILM COATED ORAL at 21:41

## 2021-01-01 RX ADMIN — ACETAMINOPHEN 650 MG: 325 TABLET, FILM COATED ORAL at 09:08

## 2021-01-01 RX ADMIN — IOPAMIDOL 100 ML: 612 INJECTION, SOLUTION INTRAVENOUS at 02:21

## 2021-01-01 RX ADMIN — Medication 10 ML: at 21:42

## 2021-01-01 RX ADMIN — SODIUM CHLORIDE 500 ML: 9 INJECTION, SOLUTION INTRAVENOUS at 09:07

## 2021-01-01 RX ADMIN — Medication 10 ML: at 09:57

## 2021-01-01 RX ADMIN — ISOSORBIDE MONONITRATE 30 MG: 30 TABLET, EXTENDED RELEASE ORAL at 09:57

## 2021-01-01 RX ADMIN — PROPOFOL 50 MG: 10 INJECTION, EMULSION INTRAVENOUS at 13:25

## 2021-01-01 RX ADMIN — SODIUM CHLORIDE: 9 INJECTION, SOLUTION INTRAVENOUS at 13:19

## 2021-01-01 RX ADMIN — METOPROLOL TARTRATE 50 MG: 50 TABLET, FILM COATED ORAL at 09:57

## 2021-01-01 RX ADMIN — MORPHINE SULFATE 6 MG: 10 INJECTION, SOLUTION INTRAMUSCULAR; INTRAVENOUS at 01:59

## 2021-01-01 RX ADMIN — ONDANSETRON 4 MG: 2 INJECTION INTRAMUSCULAR; INTRAVENOUS at 09:07

## 2021-01-01 RX ADMIN — AMIODARONE HYDROCHLORIDE 100 MG: 200 TABLET ORAL at 09:57

## 2021-01-01 RX ADMIN — VERAPAMIL HYDROCHLORIDE 240 MG: 240 TABLET, FILM COATED, EXTENDED RELEASE ORAL at 09:58

## 2021-01-01 RX ADMIN — PANTOPRAZOLE SODIUM 40 MG: 40 TABLET, DELAYED RELEASE ORAL at 09:57

## 2021-01-01 RX ADMIN — METOCLOPRAMIDE HYDROCHLORIDE 10 MG: 5 INJECTION INTRAMUSCULAR; INTRAVENOUS at 11:19

## 2021-01-01 RX ADMIN — FOLIC ACID 1 MG: 1 TABLET ORAL at 09:58

## 2021-01-01 RX ADMIN — IOPAMIDOL 75 ML: 755 INJECTION, SOLUTION INTRAVENOUS at 12:15

## 2021-01-01 RX ADMIN — ASPIRIN 81 MG: 81 TABLET, COATED ORAL at 09:57

## 2021-01-01 RX ADMIN — PROPOFOL 100 MG: 10 INJECTION, EMULSION INTRAVENOUS at 13:23

## 2021-01-01 RX ADMIN — ONDANSETRON 4 MG: 4 TABLET, ORALLY DISINTEGRATING ORAL at 23:14

## 2021-01-01 RX ADMIN — FUROSEMIDE 20 MG: 10 INJECTION, SOLUTION INTRAMUSCULAR; INTRAVENOUS at 18:47

## 2021-01-01 RX ADMIN — FUROSEMIDE 20 MG: 10 INJECTION, SOLUTION INTRAMUSCULAR; INTRAVENOUS at 09:58

## 2021-01-01 RX ADMIN — TETANUS TOXOID, REDUCED DIPHTHERIA TOXOID AND ACELLULAR PERTUSSIS VACCINE, ADSORBED 0.5 ML: 5; 2.5; 8; 8; 2.5 SUSPENSION INTRAMUSCULAR at 23:13

## 2021-01-01 RX ADMIN — APIXABAN 5 MG: 5 TABLET, FILM COATED ORAL at 18:41

## 2021-01-01 RX ADMIN — APIXABAN 5 MG: 5 TABLET, FILM COATED ORAL at 06:24

## 2021-01-01 RX ADMIN — MORPHINE SULFATE 6 MG: 10 INJECTION, SOLUTION INTRAMUSCULAR; INTRAVENOUS at 23:13

## 2021-01-01 RX ADMIN — SODIUM CHLORIDE 30 ML: 9 INJECTION, SOLUTION INTRAVENOUS at 11:18

## 2021-01-01 RX ADMIN — PREDNISONE 5 MG: 5 TABLET ORAL at 07:54

## 2021-01-01 RX ADMIN — INSULIN LISPRO 3 UNITS: 100 INJECTION, SOLUTION INTRAVENOUS; SUBCUTANEOUS at 21:41

## 2021-01-01 RX ADMIN — ONDANSETRON 4 MG: 2 INJECTION INTRAMUSCULAR; INTRAVENOUS at 02:53

## 2021-01-01 RX ADMIN — PANTOPRAZOLE SODIUM 40 MG: 40 TABLET, DELAYED RELEASE ORAL at 21:41

## 2021-01-01 RX ADMIN — CASIRIVIMAB AND IMDEVIMAB: 600; 600 INJECTION, SOLUTION, CONCENTRATE INTRAVENOUS at 11:18

## 2021-01-01 RX ADMIN — INSULIN LISPRO 3 UNITS: 100 INJECTION, SOLUTION INTRAVENOUS; SUBCUTANEOUS at 11:37

## 2021-01-01 RX ADMIN — POTASSIUM CHLORIDE 10 MEQ: 750 CAPSULE, EXTENDED RELEASE ORAL at 09:57

## 2021-01-01 RX ADMIN — METOPROLOL TARTRATE 50 MG: 50 TABLET, FILM COATED ORAL at 21:41

## 2021-01-01 SDOH — ECONOMIC STABILITY: FOOD INSECURITY: WITHIN THE PAST 12 MONTHS, YOU WORRIED THAT YOUR FOOD WOULD RUN OUT BEFORE YOU GOT MONEY TO BUY MORE.: NEVER TRUE

## 2021-01-01 SDOH — ECONOMIC STABILITY: FOOD INSECURITY: WITHIN THE PAST 12 MONTHS, THE FOOD YOU BOUGHT JUST DIDN'T LAST AND YOU DIDN'T HAVE MONEY TO GET MORE.: NEVER TRUE

## 2021-01-01 ASSESSMENT — SOCIAL DETERMINANTS OF HEALTH (SDOH): HOW HARD IS IT FOR YOU TO PAY FOR THE VERY BASICS LIKE FOOD, HOUSING, MEDICAL CARE, AND HEATING?: NOT HARD AT ALL

## 2021-01-01 ASSESSMENT — ENCOUNTER SYMPTOMS
WHEEZING: 0
SHORTNESS OF BREATH: 0
ABDOMINAL PAIN: 0
RESPIRATORY NEGATIVE: 1
GASTROINTESTINAL NEGATIVE: 1
WHEEZING: 0
SORE THROAT: 0
ABDOMINAL PAIN: 0
DIARRHEA: 0
COUGH: 0
WHEEZING: 0
SHORTNESS OF BREATH: 0
COUGH: 0
COUGH: 1
DIARRHEA: 0
NAUSEA: 0
VOMITING: 0
SHORTNESS OF BREATH: 0
SORE THROAT: 0
NAUSEA: 0
DIARRHEA: 0
CHEST TIGHTNESS: 0
EYES NEGATIVE: 1
CHEST TIGHTNESS: 0
SORE THROAT: 0
NAUSEA: 0
ABDOMINAL PAIN: 0
CHEST TIGHTNESS: 0

## 2021-01-04 DIAGNOSIS — R07.9 CHEST PAIN, UNSPECIFIED TYPE: ICD-10-CM

## 2021-01-04 DIAGNOSIS — E11.9 TYPE 2 DIABETES MELLITUS WITHOUT COMPLICATION, WITHOUT LONG-TERM CURRENT USE OF INSULIN (HCC): ICD-10-CM

## 2021-01-04 RX ORDER — METOPROLOL TARTRATE 50 MG/1
TABLET, FILM COATED ORAL
Qty: 180 TABLET | Refills: 0 | OUTPATIENT
Start: 2021-01-04

## 2021-01-04 RX ORDER — ISOSORBIDE MONONITRATE 30 MG/1
30 TABLET, EXTENDED RELEASE ORAL DAILY
Qty: 90 TABLET | Refills: 0 | OUTPATIENT
Start: 2021-01-04

## 2021-01-04 RX ORDER — POTASSIUM CHLORIDE 20 MEQ/1
TABLET, EXTENDED RELEASE ORAL
Qty: 90 TABLET | Refills: 0 | Status: SHIPPED | OUTPATIENT
Start: 2021-01-04 | End: 2022-01-01

## 2021-01-04 RX ORDER — SIMVASTATIN 40 MG
40 TABLET ORAL NIGHTLY
Qty: 90 TABLET | Refills: 0 | OUTPATIENT
Start: 2021-01-04

## 2021-01-05 DIAGNOSIS — E11.9 TYPE 2 DIABETES MELLITUS WITHOUT COMPLICATION, WITHOUT LONG-TERM CURRENT USE OF INSULIN (HCC): ICD-10-CM

## 2021-01-05 RX ORDER — AMIODARONE HYDROCHLORIDE 100 MG/1
100 TABLET ORAL DAILY
Qty: 90 TABLET | Refills: 4 | Status: SHIPPED | OUTPATIENT
Start: 2021-01-05 | End: 2022-01-01

## 2021-01-05 RX ORDER — FUROSEMIDE 20 MG/1
20 TABLET ORAL DAILY
Qty: 90 TABLET | Refills: 4 | Status: ON HOLD | OUTPATIENT
Start: 2021-01-05 | End: 2021-01-01 | Stop reason: SDUPTHER

## 2021-01-06 RX ORDER — LANCETS 28 GAUGE
EACH MISCELLANEOUS
Qty: 100 EACH | Refills: 3 | Status: SHIPPED | OUTPATIENT
Start: 2021-01-06 | End: 2021-01-01 | Stop reason: SDUPTHER

## 2021-01-21 ENCOUNTER — TELEPHONE (OUTPATIENT)
Dept: FAMILY MEDICINE CLINIC | Age: 66
End: 2021-01-21

## 2021-01-22 ENCOUNTER — TELEPHONE (OUTPATIENT)
Dept: OTHER | Age: 66
End: 2021-01-22

## 2021-01-22 ENCOUNTER — TELEPHONE (OUTPATIENT)
Dept: ADMINISTRATIVE | Age: 66
End: 2021-01-22

## 2021-01-22 ENCOUNTER — VIRTUAL VISIT (OUTPATIENT)
Dept: FAMILY MEDICINE CLINIC | Age: 66
End: 2021-01-22
Payer: MEDICARE

## 2021-01-22 DIAGNOSIS — B96.89 ACUTE BACTERIAL SINUSITIS: Primary | ICD-10-CM

## 2021-01-22 DIAGNOSIS — J01.90 ACUTE BACTERIAL SINUSITIS: Primary | ICD-10-CM

## 2021-01-22 PROCEDURE — 99441 PR PHYS/QHP TELEPHONE EVALUATION 5-10 MIN: CPT | Performed by: CLINICAL NURSE SPECIALIST

## 2021-01-22 RX ORDER — CEPHALEXIN 500 MG/1
500 CAPSULE ORAL 3 TIMES DAILY
Qty: 21 CAPSULE | Refills: 0 | Status: SHIPPED | OUTPATIENT
Start: 2021-01-22 | End: 2021-01-29

## 2021-01-22 NOTE — PROGRESS NOTES
Kellie Petersen is a 60 y.o. female evaluated via telephone on 1/22/2021. Consent:  She and/or health care decision maker is aware that that she may receive a bill for this telephone service, depending on her insurance coverage, and has provided verbal consent to proceed: Yes      Documentation:  I communicated with the patient and/or health care decision maker about    Diagnosis Orders   1. Acute bacterial sinusitis  cephALEXin (KEFLEX) 500 MG capsule     . Details of this discussion including any medical advice provided:    Ms Fagan was evaluated today via telephone only encounter during COVID-19. She has had sinus congestion, nasal drainage and post nasal drainage for 5 days. Symptoms unchanged. Some cough with thick, yellow sputum. No fever or chills. No flu like symptoms. She is getting COVID testing this afternoon but no known exposure. She has several comorbid conditions. rec keflex for sinusitis, she tolerates this best.  May use saline NS, honey for cough and vicks vaporub. Call if worsening symptoms. Total time 7 minutes      I affirm this is a Patient Initiated Episode with a Patient who has not had a related appointment within my department in the past 7 days or scheduled within the next 24 hours.     Patient identification was verified at the start of the visit: Yes    Total Time: minutes: 5-10 minutes    Note: not billable if this call serves to triage the patient into an appointment for the relevant concern      [address] [place] [address]

## 2021-01-25 ENCOUNTER — TELEPHONE (OUTPATIENT)
Dept: FAMILY MEDICINE CLINIC | Age: 66
End: 2021-01-25

## 2021-01-25 NOTE — TELEPHONE ENCOUNTER
Pt called and states her BP has low she feels like now it is from the [address] [place] [address]. Pt is D/C the medication but asked what she should do in its place? Pt followed up with Rufina last week but BP was not mentioned.  Do you just want her to schedule a F/U with you or do you want to change her med first?

## 2021-01-26 RX ORDER — GLYBURIDE 5 MG/1
TABLET ORAL
Qty: 30 TABLET | Refills: 3 | Status: SHIPPED | OUTPATIENT
Start: 2021-01-26 | End: 2021-02-22 | Stop reason: SDUPTHER

## 2021-02-22 ENCOUNTER — VIRTUAL VISIT (OUTPATIENT)
Dept: FAMILY MEDICINE CLINIC | Age: 66
End: 2021-02-22

## 2021-02-22 ENCOUNTER — VIRTUAL VISIT (OUTPATIENT)
Dept: FAMILY MEDICINE CLINIC | Age: 66
End: 2021-02-22
Payer: MEDICARE

## 2021-02-22 DIAGNOSIS — Z12.31 ENCOUNTER FOR SCREENING MAMMOGRAM FOR BREAST CANCER: ICD-10-CM

## 2021-02-22 DIAGNOSIS — Z00.00 MEDICARE ANNUAL WELLNESS VISIT, SUBSEQUENT: Primary | ICD-10-CM

## 2021-02-22 DIAGNOSIS — Z00.00 ROUTINE GENERAL MEDICAL EXAMINATION AT A HEALTH CARE FACILITY: ICD-10-CM

## 2021-02-22 DIAGNOSIS — M06.9 RHEUMATOID ARTHRITIS INVOLVING MULTIPLE SITES, UNSPECIFIED WHETHER RHEUMATOID FACTOR PRESENT (HCC): ICD-10-CM

## 2021-02-22 DIAGNOSIS — I48.0 PAROXYSMAL ATRIAL FIBRILLATION (HCC): ICD-10-CM

## 2021-02-22 DIAGNOSIS — E11.9 TYPE 2 DIABETES MELLITUS WITHOUT COMPLICATION, WITHOUT LONG-TERM CURRENT USE OF INSULIN (HCC): ICD-10-CM

## 2021-02-22 DIAGNOSIS — I48.92 ATRIAL FLUTTER BY ELECTROCARDIOGRAM (HCC): ICD-10-CM

## 2021-02-22 DIAGNOSIS — E78.01 FAMILIAL HYPERCHOLESTEROLEMIA: ICD-10-CM

## 2021-02-22 DIAGNOSIS — D72.829 LEUKOCYTOSIS, UNSPECIFIED TYPE: ICD-10-CM

## 2021-02-22 DIAGNOSIS — I10 ESSENTIAL HYPERTENSION: ICD-10-CM

## 2021-02-22 DIAGNOSIS — D50.9 IRON DEFICIENCY ANEMIA, UNSPECIFIED IRON DEFICIENCY ANEMIA TYPE: ICD-10-CM

## 2021-02-22 DIAGNOSIS — B37.2 SKIN CANDIDIASIS: Primary | ICD-10-CM

## 2021-02-22 PROBLEM — R15.9 INCONTINENCE OF FECES: Status: RESOLVED | Noted: 2018-05-04 | Resolved: 2021-02-22

## 2021-02-22 PROCEDURE — G0438 PPPS, INITIAL VISIT: HCPCS | Performed by: FAMILY MEDICINE

## 2021-02-22 PROCEDURE — G8484 FLU IMMUNIZE NO ADMIN: HCPCS | Performed by: FAMILY MEDICINE

## 2021-02-22 PROCEDURE — 3017F COLORECTAL CA SCREEN DOC REV: CPT | Performed by: FAMILY MEDICINE

## 2021-02-22 PROCEDURE — 99442 PR PHYS/QHP TELEPHONE EVALUATION 11-20 MIN: CPT | Performed by: FAMILY MEDICINE

## 2021-02-22 PROCEDURE — 3046F HEMOGLOBIN A1C LEVEL >9.0%: CPT | Performed by: FAMILY MEDICINE

## 2021-02-22 RX ORDER — GLYBURIDE 5 MG/1
TABLET ORAL
Qty: 60 TABLET | Refills: 3 | Status: SHIPPED | OUTPATIENT
Start: 2021-02-22 | End: 2021-06-25 | Stop reason: SDUPTHER

## 2021-02-22 RX ORDER — KETOCONAZOLE 20 MG/G
CREAM TOPICAL
Qty: 30 G | Refills: 1 | Status: SHIPPED | OUTPATIENT
Start: 2021-02-22 | End: 2021-01-01

## 2021-02-22 ASSESSMENT — PATIENT HEALTH QUESTIONNAIRE - PHQ9
SUM OF ALL RESPONSES TO PHQ QUESTIONS 1-9: 0
SUM OF ALL RESPONSES TO PHQ QUESTIONS 1-9: 0
1. LITTLE INTEREST OR PLEASURE IN DOING THINGS: 0
SUM OF ALL RESPONSES TO PHQ QUESTIONS 1-9: 0
SUM OF ALL RESPONSES TO PHQ9 QUESTIONS 1 & 2: 0

## 2021-02-22 ASSESSMENT — LIFESTYLE VARIABLES: HOW OFTEN DO YOU HAVE A DRINK CONTAINING ALCOHOL: 0

## 2021-02-22 NOTE — PROGRESS NOTES
Eriberto Vargas is a 60 y.o. female evaluated via telephone on 2/22/2021. Consent:  She and/or health care decision maker is aware that that she may receive a bill for this telephone service, depending on her insurance coverage, and has provided verbal consent to proceed: Yes      Documentation:  I communicated with the patient and/or health care decision maker about follow up. She cannot tolerate jardiance, has itching diffuse, stopped taking one month ago. She needs a new Rx for skin candidiasis, insurance would not cover nystatin, still with itching. She states arthritis stable, followed by Jerene Schaumann. Labs reviewed. She states blood sugar running 200s. A1C this month 10. 6! WBC stable. Diff wnl. Hgb  13.7, lfts wnl. cmp wnl.   , HDL 75, LDL 31, triglycerides 120  TSH 1.2    Diabetes Mellitus  See above. She is taking metformin bid, rectal incontinence resolved now. She has not been able to tolerate several meds due to SE profile. No polyuria, polydipsia, or vision changes since last visit. No symptomatic episodes of hypoglycemia. Hypertension  Compliant with medications. No adverse effects from medication. No lightheadedness, palpitations, or chest pain. Atrial Fibrillation  Compliant with medications. No adverse effects from medication. No lightheadedness, palpitations, or chest pain. Stable on current anticoagulation. No bleeding issues. Complaint with medication. Hypothyroidism  Symptoms are currently well controlled. No temperature intolerance, mood issues, or fatigue reported. Tolerating current medication without adverse effects. Details of this discussion including any medical advice provided: PLAN:    A1C uncontrolled. Previous a1c 8.9, has not been taking meds due to SE. Must work on diet, limit carbs, exercise reuglarly. Consider insulin.

## 2021-02-22 NOTE — PROGRESS NOTES
Medicare Annual Wellness Visit  Are Name: Stanley Allen Date: 2021   MRN: [id number] Sex: Female   Age: 60 y.o. Ethnicity: Non-/Non    : 1955 Race: Kerline Fagan is here for Medicare AWV    Screenings for behavioral, psychosocial and functional/safety risks, and cognitive dysfunction are all negative except as indicated below. These results, as well as other patient data from the [address] form, are documented in Flowsheets linked to this Encounter. COVID vaccine dose #1 done. Pneumovax due. Cscope UTD. Pap  UTD. Mammogram UTD. Allergies   Allergen Reactions    Codeine Other (See Comments)     Chest pain    Albuterol      Rapid heart rate    Januvia [Sitagliptin] Swelling    Augmentin [Amoxicillin-Pot Clavulanate] Palpitations    Sulfa Antibiotics Rash         Prior to Visit Medications    Medication Sig Taking? Authorizing Provider   glyBURIDE (DIABETA) 5 MG tablet 1 tablet po twice daily Yes Lorne Burris MD   ketoconazole (NIZORAL) 2 % cream Apply topically daily. Yes Lorne Burris MD   FreeStyle Lancets MISC USE AS DIRECTED Yes Lorne Burris MD   potassium chloride (KLOR-CON M) 20 MEQ extended release tablet TAKE 1 TABLET BY MOUTH DAILY. Yes Lorne Burris MD   losartan (COZAAR) 25 MG tablet TAKE 1 TABLET BY MOUTH DAILY Yes MARYANNE Coreas   pantoprazole (PROTONIX) 40 MG tablet TAKE 1 TABLET BY MOUTH 2 TIMES A DAY Yes MARYANNE Reeder   metFORMIN (GLUCOPHAGE) 500 MG tablet TAKE 1 TABLET BY MOUTH ONCE EVERY MORNING AND ONE TABLET EVERY EVENING Yes Lorne Burris MD   metoprolol tartrate (LOPRESSOR) 50 MG tablet TAKE 1 TABLET BY MOUTH 2 TIMES A DAY Yes Lorne Burris MD   isosorbide mononitrate (IMDUR) 30 MG extended release tablet TAKE 1 TABLET BY MOUTH DAILY Yes Lorne Burris MD   blood glucose test strips (FREESTYLE LITE) strip 1 each by In Vitro route daily As needed.  Yes Lorne Burris MD blood glucose test strips (FREESTYLE LITE) strip Infuse 1 each intravenously 2 times daily As needed. Yes Adrianne Rosa MD   simvastatin (ZOCOR) 40 MG tablet TAKE 1 TABLET BY MOUTH NIGHTLY Yes Adrianne Rosa MD   nystatin (MYCOSTATIN) 550747 UNIT/GM powder Apply topically 4 times daily. Yes MARYANNE Lockett   Blood Glucose Monitoring Suppl (FREESTYLE INSULINX SYSTEM) w/Device KIT 1 each by Does not apply route daily Yes Adrianne Rosa MD   blood glucose test strips (FREESTYLE INSULINX TEST) strip 1 each by In Vitro route daily As needed. Yes Adrianne Rosa MD   nitroGLYCERIN (NITROSTAT) 0.4 MG SL tablet PLACE 1 TABLET UNDER THE TONGUE EVERY 5 MINUTES AS NEEDED FOR CHEST PAIN Yes Adrianne Rosa MD   nystatin (MYCOSTATIN) 831783 UNIT/GM cream Apply topically 2 times daily. Yes MARYANNE De Los Santos   Tofacitinib Citrate (XELJANZ XR) 11 MG TB24 Take 11 mg by mouth daily Yes Historical Provider, MD   furosemide (LASIX) 20 MG tablet Take 20 mg by mouth daily Yes Historical Provider, MD   FREESTYLE LANCETS MISC TEST SUGAR DAILY AS DIRECTED UP TO 4 TIMES A DAY Yes Adrianne Rosa MD   apixaban (ELIQUIS) 5 MG TABS tablet Take 5 mg by mouth 2 times daily  Yes Historical Provider, MD   verapamil (CALAN SR) 240 MG extended release tablet Take 240 mg by mouth daily  Yes Historical Provider, MD   amiodarone (PACERONE) 100 MG tablet Take 100 mg by mouth daily  Yes Historical Provider, MD   FREESTYLE LANCETS MISC TEST SUGAR DAILY AS DIRECTED Yes Adrianne Rosa MD   predniSONE (DELTASONE) 5 MG tablet Take 5 mg by mouth 2 times daily Take 5 mg in the morning and 4 mg in the evening.  Yes Historical Provider, MD   aspirin EC 81 MG EC tablet Take 81 mg by mouth daily Yes Historical Provider, MD   folic acid (FOLVITE) 1 MG tablet Take 1 mg by mouth daily  Yes Historical Provider, MD         Past Medical History:   Diagnosis Date    Atrial flutter by electrocardiogram ([facility] [place] [address]     Constipation  Cervical cancer screen  09/08/2023    Pneumococcal 0-64 years Vaccine  Completed    Hepatitis A vaccine  Aged Out    Hib vaccine  Aged Out    Meningococcal (ACWY) vaccine  Aged Out     Recommendations for [employer] Due: see orders and patient instructions/AVS.  . Recommended screening schedule for the next 5-10 years is provided to the patient in written form: see Patient Angela Rico was seen today for medicare awv. Diagnoses and all orders for this visit:    Medicare annual wellness visit, subsequent  Labs reviewed and discussed. Encouraged healthy diet and regular exercise. Immunizations UTD. See HPI for further details. Essential hypertension  Blood pressure is stable. Continue current medications. Monitor ambulatory bp readings, if persistently >140/90, return to clinic. Type 2 diabetes mellitus without complication, without long-term current use of insulin (HCC)    Familial hypercholesterolemia    Leukocytosis, unspecified type    Paroxysmal atrial fibrillation (HCC)    Encounter for screening mammogram for breast cancer  -     STEVE DIGITAL SCREEN W OR WO CAD BILATERAL; Future    Other orders  -     glyBURIDE (DIABETA) 5 MG tablet; 1 tablet po twice daily  -     ketoconazole (NIZORAL) 2 % cream; Apply topically daily. Kellie Petersen is a 60 y.o. female being evaluated by a Virtual Visit (phone) encounter to address concerns as mentioned above. A caregiver was present when appropriate. Due to this being a TeleHealth encounter (During WEA-37 public health emergency), evaluation of the following organ systems was limited: Vitals/Constitutional/EENT/Resp/CV/GI//MS/Neuro/Skin/Heme-Lymph-Imm. Pursuant to the emergency declaration under the [address] [place] [address], [address] [place] [address] and the [employer] Act, this Virtual Visit was conducted with patient's (and/or legal guardian's) consent, to reduce the patient's risk of exposure to COVID-19 and provide necessary medical care. The patient (and/or legal guardian) has also been advised to contact this office for worsening conditions or problems, and seek emergency medical treatment and/or call 911 if deemed necessary. Patient identification was verified at the start of the visit: Yes    Services were provided through phone to substitute for in-person clinic visit. Patient and provider were located at their individual homes. --Michael Gary MD on 2/22/2021 at 11:33 AM    An electronic signature was used to authenticate this note.

## 2021-02-22 NOTE — PATIENT INSTRUCTIONS
Personalized Preventive Plan for Denisha Smith Day - 2/22/2021  Medicare offers a range of preventive health benefits. Some of the tests and screenings are paid in full while other may be subject to a deductible, co-insurance, and/or copay. Some of these benefits include a comprehensive review of your medical history including lifestyle, illnesses that may run in your family, and various assessments and screenings as appropriate. After reviewing your medical record and screening and assessments performed today your provider may have ordered immunizations, labs, imaging, and/or referrals for you. A list of these orders (if applicable) as well as your Preventive Care list are included within your After Visit Summary for your review. Other Preventive Recommendations:    · A preventive eye exam performed by an eye specialist is recommended every 1-2 years to screen for glaucoma; cataracts, macular degeneration, and other eye disorders. · A preventive dental visit is recommended every 6 months. · Try to get at least 150 minutes of exercise per week or 10,000 steps per day on a pedometer . · Order or download the FREE \"Exercise & Physical Activity: Your Everyday Guide\" from [employer]. Call 9-714.252.5107 or search [employer] online. · You need 4118-5962 mg of calcium and 5020-3387 IU of vitamin D per day. It is possible to meet your calcium requirement with diet alone, but a vitamin D supplement is usually necessary to meet this goal.  · When exposed to the sun, use a sunscreen that protects against both UVA and UVB radiation with an SPF of 30 or greater. Reapply every 2 to 3 hours or after sweating, drying off with a towel, or swimming. · Always wear a seat belt when traveling in a car. Always wear a helmet when riding a bicycle or motorcycle.

## 2021-02-27 ENCOUNTER — OFFICE VISIT (OUTPATIENT)
Dept: URGENT CARE | Age: 66
End: 2021-02-27
Payer: MEDICARE

## 2021-02-27 VITALS
TEMPERATURE: 97.9 F | BODY MASS INDEX: 33.47 KG/M2 | WEIGHT: 195 LBS | DIASTOLIC BLOOD PRESSURE: 73 MMHG | HEART RATE: 105 BPM | OXYGEN SATURATION: 97 % | SYSTOLIC BLOOD PRESSURE: 109 MMHG

## 2021-02-27 DIAGNOSIS — L03.116 CELLULITIS OF LEFT ANTERIOR LOWER LEG: Primary | ICD-10-CM

## 2021-02-27 PROCEDURE — 3017F COLORECTAL CA SCREEN DOC REV: CPT | Performed by: NURSE PRACTITIONER

## 2021-02-27 PROCEDURE — 1036F TOBACCO NON-USER: CPT | Performed by: NURSE PRACTITIONER

## 2021-02-27 PROCEDURE — G8484 FLU IMMUNIZE NO ADMIN: HCPCS | Performed by: NURSE PRACTITIONER

## 2021-02-27 PROCEDURE — G8417 CALC BMI ABV UP PARAM F/U: HCPCS | Performed by: NURSE PRACTITIONER

## 2021-02-27 PROCEDURE — 99213 OFFICE O/P EST LOW 20 MIN: CPT | Performed by: NURSE PRACTITIONER

## 2021-02-27 PROCEDURE — G8427 DOCREV CUR MEDS BY ELIG CLIN: HCPCS | Performed by: NURSE PRACTITIONER

## 2021-02-27 RX ORDER — DOXYCYCLINE 100 MG/1
100 CAPSULE ORAL 2 TIMES DAILY
Qty: 14 CAPSULE | Refills: 0 | Status: SHIPPED | OUTPATIENT
Start: 2021-02-27 | End: 2021-03-06

## 2021-02-27 ASSESSMENT — ENCOUNTER SYMPTOMS: COLOR CHANGE: 1

## 2021-02-27 NOTE — PATIENT INSTRUCTIONS
· Try to prevent cuts, scrapes, or other injuries to your skin. Cellulitis most often occurs where there is a break in the skin. · If you get a scrape, cut, mild burn, or bite, wash the wound with clean water as soon as you can to help avoid infection. Don't use hydrogen peroxide or alcohol, which can slow healing. · If you have swelling in your legs (edema), support stockings and good skin care may help prevent leg sores and cellulitis. · Take care of your feet, especially if you have diabetes or other conditions that increase the risk of infection. Wear shoes and socks. Do not go barefoot. If you have athlete's foot or other skin problems on your feet, talk to your doctor about how to treat them. When should you call for help? Call your doctor now or seek immediate medical care if:    · You have signs that your infection is getting worse, such as:  ? Increased pain, swelling, warmth, or redness. ? Red streaks leading from the area. ? Pus draining from the area. ? A fever.     · You get a rash. Watch closely for changes in your health, and be sure to contact your doctor if:    · You do not get better as expected. Where can you learn more? Go to https://Knova Softwareeb.Open Wager. org and sign in to your [employer] account. Enter E181 in the [facility] box to learn more about \"Cellulitis: Care Instructions. \"     If you do not have an account, please click on the \"Sign Up Now\" link. Current as of: July 2, 2020               Content Version: 12.6  © 2006-2020 [email or web address]. Care instructions adapted under license by [facility] ([facility]). If you have questions about a medical condition or this instruction, always ask your healthcare professional. [place] [address] any warranty or liability for your use of this information.

## 2021-02-27 NOTE — PROGRESS NOTES
[address] [facility]  [address] [address] [place] [address]  Dept: [phone]  Dept Fax: [phone]  Loc: [phone]    Arelis Fagan is a 60 y.o. female who presents today for her medical conditions/complaintsas noted below. Arelis Fagan is c/o of Puncture Wound (DOG SCRATCH MONDAY, HAVING DRAINAGE)        HPI:     Pt presents to clinic today with complaints of left lower extremity wound. She states that she got scratched by dog  or Monday but has noticed in the last few days it has been draining and worse today. She does note that she has diabetes and wanted to get it checked out.        Past Medical History:   Diagnosis Date    Atrial flutter by electrocardiogram ([address]     Constipation     Coronary artery disease     Diabetes mellitus (HCC)     Essential hypertension     Hyperlipidemia     IBS (irritable bowel syndrome)     Iron deficiency anemia     Menopause     Paroxysmal SVT (supraventricular tachycardia) (HCC)     RA (rheumatoid arthritis) (HCC)     Rheumatoid arthritis ([address]      Past Surgical History:   Procedure Laterality Date    CARDIAC SURGERY      CABG (Dr. Larkin Nageotte) [facility]      Stent     CHOLECYSTECTOMY      CORONARY ARTERY BYPASS GRAFT      DILATION AND CURETTAGE OF UTERUS N/A 2019    DILATATION AND CURETTAGE HYSTEROSCOPY W/ Samuel Ritter performed by Mo [facility]en [facility] at [facility] OR    TOTAL KNEE ARTHROPLASTY         Family History   Problem Relation Age of Onset    Heart Attack Father     Prostate Cancer Father     Stroke Father     Heart Attack Brother     Stroke Brother        Social History     Tobacco Use    Smoking status: Former Smoker     Packs/day: 0.50     Years: 20.00     Pack years: 10.00     Types: Cigarettes     Quit date: 2000     Years since quittin.4    Smokeless tobacco: Never Used   Substance Use Topics    Alcohol use: No      Current Outpatient Medications Medication Sig Dispense Refill    doxycycline monohydrate (MONODOX) 100 MG capsule Take 1 capsule by mouth 2 times daily for 7 days 14 capsule 0    glyBURIDE (DIABETA) 5 MG tablet 1 tablet po twice daily 60 tablet 3    FreeStyle Lancets MISC USE AS DIRECTED 100 each 3    potassium chloride (KLOR-CON M) 20 MEQ extended release tablet TAKE 1 TABLET BY MOUTH DAILY. 90 tablet 0    losartan (COZAAR) 25 MG tablet TAKE 1 TABLET BY MOUTH DAILY 90 tablet 0    pantoprazole (PROTONIX) 40 MG tablet TAKE 1 TABLET BY MOUTH 2 TIMES A  tablet 0    metFORMIN (GLUCOPHAGE) 500 MG tablet TAKE 1 TABLET BY MOUTH ONCE EVERY MORNING AND ONE TABLET EVERY EVENING 180 tablet 0    metoprolol tartrate (LOPRESSOR) 50 MG tablet TAKE 1 TABLET BY MOUTH 2 TIMES A  tablet 0    isosorbide mononitrate (IMDUR) 30 MG extended release tablet TAKE 1 TABLET BY MOUTH DAILY 90 tablet 0    blood glucose test strips (FREESTYLE LITE) strip 1 each by In Vitro route daily As needed. 100 each 3    blood glucose test strips (FREESTYLE LITE) strip Infuse 1 each intravenously 2 times daily As needed. 100 strip 1    simvastatin (ZOCOR) 40 MG tablet TAKE 1 TABLET BY MOUTH NIGHTLY 90 tablet 2    Blood Glucose Monitoring Suppl (FREESTYLE INSULINX SYSTEM) w/Device KIT 1 each by Does not apply route daily 1 kit 0    blood glucose test strips (FREESTYLE INSULINX TEST) strip 1 each by In Vitro route daily As needed.  100 each 3    nitroGLYCERIN (NITROSTAT) 0.4 MG SL tablet PLACE 1 TABLET UNDER THE TONGUE EVERY 5 MINUTES AS NEEDED FOR CHEST PAIN 25 tablet 0    furosemide (LASIX) 20 MG tablet Take 20 mg by mouth daily      FREESTYLE LANCETS MISC TEST SUGAR DAILY AS DIRECTED UP TO 4 TIMES A DAY 4 each 0    apixaban (ELIQUIS) 5 MG TABS tablet Take 5 mg by mouth 2 times daily       verapamil (CALAN SR) 240 MG extended release tablet Take 240 mg by mouth daily       amiodarone (PACERONE) 100 MG tablet Take 100 mg by mouth daily  FREESTYLE LANCETS MIS TEST SUGAR DAILY AS DIRECTED 100 each 2    predniSONE (DELTASONE) 5 MG tablet Take 5 mg by mouth 2 times daily Take 5 mg in the morning and 4 mg in the evening.  aspirin EC 81 MG EC tablet Take 81 mg by mouth daily      folic acid (FOLVITE) 1 MG tablet Take 1 mg by mouth daily       ketoconazole (NIZORAL) 2 % cream Apply topically daily. (Patient not taking: Reported on 2/27/2021) 30 g 1    nystatin (MYCOSTATIN) 039419 UNIT/GM powder Apply topically 4 times daily. (Patient not taking: Reported on 2/27/2021) 30 g 5    nystatin (MYCOSTATIN) 148617 UNIT/GM cream Apply topically 2 times daily. (Patient not taking: Reported on 2/27/2021) 30 g 2    Tofacitinib Citrate (XELJANZ XR) 11 MG TB24 Take 11 mg by mouth daily       No current facility-administered medications for this visit.       Allergies   Allergen Reactions    Codeine Other (See Comments)     Chest pain    Albuterol      Rapid heart rate    Januvia [Sitagliptin] Swelling    Augmentin [Amoxicillin-Pot Clavulanate] Palpitations    Sulfa Antibiotics Rash       Health Maintenance   Topic Date Due    Hepatitis C screen  1955    HIV screen  10/24/1971    Shingles Vaccine (1 of 2) 10/24/2006    Diabetic foot exam  04/24/2019    Diabetic microalbuminuria test  11/18/2020    Flu vaccine (1) 11/17/2021 (Originally 9/1/2020)    Diabetic retinal exam  06/22/2021    A1C test (Diabetic or Prediabetic)  09/10/2021    Lipid screen  12/08/2021    TSH testing  12/08/2021    Potassium monitoring  12/08/2021    Creatinine monitoring  12/08/2021    Annual Wellness Visit (AWV)  02/23/2022    Breast cancer screen  12/16/2022    DTaP/Tdap/Td vaccine (2 - Td) 12/31/2022    Colon cancer screen colonoscopy  01/31/2023    Cervical cancer screen  09/08/2023    Pneumococcal 0-64 years Vaccine  Completed    Hepatitis A vaccine  Aged Out    Hib vaccine  Aged Out    Meningococcal (ACWY) vaccine  Aged Out       Subjective: Review of Systems   Skin: Positive for color change and wound. Objective:     Physical Exam  Vitals signs and nursing note reviewed. Constitutional:       Appearance: She is well-developed. HENT:      Head: Normocephalic and atraumatic. Right Ear: Hearing normal.      Left Ear: Hearing normal.   Eyes:      General: Lids are normal.      Conjunctiva/sclera: Conjunctivae normal.      Pupils: Pupils are equal, round, and reactive to light. Cardiovascular:      Rate and Rhythm: Normal rate. Pulmonary:      Effort: Pulmonary effort is normal.   Skin:     General: Skin is warm and dry. Findings: Erythema and wound present. Neurological:      Mental Status: She is alert and oriented to person, place, and time. Psychiatric:         Speech: Speech normal.         Behavior: Behavior normal.         Thought Content: Thought content normal.       /73   Pulse 105   Temp 97.9 °F (36.6 °C)   Wt 195 lb (88.5 kg)   SpO2 97%   BMI 33.47 kg/m²     Assessment:       Diagnosis Orders   1. Cellulitis of left anterior lower leg  Culture, Wound       Plan:      Orders Placed This Encounter   Procedures    Culture, Wound       No follow-ups on file. Orders Placed This Encounter   Medications    doxycycline monohydrate (MONODOX) 100 MG capsule     Sig: Take 1 capsule by mouth 2 times daily for 7 days     Dispense:  14 capsule     Refill:  0     Bandage applied. Pt instructed to keep open to air but to cover if out and about. Keep leg elevated. May use neosporin sparingly. Patient given educational materials- see patient instructions. Discussed use, benefit, and side effects of prescribedmedications. All patient questions answered. Pt voiced understanding. Reviewedhealth maintenance. Instructed to continue current medications, diet and exercise. Patient agreed with treatment plan. Follow up as directed.      Patient Instructions       Patient Education Cellulitis: Care Instructions  Your Care Instructions     Cellulitis is a skin infection caused by bacteria, most often strep or staph. It often occurs after a break in the skin from a scrape, cut, bite, or puncture, or after a rash. Cellulitis may be treated without doing tests to find out what caused it. But your doctor may do tests, if needed, to look for a specific bacteria, like methicillin-resistant Staphylococcus aureus (MRSA). The doctor has checked you carefully, but problems can develop later. If you notice any problems or new symptoms, get medical treatment right away. Follow-up care is a key part of your treatment and safety. Be sure to make and go to all appointments, and call your doctor if you are having problems. It's also a good idea to know your test results and keep a list of the medicines you take. How can you care for yourself at home? · Take your antibiotics as directed. Do not stop taking them just because you feel better. You need to take the full course of antibiotics. · Prop up the infected area on pillows to reduce pain and swelling. Try to keep the area above the level of your heart as often as you can. · If your doctor told you how to care for your wound, follow your doctor's instructions. If you did not get instructions, follow this general advice:  ? Wash the wound with clean water 2 times a day. Don't use hydrogen peroxide or alcohol, which can slow healing. ? You may cover the wound with a thin layer of petroleum jelly, such as Vaseline, and a nonstick bandage. ? Apply more petroleum jelly and replace the bandage as needed. · Be safe with medicines. Take pain medicines exactly as directed. ? If the doctor gave you a prescription medicine for pain, take it as prescribed. ? If you are not taking a prescription pain medicine, ask your doctor if you can take an over-the-counter medicine.   To prevent cellulitis in the future · Try to prevent cuts, scrapes, or other injuries to your skin. Cellulitis most often occurs where there is a break in the skin. · If you get a scrape, cut, mild burn, or bite, wash the wound with clean water as soon as you can to help avoid infection. Don't use hydrogen peroxide or alcohol, which can slow healing. · If you have swelling in your legs (edema), support stockings and good skin care may help prevent leg sores and cellulitis. · Take care of your feet, especially if you have diabetes or other conditions that increase the risk of infection. Wear shoes and socks. Do not go barefoot. If you have athlete's foot or other skin problems on your feet, talk to your doctor about how to treat them. When should you call for help? Call your doctor now or seek immediate medical care if:    · You have signs that your infection is getting worse, such as:  ? Increased pain, swelling, warmth, or redness. ? Red streaks leading from the area. ? Pus draining from the area. ? A fever.     · You get a rash. Watch closely for changes in your health, and be sure to contact your doctor if:    · You do not get better as expected. Where can you learn more? Go to https://[employer] Industryeb.[employer]qu[employer]ore. org and sign in to your [employer] account. Enter Q980 in the [facility] box to learn more about \"Cellulitis: Care Instructions. \"     If you do not have an account, please click on the \"Sign Up Now\" link. Current as of: July 2, 2020               Content Version: 12.6  © 2006-2020 [employer]. Care instructions adapted under license by [facility] ([facility]). If you have questions about a medical condition or this instruction, always ask your healthcare professional. [place] [address] any warranty or liability for your use of this information.                Electronically signed by MARYANNE Lorenzo CNP on 2/27/2021 at 12:53 PM

## 2021-03-03 DIAGNOSIS — T14.8XXA WOUND INFECTION: Primary | ICD-10-CM

## 2021-03-03 DIAGNOSIS — L08.9 WOUND INFECTION: Primary | ICD-10-CM

## 2021-03-03 LAB
ANAEROBIC CULTURE: ABNORMAL
GRAM STAIN RESULT: ABNORMAL
ORGANISM: ABNORMAL
WOUND/ABSCESS: ABNORMAL

## 2021-03-10 ENCOUNTER — OFFICE VISIT (OUTPATIENT)
Dept: URGENT CARE | Age: 66
End: 2021-03-10
Payer: MEDICARE

## 2021-03-10 VITALS
DIASTOLIC BLOOD PRESSURE: 70 MMHG | SYSTOLIC BLOOD PRESSURE: 113 MMHG | BODY MASS INDEX: 33.81 KG/M2 | OXYGEN SATURATION: 98 % | HEART RATE: 107 BPM | TEMPERATURE: 97.4 F | WEIGHT: 197 LBS

## 2021-03-10 DIAGNOSIS — L02.415 ABSCESS OF RIGHT LEG: Primary | ICD-10-CM

## 2021-03-10 PROCEDURE — 3017F COLORECTAL CA SCREEN DOC REV: CPT | Performed by: NURSE PRACTITIONER

## 2021-03-10 PROCEDURE — G8417 CALC BMI ABV UP PARAM F/U: HCPCS | Performed by: NURSE PRACTITIONER

## 2021-03-10 PROCEDURE — 99213 OFFICE O/P EST LOW 20 MIN: CPT | Performed by: NURSE PRACTITIONER

## 2021-03-10 PROCEDURE — 1036F TOBACCO NON-USER: CPT | Performed by: NURSE PRACTITIONER

## 2021-03-10 PROCEDURE — G8427 DOCREV CUR MEDS BY ELIG CLIN: HCPCS | Performed by: NURSE PRACTITIONER

## 2021-03-10 PROCEDURE — G8484 FLU IMMUNIZE NO ADMIN: HCPCS | Performed by: NURSE PRACTITIONER

## 2021-03-10 RX ORDER — CLINDAMYCIN HYDROCHLORIDE 300 MG/1
300 CAPSULE ORAL 2 TIMES DAILY
Qty: 20 CAPSULE | Refills: 0 | Status: SHIPPED | OUTPATIENT
Start: 2021-03-10 | End: 2021-03-20

## 2021-03-10 ASSESSMENT — ENCOUNTER SYMPTOMS: COLOR CHANGE: 0

## 2021-03-10 NOTE — PATIENT INSTRUCTIONS
Clindamycin today    Use warm compresses to area 3-4 times daily    Dry and apply bactroban to this area after warm compresses    Return if area worsens or does not improve

## 2021-03-10 NOTE — PROGRESS NOTES
Use Topics    Alcohol use: No        Current Outpatient Medications   Medication Sig Dispense Refill    clindamycin (CLEOCIN) 300 MG capsule Take 1 capsule by mouth 2 times daily for 10 days 20 capsule 0    mupirocin (BACTROBAN) 2 % ointment Apply 3 times daily. 1 g 0    glyBURIDE (DIABETA) 5 MG tablet 1 tablet po twice daily 60 tablet 3    ketoconazole (NIZORAL) 2 % cream Apply topically daily. 30 g 1    FreeStyle Lancets MISC USE AS DIRECTED 100 each 3    potassium chloride (KLOR-CON M) 20 MEQ extended release tablet TAKE 1 TABLET BY MOUTH DAILY. 90 tablet 0    losartan (COZAAR) 25 MG tablet TAKE 1 TABLET BY MOUTH DAILY 90 tablet 0    pantoprazole (PROTONIX) 40 MG tablet TAKE 1 TABLET BY MOUTH 2 TIMES A  tablet 0    metFORMIN (GLUCOPHAGE) 500 MG tablet TAKE 1 TABLET BY MOUTH ONCE EVERY MORNING AND ONE TABLET EVERY EVENING 180 tablet 0    metoprolol tartrate (LOPRESSOR) 50 MG tablet TAKE 1 TABLET BY MOUTH 2 TIMES A  tablet 0    isosorbide mononitrate (IMDUR) 30 MG extended release tablet TAKE 1 TABLET BY MOUTH DAILY 90 tablet 0    blood glucose test strips (FREESTYLE LITE) strip 1 each by In Vitro route daily As needed. 100 each 3    blood glucose test strips (FREESTYLE LITE) strip Infuse 1 each intravenously 2 times daily As needed. 100 strip 1    simvastatin (ZOCOR) 40 MG tablet TAKE 1 TABLET BY MOUTH NIGHTLY 90 tablet 2    nystatin (MYCOSTATIN) 189164 UNIT/GM powder Apply topically 4 times daily. 30 g 5    Blood Glucose Monitoring Suppl (FREESTYLE INSULINX SYSTEM) w/Device KIT 1 each by Does not apply route daily 1 kit 0    blood glucose test strips (FREESTYLE INSULINX TEST) strip 1 each by In Vitro route daily As needed. 100 each 3    nitroGLYCERIN (NITROSTAT) 0.4 MG SL tablet PLACE 1 TABLET UNDER THE TONGUE EVERY 5 MINUTES AS NEEDED FOR CHEST PAIN 25 tablet 0    nystatin (MYCOSTATIN) 460911 UNIT/GM cream Apply topically 2 times daily.  30 g 2    Tofacitinib Citrate Colleen Primas Conjunctivae normal.   Neck:      Musculoskeletal: Normal range of motion and neck supple. Cardiovascular:      Rate and Rhythm: Tachycardia present. Pulmonary:      Effort: Pulmonary effort is normal. No respiratory distress. Musculoskeletal: Normal range of motion. Skin:     General: Skin is warm and dry. Capillary Refill: Capillary refill takes less than 2 seconds. Findings: No rash. Neurological:      General: No focal deficit present. Mental Status: She is alert and oriented to person, place, and time. Psychiatric:         Mood and Affect: Mood normal.         Behavior: Behavior normal.         /70   Pulse 107   Temp 97.4 °F (36.3 °C)   Wt 197 lb (89.4 kg)   SpO2 98%   BMI 33.81 kg/m²     Assessment:      Diagnosis Orders   1. Abscess of right leg  clindamycin (CLEOCIN) 300 MG capsule       No results found for this visit on 03/10/21. Plan:     Apply warm compresses    Apply bactroban- she already has this at home    Start clindamycin    Return if area worsens or does not improve. Return if symptoms worsen or fail to improve. No orders of the defined types were placed in this encounter. Orders Placed This Encounter   Medications    clindamycin (CLEOCIN) 300 MG capsule     Sig: Take 1 capsule by mouth 2 times daily for 10 days     Dispense:  20 capsule     Refill:  0        Patient offered educational materials - see patient instructions for any instruction needed. Discussed use, benefit, and side effects of prescribed medications. All patient questions answered. Instructed to continue current medications, diet and exercise. Patient agreed with treatment plan. Follow up as directed. Patient was advised to go to the ED if condition ever becomes emergent.        Electronically signed by Katharina Ballard on 3/10/2021 at 1:52 PM

## 2021-03-13 ENCOUNTER — TELEPHONE (OUTPATIENT)
Dept: URGENT CARE | Age: 66
End: 2021-03-13

## 2021-03-13 NOTE — TELEPHONE ENCOUNTER
Patient states that she was in clinic on 03/10/2021 and was prescribed cindamycin. Patient states that she took one dose and had mild upper back pain that didn't last very long but she took another dose the back pain was more severe and for a longer duration.   Patient states she is afraid to take any more of the medication and would like a new prescription be sent to [address] [place] [address].

## 2021-03-13 NOTE — TELEPHONE ENCOUNTER
Patient recently had doxycycline that did not improve area and she is allergic to penicillins and sulfa. If we stop Clindamycin options are very limited. If she does not feel she can tolerate the Clindamycin she can follow up with her primary care doctor on Monday.

## 2021-03-16 ENCOUNTER — OFFICE VISIT (OUTPATIENT)
Dept: CARDIOLOGY | Facility: CLINIC | Age: 66
End: 2021-03-16

## 2021-03-16 VITALS
DIASTOLIC BLOOD PRESSURE: 80 MMHG | HEART RATE: 101 BPM | HEIGHT: 64 IN | SYSTOLIC BLOOD PRESSURE: 140 MMHG | BODY MASS INDEX: 33.29 KG/M2 | WEIGHT: 195 LBS | OXYGEN SATURATION: 98 %

## 2021-03-16 DIAGNOSIS — Z95.1 S/P CABG X 3: ICD-10-CM

## 2021-03-16 DIAGNOSIS — I48.3 TYPICAL ATRIAL FLUTTER (HCC): ICD-10-CM

## 2021-03-16 DIAGNOSIS — I10 ESSENTIAL HYPERTENSION: ICD-10-CM

## 2021-03-16 DIAGNOSIS — E11.9 TYPE 2 DIABETES MELLITUS WITHOUT COMPLICATION, WITHOUT LONG-TERM CURRENT USE OF INSULIN (HCC): ICD-10-CM

## 2021-03-16 DIAGNOSIS — I25.10 CORONARY ARTERY DISEASE INVOLVING NATIVE CORONARY ARTERY OF NATIVE HEART WITHOUT ANGINA PECTORIS: ICD-10-CM

## 2021-03-16 DIAGNOSIS — I48.0 PAF (PAROXYSMAL ATRIAL FIBRILLATION) (HCC): ICD-10-CM

## 2021-03-16 DIAGNOSIS — Z95.5 S/P CORONARY ARTERY STENT PLACEMENT: Primary | ICD-10-CM

## 2021-03-16 DIAGNOSIS — E78.2 MIXED HYPERLIPIDEMIA: ICD-10-CM

## 2021-03-16 PROCEDURE — 99214 OFFICE O/P EST MOD 30 MIN: CPT | Performed by: INTERNAL MEDICINE

## 2021-03-16 RX ORDER — GLYBURIDE 5 MG/1
TABLET ORAL 2 TIMES DAILY
COMMUNITY
Start: 2021-02-22 | End: 2021-03-22

## 2021-03-16 NOTE — PROGRESS NOTES
Teresa Serna  [id number]  1955  65 y.o.  female    Referring Provider: Cierra Addison APRN    Reason for Follow-up Visit: Here for routine follow up     Paroxysmal atrial fibrillation now sinus rhythm  Saw Dr De Anda AF ablation tried but due to no good venous access not possible  Now  maintaining sinus rhythm    Cardiac workup test results as below: 14-day outpatient cardiac telemetry   In past could not get ablation due to anatomy  Attempted by Dr De Anda     Subjective    Overall feels the same    No new events or complaints since last visit     No bleeding, excessive bruising, gait instability or fall risks    Overall the patient feels no major change from baseline symptoms   Similar symptoms as during last visit     Tolerating current medications well with no untoward side effects   Compliant with prescribed medication regimen. Tries to adhere to cardiac diet.      Still has intermittent palpitations, once every several days lasting for less than 1 minute  Usually resolves after she takes AM medications  No associated symptoms of dizziness, weakness, chest pain,  shortness of breath    Mild to moderate exertional  shortness of breath     No associated or exertional chest pain  Leg swelling much better after stopping Januvia   Mild pedal edema that overall better  Dependent edema worse on sitting up towards evening     No fever or chills  No significant expectoration    No hemoptysis  No presyncope or syncope     Easy fatiguability     Joint pain in small, medium and large joints  Chronic low back pain      On anticoagulation with Eliquis   BP well controlled at home.  At times BP runs low normal     Saw arthritis MD and blood work was prescribed  Lipids under good control      History of present illness:  Teresa Serna is a 65 y.o. yo female with history of Paroxysmal atrial fibrillation who presents today for   Chief Complaint   Patient presents with   • Coronary Artery Disease     3 mo f/u   •  Atrial Fibrillation   .    History  Past Medical History:   Diagnosis Date   • Abnormal stress test    • Atrial flutter (CMS/HCC)    • CAD (coronary artery disease)    • CAD in native artery     CABG x 3   • Diverticulosis    • Essential hypertension     Tricuspid valve insufficiency, unspecified etiology    • History of adenomatous polyp of colon    • Hyperlipemia, mixed    • Hyperlipidemia    • Hypertension    • IBS (irritable bowel syndrome)    • MI, old    • Mitral valve prolapse    • Near syncope    • Obesity, morbid (CMS/HCC)    • Palpitations    • Paroxysmal SVT (supraventricular tachycardia) (CMS/HCC)    • Pedal edema    • Precordial pain    • Rheumatoid arthritis (CMS/HCC)    • S/P CABG x 3    • Type 2 diabetes mellitus (CMS/[facility])    ,   Past Surgical History:   Procedure Laterality Date   • APPENDECTOMY     • CARDIAC CATHETERIZATION Left 06/18/2012    Intensify medical therapy   • CARDIAC CATHETERIZATION Left 05/05/2002    surgery   • CHOLECYSTECTOMY  1975   • COLONOSCOPY N/A 1/31/2018    Diverticulosis in the entire examined colon; One 4mm tubular adenomatous polyp in the transverse colon; Non-bleeding internal hemorrhoids; Repeat 5 years   • COLONOSCOPY  02/10/2012    Diverticulosis in the entire examined colon; Non-bleeding internal hemorrhoids; Repeat 7 years   • COLONOSCOPY  01/23/2009    Diverticulosis; Repeat 7 years   • CORONARY ARTERY BYPASS GRAFT  05/05/2002    LIMA to LAD, SVG to D1, SVG to OM1 - x3   • CORONARY STENT PLACEMENT  09/2009    X1 - Stent to LAD, Drug Eluting   • DILATATION AND CURETTAGE     • ENDOSCOPY N/A 1/31/2018    Esophageal mucosal changes suspicious for short-segment De Anda's esophagus-biopsied; Small HH; Two gastric polyps-Clip (MR conditional) was placed-biopsied; Normal examined duodenum   • ENDOSCOPY  03/23/2015    Probable short-segment De Anda's esophagus-biopsied; HH; Gastritis-biopsied; Duodenal diverticulum; Repeat 1 year   • REPLACEMENT TOTAL KNEE Right    ,    Family History   Problem Relation Age of Onset   • Cancer Father    • Coronary artery disease Father    • Colon cancer Neg Hx    • Colon polyps Neg Hx    ,   Social History     Tobacco Use   • Smoking status: Former Smoker     Years: 20.00     Types: Cigarettes     Quit date: 2000     Years since quittin.1   • Smokeless tobacco: Never Used   Substance Use Topics   • Alcohol use: No   • Drug use: No   ,     Medications  Current Outpatient Medications   Medication Sig Dispense Refill   • amiodarone (PACERONE) 100 MG tablet Take 1 tablet by mouth Daily. 90 tablet 4   • aspirin 81 MG EC tablet Take 81 mg by mouth Daily.     • Eliquis 5 MG tablet tablet TAKE 1 TABLET BY MOUTH EVERY 12 HOURS 180 tablet 4   • folic acid (FOLVITE) 1 MG tablet Take 1 mg by mouth Daily.     • furosemide (LASIX) 20 MG tablet Take 1 tablet by mouth Daily. 90 tablet 4   • glyburide (DIAbeta) 5 MG tablet 2 (two) times a day.     • isosorbide mononitrate (IMDUR) 30 MG 24 hr tablet Take 30 mg by mouth Daily.     • losartan (COZAAR) 25 MG tablet Take 25 mg by mouth Daily.     • meclizine (ANTIVERT) 12.5 MG tablet Take 1 tablet by mouth 3 (Three) Times a Day As Needed for dizziness. 90 tablet 3   • metFORMIN (GLUCOPHAGE) 1000 MG tablet Take 1,000 mg by mouth Daily With Breakfast.     • metoprolol tartrate (LOPRESSOR) 50 MG tablet Take 50 mg by mouth 2 (Two) Times a Day.     • nitroglycerin (NITROSTAT) 0.4 MG SL tablet Place 0.4 mg under the tongue Every 5 (Five) Minutes As Needed for Chest Pain. Take no more than 3 doses in 15 minutes.     • pantoprazole (PROTONIX) 40 MG EC tablet Take 1 tablet by mouth Every 12 (Twelve) Hours. 60 tablet 1   • potassium chloride (K-DUR) 10 MEQ CR tablet Take 1 tablet by mouth Daily. 90 tablet 3   • predniSONE (DELTASONE) 1 MG tablet Take 4 mg by mouth Every Night.     • predniSONE (DELTASONE) 5 MG tablet Take 5 mg by mouth Every Morning.     • simvastatin (ZOCOR) 40 MG tablet Take 40 mg by mouth Every  "Night.     • Tofacitinib Citrate (XELJANZ XR) 11 MG tablet sustained-release 24 hour Take 1 tablet by mouth Daily.     • verapamil SR (CALAN-SR) 240 MG CR tablet Take 1 tablet by mouth Daily. 90 tablet 4   • Empagliflozin (Jardiance) 25 MG tablet Jardiance 25 mg tablet   Take 1 tablet every day by oral route.       No current facility-administered medications for this visit.     Results for orders placed during the hospital encounter of 07/07/20    Adult Transthoracic Echo Complete W/ Cont if Necessary Per Protocol    Interpretation Summary  · Estimated EF = 60%.  · Left ventricular systolic function is normal.  · Mild tricuspid valve regurgitation is present.  · Left ventricular diastolic function is normal.  · Mild pulmonary hypertension is present.      Allergies:  Codeine, Exenatide, Sulfa antibiotics, Albuterol, Sitagliptin, and Amoxicillin-pot clavulanate    Review of Systems  Review of Systems   Constitutional: Positive for malaise/fatigue.   HENT: Negative.    Eyes: Negative.    Cardiovascular: Positive for dyspnea on exertion, leg swelling and palpitations. Negative for chest pain, claudication, cyanosis, irregular heartbeat, near-syncope, orthopnea, paroxysmal nocturnal dyspnea and syncope.   Respiratory: Negative.    Endocrine: Negative.    Hematologic/Lymphatic: Negative.    Skin: Negative.    Musculoskeletal: Positive for arthritis, back pain and joint pain.   Gastrointestinal: Negative for anorexia.   Genitourinary: Negative.    Neurological: Positive for dizziness and weakness.   Psychiatric/Behavioral: Negative.    Same review of system and physical exam as last visit        Objective     Physical Exam:  /80   Pulse 101   Ht 162.6 cm (64\")   Wt 88.5 kg (195 lb)   SpO2 98%   BMI 33.47 kg/m²   Physical Exam   Constitutional: She appears well-developed.   HENT:   Head: Normocephalic.   Neck: Normal carotid pulses and no JVD present. No tracheal tenderness present. Carotid bruit is not " present. No tracheal deviation present.   Cardiovascular: Regular rhythm and normal pulses.   Murmur heard.   Systolic murmur is present with a grade of 2/6.  Pulmonary/Chest: Effort normal. No stridor. She has no wheezes.   Abdominal: Soft. She exhibits no distension. There is no abdominal tenderness.   Musculoskeletal:      Right lower le+ Pitting Edema present.      Left lower le+ Pitting Edema present.     Vascular Status -  Her right foot exhibits abnormal foot edema. Her left foot exhibits abnormal foot edema.  Neurological: She is alert. No cranial nerve deficit or sensory deficit.   Skin: Skin is warm.   Psychiatric: Her speech is normal and behavior is normal.   Lower extremity: 1 plus pitting edema.     Results Review:    Results for orders placed during the hospital encounter of 20    Adult Transthoracic Echo Complete W/ Cont if Necessary Per Protocol    Interpretation Summary  · Estimated EF = 60%.  · Left ventricular systolic function is normal.  · Mild tricuspid valve regurgitation is present.  · Left ventricular diastolic function is normal.  · Mild pulmonary hypertension is present.          Procedures____________________________________________________________________________________________________________________________________________  Health maintenance and recommendations  Similar recommendations as last visit     Offered to give patient  a copy      Questions were encouraged, asked and answered to the patient's  understanding and satisfaction. Questions if any regarding current medications and side effects, need for refills and importance of compliance to medications stressed.    Reviewed available prior notes, consults, prior visits, laboratory findings, radiology and cardiology relevant reports. Updated chart as applicable. I have reviewed the patient's medical history in detail and updated the computerized patient record as relevant.      Updated patient regarding any new or  relevant abnormalities on review of records or any new findings on physical exam. Mentioned to patient about purpose of visit and desirable health short and long term goals and objectives.    Primary to monitor CBC CMP Lipid panel and TSH as applicable    ___________________________________________________________________________________________________________________________________________        Assessment/Plan   Patient Active Problem List   Diagnosis   • CAD (coronary artery disease)   • Atrial flutter (CMS/HCC)   • Type 2 diabetes mellitus without complication, without long-term current use of insulin (CMS/HCC)   • Mixed hyperlipidemia   • S/P CABG x 3 2000 Dr Larsen   • Shoulder blade pain   • Anemia   • Abnormal finding on imaging   • History of esophagitis   • Essential hypertension   • S/P coronary artery stent placement   • Gastric polyp   • Colon polyps   • PAF (paroxysmal atrial fibrillation) (CMS/HCC)           Plan      Patient expressed understanding  Encouraged and answered all questions   Discussed with the patient and all questioned fully answered. She will call me if any problems arise.   Discussed results of prior testing with patient: 14-day outpatient cardiac telemetry       Will leave on same dose of Amiodarone as paroxysmal atrial flutter and SVT overall well tolerated   Monitor CBC, CMP, TSH and Lipid Panel by primary  Eye exam, pulmonary function tests   Discussed Amiodarone induced toxicity and required monitoring and close follow up for this   Can increase Lopressor to 100 mg BID if heart rates are elevated     In past could not get ablation due to anatomy  Attempted by Dr De Anda   The current medical regimen is effective;  continue present plan and medications.     Check BP and heart rates twice daily at least 3x / week at home and bring a recording for me to review next visit  If BP >140/90 or < 100/60 persistently over 3 reading 30 mins apart call sooner      I support the patient's  decision to take the Covid -19 vaccine    Overall doing OK with  no new cardiovascular symptoms and therefore no additional cardiac testing is required   If any interim issues arise will call me for further evaluation.          Return in about 4 months (around 7/16/2021).

## 2021-03-18 ENCOUNTER — OFFICE VISIT (OUTPATIENT)
Dept: FAMILY MEDICINE CLINIC | Age: 66
End: 2021-03-18
Payer: MEDICARE

## 2021-03-18 VITALS
OXYGEN SATURATION: 97 % | TEMPERATURE: 97 F | DIASTOLIC BLOOD PRESSURE: 78 MMHG | HEART RATE: 105 BPM | WEIGHT: 198 LBS | BODY MASS INDEX: 33.99 KG/M2 | SYSTOLIC BLOOD PRESSURE: 132 MMHG

## 2021-03-18 DIAGNOSIS — E66.01 MORBID OBESITY WITH BMI OF 40.0-44.9, ADULT (HCC): ICD-10-CM

## 2021-03-18 DIAGNOSIS — L98.491 SKIN ULCER, LIMITED TO BREAKDOWN OF SKIN (HCC): ICD-10-CM

## 2021-03-18 PROCEDURE — G8427 DOCREV CUR MEDS BY ELIG CLIN: HCPCS | Performed by: FAMILY MEDICINE

## 2021-03-18 PROCEDURE — G8417 CALC BMI ABV UP PARAM F/U: HCPCS | Performed by: FAMILY MEDICINE

## 2021-03-18 PROCEDURE — 3017F COLORECTAL CA SCREEN DOC REV: CPT | Performed by: FAMILY MEDICINE

## 2021-03-18 PROCEDURE — 1036F TOBACCO NON-USER: CPT | Performed by: FAMILY MEDICINE

## 2021-03-18 PROCEDURE — G8484 FLU IMMUNIZE NO ADMIN: HCPCS | Performed by: FAMILY MEDICINE

## 2021-03-18 PROCEDURE — 99213 OFFICE O/P EST LOW 20 MIN: CPT | Performed by: FAMILY MEDICINE

## 2021-03-18 RX ORDER — DOXYCYCLINE 100 MG/1
100 CAPSULE ORAL 2 TIMES DAILY
Qty: 20 CAPSULE | Refills: 0 | Status: SHIPPED | OUTPATIENT
Start: 2021-03-18 | End: 2021-03-25 | Stop reason: SDUPTHER

## 2021-03-18 NOTE — PROGRESS NOTES
[facility] PHYSICIAN SERVICES  [facility]  [address] [address]  [place] [address]  Dept: [phone]  Dept Fax: [phone]  Loc: [phone]     Denisha Fagan is a 60 y.o. female who presents today for her medical conditions/complaintsas noted below. Denisha Fagan is c/o of Leg Pain (upper right leg x 3 wks)        HPI:   Patient is here for lesion on leg x 3 weeks. She thought was a cyst, went to Urgent Care. She could not tolerate clindamycin. She has been using abx cream as well. She has not noticed drainage. No fever. She reports pain when touched. She just had COVID vaccine.      HPI    Past Medical History:   Diagnosis Date    Atrial flutter by electrocardiogram ([address]     Constipation     Coronary artery disease     Diabetes mellitus ([address]     Essential hypertension     Hyperlipidemia     IBS (irritable bowel syndrome)     Iron deficiency anemia     Menopause     Paroxysmal SVT (supraventricular tachycardia) (HCC)     RA (rheumatoid arthritis) (HCC)     Rheumatoid arthritis ([address]      Past Surgical History:   Procedure Laterality Date    CARDIAC SURGERY      CABG (Dr. Sumit Sharma) [address]  2009    Stent     CHOLECYSTECTOMY      CORONARY ARTERY BYPASS GRAFT      DILATION AND CURETTAGE OF UTERUS N/A 2019    DILATATION AND CURETTAGE HYSTEROSCOPY W/ Illa Clement performed by [facility]ela [facility] at [facility] OR    TOTAL KNEE ARTHROPLASTY         Family History   Problem Relation Age of Onset    Heart Attack Father     Prostate Cancer Father     Stroke Father     Heart Attack Brother     Stroke Brother        Social History     Tobacco Use    Smoking status: Former Smoker     Packs/day: 0.50     Years: 20.00     Pack years: 10.00     Types: Cigarettes     Quit date: 2000     Years since quittin.4    Smokeless tobacco: Never Used   Substance Use Topics    Alcohol use: No      Current Outpatient Medications   Medication Sig Dispense Refill    doxycycline monohydrate (MONODOX) 100 MG capsule Take 1 capsule by mouth 2 times daily for 10 days 20 capsule 0    clindamycin (CLEOCIN) 300 MG capsule Take 1 capsule by mouth 2 times daily for 10 days 20 capsule 0    glyBURIDE (DIABETA) 5 MG tablet 1 tablet po twice daily 60 tablet 3    ketoconazole (NIZORAL) 2 % cream Apply topically daily. 30 g 1    FreeStyle Lancets MISC USE AS DIRECTED 100 each 3    potassium chloride (KLOR-CON M) 20 MEQ extended release tablet TAKE 1 TABLET BY MOUTH DAILY. 90 tablet 0    losartan (COZAAR) 25 MG tablet TAKE 1 TABLET BY MOUTH DAILY 90 tablet 0    pantoprazole (PROTONIX) 40 MG tablet TAKE 1 TABLET BY MOUTH 2 TIMES A  tablet 0    metFORMIN (GLUCOPHAGE) 500 MG tablet TAKE 1 TABLET BY MOUTH ONCE EVERY MORNING AND ONE TABLET EVERY EVENING 180 tablet 0    metoprolol tartrate (LOPRESSOR) 50 MG tablet TAKE 1 TABLET BY MOUTH 2 TIMES A  tablet 0    isosorbide mononitrate (IMDUR) 30 MG extended release tablet TAKE 1 TABLET BY MOUTH DAILY 90 tablet 0    blood glucose test strips (FREESTYLE LITE) strip 1 each by In Vitro route daily As needed. 100 each 3    blood glucose test strips (FREESTYLE LITE) strip Infuse 1 each intravenously 2 times daily As needed. 100 strip 1    simvastatin (ZOCOR) 40 MG tablet TAKE 1 TABLET BY MOUTH NIGHTLY 90 tablet 2    nystatin (MYCOSTATIN) 641874 UNIT/GM powder Apply topically 4 times daily. 30 g 5    Blood Glucose Monitoring Suppl (FREESTYLE INSULINX SYSTEM) w/Device KIT 1 each by Does not apply route daily 1 kit 0    blood glucose test strips (FREESTYLE INSULINX TEST) strip 1 each by In Vitro route daily As needed. 100 each 3    nitroGLYCERIN (NITROSTAT) 0.4 MG SL tablet PLACE 1 TABLET UNDER THE TONGUE EVERY 5 MINUTES AS NEEDED FOR CHEST PAIN 25 tablet 0    nystatin (MYCOSTATIN) 582403 UNIT/GM cream Apply topically 2 times daily.  30 g 2    Tofacitinib Citrate (XELJANZ XR) 11 MG TB24 Take 11 mg by mouth daily      furosemide (LASIX) 20 MG tablet Take 20 mg by mouth daily      FREESTYLE LANCETS MISC TEST SUGAR DAILY AS DIRECTED UP TO 4 TIMES A DAY 4 each 0    apixaban (ELIQUIS) 5 MG TABS tablet Take 5 mg by mouth 2 times daily       verapamil (CALAN SR) 240 MG extended release tablet Take 240 mg by mouth daily       amiodarone (PACERONE) 100 MG tablet Take 100 mg by mouth daily       FREESTYLE LANCETS MISC TEST SUGAR DAILY AS DIRECTED 100 each 2    predniSONE (DELTASONE) 5 MG tablet Take 5 mg by mouth 2 times daily Take 5 mg in the morning and 4 mg in the evening.  aspirin EC 81 MG EC tablet Take 81 mg by mouth daily      folic acid (FOLVITE) 1 MG tablet Take 1 mg by mouth daily        No current facility-administered medications for this visit.       Allergies   Allergen Reactions    Codeine Other (See Comments)     Chest pain    Albuterol      Rapid heart rate    Clindamycin/Lincomycin     Januvia [Sitagliptin] Swelling    Augmentin [Amoxicillin-Pot Clavulanate] Palpitations    Sulfa Antibiotics Rash       Health Maintenance   Topic Date Due    Hepatitis C screen  Never done    HIV screen  Never done    Shingles Vaccine (1 of 2) Never done    Diabetic foot exam  04/24/2019    Diabetic microalbuminuria test  11/18/2020    Flu vaccine (1) 11/17/2021 (Originally 9/1/2020)    Diabetic retinal exam  06/22/2021    A1C test (Diabetic or Prediabetic)  09/10/2021    Lipid screen  12/08/2021    TSH testing  12/08/2021    Potassium monitoring  12/08/2021    Creatinine monitoring  12/08/2021    Annual Wellness Visit (AWV)  02/23/2022    Breast cancer screen  12/16/2022    DTaP/Tdap/Td vaccine (2 - Td) 12/31/2022    Colon cancer screen colonoscopy  01/31/2023    Cervical cancer screen  09/08/2023    Pneumococcal 0-64 years Vaccine  Completed    COVID-19 Vaccine  Completed    Hepatitis A vaccine  Aged Out    Hib vaccine  Aged Out    Meningococcal (ACWY) vaccine  Aged Out       Subjective: Review of Systems   Constitutional: Negative for chills and fever. HENT: Negative for congestion. Respiratory: Negative for cough, chest tightness and shortness of breath. Cardiovascular: Negative for chest pain, palpitations and leg swelling. Gastrointestinal: Negative for abdominal pain, anal bleeding, constipation, diarrhea and nausea. Genitourinary: Negative for difficulty urinating. Skin: Positive for wound. Psychiatric/Behavioral: Negative. See HPI for visit specific review of symptoms. All others negative      Objective:   /78   Pulse 105   Temp 97 °F (36.1 °C)   Wt 198 lb (89.8 kg)   SpO2 97%   BMI 33.99 kg/m²    Physical Exam  Constitutional:       Appearance: She is well-developed. She is not ill-appearing. Eyes:      Pupils: Pupils are equal, round, and reactive to light. Neck:      Musculoskeletal: Normal range of motion and neck supple. Cardiovascular:      Rate and Rhythm: Normal rate and regular rhythm. Heart sounds: No murmur. No friction rub. Pulmonary:      Effort: Pulmonary effort is normal. No respiratory distress. Breath sounds: Normal breath sounds. No wheezing or rales. Abdominal:      General: Bowel sounds are normal. There is no distension. Palpations: Abdomen is soft. Tenderness: There is no abdominal tenderness. There is no guarding or rebound. Skin:     Findings: Lesion (infected cyst with hardness, no necrosis) present. Neurological:      Mental Status: She is alert.    Psychiatric:         Behavior: Behavior normal.           Lab Review   Recent Results (from the past 672 hour(s))   Culture, Anaerobic and Aerobic    Collection Time: 02/27/21 12:49 PM    Specimen: Leg   Result Value Ref Range    Gram Stain Result       Rare WBC's (Polymorphonuclear)  No organisms seen      Anaerobic Culture No anaerobes isolated  4 days     Organism Micrococcus species (A)     WOUND/ABSCESS Rare growth  No further workup

## 2021-03-23 ENCOUNTER — OFFICE VISIT (OUTPATIENT)
Dept: FAMILY MEDICINE CLINIC | Age: 66
End: 2021-03-23
Payer: MEDICARE

## 2021-03-23 VITALS
DIASTOLIC BLOOD PRESSURE: 62 MMHG | BODY MASS INDEX: 33.8 KG/M2 | OXYGEN SATURATION: 95 % | TEMPERATURE: 97 F | HEART RATE: 102 BPM | HEIGHT: 64 IN | WEIGHT: 198 LBS | SYSTOLIC BLOOD PRESSURE: 102 MMHG

## 2021-03-23 DIAGNOSIS — L98.491 SKIN ULCER, LIMITED TO BREAKDOWN OF SKIN (HCC): Primary | ICD-10-CM

## 2021-03-23 PROCEDURE — G8417 CALC BMI ABV UP PARAM F/U: HCPCS | Performed by: FAMILY MEDICINE

## 2021-03-23 PROCEDURE — 10061 I&D ABSCESS COMP/MULTIPLE: CPT | Performed by: FAMILY MEDICINE

## 2021-03-23 PROCEDURE — 1036F TOBACCO NON-USER: CPT | Performed by: FAMILY MEDICINE

## 2021-03-23 PROCEDURE — G8427 DOCREV CUR MEDS BY ELIG CLIN: HCPCS | Performed by: FAMILY MEDICINE

## 2021-03-23 PROCEDURE — 99213 OFFICE O/P EST LOW 20 MIN: CPT | Performed by: FAMILY MEDICINE

## 2021-03-23 PROCEDURE — G8484 FLU IMMUNIZE NO ADMIN: HCPCS | Performed by: FAMILY MEDICINE

## 2021-03-23 PROCEDURE — 3017F COLORECTAL CA SCREEN DOC REV: CPT | Performed by: FAMILY MEDICINE

## 2021-03-25 ENCOUNTER — OFFICE VISIT (OUTPATIENT)
Dept: FAMILY MEDICINE CLINIC | Age: 66
End: 2021-03-25

## 2021-03-25 VITALS
OXYGEN SATURATION: 96 % | TEMPERATURE: 97 F | DIASTOLIC BLOOD PRESSURE: 62 MMHG | SYSTOLIC BLOOD PRESSURE: 110 MMHG | HEART RATE: 106 BPM

## 2021-03-25 DIAGNOSIS — E11.9 TYPE 2 DIABETES MELLITUS WITHOUT COMPLICATION, WITHOUT LONG-TERM CURRENT USE OF INSULIN (HCC): ICD-10-CM

## 2021-03-25 DIAGNOSIS — L02.415 ABSCESS OF LEG, RIGHT: Primary | ICD-10-CM

## 2021-03-25 PROCEDURE — G8484 FLU IMMUNIZE NO ADMIN: HCPCS | Performed by: FAMILY MEDICINE

## 2021-03-25 PROCEDURE — 3046F HEMOGLOBIN A1C LEVEL >9.0%: CPT | Performed by: FAMILY MEDICINE

## 2021-03-25 PROCEDURE — G8427 DOCREV CUR MEDS BY ELIG CLIN: HCPCS | Performed by: FAMILY MEDICINE

## 2021-03-25 PROCEDURE — G8417 CALC BMI ABV UP PARAM F/U: HCPCS | Performed by: FAMILY MEDICINE

## 2021-03-25 PROCEDURE — 99024 POSTOP FOLLOW-UP VISIT: CPT | Performed by: FAMILY MEDICINE

## 2021-03-25 PROCEDURE — 1036F TOBACCO NON-USER: CPT | Performed by: FAMILY MEDICINE

## 2021-03-25 PROCEDURE — 2022F DILAT RTA XM EVC RTNOPTHY: CPT | Performed by: FAMILY MEDICINE

## 2021-03-25 PROCEDURE — 3017F COLORECTAL CA SCREEN DOC REV: CPT | Performed by: FAMILY MEDICINE

## 2021-03-25 RX ORDER — DOXYCYCLINE 100 MG/1
100 CAPSULE ORAL 2 TIMES DAILY
Qty: 8 CAPSULE | Refills: 0 | Status: SHIPPED | OUTPATIENT
Start: 2021-03-25 | End: 2021-03-29

## 2021-03-25 NOTE — PROGRESS NOTES
[facility] PHYSICIAN SERVICES  [facility] FAMILY MEDICINE  [address] [facility] [address]  [facility] [place] [address]  Dept: [phone]  Dept Fax: [phone]  Loc: [phone]     Emely Fagan is a 60 y.o. female who presents today for her medical conditions/complaintsas noted below. Emely Fagan is c/o of Follow Up After Procedure        HPI:   Patient is here for follow up on abscess. Blood sugar 100 this morning. She is doing well overall. She states wick fell out after 2 days. She states still hard and tender to touch. She states redness improved.        HPI    Past Medical History:   Diagnosis Date    Atrial flutter by electrocardiogram ([address]     Constipation     Coronary artery disease     Diabetes mellitus ([address]     Essential hypertension     Hyperlipidemia     IBS (irritable bowel syndrome)     Iron deficiency anemia     Menopause     Paroxysmal SVT (supraventricular tachycardia) (HCC)     RA (rheumatoid arthritis) (HCC)     Rheumatoid arthritis ([address]      Past Surgical History:   Procedure Laterality Date    CARDIAC SURGERY      CABG (Dr. Elva Coles) [address]  2009    Stent     CHOLECYSTECTOMY      CORONARY ARTERY BYPASS GRAFT      DILATION AND CURETTAGE OF UTERUS N/A 2019    DILATATION AND CURETTAGE HYSTEROSCOPY W/ Kimberly Lux performed by Paulo L[facility]pez MD at [facility] OR    TOTAL KNEE ARTHROPLASTY         Family History   Problem Relation Age of Onset    Heart Attack Father     Prostate Cancer Father     Stroke Father     Heart Attack Brother     Stroke Brother        Social History     Tobacco Use    Smoking status: Former Smoker     Packs/day: 0.50     Years: 20.00     Pack years: 10.00     Types: Cigarettes     Quit date: 2000     Years since quittin.5    Smokeless tobacco: Never Used   Substance Use Topics    Alcohol use: No      Current Outpatient Medications   Medication Sig Dispense Refill    ketoconazole (NIZORAL) 2 % cream Apply topically daily. 30 g 1    FreeStyle Lancets MISC USE AS DIRECTED 100 each 3    potassium chloride (KLOR-CON M) 20 MEQ extended release tablet TAKE 1 TABLET BY MOUTH DAILY. 90 tablet 0    blood glucose test strips (FREESTYLE LITE) strip 1 each by In Vitro route daily As needed. 100 each 3    blood glucose test strips (FREESTYLE LITE) strip Infuse 1 each intravenously 2 times daily As needed. 100 strip 1    nystatin (MYCOSTATIN) 076179 UNIT/GM powder Apply topically 4 times daily. 30 g 5    Blood Glucose Monitoring Suppl (FREESTYLE INSULINX SYSTEM) w/Device KIT 1 each by Does not apply route daily 1 kit 0    blood glucose test strips (FREESTYLE INSULINX TEST) strip 1 each by In Vitro route daily As needed. 100 each 3    nitroGLYCERIN (NITROSTAT) 0.4 MG SL tablet PLACE 1 TABLET UNDER THE TONGUE EVERY 5 MINUTES AS NEEDED FOR CHEST PAIN 25 tablet 0    nystatin (MYCOSTATIN) 177888 UNIT/GM cream Apply topically 2 times daily. 30 g 2    Tofacitinib Citrate (XELJANZ XR) 11 MG TB24 Take 11 mg by mouth daily      furosemide (LASIX) 20 MG tablet Take 20 mg by mouth daily      FREESTYLE LANCETS MISC TEST SUGAR DAILY AS DIRECTED UP TO 4 TIMES A DAY 4 each 0    apixaban (ELIQUIS) 5 MG TABS tablet Take 5 mg by mouth 2 times daily       verapamil (CALAN SR) 240 MG extended release tablet Take 240 mg by mouth daily       amiodarone (PACERONE) 100 MG tablet Take 100 mg by mouth daily       FREESTYLE LANCETS MISC TEST SUGAR DAILY AS DIRECTED 100 each 2    predniSONE (DELTASONE) 5 MG tablet Take 5 mg by mouth 2 times daily Take 5 mg in the morning and 4 mg in the evening.       aspirin EC 81 MG EC tablet Take 81 mg by mouth daily      folic acid (FOLVITE) 1 MG tablet Take 1 mg by mouth daily       pantoprazole (PROTONIX) 40 MG tablet TAKE 1 TABLET BY MOUTH 2 TIMES A  tablet 0    simvastatin (ZOCOR) 40 MG tablet TAKE 1 TABLET BY MOUTH NIGHTLY 90 tablet 1    losartan (COZAAR) 25 MG tablet TAKE 1 TABLET BY MOUTH DAILY 90 tablet 0    metFORMIN (GLUCOPHAGE) 500 MG tablet TAKE 1 TABLET BY MOUTH ONCE EVERY MORNING AND ONE TABLET EVERY EVENING 180 tablet 1    isosorbide mononitrate (IMDUR) 30 MG extended release tablet TAKE 1 TABLET BY MOUTH DAILY 90 tablet 1    metoprolol tartrate (LOPRESSOR) 50 MG tablet TAKE 1 TABLET BY MOUTH 2 TIMES A  tablet 1    glyBURIDE (DIABETA) 5 MG tablet 1 tablet po twice daily 60 tablet 3     No current facility-administered medications for this visit. Allergies   Allergen Reactions    Codeine Other (See Comments)     Chest pain    Albuterol      Rapid heart rate    Clindamycin/Lincomycin     Januvia [Sitagliptin] Swelling    Augmentin [Amoxicillin-Pot Clavulanate] Palpitations    Sulfa Antibiotics Rash       Health Maintenance   Topic Date Due    Hepatitis C screen  Never done    HIV screen  Never done    Shingles Vaccine (1 of 2) Never done    Diabetic foot exam  04/24/2019    Diabetic microalbuminuria test  11/18/2020    Flu vaccine (Season Ended) 11/17/2021 (Originally 9/1/2021)    Diabetic retinal exam  06/22/2021    A1C test (Diabetic or Prediabetic)  09/10/2021    Lipid screen  12/08/2021    TSH testing  12/08/2021    Potassium monitoring  12/08/2021    Creatinine monitoring  12/08/2021    Annual Wellness Visit (AWV)  02/23/2022    Breast cancer screen  12/16/2022    DTaP/Tdap/Td vaccine (2 - Td) 12/31/2022    Colon cancer screen colonoscopy  01/31/2023    Cervical cancer screen  09/08/2023    Pneumococcal 0-64 years Vaccine  Completed    COVID-19 Vaccine  Completed    Hepatitis A vaccine  Aged Out    Hib vaccine  Aged Out    Meningococcal (ACWY) vaccine  Aged Out       Subjective:     Review of Systems   Constitutional: Negative for chills and fever. HENT: Negative for congestion. Respiratory: Negative for cough, chest tightness and shortness of breath. Cardiovascular: Negative for chest pain, palpitations and leg swelling. Gastrointestinal: Negative for abdominal pain, anal bleeding, constipation, diarrhea and nausea. Genitourinary: Negative for difficulty urinating. Skin: Positive for wound. Psychiatric/Behavioral: Negative. See HPI for visit specific review of symptoms. All others negative      Objective:   /62   Pulse 106   Temp 97 °F (36.1 °C)   SpO2 96%    Physical Exam  Constitutional:       Appearance: She is well-developed. She is not ill-appearing. Eyes:      Pupils: Pupils are equal, round, and reactive to light. Neck:      Musculoskeletal: Normal range of motion and neck supple. Cardiovascular:      Rate and Rhythm: Normal rate and regular rhythm. Heart sounds: No murmur. No friction rub. Pulmonary:      Effort: Pulmonary effort is normal. No respiratory distress. Breath sounds: Normal breath sounds. No wheezing or rales. Abdominal:      General: Bowel sounds are normal. There is no distension. Palpations: Abdomen is soft. Tenderness: There is no abdominal tenderness. There is no guarding or rebound. Skin:     Findings: Lesion (mild improvement, still indurated, erythema improved) present. Neurological:      Mental Status: She is alert. Psychiatric:         Behavior: Behavior normal.           Lab Review   No results found for this or any previous visit (from the past 672 hour(s)). Assessment & Plan: The following diagnoses and conditions are stable withno further orders unless indicated:  Eva Norton was seen today for follow up after procedure. Diagnoses and all orders for this visit:    Abscess of leg, right  -     External Referral To General Surgery  Persistent abscess related to cutaneous cyst, will need removal of cyst. Refer to surgery. Mild improvement with I&D. Complete 4 more days of abx. Type 2 diabetes mellitus without complication, without long-term current use of insulin (HCC)  Stable, continue same.    Other orders  - doxycycline monohydrate (MONODOX) 100 MG capsule; Take 1 capsule by mouth 2 times daily for 4 days            Return for as scheduled. Discussed use, benefit, and side effects of prescribed medications. All patient questions answered. Pt voiced understanding. Reviewed health maintenance. Instructed to continue current medications, diet and exercise. Patient agreedwith treatment plan. Follow up as directed.

## 2021-03-28 ASSESSMENT — ENCOUNTER SYMPTOMS
ANAL BLEEDING: 0
CHEST TIGHTNESS: 0
SHORTNESS OF BREATH: 0
COUGH: 0
ABDOMINAL PAIN: 0
CONSTIPATION: 0
NAUSEA: 0
DIARRHEA: 0

## 2021-03-31 RX ORDER — LOSARTAN POTASSIUM 25 MG/1
25 TABLET ORAL DAILY
Qty: 90 TABLET | Refills: 0 | Status: SHIPPED | OUTPATIENT
Start: 2021-03-31 | End: 2021-06-17

## 2021-03-31 RX ORDER — PANTOPRAZOLE SODIUM 40 MG/1
TABLET, DELAYED RELEASE ORAL
Qty: 180 TABLET | Refills: 0 | Status: SHIPPED | OUTPATIENT
Start: 2021-03-31 | End: 2021-06-17

## 2021-04-02 RX ORDER — VERAPAMIL HYDROCHLORIDE 240 MG/1
240 TABLET, FILM COATED, EXTENDED RELEASE ORAL DAILY
Qty: 90 TABLET | Refills: 4 | Status: SHIPPED | OUTPATIENT
Start: 2021-04-02 | End: 2022-01-01

## 2021-04-03 ASSESSMENT — ENCOUNTER SYMPTOMS
COUGH: 0
CHEST TIGHTNESS: 0
DIARRHEA: 0
ABDOMINAL PAIN: 0
NAUSEA: 0
ANAL BLEEDING: 0
SHORTNESS OF BREATH: 0
CONSTIPATION: 0

## 2021-04-04 NOTE — PROGRESS NOTES
[facility] PHYSICIAN SERVICES  [facility] FAMILY MEDICINE  [address] [facility] [address]  [facility] [place] [address]  Dept: [phone]  Dept Fax: [phone]  Loc: [phone]     Bennett Fagan is a 60 y.o. female who presents today for her medical conditions/complaintsas noted below. Bennett Fagan is c/o of Wound Check (f/u Skin ulcer)        HPI:   Patient is here for follow upon skin abscess. She states lesion mildly improved, no fever. She still has tenderness, redness, no discharge. She is taking Abx without difficulty. She still has pain when sitting on abscess.      HPI    Past Medical History:   Diagnosis Date    Atrial flutter by electrocardiogram ([address]     Constipation     Coronary artery disease     Diabetes mellitus ([address]     Essential hypertension     Hyperlipidemia     IBS (irritable bowel syndrome)     Iron deficiency anemia     Menopause     Paroxysmal SVT (supraventricular tachycardia) (HCC)     RA (rheumatoid arthritis) (HCC)     Rheumatoid arthritis ([address]      Past Surgical History:   Procedure Laterality Date    CARDIAC SURGERY      CABG (Dr. Viviana Sellers) [address] [facility]  2009    Stent     CHOLECYSTECTOMY      CORONARY ARTERY BYPASS GRAFT      DILATION AND CURETTAGE OF UTERUS N/A 2019    DILATATION AND CURETTAGE HYSTEROSCOPY W/ Nguyen Steen performed by Olimpia Farrar MD at [facility] OR    TOTAL KNEE ARTHROPLASTY         Family History   Problem Relation Age of Onset    Heart Attack Father     Prostate Cancer Father     Stroke Father     Heart Attack Brother     Stroke Brother        Social History     Tobacco Use    Smoking status: Former Smoker     Packs/day: 0.50     Years: 20.00     Pack years: 10.00     Types: Cigarettes     Quit date: 2000     Years since quittin.5    Smokeless tobacco: Never Used   Substance Use Topics    Alcohol use: No      Current Outpatient Medications   Medication Sig Dispense Refill    ketoconazole (NIZORAL) 2 % cream Apply topically daily. 30 g 1    FreeStyle Lancets MISC USE AS DIRECTED 100 each 3    potassium chloride (KLOR-CON M) 20 MEQ extended release tablet TAKE 1 TABLET BY MOUTH DAILY. 90 tablet 0    blood glucose test strips (FREESTYLE LITE) strip 1 each by In Vitro route daily As needed. 100 each 3    blood glucose test strips (FREESTYLE LITE) strip Infuse 1 each intravenously 2 times daily As needed. 100 strip 1    nystatin (MYCOSTATIN) 533077 UNIT/GM powder Apply topically 4 times daily. 30 g 5    Blood Glucose Monitoring Suppl (FREESTYLE INSULINX SYSTEM) w/Device KIT 1 each by Does not apply route daily 1 kit 0    blood glucose test strips (FREESTYLE INSULINX TEST) strip 1 each by In Vitro route daily As needed. 100 each 3    nitroGLYCERIN (NITROSTAT) 0.4 MG SL tablet PLACE 1 TABLET UNDER THE TONGUE EVERY 5 MINUTES AS NEEDED FOR CHEST PAIN 25 tablet 0    nystatin (MYCOSTATIN) 424210 UNIT/GM cream Apply topically 2 times daily. 30 g 2    Tofacitinib Citrate (XELJANZ XR) 11 MG TB24 Take 11 mg by mouth daily      furosemide (LASIX) 20 MG tablet Take 20 mg by mouth daily      FREESTYLE LANCETS MISC TEST SUGAR DAILY AS DIRECTED UP TO 4 TIMES A DAY 4 each 0    apixaban (ELIQUIS) 5 MG TABS tablet Take 5 mg by mouth 2 times daily       verapamil (CALAN SR) 240 MG extended release tablet Take 240 mg by mouth daily       amiodarone (PACERONE) 100 MG tablet Take 100 mg by mouth daily       FREESTYLE LANCETS MISC TEST SUGAR DAILY AS DIRECTED 100 each 2    predniSONE (DELTASONE) 5 MG tablet Take 5 mg by mouth 2 times daily Take 5 mg in the morning and 4 mg in the evening.       aspirin EC 81 MG EC tablet Take 81 mg by mouth daily      folic acid (FOLVITE) 1 MG tablet Take 1 mg by mouth daily       pantoprazole (PROTONIX) 40 MG tablet TAKE 1 TABLET BY MOUTH 2 TIMES A  tablet 0    simvastatin (ZOCOR) 40 MG tablet TAKE 1 TABLET BY MOUTH NIGHTLY 90 tablet 1    losartan (COZAAR) 25 MG tablet TAKE 1 TABLET BY MOUTH DAILY 90 tablet 0    metFORMIN (GLUCOPHAGE) 500 MG tablet TAKE 1 TABLET BY MOUTH ONCE EVERY MORNING AND ONE TABLET EVERY EVENING 180 tablet 1    isosorbide mononitrate (IMDUR) 30 MG extended release tablet TAKE 1 TABLET BY MOUTH DAILY 90 tablet 1    metoprolol tartrate (LOPRESSOR) 50 MG tablet TAKE 1 TABLET BY MOUTH 2 TIMES A  tablet 1    glyBURIDE (DIABETA) 5 MG tablet 1 tablet po twice daily 60 tablet 3     No current facility-administered medications for this visit. Allergies   Allergen Reactions    Codeine Other (See Comments)     Chest pain    Albuterol      Rapid heart rate    Clindamycin/Lincomycin     Januvia [Sitagliptin] Swelling    Augmentin [Amoxicillin-Pot Clavulanate] Palpitations    Sulfa Antibiotics Rash       Health Maintenance   Topic Date Due    Hepatitis C screen  Never done    HIV screen  Never done    Shingles Vaccine (1 of 2) Never done    Diabetic foot exam  04/24/2019    Diabetic microalbuminuria test  11/18/2020    Flu vaccine (Season Ended) 11/17/2021 (Originally 9/1/2021)    Diabetic retinal exam  06/22/2021    A1C test (Diabetic or Prediabetic)  09/10/2021    Lipid screen  12/08/2021    TSH testing  12/08/2021    Potassium monitoring  12/08/2021    Creatinine monitoring  12/08/2021    Annual Wellness Visit (AWV)  02/23/2022    Breast cancer screen  12/16/2022    DTaP/Tdap/Td vaccine (2 - Td) 12/31/2022    Colon cancer screen colonoscopy  01/31/2023    Cervical cancer screen  09/08/2023    Pneumococcal 0-64 years Vaccine  Completed    COVID-19 Vaccine  Completed    Hepatitis A vaccine  Aged Out    Hib vaccine  Aged Out    Meningococcal (ACWY) vaccine  Aged Out       Subjective:     Review of Systems   Constitutional: Negative for chills and fever. HENT: Negative for congestion. Respiratory: Negative for cough, chest tightness and shortness of breath. Cardiovascular: Negative for chest pain, palpitations and leg swelling.

## 2021-04-05 ASSESSMENT — ENCOUNTER SYMPTOMS
COUGH: 0
CONSTIPATION: 0
CHEST TIGHTNESS: 0
ANAL BLEEDING: 0
NAUSEA: 0
DIARRHEA: 0
SHORTNESS OF BREATH: 0
ABDOMINAL PAIN: 0

## 2021-05-12 PROCEDURE — 88341 IMHCHEM/IMCYTCHM EA ADD ANTB: CPT | Performed by: SPECIALIST

## 2021-05-12 PROCEDURE — 88342 IMHCHEM/IMCYTCHM 1ST ANTB: CPT | Performed by: SPECIALIST

## 2021-05-12 PROCEDURE — 88360 TUMOR IMMUNOHISTOCHEM/MANUAL: CPT

## 2021-05-12 PROCEDURE — 88305 TISSUE EXAM BY PATHOLOGIST: CPT | Performed by: SPECIALIST

## 2021-05-12 PROCEDURE — 88323 CONSLTJ&REPRT MATRL PREP SLD: CPT

## 2021-05-13 ENCOUNTER — LAB REQUISITION (OUTPATIENT)
Dept: LAB | Facility: HOSPITAL | Age: 66
End: 2021-05-13

## 2021-05-13 DIAGNOSIS — Z00.00 ENCOUNTER FOR GENERAL ADULT MEDICAL EXAMINATION WITHOUT ABNORMAL FINDINGS: ICD-10-CM

## 2021-05-25 LAB
CYTO UR: NORMAL
LAB AP CASE REPORT: NORMAL
LAB AP CLINICAL INFORMATION: NORMAL
PATH REPORT.FINAL DX SPEC: NORMAL
PATH REPORT.GROSS SPEC: NORMAL

## 2021-06-04 ENCOUNTER — OFFICE VISIT (OUTPATIENT)
Dept: FAMILY MEDICINE CLINIC | Age: 66
End: 2021-06-04
Payer: MEDICARE

## 2021-06-04 VITALS
HEART RATE: 108 BPM | OXYGEN SATURATION: 96 % | TEMPERATURE: 97.7 F | RESPIRATION RATE: 18 BRPM | SYSTOLIC BLOOD PRESSURE: 110 MMHG | DIASTOLIC BLOOD PRESSURE: 70 MMHG | WEIGHT: 197 LBS | BODY MASS INDEX: 33.81 KG/M2

## 2021-06-04 DIAGNOSIS — J02.9 ACUTE PHARYNGITIS, UNSPECIFIED ETIOLOGY: ICD-10-CM

## 2021-06-04 DIAGNOSIS — J01.40 ACUTE NON-RECURRENT PANSINUSITIS: Primary | ICD-10-CM

## 2021-06-04 PROCEDURE — 3017F COLORECTAL CA SCREEN DOC REV: CPT | Performed by: NURSE PRACTITIONER

## 2021-06-04 PROCEDURE — G8427 DOCREV CUR MEDS BY ELIG CLIN: HCPCS | Performed by: NURSE PRACTITIONER

## 2021-06-04 PROCEDURE — 99213 OFFICE O/P EST LOW 20 MIN: CPT | Performed by: NURSE PRACTITIONER

## 2021-06-04 PROCEDURE — 1036F TOBACCO NON-USER: CPT | Performed by: NURSE PRACTITIONER

## 2021-06-04 PROCEDURE — G8417 CALC BMI ABV UP PARAM F/U: HCPCS | Performed by: NURSE PRACTITIONER

## 2021-06-04 RX ORDER — BENZONATATE 200 MG/1
200 CAPSULE ORAL 3 TIMES DAILY PRN
Qty: 30 CAPSULE | Refills: 0 | Status: SHIPPED | OUTPATIENT
Start: 2021-06-04 | End: 2021-06-11

## 2021-06-04 RX ORDER — DOXYCYCLINE HYCLATE 100 MG
100 TABLET ORAL 2 TIMES DAILY
Qty: 20 TABLET | Refills: 0 | Status: SHIPPED | OUTPATIENT
Start: 2021-06-04 | End: 2021-01-01 | Stop reason: SDUPTHER

## 2021-06-04 ASSESSMENT — ENCOUNTER SYMPTOMS
NAUSEA: 0
DIARRHEA: 0
ABDOMINAL PAIN: 0
WHEEZING: 0
SORE THROAT: 1
SHORTNESS OF BREATH: 0
CHEST TIGHTNESS: 0
COUGH: 1
SINUS PRESSURE: 1

## 2021-06-04 NOTE — PROGRESS NOTES
Ms.Teresa CHERYL Fagan is a 60 y.o. female who presents today for  Chief Complaint   Patient presents with    Congestion    Pharyngitis    Headache       HPI:  She has had nasal congestion, sinus pressure, sore throat for 3 to 4 days. No fever or chills. She has had some postnasal drainage with mild cough secondary to drainage. She has had her Covid vaccines. She has not tried any OTC cold medications. Review of Systems   Constitutional: Negative for chills and fever. HENT: Positive for congestion, postnasal drip, sinus pressure and sore throat. Negative for ear pain. Respiratory: Positive for cough. Negative for chest tightness, shortness of breath and wheezing. Cardiovascular: Negative for chest pain. Gastrointestinal: Negative for abdominal pain, diarrhea and nausea. Musculoskeletal: Negative for arthralgias and myalgias. Skin: Negative for rash.        Past Medical History:   Diagnosis Date    Atrial flutter by electrocardiogram ([facility]     Constipation     Coronary artery disease     Diabetes mellitus (HCC)     Essential hypertension     Hyperlipidemia     IBS (irritable bowel syndrome)     Iron deficiency anemia     Menopause     Paroxysmal SVT (supraventricular tachycardia) (HCC)     RA (rheumatoid arthritis) (HCC)     Rheumatoid arthritis (HCC)        Current Outpatient Medications   Medication Sig Dispense Refill    doxycycline hyclate (VIBRA-TABS) 100 MG tablet Take 1 tablet by mouth 2 times daily for 10 days 20 tablet 0    benzonatate (TESSALON) 200 MG capsule Take 1 capsule by mouth 3 times daily as needed for Cough 30 capsule 0    pantoprazole (PROTONIX) 40 MG tablet TAKE 1 TABLET BY MOUTH 2 TIMES A  tablet 0    simvastatin (ZOCOR) 40 MG tablet TAKE 1 TABLET BY MOUTH NIGHTLY 90 tablet 1    losartan (COZAAR) 25 MG tablet TAKE 1 TABLET BY MOUTH DAILY 90 tablet 0    metFORMIN (GLUCOPHAGE) 500 MG tablet TAKE 1 TABLET BY MOUTH ONCE EVERY MORNING AND ONE TABLET EVERY EVENING 180 tablet 1    isosorbide mononitrate (IMDUR) 30 MG extended release tablet TAKE 1 TABLET BY MOUTH DAILY 90 tablet 1    metoprolol tartrate (LOPRESSOR) 50 MG tablet TAKE 1 TABLET BY MOUTH 2 TIMES A  tablet 1    ketoconazole (NIZORAL) 2 % cream Apply topically daily. 30 g 1    FreeStyle Lancets MISC USE AS DIRECTED 100 each 3    potassium chloride (KLOR-CON M) 20 MEQ extended release tablet TAKE 1 TABLET BY MOUTH DAILY. 90 tablet 0    blood glucose test strips (FREESTYLE LITE) strip 1 each by In Vitro route daily As needed. 100 each 3    blood glucose test strips (FREESTYLE LITE) strip Infuse 1 each intravenously 2 times daily As needed. 100 strip 1    nystatin (MYCOSTATIN) 113566 UNIT/GM powder Apply topically 4 times daily. 30 g 5    Blood Glucose Monitoring Suppl (FREESTYLE INSULINX SYSTEM) w/Device KIT 1 each by Does not apply route daily 1 kit 0    blood glucose test strips (FREESTYLE INSULINX TEST) strip 1 each by In Vitro route daily As needed. 100 each 3    nitroGLYCERIN (NITROSTAT) 0.4 MG SL tablet PLACE 1 TABLET UNDER THE TONGUE EVERY 5 MINUTES AS NEEDED FOR CHEST PAIN 25 tablet 0    nystatin (MYCOSTATIN) 382874 UNIT/GM cream Apply topically 2 times daily. 30 g 2    Tofacitinib Citrate (XELJANZ XR) 11 MG TB24 Take 11 mg by mouth daily      furosemide (LASIX) 20 MG tablet Take 20 mg by mouth daily      FREESTYLE LANCETS MISC TEST SUGAR DAILY AS DIRECTED UP TO 4 TIMES A DAY 4 each 0    apixaban (ELIQUIS) 5 MG TABS tablet Take 5 mg by mouth 2 times daily       verapamil (CALAN SR) 240 MG extended release tablet Take 240 mg by mouth daily       amiodarone (PACERONE) 100 MG tablet Take 100 mg by mouth daily       FREESTYLE LANCETS MISC TEST SUGAR DAILY AS DIRECTED 100 each 2    predniSONE (DELTASONE) 5 MG tablet Take 5 mg by mouth 2 times daily Take 5 mg in the morning and 4 mg in the evening.       aspirin EC 81 MG EC tablet Take 81 mg by mouth daily      folic acid (FOLVITE) 1 MG tablet Take 1 mg by mouth daily       glyBURIDE (DIABETA) 5 MG tablet 1 tablet po twice daily 60 tablet 3     No current facility-administered medications for this visit. Allergies   Allergen Reactions    Codeine Other (See Comments)     Chest pain    Albuterol      Rapid heart rate    Clindamycin/Lincomycin     Januvia [Sitagliptin] Swelling    Augmentin [Amoxicillin-Pot Clavulanate] Palpitations    Sulfa Antibiotics Rash       Past Surgical History:   Procedure Laterality Date    CARDIAC SURGERY      CABG (Dr. Denise Flores) [address] [facility]  2009    Stent     CHOLECYSTECTOMY      CORONARY ARTERY BYPASS GRAFT      DILATION AND CURETTAGE OF UTERUS N/A 2019    DILATATION AND CURETTAGE HYSTEROSCOPY W/ [id number] [address] performed by Nicole Smith MD at [address] [place] [address] TOTAL KNEE ARTHROPLASTY         Social History     Tobacco Use    Smoking status: Former Smoker     Packs/day: 0.50     Years: 20.00     Pack years: 10.00     Types: Cigarettes     Quit date: 2000     Years since quittin.7    Smokeless tobacco: Never Used   Vaping Use    Vaping Use: Never used   Substance Use Topics    Alcohol use: No    Drug use: No       Family History   Problem Relation Age of Onset    Heart Attack Father     Prostate Cancer Father     Stroke Father     Heart Attack Brother     Stroke Brother        /70   Pulse 108   Temp 97.7 °F (36.5 °C) (Temporal)   Resp 18   Wt 197 lb (89.4 kg)   SpO2 96%   BMI 33.81 kg/m²     Physical Exam  Vitals reviewed. Constitutional:       General: She is not in acute distress. Appearance: Normal appearance. She is well-developed. HENT:      Head: Normocephalic. Right Ear: External ear normal.      Left Ear: External ear normal.      Ears:      Comments: TMs dull bilaterally, no erythema     Nose: Nose normal.      Mouth/Throat:      Mouth: Mucous membranes are moist.      Pharynx: Posterior oropharyngeal erythema present. No oropharyngeal exudate. Comments: Mild postpharyngeal erythema, no exudate  Eyes:      Conjunctiva/sclera: Conjunctivae normal.      Pupils: Pupils are equal, round, and reactive to light. Neck:      Thyroid: No thyromegaly. Vascular: No carotid bruit or JVD. Trachea: No tracheal deviation. Cardiovascular:      Rate and Rhythm: Normal rate and regular rhythm. Heart sounds: Normal heart sounds. No murmur heard. Pulmonary:      Effort: Pulmonary effort is normal. No respiratory distress. Breath sounds: Normal breath sounds. No wheezing or rhonchi. Musculoskeletal:         General: Normal range of motion. Cervical back: Normal range of motion and neck supple. Lymphadenopathy:      Cervical: No cervical adenopathy. Skin:     General: Skin is warm and dry. Findings: No rash. Neurological:      Mental Status: She is alert. Psychiatric:         Mood and Affect: Mood normal.         Behavior: Behavior normal.         Thought Content: Thought content normal.         ASSESSMENT/PLAN:  1. Acute non-recurrent pansinusitis  -Doxycycline 100 mg twice daily x10 days. She has tolerated well in the past, has allergies to other antibiotics. -Advised OTC Zyrtec or Claritin as needed  -Benzonatate 200 mg 3 times daily as needed for cough    2. Acute pharyngitis, unspecified etiology  -Plan as above, also may try warm salt water gargles as needed. Tylenol as needed. Return for as scheduled. Daysi Abad was seen today for congestion, pharyngitis and headache. Diagnoses and all orders for this visit:    Acute non-recurrent pansinusitis    Acute pharyngitis, unspecified etiology    Other orders  -     doxycycline hyclate (VIBRA-TABS) 100 MG tablet; Take 1 tablet by mouth 2 times daily for 10 days  -     benzonatate (TESSALON) 200 MG capsule; Take 1 capsule by mouth 3 times daily as needed for Cough      There are no discontinued medications.   There are no Patient

## 2021-06-08 DIAGNOSIS — C85.80 LARGE CELL LYMPHOMA (HCC): Primary | ICD-10-CM

## 2021-06-10 ENCOUNTER — OFFICE VISIT (OUTPATIENT)
Dept: HEMATOLOGY | Age: 66
End: 2021-06-10
Payer: MEDICARE

## 2021-06-10 ENCOUNTER — HOSPITAL ENCOUNTER (OUTPATIENT)
Dept: INFUSION THERAPY | Age: 66
Discharge: HOME | End: 2021-06-10
Payer: MEDICARE

## 2021-06-10 VITALS
BODY MASS INDEX: 32.91 KG/M2 | HEART RATE: 100 BPM | WEIGHT: 192.8 LBS | HEIGHT: 64 IN | SYSTOLIC BLOOD PRESSURE: 134 MMHG | OXYGEN SATURATION: 98 % | DIASTOLIC BLOOD PRESSURE: 86 MMHG

## 2021-06-10 DIAGNOSIS — Z71.89 CARE PLAN DISCUSSED WITH PATIENT: ICD-10-CM

## 2021-06-10 DIAGNOSIS — C85.80 LARGE CELL LYMPHOMA (HCC): Primary | ICD-10-CM

## 2021-06-10 DIAGNOSIS — C85.80 LARGE CELL LYMPHOMA (HCC): ICD-10-CM

## 2021-06-10 LAB
BASOPHILS ABSOLUTE: 0.02 K/UL (ref 0.01–0.08)
BASOPHILS RELATIVE PERCENT: 0.2 % (ref 0.1–1.2)
EOSINOPHILS ABSOLUTE: 0.06 K/UL (ref 0.04–0.54)
EOSINOPHILS RELATIVE PERCENT: 0.6 % (ref 0.7–7)
HCT VFR BLD CALC: 46 % (ref 34.1–44.9)
HEMOGLOBIN: 14 G/DL (ref 11.2–15.7)
LYMPHOCYTES ABSOLUTE: 1.31 K/UL (ref 1.18–3.74)
LYMPHOCYTES RELATIVE PERCENT: 14.2 % (ref 19.3–53.1)
MCH RBC QN AUTO: 29.5 PG (ref 25.6–32.2)
MCHC RBC AUTO-ENTMCNC: 30.4 G/DL (ref 32.3–35.5)
MCV RBC AUTO: 97 FL (ref 79.4–94.8)
MONOCYTES ABSOLUTE: 0.84 K/UL (ref 0.24–0.82)
MONOCYTES RELATIVE PERCENT: 9.1 % (ref 4.7–12.5)
NEUTROPHILS ABSOLUTE: 7.01 K/UL (ref 1.56–6.13)
NEUTROPHILS RELATIVE PERCENT: 75.9 % (ref 34–71.1)
PDW BLD-RTO: 16.9 % (ref 11.7–14.4)
PLATELET # BLD: 164 K/UL (ref 182–369)
PMV BLD AUTO: 10.4 FL (ref 7.4–10.4)
RBC # BLD: 4.74 M/UL (ref 3.93–5.22)
WBC # BLD: 9.24 K/UL (ref 3.98–10.04)

## 2021-06-10 PROCEDURE — G8427 DOCREV CUR MEDS BY ELIG CLIN: HCPCS | Performed by: INTERNAL MEDICINE

## 2021-06-10 PROCEDURE — 3017F COLORECTAL CA SCREEN DOC REV: CPT | Performed by: INTERNAL MEDICINE

## 2021-06-10 PROCEDURE — 36415 COLL VENOUS BLD VENIPUNCTURE: CPT

## 2021-06-10 PROCEDURE — 99204 OFFICE O/P NEW MOD 45 MIN: CPT | Performed by: INTERNAL MEDICINE

## 2021-06-10 PROCEDURE — 99212 OFFICE O/P EST SF 10 MIN: CPT

## 2021-06-10 PROCEDURE — 85025 COMPLETE CBC W/AUTO DIFF WBC: CPT

## 2021-06-10 PROCEDURE — G8417 CALC BMI ABV UP PARAM F/U: HCPCS | Performed by: INTERNAL MEDICINE

## 2021-06-10 PROCEDURE — 1036F TOBACCO NON-USER: CPT | Performed by: INTERNAL MEDICINE

## 2021-06-10 NOTE — PROGRESS NOTES
BYPASS GRAFT  2000    DILATION AND CURETTAGE OF UTERUS N/A 8/16/2019    DILATATION AND CURETTAGE HYSTEROSCOPY W/ Aicha Trung performed by Ilya Galvez MD at [address] [facility]         Social History:    Marital status: , 1 child  Smoking status: Former smoker, 0.5ppd for 26 years. Quit in 2000  ETOH status: Not currently  Resides: [facility], [place]    Family History:   Family History   Problem Relation Age of Onset    Heart Attack Father     Prostate Cancer Father     Stroke Father     Heart Attack Brother     Stroke Brother     Breast Cancer Maternal Aunt     Cancer Paternal Uncle         Lung       Current Hospital Medications:    Current Outpatient Medications   Medication Sig Dispense Refill    doxycycline hyclate (VIBRA-TABS) 100 MG tablet Take 1 tablet by mouth 2 times daily for 10 days 20 tablet 0    benzonatate (TESSALON) 200 MG capsule Take 1 capsule by mouth 3 times daily as needed for Cough 30 capsule 0    pantoprazole (PROTONIX) 40 MG tablet TAKE 1 TABLET BY MOUTH 2 TIMES A  tablet 0    simvastatin (ZOCOR) 40 MG tablet TAKE 1 TABLET BY MOUTH NIGHTLY 90 tablet 1    losartan (COZAAR) 25 MG tablet TAKE 1 TABLET BY MOUTH DAILY 90 tablet 0    metFORMIN (GLUCOPHAGE) 500 MG tablet TAKE 1 TABLET BY MOUTH ONCE EVERY MORNING AND ONE TABLET EVERY EVENING 180 tablet 1    isosorbide mononitrate (IMDUR) 30 MG extended release tablet TAKE 1 TABLET BY MOUTH DAILY 90 tablet 1    metoprolol tartrate (LOPRESSOR) 50 MG tablet TAKE 1 TABLET BY MOUTH 2 TIMES A  tablet 1    ketoconazole (NIZORAL) 2 % cream Apply topically daily. 30 g 1    FreeStyle Lancets MISC USE AS DIRECTED 100 each 3    potassium chloride (KLOR-CON M) 20 MEQ extended release tablet TAKE 1 TABLET BY MOUTH DAILY. 90 tablet 0    blood glucose test strips (FREESTYLE LITE) strip 1 each by In Vitro route daily As needed.  100 each 3    blood glucose test strips (FREESTYLE LITE) strip Infuse 1 each intravenously 2 times daily As needed. 100 strip 1    nystatin (MYCOSTATIN) 198104 UNIT/GM powder Apply topically 4 times daily. 30 g 5    Blood Glucose Monitoring Suppl (FREESTYLE INSULINX SYSTEM) w/Device KIT 1 each by Does not apply route daily 1 kit 0    blood glucose test strips (FREESTYLE INSULINX TEST) strip 1 each by In Vitro route daily As needed. 100 each 3    nitroGLYCERIN (NITROSTAT) 0.4 MG SL tablet PLACE 1 TABLET UNDER THE TONGUE EVERY 5 MINUTES AS NEEDED FOR CHEST PAIN 25 tablet 0    nystatin (MYCOSTATIN) 224827 UNIT/GM cream Apply topically 2 times daily. 30 g 2    Tofacitinib Citrate (XELJANZ XR) 11 MG TB24 Take 11 mg by mouth daily      furosemide (LASIX) 20 MG tablet Take 20 mg by mouth daily      FREESTYLE LANCETS MISC TEST SUGAR DAILY AS DIRECTED UP TO 4 TIMES A DAY 4 each 0    apixaban (ELIQUIS) 5 MG TABS tablet Take 5 mg by mouth 2 times daily       verapamil (CALAN SR) 240 MG extended release tablet Take 240 mg by mouth daily       amiodarone (PACERONE) 100 MG tablet Take 100 mg by mouth daily       FREESTYLE LANCETS MISC TEST SUGAR DAILY AS DIRECTED 100 each 2    predniSONE (DELTASONE) 5 MG tablet Take 5 mg by mouth 2 times daily Take 5 mg in the morning and 4 mg in the evening.  aspirin EC 81 MG EC tablet Take 81 mg by mouth daily      folic acid (FOLVITE) 1 MG tablet Take 1 mg by mouth daily       glyBURIDE (DIABETA) 5 MG tablet 1 tablet po twice daily 60 tablet 3     No current facility-administered medications for this visit. Allergies:    Allergies   Allergen Reactions    Codeine Other (See Comments)     Chest pain    Albuterol      Rapid heart rate    Clindamycin/Lincomycin     Januvia [Sitagliptin] Swelling    Augmentin [Amoxicillin-Pot Clavulanate] Palpitations    Sulfa Antibiotics Rash         Subjective   REVIEW OF SYSTEMS:   CONSTITUTIONAL: no fever, night sweats, fatigue;  HEENT: no blurring of vision, no double vision, no hearing difficulty, no tinnitus, no ulceration, no dysplasia, no epistaxis;  LUNGS: no cough, no hemoptysis, wheezing,  shortness of breath;  CARDIOVASCULAR:  palpitations, no chest pain, no shortness of breath; calf swelling and tenderness, bilateral lower extremity swelling  GI: no abdominal pain, no nausea, no vomiting, no diarrhea, no constipation;  ATILIO: no dysuria, no hematuria, no frequency or urgency, no nephrolithiasis;  MUSCULOSKELETAL: painful and swollen joints, no swelling, no stiffness;  ENDOCRINE: no polyuria, no polydipsia, no cold or heat intolerance; hot flashes  HEMATOLOGY: easy bruising, no history of clotting disorder;  DERMATOLOGY: no skin rash, no eczema, no pruritus; improving skin nodules on right lateral side of torso  PSYCHIATRY: no depression, no anxiety, no panic attacks, no suicidal ideation, no homicidal ideation;  NEUROLOGY: no syncope, no seizures, no numbness or tingling of hands, no numbness or tingling of feet, no paresis;    Objective   /86   Pulse 100   Ht 5' 4\" (1.626 m)   Wt 192 lb 12.8 oz (87.5 kg)   SpO2 98%   BMI 33.09 kg/m²     PHYSICAL EXAM:  CONSTITUTIONAL: Alert, appropriate, no acute distress  EYES: Non icteric, EOM intact, pupils equal round   ENT: Mucus membranes moist, no oral pharyngeal lesions, external inspection of ears and nose are normal  NECK: Supple, no masses. No palpable thyroid mass  CHEST/LUNGS: CTA bilaterally, normal respiratory effort   CARDIOVASCULAR: RRR, no murmurs. No lower extremity edema  ABDOMEN: soft non-tender, active bowel sounds, no HSM. No palpable masses  EXTREMITIES: warm, full ROM in all 4 extremities, no focal weakness. SKIN: warm, dry with no rashes or lesions  LYMPH: No cervical, clavicular, axillary, or inguinal lymphadenopathy  NEUROLOGIC: follows commands, non focal   PSYCH: mood and affect appropriate.   Alert and oriented to time, place, person      LABORATORY RESULTS REVIEWED/ANALYZED BY ME:  Lab Results   Component Value Date    WBC 9.24 06/10/2021    HGB 14.0 06/10/2021    HCT 46.0 (H) 06/10/2021    MCV 97.0 (H) 06/10/2021     (L) 06/10/2021     Lab Results   Component Value Date    NEUTROABS 7.01 (H) 06/10/2021         RADIOLOGY STUDIES REVIEWED BY ME:  None    ASSESSMENT:    Orders Placed This Encounter   Procedures    CT CHEST W CONTRAST     Standing Status:   Future     Standing Expiration Date:   8/10/2021    CT ABDOMEN PELVIS W IV CONTRAST Additional Contrast? Oral     Standing Status:   Future     Standing Expiration Date:   7/10/2021     Order Specific Question:   Additional Contrast?     Answer:   Oral      Iram Nolan was seen today for new patient. Diagnoses and all orders for this visit:    Large cell lymphoma ([facility] [address]  -     [address]; Future  -     CT ABDOMEN PELVIS W IV CONTRAST Additional Contrast? 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 Most of the nodules have resolved at this point. Patient has been on prednisone and Vianne Pal for years for treatmeant of rheumatoid arthritis. I strongly consider this as a primary cutaneous lymphoma. Will obtain scans to assess for lymphadenopathy. If scans are clear, will watch and wait for now. Excerpt from up-to-date:  Primary cutaneous CD4+ small/medium T cell lymphoproliferative disorder is included as a provisional entity in the 2018 update of the [address] [address] (WHO)- Organization for [employer] and Treatment of Cancer classification of cutaneous lymphomas and in the 2016 revision of the WHO classification of tumors of hematopoietic and lymphoid tissue.  It includes (or it is a new term for) both primary cutaneous CD4+ small/medium T cell lymphoma presenting with a solitary lesion (from the 2005 [address] for Research and Treatment of Cancer and 2008 [facility] classifications), and idiopathic cutaneous T cell pseudolymphoma with a nodular growth pattern. It presents with a solitary plaque or tumor, generally on the face, the neck, or the upper trunk (picture 11). It is histologically characterized by a predominance of small to medium-sized CD3+, CD4+, and CD8- T cells; the presence of scattered, large CD3+, CD4+, PD-1+ T cells; and an abundant inflammatory infiltrate of small reactive CD8+ T cells, B cells, and histiocytes. Lesions may resolve spontaneously. Persistent lesions can be treated with intralesional corticosteroids or surgical excision    Essentially, apparently indolent behavior. Most of the lesions have completely disappeared or resolved. Small residual lesion in the right breast is improving. Continue observation at this time. If worsening the patient will contact us back and she will be seen earlier. Otherwise, return in 6 months. CT chest abdomen pelvis 6/18/2021showed no evidence of lymphadenopathy or splenomegaly. Findings of mediastinal lipomatosis    Addendum findings of abnormal pelvic veins. Referral to vascular surgery  CT abdomen 6/18/2021 showed  The inferior vena cava below the right renal vein entrance is  interrupted. There are large collateral gonadal veins extending to the  pelvis. There is no flow visualized and collapsed common iliac veins. External iliac veins are collapsed. There are large internal iliac  veins. A lobular appearance along the uterus and ovaries is likely  related to dilated veins. No other pelvic mass is seen. There is  atheromatous disease of the aortoiliac vessels    PLAN:  · CT Chest/Abdomen/Pelvis to assess for lymphadenopathy  · RTC 6 months in [facility], [address]  · Referral to vascular at [facility] for pelvic vein abnormalities. Kristen Hale am scribing for Bibi Mills MD. Electronically signed by Rita Barba RN on 6/10/2021 at 10:54 AM CDT.     I, Dr Mayank Gee, personally performed the services described in this documentation as scribed by Ilene Jett RN in my presence and is both accurate and complete. I have seen, examined and reviewed this patient medication list, appropriate labs and imaging studies. I reviewed relevant medical records and others physicians notes. I discussed the plans of care with the patient. I answered all the questions to the patients satisfaction. I have also reviewed the chief complaint (CC) and part of the history (History of Present Illness (HPI), Past Family Social History [facility]), or Review of Systems (ROS) and made changes when appropriated. (Please note that portions of this note were completed with a voice recognition program. Efforts were made to edit the dictations but occasionally words are mis-transcribed.)  The total time (45min) I spent to see the patient today includes at least one or more of the following: preparing to see the patient by reviewing prior tests, prior notes or other relevant information, performing appropriate independent examination and evaluation, counseling, ordering of medications, tests or procedures, referrals, communicating with other healthcare professionals when appropriated to coordinate care, documenting clinic information in the electronic medical record or other health records, independently interpreting results of tests, managing test results and communicating the results to the patient/family or caregiver.     Andreas Dominguez MD    06/10/21  11:05 AM

## 2021-06-15 ENCOUNTER — OFFICE VISIT (OUTPATIENT)
Dept: FAMILY MEDICINE CLINIC | Age: 66
End: 2021-06-15
Payer: MEDICARE

## 2021-06-15 VITALS
TEMPERATURE: 97.7 F | SYSTOLIC BLOOD PRESSURE: 118 MMHG | HEART RATE: 98 BPM | WEIGHT: 198 LBS | DIASTOLIC BLOOD PRESSURE: 62 MMHG | BODY MASS INDEX: 33.99 KG/M2 | OXYGEN SATURATION: 100 %

## 2021-06-15 DIAGNOSIS — E11.9 TYPE 2 DIABETES MELLITUS WITHOUT COMPLICATION, WITHOUT LONG-TERM CURRENT USE OF INSULIN (HCC): Primary | ICD-10-CM

## 2021-06-15 DIAGNOSIS — I48.0 PAROXYSMAL ATRIAL FIBRILLATION (HCC): ICD-10-CM

## 2021-06-15 DIAGNOSIS — I10 ESSENTIAL HYPERTENSION: ICD-10-CM

## 2021-06-15 DIAGNOSIS — I25.10 CORONARY ARTERY DISEASE INVOLVING NATIVE HEART WITHOUT ANGINA PECTORIS, UNSPECIFIED VESSEL OR LESION TYPE: ICD-10-CM

## 2021-06-15 DIAGNOSIS — C86.6: ICD-10-CM

## 2021-06-15 LAB — HBA1C MFR BLD: 9.5 %

## 2021-06-15 PROCEDURE — G8427 DOCREV CUR MEDS BY ELIG CLIN: HCPCS | Performed by: FAMILY MEDICINE

## 2021-06-15 PROCEDURE — 2022F DILAT RTA XM EVC RTNOPTHY: CPT | Performed by: FAMILY MEDICINE

## 2021-06-15 PROCEDURE — 1036F TOBACCO NON-USER: CPT | Performed by: FAMILY MEDICINE

## 2021-06-15 PROCEDURE — G8417 CALC BMI ABV UP PARAM F/U: HCPCS | Performed by: FAMILY MEDICINE

## 2021-06-15 PROCEDURE — 3017F COLORECTAL CA SCREEN DOC REV: CPT | Performed by: FAMILY MEDICINE

## 2021-06-15 PROCEDURE — 3046F HEMOGLOBIN A1C LEVEL >9.0%: CPT | Performed by: FAMILY MEDICINE

## 2021-06-15 PROCEDURE — 83036 HEMOGLOBIN GLYCOSYLATED A1C: CPT | Performed by: FAMILY MEDICINE

## 2021-06-15 PROCEDURE — 99214 OFFICE O/P EST MOD 30 MIN: CPT | Performed by: FAMILY MEDICINE

## 2021-06-15 NOTE — PROGRESS NOTES
[facility] PHYSICIAN SERVICES  [facility] FAMILY MEDICINE  [address] [facility] [address]  [address] [address]  Dept: [phone]  Dept Fax: [phone]  Loc: [phone]     Lucio Fagan is a 60 y.o. female who presents today for her medical conditions/complaintsas noted below. Lucio Fagan is c/o of 3 Month Follow-Up        HPI:   Patient is here for follow up. Hypertension  Compliant with medications. No adverse effects from medication. No lightheadedness, palpitations, or chest pain. Atrial Fibrillation  Compliant with medications. No adverse effects from medication. No lightheadedness, palpitations, or chest pain. Stable on current anticoagulation. No bleeding issues. Complaint with medication. Diabetes Mellitus  Has been compliant with medications. No side effects of medications since last visit. No polyuria, polydipsia, or vision changes since last visit. No symptomatic episodes of hypoglycemia. A1C 9.5, down from 10.6. Coronary Artery Disease  Currently no active angina symptoms. No chest pain with exertion. No orthopnea. She was seen by Dr. See Keith for skin lesions, path showing lymphoma, cutaneous anaplastic large cell lymphoma.      HPI    Past Medical History:   Diagnosis Date    Atrial flutter by electrocardiogram ([address]     Constipation     Coronary artery disease     Diabetes mellitus ([address]     Essential hypertension     Hyperlipidemia     IBS (irritable bowel syndrome)     Iron deficiency anemia     Menopause     Paroxysmal SVT (supraventricular tachycardia) (HCC)     RA (rheumatoid arthritis) (HCC)     Rheumatoid arthritis ([address]      Past Surgical History:   Procedure Laterality Date    CARDIAC SURGERY  2000    CABG (Dr. Laurel Rosales) [address]  2009    Stent     CHOLECYSTECTOMY      CORONARY ARTERY BYPASS GRAFT  2000    DILATION AND CURETTAGE OF UTERUS N/A 8/16/2019    DILATATION AND CURETTAGE HYSTEROSCOPY W/ Grey Andrews performed by Della Cabrera MD at [facility] OR    TOTAL KNEE ARTHROPLASTY         Family History   Problem Relation Age of Onset    Heart Attack Father     Prostate Cancer Father     Stroke Father     Heart Attack Brother     Stroke Brother     Breast Cancer Maternal Aunt     Cancer Paternal Uncle         Lung       Social History     Tobacco Use    Smoking status: Former Smoker     Packs/day: 0.50     Years: 26.00     Pack years: 13.00     Types: Cigarettes     Start date: 6/10/1974     Quit date: 2000     Years since quittin.7    Smokeless tobacco: Never Used   Substance Use Topics    Alcohol use: No      Current Outpatient Medications   Medication Sig Dispense Refill    simvastatin (ZOCOR) 40 MG tablet TAKE 1 TABLET BY MOUTH NIGHTLY 90 tablet 1    metFORMIN (GLUCOPHAGE) 500 MG tablet TAKE 1 TABLET BY MOUTH ONCE EVERY MORNING AND ONE TABLET EVERY EVENING 180 tablet 1    isosorbide mononitrate (IMDUR) 30 MG extended release tablet TAKE 1 TABLET BY MOUTH DAILY 90 tablet 1    metoprolol tartrate (LOPRESSOR) 50 MG tablet TAKE 1 TABLET BY MOUTH 2 TIMES A  tablet 1    ketoconazole (NIZORAL) 2 % cream Apply topically daily. 30 g 1    FreeStyle Lancets MISC USE AS DIRECTED 100 each 3    potassium chloride (KLOR-CON M) 20 MEQ extended release tablet TAKE 1 TABLET BY MOUTH DAILY. 90 tablet 0    blood glucose test strips (FREESTYLE LITE) strip 1 each by In Vitro route daily As needed. 100 each 3    blood glucose test strips (FREESTYLE LITE) strip Infuse 1 each intravenously 2 times daily As needed. 100 strip 1    nystatin (MYCOSTATIN) 951912 UNIT/GM powder Apply topically 4 times daily. 30 g 5    Blood Glucose Monitoring Suppl (FREESTYLE INSULINX SYSTEM) w/Device KIT 1 each by Does not apply route daily 1 kit 0    blood glucose test strips (FREESTYLE INSULINX TEST) strip 1 each by In Vitro route daily As needed.  100 each 3    nitroGLYCERIN (NITROSTAT) 0.4 MG SL tablet PLACE 1 TABLET UNDER THE TONGUE EVERY 5 MINUTES AS NEEDED FOR CHEST PAIN 25 tablet 0    nystatin (MYCOSTATIN) 867054 UNIT/GM cream Apply topically 2 times daily. 30 g 2    Tofacitinib Citrate (XELJANZ XR) 11 MG TB24 Take 11 mg by mouth daily      furosemide (LASIX) 20 MG tablet Take 20 mg by mouth daily      FREESTYLE LANCETS MISC TEST SUGAR DAILY AS DIRECTED UP TO 4 TIMES A DAY 4 each 0    apixaban (ELIQUIS) 5 MG TABS tablet Take 5 mg by mouth 2 times daily       verapamil (CALAN SR) 240 MG extended release tablet Take 240 mg by mouth daily       amiodarone (PACERONE) 100 MG tablet Take 100 mg by mouth daily       FREESTYLE LANCETS MISC TEST SUGAR DAILY AS DIRECTED 100 each 2    predniSONE (DELTASONE) 5 MG tablet Take 5 mg by mouth 2 times daily Take 5 mg in the morning and 4 mg in the evening.  aspirin EC 81 MG EC tablet Take 81 mg by mouth daily      folic acid (FOLVITE) 1 MG tablet Take 1 mg by mouth daily       pantoprazole (PROTONIX) 40 MG tablet TAKE 1 TABLET BY MOUTH 2 TIMES A  tablet 0    losartan (COZAAR) 25 MG tablet TAKE 1 TABLET BY MOUTH DAILY 90 tablet 0    glyBURIDE (DIABETA) 5 MG tablet 1 tablet po twice daily 60 tablet 3     No current facility-administered medications for this visit.      Allergies   Allergen Reactions    Codeine Other (See Comments)     Chest pain    Albuterol      Rapid heart rate    Clindamycin/Lincomycin     Januvia [Sitagliptin] Swelling    Augmentin [Amoxicillin-Pot Clavulanate] Palpitations    Sulfa Antibiotics Rash       Health Maintenance   Topic Date Due    Hepatitis C screen  Never done    HIV screen  Never done    Shingles Vaccine (1 of 2) Never done    Diabetic foot exam  04/24/2019    Pneumococcal 0-64 years Vaccine (2 of 4 - PPSV23) 09/02/2019    Diabetic microalbuminuria test  11/18/2020    Diabetic retinal exam  06/22/2021    Flu vaccine (Season Ended) 11/17/2021 (Originally 9/1/2021)    A1C test (Diabetic or Prediabetic)  09/15/2021    Lipid screen 12/08/2021    TSH testing  12/08/2021    Potassium monitoring  12/08/2021    Creatinine monitoring  12/08/2021    Annual Wellness Visit (AWV)  02/23/2022    Breast cancer screen  12/16/2022    DTaP/Tdap/Td vaccine (2 - Td or Tdap) 12/31/2022    Colon cancer screen colonoscopy  01/31/2023    Cervical cancer screen  09/08/2023    COVID-19 Vaccine  Completed    Hepatitis A vaccine  Aged Out    Hib vaccine  Aged Out    Meningococcal (ACWY) vaccine  Aged Out       Subjective:     Review of Systems   Constitutional: Negative for chills and fever. HENT: Negative for congestion. Respiratory: Negative for cough, chest tightness and shortness of breath. Cardiovascular: Negative for chest pain, palpitations and leg swelling. Gastrointestinal: Negative for abdominal pain, anal bleeding, constipation, diarrhea and nausea. Genitourinary: Negative for difficulty urinating. Skin:        lesions   Psychiatric/Behavioral: Negative. See HPI for visit specific review of symptoms. All others negative      Objective:   /62   Pulse 98   Temp 97.7 °F (36.5 °C)   Wt 198 lb (89.8 kg)   SpO2 100%   BMI 33.99 kg/m²    Physical Exam  Constitutional:       Appearance: She is well-developed. She is not ill-appearing. Eyes:      Pupils: Pupils are equal, round, and reactive to light. Cardiovascular:      Rate and Rhythm: Normal rate and regular rhythm. Heart sounds: No murmur heard. No friction rub. Pulmonary:      Effort: Pulmonary effort is normal. No respiratory distress. Breath sounds: Normal breath sounds. No wheezing or rales. Abdominal:      General: Bowel sounds are normal. There is no distension. Palpations: Abdomen is soft. Tenderness: There is no abdominal tenderness. There is no guarding or rebound. Musculoskeletal:      Cervical back: Normal range of motion and neck supple. Skin:     Findings: Lesion present.    Neurological:      Mental Status: She is alert.   Psychiatric:         Behavior: Behavior normal.           Lab Review   Recent Results (from the past 672 hour(s))   CBC Auto Differential    Collection Time: 06/10/21 10:40 AM   Result Value Ref Range    WBC 9.24 3.98 - 10.04 K/uL    RBC 4.74 3.93 - 5.22 M/uL    Hemoglobin 14.0 11.2 - 15.7 g/dL    Hematocrit 46.0 (H) 34.1 - 44.9 %    MCV 97.0 (H) 79.4 - 94.8 fL    MCH 29.5 25.6 - 32.2 pg    MCHC 30.4 (L) 32.3 - 35.5 g/dL    RDW 16.9 (H) 11.7 - 14.4 %    Platelets 525 (L) 159 - 369 K/uL    MPV 10.4 7.4 - 10.4 fL    Neutrophils % 75.9 (H) 34.0 - 71.1 %    Lymphocytes % 14.2 (L) 19.3 - 53.1 %    Monocytes % 9.1 4.7 - 12.5 %    Eosinophils % 0.6 (L) 0.7 - 7.0 %    Basophils % 0.2 0.1 - 1.2 %    Neutrophils Absolute 7.01 (H) 1.56 - 6.13 K/uL    Lymphocytes Absolute 1.31 1.18 - 3.74 K/uL    Monocytes Absolute 0.84 (H) 0.24 - 0.82 K/uL    Eosinophils Absolute 0.06 0.04 - 0.54 K/uL    Basophils Absolute 0.02 0.01 - 0.08 K/uL   POCT glycosylated hemoglobin (Hb A1C)    Collection Time: 06/15/21 12:00 AM   Result Value Ref Range    Hemoglobin A1C 9.5 %   POCT Venous    Collection Time: 06/18/21  9:54 AM   Result Value Ref Range    POC Creatinine 0.4 0.3 - 1.3 mg/dL    GFR Non-African American >60 >60    GFR  >60 >60    Sample Type Unspecified     Performed on [facility]                Assessment & Plan: The following diagnoses and conditions are stable withno further orders unless indicated:  Joy Parra was seen today for 3 month follow-up. Diagnoses and all orders for this visit:    Type 2 diabetes mellitus without complication, without long-term current use of insulin (HCC)  -     POCT glycosylated hemoglobin (Hb A1C)  Increase glyburide to 15 mg daily, 10 mg in am, 5 mg qpm.  Must work on dietary adherence to diabetic diet, will follow accuchecks daily. Essential hypertension  Blood pressure is stable. Continue current medications.  Monitor ambulatory bp readings, if persistently >140/90, return to clinic. Paroxysmal atrial fibrillation (HCC)  Stable, continue same. Coronary artery disease involving native heart without angina pectoris, unspecified vessel or lesion type  Stable, continue same. Primary cutaneous anaplastic large cell lymphoma ([facility] [address]  Followed by Oncology and Surgery. Return in about 6 weeks (around 7/27/2021) for me or np. Discussed use, benefit, and side effects of prescribed medications. All patient questions answered. Pt voiced understanding. Reviewed health maintenance. Instructed to continue current medications, diet and exercise. Patient agreedwith treatment plan. Follow up as directed.

## 2021-06-18 ENCOUNTER — HOSPITAL ENCOUNTER (OUTPATIENT)
Dept: CT IMAGING | Age: 66
Discharge: HOME | End: 2021-06-18
Payer: MEDICARE

## 2021-06-18 DIAGNOSIS — C85.80 LARGE CELL LYMPHOMA (HCC): ICD-10-CM

## 2021-06-18 DIAGNOSIS — Q27.8 ABNORMALITY OF SYSTEMIC VEIN: Primary | ICD-10-CM

## 2021-06-18 LAB
GFR AFRICAN AMERICAN: >60
GFR NON-AFRICAN AMERICAN: >60
PERFORMED ON: NORMAL
POC CREATININE: 0.4 MG/DL (ref 0.3–1.3)
POC SAMPLE TYPE: NORMAL

## 2021-06-18 PROCEDURE — 74177 CT ABD & PELVIS W/CONTRAST: CPT

## 2021-06-18 PROCEDURE — 82565 ASSAY OF CREATININE: CPT

## 2021-06-18 PROCEDURE — 6360000004 HC RX CONTRAST MEDICATION: Performed by: INTERNAL MEDICINE

## 2021-06-18 PROCEDURE — 71260 CT THORAX DX C+: CPT

## 2021-06-18 RX ADMIN — IOPAMIDOL 75 ML: 755 INJECTION, SOLUTION INTRAVENOUS at 10:33

## 2021-06-24 PROBLEM — C85.90 MALIGNANT LYMPHOMA, NON-HODGKIN'S (HCC): Status: ACTIVE | Noted: 2021-06-24

## 2021-06-24 ASSESSMENT — ENCOUNTER SYMPTOMS
ROS SKIN COMMENTS: LESIONS
SHORTNESS OF BREATH: 0
ABDOMINAL PAIN: 0
ANAL BLEEDING: 0
NAUSEA: 0
CONSTIPATION: 0
CHEST TIGHTNESS: 0
COUGH: 0
DIARRHEA: 0

## 2021-06-25 RX ORDER — GLYBURIDE 5 MG/1
5 TABLET ORAL 3 TIMES DAILY
Qty: 60 TABLET | Refills: 3 | Status: SHIPPED | OUTPATIENT
Start: 2021-06-25 | End: 2021-01-01 | Stop reason: SDUPTHER

## 2021-06-25 RX ORDER — GLYBURIDE 5 MG/1
TABLET ORAL
Qty: 60 TABLET | Refills: 3 | Status: SHIPPED | OUTPATIENT
Start: 2021-06-25 | End: 2021-06-25 | Stop reason: SDUPTHER

## 2021-07-06 ENCOUNTER — HOSPITAL ENCOUNTER (OUTPATIENT)
Dept: VASCULAR LAB | Age: 66
Discharge: HOME | End: 2021-07-06
Payer: MEDICARE

## 2021-07-06 ENCOUNTER — OFFICE VISIT (OUTPATIENT)
Dept: VASCULAR SURGERY | Age: 66
End: 2021-07-06
Payer: MEDICARE

## 2021-07-06 VITALS
HEART RATE: 106 BPM | TEMPERATURE: 98 F | OXYGEN SATURATION: 94 % | DIASTOLIC BLOOD PRESSURE: 67 MMHG | SYSTOLIC BLOOD PRESSURE: 106 MMHG

## 2021-07-06 DIAGNOSIS — M79.604 LEG PAIN, RIGHT: ICD-10-CM

## 2021-07-06 DIAGNOSIS — M79.89 LEG SWELLING: ICD-10-CM

## 2021-07-06 DIAGNOSIS — M79.605 LEG PAIN, LEFT: Primary | ICD-10-CM

## 2021-07-06 DIAGNOSIS — M79.605 PAIN OF LEFT LOWER EXTREMITY: Primary | ICD-10-CM

## 2021-07-06 PROCEDURE — 99204 OFFICE O/P NEW MOD 45 MIN: CPT | Performed by: NURSE PRACTITIONER

## 2021-07-06 PROCEDURE — G8417 CALC BMI ABV UP PARAM F/U: HCPCS | Performed by: NURSE PRACTITIONER

## 2021-07-06 PROCEDURE — 1036F TOBACCO NON-USER: CPT | Performed by: NURSE PRACTITIONER

## 2021-07-06 PROCEDURE — 93970 EXTREMITY STUDY: CPT

## 2021-07-06 PROCEDURE — 3017F COLORECTAL CA SCREEN DOC REV: CPT | Performed by: NURSE PRACTITIONER

## 2021-07-06 PROCEDURE — G8427 DOCREV CUR MEDS BY ELIG CLIN: HCPCS | Performed by: NURSE PRACTITIONER

## 2021-07-06 PROCEDURE — 93971 EXTREMITY STUDY: CPT

## 2021-07-06 NOTE — Clinical Note
Please let patient know Dr. Bakari Truong reviewed all films. He recommends long term anticoagulation. She is already on this due to her a fib. No surgery needed. She should wear support hose for life. If she develops any new swelling, out of the ordinary, she should get venous scan.   Her scans were negative

## 2021-07-06 NOTE — PROGRESS NOTES
Sadiq Fagan (:  1955) is a 60 y.o. female,New patient, here for evaluation of the following chief complaint(s):  New Patient (abnormality of systemic vein)            SUBJECTIVE/OBJECTIVE:  She has a known history of of large cell lymphoma found on skin diagnosis. She then had ct's showing interrupted IVC with dilated gonadal veins. She has no known hx dvt. She is on anticoagulation due to a fib. She has chronic leg pain and swelling. She does not wear hose. Differential diagnosis for leg pain includes but is not limited to: varicose veins, peripheral vascular disease, arthritic pain, DVT, lumbar disc disease, and neurogenic pain.           Harshad Yao is a 60 y.o. female with the following history as recorded in [facility]:  Patient Active Problem List    Diagnosis Date Noted    Malignant lymphoma, non-Hodgkin's ([facility] 2021    Coronary artery disease     Skin ulcer, limited to breakdown of skin ([facility] 2021    PMB (postmenopausal bleeding)     Diastolic dysfunction     Morbid obesity with BMI of 40.0-44.9, adult ([address] 2019    Paroxysmal SVT (supraventricular tachycardia) ([address] 2018    Paroxysmal atrial fibrillation ([address] 10/19/2018    Anxiety 2018    Polyp of stomach 2018    Atrophic gastritis without hemorrhage 2018    Atrial flutter by electrocardiogram ([address] 2018    Rheumatoid arthritis ([address] 02/10/2018    IBS (irritable bowel syndrome) 02/10/2018    Familial hypercholesterolemia 02/10/2018    Leukocytosis 02/10/2018    Type 2 diabetes mellitus without complication, without long-term current use of insulin ([address] 2018    Peptic ulcer disease 12/10/2017    Iron deficiency anemia     Essential hypertension      Current Outpatient Medications   Medication Sig Dispense Refill    glyBURIDE (DIABETA) 5 MG tablet Take 1 tablet by mouth three times daily Take 2 tablets in the am & 1 tablet at night 60 tablet 3    pantoprazole (PROTONIX) 40 MG tablet TAKE 1 TABLET BY MOUTH 2 TIMES A  tablet 0    losartan (COZAAR) 25 MG tablet TAKE 1 TABLET BY MOUTH DAILY 90 tablet 0    simvastatin (ZOCOR) 40 MG tablet TAKE 1 TABLET BY MOUTH NIGHTLY 90 tablet 1    metFORMIN (GLUCOPHAGE) 500 MG tablet TAKE 1 TABLET BY MOUTH ONCE EVERY MORNING AND ONE TABLET EVERY EVENING 180 tablet 1    isosorbide mononitrate (IMDUR) 30 MG extended release tablet TAKE 1 TABLET BY MOUTH DAILY 90 tablet 1    metoprolol tartrate (LOPRESSOR) 50 MG tablet TAKE 1 TABLET BY MOUTH 2 TIMES A  tablet 1    ketoconazole (NIZORAL) 2 % cream Apply topically daily. 30 g 1    FreeStyle Lancets MISC USE AS DIRECTED 100 each 3    potassium chloride (KLOR-CON M) 20 MEQ extended release tablet TAKE 1 TABLET BY MOUTH DAILY. 90 tablet 0    blood glucose test strips (FREESTYLE LITE) strip 1 each by In Vitro route daily As needed. 100 each 3    blood glucose test strips (FREESTYLE LITE) strip Infuse 1 each intravenously 2 times daily As needed. 100 strip 1    nystatin (MYCOSTATIN) 935687 UNIT/GM powder Apply topically 4 times daily. 30 g 5    Blood Glucose Monitoring Suppl (FREESTYLE INSULINX SYSTEM) w/Device KIT 1 each by Does not apply route daily 1 kit 0    blood glucose test strips (FREESTYLE INSULINX TEST) strip 1 each by In Vitro route daily As needed. 100 each 3    nitroGLYCERIN (NITROSTAT) 0.4 MG SL tablet PLACE 1 TABLET UNDER THE TONGUE EVERY 5 MINUTES AS NEEDED FOR CHEST PAIN 25 tablet 0    nystatin (MYCOSTATIN) 397096 UNIT/GM cream Apply topically 2 times daily.  30 g 2    Tofacitinib Citrate (XELJANZ XR) 11 MG TB24 Take 11 mg by mouth daily      furosemide (LASIX) 20 MG tablet Take 20 mg by mouth daily      FREESTYLE LANCETS MISC TEST SUGAR DAILY AS DIRECTED UP TO 4 TIMES A DAY 4 each 0    apixaban (ELIQUIS) 5 MG TABS tablet Take 5 mg by mouth 2 times daily       verapamil (CALAN SR) 240 MG extended release tablet Take 240 mg by mouth daily  amiodarone (PACERONE) 100 MG tablet Take 100 mg by mouth daily       FREESTYLE LANCETS MISC TEST SUGAR DAILY AS DIRECTED 100 each 2    predniSONE (DELTASONE) 5 MG tablet Take 5 mg by mouth 2 times daily Take 5 mg in the morning and 4 mg in the evening.  aspirin EC 81 MG EC tablet Take 81 mg by mouth daily      folic acid (FOLVITE) 1 MG tablet Take 1 mg by mouth daily        No current facility-administered medications for this visit. Allergies: Codeine, Albuterol, Clindamycin/lincomycin, Januvia [sitagliptin], Augmentin [amoxicillin-pot clavulanate], and Sulfa antibiotics  Past Medical History:   Diagnosis Date    Atrial fl[facility]cardiogram [facility])     Constipation     Coronary artery disease     Diabetes mellitus ([address]     Essential hypertension     Hyperlipidemia     IBS (irritable bowel syndrome)     Iron deficiency anemia     Menopause     Paroxysmal SVT (supraventricular tachycardia) ([facility]     RA (rheumatoid arthritis) ([facility])     Rheumatoid arthritis ([address]      Past Surgical History:   Procedure Laterality Date    CARDIAC SURGERY      CABG (Dr. Love Merida) [address] [place] [address]      Stent     CHOLECYSTECTOMY      CORONARY ARTERY BYPASS GRAFT      DILATION AND CURETTAGE OF UTERUS N/A 2019    DILATATION AND CURETTAGE HYSTEROSCOPY W/ Era Banister performed by [facility]    TOTAL KNEE ARTHROPLASTY       Family History   Problem Relation Age of Onset    Heart Attack Father     Prostate Cancer Father     Stroke Father     Heart Attack Brother     Stroke Brother     Breast Cancer Maternal Aunt     Cancer Paternal Uncle         Lung     Social History     Tobacco Use    Smoking status: Former Smoker     Packs/day: 0.50     Years: 26.00     Pack years: 13.00     Types: Cigarettes     Start date: 6/10/1974     Quit date: 2000     Years since quittin.7    Smokeless tobacco: Never Used   Substance Use Topics    Alcohol use:  No ROS  Eyes  no sudden vision change or amaurosis. Respiratory  no significant shortness of breath,  Cardiovascular  no chest pain or syncope. No  significant leg swelling. No claudication. Musculoskeletal  no gait disturbance  Skin  no new wound. Neurologic   No speech difficulty or lateralizing weakness. All other review of systems are negative. Physical Exam    /67 (Site: Left Upper Arm)   Pulse 106   Temp 98 °F (36.7 °C)   SpO2 94%       Neck- carotid pulses 2+ to palpation with no bruit  Cardiovascular  Regular rate and rhythm. Pulmonary  effort appears normal.  No respiratory distress. Lungs - Breath sounds normal. No wheezes or rales. Extremities - Radial and brachial pulses are 2+ to palpation bilaterally. Right femoral pulse: present 2+; Right popliteal pulse: absent Right DP: absent; Right PT absent; Left femoral pulse: present 2+; Left popliteal pulse: absent; Left DP: absent; Left PT: absent No cyanosis, clubbing, or significant edema. No signs atheroembolic event. Has some hyperpigmentation of the left shin. Neurologic  alert and oriented X 3. Physiologic. Face symmetric. Skin  warm, dry, and intact. no wound  Psychiatric  mood, affect, and behavior appear normal.  Judgment and thought processes appear normal.    Risk factors for atherosclerosis of all vascular beds have been reviewed with the patient including:  Family history, tobacco abuse in all forms, elevated cholesterol, hyperlipidemia, and diabetes. Reviewed on this visit: speciality care notes from oncology    Ct -   1. No lymphadenopathy is seen. Spleen size is normal.   2. Small hiatal hernia. Nondilated small bowel. Moderate to large   stool in the colon. Diverticulosis of the colon. 3. Small umbilical hernia containing small bowel loops. No evidence of   small bowel obstruction. 4. The inferior vena cava below the right renal vein entrance appears   to be interrupted.  There are large collateralized gonadal veins   extending to the pelvis. There are enlarged internal iliac veins in   the pelvis bilaterally. A lobular appearance around the uterus and   ovaries likely related to dilated veins. The comminuted fracture of   the iliac veins are collapsed. 5. Atheromatous disease of the aortoiliac vessels. 6. Degenerative changes of the spine and hips. There appears to be   some over coverage of the femoral heads by the acetabulum suggesting   femoroacetabular impingement anatomy. Individual images were reviewed. Results were reviewed with the patient. ASSESSMENT/PLAN:  1. Leg pain, left  -     VL Extremity Venous Left; Future  2. Leg swelling  -     VL Extremity Venous Left; Future  -     VL Extremity Venous Right; Future  3. Leg pain, right  -     VL Extremity Venous Right; Future    1. Leg pain, left    2. Leg swelling    3. Leg pain, right         Support hose  20-30 mmHg compression    Leg elevation  Good moisturization  Good skin care  Bilateral venous scan - negative  Diet   excercise    we recommend life long anticoagulation  She will need venous scan for any new swelling out side of her normal     An electronic signature was used to authenticate this note.     --MARYANNE Ramos

## 2021-07-09 ENCOUNTER — TELEPHONE (OUTPATIENT)
Dept: VASCULAR SURGERY | Age: 66
End: 2021-07-09

## 2021-07-09 NOTE — TELEPHONE ENCOUNTER
I spoke with the patient and let her know Dr. Harrison Craft reviewed all films.  He recommends long term anticoagulation. Cheryl Daniel is already on this due to her a fib.  No surgery needed.  She should wear support hose for life.  If she develops any new swelling, out of the ordinary, she should get venous scan.  Her scans were negative. Patient voiced understanding and agreed.

## 2021-07-09 NOTE — TELEPHONE ENCOUNTER
----- Message from MARYANNE Norris sent at 7/9/2021  5:57 AM CDT -----  Please let patient know Dr. Ro Cullen reviewed all films. He recommends long term anticoagulation. She is already on this due to her a fib. No surgery needed. She should wear support hose for life. If she develops any new swelling, out of the ordinary, she should get venous scan.   Her scans were negative

## 2021-07-22 ENCOUNTER — OFFICE VISIT (OUTPATIENT)
Dept: CARDIOLOGY | Facility: CLINIC | Age: 66
End: 2021-07-22

## 2021-07-22 VITALS
BODY MASS INDEX: 34.01 KG/M2 | HEIGHT: 64 IN | WEIGHT: 199.2 LBS | OXYGEN SATURATION: 98 % | SYSTOLIC BLOOD PRESSURE: 122 MMHG | HEART RATE: 102 BPM | DIASTOLIC BLOOD PRESSURE: 68 MMHG

## 2021-07-22 DIAGNOSIS — Z95.1 S/P CABG X 3: ICD-10-CM

## 2021-07-22 DIAGNOSIS — E11.9 TYPE 2 DIABETES MELLITUS WITHOUT COMPLICATION, WITHOUT LONG-TERM CURRENT USE OF INSULIN (HCC): ICD-10-CM

## 2021-07-22 DIAGNOSIS — Z95.5 S/P CORONARY ARTERY STENT PLACEMENT: ICD-10-CM

## 2021-07-22 DIAGNOSIS — E78.2 MIXED HYPERLIPIDEMIA: ICD-10-CM

## 2021-07-22 DIAGNOSIS — I25.10 CORONARY ARTERY DISEASE INVOLVING NATIVE CORONARY ARTERY OF NATIVE HEART WITHOUT ANGINA PECTORIS: ICD-10-CM

## 2021-07-22 DIAGNOSIS — I48.3 TYPICAL ATRIAL FLUTTER (HCC): ICD-10-CM

## 2021-07-22 DIAGNOSIS — I48.0 PAF (PAROXYSMAL ATRIAL FIBRILLATION) (HCC): Primary | ICD-10-CM

## 2021-07-22 PROCEDURE — 99214 OFFICE O/P EST MOD 30 MIN: CPT | Performed by: INTERNAL MEDICINE

## 2021-07-22 PROCEDURE — 93000 ELECTROCARDIOGRAM COMPLETE: CPT | Performed by: INTERNAL MEDICINE

## 2021-07-22 NOTE — PROGRESS NOTES
Teresa Serna  [id number]  1955  65 y.o.  female    Referring Provider: Cierra Addison APRN    Reason for Follow-up Visit: Here for routine follow up     Paroxysmal atrial fibrillation now atrial flutter   Saw Dr De Anda AF ablation tried but due to no good venous access not possible     Cardiac workup test results as below: 14-day outpatient cardiac telemetry   In past could not get ablation due to anatomy  Attempted by Dr De Anda     Subjective    Intermittent palpitations, once every several days lasting for less than 1 minute  Usually resolves after she takes AM medications  No associated symptoms of dizziness, weakness, chest pain,  shortness of breath    Mild to moderate exertional  shortness of breath     No associated or exertional chest pain  Leg swelling much better after stopping Januvia   Mild pedal edema that overall better  Dependent edema worse on sitting up towards evening     No fever or chills  No significant expectoration    No hemoptysis  No presyncope or syncope     Easy fatiguability     Joint pain in small, medium and large joints  Chronic low back pain      On anticoagulation with Eliquis   BP well controlled at home.    Saw arthritis MD and blood work was prescribed  Lipids under good control    No bleeding, excessive bruising, gait instability or fall risks    Fair effort tolerance     History of present illness:  Teresa Serna is a 65 y.o. yo female with history of Paroxysmal atrial fibrillation who presents today for   No chief complaint on file.  .    History  Past Medical History:   Diagnosis Date   • Abnormal stress test    • Atrial flutter (CMS/HCC)    • CAD (coronary artery disease)    • CAD in native artery     CABG x 3   • Diverticulosis    • Essential hypertension     Tricuspid valve insufficiency, unspecified etiology    • History of adenomatous polyp of colon    • Hyperlipemia, mixed    • Hyperlipidemia    • Hypertension    • IBS (irritable bowel syndrome)    • MI,  old    • Mitral valve prolapse    • Near syncope    • Obesity, morbid (CMS/[facility])    • Palpitations    • Paroxysmal SVT (supraventricular tachycardia) (CMS/[facility])    • Pedal edema    • Precordial pain    • Rheumatoid arthritis (CMS/HCC)    • S/P CABG x 3    • Type 2 diabetes mellitus (CMS/[facility])    ,   Past Surgical History:   Procedure Laterality Date   • APPENDECTOMY     • CARDIAC CATHETERIZATION Left 2012    Intensify medical therapy   • CARDIAC CATHETERIZATION Left 2002    surgery   • CHOLECYSTECTOMY  1975   • COLONOSCOPY N/A 2018    Diverticulosis in the entire examined colon; One 4mm tubular adenomatous polyp in the transverse colon; Non-bleeding internal hemorrhoids; Repeat 5 years   • COLONOSCOPY  02/10/2012    Diverticulosis in the entire examined colon; Non-bleeding internal hemorrhoids; Repeat 7 years   • COLONOSCOPY  2009    Diverticulosis; Repeat 7 years   • CORONARY ARTERY BYPASS GRAFT  2002    LIMA to LAD, SVG to D1, SVG to OM1 - x3   • CORONARY STENT PLACEMENT  09/2009    X1 - Stent to LAD, Drug Eluting   • DILATATION AND CURETTAGE     • ENDOSCOPY N/A 2018    Esophageal mucosal changes suspicious for short-segment De Anda's esophagus-biopsied; Small HH; Two gastric polyps-Clip (MR conditional) was placed-biopsied; Normal examined duodenum   • ENDOSCOPY  2015    Probable short-segment De Anda's esophagus-biopsied; HH; Gastritis-biopsied; Duodenal diverticulum; Repeat 1 year   • REPLACEMENT TOTAL KNEE Right    ,   Family History   Problem Relation Age of Onset   • Cancer Father    • Coronary artery disease Father    • Colon cancer Neg Hx    • Colon polyps Neg Hx    ,   Social History     Tobacco Use   • Smoking status: Former Smoker     Years: 20.00     Types: Cigarettes     Quit date: 2000     Years since quittin.4   • Smokeless tobacco: Never Used   Substance Use Topics   • Alcohol use: No   • Drug use: No   ,     Medications  Current Outpatient Medications    Medication Sig Dispense Refill   • amiodarone (PACERONE) 100 MG tablet Take 1 tablet by mouth Daily. 90 tablet 4   • aspirin 81 MG EC tablet Take 81 mg by mouth Daily.     • Eliquis 5 MG tablet tablet TAKE 1 TABLET BY MOUTH EVERY 12 HOURS 180 tablet 4   • Empagliflozin (Jardiance) 25 MG tablet Jardiance 25 mg tablet   Take 1 tablet every day by oral route.     • folic acid (FOLVITE) 1 MG tablet Take 1 mg by mouth Daily.     • furosemide (LASIX) 20 MG tablet Take 1 tablet by mouth Daily. 90 tablet 4   • isosorbide mononitrate (IMDUR) 30 MG 24 hr tablet Take 30 mg by mouth Daily.     • losartan (COZAAR) 25 MG tablet Take 25 mg by mouth Daily.     • meclizine (ANTIVERT) 12.5 MG tablet Take 1 tablet by mouth 3 (Three) Times a Day As Needed for dizziness. 90 tablet 3   • metFORMIN (GLUCOPHAGE) 1000 MG tablet Take 1,000 mg by mouth Daily With Breakfast.     • metoprolol tartrate (LOPRESSOR) 50 MG tablet Take 50 mg by mouth 2 (Two) Times a Day.     • nitroglycerin (NITROSTAT) 0.4 MG SL tablet Place 0.4 mg under the tongue Every 5 (Five) Minutes As Needed for Chest Pain. Take no more than 3 doses in 15 minutes.     • pantoprazole (PROTONIX) 40 MG EC tablet Take 1 tablet by mouth Every 12 (Twelve) Hours. 60 tablet 1   • potassium chloride (K-DUR) 10 MEQ CR tablet Take 1 tablet by mouth Daily. 90 tablet 3   • predniSONE (DELTASONE) 1 MG tablet Take 4 mg by mouth Every Night.     • predniSONE (DELTASONE) 5 MG tablet Take 5 mg by mouth Every Morning.     • simvastatin (ZOCOR) 40 MG tablet Take 40 mg by mouth Every Night.     • Tofacitinib Citrate (XELJANZ XR) 11 MG tablet sustained-release 24 hour Take 1 tablet by mouth Daily.     • verapamil SR (CALAN-SR) 240 MG CR tablet Take 1 tablet by mouth Daily. 90 tablet 4     No current facility-administered medications for this visit.     Results for orders placed during the hospital encounter of 07/07/20    Adult Transthoracic Echo Complete W/ Cont if Necessary Per  "Protocol    Interpretation Summary  · Estimated EF = 60%.  · Left ventricular systolic function is normal.  · Mild tricuspid valve regurgitation is present.  · Left ventricular diastolic function is normal.  · Mild pulmonary hypertension is present.      Allergies:  Codeine, Exenatide, Sulfa antibiotics, Albuterol, Sitagliptin, and Amoxicillin-pot clavulanate    Review of Systems  Review of Systems   Constitutional: Positive for malaise/fatigue.   HENT: Negative.    Eyes: Negative.    Cardiovascular: Positive for dyspnea on exertion, leg swelling and palpitations. Negative for chest pain, claudication, cyanosis, irregular heartbeat, near-syncope, orthopnea, paroxysmal nocturnal dyspnea and syncope.   Respiratory: Negative.    Endocrine: Negative.    Hematologic/Lymphatic: Negative.    Skin: Negative.    Musculoskeletal: Positive for arthritis, back pain and joint pain.   Gastrointestinal: Negative for anorexia.   Genitourinary: Negative.    Neurological: Positive for dizziness and weakness.   Psychiatric/Behavioral: Negative.    Same review of system and physical exam as last visit        Objective     Physical Exam:  /68 (BP Location: Left arm, Patient Position: Sitting, Cuff Size: Adult)   Pulse 102   Ht 162.6 cm (64\")   Wt 90.4 kg (199 lb 3.2 oz)   SpO2 98%   BMI 34.19 kg/m²   Physical Exam   Constitutional: She appears well-developed.   HENT:   Head: Normocephalic.   Neck: Normal carotid pulses and no JVD present. No tracheal tenderness present. Carotid bruit is not present. No tracheal deviation present.   Cardiovascular: Regular rhythm and normal pulses.   Murmur heard.   Systolic murmur is present with a grade of 2/6.  Pulmonary/Chest: Effort normal. No stridor. She has no wheezes.   Abdominal: Soft. She exhibits no distension. There is no abdominal tenderness.   Musculoskeletal:      Right lower le+ Pitting Edema present.      Left lower le+ Pitting Edema present.     Vascular Status -  " Her right foot exhibits abnormal foot edema. Her left foot exhibits abnormal foot edema.  Neurological: She is alert. No cranial nerve deficit or sensory deficit.   Skin: Skin is warm.   Psychiatric: Her speech is normal and behavior is normal.   Lower extremity: 1 plus pitting edema.     Results Review:    Results for orders placed during the hospital encounter of 07/07/20    Adult Transthoracic Echo Complete W/ Cont if Necessary Per Protocol    Interpretation Summary  · Estimated EF = 60%.  · Left ventricular systolic function is normal.  · Mild tricuspid valve regurgitation is present.  · Left ventricular diastolic function is normal.  · Mild pulmonary hypertension is present.            ECG 12 Lead    Date/Time: 7/22/2021 12:14 PM  Performed by: Jagdeep Reyes MD  Authorized by: Jagdeep Reyes MD   Comparison: compared with previous ECG from 12/16/2020  Comparison to previous ECG: atrial flutter replaced  sinus rhythm   Rhythm: sinus tachycardia and atrial flutter  Rate: tachycardic  Conduction: 1st degree AV block    Clinical impression: abnormal EKG        ____________________________________________________________________________________________________________________________________________  Health maintenance and recommendations  Similar recommendations as last visit     Offered to give patient  a copy      Questions were encouraged, asked and answered to the patient's  understanding and satisfaction. Questions if any regarding current medications and side effects, need for refills and importance of compliance to medications stressed.    Reviewed available prior notes, consults, prior visits, laboratory findings, radiology and cardiology relevant reports. Updated chart as applicable. I have reviewed the patient's medical history in detail and updated the computerized patient record as relevant.      Updated patient regarding any new or relevant abnormalities on review of records or any new findings on  physical exam. Mentioned to patient about purpose of visit and desirable health short and long term goals and objectives.    Primary to monitor CBC CMP Lipid panel and TSH as applicable    ___________________________________________________________________________________________________________________________________________        Assessment/Plan   Patient Active Problem List   Diagnosis   • CAD (coronary artery disease)   • Atrial flutter (CMS/HCC)   • Type 2 diabetes mellitus without complication, without long-term current use of insulin (CMS/HCC)   • Mixed hyperlipidemia   • S/P CABG x 3 2000 Dr Larsen   • Shoulder blade pain   • Anemia   • Abnormal finding on imaging   • History of esophagitis   • Essential hypertension   • S/P coronary artery stent placement   • Gastric polyp   • Colon polyps   • PAF (paroxysmal atrial fibrillation) (CMS/HCC)           Plan      Patient expressed understanding  Encouraged and answered all questions   Discussed with the patient and all questioned fully answered. She will call me if any problems arise.   Discussed results of prior testing with patient : echo   as well electrocardiogram from today that shows Paroxysmal atrial flutter     She declined DC cardioversion  Will repeat ECG in 2 weeks   If still in atrial flutter she will consider DC cardioversion      Will leave on same dose of Amiodarone as paroxysmal atrial flutter and SVT overall well tolerated   Monitor CBC, CMP, TSH and Lipid Panel by primary  Eye exam, pulmonary function tests   Discussed Amiodarone induced toxicity and required monitoring and close follow up for this     Same dose of beta blocker therapy     In past could not get ablation due to anatomy  Attempted by Dr De Anda   The current medical regimen is effective;  continue present plan and medications.     Check BP and heart rates twice daily at least 3x / week at home and bring a recording for me to review next visit  If BP >140/90 or < 100/60  persistently over 3 reading 30 mins apart call sooner     I support the patient's decision to take the Covid -19 vaccine   Had both dose   No major issues   Now fully immunized      Monitor for any signs of bleeding including red or dark stools as well as easy bruisabilty. Fall precautions.              Return in about 11 days (around 8/2/2021).

## 2021-07-26 ENCOUNTER — VIRTUAL VISIT (OUTPATIENT)
Dept: FAMILY MEDICINE CLINIC | Age: 66
End: 2021-07-26
Payer: MEDICARE

## 2021-07-26 DIAGNOSIS — C86.6: ICD-10-CM

## 2021-07-26 DIAGNOSIS — E11.9 TYPE 2 DIABETES MELLITUS WITHOUT COMPLICATION, WITHOUT LONG-TERM CURRENT USE OF INSULIN (HCC): ICD-10-CM

## 2021-07-26 DIAGNOSIS — E11.65 TYPE 2 DIABETES MELLITUS WITH HYPERGLYCEMIA, WITHOUT LONG-TERM CURRENT USE OF INSULIN (HCC): ICD-10-CM

## 2021-07-26 DIAGNOSIS — I48.0 PAROXYSMAL ATRIAL FIBRILLATION (HCC): Primary | ICD-10-CM

## 2021-07-26 DIAGNOSIS — I10 ESSENTIAL HYPERTENSION: ICD-10-CM

## 2021-07-26 PROCEDURE — 99441 PR PHYS/QHP TELEPHONE EVALUATION 5-10 MIN: CPT | Performed by: FAMILY MEDICINE

## 2021-08-02 ENCOUNTER — TRANSCRIBE ORDERS (OUTPATIENT)
Dept: LAB | Facility: HOSPITAL | Age: 66
End: 2021-08-02

## 2021-08-02 ENCOUNTER — OFFICE VISIT (OUTPATIENT)
Dept: CARDIOLOGY | Facility: CLINIC | Age: 66
End: 2021-08-02

## 2021-08-02 ENCOUNTER — LAB (OUTPATIENT)
Dept: LAB | Facility: HOSPITAL | Age: 66
End: 2021-08-02

## 2021-08-02 VITALS
DIASTOLIC BLOOD PRESSURE: 65 MMHG | BODY MASS INDEX: 33.29 KG/M2 | HEIGHT: 64 IN | SYSTOLIC BLOOD PRESSURE: 120 MMHG | WEIGHT: 195 LBS

## 2021-08-02 DIAGNOSIS — E78.2 MIXED HYPERLIPIDEMIA: ICD-10-CM

## 2021-08-02 DIAGNOSIS — I25.10 CORONARY ARTERY DISEASE INVOLVING NATIVE CORONARY ARTERY OF NATIVE HEART WITHOUT ANGINA PECTORIS: ICD-10-CM

## 2021-08-02 DIAGNOSIS — Z95.5 S/P CORONARY ARTERY STENT PLACEMENT: Primary | ICD-10-CM

## 2021-08-02 DIAGNOSIS — I48.0 PAF (PAROXYSMAL ATRIAL FIBRILLATION) (HCC): ICD-10-CM

## 2021-08-02 DIAGNOSIS — Z95.1 S/P CABG X 3: ICD-10-CM

## 2021-08-02 DIAGNOSIS — I10 ESSENTIAL HYPERTENSION: ICD-10-CM

## 2021-08-02 DIAGNOSIS — I48.3 TYPICAL ATRIAL FLUTTER (HCC): ICD-10-CM

## 2021-08-02 DIAGNOSIS — Z01.818 PREOPERATIVE TESTING: ICD-10-CM

## 2021-08-02 DIAGNOSIS — Z01.818 PREOPERATIVE TESTING: Primary | ICD-10-CM

## 2021-08-02 DIAGNOSIS — E11.9 TYPE 2 DIABETES MELLITUS WITHOUT COMPLICATION, WITHOUT LONG-TERM CURRENT USE OF INSULIN (HCC): ICD-10-CM

## 2021-08-02 LAB — SARS-COV-2 ORF1AB RESP QL NAA+PROBE: DETECTED

## 2021-08-02 PROCEDURE — C9803 HOPD COVID-19 SPEC COLLECT: HCPCS

## 2021-08-02 PROCEDURE — 99214 OFFICE O/P EST MOD 30 MIN: CPT | Performed by: INTERNAL MEDICINE

## 2021-08-02 PROCEDURE — U0004 COV-19 TEST NON-CDC HGH THRU: HCPCS

## 2021-08-02 PROCEDURE — U0005 INFEC AGEN DETEC AMPLI PROBE: HCPCS

## 2021-08-02 PROCEDURE — 93000 ELECTROCARDIOGRAM COMPLETE: CPT | Performed by: INTERNAL MEDICINE

## 2021-08-02 NOTE — PROGRESS NOTES
Teresa Serna  [id number]  1955  65 y.o.  female    Referring Provider: Cierra Addison APRN    Reason for Follow-up Visit:   short term office follow up after recent encounter   Paroxysmal atrial fibrillation now atrial flutter   Saw Dr De Anda AF ablation tried but due to no good venous access not possible     Cardiac workup test results as below: 14-day outpatient cardiac telemetry   In past could not get ablation due to anatomy  Attempted by Dr De Anda   ECG today shows persistent atrial flutter     Subjective    Overall feels the same   No new events or complaints since last visit   Overall the patient feels no major change from baseline symptoms   Similar symptoms as during last visit      Intermittent palpitations, once every several days lasting for less than 1 minute  Usually resolves after she takes AM medications  No associated symptoms of dizziness, weakness, chest pain,  shortness of breath    Mild to moderate exertional  shortness of breath     No associated or exertional chest pain  Leg swelling much better after stopping Januvia   Mild pedal edema that overall better  Dependent edema worse on sitting up towards evening     No fever or chills  No significant expectoration    No hemoptysis  No presyncope or syncope     Easy fatiguability     Joint pain in small, medium and large joints  Chronic low back pain      On anticoagulation with Eliquis   BP well controlled at home.    Saw arthritis MD and blood work was prescribed  Lipids under good control    No bleeding, excessive bruising, gait instability or fall risks    Fair effort tolerance     History of present illness:  Teresa Serna is a 65 y.o. yo female with history of Paroxysmal atrial fibrillation who presents today for   Chief Complaint   Patient presents with   • Atrial Fibrillation     2 wk f/u   .    History  Past Medical History:   Diagnosis Date   • Abnormal stress test    • Atrial flutter (CMS/HCC)    • CAD (coronary artery  disease)    • CAD in native artery     CABG x 3   • Diverticulosis    • Essential hypertension     Tricuspid valve insufficiency, unspecified etiology    • History of adenomatous polyp of colon    • Hyperlipemia, mixed    • Hyperlipidemia    • Hypertension    • IBS (irritable bowel syndrome)    • MI, old    • Mitral valve prolapse    • Near syncope    • Obesity, morbid (CMS/[facility])    • Palpitations    • Paroxysmal SVT (supraventricular tachycardia) (CMS/[facility])    • Pedal edema    • Precordial pain    • Rheumatoid arthritis (CMS/[facility])    • S/P CABG x 3    • Type 2 diabetes mellitus (CMS/[facility])    ,   Past Surgical History:   Procedure Laterality Date   • APPENDECTOMY     • CARDIAC CATHETERIZATION Left 06/18/2012    Intensify medical therapy   • CARDIAC CATHETERIZATION Left 05/05/2002    surgery   • CHOLECYSTECTOMY  1975   • COLONOSCOPY N/A 1/31/2018    Diverticulosis in the entire examined colon; One 4mm tubular adenomatous polyp in the transverse colon; Non-bleeding internal hemorrhoids; Repeat 5 years   • COLONOSCOPY  02/10/2012    Diverticulosis in the entire examined colon; Non-bleeding internal hemorrhoids; Repeat 7 years   • COLONOSCOPY  01/23/2009    Diverticulosis; Repeat 7 years   • CORONARY ARTERY BYPASS GRAFT  05/05/2002    LIMA to LAD, SVG to D1, SVG to OM1 - x3   • CORONARY STENT PLACEMENT  09/2009    X1 - Stent to LAD, Drug Eluting   • DILATATION AND CURETTAGE     • ENDOSCOPY N/A 1/31/2018    Esophageal mucosal changes suspicious for short-segment De Anda's esophagus-biopsied; Small HH; Two gastric polyps-Clip (MR conditional) was placed-biopsied; Normal examined duodenum   • ENDOSCOPY  03/23/2015    Probable short-segment De Anda's esophagus-biopsied; HH; Gastritis-biopsied; Duodenal diverticulum; Repeat 1 year   • REPLACEMENT TOTAL KNEE Right    ,   Family History   Problem Relation Age of Onset   • Cancer Father    • Coronary artery disease Father    • Colon cancer Neg Hx    • Colon polyps Neg Hx    ,    Social History     Tobacco Use   • Smoking status: Former Smoker     Years: 20.00     Types: Cigarettes     Quit date: 2000     Years since quittin.5   • Smokeless tobacco: Never Used   Substance Use Topics   • Alcohol use: No   • Drug use: No   ,     Medications  Current Outpatient Medications   Medication Sig Dispense Refill   • amiodarone (PACERONE) 100 MG tablet Take 1 tablet by mouth Daily. 90 tablet 4   • aspirin 81 MG EC tablet Take 81 mg by mouth Daily.     • Eliquis 5 MG tablet tablet TAKE 1 TABLET BY MOUTH EVERY 12 HOURS 180 tablet 4   • Empagliflozin (Jardiance) 25 MG tablet Jardiance 25 mg tablet   Take 1 tablet every day by oral route.     • folic acid (FOLVITE) 1 MG tablet Take 1 mg by mouth Daily.     • furosemide (LASIX) 20 MG tablet Take 1 tablet by mouth Daily. 90 tablet 4   • isosorbide mononitrate (IMDUR) 30 MG 24 hr tablet Take 30 mg by mouth Daily.     • losartan (COZAAR) 25 MG tablet Take 25 mg by mouth Daily.     • meclizine (ANTIVERT) 12.5 MG tablet Take 1 tablet by mouth 3 (Three) Times a Day As Needed for dizziness. 90 tablet 3   • metFORMIN (GLUCOPHAGE) 1000 MG tablet Take 1,000 mg by mouth Daily With Breakfast.     • metoprolol tartrate (LOPRESSOR) 50 MG tablet Take 50 mg by mouth 2 (Two) Times a Day.     • nitroglycerin (NITROSTAT) 0.4 MG SL tablet Place 0.4 mg under the tongue Every 5 (Five) Minutes As Needed for Chest Pain. Take no more than 3 doses in 15 minutes.     • pantoprazole (PROTONIX) 40 MG EC tablet Take 1 tablet by mouth Every 12 (Twelve) Hours. 60 tablet 1   • potassium chloride (K-DUR) 10 MEQ CR tablet Take 1 tablet by mouth Daily. 90 tablet 3   • predniSONE (DELTASONE) 1 MG tablet Take 4 mg by mouth Every Night.     • predniSONE (DELTASONE) 5 MG tablet Take 5 mg by mouth Every Morning.     • simvastatin (ZOCOR) 40 MG tablet Take 40 mg by mouth Every Night.     • Tofacitinib Citrate (XELJANZ XR) 11 MG tablet sustained-release 24 hour Take 1 tablet by mouth  "Daily.     • verapamil SR (CALAN-SR) 240 MG CR tablet Take 1 tablet by mouth Daily. 90 tablet 4     No current facility-administered medications for this visit.     Results for orders placed during the hospital encounter of 07/07/20    Adult Transthoracic Echo Complete W/ Cont if Necessary Per Protocol    Interpretation Summary  · Estimated EF = 60%.  · Left ventricular systolic function is normal.  · Mild tricuspid valve regurgitation is present.  · Left ventricular diastolic function is normal.  · Mild pulmonary hypertension is present.      Allergies:  Codeine, Exenatide, Sulfa antibiotics, Albuterol, Sitagliptin, and Amoxicillin-pot clavulanate    Review of Systems  Review of Systems   Constitutional: Positive for malaise/fatigue.   HENT: Negative.    Eyes: Negative.    Cardiovascular: Positive for dyspnea on exertion, leg swelling and palpitations. Negative for chest pain, claudication, cyanosis, irregular heartbeat, near-syncope, orthopnea, paroxysmal nocturnal dyspnea and syncope.   Respiratory: Negative.    Endocrine: Negative.    Hematologic/Lymphatic: Negative.    Skin: Negative.    Musculoskeletal: Positive for arthritis, back pain and joint pain.   Gastrointestinal: Negative for anorexia.   Genitourinary: Negative.    Neurological: Positive for dizziness and weakness.   Psychiatric/Behavioral: Negative.    Same review of system and physical exam as last visit        Objective     Physical Exam:  /65   Ht 162.6 cm (64\")   Wt 88.5 kg (195 lb)   BMI 33.47 kg/m²   Physical Exam   Constitutional: She appears well-developed.   HENT:   Head: Normocephalic.   Neck: Normal carotid pulses and no JVD present. No tracheal tenderness present. Carotid bruit is not present. No tracheal deviation present.   Cardiovascular: Regular rhythm and normal pulses.   Murmur heard.   Systolic murmur is present with a grade of 2/6.  Pulmonary/Chest: Effort normal. No stridor. She has no wheezes.   Abdominal: Soft. She " exhibits no distension. There is no abdominal tenderness.   Musculoskeletal:      Right lower le+ Pitting Edema present.      Left lower le+ Pitting Edema present.     Vascular Status -  Her right foot exhibits abnormal foot edema. Her left foot exhibits abnormal foot edema.  Neurological: She is alert. No cranial nerve deficit or sensory deficit.   Skin: Skin is warm.   Psychiatric: Her speech is normal and behavior is normal.   Lower extremity: 1 plus pitting edema.     Results Review:    Results for orders placed during the hospital encounter of 20    Adult Transthoracic Echo Complete W/ Cont if Necessary Per Protocol    Interpretation Summary  · Estimated EF = 60%.  · Left ventricular systolic function is normal.  · Mild tricuspid valve regurgitation is present.  · Left ventricular diastolic function is normal.  · Mild pulmonary hypertension is present.            ECG 12 Lead    Date/Time: 2021 11:14 AM  Performed by: Jagdeep Reyes MD  Authorized by: Jagdeep Reyes MD   Comparison: compared with previous ECG from 2021  Similar to previous ECG  Rhythm: atrial flutter  Rate: tachycardic  T inversion: II, aVF, III, V4, V5 and V6  QRS axis: normal    Clinical impression: abnormal EKG        ____________________________________________________________________________________________________________________________________________  Health maintenance and recommendations  Similar recommendations as last visit     Offered to give patient  a copy      Questions were encouraged, asked and answered to the patient's  understanding and satisfaction. Questions if any regarding current medications and side effects, need for refills and importance of compliance to medications stressed.    Reviewed available prior notes, consults, prior visits, laboratory findings, radiology and cardiology relevant reports. Updated chart as applicable. I have reviewed the patient's medical history in detail and updated the  computerized patient record as relevant.      Updated patient regarding any new or relevant abnormalities on review of records or any new findings on physical exam. Mentioned to patient about purpose of visit and desirable health short and long term goals and objectives.    Primary to monitor CBC CMP Lipid panel and TSH as applicable    ___________________________________________________________________________________________________________________________________________        Assessment/Plan   Patient Active Problem List   Diagnosis   • CAD (coronary artery disease)   • Atrial flutter (CMS/[facility])   • Type 2 diabetes mellitus without complication, without long-term current use of insulin (CMS/[facility])   • Mixed hyperlipidemia   • S/P CABG x 3 2000 Dr Larsen   • Shoulder blade pain   • Anemia   • Abnormal finding on imaging   • History of esophagitis   • Essential hypertension   • S/P coronary artery stent placement   • Gastric polyp   • Colon polyps   • PAF (paroxysmal atrial fibrillation) (CMS/[facility])           Plan      Recommend DC Cardioversion   Orders Placed This Encounter   Procedures   • Cardioversion External in Cardiology Department     Standing Status:   Future     Standing Expiration Date:   8/2/2022     Order Specific Question:   What type of sedation does the patient require?     Answer:   Monitored Anesthesia Care     Order Specific Question:   At which hospital location will this be performed?     Answer:   [place]     Order Specific Question:   Reason for Exam:     Answer:   atrial flutter   • ECG 12 Lead     This order was created via procedure documentation     Order Specific Question:   Release to patient     Answer:   Immediate        She declined DC cardioversion last visit and wanted to wait and see what her heart rates would be      Will leave on same dose of Amiodarone as paroxysmal atrial flutter and SVT overall well tolerated   Monitor CBC, CMP, TSH and Lipid Panel by primary  Eye exam, pulmonary  function tests   Discussed Amiodarone induced toxicity and required monitoring and close follow up for this     Same dose of beta blocker therapy   Check BP and heart rates twice daily at least 3x / week,  at home and bring a recording for me to review next visit  If BP >130/85 or < 100/60 persistently over 3 reading 30 mins apart call sooner      In past could not get ablation due to anatomy  Attempted by Dr De Anda   The current medical regimen is effective;  continue present plan and medications.     Check BP and heart rates twice daily at least 3x / week at home and bring a recording for me to review next visit  If BP >140/90 or < 100/60 persistently over 3 reading 30 mins apart call sooner     I support the patient's decision to take the Covid -19 vaccine   Had both dose   No major issues   Now fully immunized      Monitor for any signs of bleeding including red or dark stools as well as easy bruisabilty. Fall precautions.              Return in about 3 weeks (around 8/23/2021).

## 2021-08-03 ENCOUNTER — TELEPHONE (OUTPATIENT)
Dept: FAMILY MEDICINE CLINIC | Age: 66
End: 2021-08-03

## 2021-08-03 ENCOUNTER — TELEPHONE (OUTPATIENT)
Dept: CARDIOLOGY | Facility: CLINIC | Age: 66
End: 2021-08-03

## 2021-08-03 NOTE — TELEPHONE ENCOUNTER
[facility] called to report patient's Covid test from yesterday was positive.      Call placed to patient.  She is currently symptom free.  Advised her to notify her PCP and quarantine.

## 2021-08-03 NOTE — TELEPHONE ENCOUNTER
Pt called and wanted to let you know she tested positive for Covid. She was only tested for a procedure she was supposed to have tomorrow. Pt is symptom free feels fine. I advised pt if anyting changed to let us know. Do you recommend anything different?

## 2021-08-04 ENCOUNTER — HOSPITAL ENCOUNTER (OUTPATIENT)
Dept: CARDIOLOGY | Facility: HOSPITAL | Age: 66
Discharge: HOME | End: 2021-08-04

## 2021-08-26 NOTE — PROGRESS NOTES
Teresa Serna  [id number]  1955  65 y.o.  female    Referring Provider: Cierra Addison APRN    Reason for Follow-up Visit:   short term office follow up after recent encounter   Paroxysmal atrial fibrillation now atrial flutter   Saw Dr De Anda AF ablation tried but due to no good venous access not possible     Cardiac workup test results as below: 14-day outpatient cardiac telemetry   In past could not get ablation due to anatomy  Attempted by Dr De Anda   ECG today shows persistent atrial flutter     Subjective    Had interim Covid   Could not get cardioversion due to this   Repeat Covid test was negative     Here for office follow up   Overall feels the same   No new events or complaints since last visit   Overall the patient feels no major change from baseline symptoms   Similar symptoms as during last visit      Intermittent palpitations persists, once every several days lasting for less than 1 minute  Usually resolves after she takes AM medications  No associated symptoms of dizziness, weakness, chest pain,  shortness of breath    Mild to moderate exertional  shortness of breath     No associated or exertional chest pain  Leg swelling much better after stopping Januvia   Mild pedal edema that overall better  Dependent edema worse on sitting up towards evening     No fever or chills  No significant expectoration    No hemoptysis  No presyncope or syncope     Easy fatiguability     Joint pain in small, medium and large joints  Chronic low back pain      On anticoagulation with Eliquis   BP well controlled at home.    Saw arthritis MD and blood work was prescribed  Lipids under good control    No bleeding, excessive bruising, gait instability or fall risks    Fair effort tolerance     History of present illness:  Teresa Serna is a 65 y.o. yo female with history of Paroxysmal atrial fibrillation who presents today for   Chief Complaint   Patient presents with   • Coronary Artery Disease     3  wk f/u - Covid-19+ as of 8/2/21   • Atrial Fibrillation   .    History  Past Medical History:   Diagnosis Date   • Abnormal stress test    • Atrial flutter (CMS/HCC)    • CAD (coronary artery disease)    • CAD in native artery     CABG x 3   • Diverticulosis    • Essential hypertension     Tricuspid valve insufficiency, unspecified etiology    • History of adenomatous polyp of colon    • Hyperlipemia, mixed    • Hyperlipidemia    • Hypertension    • IBS (irritable bowel syndrome)    • MI, old    • Mitral valve prolapse    • Near syncope    • Obesity, morbid (CMS/HCC)    • Palpitations    • Paroxysmal SVT (supraventricular tachycardia) (CMS/HCC)    • Pedal edema    • Precordial pain    • Rheumatoid arthritis (CMS/HCC)    • S/P CABG x 3    • Type 2 diabetes mellitus (CMS/HCC)    ,   Past Surgical History:   Procedure Laterality Date   • APPENDECTOMY     • CARDIAC CATHETERIZATION Left 06/18/2012    Intensify medical therapy   • CARDIAC CATHETERIZATION Left 05/05/2002    surgery   • CHOLECYSTECTOMY  1975   • COLONOSCOPY N/A 1/31/2018    Diverticulosis in the entire examined colon; One 4mm tubular adenomatous polyp in the transverse colon; Non-bleeding internal hemorrhoids; Repeat 5 years   • COLONOSCOPY  02/10/2012    Diverticulosis in the entire examined colon; Non-bleeding internal hemorrhoids; Repeat 7 years   • COLONOSCOPY  01/23/2009    Diverticulosis; Repeat 7 years   • CORONARY ARTERY BYPASS GRAFT  05/05/2002    LIMA to LAD, SVG to D1, SVG to OM1 - x3   • CORONARY STENT PLACEMENT  09/2009    X1 - Stent to LAD, Drug Eluting   • DILATATION AND CURETTAGE     • ENDOSCOPY N/A 1/31/2018    Esophageal mucosal changes suspicious for short-segment De Anda's esophagus-biopsied; Small HH; Two gastric polyps-Clip (MR conditional) was placed-biopsied; Normal examined duodenum   • ENDOSCOPY  03/23/2015    Probable short-segment De Anda's esophagus-biopsied; HH; Gastritis-biopsied; Duodenal diverticulum; Repeat 1 year   •  REPLACEMENT TOTAL KNEE Right    ,   Family History   Problem Relation Age of Onset   • Cancer Father    • Coronary artery disease Father    • Colon cancer Neg Hx    • Colon polyps Neg Hx    ,   Social History     Tobacco Use   • Smoking status: Former Smoker     Years: 20.00     Types: Cigarettes     Quit date: 2000     Years since quittin.5   • Smokeless tobacco: Never Used   Substance Use Topics   • Alcohol use: No   • Drug use: No   ,     Medications  Current Outpatient Medications   Medication Sig Dispense Refill   • amiodarone (PACERONE) 100 MG tablet Take 1 tablet by mouth Daily. 90 tablet 4   • aspirin 81 MG EC tablet Take 81 mg by mouth Daily.     • Eliquis 5 MG tablet tablet TAKE 1 TABLET BY MOUTH EVERY 12 HOURS 180 tablet 4   • Empagliflozin (Jardiance) 25 MG tablet Jardiance 25 mg tablet   Take 1 tablet every day by oral route.     • folic acid (FOLVITE) 1 MG tablet Take 1 mg by mouth Daily.     • furosemide (LASIX) 20 MG tablet Take 1 tablet by mouth Daily. 90 tablet 4   • isosorbide mononitrate (IMDUR) 30 MG 24 hr tablet Take 30 mg by mouth Daily.     • losartan (COZAAR) 25 MG tablet Take 25 mg by mouth Daily.     • meclizine (ANTIVERT) 12.5 MG tablet Take 1 tablet by mouth 3 (Three) Times a Day As Needed for dizziness. 90 tablet 3   • metFORMIN (GLUCOPHAGE) 1000 MG tablet Take 1,000 mg by mouth Daily With Breakfast.     • metoprolol tartrate (LOPRESSOR) 50 MG tablet Take 50 mg by mouth 2 (Two) Times a Day.     • nitroglycerin (NITROSTAT) 0.4 MG SL tablet Place 0.4 mg under the tongue Every 5 (Five) Minutes As Needed for Chest Pain. Take no more than 3 doses in 15 minutes.     • pantoprazole (PROTONIX) 40 MG EC tablet Take 1 tablet by mouth Every 12 (Twelve) Hours. 60 tablet 1   • potassium chloride (K-DUR) 10 MEQ CR tablet Take 1 tablet by mouth Daily. 90 tablet 3   • predniSONE (DELTASONE) 1 MG tablet Take 4 mg by mouth Every Night.     • predniSONE (DELTASONE) 5 MG tablet Take 5 mg by mouth  "Every Morning.     • simvastatin (ZOCOR) 40 MG tablet Take 40 mg by mouth Every Night.     • Tofacitinib Citrate (XELJANZ XR) 11 MG tablet sustained-release 24 hour Take 1 tablet by mouth Daily.     • verapamil SR (CALAN-SR) 240 MG CR tablet Take 1 tablet by mouth Daily. 90 tablet 4     No current facility-administered medications for this visit.     Results for orders placed during the hospital encounter of 07/07/20    Adult Transthoracic Echo Complete W/ Cont if Necessary Per Protocol    Interpretation Summary  · Estimated EF = 60%.  · Left ventricular systolic function is normal.  · Mild tricuspid valve regurgitation is present.  · Left ventricular diastolic function is normal.  · Mild pulmonary hypertension is present.      Allergies:  Codeine, Exenatide, Sulfa antibiotics, Albuterol, Sitagliptin, and Amoxicillin-pot clavulanate    Review of Systems  Review of Systems   Constitutional: Positive for malaise/fatigue.   HENT: Negative.    Eyes: Negative.    Cardiovascular: Positive for dyspnea on exertion, leg swelling and palpitations. Negative for chest pain, claudication, cyanosis, irregular heartbeat, near-syncope, orthopnea, paroxysmal nocturnal dyspnea and syncope.   Respiratory: Negative.    Endocrine: Negative.    Hematologic/Lymphatic: Negative.    Skin: Negative.    Musculoskeletal: Positive for arthritis, back pain and joint pain.   Gastrointestinal: Negative for anorexia.   Genitourinary: Negative.    Neurological: Positive for dizziness and weakness.   Psychiatric/Behavioral: Negative.    Same review of system and physical exam as last visit        Objective     Physical Exam:  /70   Pulse 102   Ht 162.6 cm (64\")   Wt 88.9 kg (196 lb)   SpO2 95%   BMI 33.64 kg/m²   Physical Exam   Constitutional: She appears well-developed.   HENT:   Head: Normocephalic.   Neck: Normal carotid pulses and no JVD present. No tracheal tenderness present. Carotid bruit is not present. No tracheal deviation " present.   Cardiovascular: Regular rhythm and normal pulses.   Murmur heard.   Systolic murmur is present with a grade of 2/6.  Pulmonary/Chest: Effort normal. No stridor. She has no wheezes.   Abdominal: Soft. She exhibits no distension. There is no abdominal tenderness.   Musculoskeletal:      Right lower le+ Pitting Edema present.      Left lower le+ Pitting Edema present.     Vascular Status -  Her right foot exhibits abnormal foot edema. Her left foot exhibits abnormal foot edema.  Neurological: She is alert. No cranial nerve deficit or sensory deficit.   Skin: Skin is warm.   Psychiatric: Her speech is normal and behavior is normal.   Lower extremity: 1 plus pitting edema.     Results Review:    Results for orders placed during the hospital encounter of 20    Adult Transthoracic Echo Complete W/ Cont if Necessary Per Protocol    Interpretation Summary  · Estimated EF = 60%.  · Left ventricular systolic function is normal.  · Mild tricuspid valve regurgitation is present.  · Left ventricular diastolic function is normal.  · Mild pulmonary hypertension is present.            ECG 12 Lead    Date/Time: 2021 11:45 AM  Performed by: Jagdeep Reyes MD  Authorized by: Jagdeep Reyes MD   Comparison: compared with previous ECG from 2021  Similar to previous ECG  Comparison to previous ECG: Ventricular rate changed from  102   to 103  beats per minute    Rhythm: atrial flutter  Rate: tachycardic  T inversion: II, III, aVF, I, aVL, V1, V2 and V3    Clinical impression: abnormal EKG        ____________________________________________________________________________________________________________________________________________  Health maintenance and recommendations  Similar recommendations as last visit     Offered to give patient  a copy      Questions were encouraged, asked and answered to the patient's  understanding and satisfaction. Questions if any regarding current medications and side  effects, need for refills and importance of compliance to medications stressed.    Reviewed available prior notes, consults, prior visits, laboratory findings, radiology and cardiology relevant reports. Updated chart as applicable. I have reviewed the patient's medical history in detail and updated the computerized patient record as relevant.      Updated patient regarding any new or relevant abnormalities on review of records or any new findings on physical exam. Mentioned to patient about purpose of visit and desirable health short and long term goals and objectives.    Primary to monitor CBC CMP Lipid panel and TSH as applicable    ___________________________________________________________________________________________________________________________________________        Assessment/Plan   Patient Active Problem List   Diagnosis   • CAD (coronary artery disease)   • Atrial flutter (CMS/[facility])   • Type 2 diabetes mellitus without complication, without long-term current use of insulin (CMS/[facility])   • Mixed hyperlipidemia   • S/P CABG x 3 2000 Dr Larsen   • Shoulder blade pain   • Anemia   • Abnormal finding on imaging   • History of esophagitis   • Essential hypertension   • S/P coronary artery stent placement   • Gastric polyp   • Colon polyps   • PAF (paroxysmal atrial fibrillation) (CMS/[facility])           Plan    In past could not get ablation due to anatomy  Attempted by Dr De Anda   The current medical regimen is effective;  continue present plan and medications.   Recommend DC Cardioversion again  Ordered again     Will leave on same dose of Amiodarone as paroxysmal atrial flutter and SVT overall well tolerated   Monitor CBC, CMP, TSH and Lipid Panel by primary  Eye exam, pulmonary function tests   Discussed Amiodarone induced toxicity and required monitoring and close follow up for this     Same dose of beta blocker therapy   Check BP and heart rates twice daily at least 3x / week,  at home and bring a recording  for me to review next visit  If BP >130/85 or < 100/60 persistently over 3 reading 30 mins apart call sooner      I support the patient's decision to take the Covid -19 vaccine   Had both dose   No major issues   Now fully immunized    Recommend booster      Monitor for any signs of bleeding including red or dark stools as well as easy bruisabilty. Fall precautions.              Return in about 6 weeks (around 10/7/2021).

## 2021-08-27 NOTE — ANESTHESIA POSTPROCEDURE EVALUATION
Patient: Teresa Serna    Procedure Summary     Date: 08/27/21 Room / Location: [facility] CATH LAB    Anesthesia Start: 1321 Anesthesia Stop: 1332    Procedure: CARDIOVERSION EXTERNAL IN CARDIOLOGY DEPARTMENT Diagnosis:       Typical atrial flutter (CMS/HCC)      (atrial flutter)    Scheduled Providers: Jagdeep Reyes MD Provider: Flora Cevallos CRNA    Anesthesia Type: MAC ASA Status: 3          Anesthesia Type: MAC    Vitals  103/60  94%  HR 70  RR 22        Post Anesthesia Care and Evaluation    Patient location during evaluation: PHASE II  Patient participation: complete - patient participated  Level of consciousness: awake  Pain management: adequate  Airway patency: patent  Anesthetic complications: No anesthetic complications  PONV Status: none  Cardiovascular status: acceptable  Respiratory status: acceptable  Hydration status: acceptable

## 2021-08-27 NOTE — ANESTHESIA PREPROCEDURE EVALUATION
Anesthesia Evaluation     Patient summary reviewed   no history of anesthetic complications:  NPO Solid Status: > 8 hours  NPO Liquid Status: > 8 hours           Airway   Mallampati: II  TM distance: >3 FB  Dental - normal exam     Pulmonary    (+) a smoker Former,   Cardiovascular     ECG reviewed  PT is on anticoagulation therapy  Patient on routine beta blocker and Beta blocker given within 24 hours of surgery    (+) hypertension, valvular problems/murmurs, past MI , CAD, CABG, dysrhythmias Paroxysmal Atrial Fib, hyperlipidemia,       Neuro/Psych- negative ROS  GI/Hepatic/Renal/Endo    (+) obesity,   diabetes mellitus type 2 using insulin,     Musculoskeletal     Abdominal    Substance History      OB/GYN          Other   arthritis,                    Anesthesia Plan    ASA 3     MAC     intravenous induction     Anesthetic plan, all risks, benefits, and alternatives have been provided, discussed and informed consent has been obtained with: patient.

## 2021-09-07 NOTE — TELEPHONE ENCOUNTER
Pt called and states she cannot get her blood sugar under control. She states it  is running between 150-180. Last lab was drawn in June. Her next appt is not until November. Pt states it seems to be worse after she had cardioversion but her medications were not changed.

## 2021-09-10 NOTE — PROGRESS NOTES
Ms.Teresa CHERYL Fagan is a 60 y.o. female who presents today for  Chief Complaint   Patient presents with    Hyperglycemia     sugars have been running 140's to 150's, this morning it was 120.  Fatigue     feels very fatigued when she wakes up and it lasts through the day. HPI:  She has had fatigue for the past 2 weeks. Blood sugar has been labile. FBG running 140s to 150s, sometimes higher. It was 128 this morning. Rarely checks in afternoon but recently was a little over 300. Current glyburide dose is 10 mg qam, 5 mg qpm.  Metformin 500 mg twice daily, has GI side effects but tolerable. She takes prednisone chronically for RA. She had successful cardioversion 2 weeks ago at [facility] for atrial fib/flutter. Followed by Dr. Michelle Guaman. BP has been stable, controlled. No recent chest pain or shortness of breath. Review of Systems   Constitutional: Positive for fatigue. HENT: Negative for congestion, ear pain and sore throat. Respiratory: Negative for cough, chest tightness, shortness of breath and wheezing. Cardiovascular: Negative for chest pain and palpitations. Gastrointestinal: Negative for abdominal pain, diarrhea and nausea. Musculoskeletal: Negative for arthralgias and myalgias. Skin: Negative for rash.        Past Medical History:   Diagnosis Date    Atrial flutter [facility]cardiogram ([facility])     Constipation     Coronary artery disease     Diabetes mellitus ([facility])     Essential hypertension     Hyperlipidemia     IBS (irritable bowel syndrome)     Iron deficiency anemia     Menopause     Paroxysmal SVT (supraventricular tachycardia) ([facility])     RA (rheumatoid arthritis) (HCC)     Rheumatoid arthritis ([facility])        Current Outpatient Medications   Medication Sig Dispense Refill    glyBURIDE (DIABETA) 5 MG tablet Take 2 tablets in the am & 2 tablet at night 120 tablet 5    ketoconazole (NIZORAL) 2 % cream APPLY TOPICALLY TO AFFECTED AREA(S) ONCE DAILY AS DIRECTED 60 g 0    pantoprazole (PROTONIX) 40 MG tablet TAKE 1 TABLET BY MOUTH 2 TIMES A  tablet 0    losartan (COZAAR) 25 MG tablet TAKE 1 TABLET BY MOUTH DAILY 90 tablet 0    simvastatin (ZOCOR) 40 MG tablet TAKE 1 TABLET BY MOUTH NIGHTLY 90 tablet 1    metFORMIN (GLUCOPHAGE) 500 MG tablet TAKE 1 TABLET BY MOUTH ONCE EVERY MORNING AND ONE TABLET EVERY EVENING 180 tablet 1    isosorbide mononitrate (IMDUR) 30 MG extended release tablet TAKE 1 TABLET BY MOUTH DAILY 90 tablet 1    metoprolol tartrate (LOPRESSOR) 50 MG tablet TAKE 1 TABLET BY MOUTH 2 TIMES A  tablet 1    FreeStyle Lancets MISC USE AS DIRECTED 100 each 3    potassium chloride (KLOR-CON M) 20 MEQ extended release tablet TAKE 1 TABLET BY MOUTH DAILY. 90 tablet 0    blood glucose test strips (FREESTYLE LITE) strip 1 each by In Vitro route daily As needed. 100 each 3    blood glucose test strips (FREESTYLE LITE) strip Infuse 1 each intravenously 2 times daily As needed. 100 strip 1    nystatin (MYCOSTATIN) 745940 UNIT/GM powder Apply topically 4 times daily. 30 g 5    Blood Glucose Monitoring Suppl (FREESTYLE INSULINX SYSTEM) w/Device KIT 1 each by Does not apply route daily 1 kit 0    blood glucose test strips (FREESTYLE INSULINX TEST) strip 1 each by In Vitro route daily As needed. 100 each 3    nitroGLYCERIN (NITROSTAT) 0.4 MG SL tablet PLACE 1 TABLET UNDER THE TONGUE EVERY 5 MINUTES AS NEEDED FOR CHEST PAIN 25 tablet 0    nystatin (MYCOSTATIN) 772061 UNIT/GM cream Apply topically 2 times daily.  30 g 2    Tofacitinib Citrate (XELJANZ XR) 11 MG TB24 Take 11 mg by mouth daily      furosemide (LASIX) 20 MG tablet Take 20 mg by mouth daily      FREESTYLE LANCETS MISC TEST SUGAR DAILY AS DIRECTED UP TO 4 TIMES A DAY 4 each 0    apixaban (ELIQUIS) 5 MG TABS tablet Take 5 mg by mouth 2 times daily       verapamil (CALAN SR) 240 MG extended release tablet Take 240 mg by mouth daily       amiodarone (PACERONE) 100 MG tablet Take 100 mg by mouth daily       FREESTYLE LANCETS MISC TEST SUGAR DAILY AS DIRECTED 100 each 2    predniSONE (DELTASONE) 5 MG tablet Take 5 mg by mouth 2 times daily Take 5 mg in the morning and 4 mg in the evening.  aspirin EC 81 MG EC tablet Take 81 mg by mouth daily      folic acid (FOLVITE) 1 MG tablet Take 1 mg by mouth daily        No current facility-administered medications for this visit. Allergies   Allergen Reactions    Codeine Other (See Comments)     Chest pain    Albuterol      Rapid heart rate    Clindamycin/Lincomycin     Januvia [Sitagliptin] Swelling    Augmentin [Amoxicillin-Pot Clavulanate] Palpitations    Sulfa Antibiotics Rash       Past Surgical History:   Procedure Laterality Date    CARDIAC SURGERY      CABG (Dr. Cinthia Reed) [facility]  2009    Stent     CHOLECYSTECTOMY      CORONARY ARTERY BYPASS GRAFT      DILATION AND CURETTAGE OF UTERUS N/A 2019    DILATATION AND CURETTAGE HYSTEROSCOPY W/ Heydi Romero performed by Shalini Ken MD at [address] [place] [address] TOTAL KNEE ARTHROPLASTY         Social History     Tobacco Use    Smoking status: Former Smoker     Packs/day: 0.50     Years: 26.00     Pack years: 13.00     Types: Cigarettes     Start date: 6/10/1974     Quit date: 2000     Years since quittin.9    Smokeless tobacco: Never Used   Vaping Use    Vaping Use: Never used   Substance Use Topics    Alcohol use: No    Drug use: No       Family History   Problem Relation Age of Onset    Heart Attack Father     Prostate Cancer Father     Stroke Father     Heart Attack Brother     Stroke Brother     Breast Cancer Maternal Aunt     Cancer Paternal Uncle         Lung       /66   Pulse 59   Temp 96.9 °F (36.1 °C)   Ht 5' 4\" (1.626 m)   Wt 204 lb (92.5 kg)   SpO2 99%   BMI 35.02 kg/m²     Physical Exam  Vitals reviewed. Constitutional:       General: She is not in acute distress. Appearance: Normal appearance. She is well-developed. HENT:      Head: Normocephalic. Eyes:      Conjunctiva/sclera: Conjunctivae normal.      Pupils: Pupils are equal, round, and reactive to light. Neck:      Thyroid: No thyromegaly. Vascular: No carotid bruit or JVD. Trachea: No tracheal deviation. Cardiovascular:      Rate and Rhythm: Normal rate and regular rhythm. Heart sounds: Normal heart sounds. No murmur heard. Pulmonary:      Effort: Pulmonary effort is normal. No respiratory distress. Breath sounds: Normal breath sounds. No wheezing or rhonchi. Musculoskeletal:         General: Normal range of motion. Cervical back: Normal range of motion and neck supple. Lymphadenopathy:      Cervical: No cervical adenopathy. Skin:     General: Skin is warm and dry. Findings: No rash. Neurological:      Mental Status: She is alert. Psychiatric:         Mood and Affect: Mood normal.         Behavior: Behavior normal.         Thought Content: Thought content normal.         ASSESSMENT/PLAN:  1. Type 2 diabetes mellitus with hyperglycemia, without long-term current use of insulin (HCC)  -Increase glyburide to 10 mg twice daily.  -We will avoid increasing Metformin due to GI side effect  -Check A1c today  -Reassess in 2 weeks. She will monitor FBG at home. - Hemoglobin A1C; Future    2. Fatigue, unspecified type  -Check labs as outlined below  - CBC Auto Differential; Future  - Comprehensive Metabolic Panel; Future  - TSH without Reflex; Future  - T4, Free; Future  - Urinalysis; Future  - Culture, Urine; Future    3. Paroxysmal atrial fibrillation (HCC)  -Stable s/p recent cardioversion. Continue with recommendations per cardiology. 4. Essential hypertension  -Stable, controlled. Continue current medication. 5. Rheumatoid arthritis involving multiple sites, unspecified whether rheumatoid factor present ([facility] [address]  -Stable, remains on chronic prednisone.          Return in about 2 weeks (around 9/24/2021) for follow up, 30 minute visit. Lily Carr was seen today for hyperglycemia and fatigue. Diagnoses and all orders for this visit:    Rheumatoid arthritis involving multiple sites, unspecified whether rheumatoid factor present ([facility] [address]    Type 2 diabetes mellitus with hyperglycemia, without long-term current use of insulin (HCC)  -     Hemoglobin A1C; Future    Fatigue, unspecified type  -     CBC Auto Differential; Future  -     Comprehensive Metabolic Panel; Future  -     TSH without Reflex; Future  -     T4, Free; Future  -     Urinalysis; Future  -     Culture, Urine; Future    Paroxysmal atrial fibrillation (HCC)    Essential hypertension    Other orders  -     glyBURIDE (DIABETA) 5 MG tablet; Take 2 tablets in the am & 2 tablet at night      Medications Discontinued During This Encounter   Medication Reason    glyBURIDE (DIABETA) 5 MG tablet REORDER     Patient Instructions   We are committed to providing you with the best care possible. In order to help us achieve these goals please remember to bring all medications, herbal products, and over the counter supplements with you to each visit. If your provider has ordered testing for you, please be sure to follow up with our office if you have not received results within 7 days after the testing took place. *If you receive a survey after visiting one of our offices, please take time to share your experience concerning your physician office visit. These surveys are confidential and no health information about you is shared. We are eager to improve for you and we are counting on your feedback to help make that happen. Patient voicesunderstanding and agrees to plans along with risks and benefits of plan. Counseling:  Samuel Fagan's case, medications and options were discussed in detail. Patient was instructed to call the office if she questionsregarding her treatment.  Should her conditions worsen, she should return to office to be reassessed by Maggy Benito MARYANNE. she Should to go the closest Emergency Department for any emergency. They verbalizedunderstanding the above instructions. Return in about 2 weeks (around 9/24/2021) for follow up, 30 minute visit.

## 2021-09-13 NOTE — PROGRESS NOTES
Tong Nava is a 60 y.o. female who presents today for her medical conditions/ complaints as noted below. Heidy Snooks Day is c/o of Annual Exam        HPI  Pt presents today for gyn exam    Last mammogram:  2020  Last pap smear:    Contraception:  postmeno  Last bone density:  Several years  Last colonoscopy:     No LMP recorded. Patient is postmenopausal.  No obstetric history on file.     Past Medical History:   Diagnosis Date    Atrial flutter by electrocardiogram ([address]     Constipation     Coronary artery disease     Diabetes mellitus (HCC)     Essential hypertension     Hyperlipidemia     IBS (irritable bowel syndrome)     Iron deficiency anemia     Menopause     Paroxysmal SVT (supraventricular tachycardia) (HCC)     RA (rheumatoid arthritis) (HCC)     Rheumatoid arthritis ([address]      Past Surgical History:   Procedure Laterality Date    CARDIAC SURGERY      CABG (Dr. Richard Barth) [facility]      Stent     CHOLECYSTECTOMY      CORONARY ARTERY BYPASS GRAFT      DILATION AND CURETTAGE OF UTERUS N/A 2019    DILATATION AND CURETTAGE HYSTEROSCOPY W/ Kimberli Millan performed by [facility] OR    TOTAL KNEE ARTHROPLASTY       Family History   Problem Relation Age of Onset    Heart Attack Father     Prostate Cancer Father     Stroke Father     Heart Attack Brother     Stroke Brother     Breast Cancer Maternal Aunt     Cancer Paternal Uncle         Lung     Social History     Tobacco Use    Smoking status: Former Smoker     Packs/day: 0.50     Years: 26.00     Pack years: 13.00     Types: Cigarettes     Start date: 6/10/1974     Quit date: 2000     Years since quittin.9    Smokeless tobacco: Never Used   Substance Use Topics    Alcohol use: No       Current Outpatient Medications   Medication Sig Dispense Refill    glyBURIDE (DIABETA) 5 MG tablet Take 2 tablets in the am & 2 tablet at night 120 tablet 5    ketoconazole (NIZORAL) 2 % cream APPLY TOPICALLY TO AFFECTED AREA(S) ONCE DAILY AS DIRECTED 60 g 0    pantoprazole (PROTONIX) 40 MG tablet TAKE 1 TABLET BY MOUTH 2 TIMES A  tablet 0    losartan (COZAAR) 25 MG tablet TAKE 1 TABLET BY MOUTH DAILY 90 tablet 0    simvastatin (ZOCOR) 40 MG tablet TAKE 1 TABLET BY MOUTH NIGHTLY 90 tablet 1    metFORMIN (GLUCOPHAGE) 500 MG tablet TAKE 1 TABLET BY MOUTH ONCE EVERY MORNING AND ONE TABLET EVERY EVENING 180 tablet 1    isosorbide mononitrate (IMDUR) 30 MG extended release tablet TAKE 1 TABLET BY MOUTH DAILY 90 tablet 1    metoprolol tartrate (LOPRESSOR) 50 MG tablet TAKE 1 TABLET BY MOUTH 2 TIMES A  tablet 1    FreeStyle Lancets MISC USE AS DIRECTED 100 each 3    potassium chloride (KLOR-CON M) 20 MEQ extended release tablet TAKE 1 TABLET BY MOUTH DAILY. 90 tablet 0    blood glucose test strips (FREESTYLE LITE) strip 1 each by In Vitro route daily As needed. 100 each 3    blood glucose test strips (FREESTYLE LITE) strip Infuse 1 each intravenously 2 times daily As needed. 100 strip 1    nystatin (MYCOSTATIN) 834760 UNIT/GM powder Apply topically 4 times daily. 30 g 5    Blood Glucose Monitoring Suppl (FREESTYLE INSULINX SYSTEM) w/Device KIT 1 each by Does not apply route daily 1 kit 0    blood glucose test strips (FREESTYLE INSULINX TEST) strip 1 each by In Vitro route daily As needed. 100 each 3    nitroGLYCERIN (NITROSTAT) 0.4 MG SL tablet PLACE 1 TABLET UNDER THE TONGUE EVERY 5 MINUTES AS NEEDED FOR CHEST PAIN 25 tablet 0    nystatin (MYCOSTATIN) 599295 UNIT/GM cream Apply topically 2 times daily.  30 g 2    Tofacitinib Citrate (XELJANZ XR) 11 MG TB24 Take 11 mg by mouth daily      furosemide (LASIX) 20 MG tablet Take 20 mg by mouth daily      FREESTYLE LANCETS MISC TEST SUGAR DAILY AS DIRECTED UP TO 4 TIMES A DAY 4 each 0    apixaban (ELIQUIS) 5 MG TABS tablet Take 5 mg by mouth 2 times daily       verapamil (CALAN SR) 240 MG extended release tablet Take 240 mg by Normal rate and regular rhythm. Heart sounds: Normal heart sounds. No murmur heard. Pulmonary:      Effort: Pulmonary effort is normal.      Breath sounds: Normal breath sounds. No wheezing. Chest:      Breasts: Breasts are symmetrical.         Right: No inverted nipple, mass, nipple discharge, skin change or tenderness. Left: No inverted nipple, mass, nipple discharge, skin change or tenderness. Abdominal:      General: Bowel sounds are normal. There is no distension. Palpations: Abdomen is soft. There is no mass. Tenderness: There is no abdominal tenderness. Hernia: There is no hernia in the left inguinal area. Genitourinary:     Labia:         Right: No rash, tenderness or lesion. Left: No rash, tenderness or lesion. Vagina: No vaginal discharge or tenderness. Cervix: No cervical motion tenderness or discharge (normal cervical mucosa). Uterus: Not enlarged and not tender. Adnexa:         Right: No mass, tenderness or fullness. Left: No mass, tenderness or fullness. Rectum: No external hemorrhoid. Comments: Midline cystocele grade 3, anterior vaginal prolapse, uterine prolpase  Musculoskeletal:         General: No tenderness. Normal range of motion. Cervical back: Normal range of motion and neck supple. Lymphadenopathy:      Cervical:      Right cervical: No superficial, deep or posterior cervical adenopathy. Left cervical: No superficial, deep or posterior cervical adenopathy. Upper Body:      Right upper body: No supraclavicular, pectoral or epitrochlear adenopathy. Left upper body: No supraclavicular, pectoral or epitrochlear adenopathy. Skin:     General: Skin is warm and dry. Findings: No rash. Neurological:      Mental Status: She is alert and oriented to person, place, and time. She is not disoriented. Sensory: No sensory deficit.       Coordination: Coordination normal.      Gait: Gait normal.      Deep Tendon Reflexes: Reflexes are normal and symmetric. Psychiatric:         Speech: Speech normal.         Behavior: Behavior normal.         Thought Content: Thought content normal.         Judgment: Judgment normal.          Diagnosis Orders   1. Encounter for routine gynecologic examination in Medicare patient  [facility] CA SCREEN;PELVIC/BREAST EXAM   2. Midline cystocele     3. Uterine prolapse         MEDICATIONS:  No orders of the defined types were placed in this encounter. ORDERS:  Orders Placed This Encounter   Procedures    [facility] CA SCREEN;PELVIC/BREAST EXAM       PLAN:  We discussed her findings of prolapse. Has urinary frequency, some leakage. We discussed getting at a healthy bmi, pelvic floor strengthening PT/yoga online. If worsens, to call me, discuss pessary if so or if develops uti. Patient Instructions     Patient Education        Breast Self-Exam: Care Instructions  Your Care Instructions     A breast self-exam is when you check your breasts for lumps or changes. This regular exam helps you learn how your breasts normally look and feel. Most breast problems or changes are not because of cancer. Breast self-exam is not a substitute for a mammogram. Having regular breast exams by your doctor and regular mammograms improve your chances of finding any problems with your breasts. Some women set a time each month to do a step-by-step breast self-exam. Other women like a less formal system. They might look at their breasts as they brush their teeth, or feel their breasts once in a while in the shower. If you notice a change in your breast, tell your doctor. Follow-up care is a key part of your treatment and safety. Be sure to make and go to all appointments, and call your doctor if you are having problems. It's also a good idea to know your test results and keep a list of the medicines you take.   How do you do a breast self-exam?  · The best time to examine your breasts is usually one week after your menstrual period begins. Your breasts should not be tender then. If you do not have periods, you might do your exam on a day of the month that is easy to remember. · To examine your breasts:  ? Remove all your clothes above the waist and lie down. When you are lying down, your breast tissue spreads evenly over your chest wall, which makes it easier to feel all your breast tissue. ? Use the pads--not the fingertips--of the 3 middle fingers of your left hand to check your right breast. Move your fingers slowly in small coin-sized circles that overlap. ? Use three levels of pressure to feel of all your breast tissue. Use light pressure to feel the tissue close to the skin surface. Use medium pressure to feel a little deeper. Use firm pressure to feel your tissue close to your breastbone and ribs. Use each pressure level to feel your breast tissue before moving on to the next spot. ? Check your entire breast, moving up and down as if following a strip from the collarbone to the bra line, and from the armpit to the ribs. Repeat until you have covered the entire breast.  ? Repeat this procedure for your left breast, using the pads of the 3 middle fingers of your right hand. · To examine your breasts while in the shower:  ? Place one arm over your head and lightly soap your breast on that side. ? Using the pads of your fingers, gently move your hand over your breast (in the strip pattern described above), feeling carefully for any lumps or changes. ? Repeat for the other breast.  · Have your doctor inspect anything you notice to see if you need further testing. Where can you learn more? Go to https://Sourcebitsshannon.V Wave. org and sign in to your FreeWavz account. Enter P148 in the [place] box to learn more about \"Breast Self-Exam: Care Instructions. \"     If you do not have an account, please click on the \"Sign Up Now\" link.   Current as of: December 17, 2020               Content Version: 12.9  © 2006-2021 Healthwise, [employer]. Care instructions adapted under license by [facility] ([facility]). If you have questions about a medical condition or this instruction, always ask your healthcare professional. [address] any warranty or liability for your use of this information. Patient Education        Well Visit, Ages 25 to 48: Care Instructions  Overview     Well visits can help you stay healthy. Your doctor has checked your overall health and may have suggested ways to take good care of yourself. Your doctor also may have recommended tests. At home, you can help prevent illness with healthy eating, regular exercise, and other steps. Follow-up care is a key part of your treatment and safety. Be sure to make and go to all appointments, and call your doctor if you are having problems. It's also a good idea to know your test results and keep a list of the medicines you take. How can you care for yourself at home? · Get screening tests that you and your doctor decide on. Screening helps find diseases before any symptoms appear. · Eat healthy foods. Choose fruits, vegetables, whole grains, protein, and low-fat dairy foods. Limit fat, especially saturated fat. Reduce salt in your diet. · Limit alcohol. If you are a man, have no more than 2 drinks a day or 14 drinks a week. If you are a woman, have no more than 1 drink a day or 7 drinks a week. · Get at least 30 minutes of physical activity on most days of the week. Walking is a good choice. You also may want to do other activities, such as running, swimming, cycling, or playing tennis or team sports. Discuss any changes in your exercise program with your doctor. · Reach and stay at a healthy weight. This will lower your risk for many problems, such as obesity, diabetes, heart disease, and high blood pressure. · Do not smoke or allow others to smoke around you.  If you need help quitting, talk to your doctor about stop-smoking programs and medicines. These can increase your chances of quitting for good. · Care for your mental health. It is easy to get weighed down by worry and stress. Learn strategies to manage stress, like deep breathing and mindfulness, and stay connected with your family and community. If you find you often feel sad or hopeless, talk with your doctor. Treatment can help. · Talk to your doctor about whether you have any risk factors for sexually transmitted infections (STIs). You can help prevent STIs if you wait to have sex with a new partner (or partners) until you've each been tested for STIs. It also helps if you use condoms (male or female condoms) and if you limit your sex partners to one person who only has sex with you. Vaccines are available for some STIs, such as HPV. · Use birth control if it's important to you to prevent pregnancy. Talk with your doctor about the choices available and what might be best for you. · If you think you may have a problem with alcohol or drug use, talk to your doctor. This includes prescription medicines (such as amphetamines and opioids) and illegal drugs (such as cocaine and methamphetamine). Your doctor can help you figure out what type of treatment is best for you. · Protect your skin from too much sun. When you're outdoors from 10 a.m. to 4 p.m., stay in the shade or cover up with clothing and a hat with a wide brim. Wear sunglasses that block UV rays. Even when it's cloudy, put broad-spectrum sunscreen (SPF 30 or higher) on any exposed skin. · See a dentist one or two times a year for checkups and to have your teeth cleaned. · Wear a seat belt in the car. When should you call for help? Watch closely for changes in your health, and be sure to contact your doctor if you have any problems or symptoms that concern you. Where can you learn more? Go to https://alfonzo.health-partners. org and sign in to your SpeSo Healthhart account. Enter P072 in the [facility] box to learn more about \"Well Visit, Ages 25 to 48: Care Instructions. \"     If you do not have an account, please click on the \"Sign Up Now\" link. Current as of: May 27, 2020               Content Version: 12.9  © 2006-2021 Healthwise, [employer]. Care instructions adapted under license by [facility] ([facility]). If you have questions about a medical condition or this instruction, always ask your healthcare professional. [address] any warranty or liability for your use of this information. Patient Education        Human Papillomavirus (HPV): Care Instructions  Overview  The human papillomavirus (HPV) is a very common virus. There are many types of HPV. Some types cause the common skin wart. Other types cause genital warts, which can be spread by sexual contact. Some types can increase the risk for certain cancers, such as cervical or anal cancer. Having one type of HPV doesn't lead to having another type. Many women who have HPV may not know that they're infected until it's found with a cervical cancer screening test, such as an HPV test.  If an HPV screening test finds that you have the types of HPV that might lead to cancer, your doctor may suggest more tests. This doesn't mean you'll get cancer. But it means that you may have an increased risk. Abnormal cell changes caused by HPV often go away on their own. If they don't, they can be treated. Follow-up care is a key part of your treatment and safety. Be sure to make and go to all appointments, and call your doctor if you are having problems. It's also a good idea to know your test results and keep a list of the medicines you take. How can you care for yourself at home? · Do not smoke. Smoking increases the risk for cervical problems and cervical cancer. If you need help quitting, talk to your doctor about stop-smoking programs and medicines.  These can increase your chances of quitting for good.  · Use condoms every time you have sex. Use them from the beginning to the end of sexual contact. · Be sure to tell your sexual partner or partners that you have HPV. Even if you do not have symptoms, you can still pass HPV to others. · Having one sex partner (who does not have STIs and does not have sex with anyone else) can decrease your risk of getting STIs. When should you call for help? Watch closely for changes in your health, and be sure to contact your doctor if:    · You have vaginal pain during or after sex.     · You have vaginal bleeding when you are not in your menstrual period. Where can you learn more? Go to https://CorporateWorldpepiceweb.health-partners. org and sign in to your Fluidigm account. Enter F690 in the [place] box to learn more about \"Human Papillomavirus (HPV): Care Instructions. \"     If you do not have an account, please click on the \"Sign Up Now\" link. Current as of: February 11, 2021               Content Version: 12.9  © 2006-2021 Healthwise, Incorporated. Care instructions adapted under license by [facility] ([facility]). If you have questions about a medical condition or this instruction, always ask your healthcare professional. [place] [address] any warranty or liability for your use of this information.

## 2021-09-13 NOTE — PATIENT INSTRUCTIONS
Patient Education        Breast Self-Exam: Care Instructions  Your Care Instructions     A breast self-exam is when you check your breasts for lumps or changes. This regular exam helps you learn how your breasts normally look and feel. Most breast problems or changes are not because of cancer. Breast self-exam is not a substitute for a mammogram. Having regular breast exams by your doctor and regular mammograms improve your chances of finding any problems with your breasts. Some women set a time each month to do a step-by-step breast self-exam. Other women like a less formal system. They might look at their breasts as they brush their teeth, or feel their breasts once in a while in the shower. If you notice a change in your breast, tell your doctor. Follow-up care is a key part of your treatment and safety. Be sure to make and go to all appointments, and call your doctor if you are having problems. It's also a good idea to know your test results and keep a list of the medicines you take. How do you do a breast self-exam?  · The best time to examine your breasts is usually one week after your menstrual period begins. Your breasts should not be tender then. If you do not have periods, you might do your exam on a day of the month that is easy to remember. · To examine your breasts:  ? Remove all your clothes above the waist and lie down. When you are lying down, your breast tissue spreads evenly over your chest wall, which makes it easier to feel all your breast tissue. ? Use the pads--not the fingertips--of the 3 middle fingers of your left hand to check your right breast. Move your fingers slowly in small coin-sized circles that overlap. ? Use three levels of pressure to feel of all your breast tissue. Use light pressure to feel the tissue close to the skin surface. Use medium pressure to feel a little deeper. Use firm pressure to feel your tissue close to your breastbone and ribs.  Use each pressure level to feel your breast tissue before moving on to the next spot. ? Check your entire breast, moving up and down as if following a strip from the collarbone to the bra line, and from the armpit to the ribs. Repeat until you have covered the entire breast.  ? Repeat this procedure for your left breast, using the pads of the 3 middle fingers of your right hand. · To examine your breasts while in the shower:  ? Place one arm over your head and lightly soap your breast on that side. ? Using the pads of your fingers, gently move your hand over your breast (in the strip pattern described above), feeling carefully for any lumps or changes. ? Repeat for the other breast.  · Have your doctor inspect anything you notice to see if you need further testing. Where can you learn more? Go to https://chjaeb.[employer]. org and sign in to your [employer] account. Enter P148 in the [employer] box to learn more about \"Breast Self-Exam: Care Instructions. \"     If you do not have an account, please click on the \"Sign Up Now\" link. Current as of: December 17, 2020               Content Version: 12.9  © 7779-7938 [employer]. Care instructions adapted under license by [facility] ([facility]). If you have questions about a medical condition or this instruction, always ask your healthcare professional. [place] [address] any warranty or liability for your use of this information. Patient Education        Well Visit, Ages 25 to 48: Care Instructions  Overview     Well visits can help you stay healthy. Your doctor has checked your overall health and may have suggested ways to take good care of yourself. Your doctor also may have recommended tests. At home, you can help prevent illness with healthy eating, regular exercise, and other steps. Follow-up care is a key part of your treatment and safety. Be sure to make and go to all appointments, and call your doctor if you are having problems.  It's also a good idea to know your test results and keep a list of the medicines you take. How can you care for yourself at home? · Get screening tests that you and your doctor decide on. Screening helps find diseases before any symptoms appear. · Eat healthy foods. Choose fruits, vegetables, whole grains, protein, and low-fat dairy foods. Limit fat, especially saturated fat. Reduce salt in your diet. · Limit alcohol. If you are a man, have no more than 2 drinks a day or 14 drinks a week. If you are a woman, have no more than 1 drink a day or 7 drinks a week. · Get at least 30 minutes of physical activity on most days of the week. Walking is a good choice. You also may want to do other activities, such as running, swimming, cycling, or playing tennis or team sports. Discuss any changes in your exercise program with your doctor. · Reach and stay at a healthy weight. This will lower your risk for many problems, such as obesity, diabetes, heart disease, and high blood pressure. · Do not smoke or allow others to smoke around you. If you need help quitting, talk to your doctor about stop-smoking programs and medicines. These can increase your chances of quitting for good. · Care for your mental health. It is easy to get weighed down by worry and stress. Learn strategies to manage stress, like deep breathing and mindfulness, and stay connected with your family and community. If you find you often feel sad or hopeless, talk with your doctor. Treatment can help. · Talk to your doctor about whether you have any risk factors for sexually transmitted infections (STIs). You can help prevent STIs if you wait to have sex with a new partner (or partners) until you've each been tested for STIs. It also helps if you use condoms (male or female condoms) and if you limit your sex partners to one person who only has sex with you. Vaccines are available for some STIs, such as HPV.   · Use birth control if it's important to you to prevent pregnancy. Talk with your doctor about the choices available and what might be best for you. · If you think you may have a problem with alcohol or drug use, talk to your doctor. This includes prescription medicines (such as amphetamines and opioids) and illegal drugs (such as cocaine and methamphetamine). Your doctor can help you figure out what type of treatment is best for you. · Protect your skin from too much sun. When you're outdoors from 10 a.m. to 4 p.m., stay in the shade or cover up with clothing and a hat with a wide brim. Wear sunglasses that block UV rays. Even when it's cloudy, put broad-spectrum sunscreen (SPF 30 or higher) on any exposed skin. · See a dentist one or two times a year for checkups and to have your teeth cleaned. · Wear a seat belt in the car. When should you call for help? Watch closely for changes in your health, and be sure to contact your doctor if you have any problems or symptoms that concern you. Where can you learn more? Go to https://Spectrum Bridge.Rally Software. org and sign in to your [employer]us [employer] account. Enter P072 in the [facility] box to learn more about \"Well Visit, Ages 25 to 48: Care Instructions. \"     If you do not have an account, please click on the \"Sign Up Now\" link. Current as of: May 27, 2020               Content Version: 12.9  © 2006-2021 [employer] Met[employer]. Care instructions adapted under license by [facility] ([facility]). If you have questions about a medical condition or this instruction, always ask your healthcare professional. [place] [address] any warranty or liability for your use of this information. Patient Education        Human Papillomavirus (HPV): Care Instructions  Overview  The human papillomavirus (HPV) is a very common virus. There are many types of HPV. Some types cause the common skin wart. Other types cause genital warts, which can be spread by sexual contact.  Some types can increase the risk for certain cancers, such as cervical or anal cancer. Having one type of HPV doesn't lead to having another type. Many women who have HPV may not know that they're infected until it's found with a cervical cancer screening test, such as an HPV test.  If an HPV screening test finds that you have the types of HPV that might lead to cancer, your doctor may suggest more tests. This doesn't mean you'll get cancer. But it means that you may have an increased risk. Abnormal cell changes caused by HPV often go away on their own. If they don't, they can be treated. Follow-up care is a key part of your treatment and safety. Be sure to make and go to all appointments, and call your doctor if you are having problems. It's also a good idea to know your test results and keep a list of the medicines you take. How can you care for yourself at home? · Do not smoke. Smoking increases the risk for cervical problems and cervical cancer. If you need help quitting, talk to your doctor about stop-smoking programs and medicines. These can increase your chances of quitting for good. · Use condoms every time you have sex. Use them from the beginning to the end of sexual contact. · Be sure to tell your sexual partner or partners that you have HPV. Even if you do not have symptoms, you can still pass HPV to others. · Having one sex partner (who does not have STIs and does not have sex with anyone else) can decrease your risk of getting STIs. When should you call for help? Watch closely for changes in your health, and be sure to contact your doctor if:    · You have vaginal pain during or after sex.     · You have vaginal bleeding when you are not in your menstrual period. Where can you learn more? Go to https://alfonzo.health-partners. org and sign in to your [employer] account. Enter F690 in the [facility] box to learn more about \"Human Papillomavirus (HPV): Care Instructions. \"     If you do not have an account, please click on the \"Sign Up Now\" link. Current as of: February 11, 2021               Content Version: 12.9  © 2006-2021 Healthwise, Incorporated. Care instructions adapted under license by [facility] ([facility]). If you have questions about a medical condition or this instruction, always ask your healthcare professional. [address] any warranty or liability for your use of this information.

## 2021-09-20 NOTE — TELEPHONE ENCOUNTER
Edwardcoopergraciela Kelin Day called to request a refill on her medication.       Last office visit : 9/10/2021   Next office visit : 9/24/2021     Requested Prescriptions     Signed Prescriptions Disp Refills    FREESTYLE LITE strip 100 strip 3     Sig: CHECK BLOOD GLUCOSE ONCE DAILY AS NEEDED     Authorizing Provider: Lena Ty     Ordering User: Guilherme Nazario

## 2021-09-29 NOTE — TELEPHONE ENCOUNTER
Reason for Disposition   [1] Morning (before breakfast) blood glucose < 80 mg/dL (4.4 mmol/L) AND [2] more than once in past week    Answer Assessment - Initial Assessment Questions  1. SYMPTOMS: \"What symptoms are you concerned about? \"      Low blood glucose readings in the am for the last few days: in the 50s when awakening and on 9/29/2/21 the level after the pt ate went up to 82. Pt is also experiencing a low grade fever since Monday, 9/27 and is experiencing ear pain and congested sinuses    2. ONSET:  \"When did the symptoms start? \"      9/27/21    3. BLOOD GLUCOSE: \"What is your blood glucose level? \"       On 9/29/21 at about 0900 is was 82    4. USUAL RANGE: \"What is your blood glucose level usually? \" (e.g., usual fasting morning value, usual evening value)      Pt reports that her blood glucose levels had been running high and that her doses of DM meds had been adjusted    5. TYPE 1 or 2:  \"Do you know what type of diabetes you have? \"  (e.g., Type 1, Type 2, Gestational; doesn't know)       Type 2    6. INSULIN: \"Do you take insulin? \" \"What type of insulin(s) do you use? What is the mode of delivery? (syringe, pen; injection or pump) \"When did you last give yourself an insulin dose? \" (i.e., time or hours/minutes ago) \"How much did you give? \" (i.e., how many units)      None    7. DIABETES PILLS: \"Do you take any pills for your diabetes? \"     Glyburide and metformin    8. OTHER SYMPTOMS: \"Do you have any symptoms? \" (e.g., fever, frequent urination, difficulty breathing, vomiting)      Cold and flu symptoms also sinus infection symptoms    9. LOW BLOOD GLUCOSE TREATMENT: \"What have you done so far to treat the low blood glucose level? \"      Ate breakfast    10. FOOD: \"When did you last eat or drink? \"       30 minutes ago (9/29 @ about 0930    11. ALONE: Tamea El you alone right now or is someone with you? \"         Pt is by herself    12. PREGNANCY: \"Is there any chance you are pregnant? \" \"When was your last

## 2021-09-29 NOTE — PROGRESS NOTES
Ms.Teresa CHERYL Fagan is a 60 y.o. female who presents today for  Chief Complaint   Patient presents with    Fever    Hyperglycemia       HPI:  She started feeling poorly 2 days ago with fever, sinus pressure, congestion, HA, chills, mild cough. No sob. She has been fully vaccinated for covid with moderna in March. Her  was found unresponsive with massive MI last week. She has had anxiety related to this, difficulty sleeping. She was admitted to [facility] 2 weeks ago with acute sob, facial flushing. She was told her wbc count was high and was treated with cefdinir. Covid negative at that time. FBG has been in the 50s the past couple of mornings. Improved after a snack. She has not been eating as much in the evening. Glyburide increased to 10 mg bid (from 10 mg am, 5 mg pm) when I saw her on 9/10 due to worsening a1c and elevated readings at home. Review of Systems   Constitutional: Positive for chills and fever. HENT: Positive for congestion and ear pain. Negative for sore throat. Respiratory: Positive for cough. Negative for chest tightness, shortness of breath and wheezing. Cardiovascular: Negative for chest pain. Gastrointestinal: Negative for abdominal pain, diarrhea, nausea and vomiting. Musculoskeletal: Negative for arthralgias and myalgias. Skin: Negative for rash. Psychiatric/Behavioral: Positive for sleep disturbance. The patient is nervous/anxious.         Past Medical History:   Diagnosis Date    Atrial flutter by electrocardiogram ([facility] [address]     Constipation     Coronary artery disease     Diabetes mellitus (HCC)     Essential hypertension     Hyperlipidemia     IBS (irritable bowel syndrome)     Iron deficiency anemia     Menopause     Paroxysmal SVT (supraventricular tachycardia) (HCC)     RA (rheumatoid arthritis) (HCC)     Rheumatoid arthritis (HCC)        Current Outpatient Medications   Medication Sig Dispense Refill    doxycycline hyclate (VIBRA-TABS) 100 MG tablet Take 1 tablet by mouth 2 times daily for 10 days 20 tablet 0    fluticasone (FLONASE) 50 MCG/ACT nasal spray 2 sprays by Each Nostril route daily 16 g 0    LORazepam (ATIVAN) 0.5 MG tablet Take 0.5-1 tablets by mouth 2 times daily as needed for Anxiety for up to 10 days. 20 tablet 0    pantoprazole (PROTONIX) 40 MG tablet TAKE 1 TABLET BY MOUTH 2 TIMES A  tablet 0    losartan (COZAAR) 25 MG tablet TAKE 1 TABLET BY MOUTH DAILY 90 tablet 0    simvastatin (ZOCOR) 40 MG tablet TAKE 1 TABLET BY MOUTH NIGHTLY 90 tablet 0    metoprolol tartrate (LOPRESSOR) 50 MG tablet TAKE 1 TABLET BY MOUTH 2 TIMES A  tablet 0    isosorbide mononitrate (IMDUR) 30 MG extended release tablet TAKE 1 TABLET BY MOUTH DAILY 90 tablet 0    FREESTYLE LITE strip CHECK BLOOD GLUCOSE ONCE DAILY AS NEEDED 100 strip 3    glyBURIDE (DIABETA) 5 MG tablet Take 2 tablets in the am & 2 tablet at night 120 tablet 5    ketoconazole (NIZORAL) 2 % cream APPLY TOPICALLY TO AFFECTED AREA(S) ONCE DAILY AS DIRECTED 60 g 0    metFORMIN (GLUCOPHAGE) 500 MG tablet TAKE 1 TABLET BY MOUTH ONCE EVERY MORNING AND ONE TABLET EVERY EVENING 180 tablet 1    FreeStyle Lancets MISC USE AS DIRECTED 100 each 3    potassium chloride (KLOR-CON M) 20 MEQ extended release tablet TAKE 1 TABLET BY MOUTH DAILY. 90 tablet 0    blood glucose test strips (FREESTYLE LITE) strip Infuse 1 each intravenously 2 times daily As needed. 100 strip 1    nystatin (MYCOSTATIN) 637120 UNIT/GM powder Apply topically 4 times daily. 30 g 5    Blood Glucose Monitoring Suppl (FREESTYLE INSULINX SYSTEM) w/Device KIT 1 each by Does not apply route daily 1 kit 0    blood glucose test strips (FREESTYLE INSULINX TEST) strip 1 each by In Vitro route daily As needed.  100 each 3    nitroGLYCERIN (NITROSTAT) 0.4 MG SL tablet PLACE 1 TABLET UNDER THE TONGUE EVERY 5 MINUTES AS NEEDED FOR CHEST PAIN 25 tablet 0    nystatin (MYCOSTATIN) 203083 UNIT/GM cream Apply topically 2 times daily. 30 g 2    Tofacitinib Citrate (XELJANZ XR) 11 MG TB24 Take 11 mg by mouth daily      furosemide (LASIX) 20 MG tablet Take 20 mg by mouth daily      FREESTYLE LANCETS MISC TEST SUGAR DAILY AS DIRECTED UP TO 4 TIMES A DAY 4 each 0    apixaban (ELIQUIS) 5 MG TABS tablet Take 5 mg by mouth 2 times daily       verapamil (CALAN SR) 240 MG extended release tablet Take 240 mg by mouth daily       amiodarone (PACERONE) 100 MG tablet Take 100 mg by mouth daily       FREESTYLE LANCETS MISC TEST SUGAR DAILY AS DIRECTED 100 each 2    predniSONE (DELTASONE) 5 MG tablet Take 5 mg by mouth 2 times daily Take 5 mg in the morning and 4 mg in the evening.  aspirin EC 81 MG EC tablet Take 81 mg by mouth daily      folic acid (FOLVITE) 1 MG tablet Take 1 mg by mouth daily        No current facility-administered medications for this visit.        Allergies   Allergen Reactions    Codeine Other (See Comments)     Chest pain    Albuterol      Rapid heart rate    Clindamycin/Lincomycin     Januvia [Sitagliptin] Swelling    Augmentin [Amoxicillin-Pot Clavulanate] Palpitations    Sulfa Antibiotics Rash       Past Surgical History:   Procedure Laterality Date    CARDIAC SURGERY      [facility] (Dr. Henri oHllins) [address] [facility]  2009    Stent     CHOLECYSTECTOMY      CORONARY ARTERY BYPASS GRAFT      DILATION AND CURETTAGE OF UTERUS N/A 2019    DILATATION AND CURETTAGE HYSTEROSCOPY W/ [address]56[address] Jordyn Morgan performed by Huber Kapoor MD at [address] [place] [address] TOTAL KNEE ARTHROPLASTY         Social History     Tobacco Use    Smoking status: Former Smoker     Packs/day: 0.50     Years: 26.00     Pack years: 13.00     Types: Cigarettes     Start date: 6/10/1974     Quit date: 2000     Years since quittin.0    Smokeless tobacco: Never Used   Vaping Use    Vaping Use: Never used   Substance Use Topics    Alcohol use: No    Drug use: No       Family History   Problem Relation Age of Onset    Heart Attack Father     Prostate Cancer Father     Stroke Father     Heart Attack Brother     Stroke Brother     Breast Cancer Maternal Aunt     Cancer Paternal Uncle         Lung       /70   Pulse 80   Temp 98.9 °F (37.2 °C) (Temporal)   Resp 18   Ht 5' 4\" (1.626 m)   Wt 207 lb (93.9 kg)   SpO2 97%   BMI 35.53 kg/m²     Physical Exam  Vitals reviewed. Constitutional:       General: She is not in acute distress. Appearance: Normal appearance. She is well-developed. HENT:      Head: Normocephalic. Right Ear: External ear normal.      Left Ear: External ear normal.      Ears:      Comments: TMs dull bilaterally, L>R.  L TM with erythema and decreased visible landmarks. Nose: Nose normal.      Mouth/Throat:      Mouth: Mucous membranes are moist.      Pharynx: No oropharyngeal exudate or posterior oropharyngeal erythema. Eyes:      Conjunctiva/sclera: Conjunctivae normal.      Pupils: Pupils are equal, round, and reactive to light. Neck:      Thyroid: No thyromegaly. Vascular: No carotid bruit or JVD. Trachea: No tracheal deviation. Cardiovascular:      Rate and Rhythm: Normal rate and regular rhythm. Heart sounds: Normal heart sounds. No murmur heard. Pulmonary:      Effort: Pulmonary effort is normal. No respiratory distress. Breath sounds: Normal breath sounds. No wheezing or rhonchi. Musculoskeletal:         General: Normal range of motion. Cervical back: Normal range of motion and neck supple. Lymphadenopathy:      Cervical: No cervical adenopathy. Skin:     General: Skin is warm and dry. Findings: No rash. Neurological:      Mental Status: She is alert. Psychiatric:         Mood and Affect: Mood normal.         Behavior: Behavior normal.         Thought Content: Thought content normal.         ASSESSMENT/PLAN:  1. Fever, unspecified fever cause  -Check covid swab. Advised to quarantine pending results.   - COVID-19    2. Acute non-recurrent pansinusitis  -Doxycycline 100 mg bid x 10 days.  -Flonase 2 sprays per nostril daily prn    3. Non-recurrent acute suppurative otitis media of left ear without spontaneous rupture of tympanic membrane  -as above    4. Anxiety  -Brief rx for lorazepam 0.5 mg 1/2 to 1 tablet bid prn due to acute anxiety from the recent sudden loss of her . SE profile reviewed. -Consider SSRI if needed  - LORazepam (ATIVAN) 0.5 MG tablet; Take 0.5-1 tablets by mouth 2 times daily as needed for Anxiety for up to 10 days. Dispense: 20 tablet; Refill: 0    5. Type 2 diabetes mellitus without complication, without long-term current use of insulin ([facility])  -Advised to change glyburide dose back to 10 mg am, 5 mg pm due to morning hypoglycemia. She is not eating as much recently as well. Return for as scheduled. Clayton Hurt was seen today for fever and hyperglycemia. Diagnoses and all orders for this visit:    Non-recurrent acute suppurative otitis media of left ear without spontaneous rupture of tympanic membrane    Fever, unspecified fever cause  -     COVID-19    Acute non-recurrent pansinusitis    Anxiety  -     LORazepam (ATIVAN) 0.5 MG tablet; Take 0.5-1 tablets by mouth 2 times daily as needed for Anxiety for up to 10 days. Type 2 diabetes mellitus without complication, without long-term current use of insulin ([facility])    Other orders  -     doxycycline hyclate (VIBRA-TABS) 100 MG tablet; Take 1 tablet by mouth 2 times daily for 10 days  -     fluticasone (FLONASE) 50 MCG/ACT nasal spray; 2 sprays by Each Nostril route daily      Medications Discontinued During This Encounter   Medication Reason    doxycycline hyclate (VIBRA-TABS) 100 MG tablet REORDER     There are no Patient Instructions on file for this visit. Patient voicesunderstanding and agrees to plans along with risks and benefits of plan.     Counseling:  Sadiq Fagan's case, medications and options were discussed in detail. Patient was instructed to call the office if she questionsregarding her treatment. Should her conditions worsen, she should return to office to be reassessed by MARYANNE Sultana. she Should to go the closest Emergency Department for any emergency. They verbalizedunderstanding the above instructions. Return for as scheduled.

## 2021-09-30 PROBLEM — U07.1 COVID-19: Status: ACTIVE | Noted: 2021-01-01

## 2021-10-01 NOTE — ED PROVIDER NOTES
Subjective   Patient is a 65-year-old female who presents to the ER with Covid.  Patient states she began having symptoms 2 days ago including generalized weakness, nausea, fever, cough, ear pain and headache.  Patient saw her PCP and was diagnosed with Covid.  She is fully vaccinated.  Patient states she continues to be very weak and nauseated and decided to come here for evaluation.  She denies any chest pain, shortness of air, abdominal pain, vomiting, diarrhea, urinary changes, focal neurologic changes.          Review of Systems   Constitutional: Positive for fever.   HENT: Positive for ear pain.    Eyes: Negative.    Respiratory: Positive for cough.    Cardiovascular: Negative.    Gastrointestinal: Positive for nausea.   Endocrine: Negative.    Genitourinary: Negative.    Musculoskeletal: Negative.    Skin: Negative.    Allergic/Immunologic: Negative.    Neurological: Positive for weakness and headaches.   Hematological: Negative.    Psychiatric/Behavioral: Negative.    All other systems reviewed and are negative.      Past Medical History:   Diagnosis Date   • Abnormal stress test    • Atrial flutter ([facility])    • CAD (coronary artery disease)    • CAD in native artery     CABG x 3   • Diverticulosis    • Essential hypertension     Tricuspid valve insufficiency, unspecified etiology    • History of adenomatous polyp of colon    • Hyperlipemia, mixed    • Hyperlipidemia    • Hypertension    • IBS (irritable bowel syndrome)    • MI, old    • Mitral valve prolapse    • Near syncope    • Obesity, morbid ([facility])    • Palpitations    • Paroxysmal SVT (supraventricular tachycardia) ([facility])    • Pedal edema    • Precordial pain    • Rheumatoid arthritis ([facility])    • S/P CABG x 3    • Type 2 diabetes mellitus ([facility])        Allergies   Allergen Reactions   • Codeine Palpitations and Other (See Comments)     Chest pain   • Exenatide Arrhythmia   • Sulfa Antibiotics Hives and Rash     Reaction: hives   • Albuterol Palpitations      High heart rate   • Sitagliptin Swelling   • Amoxicillin-Pot Clavulanate Palpitations       Past Surgical History:   Procedure Laterality Date   • APPENDECTOMY     • CARDIAC CATHETERIZATION Left 2012    Intensify medical therapy   • CARDIAC CATHETERIZATION Left 2002    surgery   • CHOLECYSTECTOMY  1975   • COLONOSCOPY N/A 2018    Diverticulosis in the entire examined colon; One 4mm tubular adenomatous polyp in the transverse colon; Non-bleeding internal hemorrhoids; Repeat 5 years   • COLONOSCOPY  02/10/2012    Diverticulosis in the entire examined colon; Non-bleeding internal hemorrhoids; Repeat 7 years   • COLONOSCOPY  2009    Diverticulosis; Repeat 7 years   • CORONARY ARTERY BYPASS GRAFT  2002    LIMA to LAD, SVG to D1, SVG to OM1 - x3   • CORONARY STENT PLACEMENT  09/2009    X1 - Stent to LAD, Drug Eluting   • DILATATION AND CURETTAGE     • ENDOSCOPY N/A 2018    Esophageal mucosal changes suspicious for short-segment De Anda's esophagus-biopsied; Small HH; Two gastric polyps-Clip (MR conditional) was placed-biopsied; Normal examined duodenum   • ENDOSCOPY  2015    Probable short-segment De Anda's esophagus-biopsied; HH; Gastritis-biopsied; Duodenal diverticulum; Repeat 1 year   • REPLACEMENT TOTAL KNEE Right        Family History   Problem Relation Age of Onset   • Cancer Father    • Coronary artery disease Father    • Colon cancer Neg Hx    • Colon polyps Neg Hx        Social History     Socioeconomic History   • Marital status:      Spouse name: Not on file   • Number of children: Not on file   • Years of education: Not on file   • Highest education level: Not on file   Tobacco Use   • Smoking status: Former Smoker     Years: 20.00     Types: Cigarettes     Quit date: 2000     Years since quittin.6   • Smokeless tobacco: Never Used   Substance and Sexual Activity   • Alcohol use: No   • Drug use: No   • Sexual activity: Defer           Objective    Physical Exam  Vitals and nursing note reviewed.   Constitutional:       Appearance: She is well-developed.   HENT:      Head: Normocephalic and atraumatic.   Eyes:      Conjunctiva/sclera: Conjunctivae normal.      Pupils: Pupils are equal, round, and reactive to light.   Cardiovascular:      Rate and Rhythm: Normal rate and regular rhythm.      Heart sounds: Normal heart sounds.   Pulmonary:      Effort: Pulmonary effort is normal.      Breath sounds: Normal breath sounds.   Abdominal:      Palpations: Abdomen is soft.      Tenderness: There is no abdominal tenderness.   Musculoskeletal:         General: No deformity. Normal range of motion.      Cervical back: Normal range of motion.   Skin:     General: Skin is warm.   Neurological:      Mental Status: She is alert and oriented to person, place, and time.   Psychiatric:         Behavior: Behavior normal.         Procedures           ED Course      Patient was given IV fluids, Zofran and Tylenol.      ECG: Normal sinus rhythm with a rate of 85, nonspecific ST changes anterior lateral leads    Lab Results (last 24 hours)     Procedure Component Value Units Date/Time    CBC & Differential [[id number]]  (Abnormal) Collected: 10/01/21 0909    Specimen: Blood Updated: 10/01/21 0926    Narrative:      The following orders were created for panel order CBC & Differential.  Procedure                               Abnormality         Status                     ---------                               -----------         ------                     CBC Auto Differential[[id number]]        Abnormal            Final result                 Please view results for these tests on the individual orders.    Comprehensive Metabolic Panel [[id number]]  (Abnormal) Collected: 10/01/21 0909    Specimen: Blood Updated: 10/01/21 0947     Glucose 94 mg/dL      BUN 12 mg/dL      Creatinine 0.48 mg/dL      Sodium 139 mmol/L      Potassium 3.5 mmol/L      Chloride 101 mmol/L      CO2 26.0  "mmol/L      Calcium 9.1 mg/dL      Total Protein 7.1 g/dL      Albumin 4.20 g/dL      ALT (SGPT) 29 U/L      AST (SGOT) 36 U/L      Alkaline Phosphatase 106 U/L      Total Bilirubin 1.4 mg/dL      eGFR Non African Amer 130 mL/min/1.73      Globulin 2.9 gm/dL      A/G Ratio 1.4 g/dL      BUN/Creatinine Ratio 25.0     Anion Gap 12.0 mmol/L     Narrative:      GFR Normal >60  Chronic Kidney Disease <60  Kidney Failure <15      Lactate Dehydrogenase [[id number]]  (Abnormal) Collected: 10/01/21 0909    Specimen: Blood Updated: 10/01/21 0947      U/L     Procalcitonin [[id number]]  (Normal) Collected: 10/01/21 0909    Specimen: Blood Updated: 10/01/21 0953     Procalcitonin 0.05 ng/mL     Narrative:      As a Marker for Sepsis (Non-Neonates):     1. <0.5 ng/mL represents a low risk of severe sepsis and/or septic shock.  2. >2 ng/mL represents a high risk of severe sepsis and/or septic shock.    As a Marker for Lower Respiratory Tract Infections that require antibiotic therapy:  PCT on Admission     Antibiotic Therapy             6-12 Hrs later  >0.5                          Strongly Recommended            >0.25 - <0.5             Recommended  0.1 - 0.25                  Discouraged                       Remeasure/reassess PCT  <0.1                         Strongly Discouraged         Remeasure/reassess PCT      As 28 day mortality risk marker: \"Change in Procalcitonin Result\" (>80% or <=80%) if Day 0 (or Day 1) and Day 4 values are available. Refer to http://www.[employer]-pct-calculator.com/    Change in PCT <=80 %   A decrease of PCT levels below or equal to 80% defines a positive change in PCT test result representing a higher risk for 28-day all-cause mortality of patients diagnosed with severe sepsis or septic shock.    Change in PCT >80 %   A decrease of PCT levels of more than 80% defines a negative change in PCT result representing a lower risk for 28-day all-cause mortality of patients diagnosed with severe " sepsis or septic shock.                D-dimer, Quantitative [[id number]]  (Normal) Collected: 10/01/21 0909    Specimen: Blood Updated: 10/01/21 0945     D-Dimer, Quantitative <0.22 mg/L (FEU)     Narrative:      Reference Range is 0-0.50 mg/L FEU. However, results <0.50 mg/L FEU tends to rule out DVT or PE. Results >0.50 mg/L FEU are not useful in predicting absence or presence of DVT or PE.      C-reactive Protein [[id number]]  (Abnormal) Collected: 10/01/21 0909    Specimen: Blood Updated: 10/01/21 0947     C-Reactive Protein 13.70 mg/dL     Lactic Acid, Plasma [[id number]]  (Normal) Collected: 10/01/21 0909    Specimen: Blood Updated: 10/01/21 0944     Lactate 1.5 mmol/L     Ferritin [[id number]]  (Abnormal) Collected: 10/01/21 0909    Specimen: Blood Updated: 10/01/21 0952     Ferritin 234.40 ng/mL     Narrative:      Results may be falsely decreased if patient taking Biotin.      CBC Auto Differential [[id number]]  (Abnormal) Collected: 10/01/21 0909    Specimen: Blood Updated: 10/01/21 0926     WBC 7.04 10*3/mm3      RBC 4.61 10*6/mm3      Hemoglobin 14.0 g/dL      Hematocrit 44.0 %      MCV 95.4 fL      MCH 30.4 pg      MCHC 31.8 g/dL      RDW 14.8 %      RDW-SD 52.1 fl      MPV 10.5 fL      Platelets 115 10*3/mm3      Neutrophil % 86.7 %      Lymphocyte % 9.4 %      Monocyte % 2.8 %      Eosinophil % 0.4 %      Basophil % 0.1 %      Neutrophils, Absolute 6.10 10*3/mm3      Lymphocytes, Absolute 0.66 10*3/mm3      Monocytes, Absolute 0.20 10*3/mm3      Eosinophils, Absolute 0.03 10*3/mm3      Basophils, Absolute 0.01 10*3/mm3     COVID-19,[facility],Nasal Swab No Transport Media 3-4 HR TAT - Swab, Nasal Cavity [[id number]]  (Abnormal) Collected: 10/01/21 0911    Specimen: Swab from Nasal Cavity Updated: 10/01/21 1014     COVID19 Detected    Narrative:      Fact sheet for providers: https://www.fda.gov/media/682835/download     Fact sheet for patients: https://www.fda.gov/media/794652/download    Test  performed by PCR.    Consider negative results in combination with clinical observations, patient history, and epidemiological information.    Blood Gas, Arterial - [[id number]]  (Abnormal) Collected: 10/01/21 0918    Specimen: Arterial Blood Updated: 10/01/21 0921     Site Left Radial     Nigel's Test Positive     pH, Arterial 7.461 pH units      Comment: 83 Value above reference range        pCO2, Arterial 34.3 mm Hg      Comment: 84 Value below reference range        pO2, Arterial 63.8 mm Hg      Comment: 84 Value below reference range        HCO3, Arterial 24.4 mmol/L      Base Excess, Arterial 1.0 mmol/L      O2 Saturation, Arterial 94.2 %      Temperature 37.0 C      Barometric Pressure for Blood Gas 754 mmHg      Modality Room Air     Ventilator Mode NA     Collected by [id number]     Comment: Meter: [id number]     :  [email or web address]        pCO2, Temperature Corrected 34.3 mm Hg      pH, Temp Corrected 7.461 pH Units      pO2, Temperature Corrected 63.8 mm Hg         XR Chest 1 View   Final Result   1. Vague right infrahilar opacity. This may be atelectasis or possibly   an early inflammatory process..           This report was finalized on 10/01/2021 09:13 by Dr. Huseyin Hopkins MD.        Labs showed thrombocytopenia, positive for Covid, and elevated CRP/ferritin/LDH.  Chest x-ray showed vague right infrahilar opacity that is most likely early inflammatory process.  Patient had good sats here in the ER and she met no indication for admission at this time.  She did meet criteria for monoclonal antibody infusion and she was infused here in the ER.  Patient was feeling better.  She will be discharged home with an incentive spirometer as well as a azithromycin, albuterol and Zofran.  She is to use her pulse oximeter at home and is to follow-up with her PCP and return to the ER immediately for any worsening symptoms, shortness of breath, high fevers or other concerns.  Patient agreeable.                                   MDM    Final diagnoses:   COVID-19   Generalized weakness       ED Disposition  ED Disposition     ED Disposition Condition Comment    Discharge Stable           Cierra Addison, APRN  [address] [place] [address] [address] [place] [id number] [phone]    Schedule an appointment as soon as possible for a visit            Medication List      New Prescriptions    azithromycin 250 MG tablet  Commonly known as: Zithromax Z-Magdy  Take 2 tablets by mouth on day 1, then 1 tablet daily on days 2-5     ondansetron ODT 4 MG disintegrating tablet  Commonly known as: ZOFRAN-ODT  Place 1 tablet on the tongue Every 8 (Eight) Hours As Needed for Nausea or Vomiting.           Where to Get Your Medications      These medications were sent to [facility] - [address] - [phone] [facility] [phone]   [address] [place] [address]    Phone: [phone]   · azithromycin 250 MG tablet  · ondansetron ODT 4 MG disintegrating tablet          Sue Johnson MD  10/01/21 1339       Sue Johnson MD  10/01/21 1338

## 2021-10-01 NOTE — ED TRIAGE NOTES
PT WENT TO DOCTOR ON WED FOR POSSIBLE EAR INFECTION. PT TESTED POSITIVE FOR COVID ON WED. PT PRESENTS WITH GENERALIZED WEAKNESS.

## 2021-10-21 PROBLEM — J96.01 ACUTE RESPIRATORY FAILURE WITH HYPOXIA (HCC): Status: ACTIVE | Noted: 2021-01-01

## 2021-10-21 PROBLEM — M06.9 RHEUMATOID ARTHRITIS INVOLVING MULTIPLE SITES (HCC): Status: ACTIVE | Noted: 2021-01-01

## 2021-10-21 PROBLEM — U09.9 POST-COVID CHRONIC DECREASED MOBILITY AND ENDURANCE: Status: ACTIVE | Noted: 2021-01-01

## 2021-10-21 PROBLEM — Z74.09 POST-COVID CHRONIC DECREASED MOBILITY AND ENDURANCE: Status: ACTIVE | Noted: 2021-01-01

## 2021-10-21 PROBLEM — J96.91 RESPIRATORY FAILURE WITH HYPOXIA (HCC): Status: ACTIVE | Noted: 2021-01-01

## 2021-10-21 PROBLEM — E66.9 OBESITY (BMI 30-39.9): Status: ACTIVE | Noted: 2021-01-01

## 2021-10-21 PROBLEM — R60.0 BILATERAL LOWER EXTREMITY EDEMA: Status: ACTIVE | Noted: 2021-01-01

## 2021-10-21 NOTE — PROGRESS NOTES
Teresa Serna  [id number]  1955  65 y.o.  female    Referring Provider: Cierra Addison APRN    Reason for Follow-up Visit:   short term office follow up after recent encounter   Paroxysmal atrial fibrillation now atrial flutter   Saw Dr De Anda AF ablation tried but due to no good venous access not possible     Cardiac workup test results as below: 14-day outpatient cardiac telemetry   In past could not get ablation due to anatomy  Attempted by Dr De Anda   ECG today shows persistent atrial flutter     Subjective    Had interim Covid   Could not get cardioversion due to this   Repeat Covid test was negative     Here for office follow up   Overall feels the same   No new events or complaints since last visit   Overall the patient feels no major change from baseline symptoms   Similar symptoms as during last visit      Moderate chronic exertional shortness of breath on exertion relieved with rest  No significant cough or wheezing    No palpitations  No associated chest pain  Worsening pedal edema    No fever or chills  No significant expectoration    No hemoptysis  No presyncope or syncope    Tolerating current medications well with no untoward side effects   Compliant with prescribed medication regimen. Tries to adhere to cardiac diet.     BP running low   Today 88/42  No bleeding, excessive bruising, gait instability or fall risks        History of present illness:  Teresa Serna is a 65 y.o. yo female with history of Paroxysmal atrial fibrillation who presents today for   Chief Complaint   Patient presents with   • Coronary Artery Disease     6 WEEK FOLLOW UP    .    History  Past Medical History:   Diagnosis Date   • Abnormal stress test    • Atrial flutter (HCC)    • CAD (coronary artery disease)    • CAD in native artery     CABG x 3   • Diverticulosis    • Essential hypertension     Tricuspid valve insufficiency, unspecified etiology    • History of adenomatous polyp of colon    •  Hyperlipemia, mixed    • Hyperlipidemia    • Hypertension    • IBS (irritable bowel syndrome)    • MI, old    • Mitral valve prolapse    • Near syncope    • Obesity, morbid (HCC)    • Palpitations    • Paroxysmal SVT (supraventricular tachycardia) ([facility])    • Pedal edema    • Precordial pain    • Rheumatoid arthritis (HCC)    • S/P CABG x 3    • Type 2 diabetes mellitus (HCC)    ,   Past Surgical History:   Procedure Laterality Date   • APPENDECTOMY     • CARDIAC CATHETERIZATION Left 2012    Intensify medical therapy   • CARDIAC CATHETERIZATION Left 2002    surgery   • CHOLECYSTECTOMY  1975   • COLONOSCOPY N/A 2018    Diverticulosis in the entire examined colon; One 4mm tubular adenomatous polyp in the transverse colon; Non-bleeding internal hemorrhoids; Repeat 5 years   • COLONOSCOPY  02/10/2012    Diverticulosis in the entire examined colon; Non-bleeding internal hemorrhoids; Repeat 7 years   • COLONOSCOPY  2009    Diverticulosis; Repeat 7 years   • CORONARY ARTERY BYPASS GRAFT  2002    LIMA to LAD, SVG to D1, SVG to OM1 - x3   • CORONARY STENT PLACEMENT  09/2009    X1 - Stent to LAD, Drug Eluting   • DILATATION AND CURETTAGE     • ENDOSCOPY N/A 2018    Esophageal mucosal changes suspicious for short-segment De Anda's esophagus-biopsied; Small HH; Two gastric polyps-Clip (MR conditional) was placed-biopsied; Normal examined duodenum   • ENDOSCOPY  2015    Probable short-segment De Anda's esophagus-biopsied; HH; Gastritis-biopsied; Duodenal diverticulum; Repeat 1 year   • REPLACEMENT TOTAL KNEE Right    ,   Family History   Problem Relation Age of Onset   • Cancer Father    • Coronary artery disease Father    • Colon cancer Neg Hx    • Colon polyps Neg Hx    ,   Social History     Tobacco Use   • Smoking status: Former Smoker     Years: 20.00     Types: Cigarettes     Quit date: 2000     Years since quittin.7   • Smokeless tobacco: Never Used   Substance Use Topics    • Alcohol use: No   • Drug use: No   ,     Medications  Current Outpatient Medications   Medication Sig Dispense Refill   • albuterol sulfate  (90 Base) MCG/ACT inhaler Inhale 2 puffs Every 4 (Four) Hours As Needed for Wheezing. 18 g 0   • amiodarone (PACERONE) 100 MG tablet Take 1 tablet by mouth Daily. 90 tablet 4   • aspirin 81 MG EC tablet Take 81 mg by mouth Daily.     • azithromycin (Zithromax Z-Magdy) 250 MG tablet Take 2 tablets by mouth on day 1, then 1 tablet daily on days 2-5 6 tablet 0   • Eliquis 5 MG tablet tablet TAKE 1 TABLET BY MOUTH EVERY 12 HOURS 180 tablet 4   • Empagliflozin (Jardiance) 25 MG tablet Jardiance 25 mg tablet   Take 1 tablet every day by oral route.     • folic acid (FOLVITE) 1 MG tablet Take 1 mg by mouth Daily.     • furosemide (LASIX) 20 MG tablet Take 1 tablet by mouth Daily. 90 tablet 4   • isosorbide mononitrate (IMDUR) 30 MG 24 hr tablet Take 30 mg by mouth Daily.     • losartan (COZAAR) 25 MG tablet Take 25 mg by mouth Daily.     • meclizine (ANTIVERT) 12.5 MG tablet Take 1 tablet by mouth 3 (Three) Times a Day As Needed for dizziness. 90 tablet 3   • metFORMIN (GLUCOPHAGE) 1000 MG tablet Take 1,000 mg by mouth Daily With Breakfast.     • metoprolol tartrate (LOPRESSOR) 50 MG tablet Take 50 mg by mouth 2 (Two) Times a Day.     • nitroglycerin (NITROSTAT) 0.4 MG SL tablet Place 0.4 mg under the tongue Every 5 (Five) Minutes As Needed for Chest Pain. Take no more than 3 doses in 15 minutes.     • ondansetron ODT (ZOFRAN-ODT) 4 MG disintegrating tablet Place 1 tablet on the tongue Every 8 (Eight) Hours As Needed for Nausea or Vomiting. 12 tablet 0   • pantoprazole (PROTONIX) 40 MG EC tablet Take 1 tablet by mouth Every 12 (Twelve) Hours. 60 tablet 1   • potassium chloride (K-DUR) 10 MEQ CR tablet Take 1 tablet by mouth Daily. 90 tablet 3   • predniSONE (DELTASONE) 1 MG tablet Take 4 mg by mouth Every Night.     • predniSONE (DELTASONE) 5 MG tablet Take 5 mg by mouth  "Every Morning.     • simvastatin (ZOCOR) 40 MG tablet Take 40 mg by mouth Every Night.     • Tofacitinib Citrate (XELJANZ XR) 11 MG tablet sustained-release 24 hour Take 1 tablet by mouth Daily.     • verapamil SR (CALAN-SR) 240 MG CR tablet Take 1 tablet by mouth Daily. 90 tablet 4     No current facility-administered medications for this visit.     Results for orders placed during the hospital encounter of 07/07/20    Adult Transthoracic Echo Complete W/ Cont if Necessary Per Protocol    Interpretation Summary  · Estimated EF = 60%.  · Left ventricular systolic function is normal.  · Mild tricuspid valve regurgitation is present.  · Left ventricular diastolic function is normal.  · Mild pulmonary hypertension is present.      Allergies:  Codeine, Exenatide, Sulfa antibiotics, Albuterol, Sitagliptin, and Amoxicillin-pot clavulanate    Review of Systems  Review of Systems   Constitutional: Positive for malaise/fatigue.   HENT: Negative.    Eyes: Negative.    Cardiovascular: Positive for dyspnea on exertion, leg swelling and palpitations. Negative for chest pain, claudication, cyanosis, irregular heartbeat, near-syncope, orthopnea, paroxysmal nocturnal dyspnea and syncope.   Respiratory: Negative.    Endocrine: Negative.    Hematologic/Lymphatic: Negative.    Skin: Negative.    Musculoskeletal: Positive for arthritis, back pain and joint pain.   Gastrointestinal: Negative for anorexia.   Genitourinary: Negative.    Neurological: Positive for dizziness and weakness.   Psychiatric/Behavioral: Negative.    Same review of system and physical exam as last visit        Objective     Physical Exam:  BP (!) 88/42   Pulse 64   Ht 162.6 cm (64\")   Wt 89.8 kg (198 lb)   SpO2 97%   BMI 33.99 kg/m²   Physical Exam   Constitutional: She appears well-developed.   HENT:   Head: Normocephalic.   Neck: Normal carotid pulses and no JVD present. No tracheal tenderness present. Carotid bruit is not present. No tracheal deviation " present.   Cardiovascular: Regular rhythm and normal pulses.   Murmur heard.   Systolic murmur is present with a grade of 2/6.  Pulmonary/Chest: Effort normal. No stridor. She has no wheezes.   Abdominal: Soft. She exhibits no distension. There is no abdominal tenderness.   Musculoskeletal:      Right lower leg: 3+ Pitting Edema present.      Left lower leg: 3+ Pitting Edema present.     Vascular Status -  Her right foot exhibits abnormal foot edema. Her left foot exhibits abnormal foot edema.  Neurological: She is alert. No cranial nerve deficit or sensory deficit.   Skin: Skin is warm.   Psychiatric: Her speech is normal and behavior is normal.   Lower extremity: 1 plus pitting edema.     Results Review:    Results for orders placed during the hospital encounter of 07/07/20    Adult Transthoracic Echo Complete W/ Cont if Necessary Per Protocol    Interpretation Summary  · Estimated EF = 60%.  · Left ventricular systolic function is normal.  · Mild tricuspid valve regurgitation is present.  · Left ventricular diastolic function is normal.  · Mild pulmonary hypertension is present.          Procedures____________________________________________________________________________________________________________________________________________  Health maintenance and recommendations  Similar recommendations as last visit     Offered to give patient  a copy      Questions were encouraged, asked and answered to the patient's  understanding and satisfaction. Questions if any regarding current medications and side effects, need for refills and importance of compliance to medications stressed.    Reviewed available prior notes, consults, prior visits, laboratory findings, radiology and cardiology relevant reports. Updated chart as applicable. I have reviewed the patient's medical history in detail and updated the computerized patient record as relevant.      Updated patient regarding any new or relevant abnormalities on  review of records or any new findings on physical exam. Mentioned to patient about purpose of visit and desirable health short and long term goals and objectives.    Primary to monitor CBC CMP Lipid panel and TSH as applicable    ___________________________________________________________________________________________________________________________________________        Assessment/Plan   Patient Active Problem List   Diagnosis   • CAD (coronary artery disease)   • Atrial flutter (HCC)   • Type 2 diabetes mellitus without complication, without long-term current use of insulin (HCC)   • Mixed hyperlipidemia   • S/P CABG x 3 2000 Dr Larsen   • Shoulder blade pain   • Anemia   • Abnormal finding on imaging   • History of esophagitis   • Essential hypertension   • S/P coronary artery stent placement   • Gastric polyp   • Colon polyps   • PAF (paroxysmal atrial fibrillation) (HCC)           Plan    Go to ER   Needs labs and diuresis and medication adjustment   Will see back in office 2 weeks after discharge            Return in about 3 weeks (around 11/11/2021).

## 2021-10-22 NOTE — CARE COORDINATION
Ambulatory Care Coordination Note  10/22/2021  CM Risk Score: 9  Charlson 10 Year Mortality Risk Score: 100%     ACC: Zenon Pavon RN    Summary Note: ACM spoke with patient via telephone for [facility] outreach  Patient states that she is doing well and has no symptoms causing concern at this time. Patient states that her glucose levels have been food. Patient states her glucose today was 112 fasting. Patient states she had COVID-19 recently and it caused her glucose to be low at times. Patient states that she is eating well balanced meals and getting plenty of activity. Patient does not check her blood pressure daily. Patient states her blood pressure readings have been within normal limits when she checks it. Last in office blood pressure was 130/70 on 9/29/2021. Patient has all of her medications and is taking them as prescribed. Patient denies any need for community resources at this time. Due to no symptoms causing concern and patient doing well on current health regimen, [facility] will not enroll in Care Management at this time. Patient agrees to contact [facility] or health care provider if any future needs arise or if she has questions related to her healthcare. Kristen Liz RN,[facility]  Ambulatory Care Manager                    Prior to Admission medications    Medication Sig Start Date End Date Taking?  Authorizing Provider   fluticasone (FLONASE) 50 MCG/ACT nasal spray 2 sprays by Each Nostril route daily 9/29/21   MARYANNE Moreno   glyBURIDE (DIABETA) 5 MG tablet Take 2 tablets every morning & 1 tablet every evening. 9/29/21   MARYANNE Moreno   pantoprazole (PROTONIX) 40 MG tablet TAKE 1 TABLET BY MOUTH 2 TIMES A DAY 9/27/21   MARYANNE Moreno   losartan (COZAAR) 25 MG tablet TAKE 1 TABLET BY MOUTH DAILY 9/24/21   Augusta Ordonez MD   simvastatin (ZOCOR) 40 MG tablet TAKE 1 TABLET BY MOUTH NIGHTLY 9/24/21   Augusta Ordonez MD   metoprolol tartrate (LOPRESSOR) 50 MG tablet TAKE 1 TABLET BY MOUTH 2 TIMES A DAY 9/24/21   Jignesh Powell MD   isosorbide mononitrate (IMDUR) 30 MG extended release tablet TAKE 1 TABLET BY MOUTH DAILY 9/24/21   Jignesh Powell MD   FREESTYLE LITE strip CHECK BLOOD GLUCOSE ONCE DAILY AS NEEDED 9/20/21   Jignesh Powell MD   ketoconazole (NIZORAL) 2 % cream APPLY TOPICALLY TO AFFECTED AREA(S) ONCE DAILY AS DIRECTED 8/23/21   Jignesh Powell MD   metFORMIN (GLUCOPHAGE) 500 MG tablet TAKE 1 TABLET BY MOUTH ONCE EVERY MORNING AND ONE TABLET EVERY EVENING 3/31/21   MD Honorio BustosStyle Lancets MISC USE AS DIRECTED 1/6/21   Jignesh Powell MD   potassium chloride (KLOR-CON M) 20 MEQ extended release tablet TAKE 1 TABLET BY MOUTH DAILY. 1/4/21   Jignesh Powell MD   blood glucose test strips (FREESTYLE LITE) strip Infuse 1 each intravenously 2 times daily As needed. 9/25/20   Jignesh Powell MD   nystatin (MYCOSTATIN) 417524 UNIT/GM powder Apply topically 4 times daily. 9/1/20   MARYANNE Leavitt   Blood Glucose Monitoring Suppl (FREESTYLE INSULINX SYSTEM) w/Device KIT 1 each by Does not apply route daily 6/9/20   Jignesh Powell MD   blood glucose test strips (FREESTYLE INSULINX TEST) strip 1 each by In Vitro route daily As needed. 6/9/20   Jignesh Powell MD   nitroGLYCERIN (NITROSTAT) 0.4 MG SL tablet PLACE 1 TABLET UNDER THE TONGUE EVERY 5 MINUTES AS NEEDED FOR CHEST PAIN 5/14/20   Jignesh Powell MD   nystatin (MYCOSTATIN) 791050 UNIT/GM cream Apply topically 2 times daily.  2/25/20   MARYANNE Victor   Tofacitinib Citrate (XELJANZ XR) 11 MG TB24 Take 11 mg by mouth daily 4/23/19   Historical Provider, MD   furosemide (LASIX) 20 MG tablet Take 20 mg by mouth daily    Historical Provider, MD   FREESTYLE LANCETS MISC TEST SUGAR DAILY AS DIRECTED UP TO 4 TIMES A DAY 7/5/19   Jignesh Powell MD   apixaban (ELIQUIS) 5 MG TABS tablet Take 5 mg by mouth 2 times daily  7/21/18   Historical Provider, MD   verapamil (CALAN SR) 240 MG extended release tablet Take 240 mg by mouth daily  8/3/18   Historical Provider, MD   amiodarone (PACERONE) 100 MG tablet Take 100 mg by mouth daily     Historical Provider, MD   FREESTYLE LANCETS MIS TEST SUGAR DAILY AS DIRECTED 4/24/18   Abdiel Laneg MD   predniSONE (DELTASONE) 5 MG tablet Take 5 mg by mouth 2 times daily Take 5 mg in the morning and 4 mg in the evening.     Historical Provider, MD   aspirin EC 81 MG EC tablet Take 81 mg by mouth daily    Historical Provider, MD   folic acid (FOLVITE) 1 MG tablet Take 1 mg by mouth daily     Historical Provider, MD       Future Appointments   Date Time Provider Sunni She   11/1/2021 10:45 AM MD RAFAL [facility]   12/7/2021  2:00 PM MD Mily Foafna [facility]   12/21/2021 10:00 AM [facility] MAMMO RM 2 [facility]ENS [facility]   3/8/2022 10:30 AM Abdiel Lange MD [facility]   9/19/2022 11:00 AM MARYANNE Oakes OB/GYN [facility]

## 2021-10-23 NOTE — OUTREACH NOTE
Prep Survey      Responses   [facility] patient discharged from? [facility]   Is LACE score < 7 ? No   Emergency Room discharge w/ pulse ox? No   Eligibility Readm Mgmt   Discharge diagnosis Acute respiratory failure with hypoxia, Bilateral lower extremity edema   Does the patient have one of the following disease processes/diagnoses(primary or secondary)? Other   Does the patient have Home health ordered? No   Is there a DME ordered? No   Prep survey completed? Yes          Carol James RN

## 2021-10-25 NOTE — TELEPHONE ENCOUNTER
[facility] Transitions Initial Follow Up Call    Outreach made within 2 business days of discharge: Yes    Patient: Deepali Fagan   Patient : 1955  MRN: [id number] Reason for Admission: Admitted 10/21/21 for respiratory failure with hypoxia   Discharge Date: 10/22/21  Final Discharge Diagnoses: Active Hospital Problems   Diagnosis    Acute respiratory failure with hypoxia (HCC)    Post-COVID chronic decreased mobility and endurance    Obesity (BMI 30-39. 9)    Bilateral lower extremity edema    Rheumatoid arthritis involving multiple sites ([facility]    PAF (paroxysmal atrial fibrillation) ([facility]    Essential hypertension    Type 2 diabetes mellitus without complication, without long-term current use of insulin ([facility]      Spoke with: patient     Discharge department/facility: [facility] Patient Contact:  Was patient able to fill all prescriptions: Yes  Was patient instructed to bring all medications to the follow-up visit: Yes  Is patient taking all medications as directed in the discharge summary? Yes  Does patient understand their discharge instructions: Yes  Does patient have questions or concerns that need addressed prior to 7-14 day follow up office visit: no    I spoke with Mrs. Fagan, she states she is doing much better. She states she is feeling better now that she isn't so swollen. She reports she does still have some edema in her lower extremities but states it has gone down a lot. She is taking her lasix twice daily as instructed at discharge. She has not been short of breath. She does not use home oxygen. She reports she has been checking her oxygen saturation and reports her oxygen running in the mid 90s. She states she feels more steady on her feet. She has tried to stay off of her feet as much as possible but does still have things to do around the house. She states [facility] is supposed to be coming out for therapy. Her appetite is good.  Bowels and bladder are moving ok. She denies any needs at this time and will follow up as scheduled on 10/27/21. She did have me move her appointment to the afternoon as she has a long drive and does not do well in the mornings. She does have transportation in to her appointment. Discharge Medications     Changes to Medications   Instructions Start Date   furosemide 20 MG tablet  Commonly known as: LASIX  What changed: when to take this  20 mg, Oral, 2 Times Daily      Continue These Medications   Instructions Start Date   albuterol sulfate  (90 Base) MCG/ACT inhaler  Commonly known as: PROVENTIL HFA;VENTOLIN HFA;PROAIR HFA  2 puffs, Inhalation, Every 4 Hours PRN    amiodarone 100 MG tablet  Commonly known as: PACERONE  100 mg, Oral, Daily    apixaban 5 MG tablet tablet  Commonly known as: ELIQUIS  5 mg, Oral, 2 Times Daily    aspirin 81 MG EC tablet  81 mg, Oral, Daily    fluticasone 50 MCG/ACT nasal spray  Commonly known as: FLONASE  2 sprays, Nasal, Daily    folic acid 1 MG tablet  Commonly known as: FOLVITE  1 mg, Oral, Daily    glyburide 5 MG tablet  Commonly known as: DIAbeta  Take 2QAM & 2QPM     isosorbide mononitrate 30 MG 24 hr tablet  Commonly known as: IMDUR  30 mg, Oral, Daily    LORazepam 0.5 MG tablet  Commonly known as: ATIVAN  0.5 mg, Oral, 2 Times Daily PRN    losartan 25 MG tablet  Commonly known as: COZAAR  25 mg, Oral, Daily    meclizine 12.5 MG tablet  Commonly known as: ANTIVERT  12.5 mg, Oral, 3 Times Daily PRN    metFORMIN 1000 MG tablet  Commonly known as: GLUCOPHAGE  500 mg, Oral, 2 Times Daily With Meals    metoprolol tartrate 50 MG tablet  Commonly known as: LOPRESSOR  50 mg, Oral, 2 Times Daily    nitroglycerin 0.4 MG SL tablet  Commonly known as: NITROSTAT  0.4 mg, Sublingual, Every 5 Minutes PRN, Take no more than 3 doses in 15 minutes.     ondansetron ODT 4 MG disintegrating tablet  Commonly known as: ZOFRAN-ODT  4 mg, Translingual, Every 8 Hours PRN    pantoprazole 40 MG EC tablet  Commonly known as: PROTONIX  40 mg, Oral, Every 12 Hours Scheduled    potassium chloride 10 MEQ CR tablet  10 mEq, Oral, Daily    predniSONE 5 MG tablet  Commonly known as: DELTASONE  5 mg, Oral, Every Morning    predniSONE 1 MG tablet  Commonly known as: DELTASONE  4 mg, Oral, 4 Times Daily    simvastatin 40 MG tablet  Commonly known as: ZOCOR  40 mg, Oral, Nightly    verapamil  MG CR tablet  Commonly known as: CALAN-SR  240 mg, Oral, Daily    Scheduled appointment with PCP within 7-14 days    Follow Up  Future Appointments   Date Time Provider Sunni Nowak   10/27/2021  3[facility]   11/1/2021 10:45 AM [facility]   12/7/2021  2:00 PM Marguerite Felipe MD Gera Spikes [facility]   12/21/2021 10:00 AM [facility] MAMMO RM 2 [facility] WOMENS [facility]   3/8/2022 10:30 AM Raciel Monet MD [facility]   9/19/2022 11:00 AM MARYANNE Page OB/GYN [facility]       Ina Aguilar MA

## 2021-10-26 NOTE — OUTREACH NOTE
Medical Week 1 Survey      Responses   [facility] patient discharged from? [place]   Does the patient have one of the following disease processes/diagnoses(primary or secondary)? Other   Week 1 attempt successful? Yes   Call start time 1143   Call end time 1147   Discharge diagnosis Acute respiratory failure with hypoxia, Bilateral lower extremity edema   Is patient permission given to speak with other caregiver? No   List who call center can speak with Patient only   Meds reviewed with patient/caregiver? Yes   Is the patient having any side effects they believe may be caused by any medication additions or changes? No   Does the patient have all medications ordered at discharge? Yes   Is the patient taking all medications as directed (includes completed medication regime)? Yes   Does the patient have a primary care provider?  Yes   Does the patient have an appointment with their PCP within 7 days of discharge? Yes   Has the patient kept scheduled appointments due by today? N/A   Comments PCP tomorrow 10/27/2021   Has home health visited the patient within 72 hours of discharge? N/A   Psychosocial issues? No   Did the patient receive a copy of their discharge instructions? Yes   Nursing interventions Reviewed instructions with patient   What is the patient's perception of their health status since discharge? Improving   Is the patient/caregiver able to teach back the hierarchy of who to call/visit for symptoms/problems? PCP, Specialist, Home health nurse, Urgent Care, ED, 911 Yes   Week 1 call completed? Yes          Andree Jon RN

## 2021-10-27 NOTE — PROGRESS NOTES
Post-Discharge Transitional Care Management Services or Hospital Follow Up      Maile Fagan   YOB: 1955    Date of Office Visit:  10/27/2021  Date of Hospital Admission: 10/21/21  Date of Hospital Discharge: 10/22/21    Care management risk score Rising risk (score 2-5) and Complex Care (Scores >=6): 9     Non face to face  following discharge, date last encounter closed (first attempt may have been earlier): 10/25/2021  2:22 PM     Call initiated 2 business days of discharge: Yes    Patient Active Problem List   Diagnosis    Iron deficiency anemia    Essential hypertension    Peptic ulcer disease    Type 2 diabetes mellitus without complication, without long-term current use of insulin ([facility]    Rheumatoid arthritis ([address]    IBS (irritable bowel syndrome)    Familial hypercholesterolemia    Leukocytosis    Atrial flutter by electrocardiogram ([address]    Polyp of stomach    Atrophic gastritis without hemorrhage    Anxiety    Paroxysmal atrial fibrillation (HCC)    Paroxysmal SVT (supraventricular tachycardia) ([address]    Diastolic dysfunction    Morbid obesity with BMI of 40.0-44.9, adult ([address]    PMB (postmenopausal bleeding)    Skin ulcer, limited to breakdown of skin ([address]    Coronary artery disease    Malignant lymphoma, non-Hodgkin's ([address]    Type 2 diabetes mellitus with hyperglycemia    COVID-19       Allergies   Allergen Reactions    Codeine Other (See Comments)     Chest pain    Albuterol      Rapid heart rate    Clindamycin/Lincomycin     Januvia [Sitagliptin] Swelling    Augmentin [Amoxicillin-Pot Clavulanate] Palpitations    Sulfa Antibiotics Rash       Medications listed as ordered at the time of discharge from hospital       Medications marked \"taking\" at this time  Outpatient Medications Marked as Taking for the 10/27/21 encounter (Office Visit) with MARYANNE Garsia   Medication Sig Dispense Refill    fluticasone (FLONASE) 50 MCG/ACT nasal spray 2 sprays by Each Nostril route daily 16 g 0    glyBURIDE (DIABETA) 5 MG tablet Take 2 tablets every morning & 1 tablet every evening. 120 tablet 5    pantoprazole (PROTONIX) 40 MG tablet TAKE 1 TABLET BY MOUTH 2 TIMES A  tablet 0    losartan (COZAAR) 25 MG tablet TAKE 1 TABLET BY MOUTH DAILY 90 tablet 0    simvastatin (ZOCOR) 40 MG tablet TAKE 1 TABLET BY MOUTH NIGHTLY 90 tablet 0    metoprolol tartrate (LOPRESSOR) 50 MG tablet TAKE 1 TABLET BY MOUTH 2 TIMES A  tablet 0    isosorbide mononitrate (IMDUR) 30 MG extended release tablet TAKE 1 TABLET BY MOUTH DAILY 90 tablet 0    FREESTYLE LITE strip CHECK BLOOD GLUCOSE ONCE DAILY AS NEEDED 100 strip 3    ketoconazole (NIZORAL) 2 % cream APPLY TOPICALLY TO AFFECTED AREA(S) ONCE DAILY AS DIRECTED 60 g 0    metFORMIN (GLUCOPHAGE) 500 MG tablet TAKE 1 TABLET BY MOUTH ONCE EVERY MORNING AND ONE TABLET EVERY EVENING 180 tablet 1    FreeStyle Lancets MISC USE AS DIRECTED 100 each 3    potassium chloride (KLOR-CON M) 20 MEQ extended release tablet TAKE 1 TABLET BY MOUTH DAILY. 90 tablet 0    blood glucose test strips (FREESTYLE LITE) strip Infuse 1 each intravenously 2 times daily As needed. 100 strip 1    nystatin (MYCOSTATIN) 979395 UNIT/GM powder Apply topically 4 times daily. 30 g 5    Blood Glucose Monitoring Suppl (FREESTYLE INSULINX SYSTEM) w/Device KIT 1 each by Does not apply route daily 1 kit 0    blood glucose test strips (FREESTYLE INSULINX TEST) strip 1 each by In Vitro route daily As needed. 100 each 3    nitroGLYCERIN (NITROSTAT) 0.4 MG SL tablet PLACE 1 TABLET UNDER THE TONGUE EVERY 5 MINUTES AS NEEDED FOR CHEST PAIN 25 tablet 0    nystatin (MYCOSTATIN) 531737 UNIT/GM cream Apply topically 2 times daily.  30 g 2    Tofacitinib Citrate (XELJANZ XR) 11 MG TB24 Take 11 mg by mouth daily      furosemide (LASIX) 20 MG tablet Take 20 mg by mouth 2 times daily       FREESTYLE LANCETS MISC TEST SUGAR DAILY AS DIRECTED UP TO 4 TIMES A DAY 4 each 0    apixaban (ELIQUIS) 5 MG TABS tablet Take 5 mg by mouth 2 times daily       verapamil (CALAN SR) 240 MG extended release tablet Take 240 mg by mouth daily       amiodarone (PACERONE) 100 MG tablet Take 100 mg by mouth daily       FREESTYLE LANCETS MISC TEST SUGAR DAILY AS DIRECTED 100 each 2    predniSONE (DELTASONE) 5 MG tablet Take 5 mg by mouth 2 times daily Take 5 mg in the morning and 4 mg in the evening.  aspirin EC 81 MG EC tablet Take 81 mg by mouth daily      folic acid (FOLVITE) 1 MG tablet Take 1 mg by mouth daily           Medications patient taking as of now reconciled against medications ordered at time of hospital discharge: Yes    Chief Complaint   Patient presents with   [address] from Hospital       HPI    Inpatient course: Discharge summary reviewed- see chart. Interval history/Current status:     She tested positive for Covid on 9/29. She had the monoclonal antibody infusion but worsened and went to [facility] ER on 10/1. CXR showed inflammatory process secondary to Covid. She did improve but still has some weakness. She was admitted to [facility] on 10/21 after evaluation at cardiology's office. She was there for follow-up on PAF. Underwent cardioversion in August.  She was having increased weakness, swelling, shortness of breath. She was diuresed with improvement. Her Lasix was increased to twice daily at discharge. She has remained in sinus. Doing well now. Still weak but stable. No significant shortness of breath. BP stable, controlled on current medication. DM II stable. FBG running 130s. RA stable, managed by rheumatology in [place]. Vitals:    10/27/21 1511   BP: 122/62   Pulse: 64   Temp: 97.5 °F (36.4 °C)   SpO2: 97%   Weight: 192 lb (87.1 kg)     Body mass index is 32.96 kg/m².    Wt Readings from Last 3 Encounters:   10/27/21 192 lb (87.1 kg)   09/29/21 207 lb (93.9 kg)   09/13/21 207 lb (93.9 kg)     BP Readings from Last 3 Encounters:   10/27/21 122/62   09/29/21 130/70   09/13/21 121/68       Review of Systems   Constitutional: Negative for chills and fever. HENT: Negative for congestion, ear pain and sore throat. Respiratory: Negative for cough, chest tightness, shortness of breath and wheezing. Cardiovascular: Negative for chest pain. Gastrointestinal: Negative for abdominal pain, diarrhea and nausea. Musculoskeletal: Negative for arthralgias and myalgias. Skin: Negative for rash. Neurological: Positive for weakness (stable). Physical Exam  Vitals reviewed. Constitutional:       General: She is not in acute distress. Appearance: Normal appearance. She is well-developed. HENT:      Head: Normocephalic. Eyes:      Conjunctiva/sclera: Conjunctivae normal.      Pupils: Pupils are equal, round, and reactive to light. Neck:      Thyroid: No thyromegaly. Vascular: No carotid bruit or JVD. Trachea: No tracheal deviation. Cardiovascular:      Rate and Rhythm: Normal rate and regular rhythm. Heart sounds: Normal heart sounds. No murmur heard. Pulmonary:      Effort: Pulmonary effort is normal. No respiratory distress. Breath sounds: Normal breath sounds. No wheezing or rhonchi. Musculoskeletal:         General: Normal range of motion. Cervical back: Normal range of motion and neck supple. Lymphadenopathy:      Cervical: No cervical adenopathy. Skin:     General: Skin is warm and dry. Findings: No rash. Neurological:      Mental Status: She is alert. Psychiatric:         Mood and Affect: Mood normal.         Behavior: Behavior normal.         Thought Content: Thought content normal.               Assessment/Plan:  1. Paroxysmal atrial fibrillation (HCC)  -Stable in sinus following cardioversion in August.  Continue with management per cardiology.     2. Type 2 diabetes mellitus without complication, without long-term current use of insulin (HCC)  -Stable, continue current medication. Continue work on diabetic diet.  -Check fasting CMP, A1c in 3 months. 3. Essential hypertension  -Stable, controlled. Continue current medication. 4. Rheumatoid arthritis involving multiple sites, unspecified whether rheumatoid factor present ([facility]  -Stable, continue with management per rheumatology    5. Generalized weakness  -Multifactorial secondary to recent Covid, hospitalization, etc.  Gradually improving.         Medical Decision Making: high complexity

## 2021-11-04 NOTE — OUTREACH NOTE
Medical Week 2 Survey      Responses   [facility] patient discharged from? [facility]   Does the patient have one of the following disease processes/diagnoses(primary or secondary)? Other   Week 2 attempt successful? Yes   Call start time 1400   Call end time 1405   Person spoke with today (if not patient) and relationship patient   Meds reviewed with patient/caregiver? Yes  [Patient reports that she continues the twice a day lasix and it has really helped her. ]   Does the patient have all medications ordered at discharge? Yes   Is the patient taking all medications as directed (includes completed medication regime)? Yes   Comments regarding appointments Jagdeep Reyes MD CARDIOLOGY 11/17/21.    Does the patient have a primary care provider?  Yes   Comments regarding PCP Patient reports that she has been seen in PCP office since discharge.    Has the patient kept scheduled appointments due by today? Yes   Comments Per DC  summary, patient needs BMP with cardiology f/u. Patient to talk to Dr about it with appt.    Has home health visited the patient within 72 hours of discharge? N/A   Psychosocial issues? No   Did the patient receive a copy of their discharge instructions? Yes   Nursing interventions Reviewed instructions with patient   What is the patient's perception of their health status since discharge? Improving   Is the patient/caregiver able to teach back signs and symptoms related to disease process for when to call PCP? Yes   Is the patient/caregiver able to teach back the hierarchy of who to call/visit for symptoms/problems? PCP, Specialist, Home health nurse, Urgent Care, ED, 911 Yes   If the patient is a current smoker, are they able to teach back resources for cessation? Not a smoker   Week 2 Call Completed? Yes          Franny Live RN

## 2021-11-12 NOTE — OUTREACH NOTE
"Medical Week 3 Survey      Responses   [facility] patient discharged from? [place]   Does the patient have one of the following disease processes/diagnoses(primary or secondary)? Other   Week 3 attempt successful? Yes   Call start time 1614   Call end time 1614   Discharge diagnosis Acute respiratory failure with hypoxia, Bilateral lower extremity edema   Meds reviewed with patient/caregiver? Yes   Is the patient taking all medications as directed (includes completed medication regime)? Yes   Has the patient kept scheduled appointments due by today? Yes   What is the patient's perception of their health status since discharge? Improving   Week 3 Call Completed? Yes   Is the patient interested in additional calls from an ambulatory ?  NOTE:  applies to high risk patients requiring additional follow-up. No   Revoked No further contact(revokes)-requires comment   Graduated/Revoked comments Pt states, \"I think I'm in pretty good shape now, but I appreciate you calling.\"          Jayshree Weathers RN  "
Spontaneous

## 2021-11-17 NOTE — PROGRESS NOTES
Teresa Serna  [id number]  1955  66 y.o.  female    Referring Provider: Teresa Contreras MD    Reason for Follow-up Visit:   short term office follow up after recent encounter   Paroxysmal atrial fibrillation now atrial flutter   Saw Dr De Anda AF ablation tried but due to no good venous access not possible     Cardiac workup test results as below: 14-day outpatient cardiac telemetry   In past could not get ablation due to anatomy  Attempted by Dr De Anda   Last visit went to ER     Subjective    Overall feels dramatically better   Minimal leg swelling     Mild chronic exertional shortness of breath on exertion relieved with rest  No significant cough or wheezing    No palpitations  No associated chest pain    No fever or chills  No significant expectoration    No hemoptysis  No presyncope or syncope    Tolerating current medications well with no untoward side effects   Compliant with prescribed medication regimen. Tries to adhere to cardiac diet.     Overall much better   No new events or complaints since last visit   Recent labs reviewed   Normal potassium   A1c wa 9     No bleeding, excessive bruising, gait instability or fall risks    BP well controlled at home.         History of present illness:  Teresa Serna is a 66 y.o. yo female with paroxysmal atrial fibrillation who presents today for   Chief Complaint   Patient presents with   • Coronary Artery Disease     1mo- patient states she is doing so much better, with no issues.    .    History  Past Medical History:   Diagnosis Date   • Abnormal stress test    • Atrial flutter (HCC)    • CAD (coronary artery disease)    • CAD in native artery     CABG x 3   • Diverticulosis    • Essential hypertension     Tricuspid valve insufficiency, unspecified etiology    • History of adenomatous polyp of colon    • Hyperlipemia, mixed    • Hyperlipidemia    • Hypertension    • IBS (irritable bowel syndrome)    • MI, old    • Mitral valve prolapse    • Near  syncope    • Obesity, morbid (HCC)    • Palpitations    • Paroxysmal SVT (supraventricular tachycardia) ([facility])    • Pedal edema    • Precordial pain    • Rheumatoid arthritis ([facility])    • S/P CABG x 3    • Type 2 diabetes mellitus ([facility])    • Venous insufficiency of both lower extremities    ,   Past Surgical History:   Procedure Laterality Date   • APPENDECTOMY     • CARDIAC CATHETERIZATION Left 2012    Intensify medical therapy   • CARDIAC CATHETERIZATION Left 2002    surgery   • CHOLECYSTECTOMY  1975   • COLONOSCOPY N/A 2018    Diverticulosis in the entire examined colon; One 4mm tubular adenomatous polyp in the transverse colon; Non-bleeding internal hemorrhoids; Repeat 5 years   • COLONOSCOPY  02/10/2012    Diverticulosis in the entire examined colon; Non-bleeding internal hemorrhoids; Repeat 7 years   • COLONOSCOPY  2009    Diverticulosis; Repeat 7 years   • CORONARY ARTERY BYPASS GRAFT  2002    LIMA to LAD, SVG to D1, SVG to OM1 - x3   • CORONARY STENT PLACEMENT  09/2009    X1 - Stent to LAD, Drug Eluting   • DILATATION AND CURETTAGE     • ENDOSCOPY N/A 2018    Esophageal mucosal changes suspicious for short-segment De Anda's esophagus-biopsied; Small HH; Two gastric polyps-Clip (MR conditional) was placed-biopsied; Normal examined duodenum   • ENDOSCOPY  2015    Probable short-segment De Anda's esophagus-biopsied; HH; Gastritis-biopsied; Duodenal diverticulum; Repeat 1 year   • REPLACEMENT TOTAL KNEE Right    ,   Family History   Problem Relation Age of Onset   • Cancer Father    • Coronary artery disease Father    • Colon cancer Neg Hx    • Colon polyps Neg Hx    ,   Social History     Tobacco Use   • Smoking status: Former Smoker     Years: 20.00     Types: Cigarettes     Quit date: 2000     Years since quittin.8   • Smokeless tobacco: Never Used   Vaping Use   • Vaping Use: Never used   Substance Use Topics   • Alcohol use: No   • Drug use: No   ,      Medications  Current Outpatient Medications   Medication Sig Dispense Refill   • albuterol sulfate  (90 Base) MCG/ACT inhaler Inhale 2 puffs Every 4 (Four) Hours As Needed for Wheezing or Shortness of Air.     • amiodarone (PACERONE) 100 MG tablet Take 1 tablet by mouth Daily. 90 tablet 4   • aspirin 81 MG EC tablet Take 81 mg by mouth Daily.     • Eliquis 5 MG tablet tablet TAKE 1 TABLET BY MOUTH EVERY 12 HOURS 180 tablet 4   • fluticasone (FLONASE) 50 MCG/ACT nasal spray 2 sprays into the nostril(s) as directed by provider Daily.     • folic acid (FOLVITE) 1 MG tablet Take 1 mg by mouth Daily.     • furosemide (LASIX) 20 MG tablet Take 1 tablet by mouth 2 (Two) Times a Day. 60 tablet 4   • glyburide (DIAbeta) 5 MG tablet Take 2QAM & 2QPM     • isosorbide mononitrate (IMDUR) 30 MG 24 hr tablet Take 30 mg by mouth Daily.     • LORazepam (ATIVAN) 0.5 MG tablet Take 0.5 mg by mouth 2 (Two) Times a Day As Needed for Anxiety.     • losartan (COZAAR) 25 MG tablet Take 25 mg by mouth Daily.     • meclizine (ANTIVERT) 12.5 MG tablet Take 1 tablet by mouth 3 (Three) Times a Day As Needed for dizziness. 90 tablet 3   • metFORMIN (GLUCOPHAGE) 500 MG tablet      • metoprolol tartrate (LOPRESSOR) 50 MG tablet Take 50 mg by mouth 2 (Two) Times a Day.     • nitroglycerin (NITROSTAT) 0.4 MG SL tablet Place 0.4 mg under the tongue Every 5 (Five) Minutes As Needed for Chest Pain. Take no more than 3 doses in 15 minutes.     • ondansetron ODT (ZOFRAN-ODT) 4 MG disintegrating tablet Place 1 tablet on the tongue Every 8 (Eight) Hours As Needed for Nausea or Vomiting. 12 tablet 0   • pantoprazole (PROTONIX) 40 MG EC tablet Take 1 tablet by mouth Every 12 (Twelve) Hours. 60 tablet 1   • potassium chloride (K-DUR) 10 MEQ CR tablet Take 1 tablet by mouth Daily. 90 tablet 3   • predniSONE (DELTASONE) 1 MG tablet Take 4 mg by mouth 4 (Four) Times a Day.     • predniSONE (DELTASONE) 5 MG tablet Take 5 mg by mouth Every Morning.    "  • simvastatin (ZOCOR) 40 MG tablet Take 40 mg by mouth Every Night.     • verapamil SR (CALAN-SR) 240 MG CR tablet Take 1 tablet by mouth Daily. 90 tablet 4     No current facility-administered medications for this visit.     Results for orders placed during the hospital encounter of 07/07/20    Adult Transthoracic Echo Complete W/ Cont if Necessary Per Protocol    Interpretation Summary  · Estimated EF = 60%.  · Left ventricular systolic function is normal.  · Mild tricuspid valve regurgitation is present.  · Left ventricular diastolic function is normal.  · Mild pulmonary hypertension is present.      Allergies:  Codeine, Exenatide, Sulfa antibiotics, Albuterol, Sitagliptin, and Amoxicillin-pot clavulanate    Review of Systems  Review of Systems   Constitutional: Positive for malaise/fatigue.   HENT: Negative.    Eyes: Negative.    Cardiovascular: Positive for dyspnea on exertion, leg swelling and palpitations. Negative for chest pain, claudication, cyanosis, irregular heartbeat, near-syncope, orthopnea, paroxysmal nocturnal dyspnea and syncope.   Respiratory: Negative.    Endocrine: Negative.    Hematologic/Lymphatic: Negative.    Skin: Negative.    Musculoskeletal: Positive for arthritis, back pain and joint pain.   Gastrointestinal: Negative for anorexia.   Genitourinary: Negative.    Neurological: Positive for dizziness and weakness.   Psychiatric/Behavioral: Negative.         Objective     Physical Exam:  /68   Pulse 62   Ht 162.6 cm (64\")   Wt 87.5 kg (193 lb)   SpO2 99%   BMI 33.13 kg/m²   Physical Exam   Constitutional: She appears well-developed.   HENT:   Head: Normocephalic.   Neck: Normal carotid pulses and no JVD present. No tracheal tenderness present. Carotid bruit is not present. No tracheal deviation present.   Cardiovascular: Regular rhythm and normal pulses.   Murmur heard.   Systolic murmur is present with a grade of 2/6.  Pulmonary/Chest: Effort normal. No stridor. She has no " wheezes.   Abdominal: Soft. She exhibits no distension. There is no abdominal tenderness.   Musculoskeletal:      Right lower le+ Pitting Edema present.      Left lower le+ Pitting Edema present.     Vascular Status -  Her right foot exhibits abnormal foot edema. Her left foot exhibits abnormal foot edema.  Neurological: She is alert. No cranial nerve deficit or sensory deficit.   Skin: Skin is warm.   Psychiatric: Her speech is normal and behavior is normal.      Results Review:    Results for orders placed during the hospital encounter of 20    Adult Transthoracic Echo Complete W/ Cont if Necessary Per Protocol    Interpretation Summary  · Estimated EF = 60%.  · Left ventricular systolic function is normal.  · Mild tricuspid valve regurgitation is present.  · Left ventricular diastolic function is normal.  · Mild pulmonary hypertension is present.      Diagnoses and all orders for this visit:    1. PAF (paroxysmal atrial fibrillation) (HCC) (Primary)    2. S/P CABG x 3  Dr Larsen    3. S/P coronary artery stent placement    4. Typical atrial flutter (HCC)    5. MCCAULEY (dyspnea on exertion)  -     Adult Transthoracic Echo Complete w/ Color, Spectral and Contrast if necessary per protocol; Future            Plan      Orders Placed This Encounter   Procedures   • Adult Transthoracic Echo Complete w/ Color, Spectral and Contrast if necessary per protocol     Myocardial strain to be performed during echocardiogram as long as technically feasible     Standing Status:   Future     Standing Expiration Date:   2022     Order Specific Question:   Reason for exam?     Answer:   Dyspnea     Order Specific Question:   Release to patient     Answer:   Immediate      Call for results of above testing.     I support the patient's decision to take the Covid -19 vaccine   Had required complete course   No major issues   Now fully immunized    Recommend booster       I have reviewed the patient's medical history  and recent admission and treatmentand and updated her    Monitor for any signs of bleeding including red or dark stools as well as easy bruisabilty. Fall precautions.      Check BP and heart rates twice daily at least 3x / week, week a month  at home and bring a recording for me to review next visit  If BP >130/85 or < 100/60 persistently over 3 reading 30 mins apart call sooner      Weigh yourself frequently, at least weekly, preferably daily, call me if more than 2 pounds a day or 5 pounds a week weight gain.  Flexible diuretic dosing     Follow up with MINNIE Pedro  or myself               Return in about 4 months (around 3/17/2022).

## 2021-12-20 NOTE — TELEPHONE ENCOUNTER
[facility] from [address] called and wanted to report she has talked with pt and she is not eating says she doesn't feel like it? Would you want to add her on today or tomorrow? She has been referred to [facility] but are unable to come out to see her due to staffing? [facility] recommended switching to NOY BUCKLEY. She is sending the pt some high protein boost today with her medications to help with appetite. Do you want to do anything different? Unable to add to NP only [place] here tues wed and she is already full with 21 and 20.

## 2021-12-21 NOTE — TELEPHONE ENCOUNTER
Michelle La Day called to request a refill on her medication.       Last office visit : 12/21/2021   Next office visit : 1/4/2022     Requested Prescriptions     Signed Prescriptions Disp Refills    metoprolol tartrate (LOPRESSOR) 50 MG tablet 180 tablet 0     Sig: TAKE 1 TABLET BY MOUTH 2 TIMES A DAY     Authorizing Provider: Joanna Sherman     Ordering User: Temitope Mikey metFORMIN (GLUCOPHAGE) 500 MG tablet 180 tablet 0     Sig: TAKE 1 TABLET BY MOUTH ONCE EVERY MORNING AND ONE TABLET EVERY EVENING     Authorizing Provider: Joanna Sherman     Ordering User: Temitope Jensen simvastatin (ZOCOR) 40 MG tablet 90 tablet 0     Sig: TAKE 1 TABLET BY MOUTH NIGHTLY     Authorizing Provider: Joanna Sherman     Ordering User: Carvin Batman    losartan (COZAAR) 25 MG tablet 90 tablet 0     Sig: TAKE 1 TABLET BY MOUTH DAILY     Authorizing Provider: Joanna Sherman     Ordering User: Temitope Jensen isosorbide mononitrate (IMDUR) 30 MG extended release tablet 90 tablet 0     Sig: TAKE 1 TABLET BY MOUTH DAILY     Authorizing Provider: Joanna Sherman     Ordering User: Linda Haile

## 2021-12-21 NOTE — PROGRESS NOTES
[facility]  [facility]  [address] [address]  [address] [address]  Dept: [phone]  Dept Fax: [phone]  Loc: [phone]     Lenin Fagan is a 73 y.o. female who presents today for her medical conditions/complaintsas noted below. Lenin Fagan is c/o of ED Follow-up (faizan  no appetite )        HPI:   Patient is here for follow up. She went to ED at [facility], then transferred to  [facility] on 11-26-21. She was getting out meryl decorations from shed, fell on right side from standing. No LOC. Baseline Hgb 12. She had retroperitoneal bleed, on eliquis. She was discharged from [facility] on Friday for rehab. She has decreased appetite. Blood sugar running low, was 27 yesterday. Blood sugar averaging about 60-70. She has increased swelling in legs, sleeping in recliner. She takes lasix 20 mg bid to help with swelling. She is not taking metformin or glyburide while appetite low. She had retroperitoneal bleed last month, repeat CT improved before discharge. She now has LLQ pain, ttp LLQ, NO rectal bleeding. No fever. She has weight loss, decreased appetite. No pain with eating. She has persistent side pain, but tolerable. She has weakness, mild sob with exertion, uses walker.      HPI    Past Medical History:   Diagnosis Date    Atrial flutter by electrocardiogram ([address]     Constipation     Coronary artery disease     Diabetes mellitus ([address]     Essential hypertension     Hyperlipidemia     IBS (irritable bowel syndrome)     Iron deficiency anemia     Menopause     Paroxysmal SVT (supraventricular tachycardia) (HCC)     RA (rheumatoid arthritis) (HCC)     Rheumatoid arthritis ([address]      Past Surgical History:   Procedure Laterality Date    CARDIAC SURGERY  2000    CABG (Dr. Zoraida Runner) [address]  2009    Stent     CHOLECYSTECTOMY      CORONARY ARTERY BYPASS GRAFT  2000    DILATION AND CURETTAGE OF UTERUS N/A 8/16/2019 DILATATION AND CURETTAGE HYSTEROSCOPY W/ Suzy Mom performed by [facility] OR    TOTAL KNEE ARTHROPLASTY         Family History   Problem Relation Age of Onset    Heart Attack Father     Prostate Cancer Father     Stroke Father     Heart Attack Brother     Stroke Brother     Breast Cancer Maternal Aunt     Cancer Paternal Uncle         Lung       Social History     Tobacco Use    Smoking status: Former Smoker     Packs/day: 0.50     Years: 26.00     Pack years: 13.00     Types: Cigarettes     Start date: 6/10/1974     Quit date: 2000     Years since quittin.2    Smokeless tobacco: Never Used   Substance Use Topics    Alcohol use: No      Current Outpatient Medications   Medication Sig Dispense Refill    FreeStyle Lancets MISC USE AS DIRECTED 100 each 3    blood glucose test strips (FREESTYLE LITE) strip Infuse 1 each intravenously 2 times daily As needed. 100 strip 3    FreeStyle Lancets MISC TEST SUGAR DAILY AS DIRECTED UP TO 4 TIMES A  each 3    fluticasone (FLONASE) 50 MCG/ACT nasal spray 2 sprays by Each Nostril route daily 16 g 0    glyBURIDE (DIABETA) 5 MG tablet Take 2 tablets every morning & 1 tablet every evening. 120 tablet 5    FREESTYLE LITE strip CHECK BLOOD GLUCOSE ONCE DAILY AS NEEDED 100 strip 3    potassium chloride (KLOR-CON M) 20 MEQ extended release tablet TAKE 1 TABLET BY MOUTH DAILY. 90 tablet 0    nystatin (MYCOSTATIN) 941464 UNIT/GM powder Apply topically 4 times daily. 30 g 5    Blood Glucose Monitoring Suppl (FREESTYLE INSULINX SYSTEM) w/Device KIT 1 each by Does not apply route daily 1 kit 0    blood glucose test strips (FREESTYLE INSULINX TEST) strip 1 each by In Vitro route daily As needed. 100 each 3    nitroGLYCERIN (NITROSTAT) 0.4 MG SL tablet PLACE 1 TABLET UNDER THE TONGUE EVERY 5 MINUTES AS NEEDED FOR CHEST PAIN 25 tablet 0    nystatin (MYCOSTATIN) 908715 UNIT/GM cream Apply topically 2 times daily.  30 g 2    Tofacitinib Citrate (XELJANZ XR) 11 MG TB24 Take 11 mg by mouth daily      furosemide (LASIX) 20 MG tablet Take 20 mg by mouth 2 times daily       apixaban (ELIQUIS) 5 MG TABS tablet Take 5 mg by mouth 2 times daily       amiodarone (PACERONE) 100 MG tablet Take 100 mg by mouth daily       FREESTYLE LANCETS MISC TEST SUGAR DAILY AS DIRECTED 100 each 2    predniSONE (DELTASONE) 5 MG tablet Take 5 mg by mouth 2 times daily Take 5 mg in the morning and 4 mg in the evening.  aspirin EC 81 MG EC tablet Take 81 mg by mouth daily      folic acid (FOLVITE) 1 MG tablet Take 1 mg by mouth daily       ketoconazole (NIZORAL) 2 % cream APPLY TOPICALLY TO AFFECTED AREA(S) ONCE DAILY AS DIRECTED 60 g 0    rosuvastatin (CRESTOR) 5 MG tablet Take 1 tablet by mouth nightly 30 tablet 3    metoprolol tartrate (LOPRESSOR) 25 MG tablet Take 0.5 tablets by mouth 2 times daily 30 tablet 1    pantoprazole (PROTONIX) 40 MG tablet TAKE 1 TABLET BY MOUTH 2 TIMES A  tablet 0    metFORMIN (GLUCOPHAGE) 500 MG tablet TAKE 1 TABLET BY MOUTH ONCE EVERY MORNING AND ONE TABLET EVERY EVENING 180 tablet 0    simvastatin (ZOCOR) 40 MG tablet TAKE 1 TABLET BY MOUTH NIGHTLY 90 tablet 0    losartan (COZAAR) 25 MG tablet TAKE 1 TABLET BY MOUTH DAILY 90 tablet 0    isosorbide mononitrate (IMDUR) 30 MG extended release tablet TAKE 1 TABLET BY MOUTH DAILY 90 tablet 0     No current facility-administered medications for this visit.      Allergies   Allergen Reactions    Codeine Other (See Comments)     Chest pain    Albuterol      Rapid heart rate    Clindamycin/Lincomycin     Januvia [Sitagliptin] Swelling    Augmentin [Amoxicillin-Pot Clavulanate] Palpitations    Sulfa Antibiotics Rash       Health Maintenance   Topic Date Due    Hepatitis C screen  Never done    HIV screen  Never done    Shingles Vaccine (1 of 2) Never done    DEXA (modify frequency per FRAX score)  Never done    Diabetic foot exam  04/24/2019    Diabetic breath sounds. No wheezing or rales. Abdominal:      General: Bowel sounds are normal. There is no distension. Palpations: Abdomen is soft. Tenderness: There is no abdominal tenderness. There is no guarding or rebound. Musculoskeletal:      Cervical back: Normal range of motion and neck supple. Neurological:      Mental Status: She is alert.    Psychiatric:         Behavior: Behavior normal.           Lab Review   Recent Results (from the past 672 hour(s))   CBC Auto Differential    Collection Time: 12/21/21 12:06 PM   Result Value Ref Range    WBC 10.2 4.8 - 10.8 K/uL    RBC 3.98 (L) 4.20 - 5.40 M/uL    Hemoglobin 12.4 12.0 - 16.0 g/dL    Hematocrit 42.5 37.0 - 47.0 %    .8 (H) 81.0 - 99.0 fL    MCH 31.2 (H) 27.0 - 31.0 pg    MCHC 29.2 (L) 33.0 - 37.0 g/dL    RDW 19.1 (H) 11.5 - 14.5 %    Platelets 600 592 - 190 K/uL    MPV 9.8 9.4 - 12.3 fL    Neutrophils % 84.3 (H) 50.0 - 65.0 %    Lymphocytes % 5.9 (L) 20.0 - 40.0 %    Monocytes % 7.6 0.0 - 10.0 %    Eosinophils % 0.5 0.0 - 5.0 %    Basophils % 0.6 0.0 - 1.0 %    Neutrophils Absolute 8.6 (H) 1.5 - 7.5 K/uL    Immature Granulocytes # 0.1 K/uL    Lymphocytes Absolute 0.6 (L) 1.1 - 4.5 K/uL    Monocytes Absolute 0.80 0.00 - 0.90 K/uL    Eosinophils Absolute 0.10 0.00 - 0.60 K/uL    Basophils Absolute 0.10 0.00 - 0.20 K/uL   Hemoglobin A1C    Collection Time: 12/21/21 12:06 PM   Result Value Ref Range    Hemoglobin A1C 5.7 4.0 - 6.0 %   Comprehensive Metabolic Panel    Collection Time: 12/21/21 12:06 PM   Result Value Ref Range    Sodium 138 136 - 145 mmol/L    Potassium 3.7 3.5 - 5.0 mmol/L    Chloride 96 (L) 98 - 111 mmol/L    CO2 23 22 - 29 mmol/L    Anion Gap 19 7 - 19 mmol/L    Glucose 166 (H) 74 - 109 mg/dL    BUN 13 8 - 23 mg/dL    CREATININE 0.3 (L) 0.5 - 0.9 mg/dL    GFR Non-African American >60 >60    GFR African American >59 >59    Calcium 9.6 8.8 - 10.2 mg/dL    Total Protein 7.2 6.6 - 8.7 g/dL    Albumin 4.0 3.5 - 5.2 g/dL    Total Bilirubin 3.3 (H) 0.2 - 1.2 mg/dL    Alkaline Phosphatase 100 35 - 104 U/L    ALT 19 5 - 33 U/L    AST 33 (H) 5 - 32 U/L   Lipid Panel    Collection Time: 12/21/21 12:06 PM   Result Value Ref Range    Cholesterol, Total 194 160 - 199 mg/dL    Triglycerides 118 0 - 149 mg/dL    HDL 87 65 - 121 mg/dL    LDL Calculated 83 <100 mg/dL   TSH without Reflex    Collection Time: 12/21/21 12:06 PM   Result Value Ref Range    TSH 1.210 0.270 - 4.200 uIU/mL   Iron and TIBC    Collection Time: 12/21/21 12:06 PM   Result Value Ref Range    Iron 84 37 - 145 ug/dL    TIBC 306 250 - 400 ug/dL    Iron Saturation 27 14 - 50 %   Ferritin    Collection Time: 12/21/21 12:06 PM   Result Value Ref Range    Ferritin 490.0 (H) 13.0 - 150.0 ng/mL   Transferrin    Collection Time: 12/21/21 12:06 PM   Result Value Ref Range    Transferrin 258 200 - 400 mg/dL               Assessment & Plan: The following diagnoses and conditions are stable withno further orders unless indicated:  Casey Chakraborty was seen today for ed follow-up. Diagnoses and all orders for this visit:    Iron deficiency anemia, unspecified iron deficiency anemia type  -     CBC Auto Differential; Future  -     TSH without Reflex; Future  -     Iron and TIBC; Future  -     Ferritin; Future  -     Transferrin; Future  Stable, will follow. Type 2 diabetes mellitus without complication, without long-term current use of insulin (HCC)  -     Hemoglobin A1C; Future  -     Comprehensive Metabolic Panel; Future  -     Lipid Panel; Future  -     TSH without Reflex; Future  -     FreeStyle Lancets MISC; USE AS DIRECTED  Will follow closely. Type 2 diabetes mellitus with hyperglycemia, without long-term current use of insulin (HCC)    Retroperitoneal hemorrhage  -     CT ABDOMEN PELVIS W IV CONTRAST Additional Contrast? Radiologist Recommendation;  Future  -     [address] [place] [address]  REfer for CT abdomen and Pelvis - called to go to ED with findings of persistent extensive hematoma, displacing bowel LLQ, also occulsion of IVC. REfer to Vascular surgery. Atelectasis  -     XR CHEST STANDARD (2 VW); Future  -     Peak flow meter  Repeat CXR, peak flow meter to prevent atelectasis. Weakness  -     [address]. Familial hypercholesterolemia  Will follow. Other orders  -     blood glucose test strips (FREESTYLE LITE) strip; Infuse 1 each intravenously 2 times daily As needed. Return for already scheduled. Discussed use, benefit, and side effects of prescribed medications. All patient questions answered. Pt voiced understanding. Reviewed health maintenance. Instructed to continue current medications, diet and exercise. Patient agreedwith treatment plan. Follow up as directed.

## 2021-12-23 NOTE — DISCHARGE INSTRUCTIONS
F/u with pcp for re-evaluation with any worsening sxs  The large left retroperitoneal hematoma mostly resolved. Blood counts are stable. Spoke with surgeon on call who feels you may be discharged but return with any worsening sxs

## 2021-12-23 NOTE — ED PROVIDER NOTES
Subjective   Patient is a 66-year-old female presents the ER with complaints of an abnormal CT scan.  Per review patient was evaluated in this emergency department on November 27, 2021.  She had a mechanical fall as she was walking out of an out building.  She sustained a right 10th rib fracture and developed a retroperitoneal bleed with active extravasation.  She was transferred to [facility].  She received 2 units of FFP and 1 unit of packed red blood cells.  She was started on Lovenox for prophylaxis.  Patient is typically on Eliquis for history of CABG, A. fib, and MI.  Patient had serial abdominal exams and lab work.  She was diuresed with Lasix and oxygen therapy for pleural effusions and atelectasis.  CT scan additionally show bilateral lower lobe consolidation with nearly total collapse of the left lower lung.  Patient was discharged to [facility] rehab in stable condition on December 1, 2021.  She was evaluated by her PCP 2 days ago and had repeat CT scan.  CT scan as reviewed from [facility] shows the evolving large left retroperitoneal hematoma most of which is not identified.  The abdominal component appears to have increased in size with displacement of the left kidney and small and large bowel loops.  No encasement or obstruction.  Pelvic component of the hematoma has significantly decreased since the previous study.  No active extravasation identified.  The remaining abdominal organs are unremarkable without evidence of recent or previous traumatic involvement.  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  The diverticulosis of the colon.  No evidence of diverticulitis.    Patient reports feeling much better since she was admitted at [facility].  Bruising has greatly improved.  Labs are stable.  She continues to have mild left lower/side pain however states that pain to her abdomen and back  has substantially improved.  She denies any black or bloody stools or other associated symptoms or modifying factors.          Review of Systems   Constitutional: Negative.  Negative for fever.   HENT: Negative.  Negative for congestion.    Respiratory: Negative.  Negative for cough and shortness of breath.    Cardiovascular: Negative.  Negative for chest pain.   Gastrointestinal: Positive for abdominal pain and nausea. Negative for vomiting.   Genitourinary: Negative.    Musculoskeletal: Positive for back pain.   Skin: Positive for wound.   All other systems reviewed and are negative.      Past Medical History:   Diagnosis Date   • Abnormal stress test    • Atrial flutter ([facility])    • CAD (coronary artery disease)    • CAD in native artery     CABG x 3   • Diverticulosis    • Essential hypertension     Tricuspid valve insufficiency, unspecified etiology    • History of adenomatous polyp of colon    • Hyperlipemia, mixed    • Hyperlipidemia    • Hypertension    • IBS (irritable bowel syndrome)    • MI, old    • Mitral valve prolapse    • Near syncope    • Obesity, morbid ([facility])    • Palpitations    • Paroxysmal SVT (supraventricular tachycardia) ([facility])    • Pedal edema    • Precordial pain    • Rheumatoid arthritis ([facility])    • S/P CABG x 3    • Type 2 diabetes mellitus ([facility])    • Venous insufficiency of both lower extremities        Allergies   Allergen Reactions   • Codeine Palpitations and Other (See Comments)     Chest pain   • Exenatide Arrhythmia   • Sulfa Antibiotics Hives and Rash     Reaction: hives   • Albuterol Palpitations     High heart rate   • Sitagliptin Swelling   • Amoxicillin-Pot Clavulanate Palpitations       Past Surgical History:   Procedure Laterality Date   • APPENDECTOMY     • CARDIAC CATHETERIZATION Left 06/18/2012    Intensify medical therapy   • CARDIAC CATHETERIZATION Left 05/05/2002    surgery   • CHOLECYSTECTOMY  1975   • COLONOSCOPY N/A 1/31/2018    Diverticulosis in the entire examined  colon; One 4mm tubular adenomatous polyp in the transverse colon; Non-bleeding internal hemorrhoids; Repeat 5 years   • COLONOSCOPY  02/10/2012    Diverticulosis in the entire examined colon; Non-bleeding internal hemorrhoids; Repeat 7 years   • COLONOSCOPY  2009    Diverticulosis; Repeat 7 years   • CORONARY ARTERY BYPASS GRAFT  2002    LIMA to LAD, SVG to D1, SVG to OM1 - x3   • CORONARY STENT PLACEMENT  09/2009    X1 - Stent to LAD, Drug Eluting   • DILATATION AND CURETTAGE     • ENDOSCOPY N/A 2018    Esophageal mucosal changes suspicious for short-segment De Anda's esophagus-biopsied; Small HH; Two gastric polyps-Clip (MR conditional) was placed-biopsied; Normal examined duodenum   • ENDOSCOPY  2015    Probable short-segment De Anda's esophagus-biopsied; HH; Gastritis-biopsied; Duodenal diverticulum; Repeat 1 year   • REPLACEMENT TOTAL KNEE Right        Family History   Problem Relation Age of Onset   • Cancer Father    • Coronary artery disease Father    • Colon cancer Neg Hx    • Colon polyps Neg Hx        Social History     Socioeconomic History   • Marital status:    Tobacco Use   • Smoking status: Former Smoker     Years: 20.00     Types: Cigarettes     Quit date: 2000     Years since quittin.9   • Smokeless tobacco: Never Used   Vaping Use   • Vaping Use: Never used   Substance and Sexual Activity   • Alcohol use: No   • Drug use: No   • Sexual activity: Defer           Objective   Physical Exam  Vitals and nursing note reviewed.   Constitutional:       Appearance: She is well-developed.   HENT:      Head: Normocephalic and atraumatic.      Right Ear: External ear normal.      Left Ear: External ear normal.      Nose: Nose normal.      Mouth/Throat:      Mouth: Mucous membranes are moist.      Pharynx: Oropharynx is clear.   Eyes:      Conjunctiva/sclera: Conjunctivae normal.      Pupils: Pupils are equal, round, and reactive to light.   Cardiovascular:      Rate and  Rhythm: Normal rate and regular rhythm.      Heart sounds: Normal heart sounds.   Pulmonary:      Effort: Pulmonary effort is normal.      Breath sounds: Normal breath sounds.   Abdominal:      General: Bowel sounds are normal.      Palpations: Abdomen is soft.      Tenderness: There is abdominal tenderness.   Musculoskeletal:         General: Normal range of motion.      Cervical back: Normal range of motion and neck supple.   Skin:     General: Skin is warm and dry.      Capillary Refill: Capillary refill takes less than 2 seconds.   Neurological:      General: No focal deficit present.      Mental Status: She is alert and oriented to person, place, and time.   Psychiatric:         Mood and Affect: Mood normal.         Behavior: Behavior normal.         Thought Content: Thought content normal.         Judgment: Judgment normal.         Procedures           ED Course Dr. Mcgrath reviewed ct scan report and case and agrees with treatment plan.  ED Course as of 12/23/21 1523   Thu Dec 23, 2021   1320 Spoke with Dr. Dykes regarding patient's CT scan read. She feels this is likely the retroperitoneal resolving and the residual. Patient's h/h is stable and reports feeling much better. She may f/u with pcp with any worsening sxs. [TW]      ED Course User Index  [TW] Flora Yang, APRN                                                 MDM  Number of Diagnoses or Management Options  History of fall: new and requires workup  Traumatic retroperitoneal hematoma, initial encounter: new and requires workup     Amount and/or Complexity of Data Reviewed  Clinical lab tests: ordered and reviewed  Decide to obtain previous medical records or to obtain history from someone other than the patient: yes  Discuss the patient with other providers: yes    Risk of Complications, Morbidity, and/or Mortality  Presenting problems: moderate  Diagnostic procedures: moderate  Management options: moderate    Patient Progress  Patient  progress: improved      Final diagnoses:   History of fall   Traumatic retroperitoneal hematoma, initial encounter       ED Disposition  ED Disposition     ED Disposition Condition Comment    Discharge Good           No follow-up provider specified.       Medication List      No changes were made to your prescriptions during this visit.          Flora Yang, APRN  12/23/21 1523

## 2021-12-27 NOTE — TELEPHONE ENCOUNTER
Pharmacy called pts Metoprolol was changed in hospital from 50 mg to 12.5 mg bid. Recommend changing her Simvastatin to Crestor 5 mg daily due to major interactions with Amiodarone  And Verapamil  Glyburide was on hold due to pt not eating well. How long does this need to be held? However pharmacy recommending changing glyburide to Glipizide when resuming due to insurance coverage. Please advise.

## 2021-12-27 NOTE — TELEPHONE ENCOUNTER
Kailyn Bolanos called requesting a refill of the below medication which has been pended for you:     Requested Prescriptions     Pending Prescriptions Disp Refills    ketoconazole (NIZORAL) 2 % cream [Pharmacy Med Name: KETOCONAZOLE 2% CREAM] 60 g 0     Sig: APPLY TOPICALLY TO AFFECTED AREA(S) ONCE DAILY AS DIRECTED       Last Appointment Date: 12/21/2021  Next Appointment Date: 1/4/2022    Allergies   Allergen Reactions    Codeine Other (See Comments)     Chest pain    Albuterol      Rapid heart rate    Clindamycin/Lincomycin     Januvia [Sitagliptin] Swelling    Augmentin [Amoxicillin-Pot Clavulanate] Palpitations    Sulfa Antibiotics Rash

## 2021-12-28 NOTE — TELEPHONE ENCOUNTER
Patient was recently treated at [facility] for a bleed after a fall.  She is seen in our office for A-fib and is still taking anticoagulation.  Her HR has been running higher than normal.  Her PCP is requesting we see her before her March appt.  Please make her an appointment and call her at [phone] or [phone] or her Home Health RN at [phone].  Thank you!

## 2021-12-28 NOTE — TELEPHONE ENCOUNTER
[address] [address] PT performed evaluation  Patient walking with spc in the home and requires cga and v/c for safety.   Patient transfers to standing with spc and cga  Patient c/o increasd fatigue  PT request visits  1 wk 1, 2 wk 1    Please advise if approved

## 2021-12-28 NOTE — TELEPHONE ENCOUNTER
Per [facility] nurse pt metoprolol dose is 12.5 mg po BID , I have let the [facility] nurse know on that dose

## 2021-12-28 NOTE — TELEPHONE ENCOUNTER
Left a vm for pt to call back so that I can move her appointment up with Dr. Jagdeep Reyes. Call was made on 12-28-21 @2:05pm. My contact info was given so she could call me back directly.  Liliam

## 2021-12-28 NOTE — TELEPHONE ENCOUNTER
If taking 1/2 tab metoprolol 25 mg bid (confirm that's what she is taking), then increase to 1 tablet am and 1/2 tab qpm to start.  Also, I need her referred to [address] [place] [address] or [facility] to follow up on IVC occlusion seen on CT. REviewed ED records from [facility].

## 2021-12-28 NOTE — TELEPHONE ENCOUNTER
[facility] nurse reporting that pt has changed her metorprolol and her HR today was 106 - they have called Dr Luis Angel Childs as well on that but was concerned since the HR was up today - usually in 60-70s

## 2021-12-28 NOTE — TELEPHONE ENCOUNTER
Patients primary doctor decreased her Metoprolol tartrate 50 mg to 25 mgs and patient was supposed to cut the 25 mg in half so she is taking the 12.5mg twice a day morning and night and that is not working for her. She was recently in a nursing home just laying in the bed and her blood pressure was lower then. Since then she is back at home and she doing more now.

## 2022-01-01 ENCOUNTER — APPOINTMENT (OUTPATIENT)
Dept: GENERAL RADIOLOGY | Age: 67
DRG: 870 | End: 2022-01-01
Attending: INTERNAL MEDICINE
Payer: MEDICARE

## 2022-01-01 ENCOUNTER — APPOINTMENT (OUTPATIENT)
Dept: GENERAL RADIOLOGY | Facility: HOSPITAL | Age: 67
End: 2022-01-01

## 2022-01-01 ENCOUNTER — OFFICE VISIT (OUTPATIENT)
Dept: FAMILY MEDICINE CLINIC | Age: 67
End: 2022-01-01
Payer: MEDICARE

## 2022-01-01 ENCOUNTER — HOSPITAL ENCOUNTER (OUTPATIENT)
Dept: GENERAL RADIOLOGY | Age: 67
Discharge: HOME | End: 2022-03-08
Payer: MEDICARE

## 2022-01-01 ENCOUNTER — TELEPHONE (OUTPATIENT)
Dept: FAMILY MEDICINE CLINIC | Age: 67
End: 2022-01-01

## 2022-01-01 ENCOUNTER — TELEPHONE (OUTPATIENT)
Dept: VASCULAR SURGERY | Facility: CLINIC | Age: 67
End: 2022-01-01

## 2022-01-01 ENCOUNTER — APPOINTMENT (OUTPATIENT)
Dept: CT IMAGING | Facility: HOSPITAL | Age: 67
End: 2022-01-01

## 2022-01-01 ENCOUNTER — ANESTHESIA EVENT (OUTPATIENT)
Dept: PERIOP | Facility: HOSPITAL | Age: 67
End: 2022-01-01

## 2022-01-01 ENCOUNTER — ANESTHESIA (OUTPATIENT)
Dept: PERIOP | Facility: HOSPITAL | Age: 67
End: 2022-01-01

## 2022-01-01 ENCOUNTER — PREP FOR SURGERY (OUTPATIENT)
Dept: OTHER | Facility: HOSPITAL | Age: 67
End: 2022-01-01

## 2022-01-01 ENCOUNTER — HOSPITAL ENCOUNTER (INPATIENT)
Age: 67
LOS: 8 days | Discharge: NURSING, REHAB OR LONG-TERM CARE | DRG: 870 | End: 2022-07-27
Attending: INTERNAL MEDICINE | Admitting: INTERNAL MEDICINE
Payer: MEDICARE

## 2022-01-01 ENCOUNTER — OFFICE VISIT (OUTPATIENT)
Dept: CARDIOLOGY | Facility: CLINIC | Age: 67
End: 2022-01-01

## 2022-01-01 ENCOUNTER — APPOINTMENT (OUTPATIENT)
Dept: ULTRASOUND IMAGING | Facility: HOSPITAL | Age: 67
End: 2022-01-01

## 2022-01-01 ENCOUNTER — OFFICE VISIT (OUTPATIENT)
Dept: FAMILY MEDICINE CLINIC | Facility: CLINIC | Age: 67
End: 2022-01-01

## 2022-01-01 ENCOUNTER — HOSPITAL ENCOUNTER (OUTPATIENT)
Facility: HOSPITAL | Age: 67
End: 2022-08-18
Attending: INTERNAL MEDICINE | Admitting: INTERNAL MEDICINE

## 2022-01-01 ENCOUNTER — HOSPITAL ENCOUNTER (OUTPATIENT)
Dept: GENERAL RADIOLOGY | Age: 67
Discharge: HOME | End: 2022-07-12
Payer: MEDICARE

## 2022-01-01 ENCOUNTER — HOSPITAL ENCOUNTER (EMERGENCY)
Facility: HOSPITAL | Age: 67
Discharge: HOME | End: 2022-06-16
Admitting: EMERGENCY MEDICINE

## 2022-01-01 ENCOUNTER — TELEMEDICINE (OUTPATIENT)
Dept: FAMILY MEDICINE CLINIC | Age: 67
End: 2022-01-01
Payer: MEDICARE

## 2022-01-01 ENCOUNTER — HOSPITAL ENCOUNTER (OUTPATIENT)
Dept: ULTRASOUND IMAGING | Age: 67
Discharge: HOME | End: 2022-04-25
Payer: MEDICARE

## 2022-01-01 ENCOUNTER — HOSPITAL ENCOUNTER (EMERGENCY)
Facility: HOSPITAL | Age: 67
Discharge: HOME | End: 2022-05-08
Admitting: EMERGENCY MEDICINE

## 2022-01-01 ENCOUNTER — OFFICE VISIT (OUTPATIENT)
Dept: VASCULAR SURGERY | Facility: CLINIC | Age: 67
End: 2022-01-01

## 2022-01-01 ENCOUNTER — HOSPITAL ENCOUNTER (EMERGENCY)
Facility: HOSPITAL | Age: 67
End: 2022-01-01

## 2022-01-01 ENCOUNTER — HOSPITAL ENCOUNTER (OUTPATIENT)
Dept: CARDIOLOGY | Facility: HOSPITAL | Age: 67
Discharge: HOME | End: 2022-01-04
Admitting: INTERNAL MEDICINE

## 2022-01-01 ENCOUNTER — TRANSCRIBE ORDERS (OUTPATIENT)
Dept: ADMINISTRATIVE | Facility: HOSPITAL | Age: 67
End: 2022-01-01

## 2022-01-01 ENCOUNTER — HOSPITAL ENCOUNTER (OUTPATIENT)
Dept: CARDIOLOGY | Facility: HOSPITAL | Age: 67
Discharge: HOME | End: 2022-04-08
Admitting: NURSE PRACTITIONER

## 2022-01-01 ENCOUNTER — LAB (OUTPATIENT)
Dept: LAB | Facility: HOSPITAL | Age: 67
End: 2022-01-01

## 2022-01-01 VITALS
SYSTOLIC BLOOD PRESSURE: 127 MMHG | TEMPERATURE: 98.1 F | DIASTOLIC BLOOD PRESSURE: 53 MMHG | RESPIRATION RATE: 12 BRPM | HEIGHT: 66 IN | OXYGEN SATURATION: 95 % | HEART RATE: 100 BPM | WEIGHT: 278.8 LBS | BODY MASS INDEX: 44.81 KG/M2

## 2022-01-01 VITALS
SYSTOLIC BLOOD PRESSURE: 130 MMHG | TEMPERATURE: 97 F | BODY MASS INDEX: 33.63 KG/M2 | HEIGHT: 64 IN | OXYGEN SATURATION: 100 % | DIASTOLIC BLOOD PRESSURE: 60 MMHG | HEART RATE: 73 BPM | WEIGHT: 197 LBS | RESPIRATION RATE: 18 BRPM

## 2022-01-01 VITALS
RESPIRATION RATE: 18 BRPM | HEART RATE: 58 BPM | BODY MASS INDEX: 36.88 KG/M2 | WEIGHT: 216 LBS | SYSTOLIC BLOOD PRESSURE: 127 MMHG | OXYGEN SATURATION: 100 % | TEMPERATURE: 98.1 F | HEIGHT: 64 IN | DIASTOLIC BLOOD PRESSURE: 58 MMHG

## 2022-01-01 VITALS
HEIGHT: 64 IN | SYSTOLIC BLOOD PRESSURE: 118 MMHG | OXYGEN SATURATION: 97 % | DIASTOLIC BLOOD PRESSURE: 80 MMHG | HEART RATE: 73 BPM | BODY MASS INDEX: 35.34 KG/M2 | WEIGHT: 207 LBS

## 2022-01-01 VITALS
RESPIRATION RATE: 16 BRPM | TEMPERATURE: 97.4 F | WEIGHT: 210.2 LBS | OXYGEN SATURATION: 98 % | DIASTOLIC BLOOD PRESSURE: 66 MMHG | HEART RATE: 70 BPM | BODY MASS INDEX: 35.89 KG/M2 | SYSTOLIC BLOOD PRESSURE: 118 MMHG | HEIGHT: 64 IN

## 2022-01-01 VITALS
TEMPERATURE: 97.5 F | BODY MASS INDEX: 35.87 KG/M2 | WEIGHT: 209 LBS | HEART RATE: 66 BPM | OXYGEN SATURATION: 94 % | SYSTOLIC BLOOD PRESSURE: 138 MMHG | DIASTOLIC BLOOD PRESSURE: 74 MMHG

## 2022-01-01 VITALS
BODY MASS INDEX: 31.76 KG/M2 | WEIGHT: 186 LBS | HEIGHT: 64 IN | DIASTOLIC BLOOD PRESSURE: 68 MMHG | SYSTOLIC BLOOD PRESSURE: 124 MMHG

## 2022-01-01 VITALS
DIASTOLIC BLOOD PRESSURE: 62 MMHG | TEMPERATURE: 97.4 F | BODY MASS INDEX: 34.16 KG/M2 | OXYGEN SATURATION: 97 % | SYSTOLIC BLOOD PRESSURE: 114 MMHG | WEIGHT: 199 LBS | HEART RATE: 68 BPM

## 2022-01-01 VITALS
SYSTOLIC BLOOD PRESSURE: 126 MMHG | HEART RATE: 76 BPM | BODY MASS INDEX: 34.42 KG/M2 | HEIGHT: 64 IN | DIASTOLIC BLOOD PRESSURE: 82 MMHG | TEMPERATURE: 97.6 F | WEIGHT: 201.6 LBS | OXYGEN SATURATION: 97 %

## 2022-01-01 VITALS
DIASTOLIC BLOOD PRESSURE: 70 MMHG | SYSTOLIC BLOOD PRESSURE: 118 MMHG | OXYGEN SATURATION: 99 % | TEMPERATURE: 96.9 F | BODY MASS INDEX: 32.27 KG/M2 | HEART RATE: 104 BPM | WEIGHT: 188 LBS

## 2022-01-01 VITALS
DIASTOLIC BLOOD PRESSURE: 67 MMHG | TEMPERATURE: 97.9 F | RESPIRATION RATE: 19 BRPM | SYSTOLIC BLOOD PRESSURE: 146 MMHG | OXYGEN SATURATION: 94 % | HEIGHT: 64 IN | BODY MASS INDEX: 35.68 KG/M2 | HEART RATE: 60 BPM | WEIGHT: 209 LBS

## 2022-01-01 VITALS
WEIGHT: 199.6 LBS | DIASTOLIC BLOOD PRESSURE: 64 MMHG | BODY MASS INDEX: 34.08 KG/M2 | TEMPERATURE: 97.6 F | OXYGEN SATURATION: 96 % | RESPIRATION RATE: 18 BRPM | HEART RATE: 71 BPM | HEIGHT: 64 IN | SYSTOLIC BLOOD PRESSURE: 124 MMHG

## 2022-01-01 VITALS
OXYGEN SATURATION: 98 % | HEIGHT: 64 IN | TEMPERATURE: 97 F | DIASTOLIC BLOOD PRESSURE: 80 MMHG | BODY MASS INDEX: 34.83 KG/M2 | WEIGHT: 204 LBS | SYSTOLIC BLOOD PRESSURE: 122 MMHG | HEART RATE: 70 BPM

## 2022-01-01 VITALS
BODY MASS INDEX: 33.12 KG/M2 | SYSTOLIC BLOOD PRESSURE: 122 MMHG | HEIGHT: 64 IN | DIASTOLIC BLOOD PRESSURE: 74 MMHG | WEIGHT: 194 LBS

## 2022-01-01 VITALS
WEIGHT: 194 LBS | SYSTOLIC BLOOD PRESSURE: 110 MMHG | DIASTOLIC BLOOD PRESSURE: 72 MMHG | HEART RATE: 60 BPM | HEIGHT: 64 IN | BODY MASS INDEX: 33.12 KG/M2 | OXYGEN SATURATION: 97 %

## 2022-01-01 VITALS
HEART RATE: 70 BPM | HEIGHT: 64 IN | SYSTOLIC BLOOD PRESSURE: 140 MMHG | DIASTOLIC BLOOD PRESSURE: 72 MMHG | BODY MASS INDEX: 35 KG/M2 | WEIGHT: 205 LBS | OXYGEN SATURATION: 97 %

## 2022-01-01 VITALS
DIASTOLIC BLOOD PRESSURE: 59 MMHG | SYSTOLIC BLOOD PRESSURE: 137 MMHG | OXYGEN SATURATION: 96 % | RESPIRATION RATE: 29 BRPM | BODY MASS INDEX: 33.72 KG/M2 | WEIGHT: 197.5 LBS | HEIGHT: 64 IN | TEMPERATURE: 97.3 F | HEART RATE: 106 BPM

## 2022-01-01 VITALS
SYSTOLIC BLOOD PRESSURE: 132 MMHG | WEIGHT: 198 LBS | OXYGEN SATURATION: 98 % | HEART RATE: 82 BPM | BODY MASS INDEX: 33.8 KG/M2 | DIASTOLIC BLOOD PRESSURE: 80 MMHG | HEIGHT: 64 IN | TEMPERATURE: 98 F

## 2022-01-01 VITALS
OXYGEN SATURATION: 97 % | BODY MASS INDEX: 35.51 KG/M2 | WEIGHT: 208 LBS | TEMPERATURE: 97.3 F | DIASTOLIC BLOOD PRESSURE: 78 MMHG | HEIGHT: 64 IN | HEART RATE: 65 BPM | SYSTOLIC BLOOD PRESSURE: 130 MMHG

## 2022-01-01 VITALS
SYSTOLIC BLOOD PRESSURE: 112 MMHG | DIASTOLIC BLOOD PRESSURE: 60 MMHG | HEART RATE: 60 BPM | HEIGHT: 64 IN | TEMPERATURE: 97.2 F | BODY MASS INDEX: 35.17 KG/M2 | OXYGEN SATURATION: 98 % | WEIGHT: 206 LBS

## 2022-01-01 DIAGNOSIS — Q26.8 ABSENCE OF INFERIOR VENA CAVA: Primary | ICD-10-CM

## 2022-01-01 DIAGNOSIS — M79.89 LEG SWELLING: Primary | ICD-10-CM

## 2022-01-01 DIAGNOSIS — E66.01 CLASS 2 SEVERE OBESITY DUE TO EXCESS CALORIES WITH SERIOUS COMORBIDITY AND BODY MASS INDEX (BMI) OF 35.0 TO 35.9 IN ADULT: ICD-10-CM

## 2022-01-01 DIAGNOSIS — I25.10 CORONARY ARTERY DISEASE INVOLVING NATIVE CORONARY ARTERY OF NATIVE HEART WITHOUT ANGINA PECTORIS: Chronic | ICD-10-CM

## 2022-01-01 DIAGNOSIS — E78.01 FAMILIAL HYPERCHOLESTEROLEMIA: ICD-10-CM

## 2022-01-01 DIAGNOSIS — R07.9 CHEST PAIN, UNSPECIFIED TYPE: ICD-10-CM

## 2022-01-01 DIAGNOSIS — D50.9 IRON DEFICIENCY ANEMIA, UNSPECIFIED IRON DEFICIENCY ANEMIA TYPE: ICD-10-CM

## 2022-01-01 DIAGNOSIS — Z95.1 S/P CABG X 3: Primary | ICD-10-CM

## 2022-01-01 DIAGNOSIS — L98.491 SKIN ULCER, LIMITED TO BREAKDOWN OF SKIN (HCC): ICD-10-CM

## 2022-01-01 DIAGNOSIS — R35.0 FREQUENCY OF URINATION: Primary | ICD-10-CM

## 2022-01-01 DIAGNOSIS — Z95.5 S/P CORONARY ARTERY STENT PLACEMENT: ICD-10-CM

## 2022-01-01 DIAGNOSIS — I87.2 VENOUS INSUFFICIENCY: ICD-10-CM

## 2022-01-01 DIAGNOSIS — M06.9 RHEUMATOID ARTHRITIS INVOLVING MULTIPLE SITES, UNSPECIFIED WHETHER RHEUMATOID FACTOR PRESENT (HCC): ICD-10-CM

## 2022-01-01 DIAGNOSIS — E11.65 TYPE 2 DIABETES MELLITUS WITH HYPERGLYCEMIA, WITHOUT LONG-TERM CURRENT USE OF INSULIN (HCC): ICD-10-CM

## 2022-01-01 DIAGNOSIS — J30.89 ENVIRONMENTAL AND SEASONAL ALLERGIES: ICD-10-CM

## 2022-01-01 DIAGNOSIS — I48.3 TYPICAL ATRIAL FLUTTER: ICD-10-CM

## 2022-01-01 DIAGNOSIS — K66.1 RETROPERITONEAL HEMATOMA: ICD-10-CM

## 2022-01-01 DIAGNOSIS — R58 RETROPERITONEAL HEMORRHAGE: ICD-10-CM

## 2022-01-01 DIAGNOSIS — I25.10 CORONARY ARTERY DISEASE INVOLVING NATIVE CORONARY ARTERY OF NATIVE HEART WITHOUT ANGINA PECTORIS: ICD-10-CM

## 2022-01-01 DIAGNOSIS — R60.0 BILATERAL LOWER EXTREMITY EDEMA: ICD-10-CM

## 2022-01-01 DIAGNOSIS — E11.9 TYPE 2 DIABETES MELLITUS WITHOUT COMPLICATION, WITHOUT LONG-TERM CURRENT USE OF INSULIN (HCC): ICD-10-CM

## 2022-01-01 DIAGNOSIS — I25.10 CORONARY ARTERY DISEASE INVOLVING NATIVE CORONARY ARTERY OF NATIVE HEART WITHOUT ANGINA PECTORIS: Primary | ICD-10-CM

## 2022-01-01 DIAGNOSIS — I10 ESSENTIAL HYPERTENSION: ICD-10-CM

## 2022-01-01 DIAGNOSIS — M54.50 LUMBAR BACK PAIN: ICD-10-CM

## 2022-01-01 DIAGNOSIS — R29.818 SUSPECTED SLEEP APNEA: ICD-10-CM

## 2022-01-01 DIAGNOSIS — E11.65 TYPE 2 DIABETES MELLITUS WITH HYPERGLYCEMIA, UNSPECIFIED WHETHER LONG TERM INSULIN USE (HCC): ICD-10-CM

## 2022-01-01 DIAGNOSIS — M79.89 SWELLING OF THIGH: Primary | ICD-10-CM

## 2022-01-01 DIAGNOSIS — Z99.11 VENTILATOR DEPENDENCE: Primary | ICD-10-CM

## 2022-01-01 DIAGNOSIS — R06.09 DOE (DYSPNEA ON EXERTION): ICD-10-CM

## 2022-01-01 DIAGNOSIS — J96.20 RESPIRATORY FAILURE, ACUTE AND CHRONIC: ICD-10-CM

## 2022-01-01 DIAGNOSIS — R58 RETROPERITONEAL HEMORRHAGE: Primary | ICD-10-CM

## 2022-01-01 DIAGNOSIS — L03.115 CELLULITIS OF RIGHT LOWER EXTREMITY: ICD-10-CM

## 2022-01-01 DIAGNOSIS — I48.0 PAF (PAROXYSMAL ATRIAL FIBRILLATION): ICD-10-CM

## 2022-01-01 DIAGNOSIS — S80.811A ABRASION OF SKIN OF RIGHT LOWER LEG: Primary | ICD-10-CM

## 2022-01-01 DIAGNOSIS — I51.89 DIASTOLIC DYSFUNCTION: Primary | ICD-10-CM

## 2022-01-01 DIAGNOSIS — M25.551 RIGHT HIP PAIN: ICD-10-CM

## 2022-01-01 DIAGNOSIS — K42.9 UMBILICAL HERNIA WITHOUT OBSTRUCTION AND WITHOUT GANGRENE: ICD-10-CM

## 2022-01-01 DIAGNOSIS — E66.01 SEVERE OBESITY (BMI 35.0-39.9) WITH COMORBIDITY (HCC): ICD-10-CM

## 2022-01-01 DIAGNOSIS — E11.9 TYPE 2 DIABETES MELLITUS WITHOUT COMPLICATION, WITHOUT LONG-TERM CURRENT USE OF INSULIN: ICD-10-CM

## 2022-01-01 DIAGNOSIS — Z91.81 AT HIGH RISK FOR FALLS: ICD-10-CM

## 2022-01-01 DIAGNOSIS — E11.65 TYPE 2 DIABETES MELLITUS WITH HYPERGLYCEMIA, WITH LONG-TERM CURRENT USE OF INSULIN (HCC): ICD-10-CM

## 2022-01-01 DIAGNOSIS — K66.1: Primary | ICD-10-CM

## 2022-01-01 DIAGNOSIS — Z12.31 SCREENING MAMMOGRAM FOR BREAST CANCER: ICD-10-CM

## 2022-01-01 DIAGNOSIS — Z95.1 S/P CABG X 3: ICD-10-CM

## 2022-01-01 DIAGNOSIS — R60.0 BILATERAL LOWER EXTREMITY EDEMA: Primary | ICD-10-CM

## 2022-01-01 DIAGNOSIS — Z79.4 TYPE 2 DIABETES MELLITUS WITH HYPERGLYCEMIA, WITH LONG-TERM CURRENT USE OF INSULIN (HCC): ICD-10-CM

## 2022-01-01 DIAGNOSIS — R60.0 PEDAL EDEMA: ICD-10-CM

## 2022-01-01 DIAGNOSIS — M79.672 PAIN OF LEFT HEEL: ICD-10-CM

## 2022-01-01 DIAGNOSIS — Z78.0 POST-MENOPAUSE: ICD-10-CM

## 2022-01-01 DIAGNOSIS — I25.10 CORONARY ARTERY DISEASE INVOLVING NATIVE HEART WITHOUT ANGINA PECTORIS, UNSPECIFIED VESSEL OR LESION TYPE: ICD-10-CM

## 2022-01-01 DIAGNOSIS — R18.8 OTHER ASCITES: ICD-10-CM

## 2022-01-01 DIAGNOSIS — R05.9 COUGH: ICD-10-CM

## 2022-01-01 DIAGNOSIS — E78.2 MIXED HYPERLIPIDEMIA: ICD-10-CM

## 2022-01-01 DIAGNOSIS — R60.9 PERIPHERAL EDEMA: ICD-10-CM

## 2022-01-01 DIAGNOSIS — I82.220: ICD-10-CM

## 2022-01-01 DIAGNOSIS — R53.83 EASY FATIGABILITY: ICD-10-CM

## 2022-01-01 DIAGNOSIS — E66.9 CLASS 1 OBESITY WITH BODY MASS INDEX (BMI) OF 33.0 TO 33.9 IN ADULT, UNSPECIFIED OBESITY TYPE, UNSPECIFIED WHETHER SERIOUS COMORBIDITY PRESENT: ICD-10-CM

## 2022-01-01 DIAGNOSIS — M54.41 ACUTE LEFT-SIDED LOW BACK PAIN WITH RIGHT-SIDED SCIATICA: ICD-10-CM

## 2022-01-01 DIAGNOSIS — R60.0 PEDAL EDEMA: Primary | ICD-10-CM

## 2022-01-01 DIAGNOSIS — I10 ESSENTIAL HYPERTENSION: Primary | ICD-10-CM

## 2022-01-01 DIAGNOSIS — Z99.11 VENTILATOR DEPENDENCE: ICD-10-CM

## 2022-01-01 DIAGNOSIS — I36.1 NONRHEUMATIC TRICUSPID VALVE REGURGITATION: ICD-10-CM

## 2022-01-01 DIAGNOSIS — I47.1 PAROXYSMAL SVT (SUPRAVENTRICULAR TACHYCARDIA) (HCC): ICD-10-CM

## 2022-01-01 DIAGNOSIS — C86.6: ICD-10-CM

## 2022-01-01 DIAGNOSIS — Z00.00 MEDICARE ANNUAL WELLNESS VISIT, SUBSEQUENT: Primary | ICD-10-CM

## 2022-01-01 DIAGNOSIS — R35.0 FREQUENCY OF URINATION: ICD-10-CM

## 2022-01-01 DIAGNOSIS — Z79.01 CURRENT USE OF LONG TERM ANTICOAGULATION: ICD-10-CM
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
## 2022-01-01 LAB
25(OH)D3 SERPL-MCNC: 11.7 NG/ML (ref 30–100)
ACANTHOCYTES: ABNORMAL
ACINETOBACTER CALCOAC BAUMANNII COMPLEX BY PCR: NOT DETECTED
ADENOVIRUS BY PCR: NOT DETECTED
ALBUMIN SERPL-MCNC: 1.5 G/DL (ref 3.5–5.2)
ALBUMIN SERPL-MCNC: 2 G/DL (ref 3.5–5.2)
ALBUMIN SERPL-MCNC: 2 G/DL (ref 3.5–5.2)
ALBUMIN SERPL-MCNC: 2.1 G/DL (ref 3.5–5.2)
ALBUMIN SERPL-MCNC: 2.1 G/DL (ref 3.5–5.2)
ALBUMIN SERPL-MCNC: 2.2 G/DL (ref 3.5–5.2)
ALBUMIN SERPL-MCNC: 2.3 G/DL (ref 3.5–5.2)
ALBUMIN SERPL-MCNC: 2.4 G/DL (ref 3.5–5.2)
ALBUMIN SERPL-MCNC: 2.5 G/DL (ref 3.5–5.2)
ALBUMIN SERPL-MCNC: 2.5 G/DL (ref 3.5–5.2)
ALBUMIN SERPL-MCNC: 3.1 G/DL (ref 3.5–5.2)
ALBUMIN SERPL-MCNC: 3.2 G/DL (ref 3.5–5.2)
ALBUMIN SERPL-MCNC: 4 G/DL (ref 3.5–5.2)
ALBUMIN SERPL-MCNC: 4.4 G/DL (ref 3.5–5.2)
ALBUMIN SERPL-MCNC: 4.5 G/DL (ref 3.5–5.2)
ALBUMIN/GLOB SERPL: 0.5 G/DL
ALBUMIN/GLOB SERPL: 0.6 G/DL
ALBUMIN/GLOB SERPL: 0.7 G/DL
ALBUMIN/GLOB SERPL: 0.8 G/DL
ALBUMIN/GLOB SERPL: 1.7 G/DL
ALBUMIN/GLOB SERPL: 1.8 G/DL
ALLENS TEST: ABNORMAL
ALP BLD-CCNC: 101 U/L (ref 35–104)
ALP BLD-CCNC: 106 U/L (ref 35–104)
ALP BLD-CCNC: 107 U/L (ref 35–104)
ALP BLD-CCNC: 110 U/L (ref 35–104)
ALP BLD-CCNC: 113 U/L (ref 35–104)
ALP BLD-CCNC: 80 U/L (ref 35–104)
ALP BLD-CCNC: 84 U/L (ref 35–104)
ALP BLD-CCNC: 89 U/L (ref 35–104)
ALP BLD-CCNC: 91 U/L (ref 35–104)
ALP BLD-CCNC: 93 U/L (ref 35–104)
ALP BLD-CCNC: 98 U/L (ref 35–104)
ALP SERPL-CCNC: 118 U/L (ref 39–117)
ALP SERPL-CCNC: 124 U/L (ref 39–117)
ALP SERPL-CCNC: 139 U/L (ref 39–117)
ALP SERPL-CCNC: 140 U/L (ref 39–117)
ALP SERPL-CCNC: 164 U/L (ref 39–117)
ALP SERPL-CCNC: 207 U/L (ref 39–117)
ALT SERPL W P-5'-P-CCNC: 17 U/L (ref 1–33)
ALT SERPL W P-5'-P-CCNC: 18 U/L (ref 1–33)
ALT SERPL W P-5'-P-CCNC: 18 U/L (ref 1–33)
ALT SERPL W P-5'-P-CCNC: 20 U/L (ref 1–33)
ALT SERPL W P-5'-P-CCNC: 20 U/L (ref 1–33)
ALT SERPL W P-5'-P-CCNC: 9 U/L (ref 1–33)
ALT SERPL-CCNC: 10 U/L (ref 5–33)
ALT SERPL-CCNC: 12 U/L (ref 5–33)
ALT SERPL-CCNC: 12 U/L (ref 5–33)
ALT SERPL-CCNC: 13 U/L (ref 5–33)
ALT SERPL-CCNC: 13 U/L (ref 5–33)
ALT SERPL-CCNC: 14 U/L (ref 5–33)
ALT SERPL-CCNC: 15 U/L (ref 5–33)
ALT SERPL-CCNC: 17 U/L (ref 5–33)
ALT SERPL-CCNC: 19 U/L (ref 5–33)
ANION GAP SERPL CALCULATED.3IONS-SCNC: 10 MMOL/L (ref 5–15)
ANION GAP SERPL CALCULATED.3IONS-SCNC: 10 MMOL/L (ref 7–19)
ANION GAP SERPL CALCULATED.3IONS-SCNC: 10 MMOL/L (ref 7–19)
ANION GAP SERPL CALCULATED.3IONS-SCNC: 11 MMOL/L (ref 5–15)
ANION GAP SERPL CALCULATED.3IONS-SCNC: 11 MMOL/L (ref 5–15)
ANION GAP SERPL CALCULATED.3IONS-SCNC: 11 MMOL/L (ref 7–19)
ANION GAP SERPL CALCULATED.3IONS-SCNC: 11 MMOL/L (ref 7–19)
ANION GAP SERPL CALCULATED.3IONS-SCNC: 12 MMOL/L (ref 5–15)
ANION GAP SERPL CALCULATED.3IONS-SCNC: 13 MMOL/L (ref 5–15)
ANION GAP SERPL CALCULATED.3IONS-SCNC: 13 MMOL/L (ref 5–15)
ANION GAP SERPL CALCULATED.3IONS-SCNC: 13 MMOL/L (ref 7–19)
ANION GAP SERPL CALCULATED.3IONS-SCNC: 14 MMOL/L (ref 5–15)
ANION GAP SERPL CALCULATED.3IONS-SCNC: 15 MMOL/L (ref 5–15)
ANION GAP SERPL CALCULATED.3IONS-SCNC: 15 MMOL/L (ref 5–15)
ANION GAP SERPL CALCULATED.3IONS-SCNC: 15 MMOL/L (ref 7–19)
ANION GAP SERPL CALCULATED.3IONS-SCNC: 15 MMOL/L (ref 7–19)
ANION GAP SERPL CALCULATED.3IONS-SCNC: 17 MMOL/L (ref 5–15)
ANION GAP SERPL CALCULATED.3IONS-SCNC: 17 MMOL/L (ref 7–19)
ANION GAP SERPL CALCULATED.3IONS-SCNC: 19 MMOL/L (ref 5–15)
ANION GAP SERPL CALCULATED.3IONS-SCNC: 20 MMOL/L (ref 5–15)
ANION GAP SERPL CALCULATED.3IONS-SCNC: 20 MMOL/L (ref 5–15)
ANION GAP SERPL CALCULATED.3IONS-SCNC: 20 MMOL/L (ref 7–19)
ANION GAP SERPL CALCULATED.3IONS-SCNC: 21 MMOL/L (ref 7–19)
ANION GAP SERPL CALCULATED.3IONS-SCNC: 7 MMOL/L (ref 5–15)
ANION GAP SERPL CALCULATED.3IONS-SCNC: 8 MMOL/L (ref 7–19)
ANION GAP SERPL CALCULATED.3IONS-SCNC: 9 MMOL/L (ref 5–15)
ANISOCYTOSIS BLD QL: ABNORMAL
ANISOCYTOSIS: ABNORMAL
APTT PPP: 30.6 SECONDS (ref 24.1–35)
APTT: 32.6 SEC (ref 26–36.2)
ARTERIAL PATENCY WRIST A: ABNORMAL
ARTERIAL PATENCY WRIST A: POSITIVE
AST SERPL-CCNC: 14 U/L (ref 5–32)
AST SERPL-CCNC: 15 U/L (ref 5–32)
AST SERPL-CCNC: 15 U/L (ref 5–32)
AST SERPL-CCNC: 16 U/L (ref 5–32)
AST SERPL-CCNC: 17 U/L (ref 5–32)
AST SERPL-CCNC: 17 U/L (ref 5–32)
AST SERPL-CCNC: 18 U/L (ref 1–32)
AST SERPL-CCNC: 18 U/L (ref 5–32)
AST SERPL-CCNC: 19 U/L (ref 5–32)
AST SERPL-CCNC: 20 U/L (ref 1–32)
AST SERPL-CCNC: 21 U/L (ref 5–32)
AST SERPL-CCNC: 28 U/L (ref 1–32)
AST SERPL-CCNC: 28 U/L (ref 1–32)
AST SERPL-CCNC: 38 U/L (ref 1–32)
AST SERPL-CCNC: 77 U/L (ref 1–32)
ATMOSPHERIC PRESS: 748 MMHG
ATMOSPHERIC PRESS: 748 MMHG
ATMOSPHERIC PRESS: 749 MMHG
ATMOSPHERIC PRESS: 750 MMHG
ATMOSPHERIC PRESS: 751 MMHG
ATMOSPHERIC PRESS: 752 MMHG
ATMOSPHERIC PRESS: 753 MMHG
ATMOSPHERIC PRESS: 754 MMHG
ATMOSPHERIC PRESS: 754 MMHG
BACTERIA BLD CULT: ABNORMAL
BACTERIA BLD CULT: ABNORMAL
BACTERIA SPEC AEROBE CULT: ABNORMAL
BACTERIA SPEC AEROBE CULT: NO GROWTH
BACTERIA SPEC AEROBE CULT: NORMAL
BACTERIA SPEC RESP CULT: ABNORMAL
BACTERIA SPEC RESP CULT: NO GROWTH
BACTERIA UR QL AUTO: ABNORMAL /HPF
BACTERIA: ABNORMAL /HPF
BACTERIA: NEGATIVE /HPF
BACTEROIDES FRAGILIS BY PCR: NOT DETECTED
BANDED NEUTROPHILS RELATIVE PERCENT: 23 % (ref 0–5)
BASE EXCESS ARTERIAL: -15.7 MMOL/L (ref -2–2)
BASE EXCESS ARTERIAL: -16.8 MMOL/L (ref -2–2)
BASE EXCESS ARTERIAL: -17.2 MMOL/L (ref -2–2)
BASE EXCESS ARTERIAL: -9.3 MMOL/L (ref -2–2)
BASE EXCESS ARTERIAL: -9.9 MMOL/L (ref -2–2)
BASE EXCESS BLDA CALC-SCNC: -0.7 MMOL/L (ref 0–2)
BASE EXCESS BLDA CALC-SCNC: -3.6 MMOL/L (ref 0–2)
BASE EXCESS BLDA CALC-SCNC: -5.4 MMOL/L (ref 0–2)
BASE EXCESS BLDA CALC-SCNC: -7.1 MMOL/L (ref 0–2)
BASE EXCESS BLDA CALC-SCNC: 0.4 MMOL/L (ref 0–2)
BASE EXCESS BLDA CALC-SCNC: 0.7 MMOL/L (ref 0–2)
BASE EXCESS BLDA CALC-SCNC: 1.2 MMOL/L (ref 0–2)
BASE EXCESS BLDA CALC-SCNC: 1.7 MMOL/L (ref 0–2)
BASE EXCESS BLDA CALC-SCNC: 10.5 MMOL/L (ref 0–2)
BASE EXCESS BLDA CALC-SCNC: 2.8 MMOL/L (ref 0–2)
BASE EXCESS BLDA CALC-SCNC: 4.1 MMOL/L (ref 0–2)
BASE EXCESS BLDA CALC-SCNC: 4.7 MMOL/L (ref 0–2)
BASE EXCESS BLDA CALC-SCNC: 4.8 MMOL/L (ref 0–2)
BASE EXCESS BLDA CALC-SCNC: 5.6 MMOL/L (ref 0–2)
BASE EXCESS BLDA CALC-SCNC: 6.1 MMOL/L (ref 0–2)
BASE EXCESS BLDA CALC-SCNC: 6.3 MMOL/L (ref 0–2)
BASE EXCESS BLDA CALC-SCNC: 6.8 MMOL/L (ref 0–2)
BASE EXCESS BLDA CALC-SCNC: 7.3 MMOL/L (ref 0–2)
BASE EXCESS BLDA CALC-SCNC: 7.8 MMOL/L (ref 0–2)
BASE EXCESS BLDA CALC-SCNC: 8 MMOL/L (ref 0–2)
BASE EXCESS BLDA CALC-SCNC: 9.1 MMOL/L (ref 0–2)
BASE EXCESS BLDA CALC-SCNC: 9.5 MMOL/L (ref 0–2)
BASOPHILS # BLD AUTO: 0.02 10*3/MM3 (ref 0–0.2)
BASOPHILS # BLD AUTO: 0.03 10*3/MM3 (ref 0–0.2)
BASOPHILS # BLD AUTO: 0.04 10*3/MM3 (ref 0–0.2)
BASOPHILS # BLD AUTO: 0.05 10*3/MM3 (ref 0–0.2)
BASOPHILS # BLD AUTO: 0.05 10*3/MM3 (ref 0–0.2)
BASOPHILS # BLD MANUAL: 0.14 10*3/MM3 (ref 0–0.2)
BASOPHILS ABSOLUTE: 0 K/UL (ref 0–0.2)
BASOPHILS NFR BLD AUTO: 0.2 % (ref 0–1.5)
BASOPHILS NFR BLD AUTO: 0.4 % (ref 0–1.5)
BASOPHILS NFR BLD AUTO: 0.5 % (ref 0–1.5)
BASOPHILS NFR BLD MANUAL: 1 % (ref 0–1.5)
BASOPHILS RELATIVE PERCENT: 0 % (ref 0–1)
BASOPHILS RELATIVE PERCENT: 0 % (ref 0–1)
BASOPHILS RELATIVE PERCENT: 0.2 % (ref 0–1)
BASOPHILS RELATIVE PERCENT: 0.2 % (ref 0–1)
BASOPHILS RELATIVE PERCENT: 0.3 % (ref 0–1)
BASOPHILS RELATIVE PERCENT: 0.4 % (ref 0–1)
BASOPHILS RELATIVE PERCENT: 0.5 % (ref 0–1)
BASOPHILS RELATIVE PERCENT: 0.5 % (ref 0–1)
BDY SITE: ABNORMAL
BH CV ECHO MEAS - AO MAX PG (FULL): 2.3 MMHG
BH CV ECHO MEAS - AO MAX PG (FULL): 3.4 MMHG
BH CV ECHO MEAS - AO MAX PG: 6.9 MMHG
BH CV ECHO MEAS - AO MAX PG: 8 MMHG
BH CV ECHO MEAS - AO MEAN PG (FULL): 1 MMHG
BH CV ECHO MEAS - AO MEAN PG (FULL): 1 MMHG
BH CV ECHO MEAS - AO MEAN PG: 3 MMHG
BH CV ECHO MEAS - AO MEAN PG: 3 MMHG
BH CV ECHO MEAS - AO ROOT AREA (BSA CORRECTED): 1.2
BH CV ECHO MEAS - AO ROOT AREA (BSA CORRECTED): 1.3
BH CV ECHO MEAS - AO ROOT AREA: 4.2 CM^2
BH CV ECHO MEAS - AO ROOT AREA: 4.9 CM^2
BH CV ECHO MEAS - AO ROOT DIAM: 2.3 CM
BH CV ECHO MEAS - AO ROOT DIAM: 2.5 CM
BH CV ECHO MEAS - AO V2 MAX: 131 CM/SEC
BH CV ECHO MEAS - AO V2 MAX: 141 CM/SEC
BH CV ECHO MEAS - AO V2 MEAN: 76.8 CM/SEC
BH CV ECHO MEAS - AO V2 MEAN: 79.6 CM/SEC
BH CV ECHO MEAS - AO V2 VTI: 24.5 CM
BH CV ECHO MEAS - AO V2 VTI: 25.6 CM
BH CV ECHO MEAS - AVA(I,A): 2 CM^2
BH CV ECHO MEAS - AVA(I,A): 2.2 CM^2
BH CV ECHO MEAS - AVA(I,D): 2 CM^2
BH CV ECHO MEAS - AVA(I,D): 2.2 CM^2
BH CV ECHO MEAS - AVA(V,A): 2.2 CM^2
BH CV ECHO MEAS - AVA(V,A): 2.6 CM^2
BH CV ECHO MEAS - AVA(V,D): 2.2 CM^2
BH CV ECHO MEAS - AVA(V,D): 2.6 CM^2
BH CV ECHO MEAS - BSA(HAYCOCK): 2 M^2
BH CV ECHO MEAS - BSA(HAYCOCK): 2 M^2
BH CV ECHO MEAS - BSA: 1.9 M^2
BH CV ECHO MEAS - BSA: 1.9 M^2
BH CV ECHO MEAS - BZI_BMI: 31.9 KILOGRAMS/M^2
BH CV ECHO MEAS - BZI_BMI: 33.8 KILOGRAMS/M^2
BH CV ECHO MEAS - BZI_METRIC_HEIGHT: 162.6 CM
BH CV ECHO MEAS - BZI_METRIC_HEIGHT: 162.6 CM
BH CV ECHO MEAS - BZI_METRIC_WEIGHT: 84.4 KG
BH CV ECHO MEAS - BZI_METRIC_WEIGHT: 89.4 KG
BH CV ECHO MEAS - EDV(CUBED): 135.8 ML
BH CV ECHO MEAS - EDV(CUBED): 94.2 ML
BH CV ECHO MEAS - EDV(MOD-SP4): 93.4 ML
BH CV ECHO MEAS - EDV(MOD-SP4): 97.9 ML
BH CV ECHO MEAS - EDV(TEICH): 126.1 ML
BH CV ECHO MEAS - EDV(TEICH): 94.9 ML
BH CV ECHO MEAS - EF(CUBED): 59.4 %
BH CV ECHO MEAS - EF(CUBED): 69 %
BH CV ECHO MEAS - EF(MOD-SP4): 52.4 %
BH CV ECHO MEAS - EF(MOD-SP4): 56.7 %
BH CV ECHO MEAS - EF(TEICH): 51.1 %
BH CV ECHO MEAS - EF(TEICH): 60.2 %
BH CV ECHO MEAS - ESV(CUBED): 38.3 ML
BH CV ECHO MEAS - ESV(CUBED): 42.1 ML
BH CV ECHO MEAS - ESV(MOD-SP4): 42.4 ML
BH CV ECHO MEAS - ESV(MOD-SP4): 44.5 ML
BH CV ECHO MEAS - ESV(TEICH): 46.4 ML
BH CV ECHO MEAS - ESV(TEICH): 50.2 ML
BH CV ECHO MEAS - FS: 25.9 %
BH CV ECHO MEAS - FS: 32.3 %
BH CV ECHO MEAS - IVS/LVPW: 0.96
BH CV ECHO MEAS - IVS/LVPW: 1.1
BH CV ECHO MEAS - IVSD: 0.74 CM
BH CV ECHO MEAS - IVSD: 0.84 CM
BH CV ECHO MEAS - LA DIMENSION: 4.6 CM
BH CV ECHO MEAS - LA DIMENSION: 4.9 CM
BH CV ECHO MEAS - LA/AO: 2
BH CV ECHO MEAS - LA/AO: 2
BH CV ECHO MEAS - LAT PEAK E' VEL: 13.1 CM/SEC
BH CV ECHO MEAS - LAT PEAK E' VEL: 17 CM/SEC
BH CV ECHO MEAS - LV DIASTOLIC VOL/BSA (35-75): 48.1 ML/M^2
BH CV ECHO MEAS - LV DIASTOLIC VOL/BSA (35-75): 51.6 ML/M^2
BH CV ECHO MEAS - LV MASS(C)D: 113.2 GRAMS
BH CV ECHO MEAS - LV MASS(C)D: 132.1 GRAMS
BH CV ECHO MEAS - LV MASS(C)DI: 58.3 GRAMS/M^2
BH CV ECHO MEAS - LV MASS(C)DI: 69.6 GRAMS/M^2
BH CV ECHO MEAS - LV MAX PG: 4.6 MMHG
BH CV ECHO MEAS - LV MAX PG: 4.6 MMHG
BH CV ECHO MEAS - LV MEAN PG: 2 MMHG
BH CV ECHO MEAS - LV MEAN PG: 2 MMHG
BH CV ECHO MEAS - LV SYSTOLIC VOL/BSA (12-30): 22.4 ML/M^2
BH CV ECHO MEAS - LV SYSTOLIC VOL/BSA (12-30): 22.9 ML/M^2
BH CV ECHO MEAS - LV V1 MAX: 107 CM/SEC
BH CV ECHO MEAS - LV V1 MAX: 107 CM/SEC
BH CV ECHO MEAS - LV V1 MEAN: 60.9 CM/SEC
BH CV ECHO MEAS - LV V1 MEAN: 69 CM/SEC
BH CV ECHO MEAS - LV V1 VTI: 16.8 CM
BH CV ECHO MEAS - LV V1 VTI: 18.4 CM
BH CV ECHO MEAS - LVIDD: 4.6 CM
BH CV ECHO MEAS - LVIDD: 5.1 CM
BH CV ECHO MEAS - LVIDS: 3.4 CM
BH CV ECHO MEAS - LVIDS: 3.5 CM
BH CV ECHO MEAS - LVLD AP4: 7.3 CM
BH CV ECHO MEAS - LVLD AP4: 7.4 CM
BH CV ECHO MEAS - LVLS AP4: 5.6 CM
BH CV ECHO MEAS - LVLS AP4: 6.6 CM
BH CV ECHO MEAS - LVOT AREA (M): 2.8 CM^2
BH CV ECHO MEAS - LVOT AREA (M): 3.1 CM^2
BH CV ECHO MEAS - LVOT AREA: 2.8 CM^2
BH CV ECHO MEAS - LVOT AREA: 3.1 CM^2
BH CV ECHO MEAS - LVOT DIAM: 1.9 CM
BH CV ECHO MEAS - LVOT DIAM: 2 CM
BH CV ECHO MEAS - LVPWD: 0.74 CM
BH CV ECHO MEAS - LVPWD: 0.77 CM
BH CV ECHO MEAS - MED PEAK E' VEL: 10 CM/SEC
BH CV ECHO MEAS - MED PEAK E' VEL: 14 CM/SEC
BH CV ECHO MEAS - MR MAX PG: 55.7 MMHG
BH CV ECHO MEAS - MR MAX PG: 72.9 MMHG
BH CV ECHO MEAS - MR MAX VEL: 373 CM/SEC
BH CV ECHO MEAS - MR MAX VEL: 427 CM/SEC
BH CV ECHO MEAS - MR MEAN PG: 38 MMHG
BH CV ECHO MEAS - MR MEAN PG: 48 MMHG
BH CV ECHO MEAS - MR MEAN VEL: 284 CM/SEC
BH CV ECHO MEAS - MR MEAN VEL: 331 CM/SEC
BH CV ECHO MEAS - MR VTI: 101 CM
BH CV ECHO MEAS - MR VTI: 143 CM
BH CV ECHO MEAS - MV A MAX VEL: 54.8 CM/SEC
BH CV ECHO MEAS - MV DEC SLOPE: 374 CM/SEC^2
BH CV ECHO MEAS - MV DEC TIME: 0.21 SEC
BH CV ECHO MEAS - MV DEC TIME: 0.21 SEC
BH CV ECHO MEAS - MV E MAX VEL: 104 CM/SEC
BH CV ECHO MEAS - MV E MAX VEL: 97 CM/SEC
BH CV ECHO MEAS - MV E/A: 1.9
BH CV ECHO MEAS - MV P1/2T MAX VEL: 113 CM/SEC
BH CV ECHO MEAS - MV P1/2T: 88.5 MSEC
BH CV ECHO MEAS - MVA P1/2T LCG: 1.9 CM^2
BH CV ECHO MEAS - MVA(P1/2T): 2.5 CM^2
BH CV ECHO MEAS - RAP SYSTOLE: 5 MMHG
BH CV ECHO MEAS - RVDD: 3.6 CM
BH CV ECHO MEAS - RVSP: 35 MMHG
BH CV ECHO MEAS - SI(AO): 54.7 ML/M^2
BH CV ECHO MEAS - SI(AO): 63.4 ML/M^2
BH CV ECHO MEAS - SI(CUBED): 28.8 ML/M^2
BH CV ECHO MEAS - SI(CUBED): 49.4 ML/M^2
BH CV ECHO MEAS - SI(LVOT): 26.8 ML/M^2
BH CV ECHO MEAS - SI(LVOT): 27.8 ML/M^2
BH CV ECHO MEAS - SI(MOD-SP4): 25.2 ML/M^2
BH CV ECHO MEAS - SI(MOD-SP4): 29.3 ML/M^2
BH CV ECHO MEAS - SI(TEICH): 24.9 ML/M^2
BH CV ECHO MEAS - SI(TEICH): 40 ML/M^2
BH CV ECHO MEAS - SV(AO): 106.4 ML
BH CV ECHO MEAS - SV(AO): 120.3 ML
BH CV ECHO MEAS - SV(CUBED): 55.9 ML
BH CV ECHO MEAS - SV(CUBED): 93.7 ML
BH CV ECHO MEAS - SV(LVOT): 52.2 ML
BH CV ECHO MEAS - SV(LVOT): 52.8 ML
BH CV ECHO MEAS - SV(MOD-SP4): 48.9 ML
BH CV ECHO MEAS - SV(MOD-SP4): 55.5 ML
BH CV ECHO MEAS - SV(TEICH): 48.4 ML
BH CV ECHO MEAS - SV(TEICH): 75.9 ML
BH CV ECHO MEAS - TR MAX VEL: 240 CM/SEC
BH CV ECHO MEAS - TR MAX VEL: 274 CM/SEC
BH CV ECHO MEASUREMENTS AVERAGE E/E' RATIO: 6.71
BH CV ECHO MEASUREMENTS AVERAGE E/E' RATIO: 8.4
BI-LEVEL POS AIRWAY PRESSURE(EXPIRATORY): 6
BI-LEVEL POS AIRWAY PRESSURE(INSPIRATORY): 12
BILIRUB SERPL-MCNC: 0.7 MG/DL (ref 0–1.2)
BILIRUB SERPL-MCNC: 0.8 MG/DL (ref 0.2–1.2)
BILIRUB SERPL-MCNC: 0.8 MG/DL (ref 0.2–1.2)
BILIRUB SERPL-MCNC: 0.9 MG/DL (ref 0.2–1.2)
BILIRUB SERPL-MCNC: 0.9 MG/DL (ref 0–1.2)
BILIRUB SERPL-MCNC: 1 MG/DL (ref 0.2–1.2)
BILIRUB SERPL-MCNC: 1 MG/DL (ref 0.2–1.2)
BILIRUB SERPL-MCNC: 1.1 MG/DL (ref 0.2–1.2)
BILIRUB SERPL-MCNC: 1.3 MG/DL (ref 0–1.2)
BILIRUB SERPL-MCNC: 1.4 MG/DL (ref 0.2–1.2)
BILIRUB SERPL-MCNC: 1.4 MG/DL (ref 0–1.2)
BILIRUB SERPL-MCNC: 1.5 MG/DL (ref 0.2–1.2)
BILIRUB SERPL-MCNC: 1.6 MG/DL (ref 0.2–1.2)
BILIRUB SERPL-MCNC: 1.8 MG/DL (ref 0.2–1.2)
BILIRUB SERPL-MCNC: 1.9 MG/DL (ref 0.2–1.2)
BILIRUB SERPL-MCNC: 2 MG/DL (ref 0–1.2)
BILIRUB SERPL-MCNC: 2.1 MG/DL (ref 0–1.2)
BILIRUB UR QL STRIP: NEGATIVE
BILIRUBIN URINE: ABNORMAL
BILIRUBIN, POC: NORMAL
BLOOD CULTURE, ROUTINE: ABNORMAL
BLOOD CULTURE, ROUTINE: ABNORMAL
BLOOD CULTURE, ROUTINE: NORMAL
BLOOD URINE, POC: NORMAL
BLOOD, URINE: ABNORMAL
BODY TEMPERATURE: 37 C
BORDETELLA PARAPERTUSSIS BY PCR: NOT DETECTED
BORDETELLA PERTUSSIS BY PCR: NOT DETECTED
BOTTLE TYPE: ABNORMAL
BOTTLE TYPE: ABNORMAL
BUN BLDV-MCNC: 10 MG/DL (ref 8–23)
BUN BLDV-MCNC: 11 MG/DL (ref 8–23)
BUN BLDV-MCNC: 12 MG/DL (ref 8–23)
BUN BLDV-MCNC: 15 MG/DL (ref 8–23)
BUN BLDV-MCNC: 4 MG/DL (ref 8–23)
BUN BLDV-MCNC: 6 MG/DL (ref 8–23)
BUN BLDV-MCNC: 6 MG/DL (ref 8–23)
BUN BLDV-MCNC: 7 MG/DL (ref 8–23)
BUN BLDV-MCNC: 9 MG/DL (ref 8–23)
BUN SERPL-MCNC: 10 MG/DL (ref 8–23)
BUN SERPL-MCNC: 10 MG/DL (ref 8–23)
BUN SERPL-MCNC: 107 MG/DL (ref 8–23)
BUN SERPL-MCNC: 11 MG/DL (ref 8–23)
BUN SERPL-MCNC: 14 MG/DL (ref 8–23)
BUN SERPL-MCNC: 15 MG/DL (ref 8–23)
BUN SERPL-MCNC: 17 MG/DL (ref 8–23)
BUN SERPL-MCNC: 19 MG/DL (ref 8–23)
BUN SERPL-MCNC: 25 MG/DL (ref 8–23)
BUN SERPL-MCNC: 30 MG/DL (ref 8–23)
BUN SERPL-MCNC: 47 MG/DL (ref 8–23)
BUN SERPL-MCNC: 56 MG/DL (ref 8–23)
BUN SERPL-MCNC: 58 MG/DL (ref 8–23)
BUN SERPL-MCNC: 62 MG/DL (ref 8–23)
BUN SERPL-MCNC: 68 MG/DL (ref 8–23)
BUN SERPL-MCNC: 7 MG/DL (ref 8–23)
BUN SERPL-MCNC: 8 MG/DL (ref 8–23)
BUN SERPL-MCNC: 81 MG/DL (ref 8–23)
BUN SERPL-MCNC: 86 MG/DL (ref 8–23)
BUN SERPL-MCNC: 95 MG/DL (ref 8–23)
BUN/CREAT SERPL: 10.1 (ref 7–25)
BUN/CREAT SERPL: 11.1 (ref 7–25)
BUN/CREAT SERPL: 14.6 (ref 7–25)
BUN/CREAT SERPL: 14.9 (ref 7–25)
BUN/CREAT SERPL: 15.5 (ref 7–25)
BUN/CREAT SERPL: 17.5 (ref 7–25)
BUN/CREAT SERPL: 19.2 (ref 7–25)
BUN/CREAT SERPL: 19.5 (ref 7–25)
BUN/CREAT SERPL: 19.6 (ref 7–25)
BUN/CREAT SERPL: 22.2 (ref 7–25)
BUN/CREAT SERPL: 26.4 (ref 7–25)
BUN/CREAT SERPL: 26.6 (ref 7–25)
BUN/CREAT SERPL: 26.6 (ref 7–25)
BUN/CREAT SERPL: 26.9 (ref 7–25)
BUN/CREAT SERPL: 27 (ref 7–25)
BUN/CREAT SERPL: 27.2 (ref 7–25)
BUN/CREAT SERPL: 27.3 (ref 7–25)
BUN/CREAT SERPL: 27.7 (ref 7–25)
BUN/CREAT SERPL: 28.2 (ref 7–25)
BUN/CREAT SERPL: 32.7 (ref 7–25)
BURR CELLS: ABNORMAL
C3 SERPL-MCNC: 148 MG/DL (ref 82–167)
C4 SERPL-MCNC: 33 MG/DL (ref 14–44)
CALCIUM SERPL-MCNC: 7.6 MG/DL (ref 8.8–10.2)
CALCIUM SERPL-MCNC: 7.7 MG/DL (ref 8.8–10.2)
CALCIUM SERPL-MCNC: 8.1 MG/DL (ref 8.8–10.2)
CALCIUM SERPL-MCNC: 8.1 MG/DL (ref 8.8–10.2)
CALCIUM SERPL-MCNC: 8.2 MG/DL (ref 8.8–10.2)
CALCIUM SERPL-MCNC: 8.3 MG/DL (ref 8.8–10.2)
CALCIUM SERPL-MCNC: 8.4 MG/DL (ref 8.8–10.2)
CALCIUM SERPL-MCNC: 8.4 MG/DL (ref 8.8–10.2)
CALCIUM SERPL-MCNC: 8.5 MG/DL (ref 8.8–10.2)
CALCIUM SERPL-MCNC: 8.7 MG/DL (ref 8.8–10.2)
CALCIUM SERPL-MCNC: 9.6 MG/DL (ref 8.8–10.2)
CALCIUM SPEC-SCNC: 8.3 MG/DL (ref 8.6–10.5)
CALCIUM SPEC-SCNC: 8.5 MG/DL (ref 8.6–10.5)
CALCIUM SPEC-SCNC: 8.6 MG/DL (ref 8.6–10.5)
CALCIUM SPEC-SCNC: 8.7 MG/DL (ref 8.6–10.5)
CALCIUM SPEC-SCNC: 8.8 MG/DL (ref 8.6–10.5)
CALCIUM SPEC-SCNC: 8.9 MG/DL (ref 8.6–10.5)
CALCIUM SPEC-SCNC: 9 MG/DL (ref 8.6–10.5)
CALCIUM SPEC-SCNC: 9.3 MG/DL (ref 8.6–10.5)
CALCIUM SPEC-SCNC: 9.4 MG/DL (ref 8.6–10.5)
CALCIUM SPEC-SCNC: 9.5 MG/DL (ref 8.6–10.5)
CALCIUM SPEC-SCNC: 9.6 MG/DL (ref 8.6–10.5)
CALCIUM SPEC-SCNC: 9.7 MG/DL (ref 8.6–10.5)
CALCIUM SPEC-SCNC: 9.8 MG/DL (ref 8.6–10.5)
CANDIDA ALBICANS BY PCR: NOT DETECTED
CANDIDA AURIS BY PCR: NOT DETECTED
CANDIDA GLABRATA BY PCR: NOT DETECTED
CANDIDA KRUSEI BY PCR: NOT DETECTED
CANDIDA PARAPSILOSIS BY PCR: NOT DETECTED
CANDIDA TROPICALIS BY PCR: NOT DETECTED
CARBOXYHEMOGLOBIN ARTERIAL: 0 % (ref 0–5)
CARBOXYHEMOGLOBIN ARTERIAL: 1 % (ref 0–5)
CHLAMYDOPHILIA PNEUMONIAE BY PCR: NOT DETECTED
CHLORIDE BLD-SCNC: 104 MMOL/L (ref 98–111)
CHLORIDE BLD-SCNC: 109 MMOL/L (ref 98–111)
CHLORIDE BLD-SCNC: 110 MMOL/L (ref 98–111)
CHLORIDE BLD-SCNC: 111 MMOL/L (ref 98–111)
CHLORIDE BLD-SCNC: 113 MMOL/L (ref 98–111)
CHLORIDE BLD-SCNC: 113 MMOL/L (ref 98–111)
CHLORIDE BLD-SCNC: 114 MMOL/L (ref 98–111)
CHLORIDE BLD-SCNC: 115 MMOL/L (ref 98–111)
CHLORIDE BLD-SCNC: 119 MMOL/L (ref 98–111)
CHLORIDE BLD-SCNC: 120 MMOL/L (ref 98–111)
CHLORIDE BLD-SCNC: 122 MMOL/L (ref 98–111)
CHLORIDE SERPL-SCNC: 101 MMOL/L (ref 98–107)
CHLORIDE SERPL-SCNC: 104 MMOL/L (ref 98–107)
CHLORIDE SERPL-SCNC: 105 MMOL/L (ref 98–107)
CHLORIDE SERPL-SCNC: 105 MMOL/L (ref 98–107)
CHLORIDE SERPL-SCNC: 106 MMOL/L (ref 98–107)
CHLORIDE SERPL-SCNC: 107 MMOL/L (ref 98–107)
CHLORIDE SERPL-SCNC: 107 MMOL/L (ref 98–107)
CHLORIDE SERPL-SCNC: 108 MMOL/L (ref 98–107)
CHLORIDE SERPL-SCNC: 109 MMOL/L (ref 98–107)
CHLORIDE SERPL-SCNC: 109 MMOL/L (ref 98–107)
CHLORIDE SERPL-SCNC: 111 MMOL/L (ref 98–107)
CHLORIDE SERPL-SCNC: 111 MMOL/L (ref 98–107)
CHLORIDE SERPL-SCNC: 113 MMOL/L (ref 98–107)
CHLORIDE SERPL-SCNC: 113 MMOL/L (ref 98–107)
CHLORIDE SERPL-SCNC: 92 MMOL/L (ref 98–107)
CHLORIDE SERPL-SCNC: 95 MMOL/L (ref 98–107)
CHLORIDE SERPL-SCNC: 98 MMOL/L (ref 98–107)
CHOLESTEROL, TOTAL: 133 MG/DL (ref 160–199)
CLARITY UR: ABNORMAL
CLARITY, POC: NORMAL
CLARITY: ABNORMAL
CLUMPED PLATELETS: PRESENT
CO2 SERPL-SCNC: 18 MMOL/L (ref 22–29)
CO2 SERPL-SCNC: 20 MMOL/L (ref 22–29)
CO2 SERPL-SCNC: 21 MMOL/L (ref 22–29)
CO2 SERPL-SCNC: 22 MMOL/L (ref 22–29)
CO2 SERPL-SCNC: 23 MMOL/L (ref 22–29)
CO2 SERPL-SCNC: 25 MMOL/L (ref 22–29)
CO2 SERPL-SCNC: 26 MMOL/L (ref 22–29)
CO2 SERPL-SCNC: 26 MMOL/L (ref 22–29)
CO2 SERPL-SCNC: 27 MMOL/L (ref 22–29)
CO2 SERPL-SCNC: 28 MMOL/L (ref 22–29)
CO2 SERPL-SCNC: 29 MMOL/L (ref 22–29)
CO2 SERPL-SCNC: 29 MMOL/L (ref 22–29)
CO2 SERPL-SCNC: 31 MMOL/L (ref 22–29)
CO2 SERPL-SCNC: 31 MMOL/L (ref 22–29)
CO2 SERPL-SCNC: 33 MMOL/L (ref 22–29)
CO2: 13 MMOL/L (ref 22–29)
CO2: 13 MMOL/L (ref 22–29)
CO2: 16 MMOL/L (ref 22–29)
CO2: 18 MMOL/L (ref 22–29)
CO2: 20 MMOL/L (ref 22–29)
CO2: 20 MMOL/L (ref 22–29)
CO2: 22 MMOL/L (ref 22–29)
CO2: 24 MMOL/L (ref 22–29)
CO2: 25 MMOL/L (ref 22–29)
CO2: 25 MMOL/L (ref 22–29)
CO2: 26 MMOL/L (ref 22–29)
COARSE CASTS, UA: ABNORMAL /LPF (ref 0–5)
COARSE GRAN CASTS URNS QL MICRO: ABNORMAL /LPF
COLOR UR: ABNORMAL
COLOR UR: YELLOW
COLOR, POC: NORMAL
COLOR: ABNORMAL
CORONAVIRUS 229E BY PCR: NOT DETECTED
CORONAVIRUS HKU1 BY PCR: NOT DETECTED
CORONAVIRUS NL63 BY PCR: NOT DETECTED
CORONAVIRUS OC43 BY PCR: NOT DETECTED
CREAT SERPL-MCNC: 0.4 MG/DL (ref 0.5–0.9)
CREAT SERPL-MCNC: 0.4 MG/DL (ref 0.5–0.9)
CREAT SERPL-MCNC: 0.41 MG/DL (ref 0.57–1)
CREAT SERPL-MCNC: 0.48 MG/DL (ref 0.57–1)
CREAT SERPL-MCNC: 0.5 MG/DL (ref 0.5–0.9)
CREAT SERPL-MCNC: 0.51 MG/DL (ref 0.57–1)
CREAT SERPL-MCNC: 0.52 MG/DL (ref 0.57–1)
CREAT SERPL-MCNC: 0.52 MG/DL (ref 0.57–1)
CREAT SERPL-MCNC: 0.54 MG/DL (ref 0.57–1)
CREAT SERPL-MCNC: 0.7 MG/DL (ref 0.5–0.9)
CREAT SERPL-MCNC: 0.74 MG/DL (ref 0.57–1)
CREAT SERPL-MCNC: 0.8 MG/DL (ref 0.57–1)
CREAT SERPL-MCNC: 0.9 MG/DL (ref 0.5–0.9)
CREAT SERPL-MCNC: 1 MG/DL (ref 0.5–0.9)
CREAT SERPL-MCNC: 1.35 MG/DL (ref 0.57–1)
CREAT SERPL-MCNC: 1.48 MG/DL (ref 0.57–1)
CREAT SERPL-MCNC: 1.61 MG/DL (ref 0.57–1)
CREAT SERPL-MCNC: 1.71 MG/DL (ref 0.57–1)
CREAT SERPL-MCNC: 1.78 MG/DL (ref 0.57–1)
CREAT SERPL-MCNC: 2.02 MG/DL (ref 0.57–1)
CREAT SERPL-MCNC: 2.06 MG/DL (ref 0.57–1)
CREAT SERPL-MCNC: 2.27 MG/DL (ref 0.57–1)
CREAT SERPL-MCNC: 2.52 MG/DL (ref 0.57–1)
CREAT SERPL-MCNC: 3.05 MG/DL (ref 0.57–1)
CREAT SERPL-MCNC: 3.2 MG/DL (ref 0.57–1)
CREAT SERPL-MCNC: 3.49 MG/DL (ref 0.57–1)
CREAT SERPL-MCNC: 4.03 MG/DL (ref 0.57–1)
CREAT UR-MCNC: 27.4 MG/DL
CREATININE URINE: 116.4 MG/DL (ref 4.2–622)
CRP SERPL-MCNC: 21.47 MG/DL (ref 0–0.5)
CRYPTOCOCCUS NEOFORMANS/GATTII BY PCR: NOT DETECTED
CRYSTALS, UA: ABNORMAL /HPF
CRYSTALS, UA: ABNORMAL /HPF
CULTURE, BLOOD 2: NORMAL
CYTO UR: NORMAL
DEPRECATED RDW RBC AUTO: 62.2 FL (ref 37–54)
DEPRECATED RDW RBC AUTO: 64.5 FL (ref 37–54)
DEPRECATED RDW RBC AUTO: 65 FL (ref 37–54)
DEPRECATED RDW RBC AUTO: 67.7 FL (ref 37–54)
DEPRECATED RDW RBC AUTO: 68.8 FL (ref 37–54)
DEPRECATED RDW RBC AUTO: 69.1 FL (ref 37–54)
DEPRECATED RDW RBC AUTO: 69.1 FL (ref 37–54)
DEPRECATED RDW RBC AUTO: 69.4 FL (ref 37–54)
DEPRECATED RDW RBC AUTO: 69.7 FL (ref 37–54)
DEPRECATED RDW RBC AUTO: 71.7 FL (ref 37–54)
EGFRCR SERPLBLD CKD-EPI 2021: 102.3 ML/MIN/1.73
EGFRCR SERPLBLD CKD-EPI 2021: 103.3 ML/MIN/1.73
EGFRCR SERPLBLD CKD-EPI 2021: 103.3 ML/MIN/1.73
EGFRCR SERPLBLD CKD-EPI 2021: 103.7 ML/MIN/1.73
EGFRCR SERPLBLD CKD-EPI 2021: 105.3 ML/MIN/1.73
EGFRCR SERPLBLD CKD-EPI 2021: 109.3 ML/MIN/1.73
EGFRCR SERPLBLD CKD-EPI 2021: 11.8 ML/MIN/1.73
EGFRCR SERPLBLD CKD-EPI 2021: 14 ML/MIN/1.73
EGFRCR SERPLBLD CKD-EPI 2021: 15.5 ML/MIN/1.73
EGFRCR SERPLBLD CKD-EPI 2021: 16.4 ML/MIN/1.73
EGFRCR SERPLBLD CKD-EPI 2021: 20.7 ML/MIN/1.73
EGFRCR SERPLBLD CKD-EPI 2021: 23.4 ML/MIN/1.73
EGFRCR SERPLBLD CKD-EPI 2021: 26.3 ML/MIN/1.73
EGFRCR SERPLBLD CKD-EPI 2021: 26.9 ML/MIN/1.73
EGFRCR SERPLBLD CKD-EPI 2021: 31.4 ML/MIN/1.73
EGFRCR SERPLBLD CKD-EPI 2021: 32.9 ML/MIN/1.73
EGFRCR SERPLBLD CKD-EPI 2021: 35.4 ML/MIN/1.73
EGFRCR SERPLBLD CKD-EPI 2021: 39.1 ML/MIN/1.73
EGFRCR SERPLBLD CKD-EPI 2021: 43.7 ML/MIN/1.73
EGFRCR SERPLBLD CKD-EPI 2021: 81.9 ML/MIN/1.73
EGFRCR SERPLBLD CKD-EPI 2021: 89.9 ML/MIN/1.73
ELLIPTOCYTES BLD QL SMEAR: ABNORMAL
ENTEROBACTER CLOACAE COMPLEX BY PCR: NOT DETECTED
ENTEROBACTERALES BY PCR: NOT DETECTED
ENTEROCOCCUS FAECALIS BY PCR: NOT DETECTED
ENTEROCOCCUS FAECIUM BY PCR: NOT DETECTED
EOSINOPHIL # BLD AUTO: 0.02 10*3/MM3 (ref 0–0.4)
EOSINOPHIL # BLD AUTO: 0.03 10*3/MM3 (ref 0–0.4)
EOSINOPHIL # BLD AUTO: 0.03 10*3/MM3 (ref 0–0.4)
EOSINOPHIL # BLD AUTO: 0.45 10*3/MM3 (ref 0–0.4)
EOSINOPHIL # BLD AUTO: 0.86 10*3/MM3 (ref 0–0.4)
EOSINOPHIL # BLD MANUAL: 0.06 10*3/MM3 (ref 0–0.4)
EOSINOPHIL # BLD MANUAL: 0.09 10*3/MM3 (ref 0–0.4)
EOSINOPHIL # BLD MANUAL: 0.57 10*3/MM3 (ref 0–0.4)
EOSINOPHIL # BLD MANUAL: 1 10*3/MM3 (ref 0–0.4)
EOSINOPHIL NFR BLD AUTO: 0.2 % (ref 0.3–6.2)
EOSINOPHIL NFR BLD AUTO: 0.3 % (ref 0.3–6.2)
EOSINOPHIL NFR BLD AUTO: 0.5 % (ref 0.3–6.2)
EOSINOPHIL NFR BLD AUTO: 4.2 % (ref 0.3–6.2)
EOSINOPHIL NFR BLD AUTO: 7.8 % (ref 0.3–6.2)
EOSINOPHIL NFR BLD MANUAL: 1 % (ref 0.3–6.2)
EOSINOPHIL NFR BLD MANUAL: 1 % (ref 0.3–6.2)
EOSINOPHIL NFR BLD MANUAL: 4 % (ref 0.3–6.2)
EOSINOPHIL NFR BLD MANUAL: 8.1 % (ref 0.3–6.2)
EOSINOPHILS ABSOLUTE: 0 K/UL (ref 0–0.6)
EOSINOPHILS ABSOLUTE: 0.1 K/UL (ref 0–0.6)
EOSINOPHILS RELATIVE PERCENT: 0 % (ref 0–5)
EOSINOPHILS RELATIVE PERCENT: 0 % (ref 0–5)
EOSINOPHILS RELATIVE PERCENT: 0.6 % (ref 0–5)
EOSINOPHILS RELATIVE PERCENT: 0.6 % (ref 0–5)
EOSINOPHILS RELATIVE PERCENT: 0.8 % (ref 0–5)
EOSINOPHILS RELATIVE PERCENT: 0.9 % (ref 0–5)
EOSINOPHILS RELATIVE PERCENT: 1.3 % (ref 0–5)
EOSINOPHILS RELATIVE PERCENT: 1.5 % (ref 0–5)
EPITHELIAL CELLS, UA: 2 /HPF (ref 0–5)
EPITHELIAL CELLS, UA: 3 /HPF (ref 0–5)
ERYTHROCYTE [DISTWIDTH] IN BLOOD BY AUTOMATED COUNT: 17.2 % (ref 12.3–15.4)
ERYTHROCYTE [DISTWIDTH] IN BLOOD BY AUTOMATED COUNT: 17.4 % (ref 12.3–15.4)
ERYTHROCYTE [DISTWIDTH] IN BLOOD BY AUTOMATED COUNT: 18.7 % (ref 12.3–15.4)
ERYTHROCYTE [DISTWIDTH] IN BLOOD BY AUTOMATED COUNT: 19.3 % (ref 12.3–15.4)
ERYTHROCYTE [DISTWIDTH] IN BLOOD BY AUTOMATED COUNT: 19.5 % (ref 12.3–15.4)
ERYTHROCYTE [DISTWIDTH] IN BLOOD BY AUTOMATED COUNT: 19.7 % (ref 12.3–15.4)
ERYTHROCYTE [DISTWIDTH] IN BLOOD BY AUTOMATED COUNT: 19.9 % (ref 12.3–15.4)
ERYTHROCYTE [SEDIMENTATION RATE] IN BLOOD: 87 MM/HR (ref 0–30)
ESCHERICHIA COLI BY PCR: NOT DETECTED
FIO2: 100 %
FIO2: 100 %
FIO2: 30 %
FIO2: 70 %
GFR AFRICAN AMERICAN: >59
GFR NON-AFRICAN AMERICAN: 56
GFR NON-AFRICAN AMERICAN: >60
GLOBULIN UR ELPH-MCNC: 2.2 GM/DL
GLOBULIN UR ELPH-MCNC: 2.6 GM/DL
GLOBULIN UR ELPH-MCNC: 2.9 GM/DL
GLOBULIN UR ELPH-MCNC: 3.1 GM/DL
GLOBULIN UR ELPH-MCNC: 3.2 GM/DL
GLOBULIN UR ELPH-MCNC: 3.4 GM/DL
GLUCOSE BLD-MCNC: 139 MG/DL (ref 70–99)
GLUCOSE BLD-MCNC: 149 MG/DL (ref 74–109)
GLUCOSE BLD-MCNC: 154 MG/DL (ref 70–99)
GLUCOSE BLD-MCNC: 163 MG/DL (ref 74–109)
GLUCOSE BLD-MCNC: 171 MG/DL (ref 74–109)
GLUCOSE BLD-MCNC: 180 MG/DL (ref 70–99)
GLUCOSE BLD-MCNC: 182 MG/DL (ref 74–109)
GLUCOSE BLD-MCNC: 207 MG/DL (ref 70–99)
GLUCOSE BLD-MCNC: 209 MG/DL (ref 70–99)
GLUCOSE BLD-MCNC: 210 MG/DL (ref 70–99)
GLUCOSE BLD-MCNC: 211 MG/DL (ref 70–99)
GLUCOSE BLD-MCNC: 217 MG/DL (ref 74–109)
GLUCOSE BLD-MCNC: 222 MG/DL (ref 70–99)
GLUCOSE BLD-MCNC: 226 MG/DL (ref 74–109)
GLUCOSE BLD-MCNC: 243 MG/DL (ref 74–109)
GLUCOSE BLD-MCNC: 244 MG/DL (ref 70–99)
GLUCOSE BLD-MCNC: 258 MG/DL (ref 74–109)
GLUCOSE BLD-MCNC: 263 MG/DL (ref 70–99)
GLUCOSE BLD-MCNC: 269 MG/DL (ref 70–99)
GLUCOSE BLD-MCNC: 283 MG/DL (ref 70–99)
GLUCOSE BLD-MCNC: 308 MG/DL (ref 70–99)
GLUCOSE BLD-MCNC: 316 MG/DL (ref 74–109)
GLUCOSE BLD-MCNC: 318 MG/DL (ref 74–109)
GLUCOSE BLD-MCNC: 324 MG/DL (ref 70–99)
GLUCOSE BLD-MCNC: 365 MG/DL (ref 70–99)
GLUCOSE BLD-MCNC: 392 MG/DL (ref 70–99)
GLUCOSE BLD-MCNC: 90 MG/DL (ref 74–109)
GLUCOSE BLDC GLUCOMTR-MCNC: 103 MG/DL (ref 70–130)
GLUCOSE BLDC GLUCOMTR-MCNC: 142 MG/DL (ref 70–130)
GLUCOSE BLDC GLUCOMTR-MCNC: 146 MG/DL (ref 70–130)
GLUCOSE BLDC GLUCOMTR-MCNC: 150 MG/DL (ref 70–130)
GLUCOSE BLDC GLUCOMTR-MCNC: 150 MG/DL (ref 70–130)
GLUCOSE BLDC GLUCOMTR-MCNC: 155 MG/DL (ref 70–130)
GLUCOSE BLDC GLUCOMTR-MCNC: 156 MG/DL (ref 70–130)
GLUCOSE BLDC GLUCOMTR-MCNC: 160 MG/DL (ref 70–130)
GLUCOSE BLDC GLUCOMTR-MCNC: 161 MG/DL (ref 70–130)
GLUCOSE BLDC GLUCOMTR-MCNC: 162 MG/DL (ref 70–130)
GLUCOSE BLDC GLUCOMTR-MCNC: 163 MG/DL (ref 70–130)
GLUCOSE BLDC GLUCOMTR-MCNC: 167 MG/DL (ref 70–130)
GLUCOSE BLDC GLUCOMTR-MCNC: 168 MG/DL (ref 70–130)
GLUCOSE BLDC GLUCOMTR-MCNC: 172 MG/DL (ref 70–130)
GLUCOSE BLDC GLUCOMTR-MCNC: 174 MG/DL (ref 70–130)
GLUCOSE BLDC GLUCOMTR-MCNC: 174 MG/DL (ref 70–130)
GLUCOSE BLDC GLUCOMTR-MCNC: 177 MG/DL (ref 70–130)
GLUCOSE BLDC GLUCOMTR-MCNC: 178 MG/DL (ref 70–130)
GLUCOSE BLDC GLUCOMTR-MCNC: 179 MG/DL (ref 70–130)
GLUCOSE BLDC GLUCOMTR-MCNC: 187 MG/DL (ref 70–130)
GLUCOSE BLDC GLUCOMTR-MCNC: 188 MG/DL (ref 70–130)
GLUCOSE BLDC GLUCOMTR-MCNC: 189 MG/DL (ref 70–130)
GLUCOSE BLDC GLUCOMTR-MCNC: 189 MG/DL (ref 70–130)
GLUCOSE BLDC GLUCOMTR-MCNC: 191 MG/DL (ref 70–130)
GLUCOSE BLDC GLUCOMTR-MCNC: 192 MG/DL (ref 70–130)
GLUCOSE BLDC GLUCOMTR-MCNC: 193 MG/DL (ref 70–130)
GLUCOSE BLDC GLUCOMTR-MCNC: 194 MG/DL (ref 70–130)
GLUCOSE BLDC GLUCOMTR-MCNC: 195 MG/DL (ref 70–130)
GLUCOSE BLDC GLUCOMTR-MCNC: 196 MG/DL (ref 70–130)
GLUCOSE BLDC GLUCOMTR-MCNC: 199 MG/DL (ref 70–130)
GLUCOSE BLDC GLUCOMTR-MCNC: 200 MG/DL (ref 70–130)
GLUCOSE BLDC GLUCOMTR-MCNC: 201 MG/DL (ref 70–130)
GLUCOSE BLDC GLUCOMTR-MCNC: 202 MG/DL (ref 70–130)
GLUCOSE BLDC GLUCOMTR-MCNC: 204 MG/DL (ref 70–130)
GLUCOSE BLDC GLUCOMTR-MCNC: 206 MG/DL (ref 70–130)
GLUCOSE BLDC GLUCOMTR-MCNC: 208 MG/DL (ref 70–130)
GLUCOSE BLDC GLUCOMTR-MCNC: 209 MG/DL (ref 70–130)
GLUCOSE BLDC GLUCOMTR-MCNC: 214 MG/DL (ref 70–130)
GLUCOSE BLDC GLUCOMTR-MCNC: 215 MG/DL (ref 70–130)
GLUCOSE BLDC GLUCOMTR-MCNC: 216 MG/DL (ref 70–130)
GLUCOSE BLDC GLUCOMTR-MCNC: 217 MG/DL (ref 70–130)
GLUCOSE BLDC GLUCOMTR-MCNC: 217 MG/DL (ref 70–130)
GLUCOSE BLDC GLUCOMTR-MCNC: 222 MG/DL (ref 70–130)
GLUCOSE BLDC GLUCOMTR-MCNC: 222 MG/DL (ref 70–130)
GLUCOSE BLDC GLUCOMTR-MCNC: 224 MG/DL (ref 70–130)
GLUCOSE BLDC GLUCOMTR-MCNC: 224 MG/DL (ref 70–130)
GLUCOSE BLDC GLUCOMTR-MCNC: 227 MG/DL (ref 70–130)
GLUCOSE BLDC GLUCOMTR-MCNC: 228 MG/DL (ref 70–130)
GLUCOSE BLDC GLUCOMTR-MCNC: 230 MG/DL (ref 70–130)
GLUCOSE BLDC GLUCOMTR-MCNC: 231 MG/DL (ref 70–130)
GLUCOSE BLDC GLUCOMTR-MCNC: 233 MG/DL (ref 70–130)
GLUCOSE BLDC GLUCOMTR-MCNC: 235 MG/DL (ref 70–130)
GLUCOSE BLDC GLUCOMTR-MCNC: 236 MG/DL (ref 70–130)
GLUCOSE BLDC GLUCOMTR-MCNC: 237 MG/DL (ref 70–130)
GLUCOSE BLDC GLUCOMTR-MCNC: 237 MG/DL (ref 70–130)
GLUCOSE BLDC GLUCOMTR-MCNC: 239 MG/DL (ref 70–130)
GLUCOSE BLDC GLUCOMTR-MCNC: 240 MG/DL (ref 70–130)
GLUCOSE BLDC GLUCOMTR-MCNC: 243 MG/DL (ref 70–130)
GLUCOSE BLDC GLUCOMTR-MCNC: 246 MG/DL (ref 70–130)
GLUCOSE BLDC GLUCOMTR-MCNC: 251 MG/DL (ref 70–130)
GLUCOSE BLDC GLUCOMTR-MCNC: 252 MG/DL (ref 70–130)
GLUCOSE BLDC GLUCOMTR-MCNC: 253 MG/DL (ref 70–130)
GLUCOSE BLDC GLUCOMTR-MCNC: 253 MG/DL (ref 70–130)
GLUCOSE BLDC GLUCOMTR-MCNC: 255 MG/DL (ref 70–130)
GLUCOSE BLDC GLUCOMTR-MCNC: 262 MG/DL (ref 70–130)
GLUCOSE BLDC GLUCOMTR-MCNC: 262 MG/DL (ref 70–130)
GLUCOSE BLDC GLUCOMTR-MCNC: 264 MG/DL (ref 70–130)
GLUCOSE BLDC GLUCOMTR-MCNC: 265 MG/DL (ref 70–130)
GLUCOSE BLDC GLUCOMTR-MCNC: 266 MG/DL (ref 70–130)
GLUCOSE BLDC GLUCOMTR-MCNC: 274 MG/DL (ref 70–130)
GLUCOSE BLDC GLUCOMTR-MCNC: 276 MG/DL (ref 70–130)
GLUCOSE BLDC GLUCOMTR-MCNC: 277 MG/DL (ref 70–130)
GLUCOSE BLDC GLUCOMTR-MCNC: 281 MG/DL (ref 70–130)
GLUCOSE BLDC GLUCOMTR-MCNC: 282 MG/DL (ref 70–130)
GLUCOSE BLDC GLUCOMTR-MCNC: 282 MG/DL (ref 70–130)
GLUCOSE BLDC GLUCOMTR-MCNC: 284 MG/DL (ref 70–130)
GLUCOSE BLDC GLUCOMTR-MCNC: 285 MG/DL (ref 70–130)
GLUCOSE BLDC GLUCOMTR-MCNC: 286 MG/DL (ref 70–130)
GLUCOSE BLDC GLUCOMTR-MCNC: 286 MG/DL (ref 70–130)
GLUCOSE BLDC GLUCOMTR-MCNC: 287 MG/DL (ref 70–130)
GLUCOSE BLDC GLUCOMTR-MCNC: 289 MG/DL (ref 70–130)
GLUCOSE BLDC GLUCOMTR-MCNC: 295 MG/DL (ref 70–130)
GLUCOSE BLDC GLUCOMTR-MCNC: 298 MG/DL (ref 70–130)
GLUCOSE BLDC GLUCOMTR-MCNC: 299 MG/DL (ref 70–130)
GLUCOSE BLDC GLUCOMTR-MCNC: 299 MG/DL (ref 70–130)
GLUCOSE BLDC GLUCOMTR-MCNC: 305 MG/DL (ref 70–130)
GLUCOSE BLDC GLUCOMTR-MCNC: 315 MG/DL (ref 70–130)
GLUCOSE BLDC GLUCOMTR-MCNC: 324 MG/DL (ref 70–130)
GLUCOSE SERPL-MCNC: 130 MG/DL (ref 65–99)
GLUCOSE SERPL-MCNC: 151 MG/DL (ref 65–99)
GLUCOSE SERPL-MCNC: 152 MG/DL (ref 65–99)
GLUCOSE SERPL-MCNC: 167 MG/DL (ref 65–99)
GLUCOSE SERPL-MCNC: 169 MG/DL (ref 65–99)
GLUCOSE SERPL-MCNC: 183 MG/DL (ref 65–99)
GLUCOSE SERPL-MCNC: 189 MG/DL (ref 65–99)
GLUCOSE SERPL-MCNC: 191 MG/DL (ref 65–99)
GLUCOSE SERPL-MCNC: 193 MG/DL (ref 65–99)
GLUCOSE SERPL-MCNC: 197 MG/DL (ref 65–99)
GLUCOSE SERPL-MCNC: 213 MG/DL (ref 65–99)
GLUCOSE SERPL-MCNC: 220 MG/DL (ref 65–99)
GLUCOSE SERPL-MCNC: 247 MG/DL (ref 65–99)
GLUCOSE SERPL-MCNC: 251 MG/DL (ref 65–99)
GLUCOSE SERPL-MCNC: 256 MG/DL (ref 65–99)
GLUCOSE SERPL-MCNC: 279 MG/DL (ref 65–99)
GLUCOSE SERPL-MCNC: 280 MG/DL (ref 65–99)
GLUCOSE SERPL-MCNC: 291 MG/DL (ref 65–99)
GLUCOSE SERPL-MCNC: 306 MG/DL (ref 65–99)
GLUCOSE SERPL-MCNC: 307 MG/DL (ref 65–99)
GLUCOSE UR STRIP-MCNC: ABNORMAL MG/DL
GLUCOSE UR STRIP-MCNC: ABNORMAL MG/DL
GLUCOSE UR STRIP-MCNC: NEGATIVE MG/DL
GLUCOSE URINE, POC: 1000
GLUCOSE URINE: 100 MG/DL
GRAM STN SPEC: ABNORMAL
GRAM STN SPEC: NORMAL
GRAM STN SPEC: NORMAL
HAEMOPHILUS INFLUENZAE BY PCR: NOT DETECTED
HBA1C MFR BLD: 9.3 % (ref 4–6)
HBA1C MFR BLD: 9.3 % (ref 4–6)
HCO3 ARTERIAL: 11.3 MMOL/L (ref 22–26)
HCO3 ARTERIAL: 11.5 MMOL/L (ref 22–26)
HCO3 ARTERIAL: 11.7 MMOL/L (ref 22–26)
HCO3 ARTERIAL: 14.9 MMOL/L (ref 22–26)
HCO3 ARTERIAL: 16.2 MMOL/L (ref 22–26)
HCO3 BLDA-SCNC: 18.8 MMOL/L (ref 20–26)
HCO3 BLDA-SCNC: 19.1 MMOL/L (ref 20–26)
HCO3 BLDA-SCNC: 21.2 MMOL/L (ref 20–26)
HCO3 BLDA-SCNC: 23.5 MMOL/L (ref 20–26)
HCO3 BLDA-SCNC: 25.5 MMOL/L (ref 20–26)
HCO3 BLDA-SCNC: 25.5 MMOL/L (ref 20–26)
HCO3 BLDA-SCNC: 25.8 MMOL/L (ref 20–26)
HCO3 BLDA-SCNC: 26.2 MMOL/L (ref 20–26)
HCO3 BLDA-SCNC: 26.6 MMOL/L (ref 20–26)
HCO3 BLDA-SCNC: 27.7 MMOL/L (ref 20–26)
HCO3 BLDA-SCNC: 27.8 MMOL/L (ref 20–26)
HCO3 BLDA-SCNC: 28 MMOL/L (ref 20–26)
HCO3 BLDA-SCNC: 29.7 MMOL/L (ref 20–26)
HCO3 BLDA-SCNC: 30.2 MMOL/L (ref 20–26)
HCO3 BLDA-SCNC: 30.7 MMOL/L (ref 20–26)
HCO3 BLDA-SCNC: 30.8 MMOL/L (ref 20–26)
HCO3 BLDA-SCNC: 31.9 MMOL/L (ref 20–26)
HCO3 BLDA-SCNC: 31.9 MMOL/L (ref 20–26)
HCO3 BLDA-SCNC: 32.2 MMOL/L (ref 20–26)
HCO3 BLDA-SCNC: 33.3 MMOL/L (ref 20–26)
HCO3 BLDA-SCNC: 33.5 MMOL/L (ref 20–26)
HCO3 BLDA-SCNC: 34.2 MMOL/L (ref 20–26)
HCT VFR BLD AUTO: 25.5 % (ref 34–46.6)
HCT VFR BLD AUTO: 28.2 % (ref 34–46.6)
HCT VFR BLD AUTO: 28.5 % (ref 34–46.6)
HCT VFR BLD AUTO: 31 % (ref 34–46.6)
HCT VFR BLD AUTO: 31.3 % (ref 34–46.6)
HCT VFR BLD AUTO: 31.9 % (ref 34–46.6)
HCT VFR BLD AUTO: 32.8 % (ref 34–46.6)
HCT VFR BLD AUTO: 33.4 % (ref 34–46.6)
HCT VFR BLD AUTO: 38.8 % (ref 34–46.6)
HCT VFR BLD AUTO: 41.9 % (ref 34–46.6)
HCT VFR BLD CALC: 32.6 % (ref 37–47)
HCT VFR BLD CALC: 33.6 % (ref 37–47)
HCT VFR BLD CALC: 34.3 % (ref 37–47)
HCT VFR BLD CALC: 34.5 % (ref 37–47)
HCT VFR BLD CALC: 35.5 % (ref 37–47)
HCT VFR BLD CALC: 35.6 % (ref 37–47)
HCT VFR BLD CALC: 35.7 % (ref 37–47)
HCT VFR BLD CALC: 38.8 % (ref 37–47)
HCT VFR BLD CALC: 39.1 % (ref 37–47)
HCT VFR BLD CALC: 42.8 % (ref 37–47)
HDLC SERPL-MCNC: 83 MG/DL (ref 65–121)
HEMOGLOBIN, ART, EXTENDED: 10.5 G/DL (ref 12–16)
HEMOGLOBIN, ART, EXTENDED: 10.8 G/DL (ref 12–16)
HEMOGLOBIN, ART, EXTENDED: 11.6 G/DL (ref 12–16)
HEMOGLOBIN, ART, EXTENDED: 12.5 G/DL (ref 12–16)
HEMOGLOBIN, ART, EXTENDED: 12.7 G/DL (ref 12–16)
HEMOGLOBIN: 10.4 G/DL (ref 12–16)
HEMOGLOBIN: 10.7 G/DL (ref 12–16)
HEMOGLOBIN: 10.9 G/DL (ref 12–16)
HEMOGLOBIN: 11.1 G/DL (ref 12–16)
HEMOGLOBIN: 11.1 G/DL (ref 12–16)
HEMOGLOBIN: 11.5 G/DL (ref 12–16)
HEMOGLOBIN: 12 G/DL (ref 12–16)
HEMOGLOBIN: 12.3 G/DL (ref 12–16)
HEMOGLOBIN: 13 G/DL (ref 12–16)
HEMOGLOBIN: 9.4 G/DL (ref 12–16)
HGB BLD-MCNC: 10.1 G/DL (ref 12–15.9)
HGB BLD-MCNC: 12.1 G/DL (ref 12–15.9)
HGB BLD-MCNC: 12.8 G/DL (ref 12–15.9)
HGB BLD-MCNC: 7.4 G/DL (ref 12–15.9)
HGB BLD-MCNC: 8.3 G/DL (ref 12–15.9)
HGB BLD-MCNC: 8.4 G/DL (ref 12–15.9)
HGB BLD-MCNC: 9 G/DL (ref 12–15.9)
HGB BLD-MCNC: 9.3 G/DL (ref 12–15.9)
HGB BLD-MCNC: 9.6 G/DL (ref 12–15.9)
HGB BLD-MCNC: 9.8 G/DL (ref 12–15.9)
HGB UR QL STRIP.AUTO: ABNORMAL
HOLD SPECIMEN: NORMAL
HOLD SPECIMEN: NORMAL
HUMAN METAPNEUMOVIRUS BY PCR: NOT DETECTED
HUMAN RHINOVIRUS/ENTEROVIRUS BY PCR: NOT DETECTED
HYALINE CASTS UR QL AUTO: ABNORMAL /LPF
HYALINE CASTS: 5 /HPF (ref 0–8)
HYALINE CASTS: 67 /HPF (ref 0–8)
HYPOCHROMIA BLD QL: ABNORMAL
IMM GRANULOCYTES # BLD AUTO: 0.05 10*3/MM3 (ref 0–0.05)
IMM GRANULOCYTES # BLD AUTO: 0.07 10*3/MM3 (ref 0–0.05)
IMM GRANULOCYTES # BLD AUTO: 0.09 10*3/MM3 (ref 0–0.05)
IMM GRANULOCYTES # BLD AUTO: 0.1 10*3/MM3 (ref 0–0.05)
IMM GRANULOCYTES # BLD AUTO: 0.11 10*3/MM3 (ref 0–0.05)
IMM GRANULOCYTES NFR BLD AUTO: 0.6 % (ref 0–0.5)
IMM GRANULOCYTES NFR BLD AUTO: 0.8 % (ref 0–0.5)
IMM GRANULOCYTES NFR BLD AUTO: 0.9 % (ref 0–0.5)
IMM GRANULOCYTES NFR BLD AUTO: 1 % (ref 0–0.5)
IMM GRANULOCYTES NFR BLD AUTO: 1.6 % (ref 0–0.5)
IMMATURE GRANULOCYTES #: 0 K/UL
IMMATURE GRANULOCYTES #: 0 K/UL
IMMATURE GRANULOCYTES #: 0.1 K/UL
INFLUENZA A BY PCR: NOT DETECTED
INFLUENZA B BY PCR: NOT DETECTED
INHALED O2 CONCENTRATION: 30 %
INHALED O2 CONCENTRATION: 35 %
INHALED O2 CONCENTRATION: 40 %
INHALED O2 CONCENTRATION: 45 %
INHALED O2 CONCENTRATION: 55 %
INHALED O2 CONCENTRATION: 80 %
INR BLD: 1.43 (ref 0.88–1.18)
INR PPP: 1.5 (ref 0.91–1.09)
ISOLATED FROM: ABNORMAL
ISOLATED FROM: ABNORMAL
KETONES UR QL STRIP: ABNORMAL
KETONES UR QL STRIP: NEGATIVE
KETONES UR QL STRIP: NEGATIVE
KETONES, POC: NORMAL
KETONES, URINE: 15 MG/DL
KLEBSIELLA AEROGENES BY PCR: NOT DETECTED
KLEBSIELLA OXYTOCA BY PCR: NOT DETECTED
KLEBSIELLA PNEUMONIAE GROUP BY PCR: NOT DETECTED
KOH PREP NAIL: NORMAL
LAB AP CASE REPORT: NORMAL
LAB AP DIAGNOSIS COMMENT: NORMAL
LACTIC ACID: 1.8 MMOL/L (ref 0.5–1.9)
LDL CHOLESTEROL CALCULATED: 35 MG/DL
LEFT ATRIUM VOLUME INDEX: 25 ML/M2
LEFT ATRIUM VOLUME INDEX: 40 ML/M2
LEFT ATRIUM VOLUME: 47.3 CM3
LEFT ATRIUM VOLUME: 78 CM3
LEUKOCYTE EST, POC: NORMAL
LEUKOCYTE ESTERASE UR QL STRIP.AUTO: ABNORMAL
LEUKOCYTE ESTERASE, URINE: ABNORMAL
LISTERIA MONOCYTOGENES BY PCR: NOT DETECTED
LV EF: 48 %
LVEF MODALITY: NORMAL
LYMPHOCYTES # BLD AUTO: 0.51 10*3/MM3 (ref 0.7–3.1)
LYMPHOCYTES # BLD AUTO: 0.51 10*3/MM3 (ref 0.7–3.1)
LYMPHOCYTES # BLD AUTO: 1.07 10*3/MM3 (ref 0.7–3.1)
LYMPHOCYTES # BLD AUTO: 1.34 10*3/MM3 (ref 0.7–3.1)
LYMPHOCYTES # BLD AUTO: 1.68 10*3/MM3 (ref 0.7–3.1)
LYMPHOCYTES # BLD MANUAL: 1 10*3/MM3 (ref 0.7–3.1)
LYMPHOCYTES # BLD MANUAL: 1.11 10*3/MM3 (ref 0.7–3.1)
LYMPHOCYTES # BLD MANUAL: 1.12 10*3/MM3 (ref 0.7–3.1)
LYMPHOCYTES # BLD MANUAL: 1.51 10*3/MM3 (ref 0.7–3.1)
LYMPHOCYTES ABSOLUTE: 0.2 K/UL (ref 1.1–4.5)
LYMPHOCYTES ABSOLUTE: 0.3 K/UL (ref 1.1–4.5)
LYMPHOCYTES ABSOLUTE: 0.4 K/UL (ref 1.1–4.5)
LYMPHOCYTES ABSOLUTE: 0.4 K/UL (ref 1.1–4.5)
LYMPHOCYTES ABSOLUTE: 0.5 K/UL (ref 1.1–4.5)
LYMPHOCYTES ABSOLUTE: 0.7 K/UL (ref 1.1–4.5)
LYMPHOCYTES ABSOLUTE: 0.8 K/UL (ref 1.1–4.5)
LYMPHOCYTES ABSOLUTE: 0.9 K/UL (ref 1.1–4.5)
LYMPHOCYTES NFR BLD AUTO: 12.1 % (ref 19.6–45.3)
LYMPHOCYTES NFR BLD AUTO: 15.6 % (ref 19.6–45.3)
LYMPHOCYTES NFR BLD AUTO: 18.9 % (ref 19.6–45.3)
LYMPHOCYTES NFR BLD AUTO: 5.6 % (ref 19.6–45.3)
LYMPHOCYTES NFR BLD AUTO: 5.9 % (ref 19.6–45.3)
LYMPHOCYTES NFR BLD MANUAL: 3 % (ref 5–12)
LYMPHOCYTES NFR BLD MANUAL: 5 % (ref 5–12)
LYMPHOCYTES NFR BLD MANUAL: 6.1 % (ref 5–12)
LYMPHOCYTES RELATIVE PERCENT: 12.7 % (ref 20–40)
LYMPHOCYTES RELATIVE PERCENT: 14.6 % (ref 20–40)
LYMPHOCYTES RELATIVE PERCENT: 15.3 % (ref 20–40)
LYMPHOCYTES RELATIVE PERCENT: 15.7 % (ref 20–40)
LYMPHOCYTES RELATIVE PERCENT: 17.1 % (ref 20–40)
LYMPHOCYTES RELATIVE PERCENT: 3 % (ref 20–40)
LYMPHOCYTES RELATIVE PERCENT: 5 % (ref 20–40)
LYMPHOCYTES RELATIVE PERCENT: 5.2 % (ref 20–40)
LYMPHOCYTES RELATIVE PERCENT: 7 % (ref 20–40)
LYMPHOCYTES RELATIVE PERCENT: 9.1 % (ref 20–40)
Lab: ABNORMAL
Lab: NORMAL
MACROCYTES BLD QL SMEAR: ABNORMAL
MACROCYTES: ABNORMAL
MACROCYTES: ABNORMAL
MAGNESIUM SERPL-MCNC: 2 MG/DL (ref 1.6–2.4)
MAGNESIUM SERPL-MCNC: 2.1 MG/DL (ref 1.6–2.4)
MAGNESIUM SERPL-MCNC: 2.5 MG/DL (ref 1.6–2.4)
MAGNESIUM SERPL-MCNC: 3 MG/DL (ref 1.6–2.4)
MAGNESIUM: 1.6 MG/DL (ref 1.6–2.4)
MAGNESIUM: 1.8 MG/DL (ref 1.6–2.4)
MAGNESIUM: 1.8 MG/DL (ref 1.6–2.4)
MAGNESIUM: 1.9 MG/DL (ref 1.6–2.4)
MAGNESIUM: 2 MG/DL (ref 1.6–2.4)
MAGNESIUM: 2 MG/DL (ref 1.6–2.4)
MAGNESIUM: 2.1 MG/DL (ref 1.6–2.4)
MAGNESIUM: 2.2 MG/DL (ref 1.6–2.4)
MAGNESIUM: 2.3 MG/DL (ref 1.6–2.4)
MAXIMAL PREDICTED HEART RATE: 155 BPM
MAXIMAL PREDICTED HEART RATE: 155 BPM
MCH RBC QN AUTO: 29.7 PG (ref 26.6–33)
MCH RBC QN AUTO: 29.9 PG (ref 26.6–33)
MCH RBC QN AUTO: 29.9 PG (ref 26.6–33)
MCH RBC QN AUTO: 30 PG (ref 26.6–33)
MCH RBC QN AUTO: 30.1 PG (ref 26.6–33)
MCH RBC QN AUTO: 30.3 PG (ref 27–31)
MCH RBC QN AUTO: 30.4 PG (ref 26.6–33)
MCH RBC QN AUTO: 30.4 PG (ref 27–31)
MCH RBC QN AUTO: 30.5 PG (ref 26.6–33)
MCH RBC QN AUTO: 30.5 PG (ref 26.6–33)
MCH RBC QN AUTO: 30.5 PG (ref 27–31)
MCH RBC QN AUTO: 30.6 PG (ref 26.6–33)
MCH RBC QN AUTO: 30.6 PG (ref 26.6–33)
MCH RBC QN AUTO: 30.6 PG (ref 27–31)
MCH RBC QN AUTO: 30.7 PG (ref 27–31)
MCH RBC QN AUTO: 30.8 PG (ref 27–31)
MCH RBC QN AUTO: 31 PG (ref 27–31)
MCH RBC QN AUTO: 31.1 PG (ref 27–31)
MCH RBC QN AUTO: 31.1 PG (ref 27–31)
MCH RBC QN AUTO: 31.4 PG (ref 27–31)
MCHC RBC AUTO-ENTMCNC: 28.8 G/DL (ref 31.5–35.7)
MCHC RBC AUTO-ENTMCNC: 28.8 G/DL (ref 33–37)
MCHC RBC AUTO-ENTMCNC: 29 G/DL (ref 31.5–35.7)
MCHC RBC AUTO-ENTMCNC: 29.4 G/DL (ref 31.5–35.7)
MCHC RBC AUTO-ENTMCNC: 29.5 G/DL (ref 31.5–35.7)
MCHC RBC AUTO-ENTMCNC: 29.9 G/DL (ref 31.5–35.7)
MCHC RBC AUTO-ENTMCNC: 30 G/DL (ref 31.5–35.7)
MCHC RBC AUTO-ENTMCNC: 30.1 G/DL (ref 31.5–35.7)
MCHC RBC AUTO-ENTMCNC: 30.2 G/DL (ref 31.5–35.7)
MCHC RBC AUTO-ENTMCNC: 30.4 G/DL (ref 33–37)
MCHC RBC AUTO-ENTMCNC: 30.5 G/DL (ref 31.5–35.7)
MCHC RBC AUTO-ENTMCNC: 30.9 G/DL (ref 33–37)
MCHC RBC AUTO-ENTMCNC: 31 G/DL (ref 33–37)
MCHC RBC AUTO-ENTMCNC: 31 G/DL (ref 33–37)
MCHC RBC AUTO-ENTMCNC: 31.2 G/DL (ref 31.5–35.7)
MCHC RBC AUTO-ENTMCNC: 31.2 G/DL (ref 33–37)
MCHC RBC AUTO-ENTMCNC: 31.3 G/DL (ref 33–37)
MCHC RBC AUTO-ENTMCNC: 31.5 G/DL (ref 33–37)
MCHC RBC AUTO-ENTMCNC: 31.8 G/DL (ref 33–37)
MCHC RBC AUTO-ENTMCNC: 32.2 G/DL (ref 33–37)
MCV RBC AUTO: 100 FL (ref 79–97)
MCV RBC AUTO: 100.5 FL (ref 81–99)
MCV RBC AUTO: 100.6 FL (ref 79–97)
MCV RBC AUTO: 100.8 FL (ref 81–99)
MCV RBC AUTO: 100.9 FL (ref 79–97)
MCV RBC AUTO: 100.9 FL (ref 79–97)
MCV RBC AUTO: 101.1 FL (ref 79–97)
MCV RBC AUTO: 101.4 FL (ref 81–99)
MCV RBC AUTO: 101.8 FL (ref 79–97)
MCV RBC AUTO: 104 FL (ref 79–97)
MCV RBC AUTO: 105.4 FL (ref 79–97)
MCV RBC AUTO: 107.6 FL (ref 81–99)
MCV RBC AUTO: 95.2 FL (ref 81–99)
MCV RBC AUTO: 96.3 FL (ref 81–99)
MCV RBC AUTO: 96.8 FL (ref 81–99)
MCV RBC AUTO: 97.7 FL (ref 81–99)
MCV RBC AUTO: 98 FL (ref 79–97)
MCV RBC AUTO: 98.5 FL (ref 81–99)
MCV RBC AUTO: 99.4 FL (ref 81–99)
MCV RBC AUTO: 99.7 FL (ref 79–97)
MECHANICAL RATE IN BPM: 12
MECHANICAL RATE IN BPM: 16
METAMYELOCYTES NFR BLD MANUAL: 1 % (ref 0–0)
METAMYELOCYTES RELATIVE PERCENT: 5 %
METHEMOGLOBIN ARTERIAL: 0 %
METHEMOGLOBIN ARTERIAL: 0.1 %
METHEMOGLOBIN ARTERIAL: 0.4 %
METHICILLIN RESISTANCE MECA/C  BY PCR: DETECTED
MODALITY: ABNORMAL
MODE: ABNORMAL
MONOCYTES # BLD AUTO: 0.38 10*3/MM3 (ref 0.1–0.9)
MONOCYTES # BLD AUTO: 0.59 10*3/MM3 (ref 0.1–0.9)
MONOCYTES # BLD AUTO: 0.66 10*3/MM3 (ref 0.1–0.9)
MONOCYTES # BLD AUTO: 0.76 10*3/MM3 (ref 0.1–0.9)
MONOCYTES # BLD AUTO: 0.94 10*3/MM3 (ref 0.1–0.9)
MONOCYTES # BLD: 0.26 10*3/MM3 (ref 0.1–0.9)
MONOCYTES # BLD: 0.71 10*3/MM3 (ref 0.1–0.9)
MONOCYTES # BLD: 0.75 10*3/MM3 (ref 0.1–0.9)
MONOCYTES ABSOLUTE: 0 K/UL (ref 0–0.9)
MONOCYTES ABSOLUTE: 0.3 K/UL (ref 0–0.9)
MONOCYTES ABSOLUTE: 0.4 K/UL (ref 0–0.9)
MONOCYTES ABSOLUTE: 0.4 K/UL (ref 0–0.9)
MONOCYTES ABSOLUTE: 0.5 K/UL (ref 0–0.9)
MONOCYTES ABSOLUTE: 0.5 K/UL (ref 0–0.9)
MONOCYTES ABSOLUTE: 0.6 K/UL (ref 0–0.9)
MONOCYTES ABSOLUTE: 0.6 K/UL (ref 0–0.9)
MONOCYTES ABSOLUTE: 0.8 K/UL (ref 0–0.9)
MONOCYTES ABSOLUTE: 0.9 K/UL (ref 0–0.9)
MONOCYTES NFR BLD AUTO: 11.6 % (ref 5–12)
MONOCYTES NFR BLD AUTO: 4.4 % (ref 5–12)
MONOCYTES NFR BLD AUTO: 6.5 % (ref 5–12)
MONOCYTES NFR BLD AUTO: 6.9 % (ref 5–12)
MONOCYTES NFR BLD AUTO: 8.7 % (ref 5–12)
MONOCYTES RELATIVE PERCENT: 0 % (ref 0–10)
MONOCYTES RELATIVE PERCENT: 11.6 % (ref 0–10)
MONOCYTES RELATIVE PERCENT: 12.3 % (ref 0–10)
MONOCYTES RELATIVE PERCENT: 12.7 % (ref 0–10)
MONOCYTES RELATIVE PERCENT: 13.7 % (ref 0–10)
MONOCYTES RELATIVE PERCENT: 4 % (ref 0–10)
MONOCYTES RELATIVE PERCENT: 7.7 % (ref 0–10)
MONOCYTES RELATIVE PERCENT: 8.4 % (ref 0–10)
MONOCYTES RELATIVE PERCENT: 9.2 % (ref 0–10)
MONOCYTES RELATIVE PERCENT: 9.4 % (ref 0–10)
MRSA SCREEN RT-PCR: NOT DETECTED
MYCOPLASMA PNEUMONIAE BY PCR: NOT DETECTED
MYELOCYTE PERCENT: 1 %
MYELOCYTES NFR BLD MANUAL: 1 % (ref 0–0)
NEISSERIA MENINGITIDIS BY PCR: NOT DETECTED
NEUTROPHILS # BLD AUTO: 11.83 10*3/MM3 (ref 1.7–7)
NEUTROPHILS # BLD AUTO: 4.81 10*3/MM3 (ref 1.7–7)
NEUTROPHILS # BLD AUTO: 6.89 10*3/MM3 (ref 1.7–7)
NEUTROPHILS # BLD AUTO: 9.22 10*3/MM3 (ref 1.7–7)
NEUTROPHILS ABSOLUTE: 2.9 K/UL (ref 1.5–7.5)
NEUTROPHILS ABSOLUTE: 3.1 K/UL (ref 1.5–7.5)
NEUTROPHILS ABSOLUTE: 3.5 K/UL (ref 1.5–7.5)
NEUTROPHILS ABSOLUTE: 3.5 K/UL (ref 1.5–7.5)
NEUTROPHILS ABSOLUTE: 4 K/UL (ref 1.5–7.5)
NEUTROPHILS ABSOLUTE: 5.1 K/UL (ref 1.5–7.5)
NEUTROPHILS ABSOLUTE: 5.6 K/UL (ref 1.5–7.5)
NEUTROPHILS ABSOLUTE: 6.1 K/UL (ref 1.5–7.5)
NEUTROPHILS ABSOLUTE: 6.2 K/UL (ref 1.5–7.5)
NEUTROPHILS ABSOLUTE: 7.2 K/UL (ref 1.5–7.5)
NEUTROPHILS NFR BLD AUTO: 3.79 10*3/MM3 (ref 1.7–7)
NEUTROPHILS NFR BLD AUTO: 66.9 % (ref 42.7–76)
NEUTROPHILS NFR BLD AUTO: 7.56 10*3/MM3 (ref 1.7–7)
NEUTROPHILS NFR BLD AUTO: 7.67 10*3/MM3 (ref 1.7–7)
NEUTROPHILS NFR BLD AUTO: 7.86 10*3/MM3 (ref 1.7–7)
NEUTROPHILS NFR BLD AUTO: 7.97 10*3/MM3 (ref 1.7–7)
NEUTROPHILS NFR BLD AUTO: 70 % (ref 42.7–76)
NEUTROPHILS NFR BLD AUTO: 71.8 % (ref 42.7–76)
NEUTROPHILS NFR BLD AUTO: 86.4 % (ref 42.7–76)
NEUTROPHILS NFR BLD AUTO: 88.7 % (ref 42.7–76)
NEUTROPHILS NFR BLD MANUAL: 65 % (ref 42.7–76)
NEUTROPHILS NFR BLD MANUAL: 72.7 % (ref 42.7–76)
NEUTROPHILS NFR BLD MANUAL: 74.7 % (ref 42.7–76)
NEUTROPHILS NFR BLD MANUAL: 78.4 % (ref 42.7–76)
NEUTROPHILS RELATIVE PERCENT: 64 % (ref 50–65)
NEUTROPHILS RELATIVE PERCENT: 68.7 % (ref 50–65)
NEUTROPHILS RELATIVE PERCENT: 71.1 % (ref 50–65)
NEUTROPHILS RELATIVE PERCENT: 72.3 % (ref 50–65)
NEUTROPHILS RELATIVE PERCENT: 74.5 % (ref 50–65)
NEUTROPHILS RELATIVE PERCENT: 76.1 % (ref 50–65)
NEUTROPHILS RELATIVE PERCENT: 76.7 % (ref 50–65)
NEUTROPHILS RELATIVE PERCENT: 79.9 % (ref 50–65)
NEUTROPHILS RELATIVE PERCENT: 81.5 % (ref 50–65)
NEUTROPHILS RELATIVE PERCENT: 93 % (ref 50–65)
NEUTS BAND NFR BLD MANUAL: 18 % (ref 0–5)
NEUTS BAND NFR BLD MANUAL: 2 % (ref 0–5)
NEUTS BAND NFR BLD MANUAL: 2.1 % (ref 0–5)
NEUTS BAND NFR BLD MANUAL: 4 % (ref 0–5)
NITRITE UR QL STRIP: NEGATIVE
NITRITE, POC: POSITIVE
NITRITE, URINE: NEGATIVE
NOTIFIED BY: ABNORMAL
NOTIFIED WHO: ABNORMAL
NRBC BLD AUTO-RTO: 0 /100 WBC (ref 0–0.2)
NRBC BLD AUTO-RTO: 0 /100 WBC (ref 0–0.2)
NRBC BLD AUTO-RTO: 0.6 /100 WBC (ref 0–0.2)
NRBC BLD AUTO-RTO: 0.8 /100 WBC (ref 0–0.2)
NRBC BLD AUTO-RTO: 1.1 /100 WBC (ref 0–0.2)
NRBC BLD AUTO-RTO: 1.5 /100 WBC (ref 0–0.2)
NRBC SPEC MANUAL: 1 /100 WBC (ref 0–0.2)
NRBC SPEC MANUAL: 4 /100 WBC (ref 0–0.2)
NT-PROBNP SERPL-MCNC: 1369 PG/ML (ref 0–900)
NT-PROBNP SERPL-MCNC: 428.9 PG/ML (ref 0–900)
NT-PROBNP SERPL-MCNC: 574.5 PG/ML (ref 0–900)
O2 CONTENT ARTERIAL: 14.4 ML/DL
O2 CONTENT ARTERIAL: 14.4 ML/DL
O2 CONTENT ARTERIAL: 15.3 ML/DL
O2 CONTENT ARTERIAL: 16.6 ML/DL
O2 CONTENT ARTERIAL: 16.7 ML/DL
O2 DELIVERY DEVICE: ABNORMAL
O2 SAT, ARTERIAL: 93.2 %
O2 SAT, ARTERIAL: 93.5 %
O2 SAT, ARTERIAL: 93.8 %
O2 SAT, ARTERIAL: 94.3 %
O2 SAT, ARTERIAL: 95.5 %
O2 THERAPY: ABNORMAL
ORGANISM: ABNORMAL
OVALOCYTES BLD QL SMEAR: ABNORMAL
OVALOCYTES BLD QL SMEAR: ABNORMAL
OVALOCYTES: ABNORMAL
OXYGEN FLOW: 7
PARAINFLUENZA VIRUS 1 BY PCR: NOT DETECTED
PARAINFLUENZA VIRUS 2 BY PCR: NOT DETECTED
PARAINFLUENZA VIRUS 3 BY PCR: NOT DETECTED
PARAINFLUENZA VIRUS 4 BY PCR: NOT DETECTED
PATH REPORT.FINAL DX SPEC: NORMAL
PATH REPORT.GROSS SPEC: NORMAL
PAW @ PEAK INSP FLOW SETTING VENT: 12 CMH2O
PAW @ PEAK INSP FLOW SETTING VENT: 12 CMH2O
PCO2 ARTERIAL: 29 MMHG (ref 35–45)
PCO2 ARTERIAL: 32 MMHG (ref 35–45)
PCO2 ARTERIAL: 33 MMHG (ref 35–45)
PCO2 ARTERIAL: 34 MMHG (ref 35–45)
PCO2 ARTERIAL: 37 MMHG (ref 35–45)
PCO2 BLDA: 30.3 MM HG (ref 35–45)
PCO2 BLDA: 34 MM HG (ref 35–45)
PCO2 BLDA: 35.2 MM HG (ref 35–45)
PCO2 BLDA: 35.8 MM HG (ref 35–45)
PCO2 BLDA: 36.1 MM HG (ref 35–45)
PCO2 BLDA: 36.2 MM HG (ref 35–45)
PCO2 BLDA: 36.9 MM HG (ref 35–45)
PCO2 BLDA: 37.9 MM HG (ref 35–45)
PCO2 BLDA: 38.2 MM HG (ref 35–45)
PCO2 BLDA: 40.4 MM HG (ref 35–45)
PCO2 BLDA: 40.4 MM HG (ref 35–45)
PCO2 BLDA: 40.7 MM HG (ref 35–45)
PCO2 BLDA: 40.9 MM HG (ref 35–45)
PCO2 BLDA: 40.9 MM HG (ref 35–45)
PCO2 BLDA: 41 MM HG (ref 35–45)
PCO2 BLDA: 41.2 MM HG (ref 35–45)
PCO2 BLDA: 42.4 MM HG (ref 35–45)
PCO2 BLDA: 42.9 MM HG (ref 35–45)
PCO2 BLDA: 43.1 MM HG (ref 35–45)
PCO2 BLDA: 43.8 MM HG (ref 35–45)
PCO2 BLDA: 44.3 MM HG (ref 35–45)
PCO2 BLDA: 46.3 MM HG (ref 35–45)
PCO2 TEMP ADJ BLD: 30.3 MM HG (ref 35–45)
PCO2 TEMP ADJ BLD: 34 MM HG (ref 35–45)
PCO2 TEMP ADJ BLD: 35.2 MM HG (ref 35–45)
PCO2 TEMP ADJ BLD: 35.8 MM HG (ref 35–45)
PCO2 TEMP ADJ BLD: 36.1 MM HG (ref 35–45)
PCO2 TEMP ADJ BLD: 36.2 MM HG (ref 35–45)
PCO2 TEMP ADJ BLD: 36.9 MM HG (ref 35–45)
PCO2 TEMP ADJ BLD: 37.9 MM HG (ref 35–45)
PCO2 TEMP ADJ BLD: 38.2 MM HG (ref 35–45)
PCO2 TEMP ADJ BLD: 40.4 MM HG (ref 35–45)
PCO2 TEMP ADJ BLD: 40.4 MM HG (ref 35–45)
PCO2 TEMP ADJ BLD: 40.7 MM HG (ref 35–45)
PCO2 TEMP ADJ BLD: 40.9 MM HG (ref 35–45)
PCO2 TEMP ADJ BLD: 40.9 MM HG (ref 35–45)
PCO2 TEMP ADJ BLD: 41 MM HG (ref 35–45)
PCO2 TEMP ADJ BLD: 41.2 MM HG (ref 35–45)
PCO2 TEMP ADJ BLD: 42.4 MM HG (ref 35–45)
PCO2 TEMP ADJ BLD: 42.9 MM HG (ref 35–45)
PCO2 TEMP ADJ BLD: 43.1 MM HG (ref 35–45)
PCO2 TEMP ADJ BLD: 43.8 MM HG (ref 35–45)
PCO2 TEMP ADJ BLD: 44.3 MM HG (ref 35–45)
PCO2 TEMP ADJ BLD: 46.3 MM HG (ref 35–45)
PDW BLD-RTO: 17.2 % (ref 11.5–14.5)
PDW BLD-RTO: 17.3 % (ref 11.5–14.5)
PDW BLD-RTO: 17.3 % (ref 11.5–14.5)
PDW BLD-RTO: 17.5 % (ref 11.5–14.5)
PDW BLD-RTO: 17.7 % (ref 11.5–14.5)
PDW BLD-RTO: 17.9 % (ref 11.5–14.5)
PDW BLD-RTO: 18.1 % (ref 11.5–14.5)
PDW BLD-RTO: 18.2 % (ref 11.5–14.5)
PDW BLD-RTO: 18.6 % (ref 11.5–14.5)
PDW BLD-RTO: 18.6 % (ref 11.5–14.5)
PEEP RESPIRATORY: 5 CM[H2O]
PERFORMED ON: ABNORMAL
PH ARTERIAL: 7.1 (ref 7.35–7.45)
PH ARTERIAL: 7.13 (ref 7.35–7.45)
PH ARTERIAL: 7.17 (ref 7.35–7.45)
PH ARTERIAL: 7.3 (ref 7.35–7.45)
PH ARTERIAL: 7.32 (ref 7.35–7.45)
PH BLDA: 7.28 PH UNITS (ref 7.35–7.45)
PH BLDA: 7.36 PH UNITS (ref 7.35–7.45)
PH BLDA: 7.38 PH UNITS (ref 7.35–7.45)
PH BLDA: 7.4 PH UNITS (ref 7.35–7.45)
PH BLDA: 7.41 PH UNITS (ref 7.35–7.45)
PH BLDA: 7.43 PH UNITS (ref 7.35–7.45)
PH BLDA: 7.43 PH UNITS (ref 7.35–7.45)
PH BLDA: 7.44 PH UNITS (ref 7.35–7.45)
PH BLDA: 7.46 PH UNITS (ref 7.35–7.45)
PH BLDA: 7.46 PH UNITS (ref 7.35–7.45)
PH BLDA: 7.47 PH UNITS (ref 7.35–7.45)
PH BLDA: 7.48 PH UNITS (ref 7.35–7.45)
PH BLDA: 7.49 PH UNITS (ref 7.35–7.45)
PH BLDA: 7.49 PH UNITS (ref 7.35–7.45)
PH BLDA: 7.5 PH UNITS (ref 7.35–7.45)
PH BLDA: 7.5 PH UNITS (ref 7.35–7.45)
PH BLDA: 7.51 PH UNITS (ref 7.35–7.45)
PH BLDA: 7.51 PH UNITS (ref 7.35–7.45)
PH BLDA: 7.52 PH UNITS (ref 7.35–7.45)
PH BLDA: 7.53 PH UNITS (ref 7.35–7.45)
PH UA: 5 (ref 5–8)
PH UR STRIP.AUTO: 5.5 [PH] (ref 5–8)
PH UR STRIP.AUTO: <=5 [PH] (ref 5–8)
PH, POC: 5.5
PH, TEMP CORRECTED: 7.28 PH UNITS (ref 7.35–7.45)
PH, TEMP CORRECTED: 7.36 PH UNITS (ref 7.35–7.45)
PH, TEMP CORRECTED: 7.38 PH UNITS (ref 7.35–7.45)
PH, TEMP CORRECTED: 7.4 PH UNITS (ref 7.35–7.45)
PH, TEMP CORRECTED: 7.41 PH UNITS (ref 7.35–7.45)
PH, TEMP CORRECTED: 7.43 PH UNITS (ref 7.35–7.45)
PH, TEMP CORRECTED: 7.43 PH UNITS (ref 7.35–7.45)
PH, TEMP CORRECTED: 7.44 PH UNITS (ref 7.35–7.45)
PH, TEMP CORRECTED: 7.46 PH UNITS (ref 7.35–7.45)
PH, TEMP CORRECTED: 7.46 PH UNITS (ref 7.35–7.45)
PH, TEMP CORRECTED: 7.47 PH UNITS (ref 7.35–7.45)
PH, TEMP CORRECTED: 7.48 PH UNITS (ref 7.35–7.45)
PH, TEMP CORRECTED: 7.49 PH UNITS (ref 7.35–7.45)
PH, TEMP CORRECTED: 7.49 PH UNITS (ref 7.35–7.45)
PH, TEMP CORRECTED: 7.5 PH UNITS (ref 7.35–7.45)
PH, TEMP CORRECTED: 7.5 PH UNITS (ref 7.35–7.45)
PH, TEMP CORRECTED: 7.51 PH UNITS (ref 7.35–7.45)
PH, TEMP CORRECTED: 7.51 PH UNITS (ref 7.35–7.45)
PH, TEMP CORRECTED: 7.52 PH UNITS (ref 7.35–7.45)
PH, TEMP CORRECTED: 7.53 PH UNITS (ref 7.35–7.45)
PHOSPHATE SERPL-MCNC: 6.3 MG/DL (ref 2.5–4.5)
PHOSPHORUS: 4 MG/DL (ref 2.5–4.5)
PLAT MORPH BLD: NORMAL
PLATELET # BLD AUTO: 128 10*3/MM3 (ref 140–450)
PLATELET # BLD AUTO: 192 10*3/MM3 (ref 140–450)
PLATELET # BLD AUTO: 197 10*3/MM3 (ref 140–450)
PLATELET # BLD AUTO: 205 10*3/MM3 (ref 140–450)
PLATELET # BLD AUTO: 237 10*3/MM3 (ref 140–450)
PLATELET # BLD AUTO: 237 10*3/MM3 (ref 140–450)
PLATELET # BLD AUTO: 244 10*3/MM3 (ref 140–450)
PLATELET # BLD AUTO: 251 10*3/MM3 (ref 140–450)
PLATELET # BLD AUTO: 257 10*3/MM3 (ref 140–450)
PLATELET # BLD AUTO: 340 10*3/MM3 (ref 140–450)
PLATELET # BLD: 111 K/UL (ref 130–400)
PLATELET # BLD: 131 K/UL (ref 130–400)
PLATELET # BLD: 143 K/UL (ref 130–400)
PLATELET # BLD: 163 K/UL (ref 130–400)
PLATELET # BLD: 174 K/UL (ref 130–400)
PLATELET # BLD: 76 K/UL (ref 130–400)
PLATELET # BLD: 83 K/UL (ref 130–400)
PLATELET # BLD: 85 K/UL (ref 130–400)
PLATELET # BLD: 89 K/UL (ref 130–400)
PLATELET # BLD: 98 K/UL (ref 130–400)
PLATELET SLIDE REVIEW: ABNORMAL
PLATELET SLIDE REVIEW: ADEQUATE
PMV BLD AUTO: 10.1 FL (ref 9.4–12.3)
PMV BLD AUTO: 10.3 FL (ref 6–12)
PMV BLD AUTO: 10.3 FL (ref 9.4–12.3)
PMV BLD AUTO: 10.4 FL (ref 6–12)
PMV BLD AUTO: 10.4 FL (ref 6–12)
PMV BLD AUTO: 10.4 FL (ref 9.4–12.3)
PMV BLD AUTO: 10.5 FL (ref 6–12)
PMV BLD AUTO: 10.5 FL (ref 9.4–12.3)
PMV BLD AUTO: 10.6 FL (ref 6–12)
PMV BLD AUTO: 10.7 FL (ref 6–12)
PMV BLD AUTO: 10.8 FL (ref 9.4–12.3)
PMV BLD AUTO: 10.8 FL (ref 9.4–12.3)
PMV BLD AUTO: 11 FL (ref 6–12)
PMV BLD AUTO: 11 FL (ref 6–12)
PMV BLD AUTO: 11.1 FL (ref 6–12)
PMV BLD AUTO: 11.2 FL (ref 6–12)
PMV BLD AUTO: 9.6 FL (ref 9.4–12.3)
PMV BLD AUTO: 9.6 FL (ref 9.4–12.3)
PMV BLD AUTO: 9.7 FL (ref 9.4–12.3)
PMV BLD AUTO: 9.8 FL (ref 9.4–12.3)
PO2 ARTERIAL: 137 MMHG (ref 80–100)
PO2 ARTERIAL: 80 MMHG (ref 80–100)
PO2 ARTERIAL: 84 MMHG (ref 80–100)
PO2 ARTERIAL: 84 MMHG (ref 80–100)
PO2 ARTERIAL: 92 MMHG (ref 80–100)
PO2 BLDA: 107 MM HG (ref 83–108)
PO2 BLDA: 132 MM HG (ref 83–108)
PO2 BLDA: 54.7 MM HG (ref 83–108)
PO2 BLDA: 55.2 MM HG (ref 83–108)
PO2 BLDA: 56.7 MM HG (ref 83–108)
PO2 BLDA: 57.4 MM HG (ref 83–108)
PO2 BLDA: 58.4 MM HG (ref 83–108)
PO2 BLDA: 59.8 MM HG (ref 83–108)
PO2 BLDA: 60.9 MM HG (ref 83–108)
PO2 BLDA: 61.3 MM HG (ref 83–108)
PO2 BLDA: 62.9 MM HG (ref 83–108)
PO2 BLDA: 63.2 MM HG (ref 83–108)
PO2 BLDA: 63.5 MM HG (ref 83–108)
PO2 BLDA: 63.7 MM HG (ref 83–108)
PO2 BLDA: 68.3 MM HG (ref 83–108)
PO2 BLDA: 69.4 MM HG (ref 83–108)
PO2 BLDA: 69.6 MM HG (ref 83–108)
PO2 BLDA: 69.7 MM HG (ref 83–108)
PO2 BLDA: 71.1 MM HG (ref 83–108)
PO2 BLDA: 71.3 MM HG (ref 83–108)
PO2 BLDA: 71.7 MM HG (ref 83–108)
PO2 BLDA: 73.2 MM HG (ref 83–108)
PO2 TEMP ADJ BLD: 107 MM HG (ref 83–108)
PO2 TEMP ADJ BLD: 132 MM HG (ref 83–108)
PO2 TEMP ADJ BLD: 54.7 MM HG (ref 83–108)
PO2 TEMP ADJ BLD: 55.2 MM HG (ref 83–108)
PO2 TEMP ADJ BLD: 56.7 MM HG (ref 83–108)
PO2 TEMP ADJ BLD: 57.4 MM HG (ref 83–108)
PO2 TEMP ADJ BLD: 58.4 MM HG (ref 83–108)
PO2 TEMP ADJ BLD: 59.8 MM HG (ref 83–108)
PO2 TEMP ADJ BLD: 60.9 MM HG (ref 83–108)
PO2 TEMP ADJ BLD: 61.3 MM HG (ref 83–108)
PO2 TEMP ADJ BLD: 62.9 MM HG (ref 83–108)
PO2 TEMP ADJ BLD: 63.2 MM HG (ref 83–108)
PO2 TEMP ADJ BLD: 63.5 MM HG (ref 83–108)
PO2 TEMP ADJ BLD: 63.7 MM HG (ref 83–108)
PO2 TEMP ADJ BLD: 68.3 MM HG (ref 83–108)
PO2 TEMP ADJ BLD: 69.4 MM HG (ref 83–108)
PO2 TEMP ADJ BLD: 69.6 MM HG (ref 83–108)
PO2 TEMP ADJ BLD: 69.7 MM HG (ref 83–108)
PO2 TEMP ADJ BLD: 71.1 MM HG (ref 83–108)
PO2 TEMP ADJ BLD: 71.3 MM HG (ref 83–108)
PO2 TEMP ADJ BLD: 71.7 MM HG (ref 83–108)
PO2 TEMP ADJ BLD: 73.2 MM HG (ref 83–108)
POIKILOCYTES: ABNORMAL
POIKILOCYTOSIS BLD QL SMEAR: ABNORMAL
POLYCHROMASIA BLD QL SMEAR: ABNORMAL
POSITIVE END EXP PRESS: 5
POTASSIUM REFLEX MAGNESIUM: 2.7 MMOL/L (ref 3.5–5)
POTASSIUM SERPL-SCNC: 2.6 MMOL/L (ref 3.5–5)
POTASSIUM SERPL-SCNC: 2.6 MMOL/L (ref 3.5–5.2)
POTASSIUM SERPL-SCNC: 2.9 MMOL/L (ref 3.5–5)
POTASSIUM SERPL-SCNC: 3 MMOL/L (ref 3.5–5)
POTASSIUM SERPL-SCNC: 3.2 MMOL/L (ref 3.5–5)
POTASSIUM SERPL-SCNC: 3.3 MMOL/L (ref 3.5–5)
POTASSIUM SERPL-SCNC: 3.3 MMOL/L (ref 3.5–5)
POTASSIUM SERPL-SCNC: 3.3 MMOL/L (ref 3.5–5.2)
POTASSIUM SERPL-SCNC: 3.4 MMOL/L (ref 3.5–5)
POTASSIUM SERPL-SCNC: 3.4 MMOL/L (ref 3.5–5.2)
POTASSIUM SERPL-SCNC: 3.4 MMOL/L (ref 3.5–5.2)
POTASSIUM SERPL-SCNC: 3.5 MMOL/L (ref 3.5–5)
POTASSIUM SERPL-SCNC: 3.5 MMOL/L (ref 3.5–5.2)
POTASSIUM SERPL-SCNC: 3.6 MMOL/L (ref 3.5–5)
POTASSIUM SERPL-SCNC: 3.6 MMOL/L (ref 3.5–5.2)
POTASSIUM SERPL-SCNC: 3.6 MMOL/L (ref 3.5–5.2)
POTASSIUM SERPL-SCNC: 3.7 MMOL/L (ref 3.5–5)
POTASSIUM SERPL-SCNC: 3.8 MMOL/L (ref 3.5–5)
POTASSIUM SERPL-SCNC: 3.8 MMOL/L (ref 3.5–5.2)
POTASSIUM SERPL-SCNC: 3.9 MMOL/L (ref 3.5–5.2)
POTASSIUM SERPL-SCNC: 3.9 MMOL/L (ref 3.5–5.2)
POTASSIUM SERPL-SCNC: 4.1 MMOL/L (ref 3.5–5.2)
POTASSIUM SERPL-SCNC: 4.2 MMOL/L (ref 3.5–5.2)
POTASSIUM SERPL-SCNC: 4.4 MMOL/L (ref 3.5–5.2)
POTASSIUM SERPL-SCNC: 4.4 MMOL/L (ref 3.5–5.2)
POTASSIUM SERPL-SCNC: 4.5 MMOL/L (ref 3.5–5)
POTASSIUM SERPL-SCNC: 4.6 MMOL/L (ref 3.5–5.2)
POTASSIUM SERPL-SCNC: 4.6 MMOL/L (ref 3.5–5.2)
POTASSIUM SERPL-SCNC: 5.3 MMOL/L (ref 3.5–5.2)
POTASSIUM SERPL-SCNC: 5.4 MMOL/L (ref 3.5–5.2)
POTASSIUM SERPL-SCNC: 6 MMOL/L (ref 3.5–5.2)
POTASSIUM, WHOLE BLOOD: 2.7
POTASSIUM, WHOLE BLOOD: 3.4
POTASSIUM, WHOLE BLOOD: 3.5
POTASSIUM, WHOLE BLOOD: 3.5
POTASSIUM, WHOLE BLOOD: 3.6
PREALB SERPL-MCNC: 5.3 MG/DL (ref 20–40)
PROT ?TM UR-MCNC: 168.3 MG/DL
PROT SERPL-MCNC: 4.7 G/DL (ref 6–8.5)
PROT SERPL-MCNC: 5.1 G/DL (ref 6–8.5)
PROT SERPL-MCNC: 5.4 G/DL (ref 6–8.5)
PROT SERPL-MCNC: 5.4 G/DL (ref 6–8.5)
PROT SERPL-MCNC: 6.2 G/DL (ref 6–8.5)
PROT SERPL-MCNC: 7.1 G/DL (ref 6–8.5)
PROT UR QL STRIP: ABNORMAL
PROTEIN PROTEIN: 236 MG/DL (ref 15–45)
PROTEIN UA: 300 MG/DL
PROTEIN, POC: 100
PROTEUS SPECIES BY PCR: NOT DETECTED
PROTHROMBIN TIME: 17.5 SECONDS (ref 11.9–14.6)
PROTHROMBIN TIME: 17.6 SEC (ref 12–14.6)
PSEUDOMONAS AERUGINOSA BY PCR: NOT DETECTED
PTH-INTACT SERPL-MCNC: 9.6 PG/ML (ref 15–65)
RBC # BLD AUTO: 2.42 10*6/MM3 (ref 3.77–5.28)
RBC # BLD AUTO: 2.79 10*6/MM3 (ref 3.77–5.28)
RBC # BLD AUTO: 2.8 10*6/MM3 (ref 3.77–5.28)
RBC # BLD AUTO: 3.01 10*6/MM3 (ref 3.77–5.28)
RBC # BLD AUTO: 3.11 10*6/MM3 (ref 3.77–5.28)
RBC # BLD AUTO: 3.16 10*6/MM3 (ref 3.77–5.28)
RBC # BLD AUTO: 3.26 10*6/MM3 (ref 3.77–5.28)
RBC # BLD AUTO: 3.31 10*6/MM3 (ref 3.77–5.28)
RBC # BLD AUTO: 3.96 10*6/MM3 (ref 3.77–5.28)
RBC # BLD AUTO: 4.19 10*6/MM3 (ref 3.77–5.28)
RBC # BLD: 3.03 M/UL (ref 4.2–5.4)
RBC # BLD: 3.41 M/UL (ref 4.2–5.4)
RBC # BLD: 3.53 M/UL (ref 4.2–5.4)
RBC # BLD: 3.53 M/UL (ref 4.2–5.4)
RBC # BLD: 3.56 M/UL (ref 4.2–5.4)
RBC # BLD: 3.57 M/UL (ref 4.2–5.4)
RBC # BLD: 3.75 M/UL (ref 4.2–5.4)
RBC # BLD: 3.86 M/UL (ref 4.2–5.4)
RBC # BLD: 4.04 M/UL (ref 4.2–5.4)
RBC # BLD: 4.22 M/UL (ref 4.2–5.4)
RBC # UR STRIP: ABNORMAL /HPF
RBC UA: 1 /HPF (ref 0–4)
RBC UA: 302 /HPF (ref 0–4)
REASON FOR REJECTION: NORMAL
REF LAB TEST METHOD: ABNORMAL
REJECTED TEST: NORMAL
RESPIRATORY SYNCYTIAL VIRUS BY PCR: NOT DETECTED
SALMONELLA SPECIES BY PCR: NOT DETECTED
SAMPLE SOURCE: ABNORMAL
SAO2 % BLDCOA: 85.3 % (ref 94–99)
SAO2 % BLDCOA: 88.6 % (ref 94–99)
SAO2 % BLDCOA: 89.1 % (ref 94–99)
SAO2 % BLDCOA: 89.3 % (ref 94–99)
SAO2 % BLDCOA: 89.7 % (ref 94–99)
SAO2 % BLDCOA: 90.5 % (ref 94–99)
SAO2 % BLDCOA: 91.2 % (ref 94–99)
SAO2 % BLDCOA: 91.2 % (ref 94–99)
SAO2 % BLDCOA: 91.7 % (ref 94–99)
SAO2 % BLDCOA: 92.3 % (ref 94–99)
SAO2 % BLDCOA: 92.8 % (ref 94–99)
SAO2 % BLDCOA: 92.9 % (ref 94–99)
SAO2 % BLDCOA: 93.1 % (ref 94–99)
SAO2 % BLDCOA: 94.2 % (ref 94–99)
SAO2 % BLDCOA: 94.5 % (ref 94–99)
SAO2 % BLDCOA: 95 % (ref 94–99)
SAO2 % BLDCOA: 96.1 % (ref 94–99)
SAO2 % BLDCOA: 98.2 % (ref 94–99)
SAO2 % BLDCOA: 99.3 % (ref 94–99)
SAO2 % BLDCOA: ABNORMAL %
SARS-COV-2 RNA PNL SPEC NAA+PROBE: NOT DETECTED
SARS-COV-2 RNA PNL SPEC NAA+PROBE: NOT DETECTED
SARS-COV-2, PCR: NOT DETECTED
SERRATIA MARCESCENS BY PCR: NOT DETECTED
SET MECH RESP RATE: 12
SET MECH RESP RATE: 14
SET MECH RESP RATE: 14
SET MECH RESP RATE: 20
SET MECH RESP RATE: 24
SODIUM BLD-SCNC: 143 MMOL/L (ref 136–145)
SODIUM BLD-SCNC: 144 MMOL/L (ref 136–145)
SODIUM BLD-SCNC: 144 MMOL/L (ref 136–145)
SODIUM BLD-SCNC: 145 MMOL/L (ref 136–145)
SODIUM BLD-SCNC: 145 MMOL/L (ref 136–145)
SODIUM BLD-SCNC: 146 MMOL/L (ref 136–145)
SODIUM BLD-SCNC: 148 MMOL/L (ref 136–145)
SODIUM BLD-SCNC: 148 MMOL/L (ref 136–145)
SODIUM BLD-SCNC: 149 MMOL/L (ref 136–145)
SODIUM BLD-SCNC: 150 MMOL/L (ref 136–145)
SODIUM BLD-SCNC: 151 MMOL/L (ref 136–145)
SODIUM BLD-SCNC: 151 MMOL/L (ref 136–145)
SODIUM BLD-SCNC: 153 MMOL/L (ref 136–145)
SODIUM SERPL-SCNC: 132 MMOL/L (ref 136–145)
SODIUM SERPL-SCNC: 133 MMOL/L (ref 136–145)
SODIUM SERPL-SCNC: 138 MMOL/L (ref 136–145)
SODIUM SERPL-SCNC: 140 MMOL/L (ref 136–145)
SODIUM SERPL-SCNC: 140 MMOL/L (ref 136–145)
SODIUM SERPL-SCNC: 142 MMOL/L (ref 136–145)
SODIUM SERPL-SCNC: 143 MMOL/L (ref 136–145)
SODIUM SERPL-SCNC: 144 MMOL/L (ref 136–145)
SODIUM SERPL-SCNC: 144 MMOL/L (ref 136–145)
SODIUM SERPL-SCNC: 145 MMOL/L (ref 136–145)
SODIUM SERPL-SCNC: 145 MMOL/L (ref 136–145)
SODIUM SERPL-SCNC: 147 MMOL/L (ref 136–145)
SODIUM SERPL-SCNC: 148 MMOL/L (ref 136–145)
SODIUM SERPL-SCNC: 150 MMOL/L (ref 136–145)
SODIUM UR-SCNC: 22 MMOL/L
SODIUM URINE: 32 MMOL/L
SP GR UR STRIP: 1.01 (ref 1–1.03)
SP GR UR STRIP: 1.01 (ref 1–1.03)
SP GR UR STRIP: 1.02 (ref 1–1.03)
SP GR UR STRIP: 1.02 (ref 1–1.03)
SP GR UR STRIP: 1.03 (ref 1–1.03)
SPECIFIC GRAVITY UA: 1.04 (ref 1–1.03)
SPECIFIC GRAVITY, POC: 1.03
SQUAMOUS #/AREA URNS HPF: ABNORMAL /HPF
STAPHYLOCOCCUS AUREUS BY PCR: NOT DETECTED
STAPHYLOCOCCUS EPIDERMIDIS BY PCR: DETECTED
STAPHYLOCOCCUS LUGDUNENSIS BY PCR: NOT DETECTED
STAPHYLOCOCCUS SPECIES BY PCR: DETECTED
STENOTROPHOMONAS MALTOPHILIA BY PCR: NOT DETECTED
STOMATOCYTES BLD QL SMEAR: ABNORMAL
STREPTOCOCCUS AGALACTIAE BY PCR: NOT DETECTED
STREPTOCOCCUS PNEUMONIAE BY PCR: NOT DETECTED
STREPTOCOCCUS PYOGENES  BY PCR: NOT DETECTED
STREPTOCOCCUS SPECIES BY PCR: NOT DETECTED
STRESS TARGET HR: 132 BPM
STRESS TARGET HR: 132 BPM
TARGETS BLD QL SMEAR: ABNORMAL
TARGETS BLD QL SMEAR: ABNORMAL
TOTAL PROTEIN: 4.8 G/DL (ref 6.6–8.7)
TOTAL PROTEIN: 4.9 G/DL (ref 6.6–8.7)
TOTAL PROTEIN: 5 G/DL (ref 6.6–8.7)
TOTAL PROTEIN: 5 G/DL (ref 6.6–8.7)
TOTAL PROTEIN: 5.6 G/DL (ref 6.6–8.7)
TOTAL PROTEIN: 5.7 G/DL (ref 6.6–8.7)
TOTAL PROTEIN: 6.9 G/DL (ref 6.6–8.7)
TRIGL SERPL-MCNC: 75 MG/DL (ref 0–149)
TROPONIN T SERPL-MCNC: <0.01 NG/ML (ref 0–0.03)
TSH SERPL DL<=0.05 MIU/L-ACNC: 0.92 UIU/ML (ref 0.27–4.2)
TSH SERPL DL<=0.05 MIU/L-ACNC: 1.77 UIU/ML (ref 0.27–4.2)
TSH SERPL DL<=0.05 MIU/L-ACNC: 3.31 UIU/ML (ref 0.27–4.2)
URINE CULTURE, ROUTINE: ABNORMAL
UROBILINOGEN UR QL STRIP: ABNORMAL
UROBILINOGEN, POC: 1
UROBILINOGEN, URINE: 1 E.U./DL
UUN 24H UR-MCNC: 363 MG/DL
VANCOMYCIN TROUGH SERPL-MCNC: 16.6 MCG/ML (ref 5–20)
VANCOMYCIN TROUGH SERPL-MCNC: 23.2 MCG/ML (ref 5–20)
VANCOMYCIN TROUGH: 8.9 UG/ML (ref 10–20)
VARIANT LYMPHS NFR BLD MANUAL: 1 % (ref 0–5)
VARIANT LYMPHS NFR BLD MANUAL: 16.2 % (ref 19.6–45.3)
VARIANT LYMPHS NFR BLD MANUAL: 16.5 % (ref 19.6–45.3)
VARIANT LYMPHS NFR BLD MANUAL: 2.1 % (ref 0–5)
VARIANT LYMPHS NFR BLD MANUAL: 7 % (ref 19.6–45.3)
VARIANT LYMPHS NFR BLD MANUAL: 9.1 % (ref 19.6–45.3)
VENTILATOR MODE: ABNORMAL
VENTILATOR MODE: ABNORMAL
VENTILATOR MODE: AC
VIT B12 BLD-MCNC: 1496 PG/ML (ref 211–946)
VT MECHANICAL: 450 %
VT ON VENT VENT: 380 ML
VT ON VENT VENT: 400 ML
VT ON VENT VENT: 420 ML
VT ON VENT VENT: 450 ML
VT ON VENT VENT: 600 ML
WBC # BLD: 3.9 K/UL (ref 4.8–10.8)
WBC # BLD: 4.5 K/UL (ref 4.8–10.8)
WBC # BLD: 4.8 K/UL (ref 4.8–10.8)
WBC # BLD: 4.9 K/UL (ref 4.8–10.8)
WBC # BLD: 5.4 K/UL (ref 4.8–10.8)
WBC # BLD: 6.4 K/UL (ref 4.8–10.8)
WBC # BLD: 6.7 K/UL (ref 4.8–10.8)
WBC # BLD: 7.3 K/UL (ref 4.8–10.8)
WBC # BLD: 7.4 K/UL (ref 4.8–10.8)
WBC # BLD: 7.7 K/UL (ref 4.8–10.8)
WBC # UR STRIP: ABNORMAL /HPF
WBC MORPH BLD: NORMAL
WBC NRBC COR # BLD: 10.79 10*3/MM3 (ref 3.4–10.8)
WBC NRBC COR # BLD: 11.08 10*3/MM3 (ref 3.4–10.8)
WBC NRBC COR # BLD: 12.34 10*3/MM3 (ref 3.4–10.8)
WBC NRBC COR # BLD: 14.25 10*3/MM3 (ref 3.4–10.8)
WBC NRBC COR # BLD: 27.32 10*3/MM3 (ref 3.4–10.8)
WBC NRBC COR # BLD: 5.67 10*3/MM3 (ref 3.4–10.8)
WBC NRBC COR # BLD: 5.98 10*3/MM3 (ref 3.4–10.8)
WBC NRBC COR # BLD: 8.65 10*3/MM3 (ref 3.4–10.8)
WBC NRBC COR # BLD: 8.75 10*3/MM3 (ref 3.4–10.8)
WBC NRBC COR # BLD: 9.1 10*3/MM3 (ref 3.4–10.8)
WBC UA: 38 /HPF (ref 0–5)
WBC UA: 46 /HPF (ref 0–5)
YEAST URNS QL MICRO: ABNORMAL /HPF

## 2022-01-01 PROCEDURE — 82803 BLOOD GASES ANY COMBINATION: CPT

## 2022-01-01 PROCEDURE — G8427 DOCREV CUR MEDS BY ELIG CLIN: HCPCS | Performed by: FAMILY MEDICINE

## 2022-01-01 PROCEDURE — 87147 CULTURE TYPE IMMUNOLOGIC: CPT | Performed by: INTERNAL MEDICINE

## 2022-01-01 PROCEDURE — 2000000000 HC ICU R&B

## 2022-01-01 PROCEDURE — 36600 WITHDRAWAL OF ARTERIAL BLOOD: CPT

## 2022-01-01 PROCEDURE — 86160 COMPLEMENT ANTIGEN: CPT | Performed by: INTERNAL MEDICINE

## 2022-01-01 PROCEDURE — 80053 COMPREHEN METABOLIC PANEL: CPT | Performed by: PHYSICIAN ASSISTANT

## 2022-01-01 PROCEDURE — 82962 GLUCOSE BLOOD TEST: CPT

## 2022-01-01 PROCEDURE — 85025 COMPLETE CBC W/AUTO DIFF WBC: CPT

## 2022-01-01 PROCEDURE — 3017F COLORECTAL CA SCREEN DOC REV: CPT | Performed by: FAMILY MEDICINE

## 2022-01-01 PROCEDURE — 74176 CT ABD & PELVIS W/O CONTRAST: CPT

## 2022-01-01 PROCEDURE — 84132 ASSAY OF SERUM POTASSIUM: CPT

## 2022-01-01 PROCEDURE — 99232 SBSQ HOSP IP/OBS MODERATE 35: CPT | Performed by: OTOLARYNGOLOGY

## 2022-01-01 PROCEDURE — 94003 VENT MGMT INPAT SUBQ DAY: CPT

## 2022-01-01 PROCEDURE — 99232 SBSQ HOSP IP/OBS MODERATE 35: CPT | Performed by: PHYSICIAN ASSISTANT

## 2022-01-01 PROCEDURE — 0 LIDOCAINE 1 % SOLUTION

## 2022-01-01 PROCEDURE — 63710000001 INSULIN LISPRO (HUMAN) PER 5 UNITS: Performed by: INTERNAL MEDICINE

## 2022-01-01 PROCEDURE — 25010000002 MORPHINE PER 10 MG: Performed by: INTERNAL MEDICINE

## 2022-01-01 PROCEDURE — 2700000000 HC OXYGEN THERAPY PER DAY

## 2022-01-01 PROCEDURE — 25010000002 VANCOMYCIN 1 G RECONSTITUTED SOLUTION 1 EACH VIAL: Performed by: INTERNAL MEDICINE

## 2022-01-01 PROCEDURE — 6360000002 HC RX W HCPCS: Performed by: INTERNAL MEDICINE

## 2022-01-01 PROCEDURE — 93306 TTE W/DOPPLER COMPLETE: CPT | Performed by: INTERNAL MEDICINE

## 2022-01-01 PROCEDURE — 81001 URINALYSIS AUTO W/SCOPE: CPT

## 2022-01-01 PROCEDURE — 3017F COLORECTAL CA SCREEN DOC REV: CPT | Performed by: NURSE PRACTITIONER

## 2022-01-01 PROCEDURE — 99233 SBSQ HOSP IP/OBS HIGH 50: CPT | Performed by: INTERNAL MEDICINE

## 2022-01-01 PROCEDURE — 25010000002 MIDAZOLAM HCL (PF) 5 MG/5ML SOLUTION: Performed by: INTERNAL MEDICINE

## 2022-01-01 PROCEDURE — 71045 X-RAY EXAM CHEST 1 VIEW: CPT

## 2022-01-01 PROCEDURE — 71045 X-RAY EXAM CHEST 1 VIEW: CPT | Performed by: RADIOLOGY

## 2022-01-01 PROCEDURE — 80053 COMPREHEN METABOLIC PANEL: CPT | Performed by: INTERNAL MEDICINE

## 2022-01-01 PROCEDURE — 94002 VENT MGMT INPAT INIT DAY: CPT

## 2022-01-01 PROCEDURE — 63710000001 INSULIN DETEMIR PER 5 UNITS: Performed by: NURSE PRACTITIONER

## 2022-01-01 PROCEDURE — 80053 COMPREHEN METABOLIC PANEL: CPT

## 2022-01-01 PROCEDURE — 63710000001 INSULIN LISPRO (HUMAN) PER 5 UNITS: Performed by: SPECIALIST

## 2022-01-01 PROCEDURE — 25010000002 MICAFUNGIN SODIUM 100 MG RECONSTITUTED SOLUTION 1 EACH VIAL: Performed by: NURSE PRACTITIONER

## 2022-01-01 PROCEDURE — 94640 AIRWAY INHALATION TREATMENT: CPT

## 2022-01-01 PROCEDURE — 25010000002 FUROSEMIDE PER 20 MG: Performed by: INTERNAL MEDICINE

## 2022-01-01 PROCEDURE — 99214 OFFICE O/P EST MOD 30 MIN: CPT | Performed by: FAMILY MEDICINE

## 2022-01-01 PROCEDURE — 63710000001 INSULIN DETEMIR PER 5 UNITS: Performed by: SPECIALIST

## 2022-01-01 PROCEDURE — G8400 PT W/DXA NO RESULTS DOC: HCPCS | Performed by: FAMILY MEDICINE

## 2022-01-01 PROCEDURE — 86235 NUCLEAR ANTIGEN ANTIBODY: CPT | Performed by: INTERNAL MEDICINE

## 2022-01-01 PROCEDURE — 63710000001 LEVALBUTEROL PER 0.5 MG: Performed by: SPECIALIST

## 2022-01-01 PROCEDURE — 82565 ASSAY OF CREATININE: CPT | Performed by: INTERNAL MEDICINE

## 2022-01-01 PROCEDURE — 87040 BLOOD CULTURE FOR BACTERIA: CPT

## 2022-01-01 PROCEDURE — 25010000002 HEPARIN (PORCINE) PER 1000 UNITS: Performed by: INTERNAL MEDICINE

## 2022-01-01 PROCEDURE — 87040 BLOOD CULTURE FOR BACTERIA: CPT | Performed by: INTERNAL MEDICINE

## 2022-01-01 PROCEDURE — 82947 ASSAY GLUCOSE BLOOD QUANT: CPT

## 2022-01-01 PROCEDURE — 63710000001 INSULIN DETEMIR PER 5 UNITS: Performed by: INTERNAL MEDICINE

## 2022-01-01 PROCEDURE — 25010000002 PIPERACILLIN SOD-TAZOBACTAM PER 1 G: Performed by: INTERNAL MEDICINE

## 2022-01-01 PROCEDURE — 80048 BASIC METABOLIC PNL TOTAL CA: CPT | Performed by: INTERNAL MEDICINE

## 2022-01-01 PROCEDURE — G8417 CALC BMI ABV UP PARAM F/U: HCPCS | Performed by: FAMILY MEDICINE

## 2022-01-01 PROCEDURE — 83880 ASSAY OF NATRIURETIC PEPTIDE: CPT

## 2022-01-01 PROCEDURE — 1036F TOBACCO NON-USER: CPT | Performed by: FAMILY MEDICINE

## 2022-01-01 PROCEDURE — 85730 THROMBOPLASTIN TIME PARTIAL: CPT | Performed by: PHYSICIAN ASSISTANT

## 2022-01-01 PROCEDURE — 25010000002 MORPHINE PER 10 MG: Performed by: NURSE PRACTITIONER

## 2022-01-01 PROCEDURE — 1123F ACP DISCUSS/DSCN MKR DOCD: CPT | Performed by: FAMILY MEDICINE

## 2022-01-01 PROCEDURE — 3046F HEMOGLOBIN A1C LEVEL >9.0%: CPT | Performed by: FAMILY MEDICINE

## 2022-01-01 PROCEDURE — 84156 ASSAY OF PROTEIN URINE: CPT

## 2022-01-01 PROCEDURE — 85610 PROTHROMBIN TIME: CPT

## 2022-01-01 PROCEDURE — 25010000002 PROPOFOL 10 MG/ML EMULSION: Performed by: NURSE ANESTHETIST, CERTIFIED REGISTERED

## 2022-01-01 PROCEDURE — 85007 BL SMEAR W/DIFF WBC COUNT: CPT | Performed by: INTERNAL MEDICINE

## 2022-01-01 PROCEDURE — 6370000000 HC RX 637 (ALT 250 FOR IP): Performed by: INTERNAL MEDICINE

## 2022-01-01 PROCEDURE — 85730 THROMBOPLASTIN TIME PARTIAL: CPT

## 2022-01-01 PROCEDURE — 99283 EMERGENCY DEPT VISIT LOW MDM: CPT

## 2022-01-01 PROCEDURE — 25010000002 CEFEPIME PER 500 MG: Performed by: INTERNAL MEDICINE

## 2022-01-01 PROCEDURE — 99213 OFFICE O/P EST LOW 20 MIN: CPT | Performed by: FAMILY MEDICINE

## 2022-01-01 PROCEDURE — 99213 OFFICE O/P EST LOW 20 MIN: CPT | Performed by: NURSE PRACTITIONER

## 2022-01-01 PROCEDURE — 76775 US EXAM ABDO BACK WALL LIM: CPT

## 2022-01-01 PROCEDURE — 83735 ASSAY OF MAGNESIUM: CPT

## 2022-01-01 PROCEDURE — 87150 DNA/RNA AMPLIFIED PROBE: CPT

## 2022-01-01 PROCEDURE — 84484 ASSAY OF TROPONIN QUANT: CPT | Performed by: PHYSICIAN ASSISTANT

## 2022-01-01 PROCEDURE — 99442 PR PHYS/QHP TELEPHONE EVALUATION 11-20 MIN: CPT | Performed by: FAMILY MEDICINE

## 2022-01-01 PROCEDURE — 51702 INSERT TEMP BLADDER CATH: CPT

## 2022-01-01 PROCEDURE — 80202 ASSAY OF VANCOMYCIN: CPT | Performed by: INTERNAL MEDICINE

## 2022-01-01 PROCEDURE — G8400 PT W/DXA NO RESULTS DOC: HCPCS | Performed by: NURSE PRACTITIONER

## 2022-01-01 PROCEDURE — 96372 THER/PROPH/DIAG INJ SC/IM: CPT

## 2022-01-01 PROCEDURE — 87205 SMEAR GRAM STAIN: CPT | Performed by: INTERNAL MEDICINE

## 2022-01-01 PROCEDURE — 87086 URINE CULTURE/COLONY COUNT: CPT | Performed by: NURSE PRACTITIONER

## 2022-01-01 PROCEDURE — 87070 CULTURE OTHR SPECIMN AEROBIC: CPT | Performed by: INTERNAL MEDICINE

## 2022-01-01 PROCEDURE — 25010000002 FUROSEMIDE PER 20 MG: Performed by: PHYSICIAN ASSISTANT

## 2022-01-01 PROCEDURE — 93325 DOPPLER ECHO COLOR FLOW MAPG: CPT

## 2022-01-01 PROCEDURE — 2580000003 HC RX 258: Performed by: INTERNAL MEDICINE

## 2022-01-01 PROCEDURE — 1090F PRES/ABSN URINE INCON ASSESS: CPT | Performed by: FAMILY MEDICINE

## 2022-01-01 PROCEDURE — 85027 COMPLETE CBC AUTOMATED: CPT | Performed by: INTERNAL MEDICINE

## 2022-01-01 PROCEDURE — 99232 SBSQ HOSP IP/OBS MODERATE 35: CPT | Performed by: INTERNAL MEDICINE

## 2022-01-01 PROCEDURE — 2022F DILAT RTA XM EVC RTNOPTHY: CPT | Performed by: FAMILY MEDICINE

## 2022-01-01 PROCEDURE — 0 POTASSIUM CHLORIDE PER 2 MEQ: Performed by: INTERNAL MEDICINE

## 2022-01-01 PROCEDURE — 87077 CULTURE AEROBIC IDENTIFY: CPT | Performed by: INTERNAL MEDICINE

## 2022-01-01 PROCEDURE — 93321 DOPPLER ECHO F-UP/LMTD STD: CPT | Performed by: INTERNAL MEDICINE

## 2022-01-01 PROCEDURE — 2580000003 HC RX 258: Performed by: HOSPITALIST

## 2022-01-01 PROCEDURE — G0439 PPPS, SUBSEQ VISIT: HCPCS | Performed by: FAMILY MEDICINE

## 2022-01-01 PROCEDURE — 85025 COMPLETE CBC W/AUTO DIFF WBC: CPT | Performed by: INTERNAL MEDICINE

## 2022-01-01 PROCEDURE — 2500000003 HC RX 250 WO HCPCS: Performed by: HOSPITALIST

## 2022-01-01 PROCEDURE — 86037 ANCA TITER EACH ANTIBODY: CPT | Performed by: INTERNAL MEDICINE

## 2022-01-01 PROCEDURE — 6370000000 HC RX 637 (ALT 250 FOR IP)

## 2022-01-01 PROCEDURE — 84100 ASSAY OF PHOSPHORUS: CPT | Performed by: INTERNAL MEDICINE

## 2022-01-01 PROCEDURE — 84156 ASSAY OF PROTEIN URINE: CPT | Performed by: INTERNAL MEDICINE

## 2022-01-01 PROCEDURE — 4040F PNEUMOC VAC/ADMIN/RCVD: CPT | Performed by: FAMILY MEDICINE

## 2022-01-01 PROCEDURE — 4040F PNEUMOC VAC/ADMIN/RCVD: CPT | Performed by: CLINICAL NURSE SPECIALIST

## 2022-01-01 PROCEDURE — 36415 COLL VENOUS BLD VENIPUNCTURE: CPT

## 2022-01-01 PROCEDURE — 99214 OFFICE O/P EST MOD 30 MIN: CPT

## 2022-01-01 PROCEDURE — 6360000002 HC RX W HCPCS: Performed by: HOSPITALIST

## 2022-01-01 PROCEDURE — 83516 IMMUNOASSAY NONANTIBODY: CPT | Performed by: INTERNAL MEDICINE

## 2022-01-01 PROCEDURE — 81001 URINALYSIS AUTO W/SCOPE: CPT | Performed by: INTERNAL MEDICINE

## 2022-01-01 PROCEDURE — 87086 URINE CULTURE/COLONY COUNT: CPT | Performed by: INTERNAL MEDICINE

## 2022-01-01 PROCEDURE — 2500000003 HC RX 250 WO HCPCS: Performed by: INTERNAL MEDICINE

## 2022-01-01 PROCEDURE — 99233 SBSQ HOSP IP/OBS HIGH 50: CPT | Performed by: PHYSICIAN ASSISTANT

## 2022-01-01 PROCEDURE — 83970 ASSAY OF PARATHORMONE: CPT | Performed by: INTERNAL MEDICINE

## 2022-01-01 PROCEDURE — 25010000002 CEFAZOLIN PER 500 MG: Performed by: SPECIALIST

## 2022-01-01 PROCEDURE — G8400 PT W/DXA NO RESULTS DOC: HCPCS | Performed by: CLINICAL NURSE SPECIALIST

## 2022-01-01 PROCEDURE — 3017F COLORECTAL CA SCREEN DOC REV: CPT | Performed by: CLINICAL NURSE SPECIALIST

## 2022-01-01 PROCEDURE — 99222 1ST HOSP IP/OBS MODERATE 55: CPT | Performed by: SPECIALIST

## 2022-01-01 PROCEDURE — 1036F TOBACCO NON-USER: CPT | Performed by: CLINICAL NURSE SPECIALIST

## 2022-01-01 PROCEDURE — 84134 ASSAY OF PREALBUMIN: CPT | Performed by: INTERNAL MEDICINE

## 2022-01-01 PROCEDURE — 72128 CT CHEST SPINE W/O DYE: CPT

## 2022-01-01 PROCEDURE — 99232 SBSQ HOSP IP/OBS MODERATE 35: CPT | Performed by: SPECIALIST

## 2022-01-01 PROCEDURE — G8484 FLU IMMUNIZE NO ADMIN: HCPCS | Performed by: FAMILY MEDICINE

## 2022-01-01 PROCEDURE — 81001 URINALYSIS AUTO W/SCOPE: CPT | Performed by: NURSE PRACTITIONER

## 2022-01-01 PROCEDURE — 72131 CT LUMBAR SPINE W/O DYE: CPT

## 2022-01-01 PROCEDURE — 63710000001 LEVALBUTEROL PER 0.5 MG: Performed by: NURSE PRACTITIONER

## 2022-01-01 PROCEDURE — 99214 OFFICE O/P EST MOD 30 MIN: CPT | Performed by: INTERNAL MEDICINE

## 2022-01-01 PROCEDURE — 73650 X-RAY EXAM OF HEEL: CPT | Performed by: RADIOLOGY

## 2022-01-01 PROCEDURE — 0202U NFCT DS 22 TRGT SARS-COV-2: CPT

## 2022-01-01 PROCEDURE — 25010000002 FENTANYL CITRATE (PF) 100 MCG/2ML SOLUTION: Performed by: NURSE ANESTHETIST, CERTIFIED REGISTERED

## 2022-01-01 PROCEDURE — 5A1955Z RESPIRATORY VENTILATION, GREATER THAN 96 CONSECUTIVE HOURS: ICD-10-PCS | Performed by: INTERNAL MEDICINE

## 2022-01-01 PROCEDURE — G8417 CALC BMI ABV UP PARAM F/U: HCPCS | Performed by: CLINICAL NURSE SPECIALIST

## 2022-01-01 PROCEDURE — 99231 SBSQ HOSP IP/OBS SF/LOW 25: CPT | Performed by: SURGERY

## 2022-01-01 PROCEDURE — 87102 FUNGUS ISOLATION CULTURE: CPT | Performed by: INTERNAL MEDICINE

## 2022-01-01 PROCEDURE — 85652 RBC SED RATE AUTOMATED: CPT | Performed by: INTERNAL MEDICINE

## 2022-01-01 PROCEDURE — 96374 THER/PROPH/DIAG INJ IV PUSH: CPT

## 2022-01-01 PROCEDURE — 82607 VITAMIN B-12: CPT | Performed by: INTERNAL MEDICINE

## 2022-01-01 PROCEDURE — 31600 PLANNED TRACHEOSTOMY: CPT | Performed by: OTOLARYNGOLOGY

## 2022-01-01 PROCEDURE — 87641 MR-STAPH DNA AMP PROBE: CPT

## 2022-01-01 PROCEDURE — 6360000002 HC RX W HCPCS

## 2022-01-01 PROCEDURE — 87116 MYCOBACTERIA CULTURE: CPT | Performed by: INTERNAL MEDICINE

## 2022-01-01 PROCEDURE — 83735 ASSAY OF MAGNESIUM: CPT | Performed by: INTERNAL MEDICINE

## 2022-01-01 PROCEDURE — 83880 ASSAY OF NATRIURETIC PEPTIDE: CPT | Performed by: INTERNAL MEDICINE

## 2022-01-01 PROCEDURE — 1036F TOBACCO NON-USER: CPT | Performed by: NURSE PRACTITIONER

## 2022-01-01 PROCEDURE — 83036 HEMOGLOBIN GLYCOSYLATED A1C: CPT

## 2022-01-01 PROCEDURE — 93356 MYOCRD STRAIN IMG SPCKL TRCK: CPT

## 2022-01-01 PROCEDURE — 86140 C-REACTIVE PROTEIN: CPT | Performed by: INTERNAL MEDICINE

## 2022-01-01 PROCEDURE — 93356 MYOCRD STRAIN IMG SPCKL TRCK: CPT | Performed by: INTERNAL MEDICINE

## 2022-01-01 PROCEDURE — 94660 CPAP INITIATION&MGMT: CPT

## 2022-01-01 PROCEDURE — 99222 1ST HOSP IP/OBS MODERATE 55: CPT | Performed by: PHYSICIAN ASSISTANT

## 2022-01-01 PROCEDURE — 93970 EXTREMITY STUDY: CPT

## 2022-01-01 PROCEDURE — 84295 ASSAY OF SERUM SODIUM: CPT

## 2022-01-01 PROCEDURE — 84300 ASSAY OF URINE SODIUM: CPT | Performed by: INTERNAL MEDICINE

## 2022-01-01 PROCEDURE — G0009 ADMIN PNEUMOCOCCAL VACCINE: HCPCS | Performed by: FAMILY MEDICINE

## 2022-01-01 PROCEDURE — 84132 ASSAY OF SERUM POTASSIUM: CPT | Performed by: INTERNAL MEDICINE

## 2022-01-01 PROCEDURE — 87086 URINE CULTURE/COLONY COUNT: CPT

## 2022-01-01 PROCEDURE — 82570 ASSAY OF URINE CREATININE: CPT | Performed by: INTERNAL MEDICINE

## 2022-01-01 PROCEDURE — 87186 SC STD MICRODIL/AGAR DIL: CPT | Performed by: INTERNAL MEDICINE

## 2022-01-01 PROCEDURE — 83880 ASSAY OF NATRIURETIC PEPTIDE: CPT | Performed by: PHYSICIAN ASSISTANT

## 2022-01-01 PROCEDURE — 1123F ACP DISCUSS/DSCN MKR DOCD: CPT | Performed by: NURSE PRACTITIONER

## 2022-01-01 PROCEDURE — G8427 DOCREV CUR MEDS BY ELIG CLIN: HCPCS | Performed by: CLINICAL NURSE SPECIALIST

## 2022-01-01 PROCEDURE — C1751 CATH, INF, PER/CENT/MIDLINE: HCPCS

## 2022-01-01 PROCEDURE — 87150 DNA/RNA AMPLIFIED PROBE: CPT | Performed by: INTERNAL MEDICINE

## 2022-01-01 PROCEDURE — 0 LIDOCAINE 1 % SOLUTION: Performed by: OTOLARYNGOLOGY

## 2022-01-01 PROCEDURE — 25010000002 MIDAZOLAM HCL (PF) 5 MG/5ML SOLUTION: Performed by: SPECIALIST

## 2022-01-01 PROCEDURE — 93308 TTE F-UP OR LMTD: CPT

## 2022-01-01 PROCEDURE — 93325 DOPPLER ECHO COLOR FLOW MAPG: CPT | Performed by: INTERNAL MEDICINE

## 2022-01-01 PROCEDURE — 99214 OFFICE O/P EST MOD 30 MIN: CPT | Performed by: NURSE PRACTITIONER

## 2022-01-01 PROCEDURE — 25010000002 DIAZEPAM PER 5 MG

## 2022-01-01 PROCEDURE — 0BH17EZ INSERTION OF ENDOTRACHEAL AIRWAY INTO TRACHEA, VIA NATURAL OR ARTIFICIAL OPENING: ICD-10-PCS | Performed by: INTERNAL MEDICINE

## 2022-01-01 PROCEDURE — 31525 DX LARYNGOSCOPY EXCL NB: CPT | Performed by: OTOLARYNGOLOGY

## 2022-01-01 PROCEDURE — 6360000004 HC RX CONTRAST MEDICATION: Performed by: INTERNAL MEDICINE

## 2022-01-01 PROCEDURE — 85025 COMPLETE CBC W/AUTO DIFF WBC: CPT | Performed by: NURSE PRACTITIONER

## 2022-01-01 PROCEDURE — 25010000002 CEFTRIAXONE PER 250 MG: Performed by: INTERNAL MEDICINE

## 2022-01-01 PROCEDURE — 36592 COLLECT BLOOD FROM PICC: CPT

## 2022-01-01 PROCEDURE — 99223 1ST HOSP IP/OBS HIGH 75: CPT | Performed by: OTOLARYNGOLOGY

## 2022-01-01 PROCEDURE — 36569 INSJ PICC 5 YR+ W/O IMAGING: CPT

## 2022-01-01 PROCEDURE — 1123F ACP DISCUSS/DSCN MKR DOCD: CPT | Performed by: CLINICAL NURSE SPECIALIST

## 2022-01-01 PROCEDURE — 31500 INSERT EMERGENCY AIRWAY: CPT

## 2022-01-01 PROCEDURE — 82570 ASSAY OF URINE CREATININE: CPT

## 2022-01-01 PROCEDURE — 99232 SBSQ HOSP IP/OBS MODERATE 35: CPT

## 2022-01-01 PROCEDURE — 88305 TISSUE EXAM BY PATHOLOGIST: CPT | Performed by: INTERNAL MEDICINE

## 2022-01-01 PROCEDURE — G8417 CALC BMI ABV UP PARAM F/U: HCPCS | Performed by: NURSE PRACTITIONER

## 2022-01-01 PROCEDURE — 93306 TTE W/DOPPLER COMPLETE: CPT

## 2022-01-01 PROCEDURE — 84155 ASSAY OF PROTEIN SERUM: CPT | Performed by: INTERNAL MEDICINE

## 2022-01-01 PROCEDURE — 88112 CYTOPATH CELL ENHANCE TECH: CPT | Performed by: INTERNAL MEDICINE

## 2022-01-01 PROCEDURE — 99223 1ST HOSP IP/OBS HIGH 75: CPT | Performed by: INTERNAL MEDICINE

## 2022-01-01 PROCEDURE — 25010000002 LORAZEPAM PER 2 MG: Performed by: INTERNAL MEDICINE

## 2022-01-01 PROCEDURE — 99213 OFFICE O/P EST LOW 20 MIN: CPT | Performed by: CLINICAL NURSE SPECIALIST

## 2022-01-01 PROCEDURE — 76937 US GUIDE VASCULAR ACCESS: CPT

## 2022-01-01 PROCEDURE — 25010000002 MORPHINE SULFATE (PF) 2 MG/ML SOLUTION: Performed by: EMERGENCY MEDICINE

## 2022-01-01 PROCEDURE — 84443 ASSAY THYROID STIM HORMONE: CPT

## 2022-01-01 PROCEDURE — 25010000002 ENOXAPARIN PER 10 MG: Performed by: NURSE PRACTITIONER

## 2022-01-01 PROCEDURE — 63710000001 ONDANSETRON ODT 4 MG TABLET DISPERSIBLE: Performed by: PHYSICIAN ASSISTANT

## 2022-01-01 PROCEDURE — 25010000002 VANCOMYCIN 1 G RECONSTITUTED SOLUTION: Performed by: INTERNAL MEDICINE

## 2022-01-01 PROCEDURE — 84443 ASSAY THYROID STIM HORMONE: CPT | Performed by: INTERNAL MEDICINE

## 2022-01-01 PROCEDURE — 87220 TISSUE EXAM FOR FUNGI: CPT | Performed by: INTERNAL MEDICINE

## 2022-01-01 PROCEDURE — 81002 URINALYSIS NONAUTO W/O SCOPE: CPT | Performed by: FAMILY MEDICINE

## 2022-01-01 PROCEDURE — 1090F PRES/ABSN URINE INCON ASSESS: CPT | Performed by: NURSE PRACTITIONER

## 2022-01-01 PROCEDURE — 31624 DX BRONCHOSCOPE/LAVAGE: CPT | Performed by: INTERNAL MEDICINE

## 2022-01-01 PROCEDURE — 93970 EXTREMITY STUDY: CPT | Performed by: SURGERY

## 2022-01-01 PROCEDURE — 83605 ASSAY OF LACTIC ACID: CPT

## 2022-01-01 PROCEDURE — 87206 SMEAR FLUORESCENT/ACID STAI: CPT | Performed by: INTERNAL MEDICINE

## 2022-01-01 PROCEDURE — 43830 GSTRST OPEN WO CONSTJ TUBE: CPT | Performed by: SPECIALIST

## 2022-01-01 PROCEDURE — 84165 PROTEIN E-PHORESIS SERUM: CPT | Performed by: INTERNAL MEDICINE

## 2022-01-01 PROCEDURE — 80053 COMPREHEN METABOLIC PANEL: CPT | Performed by: NURSE PRACTITIONER

## 2022-01-01 PROCEDURE — C8929 TTE W OR WO FOL WCON,DOPPLER: HCPCS

## 2022-01-01 PROCEDURE — 80202 ASSAY OF VANCOMYCIN: CPT

## 2022-01-01 PROCEDURE — 93308 TTE F-UP OR LMTD: CPT | Performed by: INTERNAL MEDICINE

## 2022-01-01 PROCEDURE — 74018 RADEX ABDOMEN 1 VIEW: CPT

## 2022-01-01 PROCEDURE — 1090F PRES/ABSN URINE INCON ASSESS: CPT | Performed by: CLINICAL NURSE SPECIALIST

## 2022-01-01 PROCEDURE — 87635 SARS-COV-2 COVID-19 AMP PRB: CPT | Performed by: OTOLARYNGOLOGY

## 2022-01-01 PROCEDURE — 99222 1ST HOSP IP/OBS MODERATE 55: CPT | Performed by: SURGERY

## 2022-01-01 PROCEDURE — 99204 OFFICE O/P NEW MOD 45 MIN: CPT | Performed by: SURGERY

## 2022-01-01 PROCEDURE — 85025 COMPLETE CBC W/AUTO DIFF WBC: CPT | Performed by: PHYSICIAN ASSISTANT

## 2022-01-01 PROCEDURE — 87635 SARS-COV-2 COVID-19 AMP PRB: CPT | Performed by: PHYSICIAN ASSISTANT

## 2022-01-01 PROCEDURE — 86225 DNA ANTIBODY NATIVE: CPT | Performed by: INTERNAL MEDICINE

## 2022-01-01 PROCEDURE — 93321 DOPPLER ECHO F-UP/LMTD STD: CPT

## 2022-01-01 PROCEDURE — 82306 VITAMIN D 25 HYDROXY: CPT | Performed by: INTERNAL MEDICINE

## 2022-01-01 PROCEDURE — 84540 ASSAY OF URINE/UREA-N: CPT | Performed by: INTERNAL MEDICINE

## 2022-01-01 PROCEDURE — 90732 PPSV23 VACC 2 YRS+ SUBQ/IM: CPT | Performed by: FAMILY MEDICINE

## 2022-01-01 PROCEDURE — 02HV33Z INSERTION OF INFUSION DEVICE INTO SUPERIOR VENA CAVA, PERCUTANEOUS APPROACH: ICD-10-PCS | Performed by: INTERNAL MEDICINE

## 2022-01-01 PROCEDURE — 73521 X-RAY EXAM HIPS BI 2 VIEWS: CPT

## 2022-01-01 PROCEDURE — 99024 POSTOP FOLLOW-UP VISIT: CPT | Performed by: SPECIALIST

## 2022-01-01 PROCEDURE — 87040 BLOOD CULTURE FOR BACTERIA: CPT | Performed by: NURSE PRACTITIONER

## 2022-01-01 PROCEDURE — 36410 VNPNXR 3YR/> PHY/QHP DX/THER: CPT

## 2022-01-01 PROCEDURE — 25010000002 VANCOMYCIN 10 G RECONSTITUTED SOLUTION: Performed by: INTERNAL MEDICINE

## 2022-01-01 PROCEDURE — 84100 ASSAY OF PHOSPHORUS: CPT

## 2022-01-01 PROCEDURE — G8427 DOCREV CUR MEDS BY ELIG CLIN: HCPCS | Performed by: NURSE PRACTITIONER

## 2022-01-01 PROCEDURE — 84300 ASSAY OF URINE SODIUM: CPT

## 2022-01-01 PROCEDURE — 25010000002 PERFLUTREN 6.52 MG/ML SUSPENSION: Performed by: NURSE PRACTITIONER

## 2022-01-01 PROCEDURE — 25010000002 PERFLUTREN 6.52 MG/ML SUSPENSION: Performed by: INTERNAL MEDICINE

## 2022-01-01 PROCEDURE — 70450 CT HEAD/BRAIN W/O DYE: CPT

## 2022-01-01 PROCEDURE — 73650 X-RAY EXAM OF HEEL: CPT

## 2022-01-01 PROCEDURE — 87181 SC STD AGAR DILUTION PER AGT: CPT | Performed by: INTERNAL MEDICINE

## 2022-01-01 PROCEDURE — 85610 PROTHROMBIN TIME: CPT | Performed by: PHYSICIAN ASSISTANT

## 2022-01-01 DEVICE — ABSORBABLE HEMOSTAT (OXIDIZED REGENERATED CELLULOSE, U.S.P.)
Type: IMPLANTABLE DEVICE | Site: ABDOMEN | Status: FUNCTIONAL
Brand: SURGICEL

## 2022-01-01 RX ORDER — MIDAZOLAM HYDROCHLORIDE 1 MG/ML
2 INJECTION, SOLUTION INTRAMUSCULAR; INTRAVENOUS
Status: DISCONTINUED | OUTPATIENT
Start: 2022-01-01 | End: 2022-01-01

## 2022-01-01 RX ORDER — INSULIN LISPRO 100 [IU]/ML
0-9 INJECTION, SOLUTION INTRAVENOUS; SUBCUTANEOUS EVERY 6 HOURS SCHEDULED
Status: DISCONTINUED | OUTPATIENT
Start: 2022-01-01 | End: 2022-01-01

## 2022-01-01 RX ORDER — INSULIN GLARGINE 100 [IU]/ML
0.25 INJECTION, SOLUTION SUBCUTANEOUS NIGHTLY
Status: DISCONTINUED | OUTPATIENT
Start: 2022-01-01 | End: 2022-01-01 | Stop reason: HOSPADM

## 2022-01-01 RX ORDER — FUROSEMIDE 10 MG/ML
20 INJECTION INTRAMUSCULAR; INTRAVENOUS ONCE
Status: COMPLETED | OUTPATIENT
Start: 2022-01-01 | End: 2022-01-01

## 2022-01-01 RX ORDER — ONDANSETRON 2 MG/ML
4 INJECTION INTRAMUSCULAR; INTRAVENOUS EVERY 6 HOURS PRN
Status: DISCONTINUED | OUTPATIENT
Start: 2022-01-01 | End: 2022-01-01

## 2022-01-01 RX ORDER — ROSUVASTATIN CALCIUM 5 MG/1
TABLET, COATED ORAL
Qty: 90 TABLET | Refills: 0 | Status: SHIPPED | OUTPATIENT
Start: 2022-01-01

## 2022-01-01 RX ORDER — GLIPIZIDE 5 MG/1
5 TABLET ORAL
Status: DISCONTINUED | OUTPATIENT
Start: 2022-01-01 | End: 2022-01-01

## 2022-01-01 RX ORDER — ISOSORBIDE MONONITRATE 30 MG/1
30 TABLET, EXTENDED RELEASE ORAL DAILY
Qty: 90 TABLET | Refills: 0 | Status: SHIPPED | OUTPATIENT
Start: 2022-01-01

## 2022-01-01 RX ORDER — INSULIN LISPRO 100 [IU]/ML
0-4 INJECTION, SOLUTION INTRAVENOUS; SUBCUTANEOUS
Status: DISCONTINUED | OUTPATIENT
Start: 2022-01-01 | End: 2022-01-01 | Stop reason: HOSPADM

## 2022-01-01 RX ORDER — ROSUVASTATIN CALCIUM 5 MG/1
TABLET, COATED ORAL
Qty: 90 TABLET | Refills: 0 | Status: SHIPPED | OUTPATIENT
Start: 2022-01-01 | End: 2022-01-01

## 2022-01-01 RX ORDER — ORPHENADRINE CITRATE 30 MG/ML
60 INJECTION INTRAMUSCULAR; INTRAVENOUS ONCE
Status: DISCONTINUED | OUTPATIENT
Start: 2022-01-01 | End: 2022-01-01 | Stop reason: HOSPADM

## 2022-01-01 RX ORDER — POTASSIUM CHLORIDE 7.45 MG/ML
10 INJECTION INTRAVENOUS PRN
Status: DISCONTINUED | OUTPATIENT
Start: 2022-01-01 | End: 2022-01-01 | Stop reason: HOSPADM

## 2022-01-01 RX ORDER — ACETAMINOPHEN 650 MG/1
650 SUPPOSITORY RECTAL EVERY 6 HOURS PRN
Status: DISCONTINUED | OUTPATIENT
Start: 2022-01-01 | End: 2022-01-01 | Stop reason: HOSPADM

## 2022-01-01 RX ORDER — MEROPENEM AND SODIUM CHLORIDE 1 G/50ML
1000 INJECTION, SOLUTION INTRAVENOUS ONCE
Status: COMPLETED | OUTPATIENT
Start: 2022-01-01 | End: 2022-01-01

## 2022-01-01 RX ORDER — SODIUM CHLORIDE 9 MG/ML
INJECTION, SOLUTION INTRAVENOUS CONTINUOUS
Status: DISCONTINUED | OUTPATIENT
Start: 2022-01-01 | End: 2022-01-01

## 2022-01-01 RX ORDER — ACETYLCYSTEINE 200 MG/ML
3 SOLUTION ORAL; RESPIRATORY (INHALATION) 2 TIMES DAILY PRN
Status: DISCONTINUED | OUTPATIENT
Start: 2022-01-01 | End: 2022-01-01

## 2022-01-01 RX ORDER — FUROSEMIDE 10 MG/ML
40 INJECTION INTRAMUSCULAR; INTRAVENOUS ONCE
Status: DISCONTINUED | OUTPATIENT
Start: 2022-01-01 | End: 2022-01-01

## 2022-01-01 RX ORDER — LIDOCAINE 50 MG/G
1 PATCH TOPICAL ONCE
Status: DISCONTINUED | OUTPATIENT
Start: 2022-01-01 | End: 2022-01-01 | Stop reason: HOSPADM

## 2022-01-01 RX ORDER — CEPHALEXIN 500 MG/1
500 CAPSULE ORAL 2 TIMES DAILY
Qty: 20 CAPSULE | Refills: 0 | Status: ON HOLD | OUTPATIENT
Start: 2022-01-01 | End: 2022-01-01 | Stop reason: HOSPADM

## 2022-01-01 RX ORDER — POTASSIUM CHLORIDE 1.5 G/1.77G
40 POWDER, FOR SOLUTION ORAL EVERY 4 HOURS
Status: DISPENSED | OUTPATIENT
Start: 2022-01-01 | End: 2022-01-01

## 2022-01-01 RX ORDER — ONDANSETRON 4 MG/1
4 TABLET, ORALLY DISINTEGRATING ORAL ONCE
Status: COMPLETED | OUTPATIENT
Start: 2022-01-01 | End: 2022-01-01

## 2022-01-01 RX ORDER — NOREPINEPHRINE BIT/0.9 % NACL 8 MG/250ML
.02-.3 INFUSION BOTTLE (ML) INTRAVENOUS
Status: DISCONTINUED | OUTPATIENT
Start: 2022-01-01 | End: 2022-01-01

## 2022-01-01 RX ORDER — SODIUM CHLORIDE 9 MG/ML
25 INJECTION, SOLUTION INTRAVENOUS PRN
Status: DISCONTINUED | OUTPATIENT
Start: 2022-01-01 | End: 2022-01-01 | Stop reason: SDUPTHER

## 2022-01-01 RX ORDER — DEXTROSE MONOHYDRATE 25 G/50ML
25 INJECTION, SOLUTION INTRAVENOUS
Status: DISCONTINUED | OUTPATIENT
Start: 2022-01-01 | End: 2022-08-19 | Stop reason: HOSPADM

## 2022-01-01 RX ORDER — NITROFURANTOIN 25; 75 MG/1; MG/1
100 CAPSULE ORAL 2 TIMES DAILY
Qty: 14 CAPSULE | Refills: 0 | Status: SHIPPED | OUTPATIENT
Start: 2022-01-01 | End: 2022-01-01

## 2022-01-01 RX ORDER — PROPOFOL 10 MG/ML
INJECTION, EMULSION INTRAVENOUS
Status: COMPLETED
Start: 2022-01-01 | End: 2022-01-01

## 2022-01-01 RX ORDER — HEPARIN SODIUM 5000 [USP'U]/ML
5000 INJECTION, SOLUTION INTRAVENOUS; SUBCUTANEOUS EVERY 8 HOURS SCHEDULED
Status: DISCONTINUED | OUTPATIENT
Start: 2022-01-01 | End: 2022-01-01

## 2022-01-01 RX ORDER — METOPROLOL TARTRATE 50 MG/1
50 TABLET, FILM COATED ORAL EVERY 12 HOURS SCHEDULED
Status: DISCONTINUED | OUTPATIENT
Start: 2022-01-01 | End: 2022-01-01

## 2022-01-01 RX ORDER — INSULIN LISPRO 100 [IU]/ML
2-7 INJECTION, SOLUTION INTRAVENOUS; SUBCUTANEOUS EVERY 6 HOURS SCHEDULED
Status: DISCONTINUED | OUTPATIENT
Start: 2022-01-01 | End: 2022-01-01

## 2022-01-01 RX ORDER — DIAZEPAM 5 MG/ML
5 INJECTION, SOLUTION INTRAMUSCULAR; INTRAVENOUS EVERY 4 HOURS PRN
Status: DISCONTINUED | OUTPATIENT
Start: 2022-01-01 | End: 2022-01-01

## 2022-01-01 RX ORDER — CIPROFLOXACIN 500 MG/1
500 TABLET, FILM COATED ORAL 2 TIMES DAILY
Qty: 14 TABLET | Refills: 0 | Status: SHIPPED | OUTPATIENT
Start: 2022-01-01 | End: 2022-01-01

## 2022-01-01 RX ORDER — ACETAMINOPHEN 325 MG/1
650 TABLET ORAL EVERY 4 HOURS PRN
Status: DISCONTINUED | OUTPATIENT
Start: 2022-01-01 | End: 2022-01-01 | Stop reason: SDUPTHER

## 2022-01-01 RX ORDER — LIDOCAINE HYDROCHLORIDE 10 MG/ML
INJECTION, SOLUTION INFILTRATION; PERINEURAL
Status: DISPENSED
Start: 2022-01-01 | End: 2022-01-01

## 2022-01-01 RX ORDER — DROPERIDOL 2.5 MG/ML
0.62 INJECTION, SOLUTION INTRAMUSCULAR; INTRAVENOUS ONCE AS NEEDED
Status: DISCONTINUED | OUTPATIENT
Start: 2022-01-01 | End: 2022-01-01 | Stop reason: HOSPADM

## 2022-01-01 RX ORDER — POTASSIUM CHLORIDE 29.8 MG/ML
20 INJECTION INTRAVENOUS
Status: DISPENSED | OUTPATIENT
Start: 2022-01-01 | End: 2022-01-01

## 2022-01-01 RX ORDER — DEXMEDETOMIDINE HYDROCHLORIDE 4 UG/ML
.1-1.5 INJECTION INTRAVENOUS CONTINUOUS
Status: DISCONTINUED | OUTPATIENT
Start: 2022-01-01 | End: 2022-01-01 | Stop reason: SDUPTHER

## 2022-01-01 RX ORDER — ONDANSETRON 4 MG/1
4 TABLET, FILM COATED ORAL EVERY 6 HOURS PRN
Status: DISCONTINUED | OUTPATIENT
Start: 2022-01-01 | End: 2022-08-19 | Stop reason: HOSPADM

## 2022-01-01 RX ORDER — NOREPINEPHRINE BIT/0.9 % NACL 8 MG/250ML
INFUSION BOTTLE (ML) INTRAVENOUS
Status: DISPENSED
Start: 2022-01-01 | End: 2022-01-01

## 2022-01-01 RX ORDER — POTASSIUM CHLORIDE 750 MG/1
20 CAPSULE, EXTENDED RELEASE ORAL DAILY
Status: DISCONTINUED | OUTPATIENT
Start: 2022-01-01 | End: 2022-01-01

## 2022-01-01 RX ORDER — ONDANSETRON 2 MG/ML
4 INJECTION INTRAMUSCULAR; INTRAVENOUS
Status: DISCONTINUED | OUTPATIENT
Start: 2022-01-01 | End: 2022-01-01 | Stop reason: HOSPADM

## 2022-01-01 RX ORDER — ISOSORBIDE MONONITRATE 30 MG/1
30 TABLET, EXTENDED RELEASE ORAL DAILY
Qty: 90 TABLET | Refills: 0 | Status: SHIPPED | OUTPATIENT
Start: 2022-01-01 | End: 2022-01-01

## 2022-01-01 RX ORDER — FUROSEMIDE 20 MG/1
10 TABLET ORAL DAILY
Status: DISCONTINUED | OUTPATIENT
Start: 2022-01-01 | End: 2022-01-01

## 2022-01-01 RX ORDER — VERAPAMIL HYDROCHLORIDE 240 MG/1
240 TABLET, FILM COATED, EXTENDED RELEASE ORAL DAILY
Status: DISCONTINUED | OUTPATIENT
Start: 2022-01-01 | End: 2022-01-01

## 2022-01-01 RX ORDER — DIAZEPAM 5 MG/ML
INJECTION, SOLUTION INTRAMUSCULAR; INTRAVENOUS
Status: DISPENSED
Start: 2022-01-01 | End: 2022-01-01

## 2022-01-01 RX ORDER — TIZANIDINE 4 MG/1
4 TABLET ORAL NIGHTLY PRN
Status: DISCONTINUED | OUTPATIENT
Start: 2022-01-01 | End: 2022-01-01

## 2022-01-01 RX ORDER — FUROSEMIDE 20 MG/1
20 TABLET ORAL
Status: DISCONTINUED | OUTPATIENT
Start: 2022-01-01 | End: 2022-01-01

## 2022-01-01 RX ORDER — SODIUM CHLORIDE 0.9 % (FLUSH) 0.9 %
10 SYRINGE (ML) INJECTION PRN
Status: DISCONTINUED | OUTPATIENT
Start: 2022-01-01 | End: 2022-01-01 | Stop reason: SDUPTHER

## 2022-01-01 RX ORDER — MORPHINE SULFATE 2 MG/ML
2 INJECTION, SOLUTION INTRAMUSCULAR; INTRAVENOUS
Status: DISCONTINUED | OUTPATIENT
Start: 2022-01-01 | End: 2022-08-19 | Stop reason: HOSPADM

## 2022-01-01 RX ORDER — NICOTINE POLACRILEX 4 MG
15 LOZENGE BUCCAL
Status: DISCONTINUED | OUTPATIENT
Start: 2022-01-01 | End: 2022-08-19 | Stop reason: HOSPADM

## 2022-01-01 RX ORDER — SODIUM CHLORIDE 0.9 % (FLUSH) 0.9 %
10 SYRINGE (ML) INJECTION EVERY 12 HOURS SCHEDULED
Status: DISCONTINUED | OUTPATIENT
Start: 2022-01-01 | End: 2022-01-01 | Stop reason: SDUPTHER

## 2022-01-01 RX ORDER — FUROSEMIDE 40 MG/1
TABLET ORAL
Qty: 28 TABLET | Refills: 0 | Status: SHIPPED | OUTPATIENT
Start: 2022-01-01

## 2022-01-01 RX ORDER — POTASSIUM CHLORIDE 20 MEQ/1
TABLET, EXTENDED RELEASE ORAL
Qty: 90 TABLET | Refills: 0 | Status: SHIPPED | OUTPATIENT
Start: 2022-01-01

## 2022-01-01 RX ORDER — CHLORHEXIDINE GLUCONATE 0.12 MG/ML
15 RINSE ORAL EVERY 12 HOURS SCHEDULED
Status: DISCONTINUED | OUTPATIENT
Start: 2022-01-01 | End: 2022-08-19 | Stop reason: HOSPADM

## 2022-01-01 RX ORDER — LIDOCAINE HYDROCHLORIDE 20 MG/ML
INJECTION, SOLUTION EPIDURAL; INFILTRATION; INTRACAUDAL; PERINEURAL AS NEEDED
Status: DISCONTINUED | OUTPATIENT
Start: 2022-01-01 | End: 2022-01-01 | Stop reason: SURG

## 2022-01-01 RX ORDER — LEVALBUTEROL INHALATION SOLUTION 0.63 MG/3ML
0.63 SOLUTION RESPIRATORY (INHALATION) 2 TIMES DAILY PRN
Status: DISCONTINUED | OUTPATIENT
Start: 2022-01-01 | End: 2022-01-01

## 2022-01-01 RX ORDER — SODIUM CHLORIDE 0.9 % (FLUSH) 0.9 %
3 SYRINGE (ML) INJECTION EVERY 12 HOURS SCHEDULED
Status: DISCONTINUED | OUTPATIENT
Start: 2022-01-01 | End: 2022-01-01

## 2022-01-01 RX ORDER — ASPIRIN 81 MG/1
81 TABLET ORAL DAILY
Status: DISCONTINUED | OUTPATIENT
Start: 2022-01-01 | End: 2022-01-01

## 2022-01-01 RX ORDER — SODIUM CHLORIDE 0.9 % (FLUSH) 0.9 %
5-40 SYRINGE (ML) INJECTION EVERY 12 HOURS SCHEDULED
Status: DISCONTINUED | OUTPATIENT
Start: 2022-01-01 | End: 2022-01-01

## 2022-01-01 RX ORDER — ACETAMINOPHEN 650 MG/1
650 SUPPOSITORY RECTAL EVERY 4 HOURS PRN
Status: DISCONTINUED | OUTPATIENT
Start: 2022-01-01 | End: 2022-01-01 | Stop reason: SDUPTHER

## 2022-01-01 RX ORDER — ROCURONIUM BROMIDE 10 MG/ML
INJECTION, SOLUTION INTRAVENOUS AS NEEDED
Status: DISCONTINUED | OUTPATIENT
Start: 2022-01-01 | End: 2022-01-01 | Stop reason: SURG

## 2022-01-01 RX ORDER — ACETAMINOPHEN 325 MG/1
650 TABLET ORAL EVERY 4 HOURS PRN
Status: DISCONTINUED | OUTPATIENT
Start: 2022-01-01 | End: 2022-08-19 | Stop reason: HOSPADM

## 2022-01-01 RX ORDER — NOREPINEPHRINE BIT/0.9 % NACL 16MG/250ML
1-100 INFUSION BOTTLE (ML) INTRAVENOUS CONTINUOUS
Status: DISCONTINUED | OUTPATIENT
Start: 2022-01-01 | End: 2022-01-01 | Stop reason: HOSPADM

## 2022-01-01 RX ORDER — AMIODARONE HYDROCHLORIDE 200 MG/1
100 TABLET ORAL DAILY
Status: DISCONTINUED | OUTPATIENT
Start: 2022-01-01 | End: 2022-01-01

## 2022-01-01 RX ORDER — LIDOCAINE HYDROCHLORIDE 10 MG/ML
5 INJECTION, SOLUTION EPIDURAL; INFILTRATION; INTRACAUDAL; PERINEURAL ONCE
Status: DISCONTINUED | OUTPATIENT
Start: 2022-01-01 | End: 2022-01-01

## 2022-01-01 RX ORDER — ACETYLCYSTEINE 200 MG/ML
600 SOLUTION ORAL; RESPIRATORY (INHALATION) 2 TIMES DAILY
Status: DISCONTINUED | OUTPATIENT
Start: 2022-01-01 | End: 2022-01-01 | Stop reason: HOSPADM

## 2022-01-01 RX ORDER — LORAZEPAM 2 MG/ML
2 INJECTION INTRAMUSCULAR
Status: DISCONTINUED | OUTPATIENT
Start: 2022-01-01 | End: 2022-01-01

## 2022-01-01 RX ORDER — ENOXAPARIN SODIUM 100 MG/ML
40 INJECTION SUBCUTANEOUS EVERY 24 HOURS
Status: DISCONTINUED | OUTPATIENT
Start: 2022-01-01 | End: 2022-01-01

## 2022-01-01 RX ORDER — FAMOTIDINE 20 MG/1
20 TABLET, FILM COATED ORAL 2 TIMES DAILY
Status: DISCONTINUED | OUTPATIENT
Start: 2022-01-01 | End: 2022-01-01

## 2022-01-01 RX ORDER — LIDOCAINE HYDROCHLORIDE 10 MG/ML
10 INJECTION, SOLUTION INFILTRATION; PERINEURAL ONCE
Status: DISCONTINUED | OUTPATIENT
Start: 2022-01-01 | End: 2022-01-01

## 2022-01-01 RX ORDER — LIDOCAINE HYDROCHLORIDE 10 MG/ML
1 INJECTION, SOLUTION EPIDURAL; INFILTRATION; INTRACAUDAL; PERINEURAL ONCE
Status: COMPLETED | OUTPATIENT
Start: 2022-01-01 | End: 2022-01-01

## 2022-01-01 RX ORDER — VERAPAMIL HYDROCHLORIDE 240 MG/1
TABLET, FILM COATED, EXTENDED RELEASE ORAL
Qty: 90 TABLET | Refills: 3 | Status: SHIPPED | OUTPATIENT
Start: 2022-01-01

## 2022-01-01 RX ORDER — FENTANYL CITRATE 50 UG/ML
INJECTION, SOLUTION INTRAMUSCULAR; INTRAVENOUS AS NEEDED
Status: DISCONTINUED | OUTPATIENT
Start: 2022-01-01 | End: 2022-01-01 | Stop reason: SURG

## 2022-01-01 RX ORDER — LIDOCAINE 50 MG/G
1 PATCH TOPICAL EVERY 24 HOURS
Qty: 6 EACH | Refills: 0 | Status: SHIPPED | OUTPATIENT
Start: 2022-01-01

## 2022-01-01 RX ORDER — KETOCONAZOLE 20 MG/G
CREAM TOPICAL
Qty: 60 G | Refills: 0 | Status: SHIPPED | OUTPATIENT
Start: 2022-01-01 | End: 2022-01-01 | Stop reason: SDUPTHER

## 2022-01-01 RX ORDER — MORPHINE SULFATE 2 MG/ML
2 INJECTION, SOLUTION INTRAMUSCULAR; INTRAVENOUS EVERY 4 HOURS PRN
Status: DISCONTINUED | OUTPATIENT
Start: 2022-01-01 | End: 2022-01-01

## 2022-01-01 RX ORDER — DEXMEDETOMIDINE HYDROCHLORIDE 4 UG/ML
.1-1.5 INJECTION, SOLUTION INTRAVENOUS CONTINUOUS
Status: DISCONTINUED | OUTPATIENT
Start: 2022-01-01 | End: 2022-01-01 | Stop reason: HOSPADM

## 2022-01-01 RX ORDER — KETOCONAZOLE 20 MG/G
CREAM TOPICAL
Qty: 60 G | Refills: 11 | Status: SHIPPED | OUTPATIENT
Start: 2022-01-01

## 2022-01-01 RX ORDER — POLYETHYLENE GLYCOL 3350 17 G/17G
17 POWDER, FOR SOLUTION ORAL DAILY
Status: DISCONTINUED | OUTPATIENT
Start: 2022-01-01 | End: 2022-01-01 | Stop reason: HOSPADM

## 2022-01-01 RX ORDER — FUROSEMIDE 10 MG/ML
40 INJECTION INTRAMUSCULAR; INTRAVENOUS
Status: DISPENSED | OUTPATIENT
Start: 2022-01-01 | End: 2022-01-01

## 2022-01-01 RX ORDER — POTASSIUM CHLORIDE 20MEQ/15ML
20 LIQUID (ML) ORAL DAILY
Status: DISCONTINUED | OUTPATIENT
Start: 2022-01-01 | End: 2022-01-01

## 2022-01-01 RX ORDER — TIZANIDINE 4 MG/1
4 TABLET ORAL NIGHTLY PRN
Qty: 3 TABLET | Refills: 0 | Status: SHIPPED | OUTPATIENT
Start: 2022-01-01

## 2022-01-01 RX ORDER — POLYETHYLENE GLYCOL 3350 17 G/17G
17 POWDER, FOR SOLUTION ORAL DAILY
Status: DISCONTINUED | OUTPATIENT
Start: 2022-01-01 | End: 2022-01-01

## 2022-01-01 RX ORDER — NALOXONE HCL 0.4 MG/ML
0.04 VIAL (ML) INJECTION AS NEEDED
Status: DISCONTINUED | OUTPATIENT
Start: 2022-01-01 | End: 2022-01-01 | Stop reason: HOSPADM

## 2022-01-01 RX ORDER — LABETALOL HYDROCHLORIDE 5 MG/ML
5 INJECTION, SOLUTION INTRAVENOUS
Status: DISCONTINUED | OUTPATIENT
Start: 2022-01-01 | End: 2022-01-01 | Stop reason: HOSPADM

## 2022-01-01 RX ORDER — INSULIN LISPRO 100 [IU]/ML
0-9 INJECTION, SOLUTION INTRAVENOUS; SUBCUTANEOUS
Status: DISCONTINUED | OUTPATIENT
Start: 2022-01-01 | End: 2022-01-01

## 2022-01-01 RX ORDER — SODIUM CHLORIDE, SODIUM LACTATE, POTASSIUM CHLORIDE, CALCIUM CHLORIDE 600; 310; 30; 20 MG/100ML; MG/100ML; MG/100ML; MG/100ML
INJECTION, SOLUTION INTRAVENOUS CONTINUOUS
Status: DISCONTINUED | OUTPATIENT
Start: 2022-01-01 | End: 2022-01-01

## 2022-01-01 RX ORDER — FUROSEMIDE 10 MG/ML
INJECTION INTRAMUSCULAR; INTRAVENOUS
Status: DISCONTINUED
Start: 2022-01-01 | End: 2022-01-01

## 2022-01-01 RX ORDER — AMIODARONE HYDROCHLORIDE 200 MG/1
100 TABLET ORAL DAILY
Status: DISCONTINUED | OUTPATIENT
Start: 2022-01-01 | End: 2022-01-01 | Stop reason: HOSPADM

## 2022-01-01 RX ORDER — FLUMAZENIL 0.1 MG/ML
0.2 INJECTION INTRAVENOUS AS NEEDED
Status: DISCONTINUED | OUTPATIENT
Start: 2022-01-01 | End: 2022-01-01 | Stop reason: HOSPADM

## 2022-01-01 RX ORDER — FAMOTIDINE 20 MG/1
20 TABLET, FILM COATED ORAL DAILY
Status: DISCONTINUED | OUTPATIENT
Start: 2022-01-01 | End: 2022-01-01

## 2022-01-01 RX ORDER — FOLIC ACID 1 MG/1
1 TABLET ORAL DAILY
Status: DISCONTINUED | OUTPATIENT
Start: 2022-01-01 | End: 2022-01-01

## 2022-01-01 RX ORDER — LIDOCAINE HYDROCHLORIDE 10 MG/ML
INJECTION, SOLUTION INFILTRATION; PERINEURAL AS NEEDED
Status: DISCONTINUED | OUTPATIENT
Start: 2022-01-01 | End: 2022-01-01 | Stop reason: HOSPADM

## 2022-01-01 RX ORDER — VERAPAMIL HYDROCHLORIDE 240 MG/1
240 CAPSULE, EXTENDED RELEASE ORAL DAILY
COMMUNITY

## 2022-01-01 RX ORDER — DIAPER,BRIEF,INFANT-TODD,DISP
1 EACH MISCELLANEOUS EVERY 12 HOURS SCHEDULED
Status: DISCONTINUED | OUTPATIENT
Start: 2022-01-01 | End: 2022-08-19 | Stop reason: HOSPADM

## 2022-01-01 RX ORDER — PANTOPRAZOLE SODIUM 40 MG/1
TABLET, DELAYED RELEASE ORAL
Qty: 180 TABLET | Refills: 0 | Status: SHIPPED | OUTPATIENT
Start: 2022-01-01

## 2022-01-01 RX ORDER — MORPHINE SULFATE 1 MG/ML
1 INJECTION INTRAVENOUS CONTINUOUS
Status: DISCONTINUED | OUTPATIENT
Start: 2022-01-01 | End: 2022-08-19 | Stop reason: HOSPADM

## 2022-01-01 RX ORDER — ACETYLCYSTEINE 200 MG/ML
1.5 SOLUTION ORAL; RESPIRATORY (INHALATION)
Status: DISCONTINUED | OUTPATIENT
Start: 2022-01-01 | End: 2022-01-01

## 2022-01-01 RX ORDER — PROPOFOL 10 MG/ML
5-50 INJECTION, EMULSION INTRAVENOUS CONTINUOUS
Status: DISCONTINUED | OUTPATIENT
Start: 2022-01-01 | End: 2022-01-01 | Stop reason: HOSPADM

## 2022-01-01 RX ORDER — LEVALBUTEROL INHALATION SOLUTION 0.63 MG/3ML
0.63 SOLUTION RESPIRATORY (INHALATION) 2 TIMES DAILY PRN
Status: DISCONTINUED | OUTPATIENT
Start: 2022-01-01 | End: 2022-01-01 | Stop reason: HOSPADM

## 2022-01-01 RX ORDER — NITROGLYCERIN 0.4 MG/1
0.4 TABLET SUBLINGUAL EVERY 5 MIN PRN
Qty: 25 TABLET | Refills: 0 | Status: SHIPPED | OUTPATIENT
Start: 2022-01-01

## 2022-01-01 RX ORDER — LEVALBUTEROL INHALATION SOLUTION 0.63 MG/3ML
0.63 SOLUTION RESPIRATORY (INHALATION)
Status: DISCONTINUED | OUTPATIENT
Start: 2022-01-01 | End: 2022-01-01

## 2022-01-01 RX ORDER — LIDOCAINE HYDROCHLORIDE 10 MG/ML
0.5 INJECTION, SOLUTION EPIDURAL; INFILTRATION; INTRACAUDAL; PERINEURAL ONCE AS NEEDED
Status: DISCONTINUED | OUTPATIENT
Start: 2022-01-01 | End: 2022-01-01

## 2022-01-01 RX ORDER — DEXMEDETOMIDINE HYDROCHLORIDE 4 UG/ML
.2-1.5 INJECTION, SOLUTION INTRAVENOUS
Status: DISCONTINUED | OUTPATIENT
Start: 2022-01-01 | End: 2022-01-01

## 2022-01-01 RX ORDER — ONDANSETRON 2 MG/ML
4 INJECTION INTRAMUSCULAR; INTRAVENOUS EVERY 6 HOURS PRN
Status: DISCONTINUED | OUTPATIENT
Start: 2022-01-01 | End: 2022-01-01 | Stop reason: HOSPADM

## 2022-01-01 RX ORDER — ROSUVASTATIN CALCIUM 5 MG/1
5 TABLET, COATED ORAL DAILY
COMMUNITY

## 2022-01-01 RX ORDER — PREDNISONE 1 MG/1
4 TABLET ORAL DAILY
Status: DISCONTINUED | OUTPATIENT
Start: 2022-01-01 | End: 2022-01-01

## 2022-01-01 RX ORDER — ISOSORBIDE MONONITRATE 30 MG/1
30 TABLET, EXTENDED RELEASE ORAL DAILY
Status: DISCONTINUED | OUTPATIENT
Start: 2022-01-01 | End: 2022-01-01

## 2022-01-01 RX ORDER — SODIUM CHLORIDE, SODIUM LACTATE, POTASSIUM CHLORIDE, CALCIUM CHLORIDE 600; 310; 30; 20 MG/100ML; MG/100ML; MG/100ML; MG/100ML
100 INJECTION, SOLUTION INTRAVENOUS CONTINUOUS
Status: DISCONTINUED | OUTPATIENT
Start: 2022-01-01 | End: 2022-01-01

## 2022-01-01 RX ORDER — AMIODARONE HYDROCHLORIDE 200 MG/1
100 TABLET ORAL
Status: DISCONTINUED | OUTPATIENT
Start: 2022-01-01 | End: 2022-01-01

## 2022-01-01 RX ORDER — AMIODARONE HYDROCHLORIDE 100 MG/1
TABLET ORAL
Qty: 30 TABLET | Refills: 11 | Status: SHIPPED | OUTPATIENT
Start: 2022-01-01

## 2022-01-01 RX ORDER — SODIUM CHLORIDE 0.9 % (FLUSH) 0.9 %
10 SYRINGE (ML) INJECTION AS NEEDED
Status: DISCONTINUED | OUTPATIENT
Start: 2022-01-01 | End: 2022-01-01 | Stop reason: HOSPADM

## 2022-01-01 RX ORDER — LIDOCAINE HYDROCHLORIDE AND EPINEPHRINE 10; 10 MG/ML; UG/ML
INJECTION, SOLUTION INFILTRATION; PERINEURAL AS NEEDED
Status: DISCONTINUED | OUTPATIENT
Start: 2022-01-01 | End: 2022-01-01 | Stop reason: HOSPADM

## 2022-01-01 RX ORDER — POTASSIUM CHLORIDE 20MEQ/15ML
40 LIQUID (ML) ORAL EVERY 4 HOURS
Status: DISPENSED | OUTPATIENT
Start: 2022-01-01 | End: 2022-01-01

## 2022-01-01 RX ORDER — FENTANYL CITRATE 50 UG/ML
25 INJECTION, SOLUTION INTRAMUSCULAR; INTRAVENOUS
Status: DISCONTINUED | OUTPATIENT
Start: 2022-01-01 | End: 2022-01-01 | Stop reason: HOSPADM

## 2022-01-01 RX ORDER — SACCHAROMYCES BOULARDII 250 MG
250 CAPSULE ORAL 2 TIMES DAILY
Status: CANCELLED | OUTPATIENT
Start: 2022-01-01

## 2022-01-01 RX ORDER — HYDROMORPHONE HYDROCHLORIDE 1 MG/ML
0.5 INJECTION, SOLUTION INTRAMUSCULAR; INTRAVENOUS; SUBCUTANEOUS
Status: DISCONTINUED | OUTPATIENT
Start: 2022-01-01 | End: 2022-01-01 | Stop reason: HOSPADM

## 2022-01-01 RX ORDER — POTASSIUM CHLORIDE 20 MEQ/1
TABLET, EXTENDED RELEASE ORAL
Qty: 11 TABLET | Refills: 0 | Status: SHIPPED | OUTPATIENT
Start: 2022-01-01

## 2022-01-01 RX ORDER — LEVALBUTEROL INHALATION SOLUTION 1.25 MG/3ML
0.63 SOLUTION RESPIRATORY (INHALATION)
Status: DISCONTINUED | OUTPATIENT
Start: 2022-01-01 | End: 2022-01-01

## 2022-01-01 RX ORDER — ACETYLCYSTEINE 200 MG/ML
1.5 SOLUTION ORAL; RESPIRATORY (INHALATION) 2 TIMES DAILY PRN
Status: DISCONTINUED | OUTPATIENT
Start: 2022-01-01 | End: 2022-01-01

## 2022-01-01 RX ORDER — SODIUM CHLORIDE 0.9 % (FLUSH) 0.9 %
10 SYRINGE (ML) INJECTION EVERY 12 HOURS SCHEDULED
Status: DISCONTINUED | OUTPATIENT
Start: 2022-01-01 | End: 2022-08-19 | Stop reason: HOSPADM

## 2022-01-01 RX ORDER — SODIUM CHLORIDE 0.9 % (FLUSH) 0.9 %
3-10 SYRINGE (ML) INJECTION AS NEEDED
Status: DISCONTINUED | OUTPATIENT
Start: 2022-01-01 | End: 2022-01-01

## 2022-01-01 RX ORDER — LOSARTAN POTASSIUM 25 MG/1
25 TABLET ORAL DAILY
Qty: 90 TABLET | Refills: 0 | Status: SHIPPED | OUTPATIENT
Start: 2022-01-01

## 2022-01-01 RX ORDER — SODIUM CHLORIDE 9 MG/ML
20 INJECTION, SOLUTION INTRAVENOUS CONTINUOUS
Status: DISCONTINUED | OUTPATIENT
Start: 2022-01-01 | End: 2022-08-19 | Stop reason: HOSPADM

## 2022-01-01 RX ORDER — PANTOPRAZOLE SODIUM 40 MG/1
40 TABLET, DELAYED RELEASE ORAL EVERY 12 HOURS SCHEDULED
Status: DISCONTINUED | OUTPATIENT
Start: 2022-01-01 | End: 2022-01-01

## 2022-01-01 RX ORDER — POTASSIUM CHLORIDE 20 MEQ/1
TABLET, EXTENDED RELEASE ORAL
Qty: 30 TABLET | Refills: 0 | Status: SHIPPED | OUTPATIENT
Start: 2022-01-01 | End: 2022-01-01

## 2022-01-01 RX ORDER — DEXTROSE MONOHYDRATE 50 MG/ML
INJECTION, SOLUTION INTRAVENOUS CONTINUOUS
Status: DISCONTINUED | OUTPATIENT
Start: 2022-01-01 | End: 2022-01-01 | Stop reason: HOSPADM

## 2022-01-01 RX ORDER — INSULIN LISPRO 100 [IU]/ML
0-4 INJECTION, SOLUTION INTRAVENOUS; SUBCUTANEOUS NIGHTLY
Status: DISCONTINUED | OUTPATIENT
Start: 2022-01-01 | End: 2022-01-01 | Stop reason: HOSPADM

## 2022-01-01 RX ORDER — ACETAMINOPHEN 160 MG/5ML
650 SOLUTION ORAL EVERY 4 HOURS PRN
Status: DISCONTINUED | OUTPATIENT
Start: 2022-01-01 | End: 2022-08-19 | Stop reason: HOSPADM

## 2022-01-01 RX ORDER — SODIUM CHLORIDE 9 MG/ML
INJECTION, SOLUTION INTRAVENOUS PRN
Status: DISCONTINUED | OUTPATIENT
Start: 2022-01-01 | End: 2022-01-01 | Stop reason: HOSPADM

## 2022-01-01 RX ORDER — MORPHINE SULFATE 2 MG/ML
2 INJECTION, SOLUTION INTRAMUSCULAR; INTRAVENOUS ONCE
Status: COMPLETED | OUTPATIENT
Start: 2022-01-01 | End: 2022-01-01

## 2022-01-01 RX ORDER — MAGNESIUM SULFATE IN WATER 40 MG/ML
2000 INJECTION, SOLUTION INTRAVENOUS PRN
Status: DISCONTINUED | OUTPATIENT
Start: 2022-01-01 | End: 2022-01-01 | Stop reason: HOSPADM

## 2022-01-01 RX ORDER — PREDNISONE 1 MG/1
5 TABLET ORAL EVERY MORNING
Status: DISCONTINUED | OUTPATIENT
Start: 2022-01-01 | End: 2022-01-01

## 2022-01-01 RX ORDER — ENOXAPARIN SODIUM 100 MG/ML
40 INJECTION SUBCUTANEOUS DAILY
Status: DISCONTINUED | OUTPATIENT
Start: 2022-01-01 | End: 2022-01-01 | Stop reason: HOSPADM

## 2022-01-01 RX ORDER — SODIUM CHLORIDE 9 MG/ML
75 INJECTION, SOLUTION INTRAVENOUS
Status: DISPENSED | OUTPATIENT
Start: 2022-01-01 | End: 2022-01-01

## 2022-01-01 RX ORDER — MEROPENEM AND SODIUM CHLORIDE 1 G/50ML
1000 INJECTION, SOLUTION INTRAVENOUS EVERY 8 HOURS
Status: DISCONTINUED | OUTPATIENT
Start: 2022-01-01 | End: 2022-01-01 | Stop reason: SDUPTHER

## 2022-01-01 RX ORDER — LOSARTAN POTASSIUM 50 MG/1
25 TABLET ORAL DAILY
Status: DISCONTINUED | OUTPATIENT
Start: 2022-01-01 | End: 2022-01-01

## 2022-01-01 RX ORDER — METOPROLOL TARTRATE 50 MG/1
TABLET, FILM COATED ORAL
Qty: 180 TABLET | Refills: 1 | Status: SHIPPED | OUTPATIENT
Start: 2022-01-01

## 2022-01-01 RX ORDER — SODIUM CHLORIDE 0.9 % (FLUSH) 0.9 %
5-40 SYRINGE (ML) INJECTION PRN
Status: DISCONTINUED | OUTPATIENT
Start: 2022-01-01 | End: 2022-01-01 | Stop reason: HOSPADM

## 2022-01-01 RX ORDER — MEROPENEM AND SODIUM CHLORIDE 1 G/50ML
1000 INJECTION, SOLUTION INTRAVENOUS EVERY 8 HOURS
Status: DISCONTINUED | OUTPATIENT
Start: 2022-01-01 | End: 2022-01-01 | Stop reason: HOSPADM

## 2022-01-01 RX ORDER — FUROSEMIDE 10 MG/ML
40 INJECTION INTRAMUSCULAR; INTRAVENOUS DAILY
Status: DISCONTINUED | OUTPATIENT
Start: 2022-01-01 | End: 2022-01-01

## 2022-01-01 RX ORDER — ACETAMINOPHEN 325 MG/1
650 TABLET ORAL EVERY 4 HOURS PRN
Status: DISCONTINUED | OUTPATIENT
Start: 2022-01-01 | End: 2022-01-01

## 2022-01-01 RX ORDER — ONDANSETRON 4 MG/1
4 TABLET, FILM COATED ORAL EVERY 6 HOURS PRN
Status: DISCONTINUED | OUTPATIENT
Start: 2022-01-01 | End: 2022-01-01

## 2022-01-01 RX ORDER — LORAZEPAM 2 MG/ML
2 CONCENTRATE ORAL
Status: DISCONTINUED | OUTPATIENT
Start: 2022-01-01 | End: 2022-08-19 | Stop reason: HOSPADM

## 2022-01-01 RX ORDER — ROSUVASTATIN CALCIUM 10 MG/1
5 TABLET, COATED ORAL DAILY
Status: DISCONTINUED | OUTPATIENT
Start: 2022-01-01 | End: 2022-01-01

## 2022-01-01 RX ORDER — LIDOCAINE 50 MG/G
1 PATCH TOPICAL DAILY
Qty: 30 PATCH | Refills: 0 | Status: SHIPPED | OUTPATIENT
Start: 2022-01-01 | End: 2022-01-01

## 2022-01-01 RX ORDER — DEXTROSE MONOHYDRATE 100 MG/ML
INJECTION, SOLUTION INTRAVENOUS CONTINUOUS PRN
Status: DISCONTINUED | OUTPATIENT
Start: 2022-01-01 | End: 2022-01-01 | Stop reason: HOSPADM

## 2022-01-01 RX ORDER — LORAZEPAM 1 MG/1
1 TABLET ORAL EVERY 4 HOURS PRN
Status: DISCONTINUED | OUTPATIENT
Start: 2022-01-01 | End: 2022-01-01

## 2022-01-01 RX ORDER — MAGNESIUM HYDROXIDE 1200 MG/15ML
LIQUID ORAL AS NEEDED
Status: DISCONTINUED | OUTPATIENT
Start: 2022-01-01 | End: 2022-01-01 | Stop reason: HOSPADM

## 2022-01-01 RX ORDER — ACETAMINOPHEN 325 MG/1
650 TABLET ORAL EVERY 6 HOURS PRN
Status: DISCONTINUED | OUTPATIENT
Start: 2022-01-01 | End: 2022-01-01 | Stop reason: HOSPADM

## 2022-01-01 RX ORDER — SODIUM CHLORIDE 0.9 % (FLUSH) 0.9 %
10 SYRINGE (ML) INJECTION AS NEEDED
Status: DISCONTINUED | OUTPATIENT
Start: 2022-01-01 | End: 2022-08-19 | Stop reason: HOSPADM

## 2022-01-01 RX ORDER — NITROGLYCERIN 0.4 MG/1
0.4 TABLET SUBLINGUAL
Status: DISCONTINUED | OUTPATIENT
Start: 2022-01-01 | End: 2022-01-01

## 2022-01-01 RX ORDER — TIZANIDINE 4 MG/1
4 TABLET ORAL NIGHTLY PRN
Qty: 30 TABLET | Refills: 2 | Status: SHIPPED | OUTPATIENT
Start: 2022-01-01 | End: 2022-01-01

## 2022-01-01 RX ORDER — LOSARTAN POTASSIUM 25 MG/1
25 TABLET ORAL DAILY
Qty: 90 TABLET | Refills: 0 | Status: SHIPPED | OUTPATIENT
Start: 2022-01-01 | End: 2022-01-01

## 2022-01-01 RX ORDER — ACETAMINOPHEN 650 MG/1
650 SUPPOSITORY RECTAL EVERY 4 HOURS PRN
Status: DISCONTINUED | OUTPATIENT
Start: 2022-01-01 | End: 2022-01-01

## 2022-01-01 RX ORDER — POTASSIUM CHLORIDE 29.8 MG/ML
20 INJECTION INTRAVENOUS PRN
Status: DISCONTINUED | OUTPATIENT
Start: 2022-01-01 | End: 2022-01-01 | Stop reason: HOSPADM

## 2022-01-01 RX ORDER — PROPOFOL 10 MG/ML
VIAL (ML) INTRAVENOUS AS NEEDED
Status: DISCONTINUED | OUTPATIENT
Start: 2022-01-01 | End: 2022-01-01 | Stop reason: SURG

## 2022-01-01 RX ORDER — DOXYCYCLINE HYCLATE 100 MG
100 TABLET ORAL 2 TIMES DAILY
Qty: 20 TABLET | Refills: 0 | Status: SHIPPED | OUTPATIENT
Start: 2022-01-01 | End: 2022-01-01

## 2022-01-01 RX ORDER — ACETAMINOPHEN 650 MG/1
650 SUPPOSITORY RECTAL EVERY 4 HOURS PRN
Status: DISCONTINUED | OUTPATIENT
Start: 2022-01-01 | End: 2022-08-19 | Stop reason: HOSPADM

## 2022-01-01 RX ORDER — ONDANSETRON 2 MG/ML
4 INJECTION INTRAMUSCULAR; INTRAVENOUS EVERY 6 HOURS PRN
Status: DISCONTINUED | OUTPATIENT
Start: 2022-01-01 | End: 2022-08-19 | Stop reason: HOSPADM

## 2022-01-01 RX ORDER — ONDANSETRON 4 MG/1
4 TABLET, ORALLY DISINTEGRATING ORAL EVERY 8 HOURS PRN
Status: DISCONTINUED | OUTPATIENT
Start: 2022-01-01 | End: 2022-01-01 | Stop reason: HOSPADM

## 2022-01-01 RX ADMIN — MEROPENEM AND SODIUM CHLORIDE 1000 MG: 1 INJECTION, SOLUTION INTRAVENOUS at 12:43

## 2022-01-01 RX ADMIN — ACETAMINOPHEN 325MG 650 MG: 325 TABLET ORAL at 05:57

## 2022-01-01 RX ADMIN — Medication 20 MCG/MIN: at 13:31

## 2022-01-01 RX ADMIN — POTASSIUM CHLORIDE 10 MEQ: 10 INJECTION, SOLUTION INTRAVENOUS at 04:07

## 2022-01-01 RX ADMIN — ROCURONIUM BROMIDE 30 MG: 10 INJECTION INTRAVENOUS at 10:42

## 2022-01-01 RX ADMIN — PROPOFOL 10 MCG/KG/MIN: 10 INJECTION, EMULSION INTRAVENOUS at 19:07

## 2022-01-01 RX ADMIN — POLYETHYLENE GLYCOL 3350 17 G: 17 POWDER, FOR SOLUTION ORAL at 07:11

## 2022-01-01 RX ADMIN — POTASSIUM CHLORIDE 20 MEQ: 29.8 INJECTION INTRAVENOUS at 03:01

## 2022-01-01 RX ADMIN — DEXMEDETOMIDINE HYDROCHLORIDE 1.4 MCG/KG/HR: 4 INJECTION, SOLUTION INTRAVENOUS at 22:25

## 2022-01-01 RX ADMIN — INSULIN LISPRO 4 UNITS: 100 INJECTION, SOLUTION INTRAVENOUS; SUBCUTANEOUS at 20:01

## 2022-01-01 RX ADMIN — FENTANYL CITRATE 100 MCG: 50 INJECTION, SOLUTION INTRAMUSCULAR; INTRAVENOUS at 10:42

## 2022-01-01 RX ADMIN — LEVALBUTEROL HYDROCHLORIDE 0.63 MG: 0.63 SOLUTION RESPIRATORY (INHALATION) at 19:00

## 2022-01-01 RX ADMIN — MORPHINE SULFATE 2 MG: 2 INJECTION, SOLUTION INTRAMUSCULAR; INTRAVENOUS at 15:34

## 2022-01-01 RX ADMIN — SODIUM CHLORIDE: 9 INJECTION, SOLUTION INTRAVENOUS at 20:49

## 2022-01-01 RX ADMIN — MEROPENEM AND SODIUM CHLORIDE 1000 MG: 1 INJECTION, SOLUTION INTRAVENOUS at 19:38

## 2022-01-01 RX ADMIN — DEXMEDETOMIDINE HYDROCHLORIDE 1 MCG/KG/HR: 4 INJECTION, SOLUTION INTRAVENOUS at 08:19

## 2022-01-01 RX ADMIN — POTASSIUM CHLORIDE 20 MEQ: 29.8 INJECTION INTRAVENOUS at 06:09

## 2022-01-01 RX ADMIN — SODIUM BICARBONATE 100 MEQ: 84 INJECTION, SOLUTION INTRAVENOUS at 00:18

## 2022-01-01 RX ADMIN — POTASSIUM CHLORIDE 10 MEQ: 10 INJECTION, SOLUTION INTRAVENOUS at 13:30

## 2022-01-01 RX ADMIN — DEXMEDETOMIDINE HYDROCHLORIDE 0.6 MCG/KG/HR: 4 INJECTION, SOLUTION INTRAVENOUS at 14:33

## 2022-01-01 RX ADMIN — ACETYLCYSTEINE 600 MG: 200 SOLUTION ORAL; RESPIRATORY (INHALATION) at 19:00

## 2022-01-01 RX ADMIN — MEROPENEM AND SODIUM CHLORIDE 1000 MG: 1 INJECTION, SOLUTION INTRAVENOUS at 20:02

## 2022-01-01 RX ADMIN — SODIUM CHLORIDE, PRESERVATIVE FREE 10 ML: 5 INJECTION INTRAVENOUS at 19:35

## 2022-01-01 RX ADMIN — MEROPENEM AND SODIUM CHLORIDE 1000 MG: 1 INJECTION, SOLUTION INTRAVENOUS at 04:43

## 2022-01-01 RX ADMIN — ENOXAPARIN SODIUM 40 MG: 100 INJECTION SUBCUTANEOUS at 15:21

## 2022-01-01 RX ADMIN — DEXMEDETOMIDINE HYDROCHLORIDE 1.4 MCG/KG/HR: 4 INJECTION, SOLUTION INTRAVENOUS at 16:08

## 2022-01-01 RX ADMIN — PROPOFOL 35 MCG/KG/MIN: 10 INJECTION, EMULSION INTRAVENOUS at 05:48

## 2022-01-01 RX ADMIN — MEROPENEM AND SODIUM CHLORIDE 1000 MG: 1 INJECTION, SOLUTION INTRAVENOUS at 11:40

## 2022-01-01 RX ADMIN — POTASSIUM CHLORIDE 10 MEQ: 10 INJECTION, SOLUTION INTRAVENOUS at 03:04

## 2022-01-01 RX ADMIN — FUROSEMIDE 20 MG: 10 INJECTION, SOLUTION INTRAVENOUS at 18:10

## 2022-01-01 RX ADMIN — PERFLUTREN 1.5 ML: 6.52 INJECTION, SUSPENSION INTRAVENOUS at 13:53

## 2022-01-01 RX ADMIN — DEXTROSE MONOHYDRATE: 50 INJECTION, SOLUTION INTRAVENOUS at 14:05

## 2022-01-01 RX ADMIN — DEXMEDETOMIDINE HYDROCHLORIDE 1 MCG/KG/HR: 4 INJECTION, SOLUTION INTRAVENOUS at 03:52

## 2022-01-01 RX ADMIN — LEVALBUTEROL HYDROCHLORIDE 0.63 MG: 0.63 SOLUTION RESPIRATORY (INHALATION) at 06:41

## 2022-01-01 RX ADMIN — AMIODARONE HYDROCHLORIDE 100 MG: 200 TABLET ORAL at 08:43

## 2022-01-01 RX ADMIN — POTASSIUM CHLORIDE 20 MEQ: 29.8 INJECTION INTRAVENOUS at 16:20

## 2022-01-01 RX ADMIN — POTASSIUM CHLORIDE 10 MEQ: 10 INJECTION, SOLUTION INTRAVENOUS at 22:12

## 2022-01-01 RX ADMIN — PROPOFOL 15 MCG/KG/MIN: 10 INJECTION, EMULSION INTRAVENOUS at 01:10

## 2022-01-01 RX ADMIN — INSULIN LISPRO 3 UNITS: 100 INJECTION, SOLUTION INTRAVENOUS; SUBCUTANEOUS at 07:29

## 2022-01-01 RX ADMIN — POTASSIUM CHLORIDE 10 MEQ: 10 INJECTION, SOLUTION INTRAVENOUS at 08:23

## 2022-01-01 RX ADMIN — NOREPINEPHRINE BITARTRATE 0.08 MCG/KG/MIN: 1 INJECTION, SOLUTION, CONCENTRATE INTRAVENOUS at 10:37

## 2022-01-01 RX ADMIN — MEROPENEM AND SODIUM CHLORIDE 1000 MG: 1 INJECTION, SOLUTION INTRAVENOUS at 03:05

## 2022-01-01 RX ADMIN — POTASSIUM CHLORIDE 10 MEQ: 10 INJECTION, SOLUTION INTRAVENOUS at 00:36

## 2022-01-01 RX ADMIN — POLYETHYLENE GLYCOL 3350 17 G: 17 POWDER, FOR SOLUTION ORAL at 08:43

## 2022-01-01 RX ADMIN — Medication 35 MCG/MIN: at 20:48

## 2022-01-01 RX ADMIN — MEROPENEM AND SODIUM CHLORIDE 1000 MG: 1 INJECTION, SOLUTION INTRAVENOUS at 12:00

## 2022-01-01 RX ADMIN — Medication 2 MCG/MIN: at 01:08

## 2022-01-01 RX ADMIN — SODIUM CHLORIDE, POTASSIUM CHLORIDE, SODIUM LACTATE AND CALCIUM CHLORIDE: 600; 310; 30; 20 INJECTION, SOLUTION INTRAVENOUS at 08:07

## 2022-01-01 RX ADMIN — PROPOFOL 15 MCG/KG/MIN: 10 INJECTION, EMULSION INTRAVENOUS at 12:12

## 2022-01-01 RX ADMIN — POTASSIUM CHLORIDE 10 MEQ: 10 INJECTION, SOLUTION INTRAVENOUS at 11:27

## 2022-01-01 RX ADMIN — POTASSIUM CHLORIDE 20 MEQ: 29.8 INJECTION INTRAVENOUS at 10:11

## 2022-01-01 RX ADMIN — SODIUM CHLORIDE, POTASSIUM CHLORIDE, SODIUM LACTATE AND CALCIUM CHLORIDE: 600; 310; 30; 20 INJECTION, SOLUTION INTRAVENOUS at 04:04

## 2022-01-01 RX ADMIN — POTASSIUM CHLORIDE 10 MEQ: 10 INJECTION, SOLUTION INTRAVENOUS at 09:25

## 2022-01-01 RX ADMIN — ACETYLCYSTEINE 600 MG: 200 SOLUTION ORAL; RESPIRATORY (INHALATION) at 06:41

## 2022-01-01 RX ADMIN — PERFLUTREN 8.48 MG: 6.52 INJECTION, SUSPENSION INTRAVENOUS at 13:24

## 2022-01-01 RX ADMIN — Medication 18 MCG/MIN: at 15:08

## 2022-01-01 RX ADMIN — ENOXAPARIN SODIUM 40 MG: 100 INJECTION SUBCUTANEOUS at 14:36

## 2022-01-01 RX ADMIN — PROPOFOL 10 MCG/KG/MIN: 10 INJECTION, EMULSION INTRAVENOUS at 15:05

## 2022-01-01 RX ADMIN — CEFTRIAXONE 2000 MG: 1 INJECTION, POWDER, FOR SOLUTION INTRAMUSCULAR; INTRAVENOUS at 00:19

## 2022-01-01 RX ADMIN — MEROPENEM AND SODIUM CHLORIDE 1000 MG: 1 INJECTION, SOLUTION INTRAVENOUS at 03:51

## 2022-01-01 RX ADMIN — POTASSIUM CHLORIDE 10 MEQ: 10 INJECTION, SOLUTION INTRAVENOUS at 02:01

## 2022-01-01 RX ADMIN — ACETYLCYSTEINE 600 MG: 200 SOLUTION ORAL; RESPIRATORY (INHALATION) at 18:59

## 2022-01-01 RX ADMIN — ENOXAPARIN SODIUM 40 MG: 100 INJECTION SUBCUTANEOUS at 17:59

## 2022-01-01 RX ADMIN — SODIUM CHLORIDE, PRESERVATIVE FREE 10 ML: 5 INJECTION INTRAVENOUS at 09:39

## 2022-01-01 RX ADMIN — INSULIN LISPRO 1 UNITS: 100 INJECTION, SOLUTION INTRAVENOUS; SUBCUTANEOUS at 17:52

## 2022-01-01 RX ADMIN — POTASSIUM CHLORIDE 20 MEQ: 29.8 INJECTION INTRAVENOUS at 07:51

## 2022-01-01 RX ADMIN — SODIUM CHLORIDE: 9 INJECTION, SOLUTION INTRAVENOUS at 13:39

## 2022-01-01 RX ADMIN — LIDOCAINE HYDROCHLORIDE 1 ML: 10 INJECTION, SOLUTION EPIDURAL; INFILTRATION; INTRACAUDAL; PERINEURAL at 17:07

## 2022-01-01 RX ADMIN — POTASSIUM CHLORIDE 20 MEQ: 29.8 INJECTION INTRAVENOUS at 18:01

## 2022-01-01 RX ADMIN — SODIUM CHLORIDE, POTASSIUM CHLORIDE, SODIUM LACTATE AND CALCIUM CHLORIDE: 600; 310; 30; 20 INJECTION, SOLUTION INTRAVENOUS at 15:19

## 2022-01-01 RX ADMIN — FUROSEMIDE 20 MG: 10 INJECTION, SOLUTION INTRAMUSCULAR; INTRAVENOUS at 19:01

## 2022-01-01 RX ADMIN — PERFLUTREN 1.17 MG: 6.52 INJECTION, SUSPENSION INTRAVENOUS at 14:18

## 2022-01-01 RX ADMIN — SODIUM BICARBONATE: 84 INJECTION, SOLUTION INTRAVENOUS at 13:03

## 2022-01-01 RX ADMIN — DEXMEDETOMIDINE HYDROCHLORIDE 1.4 MCG/KG/HR: 4 INJECTION, SOLUTION INTRAVENOUS at 12:39

## 2022-01-01 RX ADMIN — POTASSIUM CHLORIDE 20 MEQ: 29.8 INJECTION INTRAVENOUS at 04:26

## 2022-01-01 RX ADMIN — DEXMEDETOMIDINE HYDROCHLORIDE 1.4 MCG/KG/HR: 4 INJECTION, SOLUTION INTRAVENOUS at 19:17

## 2022-01-01 RX ADMIN — PROPOFOL 20 MCG/KG/MIN: 10 INJECTION, EMULSION INTRAVENOUS at 19:39

## 2022-01-01 RX ADMIN — INSULIN LISPRO 1 UNITS: 100 INJECTION, SOLUTION INTRAVENOUS; SUBCUTANEOUS at 08:54

## 2022-01-01 RX ADMIN — ACETYLCYSTEINE 600 MG: 200 SOLUTION ORAL; RESPIRATORY (INHALATION) at 06:45

## 2022-01-01 RX ADMIN — INSULIN LISPRO 1 UNITS: 100 INJECTION, SOLUTION INTRAVENOUS; SUBCUTANEOUS at 12:37

## 2022-01-01 RX ADMIN — LEVALBUTEROL HYDROCHLORIDE 0.63 MG: 0.63 SOLUTION RESPIRATORY (INHALATION) at 06:45

## 2022-01-01 RX ADMIN — POTASSIUM CHLORIDE 20 MEQ: 29.8 INJECTION INTRAVENOUS at 03:11

## 2022-01-01 RX ADMIN — MEROPENEM AND SODIUM CHLORIDE 1000 MG: 1 INJECTION, SOLUTION INTRAVENOUS at 04:09

## 2022-01-01 RX ADMIN — MEROPENEM AND SODIUM CHLORIDE 1000 MG: 1 INJECTION, SOLUTION INTRAVENOUS at 19:29

## 2022-01-01 RX ADMIN — POTASSIUM CHLORIDE 20 MEQ: 29.8 INJECTION INTRAVENOUS at 20:23

## 2022-01-01 RX ADMIN — ACETYLCYSTEINE 600 MG: 200 SOLUTION ORAL; RESPIRATORY (INHALATION) at 19:10

## 2022-01-01 RX ADMIN — ACETAMINOPHEN 325MG 650 MG: 325 TABLET ORAL at 20:03

## 2022-01-01 RX ADMIN — MEROPENEM AND SODIUM CHLORIDE 1000 MG: 1 INJECTION, SOLUTION INTRAVENOUS at 19:34

## 2022-01-01 RX ADMIN — PROPOFOL 25 MCG/KG/MIN: 10 INJECTION, EMULSION INTRAVENOUS at 17:26

## 2022-01-01 RX ADMIN — POTASSIUM CHLORIDE 20 MEQ: 29.8 INJECTION INTRAVENOUS at 15:18

## 2022-01-01 RX ADMIN — ACETYLCYSTEINE 600 MG: 200 SOLUTION ORAL; RESPIRATORY (INHALATION) at 07:56

## 2022-01-01 RX ADMIN — LIDOCAINE 1 PATCH: 50 PATCH CUTANEOUS at 15:36

## 2022-01-01 RX ADMIN — DEXMEDETOMIDINE HYDROCHLORIDE 1.4 MCG/KG/HR: 4 INJECTION, SOLUTION INTRAVENOUS at 04:44

## 2022-01-01 RX ADMIN — MEROPENEM AND SODIUM CHLORIDE 1000 MG: 1 INJECTION, SOLUTION INTRAVENOUS at 21:26

## 2022-01-01 RX ADMIN — POTASSIUM CHLORIDE 10 MEQ: 10 INJECTION, SOLUTION INTRAVENOUS at 12:30

## 2022-01-01 RX ADMIN — AMIODARONE HYDROCHLORIDE 100 MG: 200 TABLET ORAL at 08:44

## 2022-01-01 RX ADMIN — ONDANSETRON 4 MG: 2 INJECTION INTRAMUSCULAR; INTRAVENOUS at 05:26

## 2022-01-01 RX ADMIN — DEXTROSE MONOHYDRATE: 50 INJECTION, SOLUTION INTRAVENOUS at 10:58

## 2022-01-01 RX ADMIN — POTASSIUM CHLORIDE 20 MEQ: 29.8 INJECTION INTRAVENOUS at 06:06

## 2022-01-01 RX ADMIN — MAGNESIUM SULFATE HEPTAHYDRATE 2000 MG: 40 INJECTION, SOLUTION INTRAVENOUS at 18:13

## 2022-01-01 RX ADMIN — DEXTROSE MONOHYDRATE: 50 INJECTION, SOLUTION INTRAVENOUS at 19:14

## 2022-01-01 RX ADMIN — DEXMEDETOMIDINE HYDROCHLORIDE 0.2 MCG/KG/HR: 4 INJECTION, SOLUTION INTRAVENOUS at 14:34

## 2022-01-01 RX ADMIN — INSULIN GLARGINE 22 UNITS: 100 INJECTION, SOLUTION SUBCUTANEOUS at 19:59

## 2022-01-01 RX ADMIN — MEROPENEM AND SODIUM CHLORIDE 1000 MG: 1 INJECTION, SOLUTION INTRAVENOUS at 04:05

## 2022-01-01 RX ADMIN — VANCOMYCIN HYDROCHLORIDE 1000 MG: 1 INJECTION, POWDER, LYOPHILIZED, FOR SOLUTION INTRAVENOUS at 08:00

## 2022-01-01 RX ADMIN — MEROPENEM AND SODIUM CHLORIDE 1000 MG: 1 INJECTION, SOLUTION INTRAVENOUS at 12:09

## 2022-01-01 RX ADMIN — DEXMEDETOMIDINE HYDROCHLORIDE 1 MCG/KG/HR: 4 INJECTION, SOLUTION INTRAVENOUS at 15:22

## 2022-01-01 RX ADMIN — DEXMEDETOMIDINE HYDROCHLORIDE 0.2 MCG/KG/HR: 4 INJECTION, SOLUTION INTRAVENOUS at 05:17

## 2022-01-01 RX ADMIN — SODIUM CHLORIDE, POTASSIUM CHLORIDE, SODIUM LACTATE AND CALCIUM CHLORIDE 100 ML/HR: 600; 310; 30; 20 INJECTION, SOLUTION INTRAVENOUS at 10:09

## 2022-01-01 RX ADMIN — VANCOMYCIN HYDROCHLORIDE 1000 MG: 1 INJECTION, POWDER, LYOPHILIZED, FOR SOLUTION INTRAVENOUS at 20:22

## 2022-01-01 RX ADMIN — POLYETHYLENE GLYCOL 3350 17 G: 17 POWDER, FOR SOLUTION ORAL at 16:39

## 2022-01-01 RX ADMIN — ONDANSETRON 4 MG: 4 TABLET, ORALLY DISINTEGRATING ORAL at 15:32

## 2022-01-01 RX ADMIN — POTASSIUM CHLORIDE 20 MEQ: 29.8 INJECTION INTRAVENOUS at 03:37

## 2022-01-01 RX ADMIN — PROPOFOL 80 MG: 10 INJECTION, EMULSION INTRAVENOUS at 10:37

## 2022-01-01 RX ADMIN — MEROPENEM AND SODIUM CHLORIDE 1000 MG: 1 INJECTION, SOLUTION INTRAVENOUS at 03:36

## 2022-01-01 RX ADMIN — LIDOCAINE HYDROCHLORIDE 60 MG: 20 INJECTION, SOLUTION EPIDURAL; INFILTRATION; INTRACAUDAL; PERINEURAL at 10:42

## 2022-01-01 RX ADMIN — MEROPENEM AND SODIUM CHLORIDE 1000 MG: 1 INJECTION, SOLUTION INTRAVENOUS at 11:32

## 2022-01-01 RX ADMIN — POTASSIUM CHLORIDE 20 MEQ: 29.8 INJECTION INTRAVENOUS at 02:10

## 2022-01-01 RX ADMIN — DEXMEDETOMIDINE HYDROCHLORIDE 1.4 MCG/KG/HR: 4 INJECTION, SOLUTION INTRAVENOUS at 01:44

## 2022-01-01 RX ADMIN — AMIODARONE HYDROCHLORIDE 100 MG: 200 TABLET ORAL at 14:15

## 2022-01-01 RX ADMIN — SODIUM CHLORIDE, PRESERVATIVE FREE 10 ML: 5 INJECTION INTRAVENOUS at 08:06

## 2022-01-01 RX ADMIN — AMIODARONE HYDROCHLORIDE 100 MG: 200 TABLET ORAL at 07:31

## 2022-01-01 RX ADMIN — DEXMEDETOMIDINE HYDROCHLORIDE 0.4 MCG/KG/HR: 4 INJECTION, SOLUTION INTRAVENOUS at 17:52

## 2022-01-01 RX ADMIN — POTASSIUM CHLORIDE 20 MEQ: 29.8 INJECTION INTRAVENOUS at 07:09

## 2022-01-01 RX ADMIN — INSULIN LISPRO 4 UNITS: 100 INJECTION, SOLUTION INTRAVENOUS; SUBCUTANEOUS at 11:05

## 2022-01-01 RX ADMIN — INSULIN LISPRO 2 UNITS: 100 INJECTION, SOLUTION INTRAVENOUS; SUBCUTANEOUS at 08:43

## 2022-01-01 RX ADMIN — DEXMEDETOMIDINE HYDROCHLORIDE 0.4 MCG/KG/HR: 4 INJECTION, SOLUTION INTRAVENOUS at 20:32

## 2022-01-01 RX ADMIN — SODIUM CHLORIDE, POTASSIUM CHLORIDE, SODIUM LACTATE AND CALCIUM CHLORIDE: 600; 310; 30; 20 INJECTION, SOLUTION INTRAVENOUS at 14:35

## 2022-01-01 RX ADMIN — LEVALBUTEROL HYDROCHLORIDE 0.63 MG: 0.63 SOLUTION RESPIRATORY (INHALATION) at 19:11

## 2022-01-01 RX ADMIN — MEROPENEM AND SODIUM CHLORIDE 1000 MG: 1 INJECTION, SOLUTION INTRAVENOUS at 03:17

## 2022-01-01 RX ADMIN — PROPOFOL 10 MCG/KG/MIN: 10 INJECTION, EMULSION INTRAVENOUS at 03:00

## 2022-01-01 RX ADMIN — Medication 25 MCG/MIN: at 03:05

## 2022-01-01 RX ADMIN — POTASSIUM CHLORIDE 20 MEQ: 29.8 INJECTION INTRAVENOUS at 09:10

## 2022-01-01 RX ADMIN — POTASSIUM CHLORIDE 20 MEQ: 29.8 INJECTION INTRAVENOUS at 04:46

## 2022-01-01 RX ADMIN — LEVALBUTEROL HYDROCHLORIDE 0.63 MG: 0.63 SOLUTION RESPIRATORY (INHALATION) at 07:56

## 2022-01-01 RX ADMIN — POTASSIUM CHLORIDE 10 MEQ: 10 INJECTION, SOLUTION INTRAVENOUS at 23:27

## 2022-01-01 RX ADMIN — MEROPENEM AND SODIUM CHLORIDE 1000 MG: 1 INJECTION, SOLUTION INTRAVENOUS at 10:50

## 2022-01-01 RX ADMIN — PROPOFOL 35 MCG/KG/MIN: 10 INJECTION, EMULSION INTRAVENOUS at 00:54

## 2022-01-01 RX ADMIN — PROPOFOL 20 MCG/KG/MIN: 10 INJECTION, EMULSION INTRAVENOUS at 04:14

## 2022-01-01 RX ADMIN — MEROPENEM AND SODIUM CHLORIDE 1000 MG: 1 INJECTION, SOLUTION INTRAVENOUS at 19:16

## 2022-01-01 RX ADMIN — SODIUM BICARBONATE: 84 INJECTION, SOLUTION INTRAVENOUS at 00:58

## 2022-01-01 RX ADMIN — DEXMEDETOMIDINE HYDROCHLORIDE 1.2 MCG/KG/HR: 4 INJECTION, SOLUTION INTRAVENOUS at 07:44

## 2022-01-01 RX ADMIN — MAGNESIUM SULFATE HEPTAHYDRATE 2000 MG: 40 INJECTION, SOLUTION INTRAVENOUS at 03:06

## 2022-01-01 RX ADMIN — POTASSIUM CHLORIDE 20 MEQ: 29.8 INJECTION INTRAVENOUS at 05:07

## 2022-01-01 RX ADMIN — POTASSIUM CHLORIDE 10 MEQ: 10 INJECTION, SOLUTION INTRAVENOUS at 10:25

## 2022-01-01 RX ADMIN — VANCOMYCIN HYDROCHLORIDE 2000 MG: 10 INJECTION, POWDER, LYOPHILIZED, FOR SOLUTION INTRAVENOUS at 19:17

## 2022-01-01 RX ADMIN — POTASSIUM CHLORIDE 20 MEQ: 29.8 INJECTION INTRAVENOUS at 02:31

## 2022-01-01 RX ADMIN — MAGNESIUM SULFATE HEPTAHYDRATE 2000 MG: 40 INJECTION, SOLUTION INTRAVENOUS at 05:16

## 2022-01-01 RX ADMIN — PROPOFOL 10 MCG/KG/MIN: 10 INJECTION, EMULSION INTRAVENOUS at 10:49

## 2022-01-01 RX ADMIN — ENOXAPARIN SODIUM 40 MG: 100 INJECTION SUBCUTANEOUS at 13:32

## 2022-01-01 RX ADMIN — INSULIN LISPRO 3 UNITS: 100 INJECTION, SOLUTION INTRAVENOUS; SUBCUTANEOUS at 16:23

## 2022-01-01 RX ADMIN — MEROPENEM AND SODIUM CHLORIDE 1000 MG: 1 INJECTION, SOLUTION INTRAVENOUS at 15:30

## 2022-01-01 RX ADMIN — POTASSIUM CHLORIDE 20 MEQ: 29.8 INJECTION INTRAVENOUS at 10:48

## 2022-01-01 ASSESSMENT — ENCOUNTER SYMPTOMS
RESPIRATORY NEGATIVE: 1
CONSTIPATION: 0
GASTROINTESTINAL NEGATIVE: 1
EYE ITCHING: 0
NAUSEA: 0
ABDOMINAL PAIN: 1
DIARRHEA: 0
ANAL BLEEDING: 0
SHORTNESS OF BREATH: 0
STRIDOR: 0
CHEST TIGHTNESS: 0
DIARRHEA: 0
ABDOMINAL PAIN: 1
BACK PAIN: 1
CHEST TIGHTNESS: 0
COLOR CHANGE: 0
ANAL BLEEDING: 0
NAUSEA: 0
DIARRHEA: 0
ABDOMINAL PAIN: 0
SHORTNESS OF BREATH: 0
COUGH: 0
CHEST TIGHTNESS: 0
CONSTIPATION: 0
ANAL BLEEDING: 0
SHORTNESS OF BREATH: 1
COUGH: 0
COUGH: 0
SHORTNESS OF BREATH: 0
STRIDOR: 0
ALLERGIC/IMMUNOLOGIC NEGATIVE: 1
SHORTNESS OF BREATH: 0
EYE DISCHARGE: 0
NAUSEA: 0
ANAL BLEEDING: 0
EYE DISCHARGE: 0
GASTROINTESTINAL NEGATIVE: 1
FACIAL SWELLING: 0
BACK PAIN: 0
EYE ITCHING: 0
APNEA: 0
NAUSEA: 0
ABDOMINAL PAIN: 0
EYES NEGATIVE: 1
RESPIRATORY NEGATIVE: 1
DIARRHEA: 0
CONSTIPATION: 0
CONSTIPATION: 0
APNEA: 0
COLOR CHANGE: 1
COUGH: 0
DIARRHEA: 0
CONSTIPATION: 0
CHEST TIGHTNESS: 0
BACK PAIN: 1
NAUSEA: 0
ANAL BLEEDING: 0
EYES NEGATIVE: 1
BACK PAIN: 0
ABDOMINAL PAIN: 0
NAUSEA: 0
VOMITING: 0
CHEST TIGHTNESS: 0
FACIAL SWELLING: 0
ALLERGIC/IMMUNOLOGIC NEGATIVE: 1
NAUSEA: 0
COUGH: 0
VOMITING: 0

## 2022-01-01 ASSESSMENT — PULMONARY FUNCTION TESTS
PIF_VALUE: 23
PIF_VALUE: 25
PIF_VALUE: 26
PIF_VALUE: 22
PIF_VALUE: 23
PIF_VALUE: 22
PIF_VALUE: 20
PIF_VALUE: 22
PIF_VALUE: 22
PIF_VALUE: 18
PIF_VALUE: 21
PIF_VALUE: 19
PIF_VALUE: 25
PIF_VALUE: 24
PIF_VALUE: 22
PIF_VALUE: 19
PIF_VALUE: 21
PIF_VALUE: 23
PIF_VALUE: 21
PIF_VALUE: 22
PIF_VALUE: 20
PIF_VALUE: 22
PIF_VALUE: 23
PIF_VALUE: 37
PIF_VALUE: 24
PIF_VALUE: 24
PIF_VALUE: 21
PIF_VALUE: 23
PIF_VALUE: 22
PIF_VALUE: 22
PIF_VALUE: 25
PIF_VALUE: 18
PIF_VALUE: 25
PIF_VALUE: 25
PIF_VALUE: 22
PIF_VALUE: 23
PIF_VALUE: 32
PIF_VALUE: 19
PIF_VALUE: 23
PIF_VALUE: 23
PIF_VALUE: 22
PIF_VALUE: 21
PIF_VALUE: 21
PIF_VALUE: 24
PIF_VALUE: 21
PIF_VALUE: 33
PIF_VALUE: 23
PIF_VALUE: 24
PIF_VALUE: 24
PIF_VALUE: 25
PIF_VALUE: 21
PIF_VALUE: 23
PIF_VALUE: 27
PIF_VALUE: 20
PIF_VALUE: 23
PIF_VALUE: 20
PIF_VALUE: 22
PIF_VALUE: 21
PIF_VALUE: 22
PIF_VALUE: 20
PIF_VALUE: 21
PIF_VALUE: 23
PIF_VALUE: 19
PIF_VALUE: 23
PIF_VALUE: 24
PIF_VALUE: 22
PIF_VALUE: 26
PIF_VALUE: 22
PIF_VALUE: 23
PIF_VALUE: 22
PIF_VALUE: 23
PIF_VALUE: 21
PIF_VALUE: 22
PIF_VALUE: 22
PIF_VALUE: 19
PIF_VALUE: 21
PIF_VALUE: 22
PIF_VALUE: 21
PIF_VALUE: 23
PIF_VALUE: 14
PIF_VALUE: 25
PIF_VALUE: 23
PIF_VALUE: 23
PIF_VALUE: 20
PIF_VALUE: 23
PIF_VALUE: 22
PIF_VALUE: 25
PIF_VALUE: 22
PIF_VALUE: 20
PIF_VALUE: 24
PIF_VALUE: 26
PIF_VALUE: 22
PIF_VALUE: 24
PIF_VALUE: 16
PIF_VALUE: 71
PIF_VALUE: 20
PIF_VALUE: 21
PIF_VALUE: 24
PIF_VALUE: 27
PIF_VALUE: 20
PIF_VALUE: 22
PIF_VALUE: 22
PIF_VALUE: 20
PIF_VALUE: 21
PIF_VALUE: 23
PIF_VALUE: 33
PIF_VALUE: 24
PIF_VALUE: 24
PIF_VALUE: 22
PIF_VALUE: 19
PIF_VALUE: 23
PIF_VALUE: 20
PIF_VALUE: 25
PIF_VALUE: 23
PIF_VALUE: 24
PIF_VALUE: 35
PIF_VALUE: 19
PIF_VALUE: 22
PIF_VALUE: 14
PIF_VALUE: 23
PIF_VALUE: 21
PIF_VALUE: 23
PIF_VALUE: 28
PIF_VALUE: 22
PIF_VALUE: 21
PIF_VALUE: 21
PIF_VALUE: 31
PIF_VALUE: 21
PIF_VALUE: 22
PIF_VALUE: 23
PIF_VALUE: 20
PIF_VALUE: 19
PIF_VALUE: 20
PIF_VALUE: 23
PIF_VALUE: 21
PIF_VALUE: 27
PIF_VALUE: 23
PIF_VALUE: 27
PIF_VALUE: 23
PIF_VALUE: 22
PIF_VALUE: 23
PIF_VALUE: 25
PIF_VALUE: 23
PIF_VALUE: 22
PIF_VALUE: 20
PIF_VALUE: 32
PIF_VALUE: 21
PIF_VALUE: 20
PIF_VALUE: 23
PIF_VALUE: 22
PIF_VALUE: 22
PIF_VALUE: 20
PIF_VALUE: 21
PIF_VALUE: 23
PIF_VALUE: 23
PIF_VALUE: 26
PIF_VALUE: 23
PIF_VALUE: 25
PIF_VALUE: 21
PIF_VALUE: 23
PIF_VALUE: 27
PIF_VALUE: 26
PIF_VALUE: 22
PIF_VALUE: 24
PIF_VALUE: 20
PIF_VALUE: 26
PIF_VALUE: 21
PIF_VALUE: 22
PIF_VALUE: 23
PIF_VALUE: 21
PIF_VALUE: 18
PIF_VALUE: 35
PIF_VALUE: 24
PIF_VALUE: 23
PIF_VALUE: 24
PIF_VALUE: 24
PIF_VALUE: 22
PIF_VALUE: 19
PIF_VALUE: 21
PIF_VALUE: 22
PIF_VALUE: 19
PIF_VALUE: 23
PIF_VALUE: 20
PIF_VALUE: 25
PIF_VALUE: 20
PIF_VALUE: 26
PIF_VALUE: 22
PIF_VALUE: 21
PIF_VALUE: 24
PIF_VALUE: 22
PIF_VALUE: 21
PIF_VALUE: 24
PIF_VALUE: 31
PIF_VALUE: 22
PIF_VALUE: 19
PIF_VALUE: 22
PIF_VALUE: 22
PIF_VALUE: 26
PIF_VALUE: 28
PIF_VALUE: 20
PIF_VALUE: 22
PIF_VALUE: 21
PIF_VALUE: 22
PIF_VALUE: 24
PIF_VALUE: 22
PIF_VALUE: 22
PIF_VALUE: 18
PIF_VALUE: 24
PIF_VALUE: 20
PIF_VALUE: 23
PIF_VALUE: 23
PIF_VALUE: 22
PIF_VALUE: 21
PIF_VALUE: 23
PIF_VALUE: 28
PIF_VALUE: 21
PIF_VALUE: 23
PIF_VALUE: 20
PIF_VALUE: 22
PIF_VALUE: 22
PIF_VALUE: 21

## 2022-01-01 ASSESSMENT — PATIENT HEALTH QUESTIONNAIRE - PHQ9
SUM OF ALL RESPONSES TO PHQ QUESTIONS 1-9: 0
SUM OF ALL RESPONSES TO PHQ QUESTIONS 1-9: 0
SUM OF ALL RESPONSES TO PHQ9 QUESTIONS 1 & 2: 0
2. FEELING DOWN, DEPRESSED OR HOPELESS: 0
SUM OF ALL RESPONSES TO PHQ QUESTIONS 1-9: 0
1. LITTLE INTEREST OR PLEASURE IN DOING THINGS: 0
SUM OF ALL RESPONSES TO PHQ QUESTIONS 1-9: 0

## 2022-01-01 ASSESSMENT — LIFESTYLE VARIABLES: HOW OFTEN DO YOU HAVE A DRINK CONTAINING ALCOHOL: NEVER

## 2022-01-03 PROBLEM — R58 RETROPERITONEAL HEMORRHAGE: Status: ACTIVE | Noted: 2022-01-01

## 2022-01-03 PROBLEM — R53.1 WEAKNESS: Status: ACTIVE | Noted: 2022-01-01

## 2022-01-04 NOTE — PROGRESS NOTES
[facility] PHYSICIAN SERVICES  [facility] FAMILY MEDICINE  [address] [facility] [address]  [address] [address]  Dept: [phone]  Dept Fax: [phone]  Loc: [phone]     Cyn Fagan is a 73 y.o. female who presents today for her medical conditions/complaintsas noted below. Cyn Fagan is c/o of Diabetes (2 mo ck)        HPI:   Patient is here for follow up. She has LUQ pain with tenderness, soreness. Regular BM. No rectal bleeding. No fever. HR elevated, taking toprol, followed by Dr. Joan Louie. ED records reviewed, large retroperitoneal hematoma resolving. Labs stable. On call surgeon states could be discharged with close follow up. She has not seen Vascular surgery yet for IVC occlusion. Lab Results   Component Value Date    LABA1C 5.7 12/21/2021     No results found for: EAG  Diabetes Mellitus  Has been compliant with medications. No side effects of medications since last visit. No polyuria, polydipsia, or vision changes since last visit. No symptomatic episodes of hypoglycemia.          HPI    Past Medical History:   Diagnosis Date    Atrial flutter by electrocardiogram ([address]     Constipation     Coronary artery disease     Diabetes mellitus ([address]     Essential hypertension     Hyperlipidemia     IBS (irritable bowel syndrome)     Iron deficiency anemia     Menopause     Paroxysmal SVT (supraventricular tachycardia) (HCC)     RA (rheumatoid arthritis) (HCC)     Rheumatoid arthritis ([address]      Past Surgical History:   Procedure Laterality Date    CARDIAC SURGERY  2000    CABG (Dr. Ebony Wyatt) [address]  2009    Stent     CHOLECYSTECTOMY      CORONARY ARTERY BYPASS GRAFT  2000    DILATION AND CURETTAGE OF UTERUS N/A 8/16/2019    DILATATION AND CURETTAGE HYSTEROSCOPY W/ MYOSURE performed by Vanna Mcclure MD at [facility] OR    TOTAL KNEE ARTHROPLASTY         Family History   Problem Relation Age of Onset    Heart Attack Father     Prostate Cancer Father    [facility] Stroke Father     Heart Attack Brother     Stroke Brother     Breast Cancer Maternal Aunt     Cancer Paternal Uncle         Lung       Social History     Tobacco Use    Smoking status: Former Smoker     Packs/day: 0.50     Years: 26.00     Pack years: 13.00     Types: Cigarettes     Start date: 6/10/1974     Quit date: 2000     Years since quittin.3    Smokeless tobacco: Never Used   Substance Use Topics    Alcohol use: No      Current Outpatient Medications   Medication Sig Dispense Refill    ketoconazole (NIZORAL) 2 % cream APPLY TOPICALLY TO AFFECTED AREA(S) ONCE DAILY AS DIRECTED 60 g 0    rosuvastatin (CRESTOR) 5 MG tablet Take 1 tablet by mouth nightly 30 tablet 3    metoprolol tartrate (LOPRESSOR) 25 MG tablet Take 0.5 tablets by mouth 2 times daily (Patient taking differently: Take 50 mg by mouth 2 times daily ) 30 tablet 1    FreeStyle Lancets MISC USE AS DIRECTED 100 each 3    blood glucose test strips (FREESTYLE LITE) strip Infuse 1 each intravenously 2 times daily As needed. 100 strip 3    pantoprazole (PROTONIX) 40 MG tablet TAKE 1 TABLET BY MOUTH 2 TIMES A  tablet 0    metFORMIN (GLUCOPHAGE) 500 MG tablet TAKE 1 TABLET BY MOUTH ONCE EVERY MORNING AND ONE TABLET EVERY EVENING 180 tablet 0    losartan (COZAAR) 25 MG tablet TAKE 1 TABLET BY MOUTH DAILY 90 tablet 0    isosorbide mononitrate (IMDUR) 30 MG extended release tablet TAKE 1 TABLET BY MOUTH DAILY 90 tablet 0    FreeStyle Lancets MISC TEST SUGAR DAILY AS DIRECTED UP TO 4 TIMES A  each 3    fluticasone (FLONASE) 50 MCG/ACT nasal spray 2 sprays by Each Nostril route daily 16 g 0    glyBURIDE (DIABETA) 5 MG tablet Take 2 tablets every morning & 1 tablet every evening. 120 tablet 5    FREESTYLE LITE strip CHECK BLOOD GLUCOSE ONCE DAILY AS NEEDED 100 strip 3    potassium chloride (KLOR-CON M) 20 MEQ extended release tablet TAKE 1 TABLET BY MOUTH DAILY.  90 tablet 0    nystatin (MYCOSTATIN) 903698 UNIT/GM powder Apply topically 4 times daily. 30 g 5    Blood Glucose Monitoring Suppl (FREESTYLE INSULINX SYSTEM) w/Device KIT 1 each by Does not apply route daily 1 kit 0    blood glucose test strips (FREESTYLE INSULINX TEST) strip 1 each by In Vitro route daily As needed. 100 each 3    nitroGLYCERIN (NITROSTAT) 0.4 MG SL tablet PLACE 1 TABLET UNDER THE TONGUE EVERY 5 MINUTES AS NEEDED FOR CHEST PAIN 25 tablet 0    nystatin (MYCOSTATIN) 561423 UNIT/GM cream Apply topically 2 times daily. 30 g 2    Tofacitinib Citrate (XELJANZ XR) 11 MG TB24 Take 11 mg by mouth daily      furosemide (LASIX) 20 MG tablet Take 20 mg by mouth 2 times daily       apixaban (ELIQUIS) 5 MG TABS tablet Take 5 mg by mouth 2 times daily       amiodarone (PACERONE) 100 MG tablet Take 100 mg by mouth daily       FREESTYLE LANCETS MISC TEST SUGAR DAILY AS DIRECTED 100 each 2    predniSONE (DELTASONE) 5 MG tablet Take 5 mg by mouth 2 times daily Take 5 mg in the morning and 4 mg in the evening.  aspirin EC 81 MG EC tablet Take 81 mg by mouth daily      folic acid (FOLVITE) 1 MG tablet Take 1 mg by mouth daily       simvastatin (ZOCOR) 40 MG tablet TAKE 1 TABLET BY MOUTH NIGHTLY 90 tablet 0     No current facility-administered medications for this visit.      Allergies   Allergen Reactions    Codeine Other (See Comments)     Chest pain    Albuterol      Rapid heart rate    Clindamycin/Lincomycin     Januvia [Sitagliptin] Swelling    Augmentin [Amoxicillin-Pot Clavulanate] Palpitations    Sulfa Antibiotics Rash       Health Maintenance   Topic Date Due    Hepatitis C screen  Never done    HIV screen  Never done    Shingles Vaccine (1 of 2) Never done    DEXA (modify frequency per FRAX score)  Never done    Diabetic foot exam  04/24/2019    Diabetic microalbuminuria test  11/18/2020    COVID-19 Vaccine (3 - Moderna risk 4-dose series) 04/10/2021    Diabetic retinal exam  06/22/2021    Flu vaccine (1) 09/01/2021    Pneumococcal 65+ yrs at Risk Vaccine (1 of 2 - PCV13) 10/24/2021    Depression Screen  02/22/2022    Annual Wellness Visit (AWV)  02/23/2022    Breast cancer screen  12/16/2022    A1C test (Diabetic or Prediabetic)  12/21/2022    Lipid screen  12/21/2022    TSH testing  12/21/2022    Potassium monitoring  12/21/2022    Creatinine monitoring  12/21/2022    Colon cancer screen colonoscopy  01/31/2023    Cervical cancer screen  09/13/2024    DTaP/Tdap/Td vaccine (3 - Td or Tdap) 11/26/2031    Hepatitis A vaccine  Aged Out    Hib vaccine  Aged Out    Meningococcal (ACWY) vaccine  Aged Out       Subjective:     Review of Systems   Constitutional: Negative for chills and fever. HENT: Negative for congestion. Respiratory: Negative for cough, chest tightness and shortness of breath. Cardiovascular: Negative for chest pain, palpitations and leg swelling. Gastrointestinal: Positive for abdominal pain. Negative for anal bleeding, constipation, diarrhea and nausea. Genitourinary: Negative for difficulty urinating. Psychiatric/Behavioral: Negative. See HPI for visit specific review of symptoms. All others negative      Objective:   /70   Pulse 104   Temp 96.9 °F (36.1 °C)   Wt 188 lb (85.3 kg)   SpO2 99%   BMI 32.27 kg/m²    Physical Exam  Constitutional:       Appearance: She is well-developed. She is not ill-appearing. Eyes:      Pupils: Pupils are equal, round, and reactive to light. Cardiovascular:      Rate and Rhythm: Normal rate and regular rhythm. Heart sounds: No murmur heard. No friction rub. Pulmonary:      Effort: Pulmonary effort is normal. No respiratory distress. Breath sounds: Normal breath sounds. No wheezing or rales. Abdominal:      General: Bowel sounds are normal. There is no distension. Palpations: Abdomen is soft. Tenderness: There is abdominal tenderness (LUQ pain). There is no guarding or rebound.    Musculoskeletal: Triglycerides 118 0 - 149 mg/dL    HDL 87 65 - 121 mg/dL    LDL Calculated 83 <100 mg/dL   TSH without Reflex    Collection Time: 12/21/21 12:06 PM   Result Value Ref Range    TSH 1.210 0.270 - 4.200 uIU/mL   Iron and TIBC    Collection Time: 12/21/21 12:06 PM   Result Value Ref Range    Iron 84 37 - 145 ug/dL    TIBC 306 250 - 400 ug/dL    Iron Saturation 27 14 - 50 %   Ferritin    Collection Time: 12/21/21 12:06 PM   Result Value Ref Range    Ferritin 490.0 (H) 13.0 - 150.0 ng/mL   Transferrin    Collection Time: 12/21/21 12:06 PM   Result Value Ref Range    Transferrin 258 200 - 400 mg/dL               Assessment & Plan: The following diagnoses and conditions are stable withno further orders unless indicated:  Pete Choi was seen today for diabetes. Diagnoses and all orders for this visit:    Retroperitoneal hemorrhage  -     External Referral To Vascular Surgery  ED records reviewed, stable. Refer to vascular surgery. Labs stable. Acquired occlusion of inferior vena cava [facility])  -     External Referral To Vascular Surgery  As above. Essential hypertension  Blood pressure is stable. Continue current medications. Monitor ambulatory bp readings, if persistently >140/90, return to clinic.    toprol changed to 12.5 mg bid, will monitor bp closely and adjust as needed. Paroxysmal SVT (supraventricular tachycardia) (HCC)  Stable, continue same. Type 2 diabetes mellitus without complication, without long-term current use of insulin (HCC)  Stable, continue current meds. Will consider glyburide when eating well, monitor acucchecks bid. Return for already scheduled. Discussed use, benefit, and side effects of prescribed medications. All patient questions answered. Pt voiced understanding. Reviewed health maintenance. Instructed to continue current medications, diet and exercise. Patient agreedwith treatment plan. Follow up as directed.

## 2022-01-20 NOTE — TELEPHONE ENCOUNTER
----- Message from Bennie Hutson MD sent at 1/16/2022 10:53 AM CST -----  Please check on vascular surgery referral

## 2022-01-21 NOTE — TELEPHONE ENCOUNTER
Pt left vm concerned about spotting and soreness.  Made an appointment for the [facility]

## 2022-01-24 NOTE — TELEPHONE ENCOUNTER
Spoke with Mrs Serna reminding her of her appointment for Tuesday, January 25th, 2022 at 130 pm with Dr Alejandre. Mrs Serna confirmed she would be here.

## 2022-01-24 NOTE — PROGRESS NOTES
Teresa Serna  [id number]  1955  66 y.o.  female    Referring Provider: Teresa Contreras MD    Reason for Follow-up Visit:   short term office follow up after recent encounter   Paroxysmal atrial fibrillation now atrial flutter   Saw Dr De Anda AF ablation tried but due to no good venous access not possible     Cardiac workup test results as below: 14-day outpatient cardiac telemetry   In past could not get ablation due to anatomy  Attempted by Dr De Anda   Last visit went to ER   Had fall   Had retroperitoneal hematoma   Accidental fall   Was transferred to [place]   Conservative treatment   Was admitted to [facility] [place] [facility] home for 2 weeks   Much better now     Minor soreness  Minimal leg swelling   Mild pedal edema that responds to diuretic therapy  She is on flexible diuretic dosing     Mild chronic exertional shortness of breath on exertion relieved with rest  No significant cough or wheezing    Intermittent palpitations, more so since fall   No associated chest pain    No fever or chills  No significant expectoration    No hemoptysis  No presyncope or syncope    Tolerating current medications well with no untoward side effects   Compliant with prescribed medication regimen. Tries to adhere to cardiac diet.       History of present illness:  Teresa Serna is a 66 y.o. yo female with paroxysmal atrial fibrillation who presents today for   Chief Complaint   Patient presents with   • PAF     patient states her HR just has been the same since her fall in august, she was doing good after the CV.    .    History  Past Medical History:   Diagnosis Date   • Abnormal stress test    • Atrial flutter (HCC)    • CAD (coronary artery disease)    • CAD in native artery     CABG x 3   • Diverticulosis    • Essential hypertension     Tricuspid valve insufficiency, unspecified etiology    • History of adenomatous polyp of colon    • Hyperlipemia, mixed    • Hyperlipidemia    • Hypertension    • IBS (irritable bowel  syndrome)    • MI, old    • Mitral valve prolapse    • Near syncope    • Obesity, morbid (HCC)    • Palpitations    • Paroxysmal SVT (supraventricular tachycardia) (Formerly [facility])    • Pedal edema    • Precordial pain    • Rheumatoid arthritis (HCC)    • S/P CABG x 3    • Type 2 diabetes mellitus (HCC)    • Venous insufficiency of both lower extremities    ,   Past Surgical History:   Procedure Laterality Date   • APPENDECTOMY     • CARDIAC CATHETERIZATION Left 2012    Intensify medical therapy   • CARDIAC CATHETERIZATION Left 2002    surgery   • CHOLECYSTECTOMY  1975   • COLONOSCOPY N/A 2018    Diverticulosis in the entire examined colon; One 4mm tubular adenomatous polyp in the transverse colon; Non-bleeding internal hemorrhoids; Repeat 5 years   • COLONOSCOPY  02/10/2012    Diverticulosis in the entire examined colon; Non-bleeding internal hemorrhoids; Repeat 7 years   • COLONOSCOPY  2009    Diverticulosis; Repeat 7 years   • CORONARY ARTERY BYPASS GRAFT  2002    LIMA to LAD, SVG to D1, SVG to OM1 - x3   • CORONARY STENT PLACEMENT  09/2009    X1 - Stent to LAD, Drug Eluting   • DILATATION AND CURETTAGE     • ENDOSCOPY N/A 2018    Esophageal mucosal changes suspicious for short-segment De Anda's esophagus-biopsied; Small HH; Two gastric polyps-Clip (MR conditional) was placed-biopsied; Normal examined duodenum   • ENDOSCOPY  2015    Probable short-segment De Anda's esophagus-biopsied; HH; Gastritis-biopsied; Duodenal diverticulum; Repeat 1 year   • REPLACEMENT TOTAL KNEE Right    ,   Family History   Problem Relation Age of Onset   • Cancer Father    • Coronary artery disease Father    • Colon cancer Neg Hx    • Colon polyps Neg Hx    ,   Social History     Tobacco Use   • Smoking status: Former Smoker     Years: 20.00     Types: Cigarettes     Quit date: 2000     Years since quittin.9   • Smokeless tobacco: Never Used   Vaping Use   • Vaping Use: Never used   Substance Use  Topics   • Alcohol use: No   • Drug use: No   ,     Medications  Current Outpatient Medications   Medication Sig Dispense Refill   • albuterol sulfate  (90 Base) MCG/ACT inhaler Inhale 2 puffs Every 4 (Four) Hours As Needed for Wheezing or Shortness of Air.     • amiodarone (PACERONE) 100 MG tablet Take 1 tablet by mouth Daily. 90 tablet 4   • aspirin 81 MG EC tablet Take 81 mg by mouth Daily.     • Eliquis 5 MG tablet tablet TAKE 1 TABLET BY MOUTH EVERY 12 HOURS 180 tablet 4   • fluticasone (FLONASE) 50 MCG/ACT nasal spray 2 sprays into the nostril(s) as directed by provider Daily.     • folic acid (FOLVITE) 1 MG tablet Take 1 mg by mouth Daily.     • furosemide (LASIX) 20 MG tablet Take 1 tablet by mouth 2 (Two) Times a Day. 60 tablet 4   • glyburide (DIAbeta) 5 MG tablet Take 2QAM & 2QPM     • isosorbide mononitrate (IMDUR) 30 MG 24 hr tablet Take 30 mg by mouth Daily.     • LORazepam (ATIVAN) 0.5 MG tablet Take 0.5 mg by mouth 2 (Two) Times a Day As Needed for Anxiety.     • losartan (COZAAR) 25 MG tablet Take 25 mg by mouth Daily.     • meclizine (ANTIVERT) 12.5 MG tablet Take 1 tablet by mouth 3 (Three) Times a Day As Needed for dizziness. 90 tablet 3   • metFORMIN (GLUCOPHAGE) 500 MG tablet      • metoprolol tartrate (LOPRESSOR) 50 MG tablet Take 50 mg by mouth 2 (Two) Times a Day.     • nitroglycerin (NITROSTAT) 0.4 MG SL tablet Place 0.4 mg under the tongue Every 5 (Five) Minutes As Needed for Chest Pain. Take no more than 3 doses in 15 minutes.     • ondansetron ODT (ZOFRAN-ODT) 4 MG disintegrating tablet Place 1 tablet on the tongue Every 8 (Eight) Hours As Needed for Nausea or Vomiting. 12 tablet 0   • pantoprazole (PROTONIX) 40 MG EC tablet Take 1 tablet by mouth Every 12 (Twelve) Hours. 60 tablet 1   • potassium chloride (K-DUR) 10 MEQ CR tablet Take 1 tablet by mouth Daily. 90 tablet 3   • predniSONE (DELTASONE) 1 MG tablet Take 4 mg by mouth 4 (Four) Times a Day.     • predniSONE (DELTASONE) 5  "MG tablet Take 5 mg by mouth Every Morning.     • simvastatin (ZOCOR) 40 MG tablet Take 40 mg by mouth Every Night.     • verapamil SR (CALAN-SR) 240 MG CR tablet Take 1 tablet by mouth Daily. 90 tablet 4     No current facility-administered medications for this visit.     Results for orders placed during the hospital encounter of 01/04/22    Adult Transthoracic Echo Complete w/ Color, Spectral and Contrast if necessary per protocol    Interpretation Summary  · Left ventricular ejection fraction appears to be 56 - 60%. Left ventricular systolic function is normal.  · Abnormal global longitudinal LV strain (GLS) = -9%  · Left ventricular diastolic function was normal.  · Moderate tricuspid valve regurgitation is present.  · The right ventricular cavity is moderately dilated.  · Mildly reduced right ventricular systolic function noted.  · Estimated right ventricular systolic pressure from tricuspid regurgitation is normal (<35 mmHg).      Allergies:  Codeine, Exenatide, Sulfa antibiotics, Albuterol, Sitagliptin, and Amoxicillin-pot clavulanate    Review of Systems  Review of Systems   Constitutional: Positive for malaise/fatigue.   HENT: Negative.    Eyes: Negative.    Cardiovascular: Positive for dyspnea on exertion, leg swelling and palpitations. Negative for chest pain, claudication, cyanosis, irregular heartbeat, near-syncope, orthopnea, paroxysmal nocturnal dyspnea and syncope.   Respiratory: Negative.    Endocrine: Negative.    Hematologic/Lymphatic: Negative.    Skin: Negative.    Musculoskeletal: Positive for arthritis, back pain and joint pain.   Gastrointestinal: Negative for anorexia.   Genitourinary: Negative.    Neurological: Positive for dizziness and weakness.   Psychiatric/Behavioral: Negative.         Objective     Physical Exam:  /72   Pulse 60   Ht 162.6 cm (64\")   Wt 88 kg (194 lb)   SpO2 97%   BMI 33.30 kg/m²   Physical Exam   Constitutional: She appears well-developed.   HENT:   Head: " Normocephalic.   Neck: Normal carotid pulses and no JVD present. No tracheal tenderness present. Carotid bruit is not present. No tracheal deviation present.   Cardiovascular: Regular rhythm and normal pulses.   Murmur heard.   Systolic murmur is present with a grade of 2/6.  Pulmonary/Chest: Effort normal. No stridor. She has no wheezes.   Abdominal: Soft. She exhibits no distension. There is no abdominal tenderness.   Musculoskeletal:      Right lower le+ Pitting Edema present.      Left lower le+ Pitting Edema present.     Vascular Status -  Her right foot exhibits abnormal foot edema. Her left foot exhibits abnormal foot edema.  Neurological: She is alert. No cranial nerve deficit or sensory deficit.   Skin: Skin is warm.   Psychiatric: Her speech is normal and behavior is normal.      Results Review:    IMPRESSION:  1. There is a large left retroperitoneal hemorrhage with active contrast  extravasation indicating active bleeding.  2. 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  3. No splenic or left renal injury. No acute regional bony pathology  localized.   4. Periumbilical hernia defect containing nondilated nonobstructed small  bowel.  5. Small hiatal hernia.     Comments: A preliminary report is issued to the ER by the [facility]  radiology service. I agree with this preliminary impression. Patient  transferred to outside hospital for appropriate care.  This report was finalized on 2021 07:43 by Dr. Kay Emanuel MD.    Results for orders placed during the hospital encounter of 22    Adult Transthoracic Echo Complete w/ Color, Spectral and Contrast if necessary per protocol    Interpretation Summary  · Left ventricular ejection fraction appears to be 56 - 60%. Left ventricular systolic function is normal.  · Abnormal global longitudinal LV strain (GLS) = -9%  · Left ventricular diastolic function was normal.  · Moderate  tricuspid valve regurgitation is present.  · The right ventricular cavity is moderately dilated.  · Mildly reduced right ventricular systolic function noted.  · Estimated right ventricular systolic pressure from tricuspid regurgitation is normal (<35 mmHg).      Diagnoses and all orders for this visit:    1. S/P CABG x 3 2000 Dr Larsen (Primary)    2. S/P coronary artery stent placement    3. Type 2 diabetes mellitus without complication, without long-term current use of insulin (HCC)    4. PAF (paroxysmal atrial fibrillation) ([facility]ly [facility])    5. Coronary artery disease involving native coronary artery of native heart without angina pectoris    6. Typical atrial flutter ([facility]ly [facility])  -     Holter Monitor - 72 Hour Up To 15 Days; Future    7. Suspected sleep apnea  -     Ambulatory Referral to Sleep Medicine            Plan    Orders Placed This Encounter   Procedures   • Ambulatory Referral to Sleep Medicine     Referral Priority:   Routine     Number of Visits Requested:   1   • Holter Monitor - 72 Hour Up To 15 Days     Standing Status:   Future     Standing Expiration Date:   1/24/2023     Order Specific Question:   Reason for exam?     Answer:   Palpitations     Order Specific Question:   Release to patient     Answer:   Immediate        Patient expressed understanding  Encouraged and answered all questions   Discussed with the patient and all questioned fully answered. She will call me if any problems arise.   Discussed results of prior testing with patient : echo   Will order Sleep study evaluation as above      Overall improving       I support the patient's decision to take the Covid -19 vaccine   Had required complete course   No major issues   Now fully immunized    Recommend booster       I have reviewed the patient's medical history and recent admission and treatmentand and updated her    Monitor for any signs of bleeding including red or dark stools as well as easy bruisabilty. Fall precautions.   Check BP and heart  rates twice daily initially till blood pressures and heart rates under good control and then at least 3x / week,   If blood pressures continue to be well-controlled then can check week a month  at home and bring a recording for review next visit  If BP >130/85 or < 100/60 persistently over 3 reading 30 mins apart or if heart rates persistently above 100 bpm or less than 55 bpm call sooner for evaluation and advise     Weigh yourself frequently, at least weekly, preferably daily, call me if more than 2 pounds a day or 5 pounds a week weight gain.  Flexible diuretic dosing     Follow up with Sophie HARTMAN , [place] HARTMAN  or myself           Return in about 6 weeks (around 3/7/2022).

## 2022-01-25 NOTE — PROGRESS NOTES
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 Patient denies any stroke like symptoms.    • Former Smoker     Patient is a Former Smoker - Quit 01/2000   • Med Management     Verified medications from list patient brought in        Dear Teresa Contreras MD    HPI  I had the pleasure of seeing your patient Teresa Serna in the office today.  Thank you kindly for this consultation.  As you recall, Teresa Serna is a 66 y.o.  female who you are currently following for routine health maintenance.  She does have leg swelling.  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  She is maintained on Eliquis and Crestor. She have a fall and developed a retroperitoneal hematoma, which has decreased on most recent testing. She did have noninvasive testing performed, which I did personally review.       Past Medical History:   Diagnosis Date   • Abnormal stress test    • Atrial flutter (HCC)    • CAD (coronary artery disease)    • CAD in native artery     CABG x 3   • COVID-19 vaccine series completed 2021 021221/031221   • Diverticulosis    • Essential hypertension     Tricuspid valve insufficiency, unspecified etiology    • History of adenomatous polyp of colon    • Hyperlipemia, mixed    • Hyperlipidemia    • Hypertension    • IBS (irritable bowel syndrome)    • MI, old    • Mitral valve prolapse    • Near syncope    • Obesity, morbid ([facility])    • Palpitations    • Paroxysmal SVT (supraventricular tachycardia) ([facility])    • Pedal edema    • Precordial pain    • Rheumatoid arthritis (HCC)    • S/P CABG x 3    • Type 2 diabetes mellitus ([facility])    • Venous insufficiency of both lower extremities        Past Surgical History:   Procedure Laterality Date   • APPENDECTOMY     • CARDIAC  CATHETERIZATION Left 2012    Intensify medical therapy   • CARDIAC CATHETERIZATION Left 2002    surgery   • CHOLECYSTECTOMY  1975   • COLONOSCOPY N/A 2018    Diverticulosis in the entire examined colon; One 4mm tubular adenomatous polyp in the transverse colon; Non-bleeding internal hemorrhoids; Repeat 5 years   • COLONOSCOPY  02/10/2012    Diverticulosis in the entire examined colon; Non-bleeding internal hemorrhoids; Repeat 7 years   • COLONOSCOPY  2009    Diverticulosis; Repeat 7 years   • CORONARY ARTERY BYPASS GRAFT  2002    LIMA to LAD, SVG to D1, SVG to OM1 - x3   • CORONARY STENT PLACEMENT  09/2009    X1 - Stent to LAD, Drug Eluting   • DILATATION AND CURETTAGE     • ENDOSCOPY N/A 2018    Esophageal mucosal changes suspicious for short-segment De Anda's esophagus-biopsied; Small HH; Two gastric polyps-Clip (MR conditional) was placed-biopsied; Normal examined duodenum   • ENDOSCOPY  2015    Probable short-segment De Anda's esophagus-biopsied; HH; Gastritis-biopsied; Duodenal diverticulum; Repeat 1 year   • REPLACEMENT TOTAL KNEE Right        Family History   Problem Relation Age of Onset   • Cancer Father    • Coronary artery disease Father    • Asthma Father    • Heart failure Mother    • Kidney disease Mother    • Arthritis Mother    • Pancreatitis Sister    • Heart attack Brother    • Colon cancer Neg Hx    • Colon polyps Neg Hx        Social History     Socioeconomic History   • Marital status:    Tobacco Use   • Smoking status: Former Smoker     Years: 20.00     Types: Cigarettes     Quit date: 2000     Years since quittin.0   • Smokeless tobacco: Never Used   Vaping Use   • Vaping Use: Never used   Substance and Sexual Activity   • Alcohol use: No   • Drug use: No   • Sexual activity: Defer       Allergies   Allergen Reactions   • Codeine Palpitations and Other (See Comments)     Chest pain   • Exenatide Arrhythmia   • Sulfa Antibiotics Hives and  Rash     Reaction: hives   • Albuterol Palpitations     High heart rate   • Sitagliptin Swelling   • Amoxicillin-Pot Clavulanate Palpitations         Current Outpatient Medications:   •  albuterol sulfate  (90 Base) MCG/ACT inhaler, Inhale 2 puffs Every 4 (Four) Hours As Needed for Wheezing or Shortness of Air., Disp: , Rfl:   •  amiodarone (PACERONE) 100 MG tablet, Take 1 tablet by mouth Daily., Disp: 90 tablet, Rfl: 4  •  aspirin 81 MG EC tablet, Take 81 mg by mouth Daily., Disp: , Rfl:   •  Eliquis 5 MG tablet tablet, TAKE 1 TABLET BY MOUTH EVERY 12 HOURS, Disp: 180 tablet, Rfl: 4  •  fluticasone (FLONASE) 50 MCG/ACT nasal spray, 2 sprays into the nostril(s) as directed by provider Daily., Disp: , Rfl:   •  folic acid (FOLVITE) 1 MG tablet, Take 1 mg by mouth Daily., Disp: , Rfl:   •  furosemide (LASIX) 20 MG tablet, Take 1 tablet by mouth 2 (Two) Times a Day., Disp: 60 tablet, Rfl: 4  •  glyburide (DIAbeta) 5 MG tablet, Take 2QAM & 2QPM, Disp: , Rfl:   •  isosorbide mononitrate (IMDUR) 30 MG 24 hr tablet, Take 30 mg by mouth Daily., Disp: , Rfl:   •  LORazepam (ATIVAN) 0.5 MG tablet, Take 0.5 mg by mouth 2 (Two) Times a Day As Needed for Anxiety., Disp: , Rfl:   •  losartan (COZAAR) 25 MG tablet, Take 25 mg by mouth Daily., Disp: , Rfl:   •  meclizine (ANTIVERT) 12.5 MG tablet, Take 1 tablet by mouth 3 (Three) Times a Day As Needed for dizziness., Disp: 90 tablet, Rfl: 3  •  metFORMIN (GLUCOPHAGE) 500 MG tablet, , Disp: , Rfl:   •  metoprolol tartrate (LOPRESSOR) 50 MG tablet, Take 50 mg by mouth 2 (Two) Times a Day., Disp: , Rfl:   •  nitroglycerin (NITROSTAT) 0.4 MG SL tablet, Place 0.4 mg under the tongue Every 5 (Five) Minutes As Needed for Chest Pain. Take no more than 3 doses in 15 minutes., Disp: , Rfl:   •  pantoprazole (PROTONIX) 40 MG EC tablet, Take 1 tablet by mouth Every 12 (Twelve) Hours., Disp: 60 tablet, Rfl: 1  •  potassium chloride (K-DUR) 10 MEQ CR tablet, Take 1 tablet by mouth Daily.,  "Disp: 90 tablet, Rfl: 3  •  predniSONE (DELTASONE) 1 MG tablet, Take 4 mg by mouth 4 (Four) Times a Day., Disp: , Rfl:   •  predniSONE (DELTASONE) 5 MG tablet, Take 5 mg by mouth Every Morning., Disp: , Rfl:   •  rosuvastatin (CRESTOR) 5 MG tablet, Take 5 mg by mouth Daily., Disp: , Rfl:   •  Tofacitinib Citrate ER (XELJANZ) 11 MG tablet sustained-release 24 hour, Take 11 mg by mouth Daily., Disp: , Rfl:   •  verapamil SR (CALAN-SR) 240 MG CR tablet, Take 1 tablet by mouth Daily., Disp: 90 tablet, Rfl: 4    Review of Systems   Constitutional: Negative.    HENT: Negative.    Eyes: Negative.    Respiratory: Negative.    Cardiovascular: Negative.    Gastrointestinal: Negative.    Endocrine: Negative.    Genitourinary: Negative.    Musculoskeletal: Negative.    Skin: Negative.    Allergic/Immunologic: Negative.    Neurological: Negative.    Hematological: Negative.    Psychiatric/Behavioral: Negative.    All other systems reviewed and are negative.    /74 (BP Location: Left arm, Patient Position: Sitting, Cuff Size: Adult)   Ht 162.6 cm (64\")   Wt 88 kg (194 lb)   BMI 33.30 kg/m²     Physical Exam  Vitals and nursing note reviewed.   Constitutional:       Appearance: She is well-developed.   HENT:      Head: Normocephalic and atraumatic.   Eyes:      General: No scleral icterus.     Pupils: Pupils are equal, round, and reactive to light.   Neck:      Thyroid: No thyromegaly.      Vascular: No carotid bruit or JVD.   Cardiovascular:      Rate and Rhythm: Normal rate and regular rhythm.      Pulses:           Carotid pulses are 2+ on the right side and 2+ on the left side.       Femoral pulses are 2+ on the right side and 2+ on the left side.       Popliteal pulses are 2+ on the right side and 2+ on the left side.        Dorsalis pedis pulses are 2+ on the right side and 2+ on the left side.        Posterior tibial pulses are 2+ on the right side and 2+ on the left side.      Heart sounds: Normal heart sounds. "   Pulmonary:      Effort: Pulmonary effort is normal.      Breath sounds: Normal breath sounds.   Abdominal:      General: Bowel sounds are normal. There is no distension or abdominal bruit.      Palpations: Abdomen is soft. There is no mass.      Tenderness: There is no abdominal tenderness.   Musculoskeletal:         General: Normal range of motion.      Cervical back: Neck supple.      Right lower leg: Edema present.      Left lower leg: Edema present.   Lymphadenopathy:      Cervical: No cervical adenopathy.   Skin:     General: Skin is warm and dry.   Neurological:      General: No focal deficit present.      Mental Status: She is alert and oriented to person, place, and time.      Cranial Nerves: No cranial nerve deficit.      Sensory: No sensory deficit.   Psychiatric:         Mood and Affect: Mood normal.         Behavior: Behavior normal.         Thought Content: Thought content normal.         Judgment: Judgment normal.       Impression  Performed by [facility] RADIOLOGY  1. The evolving large left retroperitoneal hematoma most of which is   not identified. The abdominal component appears to have increased in   size with displacement of the left kidney and small and large bowel   loops. No encasement or obstruction. The pelvic component of the   hematoma have significantly decreased since the previous study. No   active extravasation.   2. The remaining abdominal organs are unremarkable without evidence of   recent or a previous traumatic involvement.   3. The complete occlusion/abscess of infrarenal inferior vena cava and   collateral pathway through the ovarian veins bilaterally.   4. The diverticulosis of the colon. No evidence for diverticulitis.   Signed by Dr Deepak Pulliam      CT ABDOMEN PELVIS W CONTRAST- 11/27/2021 2:06 AM CST     HISTORY: abnormal noncontrast CT of the chest concern for splenic  injury, fall      COMPARISON: 12/30/2016     DOSE LENGTH PRODUCT: 1266 mGy cm. Automated exposure  control was also  utilized to decrease patient radiation dose.     TECHNIQUE: Axial images the abdomen pelvis are obtained following IV and  oral contrast     FINDINGS:  There is no loculated hyperdense fluid within the left  retroperitoneum with active contrast extravasation at several sites  representing active bleeding. Hematoma measures up to 11.7 x 6.4 cm.  Hematoma displaces the left kidney anteriorly. No abnormal perinephric  fluid collection. No hydronephrosis. Ureters appear intact. No renal  laceration or hematoma. No splenic laceration or hematoma. Small  costophrenic granuloma the spleen. The liver, pancreas, and adrenal  glands are unremarkable. Gallbladder not visualized. No free  intraperitoneal air loculated abscess. Prominent vascular structures of  the lower pelvis with a congenital anomaly of the infrarenal IVC,  unchanged from 12/30/2016. There is a hazy periumbilical hernia defect  containing nondilated small bowel. Small hiatal hernia. No pathologic  lymphadenopathy. Mild to moderate vascular calcification with no  regional aneurysm.     Osteopenia degenerative changes regional skeleton. No acute regional  bony injury identified. No lumbar vertebral or pelvic fracture.     IMPRESSION:  1. There is a large left retroperitoneal hemorrhage with active contrast  extravasation indicating active bleeding.  2. 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  3. No splenic or left renal injury. No acute regional bony pathology  localized.   4. Periumbilical hernia defect containing nondilated nonobstructed small  bowel.  5. Small hiatal hernia.     Comments: A preliminary report is issued to the ER by the [facility]  radiology service. I agree with this preliminary impression. Patient  transferred to outside hospital for appropriate care.  This report was finalized on 11/27/2021 07:43 by Dr. Kay Emanuel MD.  Patient Active Problem List    Diagnosis   • CAD (coronary artery disease)   • Atrial flutter (HCC)   • Type 2 diabetes mellitus without complication, without long-term current use of insulin (HCC)   • Mixed hyperlipidemia   • S/P CABG x 3 2000 Dr Larsen   • Shoulder blade pain   • Anemia   • Abnormal finding on imaging   • History of esophagitis   • Essential hypertension   • S/P coronary artery stent placement   • Gastric polyp   • Colon polyps   • PAF (paroxysmal atrial fibrillation) (HCC)   • Respiratory failure with hypoxia (HCC)   • Acute respiratory failure with hypoxia (HCC)   • Post-COVID chronic decreased mobility and endurance   • Obesity (BMI 30-39.9)   • Bilateral lower extremity edema   • Rheumatoid arthritis involving multiple sites (HCC)        Diagnosis Plan   1. Absence of inferior vena cava     2. Mixed hyperlipidemia     3. Essential hypertension         Plan: After thoroughly evaluating Teresa Serna, I believe the best course of action is to remain conservative from vascular surgery standpoint.  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  There is nothing that needs to be done from a vascular surgery standpoint.  She does have leg swelling to bilateral lower extremities but left more significant to right likely due to venous return.  I did give her a prescription for compression stockings and asked her to wear these on a daily basis.  I also think she would be a great candidate for home lymphedema pumps however she declines.  She states her diuretics help with her swelling.  At this point, she can follow-up in our office as needed going forward.  She is anticoagulated due to atrial fibrillation with Eliquis.  The patient is to continue taking their medications as previously discussed.   This was all discussed in full with complete understanding.  Thank you for allowing me to participate in the care of your patient.  Please do not hesitate to call with any questions or concerns.  We will keep you  aware of any further encounters with Teresa Serna.        Sincerely yours,         DO Diane Jesus Shanna Moore, MD

## 2022-01-25 NOTE — TELEPHONE ENCOUNTER
[address] called to advise there is a severe reaction between Cipro and Amiodarone. Ok to start the Cipro?

## 2022-01-25 NOTE — PROGRESS NOTES
[facility] PHYSICIAN SERVICES  [facility]  [address] [id number]  [address] [address]  Dept: [phone]  Dept Fax: [phone]  Loc: [phone]     Cyn Fagan is a 73 y.o. female who presents today for her medical conditions/complaintsas noted below. Cyn Fagan is c/o of Urinary Frequency (vaginal discomfort)        HPI:   Patient is here for acute visit. She reports vaginal discomfort, suprapubic pain, bloating. She had mild hematuria, has occasionally with eliquis. No fever or flank pain. She has soreness lower abdomen. No fever. Diabetes Mellitus  Has been compliant with medications. No side effects of medications since last visit. No polyuria, polydipsia, or vision changes since last visit. No symptomatic episodes of hypoglycemia. Hypertension  Compliant with medications. No adverse effects from medication. No lightheadedness, palpitations, or chest pain.         HPI    Past Medical History:   Diagnosis Date    Atrial flutter by electrocardiogram ([address]     Constipation     Coronary artery disease     Diabetes mellitus ([address]     Essential hypertension     Hyperlipidemia     IBS (irritable bowel syndrome)     Iron deficiency anemia     Menopause     Paroxysmal SVT (supraventricular tachycardia) (HCC)     RA (rheumatoid arthritis) (HCC)     Rheumatoid arthritis ([address]      Past Surgical History:   Procedure Laterality Date    CARDIAC SURGERY  2000    CABG (Dr. Ebony Wyatt) [address] [place] [address]  2009    Stent     CHOLECYSTECTOMY      CORONARY ARTERY BYPASS GRAFT  2000    DILATION AND CURETTAGE OF UTERUS N/A 8/16/2019    DILATATION AND CURETTAGE HYSTEROSCOPY W/ MYOSURE performed by [facility]clure [facility] OR    TOTAL KNEE ARTHROPLASTY         Family History   Problem Relation Age of Onset    Heart Attack Father     Prostate Cancer Father     Stroke Father     Heart Attack Brother     Stroke Brother     Breast Cancer Maternal Aunt Monitoring Suppl (FREESTYLE INSULINX SYSTEM) w/Device KIT 1 each by Does not apply route daily 1 kit 0    blood glucose test strips (FREESTYLE INSULINX TEST) strip 1 each by In Vitro route daily As needed. 100 each 3    nitroGLYCERIN (NITROSTAT) 0.4 MG SL tablet PLACE 1 TABLET UNDER THE TONGUE EVERY 5 MINUTES AS NEEDED FOR CHEST PAIN 25 tablet 0    nystatin (MYCOSTATIN) 249617 UNIT/GM cream Apply topically 2 times daily. 30 g 2    Tofacitinib Citrate (XELJANZ XR) 11 MG TB24 Take 11 mg by mouth daily      furosemide (LASIX) 20 MG tablet Take 20 mg by mouth 2 times daily       apixaban (ELIQUIS) 5 MG TABS tablet Take 5 mg by mouth 2 times daily       amiodarone (PACERONE) 100 MG tablet Take 100 mg by mouth daily       FREESTYLE LANCETS MISC TEST SUGAR DAILY AS DIRECTED 100 each 2    predniSONE (DELTASONE) 5 MG tablet Take 5 mg by mouth 2 times daily Take 5 mg in the morning and 4 mg in the evening.  aspirin EC 81 MG EC tablet Take 81 mg by mouth daily      folic acid (FOLVITE) 1 MG tablet Take 1 mg by mouth daily       FreeStyle Lancets MISC USE AS DIRECTED 100 each 3     No current facility-administered medications for this visit.      Allergies   Allergen Reactions    Codeine Other (See Comments)     Chest pain    Albuterol      Rapid heart rate    Clindamycin/Lincomycin     Januvia [Sitagliptin] Swelling    Augmentin [Amoxicillin-Pot Clavulanate] Palpitations    Sulfa Antibiotics Rash       Health Maintenance   Topic Date Due    Hepatitis C screen  Never done    HIV screen  Never done    Shingles Vaccine (1 of 2) Never done    DEXA (modify frequency per FRAX score)  Never done    Diabetic foot exam  04/24/2019    Diabetic microalbuminuria test  11/18/2020    COVID-19 Vaccine (3 - Moderna risk 4-dose series) 04/10/2021    Diabetic retinal exam  06/22/2021    Flu vaccine (1) 09/01/2021    Pneumococcal 65+ yrs at Risk Vaccine (1 of 2 - PCV13) 10/24/2021    Annual Wellness Visit (AWV) 02/23/2022    Depression Screen  02/22/2022    Breast cancer screen  12/16/2022    A1C test (Diabetic or Prediabetic)  12/21/2022    Lipid screen  12/21/2022    TSH testing  12/21/2022    Potassium monitoring  12/21/2022    Creatinine monitoring  12/21/2022    Colon cancer screen colonoscopy  01/31/2023    Cervical cancer screen  09/13/2024    DTaP/Tdap/Td vaccine (3 - Td or Tdap) 11/26/2031    Hepatitis A vaccine  Aged Out    Hib vaccine  Aged Out    Meningococcal (ACWY) vaccine  Aged Out       Subjective:     Review of Systems   Constitutional: Negative for chills and fever. HENT: Negative for congestion. Respiratory: Negative for cough, chest tightness and shortness of breath. Cardiovascular: Negative for chest pain, palpitations and leg swelling. Gastrointestinal: Negative for abdominal pain, anal bleeding, constipation, diarrhea and nausea. Genitourinary: Positive for dysuria and hematuria. Negative for difficulty urinating. Psychiatric/Behavioral: Negative. See HPI for visit specific review of symptoms. All others negative      Objective:   /62   Pulse 68   Temp 97.4 °F (36.3 °C)   Wt 199 lb (90.3 kg)   SpO2 97%   BMI 34.16 kg/m²    Physical Exam  Constitutional:       Appearance: She is well-developed. She is not ill-appearing. Eyes:      Pupils: Pupils are equal, round, and reactive to light. Cardiovascular:      Rate and Rhythm: Normal rate and regular rhythm. Heart sounds: No murmur heard. No friction rub. Pulmonary:      Effort: Pulmonary effort is normal. No respiratory distress. Breath sounds: Normal breath sounds. No wheezing or rales. Abdominal:      General: Bowel sounds are normal. There is no distension. Palpations: Abdomen is soft. Tenderness: There is no abdominal tenderness. There is no guarding or rebound. Musculoskeletal:      Cervical back: Normal range of motion and neck supple.    Neurological:      Mental Status: She is alert. Psychiatric:         Behavior: Behavior normal.           Lab Review   Recent Results (from the past 672 hour(s))   POCT Urinalysis no Micro    Collection Time: 01/25/22 12:00 AM   Result Value Ref Range    Color, UA dark yellow     Clarity, UA cloudy     Glucose, UA POC 1,000     Bilirubin, UA neg     Ketones, UA neg     Spec Grav, UA 1.030     Blood, UA POC trace     pH, UA 5.5     Protein, UA      Urobilinogen, UA 1.0     Leukocytes, UA neg     Nitrite, UA positive    Culture, Urine    Collection Time: 01/25/22  3:18 PM    Specimen: Urine, clean catch   Result Value Ref Range    Urine Culture, Routine >100,000 CFU/ml (A)     Organism Escherichia coli (A)     Urine Culture, Routine Moderate growth        Susceptibility    Escherichia coli - BACTERIAL SUSCEPTIBILITY PANEL BY PIETRO     ampicillin 16 Intermediate mcg/mL     ampicillin-sulbactam 4 Sensitive mcg/mL     aztreonam <=1 Sensitive mcg/mL     ceFAZolin <=4 Sensitive mcg/mL     cefepime <=1 Sensitive mcg/mL     cefTRIAXone <=1 Sensitive mcg/mL     ertapenem <=0.5 Sensitive mcg/mL     gentamicin <=1 Sensitive mcg/mL     levofloxacin <=0.12 Sensitive mcg/mL     meropenem <=0.25 Sensitive mcg/mL     nitrofurantoin <=16 Sensitive mcg/mL     piperacillin-tazobactam <=4 Sensitive mcg/mL     trimethoprim-sulfamethoxazole <=20 Sensitive mcg/mL   Microscopic Urinalysis    Collection Time: 01/25/22  3:18 PM   Result Value Ref Range    Bacteria, UA 4+ (A) None Seen /HPF    Crystals, UA NEG (A) None Seen /HPF    Hyaline Casts, UA 5 0 - 8 /HPF    WBC, UA 38 (H) 0 - 5 /HPF    RBC, UA 1 0 - 4 /HPF    Epithelial Cells, UA 3 0 - 5 /HPF               Assessment & Plan: The following diagnoses and conditions are stable withno further orders unless indicated:  Nadiya Sarmiento was seen today for urinary frequency. Diagnoses and all orders for this visit:    Frequency of urination  -     POCT Urinalysis no Micro  -     Microscopic Urinalysis;  Future  - Culture, Urine; Future  E. Coli on cx, change abx to macrobid due to reactions. Increase po fluids, will follow. Essential hypertension  Blood pressure is stable. Continue current medications. Monitor ambulatory bp readings, if persistently >140/90, return to clinic. Type 2 diabetes mellitus without complication, without long-term current use of insulin (HCC)  Stable, monitor blood sugar closely. Retroperitoneal hemorrhage  Stable, has follow up with Vascular and Surgery. Other orders  -     ciprofloxacin (CIPRO) 500 MG tablet; Take 1 tablet by mouth 2 times daily for 7 days            Return if symptoms worsen or fail to improve. Discussed use, benefit, and side effects of prescribed medications. All patient questions answered. Pt voiced understanding. Reviewed health maintenance. Instructed to continue current medications, diet and exercise. Patient agreedwith treatment plan. Follow up as directed.

## 2022-01-27 NOTE — TELEPHONE ENCOUNTER
----- Message from Michelle Chou MD sent at 1/26/2022  5:40 PM CST -----  E. Coli on urine cx, complete abx.

## 2022-02-11 NOTE — PATIENT INSTRUCTIONS
We are committed to providing you with the best care possible. In order to help us achieve these goals please remember to bring all medications, herbal products, and over the counter supplements with you to each visit. If your provider has ordered testing for you, please be sure to follow up with our office if you have not received results within 7 days after the testing took place. *If you receive a survey after visiting one of our offices, please take time to share your experience concerning your physician office visit. These surveys are confidential and no health information about you is shared. We are eager to improve for you and we are counting on your feedback to help make that happen. Patient Education        Superficial Thrombophlebitis: Care Instructions  Your Care Instructions  Superficial thrombophlebitis is inflammation in a vein where a blood clot has formed close to the surface of the skin. You may be able to feel the clot as a firm lump under the skin. The skin over the clot can become red, tender, and warm to the touch. Blood clots in veins close to the skin's surface usually are not serious and often can be treated at home. Sometimes superficial thrombophlebitis spreads to a deeper vein (deep vein thrombosis, or DVT). These deeper clots can be serious, even life-threatening. It is very important that you follow your doctor's instructions, keep all follow-up appointments, and watch for new or worsening symptoms of a clot. Follow-up care is a key part of your treatment and safety. Be sure to make and go to all appointments, and call your doctor if you are having problems. It's also a good idea to know your test results and keep a list of the medicines you take. How can you care for yourself at home? · Take your medicines exactly as prescribed. Call your doctor if you think you are having a problem with your medicine.  You will get more details on the specific medicines your doctor prescribes. · Prop up the sore leg or arm on a pillow anytime you sit or lie down. Try to keep it above the level of your heart. To prevent thrombophlebitis  · Exercise. Keep blood moving in your legs to keep new clots from forming. · Get up out of bed as soon as possible after an illness or surgery. · Do not smoke. If you need help quitting, talk to your doctor about stop-smoking programs and medicines. These can increase your chances of quitting for good. · Ask your doctor about compression stockings. These may help prevent blood clots from forming in your legs. But there are different types of stockings, and they need to fit right. So your doctor will recommend what you need. When should you call for help? Call 911 anytime you think you may need emergency care. For example, call if:    · You have sudden chest pain and shortness of breath, or you cough up blood.     · You vomit blood or what looks like coffee grounds.     · You pass maroon or very bloody stools.     · You passed out (lost consciousness). Call your doctor now or seek immediate medical care if:    · You have signs of a blood clot, such as:  ? Pain in your calf, back of the knee, thigh, or groin. ? Redness and swelling in your leg or groin.     · You notice a new hard, red, or tender area in your leg.     · Your stools are black and tarlike or have streaks of blood. Watch closely for changes in your health, and be sure to contact your doctor if:    · You do not get better as expected. Where can you learn more? Go to https://[email or web address]SendUs. org and sign in to your [employer] account. Enter [phone] in the [facility] box to learn more about \"Superficial Thrombophlebitis: Care Instructions. \"     If you do not have an account, please click on the \"Sign Up Now\" link. Current as of: July 6, 2021               Content Version: 13.1  © 5784-5942 [employer].    Care instructions adapted under license by [facility]. If you have questions about a medical condition or this instruction, always ask your healthcare professional. [place] [address] any warranty or liability for your use of this information.

## 2022-02-11 NOTE — PROGRESS NOTES
DILATATION AND CURETTAGE HYSTEROSCOPY W/ MYOSURE performed by Elaine Johnston MD at [facility] OR    TOTAL KNEE ARTHROPLASTY       Family History   Problem Relation Age of Onset    Heart Attack Father     Prostate Cancer Father     Stroke Father     Heart Attack Brother     Stroke Brother     Breast Cancer Maternal Aunt     Cancer Paternal Uncle         Lung     Social History     Tobacco Use    Smoking status: Former Smoker     Packs/day: 0.50     Years: 26.00     Pack years: 13.00     Types: Cigarettes     Start date: 6/10/1974     Quit date: 2000     Years since quittin.4    Smokeless tobacco: Never Used   Substance Use Topics    Alcohol use: No      Current Outpatient Medications   Medication Sig Dispense Refill    ketoconazole (NIZORAL) 2 % cream APPLY TOPICALLY TO AFFECTED AREA(S) ONCE DAILY AS DIRECTED 60 g 0    rosuvastatin (CRESTOR) 5 MG tablet Take 1 tablet by mouth nightly 30 tablet 3    metoprolol tartrate (LOPRESSOR) 25 MG tablet Take 0.5 tablets by mouth 2 times daily (Patient taking differently: Take 50 mg by mouth 2 times daily ) 30 tablet 1    blood glucose test strips (FREESTYLE LITE) strip Infuse 1 each intravenously 2 times daily As needed.  100 strip 3    pantoprazole (PROTONIX) 40 MG tablet TAKE 1 TABLET BY MOUTH 2 TIMES A  tablet 0    metFORMIN (GLUCOPHAGE) 500 MG tablet TAKE 1 TABLET BY MOUTH ONCE EVERY MORNING AND ONE TABLET EVERY EVENING 180 tablet 0    simvastatin (ZOCOR) 40 MG tablet TAKE 1 TABLET BY MOUTH NIGHTLY 90 tablet 0    losartan (COZAAR) 25 MG tablet TAKE 1 TABLET BY MOUTH DAILY 90 tablet 0    isosorbide mononitrate (IMDUR) 30 MG extended release tablet TAKE 1 TABLET BY MOUTH DAILY 90 tablet 0    FreeStyle Lancets MISC TEST SUGAR DAILY AS DIRECTED UP TO 4 TIMES A  each 3    fluticasone (FLONASE) 50 MCG/ACT nasal spray 2 sprays by Each Nostril route daily 16 g 0    glyBURIDE (DIABETA) 5 MG tablet Take 2 tablets every morning & 1 tablet apnea and stridor. Cardiovascular: Negative for chest pain and palpitations. Gastrointestinal: Negative for nausea and vomiting. Endocrine: Negative for cold intolerance and heat intolerance. Genitourinary: Negative for frequency and urgency. Musculoskeletal: Negative for arthralgias and back pain. Skin: Positive for color change. Negative for rash. See HPI for visit specific review of symptoms. All others negative      Objective:   /80   Pulse 82   Temp 98 °F (36.7 °C)   Ht 5' 4\" (1.626 m)   Wt 198 lb (89.8 kg)   SpO2 98%   BMI 33.99 kg/m²   Physical Exam  Constitutional:       Appearance: She is well-developed. She is obese. HENT:      Head: Normocephalic and atraumatic. Right Ear: External ear normal.      Left Ear: External ear normal.   Eyes:      Conjunctiva/sclera: Conjunctivae normal.      Pupils: Pupils are equal, round, and reactive to light. Cardiovascular:      Rate and Rhythm: Normal rate and regular rhythm. Heart sounds: Normal heart sounds. Pulmonary:      Effort: Pulmonary effort is normal. No respiratory distress. Breath sounds: Normal breath sounds. Abdominal:      General: Bowel sounds are normal. There is no distension. Palpations: Abdomen is soft. Tenderness: There is no abdominal tenderness. Musculoskeletal:         General: Normal range of motion. Cervical back: Normal range of motion and neck supple. Skin:     General: Skin is warm. Capillary Refill: Capillary refill takes less than 2 seconds. Findings: No rash. Neurological:      Mental Status: She is alert and oriented to person, place, and time. Cranial Nerves: No cranial nerve deficit. Psychiatric:         Thought Content: Thought content normal.           No results found for this visit on 02/11/22. Assessment & Plan:      The following diagnoses and conditions are stable with no further orders unless indicated:    Jose M Morales was seen today for other. Diagnoses and all orders for this visit:    Swelling of thigh    Notes reviewed. She states she is compliant with her Eliquis so I have low suspicion for DVT. No fall this time. Suspect she probably has superficial thrombophlebitis, information provided. Defer venous duplex at this time, keep follow-up appointment as scheduled. Return for already scheduled follow-up. Discussed use, benefit, and side effects of prescribed medications. All patient questions answered. Pt voiced understanding. Reviewed health maintenance. Instructed to continue current medications, diet and exercise. Patient agreedwith treatment plan. Follow up as directed. Old records reviewed, where available.     Marc Oconnell MD    Dictated using [address] [place]

## 2022-03-07 PROBLEM — E66.09 CLASS 1 OBESITY DUE TO EXCESS CALORIES WITH SERIOUS COMORBIDITY AND BODY MASS INDEX (BMI) OF 33.0 TO 33.9 IN ADULT: Status: ACTIVE | Noted: 2022-01-01

## 2022-03-07 PROBLEM — E66.01 CLASS 2 SEVERE OBESITY DUE TO EXCESS CALORIES WITH SERIOUS COMORBIDITY AND BODY MASS INDEX (BMI) OF 35.0 TO 35.9 IN ADULT (HCC): Status: ACTIVE | Noted: 2022-01-01

## 2022-03-07 PROBLEM — Z79.01 CURRENT USE OF LONG TERM ANTICOAGULATION: Status: ACTIVE | Noted: 2022-01-01

## 2022-03-07 NOTE — PROGRESS NOTES
Subjective:     Encounter Date: 3/7/2022      Patient ID: Teresa Serna is a 66 y.o. female. She presents today for routine follow-up of outpatient testing. 14-day Holter monitor revealed predominantly sinus rhythm with minimum heart rate of 46 bpm, average heart rate of 61 bpm, maximum heart rate of 98 bpm, rare supraventricular ectopic beats with APC burden of less than 1%, rare premature ventricular contractions with a PVC burden of less than 1%, 118 runs of supraventricular tachycardia with longest duration of 18 beats, no significant pauses and no correlated arrhythmia. She has coronary artery disease s/p coronary artery bypass grafting X3 in 2009 and subsequent stent placement in 2009, hypertension, hyperlipidemia, type 2 diabetes mellitus and paroxysmal atrial flutter/fibrillation on chronic anticoagulation. She reports a one month history of increased bilateral lower extremity edema. Patient denies chest pain, dizziness, syncope, orthopnea, PND or decreased stamina.  Patient denies any signs of bleeding. She reports chronic intermittent palpitations and dyspnea with exertion that is at baseline.     Chief Complaint: Routine follow-up  Coronary Artery Disease  Presents for follow-up visit. Symptoms include leg swelling and palpitations. Pertinent negatives include no chest pain, chest pressure, chest tightness, dizziness, muscle weakness, shortness of breath or weight gain. Risk factors include hyperlipidemia. The symptoms have been stable. Compliance with diet is variable. Compliance with exercise is variable. Compliance with medications is good.   Atrial Flutter  This is a chronic problem. The current episode started more than 1 year ago. The problem occurs intermittently. The problem has been unchanged. Pertinent negatives include no abdominal pain, anorexia, arthralgias, change in bowel habit, chest pain, chills, congestion, coughing, diaphoresis, fatigue, fever, headaches, joint swelling, myalgias,  nausea, neck pain, numbness, rash, sore throat, swollen glands, urinary symptoms, vertigo, visual change, vomiting or weakness. Treatments tried: antiarrhythmic. The treatment provided significant relief.   Hypertension  This is a chronic problem. The current episode started more than 1 year ago. The problem is controlled. Associated symptoms include palpitations. Pertinent negatives include no anxiety, blurred vision, chest pain, headaches, malaise/fatigue, neck pain, orthopnea, peripheral edema, PND, shortness of breath or sweats. Risk factors for coronary artery disease include dyslipidemia. Current antihypertension treatment includes alpha 1 blockers, diuretics and beta blockers. The current treatment provides significant improvement.   Hyperlipidemia  This is a chronic problem. The current episode started more than 1 year ago. The problem is controlled. Recent lipid tests were reviewed and are low. Pertinent negatives include no chest pain, leg pain, myalgias or shortness of breath.   Palpitations   This is a chronic problem. The current episode started more than 1 year ago. The problem occurs intermittently. The problem has been unchanged. Pertinent negatives include no anxiety, chest fullness, chest pain, coughing, diaphoresis, dizziness, fever, irregular heartbeat, malaise/fatigue, nausea, near-syncope, numbness, shortness of breath, syncope, vomiting or weakness. She has tried beta blockers and antiarrhythmics for the symptoms. The treatment provided significant relief. Risk factors include diabetes mellitus.   Shortness of Breath  This is a chronic problem. The current episode started more than 1 year ago. The problem occurs intermittently. The problem has been unchanged. Associated symptoms include leg swelling. Pertinent negatives include no abdominal pain, chest pain, claudication, coryza, ear pain, fever, headaches, hemoptysis, leg pain, neck pain, orthopnea, PND, rash, rhinorrhea, sore throat, sputum  production, swollen glands, syncope, vomiting or wheezing. The symptoms are aggravated by exercise. Her past medical history is significant for CAD.       The following portions of the patient's history were reviewed and updated as appropriate: allergies, current medications, past family history, past medical history, past social history, past surgical history and problem list.     Allergies   Allergen Reactions   • Codeine Palpitations and Other (See Comments)     Chest pain   • Exenatide Arrhythmia   • Sulfa Antibiotics Hives and Rash     Reaction: hives   • Albuterol Palpitations     High heart rate   • Sitagliptin Swelling   • Amoxicillin-Pot Clavulanate Palpitations       Current Outpatient Medications:   •  amiodarone (PACERONE) 100 MG tablet, TAKE ONE TABLET BY MOUTH EVERY DAY, Disp: 30 tablet, Rfl: 11  •  aspirin 81 MG EC tablet, Take 81 mg by mouth Daily., Disp: , Rfl:   •  Eliquis 5 MG tablet tablet, TAKE 1 TABLET BY MOUTH EVERY 12 HOURS, Disp: 180 tablet, Rfl: 4  •  folic acid (FOLVITE) 1 MG tablet, Take 1 mg by mouth Daily., Disp: , Rfl:   •  furosemide (LASIX) 20 MG tablet, Take 1 tablet by mouth 2 (Two) Times a Day., Disp: 60 tablet, Rfl: 4  •  glyburide (DIAbeta) 5 MG tablet, Take 5 mg by mouth 2 (Two) Times a Day With Meals. Take 2QAM & 2QPM, Disp: , Rfl:   •  isosorbide mononitrate (IMDUR) 30 MG 24 hr tablet, Take 30 mg by mouth Daily., Disp: , Rfl:   •  losartan (COZAAR) 25 MG tablet, Take 25 mg by mouth Daily., Disp: , Rfl:   •  metFORMIN (GLUCOPHAGE) 500 MG tablet, Take 500 mg by mouth Daily With Breakfast., Disp: , Rfl:   •  metoprolol tartrate (LOPRESSOR) 50 MG tablet, Take 50 mg by mouth 2 (Two) Times a Day., Disp: , Rfl:   •  nitroglycerin (NITROSTAT) 0.4 MG SL tablet, Place 0.4 mg under the tongue Every 5 (Five) Minutes As Needed for Chest Pain. Take no more than 3 doses in 15 minutes., Disp: , Rfl:   •  pantoprazole (PROTONIX) 40 MG EC tablet, Take 1 tablet by mouth Every 12 (Twelve)  Hours., Disp: 60 tablet, Rfl: 1  •  potassium chloride (K-DUR) 10 MEQ CR tablet, Take 1 tablet by mouth Daily. (Patient taking differently: Take 20 mEq by mouth Daily.), Disp: 90 tablet, Rfl: 3  •  predniSONE (DELTASONE) 1 MG tablet, Take 4 mg by mouth Daily., Disp: , Rfl:   •  predniSONE (DELTASONE) 5 MG tablet, Take 5 mg by mouth Every Morning., Disp: , Rfl:   •  rosuvastatin (CRESTOR) 5 MG tablet, Take 5 mg by mouth Daily., Disp: , Rfl:   •  Tofacitinib Citrate ER (XELJANZ) 11 MG tablet sustained-release 24 hour, Take 11 mg by mouth Daily., Disp: , Rfl:   •  verapamil SR (CALAN-SR) 240 MG CR tablet, Take 1 tablet by mouth Daily., Disp: 90 tablet, Rfl: 4  Past Medical History:   Diagnosis Date   • Abnormal stress test    • Atrial flutter (HCC)    • CAD (coronary artery disease)    • CAD in native artery     CABG x 3   • COVID-19 vaccine series completed 2021 021221/031221   • Diverticulosis    • Essential hypertension     Tricuspid valve insufficiency, unspecified etiology    • History of adenomatous polyp of colon    • Hyperlipemia, mixed    • Hyperlipidemia    • Hypertension    • IBS (irritable bowel syndrome)    • MI, old    • Mitral valve prolapse    • Near syncope    • Obesity, morbid (HCC)    • Palpitations    • Paroxysmal SVT (supraventricular tachycardia) ([facility])    • Pedal edema    • Precordial pain    • Rheumatoid arthritis (HCC)    • S/P CABG x 3    • Type 2 diabetes mellitus (HCC)    • Venous insufficiency of both lower extremities      Past Surgical History:   Procedure Laterality Date   • APPENDECTOMY     • CARDIAC CATHETERIZATION Left 06/18/2012    Intensify medical therapy   • CARDIAC CATHETERIZATION Left 05/05/2002    surgery   • CHOLECYSTECTOMY  1975   • COLONOSCOPY N/A 1/31/2018    Diverticulosis in the entire examined colon; One 4mm tubular adenomatous polyp in the transverse colon; Non-bleeding internal hemorrhoids; Repeat 5 years   • COLONOSCOPY  02/10/2012    Diverticulosis in the entire  examined colon; Non-bleeding internal hemorrhoids; Repeat 7 years   • COLONOSCOPY  2009    Diverticulosis; Repeat 7 years   • CORONARY ARTERY BYPASS GRAFT  2002    LIMA to LAD, SVG to D1, SVG to OM1 - x3   • CORONARY STENT PLACEMENT  09/2009    X1 - Stent to LAD, Drug Eluting   • DILATATION AND CURETTAGE     • ENDOSCOPY N/A 2018    Esophageal mucosal changes suspicious for short-segment Dea Nda's esophagus-biopsied; Small HH; Two gastric polyps-Clip (MR conditional) was placed-biopsied; Normal examined duodenum   • ENDOSCOPY  2015    Probable short-segment De Anda's esophagus-biopsied; HH; Gastritis-biopsied; Duodenal diverticulum; Repeat 1 year   • REPLACEMENT TOTAL KNEE Right      Family History   Problem Relation Age of Onset   • Cancer Father    • Coronary artery disease Father    • Asthma Father    • Heart failure Mother    • Kidney disease Mother    • Arthritis Mother    • Pancreatitis Sister    • Heart attack Brother    • Colon cancer Neg Hx    • Colon polyps Neg Hx      Social History     Socioeconomic History   • Marital status:    Tobacco Use   • Smoking status: Former Smoker     Years: 20.00     Types: Cigarettes     Quit date: 2000     Years since quittin.1   • Smokeless tobacco: Never Used   Vaping Use   • Vaping Use: Never used   Substance and Sexual Activity   • Alcohol use: No   • Drug use: No   • Sexual activity: Defer         Review of Systems   Constitutional: Negative for chills, decreased appetite, diaphoresis, fatigue, fever, malaise/fatigue, night sweats, weight gain and weight loss.   HENT: Negative for congestion, ear pain, nosebleeds, rhinorrhea and sore throat.    Eyes: Negative for blurred vision and visual disturbance.   Cardiovascular: Positive for dyspnea on exertion, leg swelling and palpitations. Negative for chest pain, claudication, irregular heartbeat, near-syncope, orthopnea, paroxysmal nocturnal dyspnea and syncope.   Respiratory: Negative  "for chest tightness, cough, hemoptysis, shortness of breath, snoring, sputum production and wheezing.    Endocrine: Negative for cold intolerance and heat intolerance.   Hematologic/Lymphatic: Does not bruise/bleed easily.   Skin: Negative for rash.   Musculoskeletal: Negative for arthralgias, back pain, falls, joint swelling, muscle weakness, myalgias and neck pain.   Gastrointestinal: Negative for abdominal pain, anorexia, change in bowel habit, constipation, diarrhea, dysphagia, heartburn, nausea and vomiting.   Genitourinary: Negative for hematuria.   Neurological: Negative for dizziness, headaches, light-headedness, numbness, vertigo and weakness.   Psychiatric/Behavioral: Negative for altered mental status. The patient is not nervous/anxious.    Allergic/Immunologic: Negative for persistent infections.       Procedures  /80   Pulse 73   Ht 162.6 cm (64\")   Wt 93.9 kg (207 lb)   SpO2 97%   BMI 35.53 kg/m²        Objective:     Physical Exam  Vitals reviewed.   Constitutional:       General: She is not in acute distress.     Appearance: She is well-developed. She is not diaphoretic.   HENT:      Head: Normocephalic and atraumatic.      Right Ear: External ear normal.      Left Ear: External ear normal.      Nose: Nose normal.   Eyes:      General:         Right eye: No discharge.         Left eye: No discharge.      Conjunctiva/sclera: Conjunctivae normal.      Pupils: Pupils are equal, round, and reactive to light.   Neck:      Thyroid: No thyromegaly.      Vascular: No carotid bruit or JVD.      Trachea: No tracheal deviation.   Cardiovascular:      Rate and Rhythm: Normal rate and regular rhythm.      Chest Wall: PMI is not displaced.      Pulses: Normal pulses and intact distal pulses.           Radial pulses are 2+ on the right side and 2+ on the left side.        Dorsalis pedis pulses are 2+ on the right side and 2+ on the left side.        Posterior tibial pulses are 2+ on the right side and 2+ " on the left side.      Heart sounds: Normal heart sounds. No murmur heard.    No friction rub. No gallop.   Pulmonary:      Effort: Pulmonary effort is normal. No respiratory distress.      Breath sounds: Normal breath sounds. No decreased breath sounds, wheezing, rhonchi or rales.   Chest:      Chest wall: No tenderness.   Abdominal:      General: There is no distension.      Palpations: Abdomen is soft.      Tenderness: There is no abdominal tenderness.   Musculoskeletal:         General: No tenderness or deformity. Normal range of motion.      Cervical back: Normal range of motion and neck supple.      Right lower leg: Edema (3+ pitting) present.      Left lower leg: Edema (4+ pitting) present.   Skin:     General: Skin is warm and dry.      Coloration: Skin is not pale.      Findings: No erythema or rash.   Neurological:      Mental Status: She is alert and oriented to person, place, and time.   Psychiatric:         Behavior: Behavior normal.         Thought Content: Thought content normal.         Judgment: Judgment normal.         Lab Review:     Lab Results   Component Value Date    WBC 10.07 12/23/2021    HGB 11.9 (L) 12/23/2021    HCT 39.5 12/23/2021    .8 (H) 12/23/2021     12/23/2021     Lab Results   Component Value Date    GLUCOSE 247 (H) 12/23/2021    BUN 10 12/23/2021    CREATININE 0.27 (L) 12/23/2021    EGFRIFNONA >150 12/23/2021    EGFRIFAFRI >59 12/21/2021    BCR 37.0 (H) 12/23/2021    K 3.6 12/23/2021    CO2 26.0 12/23/2021    CALCIUM 9.0 12/23/2021    ALBUMIN 3.90 12/23/2021    AST 31 12/23/2021    ALT 16 12/23/2021     Lab Results   Component Value Date    CHOL 117 10/22/2021    CHOL 85 (L) 10/23/2017    CHOL 117 (L) 04/26/2017    CHLPL 194 12/21/2021    CHLPL 129 06/17/2021    CHLPL 130 02/09/2021     Lab Results   Component Value Date    TRIG 118 12/21/2021    TRIG 78 10/22/2021    TRIG 127 06/17/2021     Lab Results   Component Value Date    HDL 87 12/21/2021    HDL 69 (H)  10/22/2021    HDL 66 06/17/2021     Lab Results   Component Value Date    LDL 83 12/21/2021    LDL 32 10/22/2021    LDL 38 06/17/2021           Assessment:          Diagnosis Plan   1. Coronary artery disease involving native coronary artery of native heart without angina pectoris  No clinical signs of ischemia. Continue Imdur.   2. S/P CABG x 3 2000 Dr Larsen  Continues on aspirin. Denies bleeding.   3. S/P coronary artery stent placement      2009   4. Atrial flutter, unspecified type   patient has been evaluated by Dr. D eAnda with electrophysiology in the past, however ablation was not possible due to insufficient venous access. Continue amiodarone. Anticoagulated.   5. Mixed hyperlipidemia   management per PCP. Continue Crestor.   6. Essential hypertension  Well controlled. Continue metoprolol tartrate and losartan.   7. Type 2 diabetes mellitus without complication, without long-term current use of insulin   management per PCP.   8. Current use of long term anticoagulation  patient continues on Eliquis. Denies bleeding.   9. Class 2 severe obesity due to excess calories with serious comorbidity and body mass index (BMI) of 35.0 to 35.9 in adult (HCC) Patient's Body mass index is 35.53 kg/m². indicating that she is obese (BMI >30). Obesity-related health conditions include the following: hypertension, diabetes mellitus and dyslipidemias. Obesity is unchanged. BMI is is above average; no BMI management plan is appropriate. We discussed low calorie, low carb based diet program, portion control and increasing exercise.     10. Bilateral lower extremity edema Check limited 2d echo. No structural signs of heart failure on 2d echo 1/4/22.           Plan:       1. Continue medications as previously prescribed.  2. Report any worsening symptoms.  3. Report any signs of bleeding.  4. Continue heart healthy diet and regular exercise as tolerated.   5. Follow up with PCP for blood pressure and cholesterol management and  routine lab work.  6. Follow up in one month, or sooner if needed.   7. Check limited 2d echo. If no change from previous echo, recommend pt to trial lymphedema pumps as suggested by Dr. Alejandre with vascular surgery.

## 2022-03-08 NOTE — PROGRESS NOTES
[facility] PHYSICIAN SERVICES  [facility] FAMILY MEDICINE  [address]  [facility] [place] [address]  Dept: [phone]  Dept Fax: [phone]  Loc: [phone]     Corrie Fagan is a 73 y.o. female who presents today for her medical conditions/complaintsas noted below. Corrie Fagan is c/o of Medicare AWV and Hip Pain (right)        HPI:   Patient is here for AWV and follow up. She reports 1 week hx of right hip pain and BLE edema, taking lasix 20 mg bid. She states was getting out of car, swung leg out of car, worse since then. No numbness, tingling. She has overall weakness. No back pain that is new. No chest pain. She has mild sob with prolonged walking. She has bruising on leg. No abdominal pain. No fatigue overall. She reports dark urine. Lab Results   Component Value Date    LABA1C 5.7 12/21/2021     No results found for: EAG  Diabetes Mellitus  Has been compliant with medications. No side effects of medications since last visit. No polyuria, polydipsia, or vision changes since last visit. No symptomatic episodes of hypoglycemia. Labs due in 3 months. Hypertension  Compliant with medications. No adverse effects from medication. No lightheadedness, palpitations, or chest pain. Hyperlipidemia  Tolerating current cholesterol medication without side effects. No body aches. Attempting to reduce processed sugar and cholesterol from diet.     Lab Results   Component Value Date    WBC 10.2 12/21/2021    HGB 12.4 12/21/2021    HCT 42.5 12/21/2021    .8 (H) 12/21/2021     12/21/2021             HPI    Past Medical History:   Diagnosis Date    Atrial flutter by electrocardiogram ([facility] [address]     Constipation     Coronary artery disease     Diabetes mellitus (HCC)     Essential hypertension     Hyperlipidemia     IBS (irritable bowel syndrome)     Iron deficiency anemia     Menopause     Paroxysmal SVT (supraventricular tachycardia) (HCC)     RA [facility]umatoid arthritis) [facility] [address]     Rheumatoid arthritis ([facility] [address]      Past Surgical History:   Procedure Laterality Date    CARDIAC SURGERY      CABG (Dr. Katerine Latham) [address]  2009    Stent     CHOLECYSTECTOMY      CORONARY ARTERY BYPASS GRAFT      DILATION AND CURETTAGE OF UTERUS N/A 2019    DILATATION AND CURETTAGE HYSTEROSCOPY W/ [address] performed by Erik Romero MD at [facility] OR    TOTAL KNEE ARTHROPLASTY         Family History   Problem Relation Age of Onset    Heart Attack Father     Prostate Cancer Father     Stroke Father     Heart Attack Brother     Stroke Brother     Breast Cancer Maternal Aunt     Cancer Paternal Uncle         Lung       Social History     Tobacco Use    Smoking status: Former Smoker     Packs/day: 0.50     Years: 26.00     Pack years: 13.00     Types: Cigarettes     Start date: 6/10/1974     Quit date: 2000     Years since quittin.4    Smokeless tobacco: Never Used   Substance Use Topics    Alcohol use: No      Current Outpatient Medications   Medication Sig Dispense Refill    ketoconazole (NIZORAL) 2 % cream Apply topically daily. 60 g 11    nitroGLYCERIN (NITROSTAT) 0.4 MG SL tablet PLACE 1 TABLET UNDER THE TONGUE EVERY 5 MINUTES AS NEEDED FOR CHEST PAIN 25 tablet 0    rosuvastatin (CRESTOR) 5 MG tablet Take 1 tablet by mouth nightly 30 tablet 3    blood glucose test strips (FREESTYLE LITE) strip Infuse 1 each intravenously 2 times daily As needed.  100 strip 3    pantoprazole (PROTONIX) 40 MG tablet TAKE 1 TABLET BY MOUTH 2 TIMES A  tablet 0    metFORMIN (GLUCOPHAGE) 500 MG tablet TAKE 1 TABLET BY MOUTH ONCE EVERY MORNING AND ONE TABLET EVERY EVENING 180 tablet 0    simvastatin (ZOCOR) 40 MG tablet TAKE 1 TABLET BY MOUTH NIGHTLY 90 tablet 0    losartan (COZAAR) 25 MG tablet TAKE 1 TABLET BY MOUTH DAILY 90 tablet 0    isosorbide mononitrate (IMDUR) 30 MG extended release tablet TAKE 1 TABLET BY MOUTH DAILY 90 tablet 0    FreeStyle Lancets MISC TEST SUGAR DAILY AS DIRECTED UP TO 4 TIMES A  each 3    fluticasone (FLONASE) 50 MCG/ACT nasal spray 2 sprays by Each Nostril route daily 16 g 0    glyBURIDE (DIABETA) 5 MG tablet Take 2 tablets every morning & 1 tablet every evening. 120 tablet 5    FREESTYLE LITE strip CHECK BLOOD GLUCOSE ONCE DAILY AS NEEDED 100 strip 3    potassium chloride (KLOR-CON M) 20 MEQ extended release tablet TAKE 1 TABLET BY MOUTH DAILY. 90 tablet 0    nystatin (MYCOSTATIN) 731463 UNIT/GM powder Apply topically 4 times daily. 30 g 5    Blood Glucose Monitoring Suppl (FREESTYLE INSULINX SYSTEM) w/Device KIT 1 each by Does not apply route daily 1 kit 0    blood glucose test strips (FREESTYLE INSULINX TEST) strip 1 each by In Vitro route daily As needed. 100 each 3    nystatin (MYCOSTATIN) 302259 UNIT/GM cream Apply topically 2 times daily. 30 g 2    Tofacitinib Citrate (XELJANZ XR) 11 MG TB24 Take 11 mg by mouth daily      furosemide (LASIX) 20 MG tablet Take 20 mg by mouth 2 times daily       apixaban (ELIQUIS) 5 MG TABS tablet Take 5 mg by mouth 2 times daily       amiodarone (PACERONE) 100 MG tablet Take 100 mg by mouth daily       FREESTYLE LANCETS MISC TEST SUGAR DAILY AS DIRECTED 100 each 2    predniSONE (DELTASONE) 5 MG tablet Take 5 mg by mouth 2 times daily Take 5 mg in the morning and 4 mg in the evening.  aspirin EC 81 MG EC tablet Take 81 mg by mouth daily      folic acid (FOLVITE) 1 MG tablet Take 1 mg by mouth daily       metoprolol tartrate (LOPRESSOR) 50 MG tablet TAKE 1 TABLET BY MOUTH 2 TIMES A  tablet 1    FreeStyle Lancets MISC USE AS DIRECTED 100 each 3     No current facility-administered medications for this visit.      Allergies   Allergen Reactions    Codeine Other (See Comments)     Chest pain    Albuterol      Rapid heart rate    Clindamycin/Lincomycin     Januvia [Sitagliptin] Swelling    Augmentin [Amoxicillin-Pot Clavulanate] Palpitations    Sulfa Antibiotics Rash       Health Maintenance   Topic Date Due    Hepatitis C screen  Never done    HIV screen  Never done    Shingles Vaccine (1 of 2) Never done    DEXA (modify frequency per FRAX score)  Never done    Diabetic foot exam  04/24/2019    Diabetic microalbuminuria test  11/18/2020    COVID-19 Vaccine (3 - Moderna risk 4-dose series) 04/10/2021    Diabetic retinal exam  06/22/2021    Flu vaccine (1) 09/01/2021    Annual Wellness Visit (AWV)  02/23/2022    Breast cancer screen  12/16/2022    A1C test (Diabetic or Prediabetic)  12/21/2022    Lipid screen  12/21/2022    TSH testing  12/21/2022    Potassium monitoring  12/21/2022    Creatinine monitoring  12/21/2022    Colorectal Cancer Screen  01/31/2023    Depression Screen  03/08/2023    Pneumococcal 65+ yrs at Risk Vaccine (2 of 2 - PCV13) 03/08/2023    Cervical cancer screen  09/13/2024    DTaP/Tdap/Td vaccine (3 - Td or Tdap) 11/26/2031    Hepatitis A vaccine  Aged Out    Hib vaccine  Aged Out    Meningococcal (ACWY) vaccine  Aged Out       Subjective:     Review of Systems   Constitutional: Negative for chills and fever. HENT: Negative for congestion. Respiratory: Negative for cough, chest tightness and shortness of breath. Cardiovascular: Negative for chest pain, palpitations and leg swelling. Gastrointestinal: Negative for abdominal pain, anal bleeding, constipation, diarrhea and nausea. Genitourinary: Negative for difficulty urinating. Musculoskeletal: Positive for arthralgias. Psychiatric/Behavioral: Negative. See HPI for visit specific review of symptoms. All others negative      Objective:   /60   Pulse 60   Temp 97.2 °F (36.2 °C)   Ht 5' 4\" (1.626 m)   Wt 206 lb (93.4 kg)   SpO2 98%   BMI 35.36 kg/m²    Physical Exam  Constitutional:       Appearance: She is well-developed. She is not ill-appearing. Eyes:      Pupils: Pupils are equal, round, and reactive to light. Cardiovascular:      Rate and Rhythm: Normal rate and regular rhythm. Heart sounds: No murmur heard. No friction rub. Pulmonary:      Effort: Pulmonary effort is normal. No respiratory distress. Breath sounds: Normal breath sounds. No wheezing or rales. Abdominal:      General: Bowel sounds are normal. There is no distension. Palpations: Abdomen is soft. Tenderness: There is no abdominal tenderness. There is no guarding or rebound. Musculoskeletal:      Cervical back: Normal range of motion and neck supple. Neurological:      Mental Status: She is alert. Psychiatric:         Behavior: Behavior normal.           Lab Review   No results found for this or any previous visit (from the past 672 hour(s)). Assessment & Plan: The following diagnoses and conditions are stable withno further orders unless indicated:  Yissel Simmons was seen today for medicare awv and hip pain. Diagnoses and all orders for this visit:    Medicare annual wellness visit, subsequent  See other note. Screening mammogram for breast cancer  -     STEVE DIGITAL SCREEN W OR WO CAD BILATERAL; Future    Post-menopause  -     DEXA BONE DENSITY 2 SITES; Future    Severe obesity (BMI 35.0-39. 9) with comorbidity ([address]  Work on diet and exercise, will follow. Right hip pain  -     XR HIP BILATERAL W AP PELVIS (2 VIEWS); Future  -     Cancel: XR HIP RIGHT (2-3 VIEWS); Future  Refer for xray right hip. Avoid activities that exacerbate pain. ?related to RA. Primary cutaneous anaplastic large cell lymphoma (HCC)  Stable. Followed by heme/Onc. Skin ulcer, limited to breakdown of skin ([address]  Resolved. Rheumatoid arthritis involving multiple sites, unspecified whether rheumatoid factor present ([address]  Stable.      Type 2 diabetes mellitus with hyperglycemia, without long-term current use of insulin (HCC)  -     Hemoglobin A1C; Future  -     MICROALBUMIN, RANDOM URINE (W/O CREATININE)  Stable, will follow. Continue to work on diet and exercise. Familial hypercholesterolemia  -     Comprehensive Metabolic Panel; Future  -     Lipid Panel; Future  Stable, will follow. Iron deficiency anemia, unspecified iron deficiency anemia type  -     CBC with Auto Differential; Future  Stable, continue same. Essential hypertension  -     TSH; Future  Blood pressure is stable. Continue current medications. Monitor ambulatory bp readings, if persistently >140/90, return to clinic. Other orders  -     Pneumococcal polysaccharide vaccine 23-valent greater than or equal to 1yo subcutaneous/IM  -     ketoconazole (NIZORAL) 2 % cream; Apply topically daily. Return in 3 months (on 6/8/2022). Discussed use, benefit, and side effects of prescribed medications. All patient questions answered. Pt voiced understanding. Reviewed health maintenance. Instructed to continue current medications, diet and exercise. Patient agreedwith treatment plan. Follow up as directed.

## 2022-03-08 NOTE — PROGRESS NOTES
Medicare Annual Wellness Visit    Rhea Fagan is here for Medicare AWV and Hip Pain (right)    Assessment & Plan   Medicare annual wellness visit, subsequent  Labs reviewed and discussed. Encouraged healthy diet and regular exercise. Immunizations UTD. See HPI for further details. Schedule DEXA, mammogram.     Screening mammogram for breast cancer  -     STEVE DIGITAL SCREEN W OR WO CAD BILATERAL; Future  Post-menopause  -     DEXA BONE DENSITY 2 SITES; Future  Severe obesity (BMI 35.0-39. 9) with comorbidity ([address]  Right hip pain  -     XR HIP BILATERAL W AP PELVIS (2 VIEWS); Future  Primary cutaneous anaplastic large cell lymphoma (HCC)  Skin ulcer, limited to breakdown of skin (HCC)  Rheumatoid arthritis involving multiple sites, unspecified whether rheumatoid factor present ([address]  Type 2 diabetes mellitus with hyperglycemia, without long-term current use of insulin ([address]      Recommendations for Preventive Services Due: see orders and patient instructions/AVS.  Recommended screening schedule for the next 5-10 years is provided to the patient in written form: see Patient Instructions/AVS.     Return in 3 months (on 6/8/2022). Pneumovax due.   cscope UTD. Mammogram due. DEXA due. Covid vaccine booster due. Tdap UTD. Pap UTD. Subjective   The following acute and/or chronic problems were also addressed today:  DIABETES MELLITUS, hip pain. Patient's complete Health Risk Assessment and screening values have been reviewed and are found in Flowsheets. The following problems were reviewed today and where indicated follow up appointments were made and/or referrals ordered.     Positive Risk Factor Screenings with Interventions:    Fall Risk:  Do you feel unsteady or are you worried about falling? : (!) yes  2 or more falls in past year?: no  Fall with injury in past year?: (!) yes     Fall Risk Interventions:    · Home safety tips provided              General Health and ACP:  General  In general, how would you say your health is?: (!) Poor  In the past 7 days, have you experienced any of the following: New or Increased Pain, New or Increased Fatigue, Loneliness, Social Isolation, Stress or Anger?: (!) Yes  Select all that apply: (!) New or Increased Pain  Do you get the social and emotional support that you need?: Yes    Advance Directives     Power of  Living Will ACP-Advance Directive ACP-Power of     Not on File Not on File Not on File Not on File      General Health Risk Interventions:  · Poor self-assessment of health status: several issues past few months    Health Habits/Nutrition:     Physical Activity: Inactive    Days of Exercise per Week: 0 days    Minutes of Exercise per Session: 0 min     Have you lost any weight without trying in the past 3 months?: No  Body mass index: (!) 35.36  Have you seen the dentist within the past year?: Yes    Health Habits/Nutrition Interventions:  · Nutritional issues:  will work on diet and exercise      ADLs:  In the past 7 days, did you need help from others to perform any of the following everyday activities: Eating, dressing, grooming, bathing, toileting, or walking/balance?: (!) Yes  Select all that apply: (!) Walking/Balance  In the past 7 days, did you need help from others to take care of any of the following: Laundry, housekeeping, banking/finances, shopping, telephone use, food preparation, transportation, or taking medications?: No    ADL Interventions:  · uses cane for balance          Objective   Vitals:    03/08/22 1054   BP: 112/60   Pulse: 60   Temp: 97.2 °F (36.2 °C)   SpO2: 98%   Weight: 206 lb (93.4 kg)   Height: 5' 4\" (1.626 m)      Body mass index is 35.36 kg/m².                Allergies   Allergen Reactions    Codeine Other (See Comments)     Chest pain    Albuterol      Rapid heart rate    Clindamycin/Lincomycin     Januvia [Sitagliptin] Swelling    Augmentin [Amoxicillin-Pot Clavulanate] Palpitations    Sulfa Antibiotics Rash     Prior to Visit Medications    Medication Sig Taking? Authorizing Provider   ketoconazole (NIZORAL) 2 % cream Apply topically daily. Yes Enma Carranza MD   nitroGLYCERIN (NITROSTAT) 0.4 MG SL tablet PLACE 1 TABLET UNDER THE TONGUE EVERY 5 MINUTES AS NEEDED FOR CHEST PAIN Yes Enma Carranza MD   rosuvastatin (CRESTOR) 5 MG tablet Take 1 tablet by mouth nightly Yes Enma Carranza MD   blood glucose test strips (FREESTYLE LITE) strip Infuse 1 each intravenously 2 times daily As needed. Yes Enma Carranza MD   pantoprazole (PROTONIX) 40 MG tablet TAKE 1 TABLET BY MOUTH 2 TIMES A DAY Yes MARYANNE Reeder   metFORMIN (GLUCOPHAGE) 500 MG tablet TAKE 1 TABLET BY MOUTH ONCE EVERY MORNING AND ONE TABLET EVERY EVENING Yes Enma Carranza MD   simvastatin (ZOCOR) 40 MG tablet TAKE 1 TABLET BY MOUTH NIGHTLY Yes Enma Carranza MD   losartan (COZAAR) 25 MG tablet TAKE 1 TABLET BY MOUTH DAILY Yes Enma Carranza MD   isosorbide mononitrate (IMDUR) 30 MG extended release tablet TAKE 1 TABLET BY MOUTH DAILY Yes Enma Carranza MD   FreeStyle Lancets MISC TEST SUGAR DAILY AS DIRECTED UP TO 4 TIMES A DAY Yes Enma Carranza MD   fluticasone (FLONASE) 50 MCG/ACT nasal spray 2 sprays by Each Nostril route daily Yes MARYANNE Saha   glyBURIDE (DIABETA) 5 MG tablet Take 2 tablets every morning & 1 tablet every evening. Yes MARYANNE Saha   FREESTYLE LITE strip CHECK BLOOD GLUCOSE ONCE DAILY AS NEEDED Yes Enma Carranza MD   potassium chloride (KLOR-CON M) 20 MEQ extended release tablet TAKE 1 TABLET BY MOUTH DAILY. Yes Enma Carranza MD   nystatin (MYCOSTATIN) 704304 UNIT/GM powder Apply topically 4 times daily.  Yes MARYANNE Lockett   Blood Glucose Monitoring Suppl (FREESTYLE INSULINX SYSTEM) w/Device KIT 1 each by Does not apply route daily Yes Enma Carranza MD   blood glucose test strips (FREESTYLE INSULINX TEST) strip 1 each by In Vitro route daily As needed. Yes Julieta Jeronimo MD   nystatin (MYCOSTATIN) 387366 UNIT/GM cream Apply topically 2 times daily. Yes MARYANNE Sullivan   Tofacitinib Citrate (XELJANZ XR) 11 MG TB24 Take 11 mg by mouth daily Yes Historical Provider, MD   furosemide (LASIX) 20 MG tablet Take 20 mg by mouth 2 times daily  Yes Historical Provider, MD   apixaban (ELIQUIS) 5 MG TABS tablet Take 5 mg by mouth 2 times daily  Yes Historical Provider, MD   amiodarone (PACERONE) 100 MG tablet Take 100 mg by mouth daily  Yes Historical Provider, MD   FREESTYLE LANCETS MISC TEST SUGAR DAILY AS DIRECTED Yes Julieta Jeronimo MD   predniSONE (DELTASONE) 5 MG tablet Take 5 mg by mouth 2 times daily Take 5 mg in the morning and 4 mg in the evening.  Yes Historical Provider, MD   aspirin EC 81 MG EC tablet Take 81 mg by mouth daily Yes Historical Provider, MD   folic acid (FOLVITE) 1 MG tablet Take 1 mg by mouth daily  Yes Historical Provider, MD   metoprolol tartrate (LOPRESSOR) 50 MG tablet TAKE 1 TABLET BY MOUTH 2 TIMES A DAY  MD Honorio NgStyle Lancets MISC USE AS DIRECTED  Julieta Jeronimo MD       CareTeam (Including outside providers/suppliers regularly involved in providing care):   Patient Care Team:  Julieta Jeronimo MD as PCP - General (Family Medicine)  Juli[facility] Jeronimo [facility]CP [facility]  Yolanda Bagley MD as Consulting Physician (Otolaryngology)  Savana Higginbotham MD as Consulting Physician (Vascular Surgery)    Reviewed and updated this visit:  Tobacco  Allergies  Meds  Med Hx  Surg Hx  Soc Hx  Fam Hx

## 2022-03-08 NOTE — PATIENT INSTRUCTIONS
Personalized Preventive Plan for Ale Fagan - 3/8/2022  Medicare offers a range of preventive health benefits. Some of the tests and screenings are paid in full while other may be subject to a deductible, co-insurance, and/or copay. Some of these benefits include a comprehensive review of your medical history including lifestyle, illnesses that may run in your family, and various assessments and screenings as appropriate. After reviewing your medical record and screening and assessments performed today your provider may have ordered immunizations, labs, imaging, and/or referrals for you. A list of these orders (if applicable) as well as your Preventive Care list are included within your After Visit Summary for your review. Other Preventive Recommendations:    · A preventive eye exam performed by an eye specialist is recommended every 1-2 years to screen for glaucoma; cataracts, macular degeneration, and other eye disorders. · A preventive dental visit is recommended every 6 months. · Try to get at least 150 minutes of exercise per week or 10,000 steps per day on a pedometer . · Order or download the FREE \"Exercise & Physical Activity: Your Everyday Guide\" from [employer]. Call 3-105.740.7121 or search [employer] online. · You need 3590-7940 mg of calcium and 7815-4017 IU of vitamin D per day. It is possible to meet your calcium requirement with diet alone, but a vitamin D supplement is usually necessary to meet this goal.  · When exposed to the sun, use a sunscreen that protects against both UVA and UVB radiation with an SPF of 30 or greater. Reapply every 2 to 3 hours or after sweating, drying off with a towel, or swimming. · Always wear a seat belt when traveling in a car. Always wear a helmet when riding a bicycle or motorcycle.

## 2022-04-01 NOTE — PROGRESS NOTES
"Chief Complaint  Leg Swelling (She states her legs have been swelling and are painful.  She states her PCP increased her Lasix about 3 weeks ago and has not made any difference.  She states had a heart echo done about a month ago that was normal. )    Subjective    History of Present Illness      Patient presents to [facility] for   Leg Swelling She states her legs have been swelling and are painful.  She states her PCP increased her Lasix about 3 weeks ago and has not made any difference.  She states had a heart echo done about a month ago that was normal.            Review of Systems   Musculoskeletal:        Bilateral LE edema    All other systems reviewed and are negative.      I have reviewed and agree with the HPI and ROS information as above.  Liz Johnston, APRN     Objective   Vital Signs:   /66   Pulse 70   Temp 97.4 °F (36.3 °C)   Resp 16   Ht 162.6 cm (64\")   Wt 95.3 kg (210 lb 3.2 oz)   SpO2 98%   BMI 36.08 kg/m²       Physical Exam  Constitutional:       Appearance: Normal appearance. She is well-developed.   HENT:      Head: Normocephalic and atraumatic.      Right Ear: Tympanic membrane, ear canal and external ear normal.      Left Ear: Tympanic membrane, ear canal and external ear normal.      Nose: Nose normal. No septal deviation, nasal tenderness or congestion.      Mouth/Throat:      Lips: Pink. No lesions.      Mouth: Mucous membranes are moist. No oral lesions.      Dentition: Normal dentition.      Pharynx: Oropharynx is clear. No pharyngeal swelling, oropharyngeal exudate or posterior oropharyngeal erythema.   Eyes:      General: Lids are normal. Vision grossly intact. No scleral icterus.        Right eye: No discharge.         Left eye: No discharge.      Extraocular Movements: Extraocular movements intact.      Conjunctiva/sclera: Conjunctivae normal.      Right eye: Right conjunctiva is not injected.      Left eye: Left conjunctiva is not " injected.      Pupils: Pupils are equal, round, and reactive to light.   Neck:      Thyroid: No thyroid mass.      Trachea: Trachea normal.   Cardiovascular:      Rate and Rhythm: Normal rate and regular rhythm.      Heart sounds: Normal heart sounds. No murmur heard.    No gallop.   Pulmonary:      Effort: Pulmonary effort is normal.      Breath sounds: Normal breath sounds and air entry. No wheezing, rhonchi or rales.   Abdominal:      General: There is no distension.      Palpations: Abdomen is soft. There is no mass.      Tenderness: There is no abdominal tenderness. There is no right CVA tenderness, left CVA tenderness, guarding or rebound.   Musculoskeletal:         General: No tenderness or deformity. Normal range of motion.      Cervical back: Full passive range of motion without pain, normal range of motion and neck supple.      Thoracic back: Normal.      Right lower le+ Pitting Edema present.      Left lower le+ Pitting Edema present.   Skin:     General: Skin is warm and dry.      Coloration: Skin is not jaundiced.      Findings: No rash.   Neurological:      Mental Status: She is alert and oriented to person, place, and time.      Cranial Nerves: Cranial nerves are intact.      Sensory: Sensation is intact.      Motor: Motor function is intact.      Coordination: Coordination is intact.      Gait: Gait is intact.      Deep Tendon Reflexes: Reflexes are normal and symmetric.   Psychiatric:         Mood and Affect: Mood and affect normal.         Judgment: Judgment normal.          JEOVANY:    PHQ-9 Total Score:      Result Review  Data Reviewed:                   Assessment and Plan      Problem List Items Addressed This Visit        Cardiac and Vasculature    Essential hypertension    Relevant Medications    furosemide (Lasix) 40 MG tablet       Endocrine and Metabolic    Type 2 diabetes mellitus without complication, without long-term current use of insulin ([facility])       Symptoms and Signs     Bilateral lower extremity edema - Primary    Relevant Medications    potassium chloride (K-DUR,KLOR-CON) 20 MEQ CR tablet    furosemide (Lasix) 40 MG tablet      Other Visit Diagnoses     Venous insufficiency        Relevant Medications    potassium chloride (K-DUR,KLOR-CON) 20 MEQ CR tablet    furosemide (Lasix) 40 MG tablet        Patient is seen today with lower extremity edema. Patient has been dealing with this issue ongoing. Patient recently had her Lasix increased to 60mg, in which she states has not helped her. She continues to have increased edema and pain. On reviewing patient chart she was recently seen by Dr. Alejandre which felt this was venous insufficiency. She also sees cardiology in which her heart in in good standing at this time with recent echo.     Plan  1. Increase Lasix to 160mg gradually. Increase Potassium. Educated patient on importance of daily weights and logs during this course.   2. Educated patient on at home BP monitoring while taking increased dose of lasix  3. Educated on low sodium diet, compression stockings, and at home lyphedema pumps in the future in which Dr. Alejandre recommended.  4. Patient educated that if no improvement or worsening symptoms over the weekend to seek ER for further treatment and possibly IV lasix.         Follow Up   Return Wednesday 04/06/2022.  Patient was given instructions and counseling regarding her condition or for health maintenance advice. Please see specific information pulled into the AVS if appropriate.

## 2022-04-06 PROBLEM — J30.89 ENVIRONMENTAL AND SEASONAL ALLERGIES: Status: ACTIVE | Noted: 2022-01-01

## 2022-04-06 PROBLEM — I87.2 VENOUS INSUFFICIENCY: Status: ACTIVE | Noted: 2022-01-01

## 2022-04-06 PROBLEM — E66.9 CLASS 1 OBESITY WITH BODY MASS INDEX (BMI) OF 33.0 TO 33.9 IN ADULT: Status: ACTIVE | Noted: 2022-01-01

## 2022-04-06 NOTE — PROGRESS NOTES
"Chief Complaint  Leg Swelling (BLE), Hypertension, and Cough    Subjective    History of Present Illness      Patient presents to [facility] for   follow up for BLE swelling and HTN. She also complains of drainage and cough today. She states that it started a day or so ago. She denies fever or any other concerning symptoms.     Leg Swelling  Associated symptoms include congestion and coughing.   Hypertension    Cough  Associated symptoms include postnasal drip.        Review of Systems   Constitutional: Negative.    HENT: Positive for congestion and postnasal drip.    Eyes: Negative.    Respiratory: Positive for cough.    Cardiovascular: Negative.    Gastrointestinal: Negative.    Endocrine: Negative.    Genitourinary: Negative.    Musculoskeletal: Negative.    Skin: Negative.    Allergic/Immunologic: Negative.    Neurological: Negative.    Hematological: Negative.    Psychiatric/Behavioral: Negative.        I have reviewed and agree with the HPI and ROS information as above.  Zoraida Drummond, APRN     Objective   Vital Signs:   /60 Comment: MANUAL  Pulse 73   Temp 97 °F (36.1 °C)   Resp 18   Ht 162.6 cm (64.02\")   Wt 89.4 kg (197 lb)   SpO2 100%   BMI 33.80 kg/m²         Physical Exam  Constitutional:       Appearance: Normal appearance. She is well-developed. She is obese.   HENT:      Head: Normocephalic and atraumatic.      Right Ear: External ear normal.      Left Ear: External ear normal.      Nose: Nose normal. No nasal tenderness or congestion.      Mouth/Throat:      Lips: Pink. No lesions.      Mouth: Mucous membranes are moist. No oral lesions.      Dentition: Normal dentition.      Pharynx: Oropharynx is clear. No pharyngeal swelling, oropharyngeal exudate or posterior oropharyngeal erythema.   Eyes:      General: Lids are normal. Vision grossly intact. No scleral icterus.        Right eye: No discharge.         Left eye: No discharge.      Extraocular " Movements: Extraocular movements intact.      Conjunctiva/sclera: Conjunctivae normal.      Right eye: Right conjunctiva is not injected.      Left eye: Left conjunctiva is not injected.      Pupils: Pupils are equal, round, and reactive to light.   Cardiovascular:      Rate and Rhythm: Normal rate and regular rhythm.      Heart sounds: Normal heart sounds. No murmur heard.    No gallop.   Pulmonary:      Effort: Pulmonary effort is normal.      Breath sounds: Normal breath sounds and air entry. No wheezing, rhonchi or rales.   Musculoskeletal:         General: No tenderness or deformity. Normal range of motion.      Cervical back: Full passive range of motion without pain, normal range of motion and neck supple.      Right lower le+ Edema present.      Left lower le+ Edema present.   Skin:     General: Skin is warm and dry.      Coloration: Skin is not jaundiced.      Findings: No rash.   Neurological:      Mental Status: She is alert and oriented to person, place, and time.      Cranial Nerves: Cranial nerves are intact.      Sensory: Sensation is intact.      Motor: Motor function is intact.      Coordination: Coordination is intact.      Gait: Gait is intact.   Psychiatric:         Attention and Perception: Attention normal.         Mood and Affect: Mood and affect normal.         Behavior: Behavior is not hyperactive. Behavior is cooperative.         Thought Content: Thought content normal.         Judgment: Judgment normal.          JEOVANY:    PHQ-9 Total Score:      Result Review  Data Reviewed:   The following data was reviewed by: MINNIE Casarez on 2022:    COMPREHENSIVE METABOLIC PANEL (2022 15:44 EDT)  CBC AND DIFFERENTIAL (2022 15:44 EDT)           Assessment and Plan      Problem List Items Addressed This Visit        Allergies and Adverse Reactions    Environmental and seasonal allergies       Cardiac and Vasculature    Essential hypertension    Venous insufficiency        Endocrine and Metabolic    Class 1 obesity with body mass index (BMI) of 33.0 to 33.9 in adult       Symptoms and Signs    Bilateral lower extremity edema - Primary      Other Visit Diagnoses     Cough            Patient here today for 1 week follow-up for bilateral lower extremity swelling.  Last week her Lasix dose was increased to work up to 80 mg twice daily.  She continues to have lower extremity swelling, but swelling has improved.  Her weight has decreased from 210 lbs to 197 lbs.  Edema to lower extremities are now 2+.  Patient feels as though her swelling has improved as well.  She does not have scales to weigh herself daily at home.  She also states she has not been able to use compression stockings d.t the stockings being too tight and too hard to get off and on. She states her blood pressure readings have been stable, but was concerned about taking the lasix if her bp was lower than normal at 116/60s.  She does complain of some sinus drainage and a cough as well.  She feels as though this is allergy related. Lungs clear on exam.     Plan:    1.  Continue same dose of Lasix at this time.  Recommended to get a set of scales to weigh daily as this is one of the best ways to assess for fluid retention.  Also recommended to wear compression stockings, suggested trying to find compression stockings that are not quite as tight.  Also recommended to elevate lower extremities when she is sitting.  Recommended to follow-up with her PCP in 1 month.  If she is not able to get a follow-up appointment with them she may follow-up at our office. F/u as needed if symptoms worsen.   2. Recommended for pt to continue to monitor bp. If she feels her bp is lower than normal for her, she could half the lasix dose for that dose if needed.   3. Offered treatment for cough and sinus drainage. Pt declines any treatment. Suggested Flonase nasal spray. Pt states she may consider trying this if no improvement in allergy symptoms.        Follow Up   Return in about 1 month (around 5/6/2022), or with PCP.  Patient was given instructions and counseling regarding her condition or for health maintenance advice. Please see specific information pulled into the AVS if appropriate.

## 2022-04-13 NOTE — PROGRESS NOTES
SUBJECTIVE:  Moses Fleischer is a 73 y.o. who presents today for Back Pain (lower back; lifted something heavy, but does not remember any specific injury)      HPI     Ms Fagan presents today with acute left lower back pain. This is concerning to her as she has just had retroperitoneal hemorrhage on left after a fall on anticoagulation. She has been several months and she has been cleared by vascular. She has not had another fall. She may have lifted something prior to the pain starting. No radicular symptoms. No signs of acute blood loss. Pain is worsened by movement.      Past Medical History:   Diagnosis Date    Atrial flutter by [facility]cardiogram ([facility] [address]     Constipation     Coronary artery disease     Diabetes mellitus (HCC)     Essential hypertension     Hyperlipidemia     IBS (irritable bowel syndrome)     Iron deficiency anemia     Menopause     Paroxysmal SVT (supraventricular tachycardia) (HCC)     RA (rheumatoid arthritis) (HCC)     Rheumatoid arthritis ([address]      Past Surgical History:   Procedure Laterality Date    CARDIAC SURGERY      CABG (Dr. Cinthia Reed) [address] [facility]  2009    Stent     CHOLECYSTECTOMY      CORONARY ARTERY BYPASS GRAFT      DILATION AND CURETTAGE OF UTERUS N/A 2019    DILATATION AND CURETTAGE HYSTEROSCOPY W/ Heydi Romero performed by Shalini Ken MD at [facility] OR    TOTAL KNEE ARTHROPLASTY       Family History   Problem Relation Age of Onset    Heart Attack Father     Prostate Cancer Father     Stroke Father     Heart Attack Brother     Stroke Brother     Breast Cancer Maternal Aunt     Cancer Paternal Uncle         Lung     Social History     Tobacco Use    Smoking status: Former Smoker     Packs/day: 0.50     Years: 26.00     Pack years: 13.00     Types: Cigarettes     Start date: 6/10/1974     Quit date: 2000     Years since quittin.5    Smokeless tobacco: Never Used   Substance Use Topics    Alcohol use: No     Current Outpatient Medications   Medication Sig Dispense Refill    losartan (COZAAR) 25 MG tablet TAKE 1 TABLET BY MOUTH DAILY 90 tablet 0    metFORMIN (GLUCOPHAGE) 500 MG tablet TAKE 1 TABLET BY MOUTH ONCE EVERY MORNING AND ONE TABLET EVERY EVENING 180 tablet 0    isosorbide mononitrate (IMDUR) 30 MG extended release tablet TAKE 1 TABLET BY MOUTH DAILY 90 tablet 0    rosuvastatin (CRESTOR) 5 MG tablet TAKE ONE TABLET BY MOUTH ONCE NIGHTLY 90 tablet 0    pantoprazole (PROTONIX) 40 MG tablet TAKE 1 TABLET BY MOUTH 2 TIMES A  tablet 0    potassium chloride (KLOR-CON M) 20 MEQ extended release tablet TAKE 1 TABLET BY MOUTH ONCE DAILY WHILE TAKING FUROSEMIDE 30 tablet 0    metoprolol tartrate (LOPRESSOR) 50 MG tablet TAKE 1 TABLET BY MOUTH 2 TIMES A  tablet 1    ketoconazole (NIZORAL) 2 % cream Apply topically daily. 60 g 11    nitroGLYCERIN (NITROSTAT) 0.4 MG SL tablet PLACE 1 TABLET UNDER THE TONGUE EVERY 5 MINUTES AS NEEDED FOR CHEST PAIN 25 tablet 0    blood glucose test strips (FREESTYLE LITE) strip Infuse 1 each intravenously 2 times daily As needed. 100 strip 3    FreeStyle Lancets MISC TEST SUGAR DAILY AS DIRECTED UP TO 4 TIMES A  each 3    glyBURIDE (DIABETA) 5 MG tablet Take 2 tablets every morning & 1 tablet every evening. 120 tablet 5    FREESTYLE LITE strip CHECK BLOOD GLUCOSE ONCE DAILY AS NEEDED 100 strip 3    nystatin (MYCOSTATIN) 731014 UNIT/GM powder Apply topically 4 times daily. 30 g 5    Blood Glucose Monitoring Suppl (FREESTYLE INSULINX SYSTEM) w/Device KIT 1 each by Does not apply route daily 1 kit 0    blood glucose test strips (FREESTYLE INSULINX TEST) strip 1 each by In Vitro route daily As needed. 100 each 3    nystatin (MYCOSTATIN) 035915 UNIT/GM cream Apply topically 2 times daily.  30 g 2    Tofacitinib Citrate (XELJANZ XR) 11 MG TB24 Take 11 mg by mouth daily      furosemide (LASIX) 20 MG tablet Take 20 mg by mouth 2 times daily       apixaban (ELIQUIS) 5 MG TABS tablet Take 5 mg by mouth 2 times daily       amiodarone (PACERONE) 100 MG tablet Take 100 mg by mouth daily       FREESTYLE LANCETS MISC TEST SUGAR DAILY AS DIRECTED 100 each 2    predniSONE (DELTASONE) 5 MG tablet Take 5 mg by mouth 2 times daily Take 5 mg in the morning and 4 mg in the evening.  aspirin EC 81 MG EC tablet Take 81 mg by mouth daily      folic acid (FOLVITE) 1 MG tablet Take 1 mg by mouth daily       FreeStyle Lancets MISC USE AS DIRECTED 100 each 3    simvastatin (ZOCOR) 40 MG tablet TAKE 1 TABLET BY MOUTH NIGHTLY (Patient not taking: Reported on 4/13/2022) 90 tablet 0    fluticasone (FLONASE) 50 MCG/ACT nasal spray 2 sprays by Each Nostril route daily (Patient not taking: Reported on 4/13/2022) 16 g 0     No current facility-administered medications for this visit. Allergies   Allergen Reactions    Codeine Other (See Comments)     Chest pain    Albuterol      Rapid heart rate    Clindamycin/Lincomycin     Januvia [Sitagliptin] Swelling    Augmentin [Amoxicillin-Pot Clavulanate] Palpitations    Sulfa Antibiotics Rash       Review of Systems   Constitutional: Negative for fatigue. Musculoskeletal: Positive for back pain. Neurological: Negative for dizziness and light-headedness. OBJECTIVE:  /64   Pulse 71   Temp 97.6 °F (36.4 °C) (Temporal)   Resp 18   Ht 5' 4\" (1.626 m)   Wt 199 lb 9.6 oz (90.5 kg)   SpO2 96%   BMI 34.26 kg/m²    Physical Exam  Vitals reviewed. Constitutional:       General: She is not in acute distress. Appearance: Normal appearance. She is not ill-appearing or toxic-appearing. Cardiovascular:      Rate and Rhythm: Normal rate. Heart sounds: No murmur heard. Pulmonary:      Effort: Pulmonary effort is normal. No respiratory distress. Breath sounds: No wheezing. Musculoskeletal:      Lumbar back: Tenderness and bony tenderness present. No swelling or signs of trauma. Normal range of motion.    Skin: Coloration: Skin is not pale. Neurological:      General: No focal deficit present. Mental Status: She is alert and oriented to person, place, and time. Mental status is at baseline. Psychiatric:         Mood and Affect: Mood normal.         Behavior: Behavior normal.         Thought Content: Thought content normal.         Judgment: Judgment normal.         ASSESSMENT/PLAN:  1. Retroperitoneal hemorrhage  Improved, but with new back pain. I suspect her back pain today is musculoskeletal as she is tender to touch. Check ultrasound of retroperitoneum out of caution. ER if worse. May use heat. Tylenol for pain. - US RETROPERITONEAL LIMITED; Future          Return for already scheduled.

## 2022-05-08 NOTE — ED PROVIDER NOTES
"Subjective   History of Present Illness    Patient is a 66-year-old female presenting to ED with leg swelling and shortness of breath.  PMH significant for non-insulin-dependent diabetes, MVP, CAD status post CABG, history of MI, history SVT, venous insufficiency bilateral lower extremities, atrial flutter, hypertension, RA, hypertension.  Patient states over the past 2 to 3 months she has had bilateral lower extremity edema which is progressively getting worse.  Patient states she is status post CABG in her left leg is always slightly more swollen however reports they are relatively symmetric.  Patient reports that they have become painful from the calf down describing \"they just feel like lead that I am carrying around.\"  Patient has been followed with her primary care provider who advised her to increase her Lasix however she felt that her blood pressures were getting too low and she was not having enough of a change in the swelling so she reduced herself back down to 20 mg of Lasix twice a day.  Patient states that she does not have a scale at home to weigh herself but feels that her clothes have been getting tighter from her abdomen down so she is concerned she is gaining weight.  Patient states over the past couple days she has felt like she cannot even get up and move around due to the pain and heaviness of the legs at which time she presents for further evaluation.  Patient did note that over the past 2 to 3 days she has had a very slight \"pin hole weeping\" from the legs and was concerned that they would start leaking more fluid.  Patient reports shortness of breath with exertion and describes that when she rests the shortness of breath stops.  Patient denies palpitations, chest pain/pressure/heaviness/tightness.    Records reviewed show patient last seen in the ED on 12/23/2021 for fall, traumatic peritoneal hematoma.    Patient routinely follows up outpatient with cardiology with last visit for an " echocardiogram on 4/8/2022 for bilateral lower extremity edema. Patient had a TTE on 4/8/2022 which showed: LVEF 51 to 55%, LV systolic function low normal, LV diastolic function normal.     Review of Systems   Constitutional: Negative.    HENT: Negative.    Eyes: Negative.    Respiratory: Positive for shortness of breath. Negative for cough and chest tightness.    Cardiovascular: Positive for leg swelling. Negative for chest pain and palpitations.   Gastrointestinal: Negative.  Negative for abdominal distention.   Genitourinary: Negative.    Musculoskeletal: Negative.  Negative for gait problem.   Skin: Negative.  Negative for wound.   Neurological: Negative.  Negative for weakness.   Psychiatric/Behavioral: Negative.    All other systems reviewed and are negative.      Past Medical History:   Diagnosis Date   • Abnormal stress test    • Atrial flutter ([facility])    • CAD (coronary artery disease)    • CAD in native artery     CABG x 3   • COVID-19 vaccine series completed 2021 [id number]   • Diverticulosis    • Essential hypertension     Tricuspid valve insufficiency, unspecified etiology    • History of adenomatous polyp of colon    • Hyperlipemia, mixed    • Hyperlipidemia    • Hypertension    • IBS (irritable bowel syndrome)    • MI, old    • Mitral valve prolapse    • Near syncope    • Obesity, morbid ([facility])    • Palpitations    • Paroxysmal SVT (supraventricular tachycardia) ([facility])    • Pedal edema    • Precordial pain    • Rheumatoid arthritis ([facility])    • S/P CABG x 3    • Type 2 diabetes mellitus ([facility])    • Venous insufficiency of both lower extremities        Allergies   Allergen Reactions   • Codeine Palpitations and Other (See Comments)     Chest pain   • Exenatide Arrhythmia   • Sulfa Antibiotics Hives and Rash     Reaction: hives   • Albuterol Palpitations     High heart rate   • Sitagliptin Swelling   • Amoxicillin-Pot Clavulanate Palpitations       Past Surgical History:   Procedure Laterality Date   •  APPENDECTOMY     • CARDIAC CATHETERIZATION Left 2012    Intensify medical therapy   • CARDIAC CATHETERIZATION Left 2002    surgery   • CHOLECYSTECTOMY  1975   • COLONOSCOPY N/A 2018    Diverticulosis in the entire examined colon; One 4mm tubular adenomatous polyp in the transverse colon; Non-bleeding internal hemorrhoids; Repeat 5 years   • COLONOSCOPY  02/10/2012    Diverticulosis in the entire examined colon; Non-bleeding internal hemorrhoids; Repeat 7 years   • COLONOSCOPY  2009    Diverticulosis; Repeat 7 years   • CORONARY ARTERY BYPASS GRAFT  2002    LIMA to LAD, SVG to D1, SVG to OM1 - x3   • CORONARY STENT PLACEMENT  09/2009    X1 - Stent to LAD, Drug Eluting   • DILATATION AND CURETTAGE     • ENDOSCOPY N/A 2018    Esophageal mucosal changes suspicious for short-segment De Anda's esophagus-biopsied; Small HH; Two gastric polyps-Clip (MR conditional) was placed-biopsied; Normal examined duodenum   • ENDOSCOPY  2015    Probable short-segment De Anda's esophagus-biopsied; HH; Gastritis-biopsied; Duodenal diverticulum; Repeat 1 year   • REPLACEMENT TOTAL KNEE Right        Family History   Problem Relation Age of Onset   • Cancer Father    • Coronary artery disease Father    • Asthma Father    • Heart failure Mother    • Kidney disease Mother    • Arthritis Mother    • Pancreatitis Sister    • Heart attack Brother    • Colon cancer Neg Hx    • Colon polyps Neg Hx        Social History     Socioeconomic History   • Marital status:    Tobacco Use   • Smoking status: Former Smoker     Years: 20.00     Types: Cigarettes     Quit date: 2000     Years since quittin.2   • Smokeless tobacco: Never Used   Vaping Use   • Vaping Use: Never used   Substance and Sexual Activity   • Alcohol use: No   • Drug use: No   • Sexual activity: Defer           Objective   Physical Exam  Vitals and nursing note reviewed.   Constitutional:       General: She is not in acute  distress.     Appearance: Normal appearance. She is well-developed and well-groomed. She is obese. She is not toxic-appearing or diaphoretic.   HENT:      Head: Normocephalic.      Mouth/Throat:      Mouth: Mucous membranes are moist.      Pharynx: Oropharynx is clear.   Eyes:      Conjunctiva/sclera: Conjunctivae normal.      Pupils: Pupils are equal, round, and reactive to light.   Cardiovascular:      Rate and Rhythm: Normal rate.   Pulmonary:      Effort: Pulmonary effort is normal.      Breath sounds: Normal breath sounds.   Abdominal:      General: Bowel sounds are normal.      Palpations: Abdomen is soft.   Musculoskeletal:         General: Normal range of motion.      Cervical back: Normal range of motion and neck supple.      Right lower leg: 3+ Pitting Edema present.      Left lower leg: 3+ Pitting Edema present.   Skin:     General: Skin is warm and dry.      Findings: No wound.   Neurological:      Mental Status: She is alert and oriented to person, place, and time.      Gait: Gait normal.   Psychiatric:         Attention and Perception: Attention normal.         Mood and Affect: Mood and affect normal.         Speech: Speech normal.         Behavior: Behavior normal. Behavior is cooperative.         Procedures           ED Course                                                 MDM  Number of Diagnoses or Management Options     Amount and/or Complexity of Data Reviewed  Clinical lab tests: ordered and reviewed  Tests in the radiology section of CPT®: reviewed and ordered  Tests in the medicine section of CPT®: reviewed and ordered  Decide to obtain previous medical records or to obtain history from someone other than the patient: yes  Review and summarize past medical records: yes  Discuss the patient with other providers: yes (Dr. Rickey Leo (attending))    Patient Progress  Patient progress: improved    Patient is a 66-year-old female presenting to ED with leg swelling and shortness of breath.   PMH significant for non-insulin-dependent diabetes, MVP, CAD status post CABG, history of MI, history SVT, venous insufficiency bilateral lower extremities, atrial flutter, hypertension, RA, hypertension.  CBC is normal blood cell 8.65, stable H&H, elevated relative neutrophils 88.7%, decreased 11 6 5.9% no further acute findings.  CMP with hyperglycemia 256, elevated alk phos 124, elevated total bilirubin 2.1.  PT/INR 17.5/1.5.  BNP WNL at 574.5, troponin negative.  COVID is negative. Chest x-ray showed: Mild to moderate cardiomegaly with no evidence pulmonary vascular congestion, postsurgical changes noted from median sternotomy.  Venous Doppler of the bilateral lower extremity showed: No thrombus visualized in bilateral lower extremities, distal FV was not visualized bilaterally due to edema.  Discussed with patient importance of taking her Lasix as it is prescribed by her provider, weighing herself daily.  Discussed ability to use compression stockings for symptomatic relief, importance of elevation.  Patient very concerned about her Lasix dose today and was requesting IV dose.  Discussed risks and benefits and she wishes to proceed with this.  Discussed with patient need to follow-up with her primary care provider within the next 24 to 48 hours.  Advised of strict return precautions need for immediate return to ED should she develop any new or worsening symptoms.  Patient ambulating at her baseline without difficulty with no further questions, concerns, needs at this time.  Patient stable for discharge at this time.    Final diagnoses:   Leg swelling       ED Disposition  ED Disposition     ED Disposition   Discharge    Condition   Stable    Comment   --             Teresa Contreras MD  [address] [place] [address] [address]  [place] [address]  [phone]    Schedule an appointment as soon as possible for a visit in 2 day(s)      [facility] Emergency Department  [address] [place] [address]  [id number] [phone]  Follow up  As needed    Jay Alejandre,   [address] [id number] [phone]    Schedule an appointment as soon as possible for a visit in 2 day(s)           Medication List      Changed    potassium chloride 10 MEQ CR tablet  Take 1 tablet by mouth Daily.  What changed: how much to take             Jossue Oliva PA-C  05/09/22 0857

## 2022-05-08 NOTE — DISCHARGE INSTRUCTIONS
As discussed please make sure that you are elevating your legs when you are resting above the level of your hip, please begin wearing compression stockings.  Please continue taking your Lasix as prescribed by your provider.  Please follow-up with your primary care provider to discuss a vascular surgeon referral for further evaluation.  Should you develop any new or worsening symptoms please return to the ER for further evaluation.

## 2022-05-25 NOTE — TELEPHONE ENCOUNTER
Patient called and states that she is continuing to have issues with LE edema. She states that she is doing all advised. Please advise further?

## 2022-05-26 NOTE — PROGRESS NOTES
[address] [address]  [address] [address]  Dept: [phone]  Dept Fax: [phone]  Loc: [phone]    Subjective:     Heydi Celeste is a 73 y.o. female who presents today for her medical conditions/complaints as noted below. Collene Belling Day is c/o of Edema (wound on L leg. area is not painful but does have drainage)        HPI:   Patient of Ivett Villalba MD presents with persistent lower extremity edema, left side greater than right side. She follows with vascular surgery, and also has seen Dr. Antwan Weiss, [facility], and the ED regarding the same in the last 2 months or so, all notes reviewed. She states that she has started to sleep with her feet propped up in recliner with 2 pillows underneath her left foot, so it is more elevated. This has helped quite a bit. However 2 weeks ago she was scratched by her father's dog, just below her left knee. She has noticed some clear drainage but not an excessive amount. She is keeping it covered was it with a Band-Aid, and applying Neosporin. Denies any shortness of breath or orthopnea. PHQ Scores 3/8/2022 2/22/2021 7/13/2020 2/14/2019 9/24/2018 11/14/2017   PHQ2 Score 0 0 0 0 1 1   PHQ9 Score 0 0 0 0 1 1     Interpretation of Total Score Depression Severity: 1-4 = Minimal depression, 5-9 = Mild depression, 10-14 = Moderate depression, 15-19 = Moderately severe depression, 20-27 = Severe depression     No flowsheet data found. Interpretation of SAUL-7 score: 5-9 = mild anxiety, 10-14 = moderate anxiety, 15+ = severe anxiety. Recommend referral to behavioral health for scores 10 or greater.      Past Medical History:   Diagnosis Date    [facility] fl[facility]ogram [facility]     Constipation     Coronary artery disease     Diabetes mellitus (HCC)     Essential hypertension     Hyperlipidemia     IBS (irritable bowel syndrome)     Iron deficiency anemia     Menopause     Paroxysmal SVT (supraventricular tachycardia) (HCC)     RA (rheumatoid arthritis) (HCC)     Rheumatoid arthritis ([address]      Past Surgical History:   Procedure Laterality Date    CARDIAC SURGERY      CABG (Dr. Marcelina Ann) [facility]  2009    Stent     CHOLECYSTECTOMY      CORONARY ARTERY BYPASS GRAFT      DILATION AND CURETTAGE OF UTERUS N/A 2019    DILATATION AND CURETTAGE HYSTEROSCOPY W/ [id number] [address] performed by [facility]ae [facility] at [facility] OR    TOTAL KNEE ARTHROPLASTY         Family History   Problem Relation Age of Onset    Heart Attack Father     Prostate Cancer Father     Stroke Father     Heart Attack Brother     Stroke Brother     Breast Cancer Maternal Aunt     Cancer Paternal Uncle         Lung       Social History     Tobacco Use    Smoking status: Former Smoker     Packs/day: 0.50     Years: 26.00     Pack years: 13.00     Types: Cigarettes     Start date: 6/10/1974     Quit date: 2000     Years since quittin.6    Smokeless tobacco: Never Used   Substance Use Topics    Alcohol use: No      Current Outpatient Medications   Medication Sig Dispense Refill    losartan (COZAAR) 25 MG tablet TAKE 1 TABLET BY MOUTH DAILY 90 tablet 0    metFORMIN (GLUCOPHAGE) 500 MG tablet TAKE 1 TABLET BY MOUTH ONCE EVERY MORNING AND ONE TABLET EVERY EVENING 180 tablet 0    isosorbide mononitrate (IMDUR) 30 MG extended release tablet TAKE 1 TABLET BY MOUTH DAILY 90 tablet 0    rosuvastatin (CRESTOR) 5 MG tablet TAKE ONE TABLET BY MOUTH ONCE NIGHTLY 90 tablet 0    pantoprazole (PROTONIX) 40 MG tablet TAKE 1 TABLET BY MOUTH 2 TIMES A  tablet 0    potassium chloride (KLOR-CON M) 20 MEQ extended release tablet TAKE 1 TABLET BY MOUTH ONCE DAILY WHILE TAKING FUROSEMIDE 30 tablet 0    metoprolol tartrate (LOPRESSOR) 50 MG tablet TAKE 1 TABLET BY MOUTH 2 TIMES A  tablet 1    ketoconazole (NIZORAL) 2 % cream Apply topically daily.  60 g 11    nitroGLYCERIN (NITROSTAT) 0.4 MG SL tablet PLACE 1 TABLET UNDER THE TONGUE EVERY 5 MINUTES AS NEEDED FOR CHEST PAIN 25 tablet 0    blood glucose test strips (FREESTYLE LITE) strip Infuse 1 each intravenously 2 times daily As needed. 100 strip 3    FreeStyle Lancets MISC TEST SUGAR DAILY AS DIRECTED UP TO 4 TIMES A  each 3    glyBURIDE (DIABETA) 5 MG tablet Take 2 tablets every morning & 1 tablet every evening. 120 tablet 5    FREESTYLE LITE strip CHECK BLOOD GLUCOSE ONCE DAILY AS NEEDED 100 strip 3    nystatin (MYCOSTATIN) 733382 UNIT/GM powder Apply topically 4 times daily. 30 g 5    Blood Glucose Monitoring Suppl (FREESTYLE INSULINX SYSTEM) w/Device KIT 1 each by Does not apply route daily 1 kit 0    blood glucose test strips (FREESTYLE INSULINX TEST) strip 1 each by In Vitro route daily As needed. 100 each 3    nystatin (MYCOSTATIN) 027709 UNIT/GM cream Apply topically 2 times daily. 30 g 2    Tofacitinib Citrate (XELJANZ XR) 11 MG TB24 Take 11 mg by mouth daily      furosemide (LASIX) 20 MG tablet Take 20 mg by mouth 2 times daily       apixaban (ELIQUIS) 5 MG TABS tablet Take 5 mg by mouth 2 times daily       amiodarone (PACERONE) 100 MG tablet Take 100 mg by mouth daily       FREESTYLE LANCETS MISC TEST SUGAR DAILY AS DIRECTED 100 each 2    predniSONE (DELTASONE) 5 MG tablet Take 5 mg by mouth 2 times daily Take 5 mg in the morning and 4 mg in the evening.  aspirin EC 81 MG EC tablet Take 81 mg by mouth daily      folic acid (FOLVITE) 1 MG tablet Take 1 mg by mouth daily       FreeStyle Lancets MISC USE AS DIRECTED 100 each 3    simvastatin (ZOCOR) 40 MG tablet TAKE 1 TABLET BY MOUTH NIGHTLY (Patient not taking: Reported on 4/13/2022) 90 tablet 0    fluticasone (FLONASE) 50 MCG/ACT nasal spray 2 sprays by Each Nostril route daily (Patient not taking: Reported on 4/13/2022) 16 g 0     No current facility-administered medications for this visit.      Allergies   Allergen Reactions    Codeine Other (See Comments) Chest pain    Albuterol      Rapid heart rate    Clindamycin/Lincomycin     Januvia [Sitagliptin] Swelling    Augmentin [Amoxicillin-Pot Clavulanate] Palpitations    Sulfa Antibiotics Rash       Review of Systems   Constitutional: Negative for chills and fever. HENT: Negative for facial swelling and mouth sores. Eyes: Negative for discharge and itching. Respiratory: Negative for apnea and stridor. Cardiovascular: Positive for leg swelling. Negative for chest pain and palpitations. Gastrointestinal: Negative for nausea and vomiting. Endocrine: Negative for cold intolerance and heat intolerance. Genitourinary: Negative for frequency and urgency. Musculoskeletal: Negative for arthralgias and back pain. Skin: Negative for color change and rash. See HPI for visit specific review of symptoms. All others negative      Objective:   /80   Pulse 70   Temp 97 °F (36.1 °C)   Ht 5' 4\" (1.626 m)   Wt 204 lb (92.5 kg)   SpO2 98%   BMI 35.02 kg/m²   Physical Exam  Constitutional:       Appearance: She is well-developed. HENT:      Head: Normocephalic and atraumatic. Right Ear: External ear normal.      Left Ear: External ear normal.   Eyes:      Conjunctiva/sclera: Conjunctivae normal.      Pupils: Pupils are equal, round, and reactive to light. Cardiovascular:      Rate and Rhythm: Normal rate and regular rhythm. Heart sounds: Normal heart sounds. Pulmonary:      Effort: Pulmonary effort is normal. No respiratory distress. Breath sounds: Normal breath sounds. Abdominal:      General: Bowel sounds are normal. There is no distension. Palpations: Abdomen is soft. Tenderness: There is no abdominal tenderness. Musculoskeletal:         General: Normal range of motion. Cervical back: Normal range of motion and neck supple. Legs:    Skin:     General: Skin is warm. Capillary Refill: Capillary refill takes less than 2 seconds.       Findings: No rash. Neurological:      Mental Status: She is alert and oriented to person, place, and time. Cranial Nerves: No cranial nerve deficit. Psychiatric:         Thought Content: Thought content normal.           Lab Review   No results found for this or any previous visit (from the past 672 hour(s)). Assessment & Plan: The following diagnoses and conditions are stable with no further orders unless indicated:    Patient Active Problem List    Diagnosis Date Noted    Retroperitoneal hemorrhage 01/03/2022    Weakness 01/03/2022    COVID-19 09/30/2021    Type 2 diabetes mellitus with hyperglycemia 07/26/2021    Malignant lymphoma, non-Hodgkin's ([address] 06/24/2021    Coronary artery disease     Skin ulcer, limited to breakdown of skin ([address] 03/18/2021    PMB (postmenopausal bleeding)     Diastolic dysfunction 28/83/3401    Morbid obesity with BMI of 40.0-44.9, adult ([address] 02/14/2019    Paroxysmal SVT (supraventricular tachycardia) ([address] 11/05/2018    Paroxysmal atrial fibrillation ([address] 10/19/2018    Anxiety 08/19/2018    Polyp of stomach 06/05/2018    Atrophic gastritis without hemorrhage 06/05/2018    Atrial flutter by electrocardiogram ([address] 03/26/2018    Rheumatoid arthritis ([address] 02/10/2018    IBS (irritable bowel syndrome) 02/10/2018    Familial hypercholesterolemia 02/10/2018    Leukocytosis 02/10/2018    Type 2 diabetes mellitus without complication, without long-term current use of insulin ([address] 01/23/2018    Peptic ulcer disease 12/10/2017    Iron deficiency anemia     Essential hypertension         Krystal Wyatt was seen today for edema. Diagnoses and all orders for this visit:    Pedal edema    Retroperitoneal hemorrhage    Skin ulcer, limited to breakdown of skin ([address]    Continue current care, advised her to let us know if drainage changes color or she has surrounding redness. She voiced understanding.     Health Maintenance   Topic Date Due    HIV screen  Never done    Hepatitis C screen  Never done    Shingles vaccine (1 of 2) Never done    DEXA (modify frequency per FRAX score)  Never done    Diabetic foot exam  04/24/2019    Diabetic microalbuminuria test  11/18/2020    COVID-19 Vaccine (3 - Moderna risk 4-dose series) 04/10/2021    Diabetic retinal exam  06/22/2021    Flu vaccine (Season Ended) 09/01/2022    Breast cancer screen  12/16/2022    A1C test (Diabetic or Prediabetic)  12/21/2022    Lipids  12/21/2022    Colorectal Cancer Screen  01/31/2023    Depression Screen  03/08/2023    Pneumococcal 65+ years Vaccine (3 - PCV) 03/08/2023    Annual Wellness Visit (AWV)  03/09/2023    Cervical cancer screen  09/13/2024    DTaP/Tdap/Td vaccine (3 - Td or Tdap) 11/26/2031    Hepatitis A vaccine  Aged Out    Hib vaccine  Aged Out    Meningococcal (ACWY) vaccine  Aged Out         Return for already scheduled follow-up. Discussed use, benefit, and side effects of prescribed medications. All patient questions answered. Pt voiced understanding. Reviewed health maintenance. Instructed to continue current medications, diet and exercise. Patient agreedwith treatment plan. Follow up as directed. Old records reviewed, where available.     Karma Estrada MD    Note:  dictated using Dragon software

## 2022-06-16 NOTE — DISCHARGE INSTRUCTIONS
Please continue to alternate heat and ice to your back doing gentle range of motion stretching after the heat.  Please continues an anti-inflammatory territories, topical lidocaine patches, Biofreeze, or muscle x-rays as needed to assist with the pain.  Please follow close with your primary care provider to assure improvement of your pain and discussion of physical therapy.  He will also need to follow-up with your primary care provider for close reevaluation of your incidental findings.  Today you have have continued improvement of the retroperitoneal hematoma.  You do have evidence of fluid around her liver known as ascites, and umbilical hernia, edema throughout your abdominal wall.  Should you develop any new or worsening symptoms please return to the ER for further evaluation.

## 2022-06-16 NOTE — PROGRESS NOTES
[facility] PHYSICIAN SERVICES  [facility] FAMILY MEDICINE  [address] [facility] [address]  [address] [address]  Dept: [phone]  Dept Fax: [phone]  Loc: [phone]     Vanesa Fagan is a 73 y.o. female who presents today for her medical conditions/complaintsas noted below. Vanesa Fagan is c/o of 3 Month Follow-Up and Back Pain (no injury)        HPI:   Patient is here for follow up. She has leg swelling improved. She has back pain for the past month. NO injury. Low back pain left side, radiates to right hip and leg. She has weakness, has to swing leg to get into car. She is taking tylenol regularly, has sharp pain. No numbness. She has bilateral hip xrays in March with early arthritis. She has retroperitoneal hematoma left side, has soreness, pain at site. Diabetes Mellitus  Has been compliant with medications. No side effects of medications since last visit. No polyuria, polydipsia, or vision changes since last visit. No symptomatic episodes of hypoglycemia. Lab Results   Component Value Date    LABA1C 9.3 (H) 06/09/2022     No results found for: EAG    Will increase glyburide to 2 tabs at night instead of one. HPI     Hypertension  Compliant with medications. No adverse effects from medication. No lightheadedness, palpitations, or chest pain.         Past Medical History:   Diagnosis Date    Atrial flutter by electrocardiogram ([address]     Constipation     Coronary artery disease     Diabetes mellitus ([address]     Essential hypertension     Hyperlipidemia     IBS (irritable bowel syndrome)     Iron deficiency anemia     Menopause     Paroxysmal SVT (supraventricular tachycardia) (HCC)     RA (rheumatoid arthritis) (HCC)     Rheumatoid arthritis ([address]      Past Surgical History:   Procedure Laterality Date    CARDIAC SURGERY  2000    CABG (Dr. James Griffith) [address]  2009    Stent     CHOLECYSTECTOMY      CORONARY ARTERY BYPASS GRAFT  2000    DILATION AND CURETTAGE OF UTERUS N/A 2019    DILATATION AND CURETTAGE HYSTEROSCOPY W/ Stalin Aleman performed by Jolene [facility] at [facility] OR    TOTAL KNEE ARTHROPLASTY         Family History   Problem Relation Age of Onset    Heart Attack Father     Prostate Cancer Father     Stroke Father     Heart Attack Brother     Stroke Brother     Breast Cancer Maternal Aunt     Cancer Paternal Uncle         Lung       Social History     Tobacco Use    Smoking status: Former Smoker     Packs/day: 0.50     Years: 26.00     Pack years: 13.00     Types: Cigarettes     Start date: 6/10/1974     Quit date: 2000     Years since quittin.7    Smokeless tobacco: Never Used   Substance Use Topics    Alcohol use: No      Current Outpatient Medications   Medication Sig Dispense Refill    lidocaine (LIDODERM) 5 % Place 1 patch onto the skin daily 12 hours on, 12 hours off. 30 patch 0    tiZANidine (ZANAFLEX) 4 MG tablet Take 1 tablet by mouth nightly as needed (muscle spasm) 30 tablet 2    rosuvastatin (CRESTOR) 5 MG tablet TAKE ONE TABLET BY MOUTH ONCE NIGHTLY 90 tablet 0    losartan (COZAAR) 25 MG tablet TAKE 1 TABLET BY MOUTH DAILY 90 tablet 0    isosorbide mononitrate (IMDUR) 30 MG extended release tablet TAKE 1 TABLET BY MOUTH DAILY 90 tablet 0    metFORMIN (GLUCOPHAGE) 500 MG tablet TAKE 1 TABLET BY MOUTH ONCE EVERY MORNING AND ONE TABLET EVERY EVENING 180 tablet 0    pantoprazole (PROTONIX) 40 MG tablet TAKE 1 TABLET BY MOUTH 2 TIMES A  tablet 0    metoprolol tartrate (LOPRESSOR) 50 MG tablet TAKE 1 TABLET BY MOUTH 2 TIMES A  tablet 1    ketoconazole (NIZORAL) 2 % cream Apply topically daily. 60 g 11    nitroGLYCERIN (NITROSTAT) 0.4 MG SL tablet PLACE 1 TABLET UNDER THE TONGUE EVERY 5 MINUTES AS NEEDED FOR CHEST PAIN 25 tablet 0    blood glucose test strips (FREESTYLE LITE) strip Infuse 1 each intravenously 2 times daily As needed.  100 strip 3    simvastatin (ZOCOR) 40 MG tablet TAKE 1 TABLET BY MOUTH NIGHTLY 90 tablet 0    FreeStyle Lancets MISC TEST SUGAR DAILY AS DIRECTED UP TO 4 TIMES A  each 3    fluticasone (FLONASE) 50 MCG/ACT nasal spray 2 sprays by Each Nostril route daily 16 g 0    glyBURIDE (DIABETA) 5 MG tablet Take 2 tablets every morning & 1 tablet every evening. 120 tablet 5    FREESTYLE LITE strip CHECK BLOOD GLUCOSE ONCE DAILY AS NEEDED 100 strip 3    nystatin (MYCOSTATIN) 799213 UNIT/GM powder Apply topically 4 times daily. 30 g 5    Blood Glucose Monitoring Suppl (FREESTYLE INSULINX SYSTEM) w/Device KIT 1 each by Does not apply route daily 1 kit 0    blood glucose test strips (FREESTYLE INSULINX TEST) strip 1 each by In Vitro route daily As needed. 100 each 3    nystatin (MYCOSTATIN) 027530 UNIT/GM cream Apply topically 2 times daily. 30 g 2    Tofacitinib Citrate (XELJANZ XR) 11 MG TB24 Take 11 mg by mouth daily      furosemide (LASIX) 20 MG tablet Take 20 mg by mouth 2 times daily       apixaban (ELIQUIS) 5 MG TABS tablet Take 5 mg by mouth 2 times daily       amiodarone (PACERONE) 100 MG tablet Take 100 mg by mouth daily       FREESTYLE LANCETS MISC TEST SUGAR DAILY AS DIRECTED 100 each 2    predniSONE (DELTASONE) 5 MG tablet Take 5 mg by mouth 2 times daily Take 5 mg in the morning and 4 mg in the evening.  aspirin EC 81 MG EC tablet Take 81 mg by mouth daily      folic acid (FOLVITE) 1 MG tablet Take 1 mg by mouth daily       potassium chloride (KLOR-CON M) 20 MEQ extended release tablet TAKE 1 TABLET BY MOUTH ONCE DAILY WHILE TAKING FUROSEMIDE 90 tablet 0    FreeStyle Lancets MISC USE AS DIRECTED 100 each 3     No current facility-administered medications for this visit.      Allergies   Allergen Reactions    Codeine Other (See Comments)     Chest pain    Albuterol      Rapid heart rate    Clindamycin/Lincomycin     Januvia [Sitagliptin] Swelling    Augmentin [Amoxicillin-Pot Clavulanate] Palpitations    Sulfa Antibiotics Rash       Health Maintenance   Topic Date Due    HIV screen  Never done    Hepatitis C screen  Never done    Shingles vaccine (1 of 2) Never done    DEXA (modify frequency per FRAX score)  Never done    Diabetic foot exam  04/24/2019    Diabetic microalbuminuria test  11/18/2020    COVID-19 Vaccine (3 - Moderna risk 4-dose series) 04/10/2021    Diabetic retinal exam  06/22/2021    Flu vaccine (Season Ended) 09/01/2022    A1C test (Diabetic or Prediabetic)  09/09/2022    Breast cancer screen  12/16/2022    Colorectal Cancer Screen  01/31/2023    Depression Screen  03/08/2023    Pneumococcal 65+ years Vaccine (3 - PCV) 03/08/2023    Annual Wellness Visit (AWV)  03/09/2023    Lipids  06/09/2023    Cervical cancer screen  09/13/2024    DTaP/Tdap/Td vaccine (3 - Td or Tdap) 11/26/2031    Hepatitis A vaccine  Aged Out    Hib vaccine  Aged Out    Meningococcal (ACWY) vaccine  Aged Out       Subjective:     Review of Systems   Constitutional: Negative for chills and fever. HENT: Negative for congestion. Respiratory: Negative for cough, chest tightness and shortness of breath. Cardiovascular: Negative for chest pain, palpitations and leg swelling. Gastrointestinal: Negative for abdominal pain, anal bleeding, constipation, diarrhea and nausea. Genitourinary: Negative for difficulty urinating. Musculoskeletal: Positive for back pain. Psychiatric/Behavioral: Negative. See HPI for visit specific review of symptoms. All others negative      Objective:   /74   Pulse 66   Temp 97.5 °F (36.4 °C)   Wt 209 lb (94.8 kg)   SpO2 94%   BMI 35.87 kg/m²    Physical Exam  Constitutional:       Appearance: She is well-developed. She is not ill-appearing. Eyes:      Pupils: Pupils are equal, round, and reactive to light. Cardiovascular:      Rate and Rhythm: Normal rate and regular rhythm. Heart sounds: No murmur heard. No friction rub.    Pulmonary:      Effort: Pulmonary effort is normal. No respiratory distress. Breath sounds: Normal breath sounds. No wheezing or rales. Abdominal:      General: Bowel sounds are normal. There is no distension. Palpations: Abdomen is soft. Tenderness: There is no abdominal tenderness. There is no guarding or rebound. Musculoskeletal:      Cervical back: Normal range of motion and neck supple. Neurological:      Mental Status: She is alert.    Psychiatric:         Behavior: Behavior normal.           Lab Review   Recent Results (from the past 672 hour(s))   Lipid Panel    Collection Time: 06/09/22  9:28 AM   Result Value Ref Range    Cholesterol, Total 133 (L) 160 - 199 mg/dL    Triglycerides 75 0 - 149 mg/dL    HDL 83 65 - 121 mg/dL    LDL Calculated 35 <100 mg/dL   TSH    Collection Time: 06/09/22  9:28 AM   Result Value Ref Range    TSH 3.310 0.270 - 4.200 uIU/mL   Hemoglobin A1C    Collection Time: 06/09/22  9:28 AM   Result Value Ref Range    Hemoglobin A1C 9.3 (H) 4.0 - 6.0 %   CBC with Auto Differential    Collection Time: 06/09/22  9:28 AM   Result Value Ref Range    WBC 7.3 4.8 - 10.8 K/uL    RBC 4.22 4.20 - 5.40 M/uL    Hemoglobin 13.0 12.0 - 16.0 g/dL    Hematocrit 42.8 37.0 - 47.0 %    .4 (H) 81.0 - 99.0 fL    MCH 30.8 27.0 - 31.0 pg    MCHC 30.4 (L) 33.0 - 37.0 g/dL    RDW 17.2 (H) 11.5 - 14.5 %    Platelets 285 958 - 466 K/uL    MPV 10.8 9.4 - 12.3 fL    Neutrophils % 76.7 (H) 50.0 - 65.0 %    Lymphocytes % 12.7 (L) 20.0 - 40.0 %    Monocytes % 8.4 0.0 - 10.0 %    Eosinophils % 0.8 0.0 - 5.0 %    Basophils % 0.4 0.0 - 1.0 %    Neutrophils Absolute 5.6 1.5 - 7.5 K/uL    Immature Granulocytes # 0.1 K/uL    Lymphocytes Absolute 0.9 (L) 1.1 - 4.5 K/uL    Monocytes Absolute 0.60 0.00 - 0.90 K/uL    Eosinophils Absolute 0.10 0.00 - 0.60 K/uL    Basophils Absolute 0.00 0.00 - 0.20 K/uL   Comprehensive Metabolic Panel    Collection Time: 06/09/22  9:28 AM   Result Value Ref Range    Sodium 144 136 - 145 mmol/L    Potassium 4.5 3.5 - 5.0 mmol/L    Chloride 104 98 - 111 mmol/L    CO2 25 22 - 29 mmol/L    Anion Gap 15 7 - 19 mmol/L    Glucose 90 74 - 109 mg/dL    BUN 15 8 - 23 mg/dL    CREATININE 0.5 0.5 - 0.9 mg/dL    GFR Non-African American >60 >60    GFR African American >59 >59    Calcium 9.6 8.8 - 10.2 mg/dL    Total Protein 6.9 6.6 - 8.7 g/dL    Albumin 4.4 3.5 - 5.2 g/dL    Total Bilirubin 1.9 (H) 0.2 - 1.2 mg/dL    Alkaline Phosphatase 110 (H) 35 - 104 U/L    ALT 19 5 - 33 U/L    AST 16 5 - 32 U/L               Assessment & Plan: The following diagnoses and conditions are stable withno further orders unless indicated:  Manju Ibarra was seen today for 3 month follow-up and back pain. Diagnoses and all orders for this visit:    Essential hypertension  Blood pressure is stable. Continue current medications. Monitor ambulatory bp readings, if persistently >140/90, return to clinic. Type 2 diabetes mellitus with hyperglycemia, with long-term current use of insulin ([facility]  Will continue same and follow. Familial hypercholesterolemia  Work on lifestyle modification. Rheumatoid arthritis involving multiple sites, unspecified whether rheumatoid factor present ([facility]  Stable, continue same. Acute left-sided low back pain with right-sided sciatica  -     XR LUMBAR SPINE (2-3 VIEWS); Future  -     XR THORACIC SPINE (2 VIEWS); Future  Refer for XR thoracic and lumbar spine. Lidoderm patch, zanaflex. If worsens, rtc. Retroperitoneal hematoma  -     US RETROPERITONEAL COMPLETE; Future  Refer for US. At high risk for falls    Other orders  -     lidocaine (LIDODERM) 5 %; Place 1 patch onto the skin daily 12 hours on, 12 hours off.  -     tiZANidine (ZANAFLEX) 4 MG tablet; Take 1 tablet by mouth nightly as needed (muscle spasm)            Return in about 3 months (around 9/16/2022). Discussed use, benefit, and side effects of prescribed medications. All patient questions answered. Pt voiced understanding.  Reviewed health maintenance. Instructed to continue current medications, diet and exercise. Patient agreedwith treatment plan. Follow up as directed. On the basis of positive falls risk screening, assessment and plan is as follows: home safety tips provided.

## 2022-06-16 NOTE — ED PROVIDER NOTES
"Subjective   History of Present Illness    Patient is a 66-year-old female presenting to ED with back pain after fall.  PMH significant for non-insulin-dependent diabetes, CAD, MVP, history of SVT, history RA, history A-flutter, hypertension.  Patient states just prior to arrival she was walking out of a restaurant with her sister using her cane which she has been using over the past few months for assistance with gait when she went to step off a small curb, lost her balance, and fell directly backwards striking the lower lumbar as well as left lower back on the concrete.  Patient denies head injury or loss of consciousness.  Patient states that she required assistance to get due to pain however since she has been ambulating with her cane at baseline.  Patient denies extremity numbness or weakness, saddle anesthesia, incontinence of bowel or bladder, urinary tension, or fevers.  Patient reports that she is concerned because over Thanksgiving she had a history of a left-sided retroperitoneal hematoma and is concerned that she is \"disrupted this.\"  Patient denies any medication use since her fall.  Patient reiterated that the fall was mechanical in nature when she tripped on the concrete and denies any preceding symptoms.    Records reviewed show patient last seen in the ED on 5/8/2022 for leg swelling.    Patient was recently seen in the outpatient setting earlier today for management of hypertension, diabetes, chronic illnesses.    Review of Systems   Constitutional: Negative.  Negative for fever.   HENT: Negative.    Eyes: Negative.    Respiratory: Negative.    Cardiovascular: Negative.    Gastrointestinal: Negative.    Genitourinary: Negative.  Negative for flank pain and hematuria.        Denies urinary retention   Musculoskeletal: Positive for back pain (lower, left sided). Negative for gait problem (pain with ambulation but no difficulty with gait) and neck pain.   Skin: Negative.  Negative for wound. "   Neurological: Negative.         Denies incontinence of bowel or bladder  Denies saddle anesthesia   Psychiatric/Behavioral: Negative.    All other systems reviewed and are negative.      Past Medical History:   Diagnosis Date   • Abnormal stress test    • Atrial flutter (HCC)    • CAD (coronary artery disease)    • CAD in native artery     CABG x 3   • COVID-19 vaccine series completed 2021 021221/[id number]   • Diverticulosis    • Essential hypertension     Tricuspid valve insufficiency, unspecified etiology    • History of adenomatous polyp of colon    • Hyperlipemia, mixed    • Hyperlipidemia    • Hypertension    • IBS (irritable bowel syndrome)    • MI, old    • Mitral valve prolapse    • Near syncope    • Obesity, morbid ([facility])    • Palpitations    • Paroxysmal SVT (supraventricular tachycardia) ([facility])    • Pedal edema    • Precordial pain    • Rheumatoid arthritis ([facility])    • S/P CABG x 3    • Type 2 diabetes mellitus ([facility])    • Venous insufficiency of both lower extremities        Allergies   Allergen Reactions   • Codeine Palpitations and Other (See Comments)     Chest pain   • Exenatide Arrhythmia   • Sulfa Antibiotics Hives and Rash     Reaction: hives   • Albuterol Palpitations     High heart rate   • Sitagliptin Swelling   • Amoxicillin-Pot Clavulanate Palpitations       Past Surgical History:   Procedure Laterality Date   • APPENDECTOMY     • CARDIAC CATHETERIZATION Left 06/18/2012    Intensify medical therapy   • CARDIAC CATHETERIZATION Left 05/05/2002    surgery   • CHOLECYSTECTOMY  1975   • COLONOSCOPY N/A 1/31/2018    Diverticulosis in the entire examined colon; One 4mm tubular adenomatous polyp in the transverse colon; Non-bleeding internal hemorrhoids; Repeat 5 years   • COLONOSCOPY  02/10/2012    Diverticulosis in the entire examined colon; Non-bleeding internal hemorrhoids; Repeat 7 years   • COLONOSCOPY  01/23/2009    Diverticulosis; Repeat 7 years   • CORONARY ARTERY BYPASS GRAFT  05/05/2002     LIMA to LAD, SVG to D1, SVG to OM1 - x3   • CORONARY STENT PLACEMENT  09/2009    X1 - Stent to LAD, Drug Eluting   • DILATATION AND CURETTAGE     • ENDOSCOPY N/A 2018    Esophageal mucosal changes suspicious for short-segment D Eanda's esophagus-biopsied; Small HH; Two gastric polyps-Clip (MR conditional) was placed-biopsied; Normal examined duodenum   • ENDOSCOPY  2015    Probable short-segment De Anda's esophagus-biopsied; HH; Gastritis-biopsied; Duodenal diverticulum; Repeat 1 year   • REPLACEMENT TOTAL KNEE Right        Family History   Problem Relation Age of Onset   • Cancer Father    • Coronary artery disease Father    • Asthma Father    • Heart failure Mother    • Kidney disease Mother    • Arthritis Mother    • Pancreatitis Sister    • Heart attack Brother    • Colon cancer Neg Hx    • Colon polyps Neg Hx        Social History     Socioeconomic History   • Marital status:    Tobacco Use   • Smoking status: Former Smoker     Years: 20.00     Types: Cigarettes     Quit date: 2000     Years since quittin.3   • Smokeless tobacco: Never Used   Vaping Use   • Vaping Use: Never used   Substance and Sexual Activity   • Alcohol use: No   • Drug use: No   • Sexual activity: Defer           Objective   Physical Exam  Vitals and nursing note reviewed.   Constitutional:       General: She is in acute distress (appears uncomfortable due to pain).      Appearance: Normal appearance. She is well-developed and well-groomed. She is obese.   HENT:      Head: Normocephalic.      Mouth/Throat:      Mouth: Mucous membranes are moist.      Pharynx: Oropharynx is clear.   Eyes:      General: No scleral icterus.     Conjunctiva/sclera: Conjunctivae normal.      Pupils: Pupils are equal, round, and reactive to light.   Cardiovascular:      Rate and Rhythm: Normal rate.   Pulmonary:      Effort: Pulmonary effort is normal.      Breath sounds: Normal breath sounds.   Abdominal:      General: Bowel sounds  are normal.      Palpations: Abdomen is soft.      Tenderness: There is no abdominal tenderness. There is no right CVA tenderness or left CVA tenderness.   Musculoskeletal:      Cervical back: Normal, normal range of motion and neck supple.      Thoracic back: Tenderness (left sided paraspinal) present. No bony tenderness. Normal range of motion.      Lumbar back: Tenderness (left sided paraspinal) and bony tenderness present. Positive left straight leg raise test. Negative right straight leg raise test.   Skin:     General: Skin is warm and dry.      Findings: No abrasion, bruising, signs of injury or laceration.   Neurological:      General: No focal deficit present.      Mental Status: She is alert and oriented to person, place, and time. Mental status is at baseline.      GCS: GCS eye subscore is 4. GCS verbal subscore is 5. GCS motor subscore is 6.      Sensory: Sensation is intact.      Motor: Motor function is intact.      Comments: 5/5 symmetric strength to bilateral UE  5/5 symmetric strength to bilateral LE     Psychiatric:         Attention and Perception: Attention normal.         Mood and Affect: Mood and affect normal.         Speech: Speech normal.         Behavior: Behavior normal. Behavior is cooperative.         Procedures           ED Course                                                 MDM  Number of Diagnoses or Management Options     Amount and/or Complexity of Data Reviewed  Tests in the radiology section of CPT®: reviewed and ordered  Tests in the medicine section of CPT®: ordered and reviewed  Decide to obtain previous medical records or to obtain history from someone other than the patient: yes  Review and summarize past medical records: yes  Discuss the patient with other providers: yes        Patient is a 66-year-old female presenting to ED with back pain after fall.  PMH significant for non-insulin-dependent diabetes, CAD, MVP, history of SVT, history RA, history A-flutter,  hypertension. CT of the thoracic spine showed: No acute osseous injury or malalignment in the thoracic spine, osteopenia, large layering right pleural effusion.  CT of the lumbar spine showed: No evidence of lumbar spine fracture, degenerative changes, atheromatous disease of aortoiliac vessels, partially seen retroperitoneal process on the left side findings are likely chronic but this area is only partially imaged on exam. CT abd/pel showed: CT the abdomen and pelvis showed: Small to moderate right pleural effusion, cardiomegaly, atheromatous disease, ascites around the liver, fatty atrophy of pancreas, interruption/absence of inferior vena cava below the kidneys with absence of common iliac and external iliac veins, prominent and tortuous gonadal veins extending down to the deep central pelvis as collaterals.  Fat and fluid density in the retroperitoneum on the left side smaller on the current study consistent with resolving retroperitoneal hematoma, diffuse body wall edema that is new, umbilical hernia containing a loop of small bowel does not appear to be causing any bowel obstruction.  Patient was given pain medication, Norflex, lidocaine patch and reported feeling much better and was able to ambulate at her baseline utilizing her cane.  Patient continued to have no evidence of focal neurological deficits and no evidence of cauda equina.  Patient urinated without difficulty while in ED.  Discussed with patient incidental findings and need for continued PCP follow-up, improving retroperitoneal hematoma, need for symptomatic continued treatment of back strain with follow-up through primary care provider.  Advised of strict return precautions any for immediate return to ED should she develop any new or worsening symptoms.  Patient.  Patient with no further questions, concerns, needs at this time and is stable for discharge.    Final diagnoses:   Fall, initial encounter   Lumbar back pain   Umbilical hernia  without obstruction and without gangrene   Retroperitoneal hematoma   Other ascites       ED Disposition  ED Disposition     ED Disposition   Discharge    Condition   Stable    Comment   --             Teresa Contreras MD  [address] [place] [address] [address]  [facility] [id number] [phone]    Schedule an appointment as soon as possible for a visit in 2 day(s)      [facility] Emergency Department  [address] [place] [id number] [phone]  Follow up  As needed         Medication List      New Prescriptions    lidocaine 5 %  Commonly known as: LIDODERM  Place 1 patch on the skin as directed by provider Daily. Remove & Discard patch within 12 hours or as directed by MD     tiZANidine 4 MG tablet  Commonly known as: Zanaflex  Take 1 tablet by mouth At Night As Needed for Muscle Spasms.        Changed    potassium chloride 10 MEQ CR tablet  Take 1 tablet by mouth Daily.  What changed: how much to take           Where to Get Your Medications      These medications were sent to [facility] - [address] - [phone]  - [phone]   [address] [place] [address]    Phone: [phone]   · lidocaine 5 %  · tiZANidine 4 MG tablet          Jossue Oliva PA-C  06/16/22 [id number]

## 2022-06-20 PROBLEM — R18.8 OTHER ASCITES: Status: ACTIVE | Noted: 2022-01-01

## 2022-06-20 NOTE — TELEPHONE ENCOUNTER
Pt notified. Pt unable to come in tomorrow due to it is hard to get around right now. Virtual scheduled for today.

## 2022-06-20 NOTE — TELEPHONE ENCOUNTER
No need for xrays, reviewed CT findings - She needs to follow up with Cardiology soon, looks like pleural effusion, edema and ascites may be heart failure related vs. Other. I need to see her soon to discuss. Can she come in tomorrow morning or do virtual on Monday to discuss?

## 2022-06-20 NOTE — TELEPHONE ENCOUNTER
Pt wanted to report after she left her appt last week she had a fall. Pt went to [facility] ER. They did a CT abd, Ct T-spine and CT L-spne. So the pt did not have the xrays you ordered. Please review to let me know if you still want her to have the other xrays.

## 2022-06-20 NOTE — PROGRESS NOTES
Shea Garcia is a 73 y.o. female evaluated via telephone on 6/20/2022 for ED Follow-up  . Documentation:  I communicated with the patient and/or health care decision maker about ED follow up from [facility].    She fell outside of restaurant and hit concrete, went to [facility] ED, records reviewed from 6-16-22. CT reviewed. She is taking lasix 20 mg instead of 40 mg dose since swelling in legs improved, about 2-4 weeks on 20 mg dose. CT abdomen showed small to moderate right pleural effusion, CM. AScites around liver. Fatty atrophy of pancreas. Resolving retroperitoneal hematoma, diffuse body wall edema that is new, other findings see report, not acute. CT thoracic spine no acute findings, just right pleural effusion. CT lumbar spine with no fx, degenerative changes seen. Hypertension  Compliant with medications. No adverse effects from medication. No lightheadedness, palpitations, or chest pain. Pain controlled currently. Details of this discussion including any medical advice provided: PLAN:    Increase lasix to 40 mg daily. Follow up with Dr. Judith terjo. Continue other meds. CT findings discuss and reviewed. Needs to follow up with Cards regarding CHF findings. Increase lasix. Monitor closely. Total Time: minutes: 11-20 minutes    Rosie Fagan was evaluated through a synchronous (real-time) audio encounter. Patient identification was verified at the start of the visit. She (or guardian if applicable) is aware that this is a billable service, which includes applicable co-pays. This visit was conducted with the patient's (and/or legal guardian's) verbal consent. She has not had a related appointment within my department in the past 7 days or scheduled within the next 24 hours. The patient was located at Home: [place] [address] [address].   The provider was located at Home ([place] [address]): [place].    Note: not billable if this call serves to triage the patient into an appointment for the relevant concern    Alannah Patel MD

## 2022-06-20 NOTE — TELEPHONE ENCOUNTER
. I am trying to schedule your 2:00 for a 1 month follow with you and I cannot find anything. I have asked the nurses for help and they can't find anything either. Any suggestions?

## 2022-06-27 PROBLEM — K68.3 RETROPERITONEAL HEMATOMA: Status: ACTIVE | Noted: 2022-01-01

## 2022-06-27 PROBLEM — K66.1 RETROPERITONEAL HEMATOMA: Status: ACTIVE | Noted: 2022-01-01

## 2022-06-27 NOTE — PROGRESS NOTES
Teresa Serna  [id number]  1955  66 y.o.  female    Referring Provider: Teresa Contreras MD    Reason for Follow-up Visit:   short term office follow up after recent encounter   Paroxysmal atrial fibrillation now atrial flutter   Saw Dr De Anda AF ablation tried but due to no good venous access not possible     Cardiac workup test results as below: 14-day outpatient cardiac telemetry   In past could not get ablation due to anatomy  Attempted by Dr De Anda   Last visit went to ER   Had fall   Had retroperitoneal hematoma   Accidental fall   Was transferred to [place]   Conservative treatment   Cardiac workup test results as below: echo     Subjective    Moderate chronic exertional shortness of breath on exertion relieved with rest  No significant cough or wheezing    No palpitations  No associated chest pain    No fever or chills  No significant expectoration    No hemoptysis  No presyncope or syncope    Tolerating current medications well with no untoward side effects   Compliant with prescribed medication regimen. Tries to adhere to cardiac diet.     Easy fatiguability and increasing tired  Feels energy levels running low      Moderate pedal edema that responds to diuretic therapy   No bleeding, excessive bruising  Has gait instability with increased fall risks    Had accidental fall as poor balance    Tolerating current medications well with no untoward side effects   Compliant with prescribed medication regimen. Tries to adhere to cardiac diet.        History of present illness:  Teresa Serna is a 66 y.o. yo female with paroxysmal atrial fibrillation who presents today for   Chief Complaint   Patient presents with   • Follow-up     ABN CT & RECENT ECHO    .    History  Past Medical History:   Diagnosis Date   • Abnormal stress test    • Atrial flutter (HCC)    • CAD (coronary artery disease)    • CAD in native artery     CABG x 3   • COVID-19 vaccine series completed 2021 [id number]   •  Diverticulosis    • Essential hypertension     Tricuspid valve insufficiency, unspecified etiology    • History of adenomatous polyp of colon    • Hyperlipemia, mixed    • Hyperlipidemia    • Hypertension    • IBS (irritable bowel syndrome)    • MI, old    • Mitral valve prolapse    • Near syncope    • Obesity, morbid ([facility])    • Palpitations    • Paroxysmal SVT (supraventricular tachycardia) ([facility])    • Pedal edema    • Precordial pain    • Rheumatoid arthritis ([facility])    • S/P CABG x 3    • Type 2 diabetes mellitus ([facility])    • Venous insufficiency of both lower extremities    ,   Past Surgical History:   Procedure Laterality Date   • APPENDECTOMY     • CARDIAC CATHETERIZATION Left 06/18/2012    Intensify medical therapy   • CARDIAC CATHETERIZATION Left 05/05/2002    surgery   • CHOLECYSTECTOMY  1975   • COLONOSCOPY N/A 1/31/2018    Diverticulosis in the entire examined colon; One 4mm tubular adenomatous polyp in the transverse colon; Non-bleeding internal hemorrhoids; Repeat 5 years   • COLONOSCOPY  02/10/2012    Diverticulosis in the entire examined colon; Non-bleeding internal hemorrhoids; Repeat 7 years   • COLONOSCOPY  01/23/2009    Diverticulosis; Repeat 7 years   • CORONARY ARTERY BYPASS GRAFT  05/05/2002    LIMA to LAD, SVG to D1, SVG to OM1 - x3   • CORONARY STENT PLACEMENT  09/2009    X1 - Stent to LAD, Drug Eluting   • DILATATION AND CURETTAGE     • ENDOSCOPY N/A 1/31/2018    Esophageal mucosal changes suspicious for short-segment De Anda's esophagus-biopsied; Small HH; Two gastric polyps-Clip (MR conditional) was placed-biopsied; Normal examined duodenum   • ENDOSCOPY  03/23/2015    Probable short-segment De Anda's esophagus-biopsied; HH; Gastritis-biopsied; Duodenal diverticulum; Repeat 1 year   • REPLACEMENT TOTAL KNEE Right    ,   Family History   Problem Relation Age of Onset   • Cancer Father    • Coronary artery disease Father    • Asthma Father    • Heart failure Mother    • Kidney disease Mother    •  Arthritis Mother    • Pancreatitis Sister    • Heart attack Brother    • Colon cancer Neg Hx    • Colon polyps Neg Hx    ,   Social History     Tobacco Use   • Smoking status: Former Smoker     Years: 20.00     Types: Cigarettes     Quit date: 2000     Years since quittin.4   • Smokeless tobacco: Never Used   Vaping Use   • Vaping Use: Never used   Substance Use Topics   • Alcohol use: No   • Drug use: No   ,     Medications  Current Outpatient Medications   Medication Sig Dispense Refill   • amiodarone (PACERONE) 100 MG tablet TAKE ONE TABLET BY MOUTH EVERY DAY 30 tablet 11   • aspirin 81 MG EC tablet Take 81 mg by mouth Daily.     • Eliquis 5 MG tablet tablet TAKE 1 TABLET BY MOUTH EVERY 12 HOURS 180 tablet 4   • folic acid (FOLVITE) 1 MG tablet Take 1 mg by mouth Daily.     • furosemide (Lasix) 40 MG tablet Please take 2 tabs in AM and 1 Qafternoon x2 days then increase to 2 tabs BID 28 tablet 0   • glyburide (DIAbeta) 5 MG tablet Take 5 mg by mouth 2 (Two) Times a Day With Meals. Take 2QAM & 2QPM     • isosorbide mononitrate (IMDUR) 30 MG 24 hr tablet Take 30 mg by mouth Daily.     • lidocaine (LIDODERM) 5 % Place 1 patch on the skin as directed by provider Daily. Remove & Discard patch within 12 hours or as directed by MD 6 each 0   • losartan (COZAAR) 25 MG tablet Take 25 mg by mouth Daily.     • metFORMIN (GLUCOPHAGE) 500 MG tablet Take 500 mg by mouth Daily With Breakfast.     • metoprolol tartrate (LOPRESSOR) 50 MG tablet Take 50 mg by mouth 2 (Two) Times a Day.     • nitroglycerin (NITROSTAT) 0.4 MG SL tablet Place 0.4 mg under the tongue Every 5 (Five) Minutes As Needed for Chest Pain. Take no more than 3 doses in 15 minutes.     • pantoprazole (PROTONIX) 40 MG EC tablet Take 1 tablet by mouth Every 12 (Twelve) Hours. 60 tablet 1   • potassium chloride (K-DUR) 10 MEQ CR tablet Take 1 tablet by mouth Daily. (Patient taking differently: Take 20 mEq by mouth Daily.) 90 tablet 3   • potassium  chloride (K-DUR,KLOR-CON) 20 MEQ CR tablet Please take 1 and 1/2 tab daily x1 week 11 tablet 0   • predniSONE (DELTASONE) 1 MG tablet Take 4 mg by mouth Daily.     • predniSONE (DELTASONE) 5 MG tablet Take 5 mg by mouth Every Morning.     • rosuvastatin (CRESTOR) 5 MG tablet Take 5 mg by mouth Daily.     • tiZANidine (Zanaflex) 4 MG tablet Take 1 tablet by mouth At Night As Needed for Muscle Spasms. 3 tablet 0   • Tofacitinib Citrate ER (XELJANZ) 11 MG tablet sustained-release 24 hour Take 11 mg by mouth Daily.     • verapamil SR (CALAN-SR) 240 MG CR tablet TAKE ONE TABLET BY MOUTH ONCE DAILY 90 tablet 3     No current facility-administered medications for this visit.     Results for orders placed during the hospital encounter of 04/08/22    Adult Transthoracic Echo Limited W/ Cont if Necessary Per Protocol    Interpretation Summary  · Left ventricular ejection fraction appears to be 51 - 55%. Left ventricular systolic function is low normal.  · Left ventricular diastolic function was normal.  · Left atrial volume is mildly increased.  · The right atrial cavity is severely dilated.  · Moderate to severe tricuspid valve regurgitation is present.  · The right ventricular cavity is moderate to severely dilated.  · Moderately reduced right ventricular systolic function noted.  · Estimated right ventricular systolic pressure from tricuspid regurgitation is normal (<35 mmHg).      Allergies:  Codeine, Exenatide, Sulfa antibiotics, Albuterol, Sitagliptin, and Amoxicillin-pot clavulanate    Review of Systems  Review of Systems   Constitutional: Positive for malaise/fatigue.   HENT: Negative.    Eyes: Negative.    Cardiovascular: Positive for dyspnea on exertion, leg swelling and palpitations. Negative for chest pain, claudication, cyanosis, irregular heartbeat, near-syncope, orthopnea, paroxysmal nocturnal dyspnea and syncope.   Respiratory: Negative.    Endocrine: Negative.    Hematologic/Lymphatic: Negative.    Skin:  "Negative.    Musculoskeletal: Positive for arthritis, back pain and joint pain.   Gastrointestinal: Negative for anorexia.   Genitourinary: Negative.    Neurological: Positive for dizziness and weakness.   Psychiatric/Behavioral: Negative.         Objective     Physical Exam:  /72   Pulse 70   Ht 162.6 cm (64\")   Wt 93 kg (205 lb)   SpO2 97%   BMI 35.19 kg/m²   Physical Exam   Constitutional: She appears well-developed.   HENT:   Head: Normocephalic.   Neck: Normal carotid pulses and no JVD present. No tracheal tenderness present. Carotid bruit is not present. No tracheal deviation present.   Cardiovascular: Regular rhythm and normal pulses.   Murmur heard.   Systolic murmur is present with a grade of 2/6.  Pulmonary/Chest: Effort normal. No stridor. She has no wheezes.   Abdominal: Soft. She exhibits no distension. There is no abdominal tenderness.   Musculoskeletal:      Right lower le+ Pitting Edema present.      Left lower le+ Pitting Edema present.     Vascular Status -  Her right foot exhibits abnormal foot edema. Her left foot exhibits abnormal foot edema.  Neurological: She is alert. No cranial nerve deficit or sensory deficit.   Skin: Skin is warm.   Psychiatric: Her speech is normal and behavior is normal.      Results Review:    IMPRESSION:  1. There is a large left retroperitoneal hemorrhage with active contrast  extravasation indicating active bleeding.  2. 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  3. No splenic or left renal injury. No acute regional bony pathology  localized.   4. Periumbilical hernia defect containing nondilated nonobstructed small  bowel.  5. Small hiatal hernia.     Comments: A preliminary report is issued to the ER by the [facility]  radiology service. I agree with this preliminary impression. Patient  transferred to outside hospital for appropriate care.  This report was finalized on 2021 07:43 " by Dr. Kay Emanuel MD.    Results for orders placed during the hospital encounter of 04/08/22    Adult Transthoracic Echo Limited W/ Cont if Necessary Per Protocol    Interpretation Summary  · Left ventricular ejection fraction appears to be 51 - 55%. Left ventricular systolic function is low normal.  · Left ventricular diastolic function was normal.  · Left atrial volume is mildly increased.  · The right atrial cavity is severely dilated.  · Moderate to severe tricuspid valve regurgitation is present.  · The right ventricular cavity is moderate to severely dilated.  · Moderately reduced right ventricular systolic function noted.  · Estimated right ventricular systolic pressure from tricuspid regurgitation is normal (<35 mmHg).      Diagnoses and all orders for this visit:    1. Bilateral lower extremity edema (Primary)  -     Ambulatory Referral to Vascular Surgery    2. Coronary artery disease involving native coronary artery of native heart without angina pectoris    3. S/P CABG x 3 2000 Dr Larsen    4. S/P coronary artery stent placement    5. Type 2 diabetes mellitus without complication, without long-term current use of insulin ([facility])    6. Nonrheumatic tricuspid valve regurgitation  -     Ambulatory Referral to Cardiology    7. MCCAULEY (dyspnea on exertion)  -     CBC & Differential; Future  -     Comprehensive Metabolic Panel; Future  -     BNP; Future  -     TSH; Future    8. PAF (paroxysmal atrial fibrillation) (HCC)  -     Ambulatory Referral to Structural Heart - [place]    9. At high risk for falls  -     Ambulatory Referral to Structural Heart - [place]    10. Venous insufficiency  -     Ambulatory Referral to Vascular Surgery    11. Easy fatigability  -     TSH; Future            Plan      Watchman device referral as significant stroke risk and bleeding risk  Some information provided including brochure  Further discussion encouraged in the structural heart disease clinic      (Possibly may have  difficulty with femoral venous access as Dr De Anda unable to access bilateral femoral veins for AF ablation)    Will check BNP again to see if may benefit from [facility]   Refer for Tricuspid clip evaluation     Orders Placed This Encounter   Procedures   • Comprehensive Metabolic Panel     Standing Status:   Future     Standing Expiration Date:   6/27/2023     Order Specific Question:   Release to patient     Answer:   Immediate   • BNP     Standing Status:   Future     Standing Expiration Date:   6/27/2023     Order Specific Question:   Release to patient     Answer:   Immediate   • TSH     Standing Status:   Future     Standing Expiration Date:   6/27/2023     Order Specific Question:   Release to patient     Answer:   Immediate   • Ambulatory Referral to Cardiology     Referral Priority:   Routine     Referral Type:   Consultation     Referral Reason:   Specialty Services Required     Requested Specialty:   Cardiology     Number of Visits Requested:   1   • Ambulatory Referral to Structural Heart - [place]     Referral Priority:   Routine     Referral Type:   Consultation     Referral Reason:   Specialty Services Required     Number of Visits Requested:   1   • Ambulatory Referral to Vascular Surgery     Referral Priority:   Routine     Referral Type:   Consultation     Referral Reason:   Specialty Services Required     Requested Specialty:   Vascular Surgery     Number of Visits Requested:   1   • CBC & Differential     Standing Status:   Future     Standing Expiration Date:   6/27/2023     Order Specific Question:   Manual Differential     Answer:   No     Order Specific Question:   Release to patient     Answer:   Immediate        Weigh yourself frequently, at least weekly, preferably daily, call me if more than 2 pounds a day or 5 pounds a week weight gain.  Flexible diuretic dosing     Patient expressed understanding  Encouraged and answered all questions   Discussed with the patient and all questioned  fully answered. She will call me if any problems arise.   Discussed results of prior testing with patient : echo      I support the patient's decision to take the Covid -19 vaccine   Had required complete course   No major issues   Now fully immunized    Recommend booster       Check BP and heart rates twice daily initially till blood pressures and heart rates under good control and then at least 3x / week,   If blood pressures continue to be well-controlled then can check week a month  at home and bring a recording for review next visit  If BP >130/85 or < 100/60 persistently over 3 reading 30 mins apart or if heart rates persistently above 100 bpm or less than 55 bpm call sooner for evaluation and advise     Follow up with Sophie HARTMAN , [place] HARTMAN, Megha Mcgill PA-C  or myself           Return in about 6 weeks (around 8/8/2022).

## 2022-06-28 NOTE — PROGRESS NOTES
[facility] PHYSICIAN SERVICES  [facility] FAMILY MEDICINE  [address] [address]  Dept: [phone]  Dept Fax: [phone]  Loc: [phone]    oRd Fagan is a 73 y.o. female who presents today for her medical conditions/complaints as noted below. Rod Fagan is c/o of Other (fluid coming out of the sores in her leg )        HPI:   She presents for right leg swelling and drainage. She reports falling a couple weeks ago and not sure what she hit her leg on but scrapped it. She has been cleansing with peroxide and applying Neosporin. She also states she has been taking Lasix 40mg daily but did not take today due to long car ride here.    HPI   Chief Complaint   Patient presents with    Other     fluid coming out of the sores in her leg      Past Medical History:   Diagnosis Date    Atrial flutter by electrocardiogram ([address]     Constipation     Coronary artery disease     Diabetes mellitus ([address]     Essential hypertension     Hyperlipidemia     IBS (irritable bowel syndrome)     Iron deficiency anemia     Menopause     Paroxysmal SVT (supraventricular tachycardia) (HCC)     RA (rheumatoid arthritis) ([address]     Rheumatoid arthritis ([address]       Past Surgical History:   Procedure Laterality Date    CARDIAC SURGERY  2000    CABG (Dr. Luciana Momin) [address] [facility]  2009    Stent     CHOLECYSTECTOMY      CORONARY ARTERY BYPASS GRAFT  2000    DILATION AND CURETTAGE OF UTERUS N/A 8/16/2019    DILATATION AND CURETTAGE HYSTEROSCOPY W/ Virgel Quiet performed by Mary Bustillo MD at [address] [place] [address] 6/28/2022 6/16/2022 5/26/2022 4/13/2022 3/8/2022 2/21/0479   SYSTOLIC 996 452 001 299 066 263   DIASTOLIC 78 74 80 64 60 80   Site - - - - - -   Pulse 65 66 70 71 60 82   Temp 97.3 97.5 97 97.6 97.2 98   Resp - - - 18 - -   SpO2 97 94 98 96 98 98   Weight 208 lb 209 lb 204 lb 199 lb 9.6 oz 206 lb 198 lb   Height 5' 4\" - 5' 4\" 5' 4\" 5' 4\" 5' 4\" Body mass index 35.7 kg/m2 - 35.01 kg/m2 34.26 kg/m2 35.36 kg/m2 33.98 kg/m2   Some recent data might be hidden       Family History   Problem Relation Age of Onset    Heart Attack Father     Prostate Cancer Father     Stroke Father     Heart Attack Brother     Stroke Brother     Breast Cancer Maternal Aunt     Cancer Paternal Uncle         Lung       Social History     Tobacco Use    Smoking status: Former Smoker     Packs/day: 0.50     Years: 26.00     Pack years: 13.00     Types: Cigarettes     Start date: 6/10/1974     Quit date: 2000     Years since quittin.7    Smokeless tobacco: Never Used   Substance Use Topics    Alcohol use: No      Current Outpatient Medications on File Prior to Visit   Medication Sig Dispense Refill    potassium chloride (KLOR-CON M) 20 MEQ extended release tablet TAKE 1 TABLET BY MOUTH ONCE DAILY WHILE TAKING FUROSEMIDE 90 tablet 0    lidocaine (LIDODERM) 5 % Place 1 patch onto the skin daily 12 hours on, 12 hours off. 30 patch 0    tiZANidine (ZANAFLEX) 4 MG tablet Take 1 tablet by mouth nightly as needed (muscle spasm) 30 tablet 2    rosuvastatin (CRESTOR) 5 MG tablet TAKE ONE TABLET BY MOUTH ONCE NIGHTLY 90 tablet 0    losartan (COZAAR) 25 MG tablet TAKE 1 TABLET BY MOUTH DAILY 90 tablet 0    isosorbide mononitrate (IMDUR) 30 MG extended release tablet TAKE 1 TABLET BY MOUTH DAILY 90 tablet 0    metFORMIN (GLUCOPHAGE) 500 MG tablet TAKE 1 TABLET BY MOUTH ONCE EVERY MORNING AND ONE TABLET EVERY EVENING 180 tablet 0    pantoprazole (PROTONIX) 40 MG tablet TAKE 1 TABLET BY MOUTH 2 TIMES A  tablet 0    metoprolol tartrate (LOPRESSOR) 50 MG tablet TAKE 1 TABLET BY MOUTH 2 TIMES A  tablet 1    ketoconazole (NIZORAL) 2 % cream Apply topically daily.  60 g 11    nitroGLYCERIN (NITROSTAT) 0.4 MG SL tablet PLACE 1 TABLET UNDER THE TONGUE EVERY 5 MINUTES AS NEEDED FOR CHEST PAIN 25 tablet 0    blood glucose test strips (FREESTYLE LITE) strip Infuse 1 each intravenously 2 times daily As needed. 100 strip 3    simvastatin (ZOCOR) 40 MG tablet TAKE 1 TABLET BY MOUTH NIGHTLY 90 tablet 0    FreeStyle Lancets MISC TEST SUGAR DAILY AS DIRECTED UP TO 4 TIMES A  each 3    fluticasone (FLONASE) 50 MCG/ACT nasal spray 2 sprays by Each Nostril route daily 16 g 0    glyBURIDE (DIABETA) 5 MG tablet Take 2 tablets every morning & 1 tablet every evening. 120 tablet 5    FREESTYLE LITE strip CHECK BLOOD GLUCOSE ONCE DAILY AS NEEDED 100 strip 3    nystatin (MYCOSTATIN) 586266 UNIT/GM powder Apply topically 4 times daily. 30 g 5    Blood Glucose Monitoring Suppl (FREESTYLE INSULINX SYSTEM) w/Device KIT 1 each by Does not apply route daily 1 kit 0    blood glucose test strips (FREESTYLE INSULINX TEST) strip 1 each by In Vitro route daily As needed. 100 each 3    nystatin (MYCOSTATIN) 112392 UNIT/GM cream Apply topically 2 times daily. 30 g 2    Tofacitinib Citrate (XELJANZ XR) 11 MG TB24 Take 11 mg by mouth daily      furosemide (LASIX) 20 MG tablet Take 20 mg by mouth 2 times daily       apixaban (ELIQUIS) 5 MG TABS tablet Take 5 mg by mouth 2 times daily       amiodarone (PACERONE) 100 MG tablet Take 100 mg by mouth daily       FREESTYLE LANCETS MISC TEST SUGAR DAILY AS DIRECTED 100 each 2    predniSONE (DELTASONE) 5 MG tablet Take 5 mg by mouth 2 times daily Take 5 mg in the morning and 4 mg in the evening.  aspirin EC 81 MG EC tablet Take 81 mg by mouth daily      folic acid (FOLVITE) 1 MG tablet Take 1 mg by mouth daily       FreeStyle Lancets MISC USE AS DIRECTED 100 each 3     No current facility-administered medications on file prior to visit.      Allergies   Allergen Reactions    Codeine Other (See Comments)     Chest pain    Albuterol      Rapid heart rate    Clindamycin/Lincomycin     Januvia [Sitagliptin] Swelling    Augmentin [Amoxicillin-Pot Clavulanate] Palpitations    Sulfa Antibiotics Rash       Health Maintenance   Topic Date Due    HIV screen  Never done    Hepatitis C screen  Never done    Shingles vaccine (1 of 2) Never done    DEXA (modify frequency per FRAX score)  Never done    Diabetic foot exam  04/24/2019    Diabetic microalbuminuria test  11/18/2020    COVID-19 Vaccine (3 - Moderna risk 4-dose series) 04/10/2021    Diabetic retinal exam  06/22/2021    Flu vaccine (Season Ended) 09/01/2022    A1C test (Diabetic or Prediabetic)  09/09/2022    Breast cancer screen  12/16/2022    Colorectal Cancer Screen  01/31/2023    Depression Screen  03/08/2023    Pneumococcal 65+ years Vaccine (3 - PCV) 03/08/2023    Annual Wellness Visit (AWV)  03/09/2023    Lipids  06/09/2023    Cervical cancer screen  09/13/2024    DTaP/Tdap/Td vaccine (3 - Td or Tdap) 11/26/2031    Hepatitis A vaccine  Aged Out    Hib vaccine  Aged Out    Meningococcal (ACWY) vaccine  Aged Out       Subjective:      Review of Systems   Constitutional: Negative. HENT: Negative. Eyes: Negative. Respiratory: Negative. Cardiovascular: Positive for leg swelling (right). Gastrointestinal: Negative. Endocrine: Negative. Genitourinary: Negative. Musculoskeletal: Negative. Skin: Positive for wound (rght lower leg). Allergic/Immunologic: Negative. Neurological: Negative. Hematological: Negative. Psychiatric/Behavioral: Negative. Objective:     Physical Exam  Vitals and nursing note reviewed. Constitutional:       Appearance: Normal appearance. She is obese. HENT:      Head: Normocephalic. Nose: Nose normal.   Eyes:      General:         Right eye: No discharge. Left eye: No discharge. Cardiovascular:      Rate and Rhythm: Normal rate and regular rhythm. Pulmonary:      Effort: Pulmonary effort is normal.   Musculoskeletal:         General: Tenderness (right lower leg) and signs of injury (right lower leg) present. Cervical back: Normal range of motion. Right lower leg: Edema present. Left lower leg: Edema present. Comments: Decreased ROM using cane to assist with ambulation   Skin:     General: Skin is warm and dry. Capillary Refill: Capillary refill takes less than 2 seconds. Neurological:      Mental Status: She is alert and oriented to person, place, and time. Psychiatric:         Mood and Affect: Mood normal.         Behavior: Behavior normal.         Thought Content: Thought content normal.         Judgment: Judgment normal.       /78   Pulse 65   Temp 97.3 °F (36.3 °C)   Ht 5' 4\" (1.626 m)   Wt 208 lb (94.3 kg)   SpO2 97%   BMI 35.70 kg/m²     Assessment:       Diagnosis Orders   1. Abrasion of skin of right lower leg     2. Cellulitis of right lower extremity     3. Pedal edema     4. Peripheral edema           Plan:   More than 50% of the time was spent counseling and coordinating care for a total time of 20 min face to face. Triple antibiotic ointment and telfa applied  Will start Doxycycline and Bactroban  Patient information provided  Instructed to elevate legs and wear compression stockings  Follow up in 2 weeks. PDMP Monitoring:    Last PDMP Sohan as Reviewed [facility]):  Review User Review Instant Review Result            Urine Drug Screenings (1 yr)    No resulted procedures found. Medication Contract and Consent for Opioid Use Documents Filed      No documents found                 Patient given educational materials -see patient instructions. Discussed use, benefit, and side effects of prescribed medications. All patient questions answered. Pt voiced understanding. Reviewed health maintenance. Instructed to continue currentmedications, diet and exercise. Patient agreed with treatment plan. Follow up as directed.    MEDICATIONS:  Orders Placed This Encounter   Medications    doxycycline hyclate (VIBRA-TABS) 100 MG tablet     Sig: Take 1 tablet by mouth 2 times daily for 10 days     Dispense:  20 tablet     Refill:  0    mupirocin (BACTROBAN) 2 % ointment     Sig: Apply topically 2 times daily x 5-7 days     Dispense:  30 g     Refill:  0         ORDERS:  No orders of the defined types were placed in this encounter. Follow-up:  Return in about 2 weeks (around 7/12/2022) for right leg wound. PATIENT INSTRUCTIONS:  Patient Instructions       Patient Education        Cellulitis: Care Instructions  Your Care Instructions     Cellulitis is a skin infection caused by bacteria, most often strep or staph. It often occurs after a break in the skin from a scrape, cut, bite, orpuncture, or after a rash. Cellulitis may be treated without doing tests to find out what caused it. But your doctor may do tests, if needed, to look for a specific bacteria, like methicillin-resistant Staphylococcus aureus (MRSA). The doctor has checked you carefully, but problems can develop later. If you notice any problems or new symptoms, get medical treatment right away. Follow-up care is a key part of your treatment and safety. Be sure to make and go to all appointments, and call your doctor if you are having problems. It's also a good idea to know your test results and keep alist of the medicines you take. How can you care for yourself at home?  Take your antibiotics as directed. Do not stop taking them just because you feel better. You need to take the full course of antibiotics.  Prop up the infected area on pillows to reduce pain and swelling. Try to keep the area above the level of your heart as often as you can.  If your doctor told you how to care for your wound, follow your doctor's instructions. If you did not get instructions, follow this general advice:  ? Wash the wound with clean water 2 times a day. Don't use hydrogen peroxide or alcohol, which can slow healing. ? You may cover the wound with a thin layer of petroleum jelly, such as Vaseline, and a nonstick bandage. ? Apply more petroleum jelly and replace the bandage as needed.  Be safe with medicines. Take pain medicines exactly as directed. ? If the doctor gave you a prescription medicine for pain, take it as prescribed. ? If you are not taking a prescription pain medicine, ask your doctor if you can take an over-the-counter medicine. To prevent cellulitis in the future   Try to prevent cuts, scrapes, or other injuries to your skin. Cellulitis most often occurs where there is a break in the skin.  If you get a scrape, cut, mild burn, or bite, wash the wound with clean water as soon as you can to help avoid infection. Don't use hydrogen peroxide or alcohol, which can slow healing.  If you have swelling in your legs (edema), support stockings and good skin care may help prevent leg sores and cellulitis.  Take care of your feet, especially if you have diabetes or other conditions that increase the risk of infection. Wear shoes and socks. Do not go barefoot. If you have athlete's foot or other skin problems on your feet, talk to your doctor about how to treat them. When should you call for help? Call your doctor now or seek immediate medical care if:     You have signs that your infection is getting worse, such as:  ? Increased pain, swelling, warmth, or redness. ? Red streaks leading from the area. ? Pus draining from the area. ? A fever.      You get a rash. Watch closely for changes in your health, and be sure to contact your doctor if:     You do not get better as expected. Where can you learn more? Go to https://Frog Industry.English TV. org and sign in to your GoEuro account. Enter M812 in the [facility] box to learn more about \"Cellulitis: Care Instructions. \"     If you do not have an account, please click on the \"Sign Up Now\" link. Current as of: November 15, 2021               Content Version: 13.3  © 2006-2022 Healthwise, Incorporated. Care instructions adapted under license by [facility] ([facility]).  If you have questions about a medical condition or this instruction, help?   Call your doctor now or seek immediate medical care if:     You have signs of infection, such as:  ? Increased pain, swelling, warmth, or redness around the scrape. ? Red streaks leading from the scrape. ? Pus draining from the scrape. ? A fever.      The scrape starts to bleed, and blood soaks through the bandage. Oozing small amounts of blood is normal.   Watch closely for changes in your health, and be sure to contact your doctor ifthe scrape is not getting better each day. Where can you learn more? Go to https://Morgan Everettpepiceweb.Apofore. org and sign in to your [employer] account. Enter A374 in the [facility] box to learn more about \"Scrapes (Abrasions): Care Instructions. \"     If you do not have an account, please click on the \"Sign Up Now\" link. Current as of: March 9, 2022               Content Version: 13.3  © 6950-7631 [employer]. Care instructions adapted under license by [facility] ([facility]). If you have questions about a medical condition or this instruction, always ask your healthcare professional. [place] [address] any warranty or liability for your use of this information. Electronically signed by MARYANNE Ho on 6/28/2022 at 11:57 AM    EMR Dragon/transcription disclaimer:  Much of thisencounter note is electronic transcription/translation of spoken language to printed texts. The electronic translation of spoken language may be erroneous, or at times, nonsensical words or phrases may be inadvertentlytranscribed.   Although I have reviewed the note for such errors, some may still exist.

## 2022-06-28 NOTE — PATIENT INSTRUCTIONS
Patient Education        Cellulitis: Care Instructions  Your Care Instructions     Cellulitis is a skin infection caused by bacteria, most often strep or staph. It often occurs after a break in the skin from a scrape, cut, bite, orpuncture, or after a rash. Cellulitis may be treated without doing tests to find out what caused it. But your doctor may do tests, if needed, to look for a specific bacteria, like methicillin-resistant Staphylococcus aureus (MRSA). The doctor has checked you carefully, but problems can develop later. If you notice any problems or new symptoms, get medical treatment right away. Follow-up care is a key part of your treatment and safety. Be sure to make and go to all appointments, and call your doctor if you are having problems. It's also a good idea to know your test results and keep alist of the medicines you take. How can you care for yourself at home?  Take your antibiotics as directed. Do not stop taking them just because you feel better. You need to take the full course of antibiotics.  Prop up the infected area on pillows to reduce pain and swelling. Try to keep the area above the level of your heart as often as you can.  If your doctor told you how to care for your wound, follow your doctor's instructions. If you did not get instructions, follow this general advice:  ? Wash the wound with clean water 2 times a day. Don't use hydrogen peroxide or alcohol, which can slow healing. ? You may cover the wound with a thin layer of petroleum jelly, such as Vaseline, and a nonstick bandage. ? Apply more petroleum jelly and replace the bandage as needed.  Be safe with medicines. Take pain medicines exactly as directed. ? If the doctor gave you a prescription medicine for pain, take it as prescribed. ? If you are not taking a prescription pain medicine, ask your doctor if you can take an over-the-counter medicine.   To prevent cellulitis in the future   Try to prevent cuts, scrapes, or other injuries to your skin. Cellulitis most often occurs where there is a break in the skin.  If you get a scrape, cut, mild burn, or bite, wash the wound with clean water as soon as you can to help avoid infection. Don't use hydrogen peroxide or alcohol, which can slow healing.  If you have swelling in your legs (edema), support stockings and good skin care may help prevent leg sores and cellulitis.  Take care of your feet, especially if you have diabetes or other conditions that increase the risk of infection. Wear shoes and socks. Do not go barefoot. If you have athlete's foot or other skin problems on your feet, talk to your doctor about how to treat them. When should you call for help? Call your doctor now or seek immediate medical care if:     You have signs that your infection is getting worse, such as:  ? Increased pain, swelling, warmth, or redness. ? Red streaks leading from the area. ? Pus draining from the area. ? A fever.      You get a rash. Watch closely for changes in your health, and be sure to contact your doctor if:     You do not get better as expected. Where can you learn more? Go to https://MiTurno.A+ Network. org and sign in to your [employer] account. Enter T036 in the [facility] box to learn more about \"Cellulitis: Care Instructions. \"     If you do not have an account, please click on the \"Sign Up Now\" link. Current as of: November 15, 2021               Content Version: 13.3  © 2006-2022 Healthwise, Incorporated. Care instructions adapted under license by [facility] ([facility]). If you have questions about a medical condition or this instruction, always ask your healthcare professional. [place] [address] any warranty or liability for your use of this information. Patient Education        Scrapes (Abrasions): Care Instructions  Overview  Scrapes (abrasions) are wounds where your skin has been rubbed or torn off.  Most scrapes do not go deep into the skin, but some may remove several layersof skin. Scrapes usually don't bleed much, but they may ooze pinkish fluid. Scrapes on the head or face may appear worse than they are. They may bleed a lot becauseof the good blood supply to this area. Most scrapes heal well and may not need a bandage. They usually heal within 3 to 7 days. A large, deep scrape may take 1 to 2 weeks or longer to heal. A scabmay form on some scrapes. Follow-up care is a key part of your treatment and safety. Be sure to make and go to all appointments, and call your doctor if you are having problems. It's also a good idea to know your test results and keep alist of the medicines you take. How can you care for yourself at home?  If your doctor told you how to care for your wound, follow your doctor's instructions. If you did not get instructions, follow this general advice:  ? Wash the scrape with clean water 2 times a day. Don't use hydrogen peroxide or alcohol, which can slow healing. ? You may cover the scrape with a thin layer of petroleum jelly, such as Vaseline, and a nonstick bandage. ? Apply more petroleum jelly and replace the bandage as needed.  Prop up the injured area on a pillow anytime you sit or lie down during the next 3 days. Try to keep it above the level of your heart. This will help reduce swelling.  Be safe with medicines. Take pain medicines exactly as directed. ? If the doctor gave you a prescription medicine for pain, take it as prescribed. ? If you are not taking a prescription pain medicine, ask your doctor if you can take an over-the-counter medicine. When should you call for help? Call your doctor now or seek immediate medical care if:     You have signs of infection, such as:  ? Increased pain, swelling, warmth, or redness around the scrape. ? Red streaks leading from the scrape. ? Pus draining from the scrape.   ? A fever.      The scrape starts to bleed, and blood soaks through the bandage. Oozing small amounts of blood is normal.   Watch closely for changes in your health, and be sure to contact your doctor ifthe scrape is not getting better each day. Where can you learn more? Go to https://chperosanaeweb.Aha [email or web address] org and sign in to your [employer] account. Enter A374 in the [place] box to learn more about \"Scrapes (Abrasions): Care Instructions. \"     If you do not have an account, please click on the \"Sign Up Now\" link. Current as of: March 9, 2022               Content Version: 13.3  © 0687-0668 BBE. Care instructions adapted under license by [employer]. If you have questions about a medical condition or this instruction, always ask your healthcare professional. [address] any warranty or liability for your use of this information.

## 2022-07-12 NOTE — PATIENT INSTRUCTIONS
Patient Education        Heel Pain: Care Instructions  Your Care Instructions  You can have heel pain from an injury or from everyday overuse, such as running or walking a lot. Plantar fasciitis is the most common cause of heel pain. In this condition, the bottom of your foot from the front of the heel to the baseof the toes is sore and hard to walk on. Your heel can get better with rest, anti-inflammatory pain medicines, andstretching exercises. Follow-up care is a key part of your treatment and safety. Be sure to make and go to all appointments, and call your doctor if you are having problems. It's also a good idea to know your test results and keep alist of the medicines you take. How can you care for yourself at home?  Rest your feet often. Reduce your activity to a level that lets you avoid pain. If possible, do not run or walk on hard surfaces.  Take anti-inflammatory medicines to reduce heel pain. These include ibuprofen (Advil, Motrin) and naproxen (Aleve). Read and follow all instructions on the label.  Put ice or a cold pack on your heel for 10 to 20 minutes at a time. Try to do this every 1 to 2 hours for the next 3 days (when you are awake). Put a thin cloth between the ice and your skin.  If ice isn't helping after 2 or 3 days, try heat, such as a heating pad set on low.  If your doctor says it is okay, try these calf stretches. Tight calf muscles can cause heel pain or make it worse. ? Stand about 1 foot from a wall. Place the palms of both hands against the wall at chest level and lean forward against the wall. Put the leg you want to stretch about a step behind your other leg. Keep your back heel on the floor and bend your front knee until you feel a stretch in the back leg. Hold this position for 15 to 30 seconds. Repeat the exercise 2 to 4 times a session. Do 3 to 4 sessions a day. ? Sit down on the floor or a mat with your feet stretched in front of you.  Roll up a towel lengthwise, and loop it over the ball of your foot. Holding the towel at both ends, gently pull the towel toward you to stretch your foot. Hold this position for 15 to 30 seconds. Repeat the exercise 2 to 4 times a session. Do 3 to 4 sessions a day.  Wear a night splint if your doctor suggests it. A night splint holds your foot with the toes pointed up. This position gives the bottom of your foot a constant, gentle stretch.  Wear shoes with good arch support. Athletic shoes or shoes with a well-cushioned sole are good choices.  Try a heel lift, heel cup or shoe insert (orthotic) to help cushion your heel. You can buy these at many shoe stores. Use them in both shoes, even if only one foot hurts.  Maintain a healthy weight. This puts less strain on your feet. When should you call for help? Call your doctor now or seek immediate medical care if:     You have heel pain with fever, redness, or warmth in your heel.      You have numbness or tingling in your heel. Watch closely for changes in your health, and be sure to contact your doctor if:     You cannot put weight on the sore foot.      Your heel pain lasts more than 2 weeks. Where can you learn more? Go to https://Keepio.MarkMonitor. org and sign in to your Agent Partner account. Enter S299 in the [employer] box to learn more about \"Heel Pain: Care Instructions. \"     If you do not have an account, please click on the \"Sign Up Now\" link. Current as of: March 9, 2022               Content Version: 13.3  © 8530-4148 Healthwise, Incorporated. Care instructions adapted under license by [facility] ([facility]). If you have questions about a medical condition or this instruction, always ask your healthcare professional. [place] [address] any warranty or liability for your use of this information.

## 2022-07-12 NOTE — PROGRESS NOTES
[facility] PHYSICIAN SERVICES  [facility]  [address] [id number]  [facility] [place] [address]  Dept: [phone]  Dept Fax: [phone]  Loc: [phone]    Ravin Fagan is a 73 y.o. female who presents today for her medical conditions/complaints as noted below. Ravin Fagan is c/o of Follow-up (right leg wound. \"seems to be a little better but not very good\")        HPI:     HPI   Chief Complaint   Patient presents with    Follow-up     right leg wound. \"seems to be a little better but not very good\"   She presents for follow up on right lower leg wound and drainage. She states she feels one area has improved but still seeping yellow drainage. She finished Doxycycline and still using the Bactroban ointment. She states the leg swells and she is on Lasix 40mg daily but did not take Lasix this morning due to the drive here. She states when she gets up of the morning, the leg is not swollen but as the day goes on, it swells more. States he tries to keep leg elevated during the day. She also reports left heel pain for the past several weeks. She reports pain with walking. States she thought it would get better, but has not. Denies any injury.   Past Medical History:   Diagnosis Date    Atrial flutter by electrocardiogram ([facility] [address] [facility]     Constipation     Coronary artery disease     Diabetes mellitus ([address]     Essential hypertension     Hyperlipidemia     IBS (irritable bowel syndrome)     Iron deficiency anemia     Menopause     Paroxysmal SVT (supraventricular tachycardia) (HCC)     RA (rheumatoid arthritis) (HCC)     Rheumatoid arthritis ([address]       Past Surgical History:   Procedure Laterality Date    CARDIAC SURGERY  2000    [facility] (Dr. Jaja Calabrese) [address]  2009    Stent     CHOLECYSTECTOMY      CORONARY ARTERY BYPASS GRAFT  2000    DILATION AND CURETTAGE OF UTERUS N/A 8/16/2019    DILATATION AND CURETTAGE HYSTEROSCOPY W/ MYOSURE performed by Moses Curtis MD at [address] [place] [address] 2022 2022 2022 2022 2022 3/2/8897   SYSTOLIC 010 863 037 020 662 190   DIASTOLIC 82 78 74 80 64 60   Site - - - - - -   Pulse 76 65 66 70 71 60   Temp 97.6 97.3 97.5 97 97.6 97.2   Resp - - - - 18 -   SpO2 97 97 94 98 96 98   Weight 201 lb 9.6 oz 208 lb 209 lb 204 lb 199 lb 9.6 oz 206 lb   Height 5' 4\" 5' 4\" - 5' 4\" 5' 4\" 5' 4\"   Body mass index 34.6 kg/m2 35.7 kg/m2 - 35.01 kg/m2 34.26 kg/m2 35.36 kg/m2   Some recent data might be hidden       Family History   Problem Relation Age of Onset    Heart Attack Father     Prostate Cancer Father     Stroke Father     Heart Attack Brother     Stroke Brother     Breast Cancer Maternal Aunt     Cancer Paternal Uncle         Lung       Social History     Tobacco Use    Smoking status: Former Smoker     Packs/day: 0.50     Years: 26.00     Pack years: 13.00     Types: Cigarettes     Start date: 6/10/1974     Quit date: 2000     Years since quittin.8    Smokeless tobacco: Never Used   Substance Use Topics    Alcohol use: No      Current Outpatient Medications on File Prior to Visit   Medication Sig Dispense Refill    mupirocin (BACTROBAN) 2 % ointment Apply topically 2 times daily x 5-7 days 30 g 0    potassium chloride (KLOR-CON M) 20 MEQ extended release tablet TAKE 1 TABLET BY MOUTH ONCE DAILY WHILE TAKING FUROSEMIDE 90 tablet 0    lidocaine (LIDODERM) 5 % Place 1 patch onto the skin daily 12 hours on, 12 hours off.  30 patch 0    tiZANidine (ZANAFLEX) 4 MG tablet Take 1 tablet by mouth nightly as needed (muscle spasm) 30 tablet 2    rosuvastatin (CRESTOR) 5 MG tablet TAKE ONE TABLET BY MOUTH ONCE NIGHTLY 90 tablet 0    losartan (COZAAR) 25 MG tablet TAKE 1 TABLET BY MOUTH DAILY 90 tablet 0    isosorbide mononitrate (IMDUR) 30 MG extended release tablet TAKE 1 TABLET BY MOUTH DAILY 90 tablet 0    metFORMIN (GLUCOPHAGE) 500 MG tablet TAKE 1 TABLET BY MOUTH ONCE EVERY MORNING AND ONE TABLET EVERY EVENING 180 tablet 0    pantoprazole (PROTONIX) 40 MG tablet TAKE 1 TABLET BY MOUTH 2 TIMES A  tablet 0    metoprolol tartrate (LOPRESSOR) 50 MG tablet TAKE 1 TABLET BY MOUTH 2 TIMES A  tablet 1    ketoconazole (NIZORAL) 2 % cream Apply topically daily. 60 g 11    nitroGLYCERIN (NITROSTAT) 0.4 MG SL tablet PLACE 1 TABLET UNDER THE TONGUE EVERY 5 MINUTES AS NEEDED FOR CHEST PAIN 25 tablet 0    blood glucose test strips (FREESTYLE LITE) strip Infuse 1 each intravenously 2 times daily As needed. 100 strip 3    simvastatin (ZOCOR) 40 MG tablet TAKE 1 TABLET BY MOUTH NIGHTLY 90 tablet 0    FreeStyle Lancets MISC TEST SUGAR DAILY AS DIRECTED UP TO 4 TIMES A  each 3    fluticasone (FLONASE) 50 MCG/ACT nasal spray 2 sprays by Each Nostril route daily 16 g 0    glyBURIDE (DIABETA) 5 MG tablet Take 2 tablets every morning & 1 tablet every evening. 120 tablet 5    FREESTYLE LITE strip CHECK BLOOD GLUCOSE ONCE DAILY AS NEEDED 100 strip 3    nystatin (MYCOSTATIN) 580960 UNIT/GM powder Apply topically 4 times daily. 30 g 5    Blood Glucose Monitoring Suppl (FREESTYLE INSULINX SYSTEM) w/Device KIT 1 each by Does not apply route daily 1 kit 0    blood glucose test strips (FREESTYLE INSULINX TEST) strip 1 each by In Vitro route daily As needed. 100 each 3    nystatin (MYCOSTATIN) 729488 UNIT/GM cream Apply topically 2 times daily. 30 g 2    Tofacitinib Citrate (XELJANZ XR) 11 MG TB24 Take 11 mg by mouth daily      furosemide (LASIX) 20 MG tablet Take 20 mg by mouth 2 times daily       apixaban (ELIQUIS) 5 MG TABS tablet Take 5 mg by mouth 2 times daily       amiodarone (PACERONE) 100 MG tablet Take 100 mg by mouth daily       FREESTYLE LANCETS MISC TEST SUGAR DAILY AS DIRECTED 100 each 2    predniSONE (DELTASONE) 5 MG tablet Take 5 mg by mouth 2 times daily Take 5 mg in the morning and 4 mg in the evening.       aspirin EC 81 MG EC tablet Take 81 mg by mouth daily  folic acid (FOLVITE) 1 MG tablet Take 1 mg by mouth daily       FreeStyle Lancets MISC USE AS DIRECTED 100 each 3     No current facility-administered medications on file prior to visit. Allergies   Allergen Reactions    Codeine Other (See Comments)     Chest pain    Albuterol      Rapid heart rate    Clindamycin/Lincomycin     Januvia [Sitagliptin] Swelling    Augmentin [Amoxicillin-Pot Clavulanate] Palpitations    Sulfa Antibiotics Rash       Health Maintenance   Topic Date Due    HIV screen  Never done    Hepatitis C screen  Never done    Shingles vaccine (1 of 2) Never done    DEXA (modify frequency per FRAX score)  Never done    Diabetic foot exam  04/24/2019    Diabetic microalbuminuria test  11/18/2020    COVID-19 Vaccine (3 - Moderna risk 4-dose series) 04/10/2021    Diabetic retinal exam  06/22/2021    Flu vaccine (1) 09/01/2022    A1C test (Diabetic or Prediabetic)  09/09/2022    Breast cancer screen  12/16/2022    Colorectal Cancer Screen  01/31/2023    Depression Screen  03/08/2023    Pneumococcal 65+ years Vaccine (3 - PCV) 03/08/2023    Annual Wellness Visit (AWV)  03/09/2023    Lipids  06/09/2023    Cervical cancer screen  09/13/2024    DTaP/Tdap/Td vaccine (3 - Td or Tdap) 11/26/2031    Hepatitis A vaccine  Aged Out    Hib vaccine  Aged Out    Meningococcal (ACWY) vaccine  Aged Out       Subjective:      Review of Systems   Constitutional: Negative. HENT: Negative. Eyes: Negative. Respiratory: Negative. Cardiovascular: Positive for leg swelling (both but worse on right). Gastrointestinal: Negative. Endocrine: Negative. Genitourinary: Negative. Musculoskeletal: Positive for gait problem. Left heel pain   Skin: Positive for wound (right lower leg). Allergic/Immunologic: Negative. Hematological: Negative. Psychiatric/Behavioral: Negative. Objective:     Physical Exam  Vitals and nursing note reviewed.    Constitutional: Appearance: Normal appearance. She is obese. Cardiovascular:      Rate and Rhythm: Normal rate and regular rhythm. Pulmonary:      Effort: Pulmonary effort is normal.   Musculoskeletal:      Right lower leg: Tenderness present. No deformity or bony tenderness. 2+ Edema present. Left lower leg: No deformity or tenderness. 1+ Edema present. Legs:       Comments: Abrasions to right lower extremity. Superior lesion appears to be healing. Lower lesion approximately 1-2cm with two areas open and draining purulent discharge   Skin:     General: Skin is warm and dry. Capillary Refill: Capillary refill takes less than 2 seconds. Neurological:      General: No focal deficit present. Mental Status: She is alert. Psychiatric:         Mood and Affect: Mood normal.         Behavior: Behavior normal.         Thought Content: Thought content normal.         Judgment: Judgment normal.       /82   Pulse 76   Temp 97.6 °F (36.4 °C)   Ht 5' 4\" (1.626 m)   Wt 201 lb 9.6 oz (91.4 kg)   SpO2 97%   BMI 34.60 kg/m²     Assessment:       Diagnosis Orders   1. Abrasion of skin of right lower leg     2. Pain of left heel  XR CALCANEUS LEFT (MIN 2 VIEWS)   3. Cellulitis of right lower extremity     4. Peripheral edema           Plan:   More than 50% of the time was spent counseling and coordinating care for a total time of 20 min face to face. Start Keflex 500mg bid x 10 days. Continue Bactroban. Leave open to air when home, cover when outside of home  Keep elevated  Continue Lasix as prescribed. Xray left heel today and call with results. Patient information given. Advised good shoe inserts. Keep follow up with PCP on 8/9/22    PDMP Monitoring:    Last PDMP Dominique Ortiz as Reviewed [facility]):  Review User Review Instant Review Result            Urine Drug Screenings (1 yr)    No resulted procedures found.        Medication Contract and Consent for Opioid Use Documents Filed      No documents found Patient given educational materials -see patient instructions. Discussed use, benefit, and side effects of prescribed medications. All patient questions answered. Pt voiced understanding. Reviewed health maintenance. Instructed to continue currentmedications, diet and exercise. Patient agreed with treatment plan. Follow up as directed. MEDICATIONS:  Orders Placed This Encounter   Medications    cephALEXin (KEFLEX) 500 MG capsule     Sig: Take 1 capsule by mouth 2 times daily for 10 days     Dispense:  20 capsule     Refill:  0         ORDERS:  Orders Placed This Encounter   Procedures    XR CALCANEUS LEFT (MIN 2 VIEWS)       Follow-up:  Return for keep follow up with PCP 8/9/22. PATIENT INSTRUCTIONS:  Patient Instructions       Patient Education        Heel Pain: Care Instructions  Your Care Instructions  You can have heel pain from an injury or from everyday overuse, such as running or walking a lot. Plantar fasciitis is the most common cause of heel pain. In this condition, the bottom of your foot from the front of the heel to the baseof the toes is sore and hard to walk on. Your heel can get better with rest, anti-inflammatory pain medicines, andstretching exercises. Follow-up care is a key part of your treatment and safety. Be sure to make and go to all appointments, and call your doctor if you are having problems. It's also a good idea to know your test results and keep alist of the medicines you take. How can you care for yourself at home?  Rest your feet often. Reduce your activity to a level that lets you avoid pain. If possible, do not run or walk on hard surfaces.  Take anti-inflammatory medicines to reduce heel pain. These include ibuprofen (Advil, Motrin) and naproxen (Aleve). Read and follow all instructions on the label.  Put ice or a cold pack on your heel for 10 to 20 minutes at a time. Try to do this every 1 to 2 hours for the next 3 days (when you are awake).  Put a thin cloth between the ice and your skin.  If ice isn't helping after 2 or 3 days, try heat, such as a heating pad set on low.  If your doctor says it is okay, try these calf stretches. Tight calf muscles can cause heel pain or make it worse. ? Stand about 1 foot from a wall. Place the palms of both hands against the wall at chest level and lean forward against the wall. Put the leg you want to stretch about a step behind your other leg. Keep your back heel on the floor and bend your front knee until you feel a stretch in the back leg. Hold this position for 15 to 30 seconds. Repeat the exercise 2 to 4 times a session. Do 3 to 4 sessions a day. ? Sit down on the floor or a mat with your feet stretched in front of you. Roll up a towel lengthwise, and loop it over the ball of your foot. Holding the towel at both ends, gently pull the towel toward you to stretch your foot. Hold this position for 15 to 30 seconds. Repeat the exercise 2 to 4 times a session. Do 3 to 4 sessions a day.  Wear a night splint if your doctor suggests it. A night splint holds your foot with the toes pointed up. This position gives the bottom of your foot a constant, gentle stretch.  Wear shoes with good arch support. Athletic shoes or shoes with a well-cushioned sole are good choices.  Try a heel lift, heel cup or shoe insert (orthotic) to help cushion your heel. You can buy these at many shoe stores. Use them in both shoes, even if only one foot hurts.  Maintain a healthy weight. This puts less strain on your feet. When should you call for help? Call your doctor now or seek immediate medical care if:     You have heel pain with fever, redness, or warmth in your heel.      You have numbness or tingling in your heel. Watch closely for changes in your health, and be sure to contact your doctor if:     You cannot put weight on the sore foot.      Your heel pain lasts more than 2 weeks. Where can you learn more?   Go to [email or web address]://[email or web address] [email or web address] org and sign in to your TripConnect[employer] account. Enter S299 in the [employer] box to learn more about \"Heel Pain: Care Instructions. \"     If you do not have an account, please click on the \"Sign Up Now\" link. Current as of: March 9, 2022               Content Version: 13.3  © 3076-1474 Healthwise, [facility]. Care instructions adapted under license by [facility] ([facility]). If you have questions about a medical condition or this instruction, always ask your healthcare professional. [place] [address] any warranty or liability for your use of this information. Electronically signed by MARYANNE Mosley on 7/12/2022 at 11:06 AM    EMR Dragon/transcription disclaimer:  Much of thisencounter note is electronic transcription/translation of spoken language to printed texts. The electronic translation of spoken language may be erroneous, or at times, nonsensical words or phrases may be inadvertentlytranscribed.   Although I have reviewed the note for such errors, some may still exist.

## 2022-07-18 NOTE — TELEPHONE ENCOUNTER
Celecoxib 100 mg twice a day as needed for pain      Last Written Prescription Date:  3/1/2021  Last Fill Quantity: 60,   # refills: 3  Last Office Visit : 6/28/2021  Future Office visit:  none    Routing refill request to provider for review/approval because:  Failed protocol         Pt was rescheduled with Dr. Alejandre.

## 2022-07-19 PROBLEM — R57.1 HYPOVOLEMIC SHOCK (HCC): Status: ACTIVE | Noted: 2022-01-01

## 2022-07-19 PROBLEM — C84.A0: Status: ACTIVE | Noted: 2021-06-17

## 2022-07-19 PROBLEM — Z51.5 PALLIATIVE CARE PATIENT: Status: ACTIVE | Noted: 2022-01-01

## 2022-07-19 NOTE — PROCEDURES
[facility] Vascular Access Team:  PICC Line Insertion Procedure Note    Chaneta Bodily Day  [id number]   Admitted - 7/19/2022  1:04 PM  Admission Diagnosis -   Hypovolemic shock ([address] [R57.1]    Attending Physician - Devorah Nava DO  Ordering Physician - Devorah Nava DO    Active LDAs -   PICC Triple Lumen 99/84/80 Left Cephalic (Active)   Number of days: 0       Peripheral IV 07/19/22 Right Forearm (Active)   Number of days: 0       PROCEDURE: Insertion of 6 Bengali Triple Lumen PICC    INDICATION(S): Long Term IV therapy, Poor Access, Levophed Gtt 30mcg/min    MEDICATIONS: Local medication only. PROCEDURE DETAILS:  Informed consent was obtained for the procedure, including sedation. Risks of lung perforation, hemorrhage, and adverse drug reaction were discussed. 6 Bengali Triple Lumen PICC inserted to the Left Cephalic per hospital protocol. Blood return: yes    FINDINGS:  The Left Cephalic vein was visualized using the ultrasound and deemed a suitable vessel. The area was prepped with Chlorhexidine and draped in sterile fashion using full max barrier draping. The area was anesthetized with 3 cc's of 1% Lidocaine. The vein was accessed using US guidance. The guidewire was advanced through the needle to secure the vein. The needle was removed and a peel-a-way Sheath was placed over the guidewire. Guidewire was removed with atraumatic tip intact. The 6 Bengali Triple Lumen PICC was trimmed at 30 cm and inserted into the Sheath. Using VPS technology, the PICC line was advanced until PICC tip was in the SVC. The peel-a-way sheath was removed and PICC was inserted until the CAJ was reached. The PICC was advanced until P wave deflection was captured. The PICC was withdrawn until the optimal P wave amplitude was obtained. Approximately 4 cm exposed. Internal components of the PICC were removed, all lumens had brisk blood return and was flushed with 10 cc of NS.  The PICC was secured using a securement device and a Biopatch was placed over the insertion site. A Tegaderm was placed over the securement device and insertion site. Dressing was dated and initialed with external measurement marked. Patient did tolerate procedure well. IMPRESSION/PLAN:  1. Successful insertion of 6 Sudanese Triple Lumen PICC  2. PICC Line is ready to be used. 3. Please change tubing prior to using new PICC line. Make sure to label, date and use alcohol caps on new tubing and alcohol caps on unused ports.              Electronically Signed By: Dharmesh Draper RN, VA-BC on 7/19/2022 at 6:08 PM

## 2022-07-19 NOTE — ACP (ADVANCE CARE PLANNING)
Advance Care Planning     Advance Care Planning (ACP) Physician/NP/PA (Provider) Conversation      Date of ACP Conversation: 7/19/2022    Conversation Conducted with:    Healthcare Decision Maker: Next of Kin by law (only applies in absence of above) (name) [address] Decision Maker:    Current Designated Health Care Decision Maker:    Primary Decision Maker: Demetris Fagan - [phone]    Secondary Decision Maker: Praveen [place] - Brother/Sister - [phone]    Care Preferences:    Hospitalization: \"If your health worsens and it becomes clear that your chance of recovery is unlikely, what would your preference be regarding hospitalization? \"  yes      Ventilation: \"If you were in your present state of health and suddenly became very ill and were unable to breathe on your own, what would your preference be about the use of a ventilator (breathing machine) if it was available to you? \"    yes    \"If your health worsens and it becomes clear that your chance of recovery is unlikely, what would your preference be about the use of a ventilator (breathing machine) if it was available to you? \"   yes    Resuscitation:  \"CPR works best to restart the heart when there is a sudden event, like a heart attack, in someone who is otherwise healthy. Unfortunately, CPR does not typically restart the heart for people who have serious health conditions or who are very sick. \"    \"In the event your heart stopped as a result of an underlying serious health condition, would you want attempts to be made to restart your heart (answer \"yes\" for attempt to resuscitate) or would you prefer a natural death (answer \"no\" for do not attempt to resuscitate)? \"   Yes    Length of Voluntary ACP Conversation in minutes:  15 minutes included w/ initial consult time    Melvin Abad PA-C

## 2022-07-19 NOTE — H&P
[facility] Medicine H&P    Patient:  Yoli Gaviria  MRN: [id number]    Consulting Physician: Balbina Mendoza DO  Reason for Consult: Medical Management  Primacy Care Physician: Tala Cardenas MD    HISTORY OF PRESENT ILLNESS:   The patient is a 73 y.o. female is accepted in transfer from [facility].  She was admitted to their facility on 717 with abrupt onset of abdominal pain nausea vomiting. CT of the abdomen showed evidence of partial small bowel obstruction and a mesenteric mass. She has a history of a retroperitoneal hematoma from November 2021. She was hospitalized at [facility]. There was no specific intervention that was done other than monitoring and transfusing as needed. She had NG decompression at [facility] and initially had a large volume drained which then tailed off. Her NG tube was discontinued. Today she developed some respiratory distress and hypotension. Because of these findings she was transferred to our facility for higher level of care. On assessment, I feel that she is intravascularly volume depleted at this point. I have also asked general surgery to get an opinion regarding this mesenteric mass. I suspect that it may be related to resolution of her previous retroperitoneal hematoma.     Past Medical History:        Diagnosis Date    Atrial flutter by electrocardiogram ([facility] [address]     Constipation     Coronary artery disease     Diabetes mellitus (HCC)     Essential hypertension     Hyperlipidemia     IBS (irritable bowel syndrome)     Iron deficiency anemia     Menopause     Paroxysmal SVT (supraventricular tachycardia) ([facility])     RA (rheumatoid arthritis) (HCC)     Rheumatoid arthritis ([facility] [address]        Past Surgical History:        Procedure Laterality Date    CARDIAC SURGERY  2000    CABG (Dr. Livan León) [address]  2009    Stent     CHOLECYSTECTOMY      CORONARY ARTERY BYPASS GRAFT  2000    DILATION AND CURETTAGE OF UTERUS N/A 8/16/2019    DILATATION AND [address] performed by Manolo Horowitz MD at [facility]         Medications: Scheduled Meds:   lidocaine 1 % injection  5 mL IntraDERmal Once    sodium chloride flush  5-40 mL IntraVENous 2 times per day    sodium chloride flush  10 mL IntraVENous 2 times per day    enoxaparin  40 mg SubCUTAneous Daily     Continuous Infusions:   norepinephrine 25 mcg/min (07/19/22 1339)    sodium chloride      sodium chloride 150 mL/hr at 07/19/22 1339     PRN Meds:.sodium chloride flush, sodium chloride flush, sodium chloride, ondansetron **OR** ondansetron, magnesium hydroxide, acetaminophen **OR** acetaminophen    Allergies:  Codeine, Albuterol, Clindamycin/lincomycin, Januvia [sitagliptin], Augmentin [amoxicillin-pot clavulanate], and Sulfa antibiotics    Social History:   TOBACCO:   reports that she quit smoking about 21 years ago. Her smoking use included cigarettes. She started smoking about 48 years ago. She has a 13.00 pack-year smoking history. She has never used smokeless tobacco.  ETOH:   reports no history of alcohol use. Family History:       Problem Relation Age of Onset    Heart Attack Father     Prostate Cancer Father     Stroke Father     Heart Attack Brother     Stroke Brother     Breast Cancer Maternal Aunt     Cancer Paternal Uncle         Lung       ROS:  Review of Systems   Unable to perform ROS: Acuity of condition     Physical Exam:    Vitals: BP (!) 96/42   Pulse 71   Temp 98.9 °F (37.2 °C)   Resp 25   SpO2 95%     Physical Exam  Vitals and nursing note reviewed. Constitutional:       General: She is in acute distress. Appearance: She is ill-appearing. HENT:      Head: Normocephalic and atraumatic.       Right Ear: External ear normal.      Left Ear: External ear normal.      Nose: Nose normal.      Mouth/Throat:      Mouth: Mucous membranes are moist.   Eyes:      Conjunctiva/sclera: Conjunctivae normal.      Pupils: Pupils are equal, round, and reactive to light. Cardiovascular:      Rate and Rhythm: Normal rate and regular rhythm. Heart sounds: Normal heart sounds. Pulmonary:      Effort: Respiratory distress present. Breath sounds: Normal breath sounds. Abdominal:      General: Abdomen is flat. Palpations: Abdomen is soft. Musculoskeletal:         General: Swelling present. Cervical back: Neck supple. No rigidity. No muscular tenderness. Right lower leg: Edema present. Left lower leg: Edema present. Skin:     General: Skin is warm and dry. Neurological:      Mental Status: She is disoriented. CBC: No results for input(s): WBC, HGB, PLT in the last 72 hours. BMP:  No results for input(s): NA, K, CL, CO2, BUN, CREATININE, GLUCOSE in the last 72 hours. Hepatic: No results for input(s): AST, ALT, ALB, BILITOT, ALKPHOS in the last 72 hours. Troponin: No results for input(s): TROPONINI in the last 72 hours. BNP: No results for input(s): BNP in the last 72 hours. Lipids: No results for input(s): CHOL, HDL in the last 72 hours. Invalid input(s): LDLCALCU  ABGs: No results found for: PHART, PO2ART, [id number]  INR: No results for input(s): INR in the last 72 hours. -----------------------------------------------------------------  XR CALCANEUS LEFT (MIN 2 VIEWS)    Result Date: 7/15/2022  NO PRIOR REPORT AVAILABLE Exam: X-RAYS OF THE LEFT CALCANEUS Clinical data: Pain of left heel. Technique: Axial and lateral views of the left calcaneus. Prior studies: No prior studies submitted. Findings:No evidence of calcaneal fracture. There is normal bone mineral density. Moderate-sized plantar calcaneal spur. Some soft tissue swelling at the level of the distal Achilles tendon insertion, incompletely characterized. Scattered DJD. Possible pes planus. Moderate-sized plantar calcaneal spur. Some soft tissue swelling at the level of the distal Achilles tendon insertion, incompletely characterized. Recommendation: If patient's symptoms or clinical concern persist, consider MRI. Electronically Signed by Bella Barrientos MD at 15-Jul-2022 01:49:15 AM                   Assessment and Plan     Hypovolemic shock. Fluid resuscitation with isotonic saline. Acute hypoxemic respiratory failure. BiPAP support. Mesenteric mass. Primary malignancy versus ongoing reorganization of previous retroperitoneal hematoma. Ask opinion from general surgery. Please document 90 minutes of critical care time for patient assessment, chart review, discussion with staff, .       Jaime Chilel,

## 2022-07-19 NOTE — CONSULTS
2009    Stent     CHOLECYSTECTOMY      CORONARY ARTERY BYPASS GRAFT  2000    DILATION AND CURETTAGE OF UTERUS N/A 8/16/2019    DILATATION AND CURETTAGE HYSTEROSCOPY W/ Tomy Talbot performed by Evangelist Nickerson MD at [facility] Medications:  Prior to Admission medications    Medication Sig Start Date End Date Taking? Authorizing Provider   verapamil (VERELAN) 240 MG extended release capsule Take 240 mg by mouth in the morning. Yes Historical Provider, MD   cephALEXin (KEFLEX) 500 MG capsule Take 1 capsule by mouth 2 times daily for 10 days 7/12/22 7/22/22  MARYANNE Healy   mupirocin (BACTROBAN) 2 % ointment Apply topically 2 times daily x 5-7 days 6/28/22   MARYANNE Healy   potassium chloride (KLOR-CON M) 20 MEQ extended release tablet TAKE 1 TABLET BY MOUTH ONCE DAILY WHILE TAKING FUROSEMIDE 6/20/22   Renny Gupta MD   rosuvastatin (CRESTOR) 5 MG tablet TAKE ONE TABLET BY MOUTH ONCE NIGHTLY 6/7/22   Renny Gupta MD   losartan (COZAAR) 25 MG tablet TAKE 1 TABLET BY MOUTH DAILY 6/7/22   Renny Gupta MD   isosorbide mononitrate (IMDUR) 30 MG extended release tablet TAKE 1 TABLET BY MOUTH DAILY 5/31/22   Renny Gupta MD   metFORMIN (GLUCOPHAGE) 500 MG tablet TAKE 1 TABLET BY MOUTH ONCE EVERY MORNING AND ONE TABLET EVERY EVENING 3/22/22   Renny Gupta MD   pantoprazole (PROTONIX) 40 MG tablet TAKE 1 TABLET BY MOUTH 2 TIMES A DAY 3/22/22   Renny Gupta MD   metoprolol tartrate (LOPRESSOR) 50 MG tablet TAKE 1 TABLET BY MOUTH 2 TIMES A DAY 3/14/22   Renny Gupta MD   ketoconazole (NIZORAL) 2 % cream Apply topically daily.  3/8/22   Renny Gupta MD   nitroGLYCERIN (NITROSTAT) 0.4 MG SL tablet PLACE 1 TABLET UNDER THE TONGUE EVERY 5 MINUTES AS NEEDED FOR CHEST PAIN 3/1/22   Renny Gupta MD   FreeStyle Lancets MISC USE AS DIRECTED 12/21/21 1/21/22  Renny Gupta MD   blood glucose test strips (FREESTYLE LITE) strip Infuse 1 each intravenously 2 times daily As needed. 12/21/21   Layne Ruffin MD   simvastatin (ZOCOR) 40 MG tablet TAKE 1 TABLET BY MOUTH NIGHTLY 12/21/21   Layne Ruffin MD   FreeStyle Lancets MISC TEST SUGAR DAILY AS DIRECTED UP TO 4 TIMES A DAY 11/11/21   Layne Ruffin MD   fluticasone Glenis Beer) 50 MCG/ACT nasal spray 2 sprays by Each Nostril route daily 9/29/21   MARYANNE Swanson   glyBURIDE (DIABETA) 5 MG tablet Take 2 tablets every morning & 1 tablet every evening. 9/29/21   MARYANNE Swanson   FREESTYLE LITE strip CHECK BLOOD GLUCOSE ONCE DAILY AS NEEDED 9/20/21   Layne Ruffin MD   nystatin (MYCOSTATIN) 640671 UNIT/GM powder Apply topically 4 times daily. 9/1/20   MARYANNE Cruz   Blood Glucose Monitoring Suppl (FREESTYLE INSULINX SYSTEM) w/Device KIT 1 each by Does not apply route daily 6/9/20   Layne Ruffin MD   blood glucose test strips (FREESTYLE INSULINX TEST) strip 1 each by In Vitro route daily As needed. 6/9/20   Layne Ruffin MD   nystatin (MYCOSTATIN) 905996 UNIT/GM cream Apply topically 2 times daily. 2/25/20   MARYANNE Swanson   Tofacitinib Citrate (XELJANZ XR) 11 MG TB24 Take 11 mg by mouth daily 4/23/19   Historical Provider, MD   furosemide (LASIX) 20 MG tablet Take 20 mg by mouth 2 times daily     Historical Provider, MD   apixaban (ELIQUIS) 5 MG TABS tablet Take 5 mg by mouth 2 times daily  7/21/18   Historical Provider, MD   amiodarone (PACERONE) 100 MG tablet Take 100 mg by mouth daily     Historical Provider, MD   FREESTYLE LANCETS MISC TEST SUGAR DAILY AS DIRECTED 4/24/18   Layne Ruffin MD   predniSONE (DELTASONE) 5 MG tablet Take 5 mg by mouth 2 times daily Take 5 mg in the morning and 4 mg in the evening.     Historical Provider, MD   aspirin EC 81 MG EC tablet Take 81 mg by mouth daily    Historical Provider, MD   folic acid (FOLVITE) 1 MG tablet Take 1 mg by mouth daily     Historical Provider, MD       Allergies:    Codeine, Albuterol, Clindamycin/lincomycin, Januvia [sitagliptin], Augmentin [amoxicillin-pot clavulanate], and Sulfa antibiotics    Social History:    The patient currently lives at home  Tobacco:   reports that she quit smoking about 21 years ago. Her smoking use included cigarettes. She started smoking about 48 years ago. She has a 13.00 pack-year smoking history. She has never used smokeless tobacco.  Alcohol:   reports no history of alcohol use. Illicit Drugs: None    Family History:      Problem Relation Age of Onset    Heart Attack Father     Prostate Cancer Father     Stroke Father     Heart Attack Brother     Stroke Brother     Breast Cancer Maternal Aunt     Cancer Paternal Uncle         Lung       Review of Systems:   Unable to obtain 2/2 AMS/lethargy    Physical Examination:  BP (!) 99/36   Pulse 72   Temp 98.9 °F (37.2 °C)   Resp 20   SpO2 96%   General appearance: 72 yo female, ill appearing, BiPAP in place   Head: Normocephalic, without obvious abnormality, atraumatic  Eyes: conjunctivae/corneas clear. PERRL, EOM's intact.    Ears: normal external ears and nose  Neck: no JVD, supple, symmetrical, trachea midline   Lungs: diminished throughout otherwise clear to auscultation bilaterally,no rales or wheezes   Heart: regular rate and rhythm, S1, S2 normal, no murmur  Abdomen:soft, no grimacing w/ palpation; non-distended, bowel sounds present  Extremities:No lower extremity edema,  chronic venous stasis changes w/ BLE circumferential erythema extending from mid calf to just above the ankle with scabbed area to right calf  Skin: Warm, dry  Neurologic: Lethargic, opens eyes in response to being called \"Kevin\", shakes her head no when I ask if she can squeeze my hands   Psychiatric: Unable to fully assess     Diagnostic Data:  None yet available from this facility     Palliative Performance Scale:    [x] 20% Bed Bound  Extensive disease  Total care  Minimal intake  Drowsy/coma      Palliative Review of Advance Directives:     Surrogate Decision Maker:Yes-Demetris Day    Durable Power of :No    Advanced Directives/Living Cardoza: No    Out of hospital medical orders in place to reflect resuscitation status (MOLST/POLST): No    Information Sharing:  Patient's awareness of illness:  [] Terminal [] Life-Threatening [] Serious [] Non life-threatening [] Not serious   [x] Not discussed    Family awareness of illness:   [] Terminal [x] Life-Threatening [] Serious [] Nonlife-threatening [] Not serious   [] Not discussed    Assessment/Plan:  Principal Problem:    Hypovolemic shock ([address]  Active Problems:    Palliative care patient    Paroxysmal atrial fibrillation (HCC)    Diastolic dysfunction    Coronary artery disease    Type 2 diabetes mellitus with hyperglycemia  Resolved Problems:    * No resolved hospital problems. *     Visit Summary:  Chart reviewed, patient discussed with consulting service and nursing staff. Reviewed health issues, work up and treatment plan as well as factors that lead to hospitalization. Ms. Juanita Farris was seen at her bedside with her son and her sister present in the room. I introduced myself, the role of palliative care and reason for consultation. Ms. Juanita Farris is lethargic, on BiPAP and is unable to participate in history at this time. She is unable to follow commands. Her family gives history of abdomen and LE edema that started prior to abdominal pain w/ nausea. She was diuresed at OSH and edema has resolved. They report nausea resolved and NG was subsequently removed. She then developed acute respiratory failure and became obtunded and unable to communicate with them. Prior to this event, patient was alert and oriented. She was ambulatory though could only tolerate short distances. She is a  and her son is an only child. He is willing to be her healthcare surrogate with assistance from her sister.  They state she has never completed a living will or advance directive and that they would want resuscitative efforts attempted. Her son is willing to have further goals of care discussion based on hospital course. Will continue to follow. Opportunity for questions and emotional support provided. Recommendations:     Palliative Care-GOC continue work up/treatment in hopes patient will stabilize medically to return home. 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     CounselingTopics: Goals of care, Code Status, Disease process education, pt/family support    Time Spent Counseling > 50%:  YES                                   Total Time Spent with patient/family counseling, workup/treatment review, counseling and placement of orders/preparation of this note: 60 minutes    Electronically signed by Jennifer Hoskins PA-C on 7/19/2022 at 3:59 PM    (Please note that portions of this note were completed with a voice recognition program.  Efforts were made to edit the dictations but occasionally words are mis-transcribed.)

## 2022-07-20 NOTE — PROCEDURES
Patient was bagged with a bag-valve-mask to a saturation greater than 92%. The patient was given etomidate 20 mg IV push followed with vecuronium 10 mg IV push. The glide scope was used to identify the vocal cords without difficulty. A #7-1/2 ET tube was visualized going through the cords. The tube was 23 cm at the lip. There was positive end-tidal CO2. Patient tolerated the procedure well. No immediate complications. Saturations went up to 99% post intubation. Chest x-ray is pending.

## 2022-07-20 NOTE — PROGRESS NOTES
Latest Reference Range & Units 7/20/22 03:45   FIO2 Not Established % 70.0   O2 Therapy  Unknown   Hemoglobin, Art, Extended 12.0 - 16.0 g/dL 10.8 (L)   pH, Arterial 7.350 - 7.450  7.300 (L)   pCO2, Arterial 35.0 - 45.0 mmHg 33.0 (L)   pO2, Arterial 80.0 - 100.0 mmHg 92.0   HCO3, Arterial 22.0 - 26.0 mmol/L 16.2 (L)   Base Excess, Arterial -2.0 - 2.0 mmol/L -9.3 (L)   O2 Sat, Arterial >92 % 94.3   O2 Content, Arterial Not Established mL/dL 14.4   Methemoglobin, Arterial <1.5 % 0.1   Carboxyhgb, Arterial 0.0 - 5.0 % 0.0   MODE  A/C   Vt Mechanical Not Established % 450.0   (L): Data is abnormally low   450 VT, 5 PEEP, 70%, RR 16.  RT BRACH, GIVEN TO Joe ARROYO RN ICU

## 2022-07-20 NOTE — PROGRESS NOTES
Latest Reference Range & Units 7/19/22 23:11   Hemoglobin, Art, Extended 12.0 - 16.0 g/dL 12.5   pH, Arterial 7.350 - 7.450  7.130 (LL)   pCO2, Arterial 35.0 - 45.0 mmHg 34.0 (L)   pO2, Arterial 80.0 - 100.0 mmHg 84.0   HCO3, Arterial 22.0 - 26.0 mmol/L 11.3 (L)   Base Excess, Arterial -2.0 - 2.0 mmol/L -16.8 (L)   O2 Sat, Arterial >92 % 93.8   O2 Content, Arterial Not Established mL/dL 16.6   Methemoglobin, Arterial <1.5 % 0.0   Carboxyhgb, Arterial 0.0 - 5.0 % 0.0   MODE  A/C   Vt Mechanical Not Established % 450.0   (LL): Data is critically low  (L): Data is abnormally low  CALLED TO LORI ARROYO RN ICU, RT BRACH, 450VT, 5 PEEP, 100%, RR 16

## 2022-07-20 NOTE — CONSULTS
[facility] General Surgery     Consult Note    Patient ID: Ayesha Ortega Day  73 y.o.  female  YOB: 1955    Admitting Diagnosis: Hypovolemic shock ([facility] [address] [R57.1]    Subjective:      Ms. Himanshu Shields is a 79-year-old woman who at Thanksgiving time last fall fell from a ladder while taking her [place] decorations down from a shelf in the garage. She presented to the emergency department at [facility].  She was taking Eliquis at that time and developed a large retroperitoneal hematoma on the left side. There was active extravasation and thus she was transferred to the trauma center at [facility]. She was treated conservatively with eventual recovery and discharge home. Several days ago she presented to an outside hospital with complaints of abdominal pain. She was felt to have a partial small bowel obstruction on imaging and an NG tube was placed. There was an initial return of a large volume of fluid which then tapered off rapidly. A CT scan was obtained as part of her work-up and was read by the outside hospital as consistent with a large retroperitoneal tumor. She was improving until earlier today when she developed respiratory distress and hypotension. She was thus transferred to [facility] for ICU treatment. At the time of my visit this evening she is intubated in the intensive care unit. Further hospital history is unobtainable.     Past Medical History:   Diagnosis Date    Atrial flutter by electrocardiogram [facility])     Constipation     Coronary artery disease     Diabetes mellitus (HCC)     Essential hypertension     Hyperlipidemia     IBS (irritable bowel syndrome)     Iron deficiency anemia     Menopause     Paroxysmal SVT (supraventricular tachycardia) ([facility])     RA (rheumatoid arthritis) (HCC)     Rheumatoid arthritis ([facility] [address]      Past Surgical History:   Procedure Laterality Date    CARDIAC SURGERY  2000    CABG (Dr. General Bagley) Mohan Sanchez 89  2009    Stent fluticasone (FLONASE) 50 MCG/ACT nasal spray 2 sprays by Each Nostril route daily 16 g 0    glyBURIDE (DIABETA) 5 MG tablet Take 2 tablets every morning & 1 tablet every evening. 120 tablet 5    FREESTYLE LITE strip CHECK BLOOD GLUCOSE ONCE DAILY AS NEEDED 100 strip 3    nystatin (MYCOSTATIN) 474810 UNIT/GM powder Apply topically 4 times daily. 30 g 5    Blood Glucose Monitoring Suppl (FREESTYLE INSULINX SYSTEM) w/Device KIT 1 each by Does not apply route daily 1 kit 0    blood glucose test strips (FREESTYLE INSULINX TEST) strip 1 each by In Vitro route daily As needed. 100 each 3    nystatin (MYCOSTATIN) 589973 UNIT/GM cream Apply topically 2 times daily. 30 g 2    Tofacitinib Citrate (XELJANZ XR) 11 MG TB24 Take 11 mg by mouth daily      furosemide (LASIX) 20 MG tablet Take 20 mg by mouth 2 times daily       apixaban (ELIQUIS) 5 MG TABS tablet Take 5 mg by mouth 2 times daily       amiodarone (PACERONE) 100 MG tablet Take 100 mg by mouth daily       FREESTYLE LANCETS MISC TEST SUGAR DAILY AS DIRECTED 100 each 2    predniSONE (DELTASONE) 5 MG tablet Take 5 mg by mouth 2 times daily Take 5 mg in the morning and 4 mg in the evening. aspirin EC 81 MG EC tablet Take 81 mg by mouth daily      folic acid (FOLVITE) 1 MG tablet Take 1 mg by mouth daily        Allergies: Codeine, Albuterol, Clindamycin/lincomycin, Januvia [sitagliptin], Augmentin [amoxicillin-pot clavulanate], and Sulfa antibiotics    Family History   Problem Relation Age of Onset    Heart Attack Father     Prostate Cancer Father     Stroke Father     Heart Attack Brother     Stroke Brother     Breast Cancer Maternal Aunt     Cancer Paternal Uncle         Lung       Social History     Tobacco Use    Smoking status: Former     Packs/day: 0.50     Years: 26.00     Pack years: 13.00     Types: Cigarettes     Start date: 6/10/1974     Quit date: 2000     Years since quittin.8    Smokeless tobacco: Never   Substance Use Topics    Alcohol use:  No Review of Systems   Unable to perform ROS: Intubated   Remaining systems reviewed and unremarkable. Objective:   BP (!) 117/50   Pulse 92   Temp 98.9 °F (37.2 °C) (Temporal)   Resp 16   Wt 205 lb (93 kg)   SpO2 95%   BMI 35.19 kg/m²     Intake/Output Summary (Last 24 hours) at 7/19/2022 2012  Last data filed at 7/19/2022 1800  Gross per 24 hour   Intake 1410.08 ml   Output 125 ml   Net 1285.08 ml     Physical Exam  Vitals reviewed. Constitutional:       Appearance: She is obese. HENT:      Head: Normocephalic and atraumatic. Mouth/Throat:      Comments: An endotracheal tube exits the mouth. Cardiovascular:      Rate and Rhythm: Normal rate and regular rhythm. Heart sounds: Normal heart sounds. Pulmonary:      Breath sounds: Normal breath sounds. No wheezing, rhonchi or rales. Abdominal:      Comments: The abdomen is obese but soft. No fullness, mass or organomegaly appreciated. I cannot check for tenderness given sedation and intubation. Bowel sounds are present. Skin:     General: Skin is warm and dry. Coloration: Skin is not jaundiced. Neurological:      Comments: Intubated and sedated, thus unable to examine   Psychiatric:      Comments: Intubated and sedated, thus unable to examine       Data:  CBC:   Recent Labs     07/19/22  1448   WBC 7.7   RBC 3.03*   HGB 9.4*   HCT 32.6*   .6*   RDW 17.3*        BMP:   Recent Labs     07/19/22  1513 07/19/22  1800 07/19/22  1954   NA  --  146*  --    K 3.5 3.8 3.6   CL  --  113*  --    CO2  --  13*  --    ANIONGAP  --  20*  --    GLUCOSE  --  226*  --    CREATININE  --  0.9  --    LABGLOM  --  >60  --    CALCIUM  --  8.1*  --      LFT:   Recent Labs     07/19/22  1800   PROT 5.7*   LABALBU 3.2*   BILITOT 1.8*   ALKPHOS 93   ALT 12   AST 16     PT/INR:   Recent Labs     07/19/22  1439   PROTIME 17.6*   INR 1.43*       Imaging:  XR CHEST PORTABLE   Final Result   satisfactory ET tube placement.  The distal tip lies 4 cm above the Gabby. Postop changes status post CABG    cardiomegaly    increased vascular pattern. Congestive heart failure is suspected and cannot be excluded   suspect right lower lobe infiltrate   small left-sided pleural effusion   Recommendation: Follow up as clinically indicated. Electronically Signed by My Thayer at 19-Jul-2022 09:08:27 PM                 CT scan abdomen and pelvis from [facility] dated 7/17/2022 is personally reviewed. There is a large left retroperitoneal hematoma which is stable when compared to retroperitoneal ultrasound dated 4/25/2022 and CT scan abdomen and pelvis dated 12/21/2021. No evidence of a retroperitoneal tumor. Assessment:     1. Slowly resolving left-sided retroperitoneal hematoma. No evidence of a retroperitoneal tumor at this time. 2.  Initial presentation to an outside hospital with partial small bowel obstruction. 3.  Intravascular volume depletion. Plan:     1. The retroperitoneal hematoma will continue to slowly resolve and does not need to be addressed at this time. 2.  Would replace her NG tube if she develops nausea vomiting or abdominal distention. 3.  Critical care management per Dr. Keiry Mooney and the hospitalist service. 4.  I will recheck on rounds tomorrow morning. Thank you for asking me to see Ms. Fagan in consultation.     Michelle Yu MD

## 2022-07-20 NOTE — PROGRESS NOTES
[address] [facility] Pharmacokinetic Monitoring Service - Vancomycin     Joni Fagan is a 73 y.o. female starting on vancomycin therapy for pneumonia and SSTI. Pharmacy consulted by Dr. Tyrone Montoya for monitoring and adjustment. Target Concentration: Goal AUC/PIETRO 400-600 mg*hr/L    Additional Antimicrobials: meropenem    Pertinent Laboratory Values: Wt Readings from Last 1 Encounters:   07/20/22 200 lb 3.2 oz (90.8 kg)     Temp Readings from Last 1 Encounters:   07/20/22 97.7 °F (36.5 °C) (Temporal)     Estimated Creatinine Clearance: 87 mL/min (based on SCr of 0.7 mg/dL). Recent Labs     07/19/22  1448 07/19/22  1800 07/19/22  2215 07/20/22  0515   CREATININE  --    < > 1.0* 0.7   WBC 7.7  --   --  6.7    < > = values in this interval not displayed. Procalcitonin: No level    Pertinent Cultures:  Culture Date Source Results   07/20/22 Blood Sent   07/19/22 Urine No growth   MRSA Nasal Swab: not ordered. Order placed by pharmacy.     Plan:  Dosing recommendations based on Bayesian software  Start vancomycin 2000mg IV x 1 dose followed by vancomycin 1000mg IV q 12 hours  Anticipated AUC of 459 and trough concentration of 14.5 at steady state  Renal labs as indicated   Vancomycin concentration ordered for 07/22/22 @ [facility] will continue to monitor patient and adjust therapy as indicated    Thank you for the consult,    TREVOR GONZALEZ, PHARM D, 7/20/2022, 3:32 PM

## 2022-07-20 NOTE — PROGRESS NOTES
Hospitalist Progress Note  [facility]     Patient: Sana Javier Day  : 1955  MRN: [id number]  Code Status: Full Code    Hospital Day: 1   Date of Service: 2022    Subjective:   Patient seen and examined. Intubated and sedated. Past Medical History:   Diagnosis Date    Atrial flutter by electrocardiogram ([facility] [address]     Constipation     Coronary artery disease     Diabetes mellitus (HCC)     Essential hypertension     Hyperlipidemia     IBS (irritable bowel syndrome)     Iron deficiency anemia     Menopause     Paroxysmal SVT (supraventricular tachycardia) (HCC)     RA (rheumatoid arthritis) (HCC)     Rheumatoid arthritis ([facility] [address]        Past Surgical History:   Procedure Laterality Date    CARDIAC SURGERY      CABG (Dr. Kimberley Rosa) Mohan Sanchez 89      Stent     CHOLECYSTECTOMY      CORONARY ARTERY BYPASS GRAFT      DILATION AND CURETTAGE OF UTERUS N/A 2019    DILATATION AND CURETTAGE HYSTEROSCOPY W/ Katharina Jurist performed by Samantha Miller MD at [facility] OR    TOTAL KNEE ARTHROPLASTY         Family History   Problem Relation Age of Onset    Heart Attack Father     Prostate Cancer Father     Stroke Father     Heart Attack Brother     Stroke Brother     Breast Cancer Maternal Aunt     Cancer Paternal Uncle         Lung       Social History     Socioeconomic History    Marital status:       Spouse name: Not on file    Number of children: Not on file    Years of education: Not on file    Highest education level: Not on file   Occupational History    Not on file   Tobacco Use    Smoking status: Former     Packs/day: 0.50     Years: 26.00     Pack years: 13.00     Types: Cigarettes     Start date: 6/10/1974     Quit date: 2000     Years since quittin.8    Smokeless tobacco: Never   Vaping Use    Vaping Use: Never used   Substance and Sexual Activity    Alcohol use: No    Drug use: No    Sexual activity: Yes   Other Topics Concern    Not on file   Social History Narrative    Not on file     Social Determinants of Health     Financial Resource Strain: Low Risk     Difficulty of Paying Living Expenses: Not hard at all   Food Insecurity: No Food Insecurity    Worried About Running Out of Food in the Last Year: Never true    Ran Out of Food in the Last Year: Never true   Transportation Needs: Not on file   Physical Activity: Inactive    Days of Exercise per Week: 0 days    Minutes of Exercise per Session: 0 min   Stress: Not on file   Social Connections: Not on file   Intimate Partner Violence: Not on file   Housing Stability: Not on file       Current Facility-Administered Medications   Medication Dose Route Frequency Provider Last Rate Last Admin    sodium bicarbonate 150 mEq in sterile water 1,000 mL infusion   IntraVENous Continuous Mitch Dias  mL/hr at 07/20/22 1303 New Bag at 07/20/22 1303    sodium bicarbonate 8.4 % injection 50 mEq  50 mEq IntraVENous PRN Mitch Dias MD        potassium chloride 20 mEq/50 mL IVPB (Central Line)  20 mEq IntraVENous PRN Mitch Dias MD        Or    potassium chloride 10 mEq/100 mL IVPB (Peripheral Line)  10 mEq IntraVENous PRGAVIN Dias  mL/hr at 07/20/22 0823 10 mEq at 07/20/22 8324    magnesium sulfate 2000 mg in 50 mL IVPB premix  2,000 mg IntraVENous PRN Mitch Dias MD        dexmedetomidine (PRECEDEX) 400 mcg in sodium chloride 0.9 % 100 mL infusion  0.1-1.5 mcg/kg/hr IntraVENous Continuous Cathy Sharma MD        lidocaine PF 1 % injection 5 mL  5 mL IntraDERmal Once Devorah Cools, DO        sodium chloride flush 0.9 % injection 5-40 mL  5-40 mL IntraVENous 2 times per day Devorah Cools, DO        sodium chloride flush 0.9 % injection 5-40 mL  5-40 mL IntraVENous PRN Devorah Cools, DO        norepinephrine (LEVOPHED) 16 mg in sodium chloride 0.9 % 250 mL infusion  1-100 mcg/min IntraVENous Continuous Devorah Cools, DO 18.8 mL/hr at 07/20/22 0543 20 mcg/min at 07/20/22 0543    0.9 % sodium chloride infusion   IntraVENous PRN Leslie Justin, DO        enoxaparin (LOVENOX) injection 40 mg  40 mg SubCUTAneous Daily Leslie Justin, DO   40 mg at 07/19/22 1759    ondansetron (ZOFRAN-ODT) disintegrating tablet 4 mg  4 mg Oral Q8H PRN Leslie Justin, DO        Or    ondansetron [facility]) injection 4 mg  4 mg IntraVENous Q6H PRN Leslie Justin, DO        magnesium hydroxide (MILK OF MAGNESIA) 400 MG/5ML suspension 30 mL  30 mL Oral Daily PRN Leslie Justin, DO        acetaminophen (TYLENOL) tablet 650 mg  650 mg Oral Q6H PRN Leslie Justin, DO        Or    acetaminophen (TYLENOL) suppository 650 mg  650 mg Rectal Q6H PRN Leslie Justin, DO        0.9 % sodium chloride infusion   IntraVENous Continuous Leslie Justin,  mL/hr at 07/19/22 2049 New Bag at 07/19/22 2049    propofol injection  5-50 mcg/kg/min IntraVENous Continuous Leslie Justin, DO 11.2 mL/hr at 07/20/22 0414 20 mcg/kg/min at 07/20/22 0414    cefTRIAXone (ROCEPHIN) 2,000 mg in sterile water 20 mL IV syringe  2,000 mg IntraVENous Q24H Marlee Clements MD   2,000 mg at 07/20/22 0019         sodium bicarbonate infusion 100 mL/hr at 07/20/22 1303    dexmedetomidine HCl in NaCl      norepinephrine 20 mcg/min (07/20/22 0543)    sodium chloride      sodium chloride 150 mL/hr at 07/19/22 2049    propofol 20 mcg/kg/min (07/20/22 0414)        Objective:   BP (!) 131/42   Pulse (!) 109   Temp 97.7 °F (36.5 °C) (Temporal)   Resp 21   Wt 200 lb 3.2 oz (90.8 kg)   SpO2 93%   BMI 34.36 kg/m²     General: no acute distress  HEENT: normocephalic, atraumatic  Neck: supple, symmetrical, trachea midline   Lungs: bilateral rhonchi  Cardiovascular: s1 and s2 normal  Abdomen: soft, positive bowel sounds  Extremities: bilateral lower extremity CSSTI without crepitus or bullae  Neuro: intubated and sedated  Skin: normal color and texture     Recent Labs     07/19/22  1448 07/20/22  0515   WBC 7.7 6.7   RBC 3.03* 3.53*   HGB 9.4* 11.1*   HCT 32.6* 35.6*   .6* 100.8*   MCH 31.0 31.4*   MCHC 28.8* 31.2*    163     Recent Labs     07/19/22  1800 07/19/22  1954 07/19/22 2215 07/19/22  2311 07/20/22  0345 07/20/22  0515   *  --  148*  --   --  149*   K 3.8   < > 3.7 3.5 2.7 2.7*   ANIONGAP 20*  --  21*  --   --  17   *  --  114*  --   --  110   CO2 13*  --  13*  --   --  22   BUN 9  --  11  --   --  10   CREATININE 0.9  --  1.0*  --   --  0.7   GLUCOSE 226*  --  243*  --   --  163*   CALCIUM 8.1*  --  8.1*  --   --  7.6*    < > = values in this interval not displayed. Recent Labs     07/19/22 2215 07/20/22  0515   MG 1.8 1.6   PHOS 4.0  --      Recent Labs     07/19/22  1800 07/19/22 2215 07/20/22  0515   AST 16 16 14   ALT 12 13 10   BILITOT 1.8* 1.4* 1.0   ALKPHOS 93 98 80     No results for input(s): PH, PO2, PCO2, HCO3, BE, O2SAT in the last 72 hours. No results for input(s): TROPONINI in the last 72 hours. Recent Labs     07/19/22  1439   INR 1.43*     Recent Labs     07/19/22  1448   LACTA 1.8         Intake/Output Summary (Last 24 hours) at 7/20/2022 1422  Last data filed at 7/20/2022 1000  Gross per 24 hour   Intake 2581.78 ml   Output 1300 ml   Net 1281.78 ml       XR CHEST PORTABLE    Result Date: 7/19/2022  NO PRIOR REPORT AVAILABLE Exam: X-RAY OF THECHEST Clinical data:Intubation. Technique:Single view of the chest. Prior studies: No prior studies submitted. Findings: The ET tube lies 4 cm above the level of the Gabby in satisfactory position. Postop changes are noted status post CABG. Cardiac silhouette is at large. The vascular pattern is increased . Findings consistent with the suspected congestive heart failure. Left-sided pleural effusion is suspected. Suspect Right lower lobe infiltrate/atelectasis. Please correlate clinically. No evidence of pleural effusion or pneumothorax. No acute osseous abnormality is detected. satisfactory ET tube placement.  The distal tip lies 4 cm above the Gabby. Postop changes status post CABG  cardiomegaly increased vascular pattern. Congestive heart failure is suspected and cannot be excluded suspect right lower lobe infiltrate small left-sided pleural effusion Recommendation: Follow up as clinically indicated. Electronically Signed by Meg William at 19-Jul-2022 09:08:27 PM              Assessment and Plan:   Ventilator dependent acute respiratory failure  Suspect secondary to below processes  Titrate minute ventilation for pH 7.35  Follow ABG/CXR  Follow plateau pressure    Concern for CAP versus aspiration pneumonitis  Empiric broad-spectrum antibiotics  Follow cultures  Molecular respiratory panel    Bilateral lower extremity CSSTI  Broad-spectrum antibiotics  Imaging as warranted  Elevate extremities    Septic shock  Secondary to above processes  Broad-spectrum antibiotics  Follow cultures  IVF  Norepinephrine gtt  Lactate 1.8    Known left retroperitoneal hematoma  Surgery following  States stable  Slowly resolving  No intervention required    DVT prophylaxis  Lovenox    Extensive discussion with patient's son in regards to current clinical state. All questions sought and answered.     Total critical care time: 54 minutes    Shira Stock MD   7/20/2022 2:22 PM

## 2022-07-20 NOTE — PROGRESS NOTES
Palliative Care Progress Note  7/20/2022 11:29 AM    Patient:  Sana Fagan  YOB: 1955  Primary Care Physician: Akbar Best MD  Advance Directive: Full Code  Admit Date: 7/19/2022       Hospital Day: 1  Portions of this note have been copied forward, however, changed to reflect the most current clinical status of this patient. CHIEF COMPLAINT/REASON FOR CONSULTATION Goals of care, code status, family support    SUBJECTIVE:  Ms. Fagan required intubation yesterday evening. She is sedated. Family is present at her bedside. Review of Systems:   14 point review of systems is negative except as specifically addressed above. Objective:   VITALS:  BP (!) 131/42   Pulse (!) 109   Temp 97.7 °F (36.5 °C) (Temporal)   Resp 21   Wt 200 lb 3.2 oz (90.8 kg)   SpO2 93%   BMI 34.36 kg/m²   24HR INTAKE/OUTPUT:    Intake/Output Summary (Last 24 hours) at 7/20/2022 1129  Last data filed at 7/20/2022 0800  Gross per 24 hour   Intake 2593.43 ml   Output 1175 ml   Net 1418.43 ml       General appearance: 72 yo female, intubated, sedated, no acute distress   Head: Normocephalic, without obvious abnormality, atraumatic  Eyes: conjunctivae/corneas clear.  PERRL  Ears: normal external ears and nose  Neck: no JVD, supple, symmetrical, trachea midline   Lungs: diminished air entry to bases, no rhonchi, mechanically ventialted   Heart: tachycardic, regular rhythm, S1, S2 normal, no murmur  Abdomen:soft,obese,  non-tender; non-distended, bowel sounds present    Extremities:trace bilateral lower extremity edema w/ cellulitic changes   Skin: warm, dry  Neurologic: intubated,sedated, moving extremities spontaneously at times     Medications:      sodium bicarbonate infusion 100 mL/hr at 07/20/22 0058    dexmedetomidine HCl in NaCl      norepinephrine 20 mcg/min (07/20/22 0530)    sodium chloride      sodium chloride 150 mL/hr at 07/19/22 2049    propofol 20 mcg/kg/min (07/20/22 3367)      lidocaine 1 % injection 5 mL IntraDERmal Once    sodium chloride flush  5-40 mL IntraVENous 2 times per day    enoxaparin  40 mg SubCUTAneous Daily    cefTRIAXone (ROCEPHIN) IV  2,000 mg IntraVENous Q24H     sodium bicarbonate, potassium chloride **OR** potassium chloride, magnesium sulfate, sodium chloride flush, sodium chloride, ondansetron **OR** ondansetron, magnesium hydroxide, acetaminophen **OR** acetaminophen  Diet NPO     Lab and other Data:     Recent Labs     07/19/22  1448 07/20/22  0515   WBC 7.7 6.7   HGB 9.4* 11.1*    163     Recent Labs     07/19/22  1800 07/19/22  1954 07/19/22 2215 07/19/22  2311 07/20/22  0345 07/20/22  0515   *  --  148*  --   --  149*   K 3.8   < > 3.7 3.5 2.7 2.7*   *  --  114*  --   --  110   CO2 13*  --  13*  --   --  22   BUN 9  --  11  --   --  10   CREATININE 0.9  --  1.0*  --   --  0.7   GLUCOSE 226*  --  243*  --   --  163*    < > = values in this interval not displayed. Recent Labs     07/19/22  1800 07/19/22 2215 07/20/22  0515   AST 16 16 14   ALT 12 13 10   BILITOT 1.8* 1.4* 1.0   ALKPHOS 93 98 80     Assessment/Plan   Principal Problem:    Hypovolemic shock (HCC)  Active Problems:    Retroperitoneal hematoma    Palliative care patient    Paroxysmal atrial fibrillation (HCC)    Diastolic dysfunction    Coronary artery disease    Type 2 diabetes mellitus with hyperglycemia  Resolved Problems:    * No resolved hospital problems. *    Visit Summary:  Chart reviewed, patient discussed with consulting service and nursing staff. Reviewed health issues, work up and treatment plan as well as factors that lead to hospitalization. I saw Ms. Fagan at her bedside with her son and her uncle in addition to Hospitalist and RN present in the room. She was intubated overnight and is tolerating the ventilator. She is currently receiving Rocephin for possible UTI w/ plans to switch to empiric antibiotics to also cover for BLE cellulitis/potential pneumonia.  Felt respiratory failure may be component of CHF. Son states she was also recently diagnosed w/ pulmonary hypertension. Patient's family are agreeable to continue work up in hopes she will be extubated in the next few days. 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    Time Spent Counseling > 50%:  YES                                   Total Time Spent with patient/family counseling, workup/treatment review, counseling and placement of orders/preparation of this note: 36 minutes    Electronically signed by Lara Ulloa PA-C on 7/20/2022 at 11:29 AM    (Please note that portions of this note were completed with a voice recognition program.  Cherelle Best made to edit the dictations but occasionally words are mis-transcribed.)

## 2022-07-20 NOTE — PLAN OF CARE
Problem: Discharge Planning  Goal: Discharge to home or other facility with appropriate resources  Outcome: Progressing  Flowsheets (Taken 7/19/2022 1315 by Isabell Goodamn RN)  Discharge to home or other facility with appropriate resources:   Identify barriers to discharge with patient and caregiver   Arrange for needed discharge resources and transportation as appropriate   Identify discharge learning needs (meds, wound care, etc)   Refer to discharge planning if patient needs post-hospital services based on physician order or complex needs related to functional status, cognitive ability or social support system     Problem: Skin/Tissue Integrity  Goal: Absence of new skin breakdown  Description: 1. Monitor for areas of redness and/or skin breakdown  2. Assess vascular access sites hourly  3. Every 4-6 hours minimum:  Change oxygen saturation probe site  4. Every 4-6 hours:  If on nasal continuous positive airway pressure, respiratory therapy assess nares and determine need for appliance change or resting period.   Outcome: Progressing

## 2022-07-21 NOTE — PLAN OF CARE
Problem: Discharge Planning  Goal: Discharge to home or other facility with appropriate resources  Outcome: Progressing  Flowsheets (Taken 7/21/2022 0800 by Carol Qureshi RN)  Discharge to home or other facility with appropriate resources: Identify barriers to discharge with patient and caregiver     Problem: Skin/Tissue Integrity  Goal: Absence of new skin breakdown  Description: 1. Monitor for areas of redness and/or skin breakdown  2. Assess vascular access sites hourly  3. Every 4-6 hours minimum:  Change oxygen saturation probe site  4. Every 4-6 hours:  If on nasal continuous positive airway pressure, respiratory therapy assess nares and determine need for appliance change or resting period.   7/21/2022 1601 by Gregorio Andersen RN  Outcome: Progressing  7/21/2022 1600 by Gregorio Andersen RN  Outcome: Progressing     Problem: Safety - Adult  Goal: Free from fall injury  7/21/2022 1601 by Gregorio Andersen RN  Outcome: Progressing  7/21/2022 1600 by Gregorio Andersen RN  Outcome: Progressing     Problem: Pain  Goal: Verbalizes/displays adequate comfort level or baseline comfort level  Outcome: Progressing  Flowsheets  Taken 7/21/2022 1200 by Carol Qureshi RN  Verbalizes/displays adequate comfort level or baseline comfort level: Assess pain using appropriate pain scale  Taken 7/21/2022 0800 by Carol Qureshi RN  Verbalizes/displays adequate comfort level or baseline comfort level: Assess pain using appropriate pain scale     Problem: Nutrition Deficit:  Goal: Optimize nutritional status  Outcome: Progressing     Problem: Chronic Conditions and Co-morbidities  Goal: Patient's chronic conditions and co-morbidity symptoms are monitored and maintained or improved  7/21/2022 1601 by Gregorio Andersen RN  Outcome: Progressing  7/21/2022 1600 by Gregorio Andersen RN  Outcome: Progressing  Flowsheets (Taken 7/21/2022 0800 by Carol Qureshi RN)  Care Plan - Patient's Chronic Conditions and Co-Morbidity Symptoms are Monitored and Maintained or Improved: Monitor and assess patient's chronic conditions and comorbid symptoms for stability, deterioration, or improvement     Problem: ABCDS Injury Assessment  Goal: Absence of physical injury  7/21/2022 1601 by Muna Mcdonnell RN  Outcome: Progressing  7/21/2022 1600 by Muna Mcdonnell RN  Outcome: Progressing

## 2022-07-21 NOTE — CARE COORDINATION
Visited in ICU at 4:20PM:    Date / Time of Evaluation:   7/21/2022    6:10 PM  Assessment Completed by:   TUNDE Novak      Patient Name:   Tiffanie Fagan  MRN:   [id number]  YOB: 1955    Patient Admission Status:   inpt    Patient Contact Information:    [address] [facility]  [phone] (home)   Telephone Information:   Mobile [phone]     Above information verified? [x]   Yes  []   No    (Best Practice:   Have patient/caregiver verify above address and phone number by stating out loud their current address and reachable phone number. Initial Assessment Completed at bedside with:  sister at bedside    []   Patient  [x]   Family/Caregiver/Guardian   []   Other:      Current PCP:    Lisy Carpio MD    PCP verified? [x]   Yes  []   No    Emergency Contacts:    Extended Emergency Contact Information  Primary Emergency Contact: Geovanna Parent States of [address] [place] [address] Phone: [phone]  Mobile Phone: [phone]  Relation: Brother/Sister  Secondary Emergency Contact: Demetris Fagan  [place] Phone: [phone]  Relation: Child  Preferred language: English    Advance Directives:    Does Ms. Fagan have an advance directive in her electronic medical record? []   Yes  [x]   No    Code Status:   Full Code      Have you been vaccinated for COVID-19 (SARS-CoV-2)? [x]   Yes  []   No                   If so, when?     Which :         []   Pfizer-BioNT[employer]  [x]   Moderna  []   [employer]  []   Other:       Do you have any of the following unmet social needs that would keep you from returning home safely:    []   Yes  [x]   No                    Unmet Social Needs:           []   Living Situation/Housing  []   Food  []   Stroke Education   []   Utilities  []   Personal Safety  []   Financial Strain  []   Employment  []   Mental Health  []   Substance Abuse  []   Transportation Barriers    Additional Unmet Social Needs Notes:           Financial:    Payor: Kenrick Pan / Plan: MEDICARE PART A AND B / Product Type: *No Product type* /     Pre-Cert required for SNF:     []   Yes  [x]   No    Have Long Term Care Insurance:      []   Yes  [x]   No      Pharmacy:    [address] [facility]  Phone: [phone] Fax: [facility]  [facility], [address] Mary Mercado [address] [facility] [address]  Phone: [phone] Fax: [facility], [address] [place] [address] [address] [facility] [address]  [address] [facility] [address]  [address] [place] [address]  Phone: [phone] Fax: [phone]    Potential assistance purchasing medications?        []   Yes  [x]   No      ADLS:   prior to admission Pt's sister noted she was IND with ADL and IADL needs    Support System:    family       Current Home Environment:   home alone with family support      Plans to RETURN to current housing: uncertain    []   Yes  []   No    Barriers to RETURNING to current housing:  medical stability and ability to care for own needs      Currently ACTIVE with Home Health CARE:      []   Yes  [x]   No    Home Health Care Agency:        DME Provider:  has walker and cane; sister doesn't know pref of agency       Had HOME OXYGEN prior to admission:      []   Yes  [x]   No        Active with HD/PD prior to admission:             []   Yes  [x]   No    Nephrologist:     HD Center:         Transition Plan:   uncertain; Pt normally lives at home; following for needs    Transportation PLAN for Discharge:  uncertain at current; likely will be family    Factors facilitating achievement of predicted outcomes:     Barriers to discharge:        Patient Deficits: Pt currently ventilated     []   Yes   []   No    If yes:    []   Confusion/Memory  []   Visual  []   Motor/Sensory         []   Right arm         []   Right leg         []   Left arm         []   Left leg  [] Language/Speech         []   Aphasia         []   Dysarthria         []   Swallow         [place] Coma Scale  Eye Opening: To pain  Best Verbal Response: None  Best Motor Response: Withdraws from pain  [place] Coma Scale Score: 7    Swallow Screening  Is the patient unable to remain alert for testing?: (!) Yes    Patient Deficit Notes: Additional CM/SW Notes:  SW visited with Pt's sister;  Pt currently ventilated; normally lives home alone and can manage her own needs per sister; has good family support; still drives; has walker and cane;   Currently on levophed; transferred here [facility]; possible to extubate tmr per RN; following for needs           Liliam Jeter [address]  Phone:   [phone]          Fax:   [phone]  Electronically signed by TUNDE Jeter on 7/21/2022 at 6:14 PM

## 2022-07-21 NOTE — CARE COORDINATION
07/21/22 1815   Service Assessment   Patient Orientation Sedated; Other (see comment)  (ventilated)   Cognition Other (see comment)  (on vent)   History Provided By Child/Family  (sister at bedside)   Primary Caregiver Self   Support Systems Children;Family Members   Patient's Healthcare Decision Maker is: Legal Next of Brent Villareal   PCP Verified by CM Yes   Prior Functional Level Independent in ADLs/IADLs   Current Functional Level Independent in ADLs/IADLs  (ventilated at present)   Can patient return to prior living arrangement Unknown at present   Ability to make needs known: Unable   Family able to assist with home care needs: Other (comment)  (uncertain; dependent upon how much is needed)   Social/Functional History   Lives With Alone   Type of [address] One level   Home Access Stairs to enter without rails; Ramped entrance  (one step)   Entrance Stairs - Number of Steps 1   Bathroom Shower/Tub Tub/Shower unit   [address] Cane;Walker, rolling   Receives Help From Family   ADL Assistance Independent   Homemaking Assistance Independent   Ambulation Assistance Independent   Transfer Assistance Independent   Active  Yes   Occupation Retired   Discharge Planning   Type of [address] [place] [address] Prior To Admission None   [address]  (uncertain has been to [address] [facility] in the past)   DME Ordered? No   Potential Assistance Purchasing Medications No   Type of Home Care Services None   Patient expects to be discharged to: Unknown   One/Two Story Residence One story   History of falls?  [address] [place] [address] Discharge   Services At/After Discharge   (uncertain; at minimum [place]; depends on progress)   Mode of Transport at Discharge   (unknown at present; usually family or drives self places)   Confirm Follow Up Transport Family   Electronically signed by Lily Berman [facility] on 7/21/2022 at 6:20 PM

## 2022-07-21 NOTE — PROGRESS NOTES
Hospitalist Progress Note  [facility]     Patient: Deepali Field Day  : 1955  MRN: [id number]  Code Status: Full Code    Hospital Day: 2   Date of Service: 2022    Subjective:   Patient seen and examined. Intubated and sedated. Past Medical History:   Diagnosis Date    Atrial flutter by electrocardiogram ([facility] [address]     Constipation     Coronary artery disease     Diabetes mellitus (HCC)     Essential hypertension     Hyperlipidemia     IBS (irritable bowel syndrome)     Iron deficiency anemia     Menopause     Paroxysmal SVT (supraventricular tachycardia) (HCC)     RA (rheumatoid arthritis) (HCC)     Rheumatoid arthritis ([facility] [address]        Past Surgical History:   Procedure Laterality Date    CARDIAC SURGERY      CABG (Dr. Chance Pond) Mohan Sanchez 89      Stent     CHOLECYSTECTOMY      CORONARY ARTERY BYPASS GRAFT      DILATION AND CURETTAGE OF UTERUS N/A 2019    DILATATION AND CURETTAGE HYSTEROSCOPY W/ Abram Walker performed by Patricio Ennis MD at [facility] OR    TOTAL KNEE ARTHROPLASTY         Family History   Problem Relation Age of Onset    Heart Attack Father     Prostate Cancer Father     Stroke Father     Heart Attack Brother     Stroke Brother     Breast Cancer Maternal Aunt     Cancer Paternal Uncle         Lung       Social History     Socioeconomic History    Marital status:       Spouse name: Not on file    Number of children: Not on file    Years of education: Not on file    Highest education level: Not on file   Occupational History    Not on file   Tobacco Use    Smoking status: Former     Packs/day: 0.50     Years: 26.00     Pack years: 13.00     Types: Cigarettes     Start date: 6/10/1974     Quit date: 2000     Years since quittin.8    Smokeless tobacco: Never   Vaping Use    Vaping Use: Never used   Substance and Sexual Activity    Alcohol use: No    Drug use: No    Sexual activity: Yes   Other Topics Concern    Not on file   Social History Narrative    Not on file     Social Determinants of Health     Financial Resource Strain: Low Risk     Difficulty of Paying Living Expenses: Not hard at all   Food Insecurity: No Food Insecurity    Worried About Running Out of Food in the Last Year: Never true    Ran Out of Food in the Last Year: Never true   Transportation Needs: Not on file   Physical Activity: Inactive    Days of Exercise per Week: 0 days    Minutes of Exercise per Session: 0 min   Stress: Not on file   Social Connections: Not on file   Intimate Partner Violence: Not on file   Housing Stability: Not on file       Current Facility-Administered Medications   Medication Dose Route Frequency Provider Last Rate Last Admin    sodium bicarbonate 8.4 % injection 50 mEq  50 mEq IntraVENous PRN Gaudencio Edgar MD        potassium chloride 20 mEq/50 mL IVPB (Central Line)  20 mEq IntraVENous PRGAVIN Edgar MD 50 mL/hr at 07/21/22 0751 20 mEq at 07/21/22 0751    Or    potassium chloride 10 mEq/100 mL IVPB (Peripheral Line)  10 mEq IntraVENous DON Edgar  mL/hr at 07/21/22 0407 10 mEq at 07/21/22 0407    magnesium sulfate 2000 mg in 50 mL IVPB premix  2,000 mg IntraVENous PRGAVIN Edgar MD   Stopped at 07/20/22 2015    dexmedetomidine (PRECEDEX) 400 mcg in sodium chloride 0.9 % 100 mL infusion  0.1-1.5 mcg/kg/hr IntraVENous Continuous Sulma Padilla MD 22.7 mL/hr at 07/21/22 0819 1 mcg/kg/hr at 07/21/22 0819    meropenem (MERREM) 1000 mg in sodium chloride 0.9% 50 mL (duplex)  1,000 mg IntraVENous Carey Hewitt MD   Stopped at 07/21/22 0720    lactated ringers infusion   IntraVENous Continuous Sulma Padilla  mL/hr at 07/21/22 8420 New Bag at 07/21/22 0807    vancomycin (VANCOCIN) intermittent dosing (placeholder)   Other RX Ankita Roy MD        vancomycin (VANCOCIN) 1000 mg in sodium chloride 0.9% 250 mL IVPB  1,000 mg IntraVENous Q12H Sulma Padilla MD   Stopped at 07/21/22 0937    sodium chloride flush 0.9 % injection 5-40 mL  5-40 mL IntraVENous 2 times per day Jaime Chilel, DO   10 mL at 07/21/22 2817    sodium chloride flush 0.9 % injection 5-40 mL  5-40 mL IntraVENous PRN Jaime Chilel DO        norepinephrine (LEVOPHED) 16 mg in sodium chloride 0.9 % 250 mL infusion  1-100 mcg/min IntraVENous Continuous Jaime Chilel DO 17.8 mL/hr at 07/21/22 0941 19 mcg/min at 07/21/22 0941    0.9 % sodium chloride infusion   IntraVENous PRN Jaime Chilel, DO        enoxaparin (LOVENOX) injection 40 mg  40 mg SubCUTAneous Daily Jaime Chilel DO   40 mg at 07/20/22 1521    ondansetron (ZOFRAN-ODT) disintegrating tablet 4 mg  4 mg Oral Q8H PRN Jaime Chilel,         Or    ondansetron [facility]) injection 4 mg  4 mg IntraVENous Q6H PRN Jaime Chilel, DO        magnesium hydroxide (MILK OF MAGNESIA) 400 MG/5ML suspension 30 mL  30 mL Oral Daily PRN Jaime Chilel, DO        acetaminophen (TYLENOL) tablet 650 mg  650 mg Oral Q6H PRN Jaime Chilel, DO        Or    acetaminophen (TYLENOL) suppository 650 mg  650 mg Rectal Q6H PRN Jaime Chilel,         propofol injection  5-50 mcg/kg/min IntraVENous Continuous Jaime Chilel DO 11.2 mL/hr at 07/20/22 0414 20 mcg/kg/min at 07/20/22 0414         dexmedetomidine HCl in NaCl 1 mcg/kg/hr (07/21/22 0819)    lactated ringers 100 mL/hr at 07/21/22 0807    norepinephrine 19 mcg/min (07/21/22 0941)    sodium chloride      propofol 20 mcg/kg/min (07/20/22 0414)        Objective:   BP (!) 126/42   Pulse 91   Temp 98 °F (36.7 °C) (Temporal)   Resp 21   Ht 5' 4\" (1.626 m)   Wt 203 lb 4.8 oz (92.2 kg)   SpO2 97%   BMI 34.90 kg/m²     General: no acute distress  HEENT: normocephalic, atraumatic  Neck: supple, symmetrical, trachea midline  Lungs: bilateral rhonchi  Cardiovascular: s1 and s2 normal  Abdomen: soft, positive bowel sounds  Extremities: bilateral lower extremity CSSTI without crepitus or bullae  Neuro: intubated and sedated  Skin: normal color and texture     Recent Labs     07/19/22  1448 07/20/22  0515 07/21/22  0638   WBC 7.7 6.7 7.4   RBC 3.03* 3.53* 3.57*   HGB 9.4* 11.1* 11.1*   HCT 32.6* 35.6* 35.5*   .6* 100.8* 99.4*   MCH 31.0 31.4* 31.1*   MCHC 28.8* 31.2* 31.3*    163 143     Recent Labs     07/19/22 2215 07/19/22 2311 07/20/22 0515 07/20/22 2050 07/21/22  0638   *  --  149*  --  146*   K 3.7   < > 2.7* 2.9* 3.4*   ANIONGAP 21*  --  17  --  15   *  --  110  --  113*   CO2 13*  --  22  --  18*   BUN 11  --  10  --  6*   CREATININE 1.0*  --  0.7  --  0.5   GLUCOSE 243*  --  163*  --  149*   CALCIUM 8.1*  --  7.6*  --  7.7*    < > = values in this interval not displayed. Recent Labs     07/19/22 2215 07/20/22 0515 07/20/22 2050 07/21/22  0638   MG 1.8 1.6 2.2 2.0   PHOS 4.0  --   --   --      Recent Labs     07/19/22 2215 07/20/22 0515 07/21/22  0638   AST 16 14 17   ALT 13 10 12   BILITOT 1.4* 1.0 1.6*   ALKPHOS 98 80 101     No results for input(s): PH, PO2, PCO2, HCO3, BE, O2SAT in the last 72 hours. No results for input(s): TROPONINI in the last 72 hours. Recent Labs     07/19/22  1439   INR 1.43*     Recent Labs     07/19/22  1448   LACTA 1.8         Intake/Output Summary (Last 24 hours) at 7/21/2022 1045  Last data filed at 7/21/2022 1000  Gross per 24 hour   Intake 3888.6 ml   Output 2000 ml   Net 1888.6 ml       XR CHEST PORTABLE    Result Date: 7/19/2022  NO PRIOR REPORT AVAILABLE Exam: X-RAY OF [facility] Clinical data:Intubation. Technique:Single view of the chest. Prior studies: No prior studies submitted. Findings: The ET tube lies 4 cm above the level of the Gab[place] in satisfactory position. Postop changes are noted status post CABG. Cardiac silhouette is at large. The vascular pattern is increased . Findings consistent with the suspected congestive heart failure. Left-sided pleural effusion is suspected. Suspect Right lower lobe infiltrate/atelectasis.  Please correlate clinically. No evidence of pleural effusion or pneumothorax. No acute osseous abnormality is detected. satisfactory ET tube placement. The distal tip lies 4 cm above the [place]. Postop changes status post CABG  cardiomegaly increased vascular pattern. Congestive heart failure is suspected and cannot be excluded suspect right lower lobe infiltrate small left-sided pleural effusion Recommendation: Follow up as clinically indicated. Electronically Signed by Kaz Yoo at 19-Jul-2022 09:08:27 PM              Assessment and Plan:   Ventilator dependent acute respiratory failure  Suspect secondary to below processes  Titrate minute ventilation for pH 7.35  Follow ABG/CXR  Follow plateau pressure     Concern for CAP versus aspiration pneumonitis  Empiric broad-spectrum antibiotics  Follow cultures  Molecular respiratory panel negative     Bilateral lower extremity CSSTI  Broad-spectrum antibiotics  Imaging as warranted  Elevate extremities     Septic shock  Secondary to above processes  Broad-spectrum antibiotics  Follow cultures  IVF  Norepinephrine gtt  Lactate 1.8  Echo ordered     Known left retroperitoneal hematoma  Surgery following  States stable  Slowly resolving  No intervention required  Surgery since signed off     DVT prophylaxis  Lovenox     Extensive discussion with patient's son in regards to current clinical state. All questions sought and answered.     Total critical care time: 48 minutes    Kanu Hills MD   7/21/2022 10:45 AM

## 2022-07-21 NOTE — PROGRESS NOTES
Palliative Care Progress Note  7/21/2022 1:52 PM    Patient:  Deepali Fagan  YOB: 1955  Primary Care Physician: Carmen Arteaga MD  Advance Directive: Full Code  Admit Date: 7/19/2022       Hospital Day: 2  Portions of this note have been copied forward, however, changed to reflect the most current clinical status of this patient. CHIEF COMPLAINT/REASON FOR CONSULTATION Goals of care, code status, family support    SUBJECTIVE:  Ms. Fagan remains intubated. No acute overnight events noted. Review of Systems:   14 point review of systems is negative except as specifically addressed above. Objective:   VITALS:  BP (!) 129/38   Pulse 90   Temp 98.5 °F (36.9 °C) (Temporal)   Resp 27   Ht 5' 4\" (1.626 m)   Wt 203 lb 4.8 oz (92.2 kg)   SpO2 98%   BMI 34.90 kg/m²   24HR INTAKE/OUTPUT:    Intake/Output Summary (Last 24 hours) at 7/21/2022 1352  Last data filed at 7/21/2022 1200  Gross per 24 hour   Intake 4605.5 ml   Output 2800 ml   Net 1805.5 ml       General appearance: 72 yo female, intubated, moving arms and legs around continuously   Head: Normocephalic, without obvious abnormality, atraumatic  Eyes: conjunctivae/corneas clear.  PERRL  Ears: normal external ears and nose  Neck: no JVD, supple, symmetrical, trachea midline   Lungs: diminished throughout, no rhonchi, mechanically ventialted   Heart: regular rate, regular rhythm, S1, S2 normal, no murmur  Abdomen:soft,obese,  non-tender; non-distended, bowel sounds present    Extremities:trace bilateral lower extremity edema w/ cellulitic changes that appear improved  Skin: warm, dry  Neurologic: intubated,sedated, moving extremities continuously, able to squeeze my hand but does not otherwise follow commands    Medications:      dexmedetomidine HCl in NaCl 1.2 mcg/kg/hr (07/21/22 1223)    lactated ringers 100 mL/hr at 07/21/22 0807    norepinephrine 19 mcg/min (07/21/22 0941)    sodium chloride      propofol 20 mcg/kg/min (07/20/22 5011) meropenem  1,000 mg IntraVENous Q8H    vancomycin (VANCOCIN) intermittent dosing (placeholder)   Other RX Placeholder    vancomycin  1,000 mg IntraVENous Q12H    sodium chloride flush  5-40 mL IntraVENous 2 times per day    enoxaparin  40 mg SubCUTAneous Daily     perflutren lipid microspheres, sodium bicarbonate, potassium chloride **OR** potassium chloride, magnesium sulfate, sodium chloride flush, sodium chloride, ondansetron **OR** ondansetron, magnesium hydroxide, acetaminophen **OR** acetaminophen  Diet NPO     Lab and other Data:     Recent Labs     07/19/22  1448 07/20/22  0515 07/21/22  0638   WBC 7.7 6.7 7.4   HGB 9.4* 11.1* 11.1*    163 143     Recent Labs     07/19/22 2215 07/19/22  2311 07/20/22  0515 07/20/22  2050 07/21/22  0638   *  --  149*  --  146*   K 3.7   < > 2.7* 2.9* 3.4*   *  --  110  --  113*   CO2 13*  --  22  --  18*   BUN 11  --  10  --  6*   CREATININE 1.0*  --  0.7  --  0.5   GLUCOSE 243*  --  163*  --  149*    < > = values in this interval not displayed. Recent Labs     07/19/22 2215 07/20/22  0515 07/21/22  0638   AST 16 14 17   ALT 13 10 12   BILITOT 1.4* 1.0 1.6*   ALKPHOS 98 80 101     Assessment/Plan   Principal Problem:    Hypovolemic shock (HCC)  Active Problems:    Retroperitoneal hematoma    Palliative care patient    Paroxysmal atrial fibrillation (HCC)    Diastolic dysfunction    Coronary artery disease    Type 2 diabetes mellitus with hyperglycemia  Resolved Problems:    * No resolved hospital problems. *    Visit Summary:  Chart reviewed. No acute overnight events noted. Blood cx positive. Empiric antibiotics continued. D/w RN. Patient may need increase in sedation as she appears uncomfortable at this time. Patient's family were at bedside. Updated them on hospital course thus far. They have no new questions or concerns and remain hopeful that Michelle Venkata will be extubated soon.      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    Time Spent Counseling > 50%:  YES                                   Total Time Spent with patient/family counseling, workup/treatment review, counseling and placement of orders/preparation of this note: 28 minutes    Electronically signed by Cheo Giles PA-C on 7/21/2022 at 1:52 PM    (Please note that portions of this note were completed with a voice recognition program.  Aries Howe made to edit the dictations but occasionally words are mis-transcribed.)

## 2022-07-21 NOTE — PROGRESS NOTES
Comprehensive Nutrition Assessment    Type and Reason for Visit:  Initial    Nutrition Recommendations/Plan:   Follow for extubation, starting po diet, or alternate source of nutrition     Malnutrition Assessment:  Malnutrition Status: At risk for malnutrition (Comment) (07/21/22 7109)    Context:  Acute Illness     Findings of the 6 clinical characteristics of malnutrition:  Energy Intake:  Mild decrease in energy intake (Comment)  Weight Loss:  No significant weight loss     Body Fat Loss:  No significant body fat loss     Muscle Mass Loss:  No significant muscle mass loss    Fluid Accumulation:  No significant fluid accumulation Extremities   Strength:  Not Performed    Nutrition Assessment:    Following for vent status. Pt appears adequately nourished. At present time pt remains on vent. Aware of hoping to extubate. If unable to extubate or continues NPO status, suggest EN-Vital High Protein with a goal rate of 52ml/hr and 10ml free water flush hourly    Nutrition Related Findings:    on vent Wound Type: Pressure Injury       Current Nutrition Intake & Therapies:    Average Meal Intake: NPO  Average Supplements Intake: NPO  Diet NPO    Anthropometric Measures:  Height: 5' 4\" (162.6 cm)  Ideal Body Weight (IBW): 120 lbs (55 kg)    Admission Body Weight: 200 lb 3.2 oz (90.8 kg)  Current Body Weight: 203 lb 4.8 oz (92.2 kg), 169.4 % IBW. Current BMI (kg/m2): 34.9  Usual Body Weight: 199 lb 9.6 oz (90.5 kg) (4/13/2022)  % Weight Change (Calculated): 1.9    BMI Categories: Obese Class 1 (BMI 30.0-34. 9)    Estimated Daily Nutrient Needs:  Energy Requirements Based On: Kcal/kg  Weight Used for Energy Requirements: Current  Energy (kcal/day): 9799-2373 kcals (11-14 kcals/kg)  Weight Used for Protein Requirements: Ideal  Protein (g/day): 109  Method Used for Fluid Requirements: 1 ml/kcal  Fluid (ml/day): 3673-2495 ml    Nutrition Diagnosis:   Inadequate oral intake related to acute injury/trauma, impaired respiratory function as evidenced by NPO or clear liquid status due to medical condition, intubation    Nutrition Interventions:   Food and/or Nutrient Delivery: Continue NPO  Nutrition Education/Counseling: No recommendation at this time  Coordination of Nutrition Care: Continue to monitor while inpatient       Goals:     Goals: Initiate PO diet, Initiate nutrition support       Nutrition Monitoring and Evaluation:   Behavioral-Environmental Outcomes: None Identified  Food/Nutrient Intake Outcomes: Diet Advancement/Tolerance, Food and Nutrient Intake, Enteral Nutrition Intake/Tolerance  Physical Signs/Symptoms Outcomes: Biochemical Data, Fluid Status or Edema, Weight, Skin    Discharge Planning:     Too soon to determine     Erich Sommer MS, RD, LD  Contact: [phone]

## 2022-07-22 PROBLEM — J96.01 ACUTE RESPIRATORY FAILURE WITH HYPOXIA (HCC): Status: ACTIVE | Noted: 2022-01-01

## 2022-07-22 NOTE — PROGRESS NOTES
Hospitalist Progress Note  [facility]     Patient: Eagle Roblero Day  : 1955  MRN: [id number]  Code Status: Full Code    Hospital Day: 3   Date of Service: 2022    Subjective:   Patient seen and examined. Intubated and sedated. Past Medical History:   Diagnosis Date    Atrial flutter by electrocardiogram ([address]     Constipation     Coronary artery disease     Diabetes mellitus (HCC)     Essential hypertension     Hyperlipidemia     IBS (irritable bowel syndrome)     Iron deficiency anemia     Menopause     Paroxysmal SVT (supraventricular tachycardia) (HCC)     RA (rheumatoid arthritis) (HCC)     Rheumatoid arthritis (HCC)     Traumatic retroperitoneal hematoma     fell from ladder 2021       Past Surgical History:   Procedure Laterality Date    CARDIAC SURGERY      CABG (Dr. Jose Hollingsworth) [facility] [address] 89      Stent     CHOLECYSTECTOMY      CORONARY ARTERY BYPASS GRAFT      DILATION AND CURETTAGE OF UTERUS N/A 2019    DILATATION AND CURETTAGE HYSTEROSCOPY W/ Fredo Montanez performed by Belinda Rodriguez MD at [facility] OR    TOTAL KNEE ARTHROPLASTY         Family History   Problem Relation Age of Onset    Heart Attack Father     Prostate Cancer Father     Stroke Father     Heart Attack Brother     Stroke Brother     Breast Cancer Maternal Aunt     Cancer Paternal Uncle         Lung       Social History     Socioeconomic History    Marital status:       Spouse name: Not on file    Number of children: Not on file    Years of education: Not on file    Highest education level: Not on file   Occupational History    Not on file   Tobacco Use    Smoking status: Former     Packs/day: 0.50     Years: 26.00     Pack years: 13.00     Types: Cigarettes     Start date: 6/10/1974     Quit date: 2000     Years since quittin.8    Smokeless tobacco: Never   Vaping Use    Vaping Use: Never used   Substance and Sexual Activity    Alcohol use: No    Drug use: No    Sexual activity: Yes   Other Topics Concern    Not on file   Social History Narrative    Not on file     Social Determinants of Health     Financial Resource Strain: Low Risk     Difficulty of Paying Living Expenses: Not hard at all   Food Insecurity: No Food Insecurity    Worried About Running Out of Food in the Last Year: Never true    Ran Out of Food in the Last Year: Never true   Transportation Needs: Not on file   Physical Activity: Inactive    Days of Exercise per Week: 0 days    Minutes of Exercise per Session: 0 min   Stress: Not on file   Social Connections: Not on file   Intimate Partner Violence: Not on file   Housing Stability: Not on file       Current Facility-Administered Medications   Medication Dose Route Frequency Provider Last Rate Last Admin    vancomycin (VANCOCIN) 1,250 mg in dextrose 5 % 250 mL IVPB  1,250 mg IntraVENous Q12H Amaris Mendoza MD        perflutren lipid microspheres (DEFINITY) injection 1.65 mg  1.5 mL IntraVENous ONCE PRN Amaris Mendoza MD   1.5 mL at 07/21/22 1353    sodium bicarbonate 8.4 % injection 50 mEq  50 mEq IntraVENous PRN Beverlie Dubin, MD        potassium chloride 20 mEq/50 mL IVPB (Central Line)  20 mEq IntraVENous PRN Beverlie Dubin, MD   Stopped at 07/21/22 2123    Or    potassium chloride 10 mEq/100 mL IVPB (Peripheral Line)  10 mEq IntraVENous PRN Beverlie Dubin, MD   Stopped at 07/21/22 0507    magnesium sulfate 2000 mg in 50 mL IVPB premix  2,000 mg IntraVENous PRN Beverlie Dubin, MD   Stopped at 07/20/22 2015    dexmedetomidine (PRECEDEX) 400 mcg in sodium chloride 0.9 % 100 mL infusion  0.1-1.5 mcg/kg/hr IntraVENous Continuous Amaris Mendoza MD 27.2 mL/hr at 07/22/22 0744 1.2 mcg/kg/hr at 07/22/22 0744    meropenem (MERREM) 1000 mg in sodium chloride 0.9% 50 mL (duplex)  1,000 mg IntraVENous Q8H Amaris Mendoza MD   Stopped at 07/22/22 0747    lactated ringers infusion   IntraVENous Continuous Amaris Mendoza  mL/hr at 07/22/22 0604 Rate Verify at 07/22/22 0534 vancomycin (VANCOCIN) intermittent dosing (placeholder)   Other RX Placeholder Layo Wilson MD        sodium chloride flush 0.9 % injection 5-40 mL  5-40 mL IntraVENous 2 times per day Balbina Oxford, DO   10 mL at 07/22/22 1704    sodium chloride flush 0.9 % injection 5-40 mL  5-40 mL IntraVENous PRN Balbina Oxford, DO        norepinephrine (LEVOPHED) 16 mg in sodium chloride 0.9 % 250 mL infusion  1-100 mcg/min IntraVENous Continuous Balbina Oxford, DO 0.9 mL/hr at 07/22/22 0840 1 mcg/min at 07/22/22 0840    0.9 % sodium chloride infusion   IntraVENous PRN Balbina Oxford, DO        enoxaparin (LOVENOX) injection 40 mg  40 mg SubCUTAneous Daily Balbina Oxford, DO   40 mg at 07/21/22 1436    ondansetron (ZOFRAN-ODT) disintegrating tablet 4 mg  4 mg Oral Q8H PRN Balbina Oxford, DO        Or    ondansetron [facility]) injection 4 mg  4 mg IntraVENous Q6H PRN Balbina Bernalillo, DO        magnesium hydroxide (MILK OF MAGNESIA) 400 MG/5ML suspension 30 mL  30 mL Oral Daily PRN Balbina Oxford, DO        acetaminophen (TYLENOL) tablet 650 mg  650 mg Oral Q6H PRN Balbina Oxford, DO        Or    acetaminophen (TYLENOL) suppository 650 mg  650 mg Rectal Q6H PRN Balbina Oxford, DO        propofol injection  5-50 mcg/kg/min IntraVENous Continuous Balbina Oxford, DO 5.6 mL/hr at 07/22/22 1049 10 mcg/kg/min at 07/22/22 1049         dexmedetomidine HCl in NaCl 1.2 mcg/kg/hr (07/22/22 0744)    lactated ringers 100 mL/hr at 07/22/22 0604    norepinephrine 1 mcg/min (07/22/22 0840)    sodium chloride      propofol 10 mcg/kg/min (07/22/22 1049)        Objective:   BP (!) 97/56   Pulse 66   Temp 97.1 °F (36.2 °C) (Temporal)   Resp 19   Ht 5' 4\" (1.626 m)   Wt 200 lb 8 oz (90.9 kg)   SpO2 95%   BMI 34.42 kg/m²     General: no acute distress  HEENT: normocephalic, atraumatic  Neck: supple, symmetrical, trachea midline  Lungs: bilateral rhonchi  Cardiovascular: s1 and s2 normal  Abdomen: soft, positive bowel sounds  Extremities: bilateral lower extremity CSSTI without crepitus or bullae  Neuro: intubated and sedated  Skin: normal color and texture     Recent Labs     07/20/22  0515 07/21/22  0638 07/22/22  0000   WBC 6.7 7.4 6.4   RBC 3.53* 3.57* 3.86*   HGB 11.1* 11.1* 12.0   HCT 35.6* 35.5* 38.8   .8* 99.4* 100.5*   MCH 31.4* 31.1* 31.1*   MCHC 31.2* 31.3* 30.9*    143 111*     Recent Labs     07/20/22 0515 07/20/22 2050 07/21/22  0638 07/21/22  1700 07/22/22  0000 07/22/22  0229   *  --  146*  --  149*  --    K 2.7*   < > 3.4* 3.3* 3.7 3.4   ANIONGAP 17  --  15  --  13  --      --  113*  --  120*  --    CO2 22  --  18*  --  16*  --    BUN 10  --  6*  --  4*  --    CREATININE 0.7  --  0.5  --  0.5  --    GLUCOSE 163*  --  149*  --  171*  --    CALCIUM 7.6*  --  7.7*  --  8.2*  --     < > = values in this interval not displayed. Recent Labs     07/19/22 2215 07/20/22  0515 07/20/22  2050 07/21/22  0638 07/22/22  0000   MG 1.8   < > 2.2 2.0 1.9   PHOS 4.0  --   --   --   --     < > = values in this interval not displayed. Recent Labs     07/20/22  0515 07/21/22  0638 07/22/22  0000   AST 14 17 18   ALT 10 12 13   BILITOT 1.0 1.6* 1.5*   ALKPHOS 80 101 91     No results for input(s): PH, PO2, PCO2, HCO3, BE, O2SAT in the last 72 hours. No results for input(s): TROPONINI in the last 72 hours.   Recent Labs     07/19/22  1439   INR 1.43*     Recent Labs     07/19/22  1448   LACTA 1.8         Intake/Output Summary (Last 24 hours) at 7/22/2022 1122  Last data filed at 7/22/2022 1000  Gross per 24 hour   Intake 2989 ml   Output 4895 ml   Net -1906 ml       ECHO Complete 2D W Doppler W Color    Result Date: 7/21/2022  Transthoracic Echocardiography Report (TTE)  Demographics   Patient Name   Barbara Reinoso  Date of Study           07/21/2022   MRN            [id number]        Gender                  Female   Date of Birth  1955    Room Number [facility]HL-0150   Age            73 year(s)   Height:        64 inches     Referring Physician     Peng Lopez   Weight:        203 pounds    Sonographer             Chrissie Farfan   BSA:           1.97 m^2      Interpreting Physician  Caron Altamirano MD   BMI:           34.85 kg/m^2  Procedure Type of Study   TTE procedure:ECHO 2D W/DOPPLER/COLOR/CONTRAST. Study Location: Echo Lab Technical Quality: Limited visualization due to patient on ventilator. Patient Status: Inpatient Contrast Medium: Definity. HR: 93 bpm BP: 129/38 mmHg Indications:Atrial fibrillation and Coronary artery disease. Conclusions   Summary  Normal left ventricular cavity with overall normal systolic function. EF  45-50%  Morphologically normal valves with mod TR  Pericardium and ascending aorta normal  IVC mildly dilated   Signature   ----------------------------------------------------------------  Electronically signed by Caron Altamirano MD(Interpreting  physician) on 07/21/2022 03:52 PM  ----------------------------------------------------------------   Findings   Mitral Valve  Structurally normal mitral valve with normal leaflet mobility. No evidence  of mitral valve stenosis or mild mitral regurgitation. Aortic Valve  Aortic valve appears to be tricuspid. Structurally normal aortic valve. No significant aortic regurgitation or stenosis is noted. Tricuspid Valve  Tricuspid valve is structurally normal.  Mod tricuspid regurgitation. Pulmonic Valve  The pulmonic valve was not well visualized. Left Atrium  Normal size left atrium. Left Ventricle  Normal left ventricular size with preserved LV function and an estimated  ejection fraction of approximately 45-50%. No evidence of left ventricular mass or thrombus noted. Right Atrium  Normal right atrial dimension with no evidence of thrombus or mass noted. Right Ventricle  Normal right ventricular size with preserved RV function.    Pericardial Effusion  No evidence of significant pericardial effusion is noted. Pleural Effusion  No evidence of pleural effusion. Miscellaneous  Aortic root is within normal limits. 2D Measurements and Calculations(cm)   LVIDd: 4.54 cm                      LVIDs: 3.71 cm  IVSd: 1.09 cm  LVPWd: 1 cm                         AO Root Dimension: 2.1 cm  Rt. Vent. Dimension: 3.39 cm        LA Dimension: 3.8 cm  % Ejection Fraction: 40 %           LA Area: 13.9 cm^2  LA Volume: 35.3 ml                  LV Systolic dimension: 2.84WB  LA Volume Index: 18 ml/m^2          LV PW diastolic: 1cm  LV dimension: 4.54 cm               LVOT diameter: 1.9 cm                                      RA Systolic pressure: 3mmHg  LVEDV: 67.8 ml                      RV Diastolic dimension: 5.17KO  LVESV:38.9 ml                       LVEDVI: 34 ml/m^2  Cardic Output (CO): 5.82l/min       LVESVI: 20 ml/m^2  Ascending Aorta: 2.3 cm  Doppler Measurements and Calculations   AV Peak Velocity:211 cm/s              MV Peak E-Wave: 99.2 cm/s  AV Mean Velocity:137 cm/s              MV Peak A-Wave: 56.3 cm/s  AV Peak Gradient: 17.81 mmHg           MV E/A Ratio: 1.76 %  AV Mean Gradient: 9 mmHg               MV Mean velocity: 70.8cm/s  AV Area (Continuity):1.62 cm^2         MV Peak Gradient: 3.94 mmHg  AV VTI:38.6 cm/s                       MV Mean gradient: 2 mmHg  TR Velocity:271 cm/s                   MV P1/2t: 80 msec  TR Gradient:29.38 mmHg                 MVA by PHT2.75 cm^2  Estimated RAP:3 mmHg  RVSP:35 mmHg   MV E' septal velocity: 7.94cm/s  MV E' lateral velocity:8.59 cm/s      XR CHEST PORTABLE    Result Date: 7/22/2022  Exam: X-RAY OF [facility] Clinical data:Ventilator-dependent respiratory failure (VDRF). Technique:Single view of the chest. Prior studies: Chest x-ray dated 07/19/2022 Findings:Tubes lines, grossly as before. Postoperative changes, with sternotomy. Stable cardiac outline.  Bilateral pleural effusions, with associated dependent lung opacities, as before, as well as some patchy airspace disease in the right mid lung, mildly increased from prior. No pneumothorax. Mildly increased patchy airspace disease in the right mid lung/perihilar region. Otherwise, no significant interval change, with findings as before. Recommendation: Follow up as clinically indicated. Electronically Signed by Amanda Roblero MD at 22-Jul-2022 06:57:08 AM              Assessment and Plan:   Ventilator dependent acute respiratory failure  Suspect secondary to below processes  Titrate minute ventilation for pH 7.35  Follow ABG/CXR  Follow plateau pressure     Concern for CAP versus aspiration pneumonitis  Empiric broad-spectrum antibiotics  Follow cultures  Molecular respiratory panel negative     Bilateral lower extremity CSSTI  Broad-spectrum antibiotics  Imaging as warranted  Elevate extremities     Septic shock  Secondary to above processes  Broad-spectrum antibiotics  Follow cultures  IVF  Norepinephrine gtt, decreasing requirement  Lactate 1.8  TTE reviewed     Known left retroperitoneal hematoma  Surgery following  States stable  Slowly resolving  No intervention required  Surgery since signed off     DVT prophylaxis  Lovenox     Extensive discussions with patient's son and sister in regards to current clinical state. All questions sought and answered.     Total critical care time: 54 minutes    Anna Ledezma MD   7/22/2022 11:22 AM

## 2022-07-22 NOTE — PROGRESS NOTES
[address] [facility] Pharmacokinetic Monitoring Service - Vancomycin    Consulting Provider: Dr. Regi Abdi   Indication: Pneumonia; SSTI  Target Concentration: Goal trough 10-15 AUC < 500 mg*hr/L  Day of Therapy: 3  Additional Antimicrobials: meropenem    Pertinent Laboratory Values: Wt Readings from Last 1 Encounters:   07/22/22 200 lb 8 oz (90.9 kg)     Temp Readings from Last 1 Encounters:   07/22/22 97.1 °F (36.2 °C) (Temporal)     Estimated Creatinine Clearance: 123 mL/min (based on SCr of 0.5 mg/dL). Recent Labs     07/21/22  0638 07/22/22  0000   CREATININE 0.5 0.5   WBC 7.4 6.4     Procalcitonin: No level    Pertinent Cultures:  Culture Date Source Results   07/20/22 Blood Staph epidermidis (frequently represents skin contamination)   07/20/22 Blood No growth   07/22/22 Blood Sent   07/19/22 Urine Candida glabrata   MRSA Nasal Swab: was negative on 07/20/22, ordered for respiratory indication, pharmacy to contact provider about discontinuing vancomycin    Recent vancomycin administrations                     vancomycin (VANCOCIN) 1000 mg in sodium chloride 0.9% 250 mL IVPB (mg) 1,000 mg New Bag 07/21/22 2022     1,000 mg New Bag  0800    vancomycin (VANCOCIN) 2,000 mg in dextrose 5 % 500 mL IVPB (mg) 2,000 mg New Bag 07/20/22 1917                    Assessment:  Date/Time Current Dose Concentration Timing of Concentration (h) AUC   07/22/22 @ 0839 1000mg IV q 12 hours 8.9 12h 17m 345   Note: Serum concentrations collected for AUC dosing may appear elevated if collected in close proximity to the dose administered, this is not necessarily an indication of toxicity    Plan:  Current dosing regimen is sub-therapeutic  Increase dose to 1250mg IV q 12 hours  Repeat vancomycin concentration ordered for 07/23/22 @ 1100   Pharmacy will continue to monitor patient and adjust therapy as indicated  Contacting Dr. Regi Abdi to see if vancomycin needed for SSTI or if it can be discontinued.     Thank you for the consult,    TREVOR GONZALEZ, PHARM D, 7/22/2022, 10:17 AM

## 2022-07-22 NOTE — PLAN OF CARE
Problem: Discharge Planning  Goal: Discharge to home or other facility with appropriate resources  7/22/2022 1231 by Best Puga RN  Outcome: Progressing  7/22/2022 0005 by Jackson Nicole RN  Outcome: Not Progressing     Problem: Skin/Tissue Integrity  Goal: Absence of new skin breakdown  Description: 1. Monitor for areas of redness and/or skin breakdown  2. Assess vascular access sites hourly  3. Every 4-6 hours minimum:  Change oxygen saturation probe site  4. Every 4-6 hours:  If on nasal continuous positive airway pressure, respiratory therapy assess nares and determine need for appliance change or resting period.   7/22/2022 1231 by Best Puga RN  Outcome: Progressing  7/22/2022 0005 by Jackson Nicole RN  Outcome: Not Progressing     Problem: Safety - Adult  Goal: Free from fall injury  7/22/2022 1231 by Best Puga RN  Outcome: Progressing  7/22/2022 0005 by Jackson Nicole RN  Outcome: Not Progressing     Problem: Pain  Goal: Verbalizes/displays adequate comfort level or baseline comfort level  7/22/2022 1231 by Best Puga RN  Outcome: Progressing  Flowsheets (Taken 7/22/2022 0800 by Anamika Pizarro RN)  Verbalizes/displays adequate comfort level or baseline comfort level: Assess pain using appropriate pain scale  7/22/2022 0005 by Jackson Nicole RN  Outcome: Not Progressing     Problem: Nutrition Deficit:  Goal: Optimize nutritional status  7/22/2022 1231 by Best Puga RN  Outcome: Progressing  7/22/2022 0005 by Jackson Nicole RN  Outcome: Not Progressing     Problem: Chronic Conditions and Co-morbidities  Goal: Patient's chronic conditions and co-morbidity symptoms are monitored and maintained or improved  7/22/2022 1231 by Best Puga RN  Outcome: Progressing  7/22/2022 0005 by Jackson Nicole RN  Outcome: Not Progressing     Problem: ABCDS Injury Assessment  Goal: Absence of physical injury  7/22/2022 1231 by Best Puga RN  Outcome: Progressing  7/22/2022 0005 by Jackson Nicole RN  Outcome: Not Progressing     Problem: Discharge Planning  Goal: Discharge to home or other facility with appropriate resources  7/22/2022 1231 by Juma Cummins RN  Outcome: Progressing  7/22/2022 0005 by Micheal Mcneal RN  Outcome: Not Progressing     Problem: Skin/Tissue Integrity  Goal: Absence of new skin breakdown  Description: 1. Monitor for areas of redness and/or skin breakdown  2. Assess vascular access sites hourly  3. Every 4-6 hours minimum:  Change oxygen saturation probe site  4. Every 4-6 hours:  If on nasal continuous positive airway pressure, respiratory therapy assess nares and determine need for appliance change or resting period.   7/22/2022 1231 by Juma Cummins RN  Outcome: Progressing  7/22/2022 0005 by Micheal Mcneal RN  Outcome: Not Progressing     Problem: Safety - Adult  Goal: Free from fall injury  7/22/2022 1231 by Juma Cummins RN  Outcome: Progressing  7/22/2022 0005 by Micheal Mcneal RN  Outcome: Not Progressing     Problem: Pain  Goal: Verbalizes/displays adequate comfort level or baseline comfort level  7/22/2022 1231 by Juma Cummins RN  Outcome: Progressing  Flowsheets (Taken 7/22/2022 0800 by Roxie Martínez RN)  Verbalizes/displays adequate comfort level or baseline comfort level: Assess pain using appropriate pain scale  7/22/2022 0005 by Micheal Mcneal RN  Outcome: Not Progressing     Problem: Nutrition Deficit:  Goal: Optimize nutritional status  7/22/2022 1231 by Juma Cummins RN  Outcome: Progressing  7/22/2022 0005 by Micheal Mcneal RN  Outcome: Not Progressing     Problem: Chronic Conditions and Co-morbidities  Goal: Patient's chronic conditions and co-morbidity symptoms are monitored and maintained or improved  7/22/2022 1231 by Juma Cummins RN  Outcome: Progressing  7/22/2022 0005 by Micheal Mcneal RN  Outcome: Not Progressing     Problem: ABCDS Injury Assessment  Goal: Absence of physical injury  7/22/2022 1231 by Juma Cummins RN  Outcome: Progressing  7/22/2022 0005 by Micheal Mcneal RN  Outcome: Not Progressing

## 2022-07-22 NOTE — PLAN OF CARE
Problem: Discharge Planning  Goal: Discharge to home or other facility with appropriate resources  7/22/2022 0005 by Randy Martinez RN  Outcome: Not Progressing  7/21/2022 1629 by Benja Berman RN  Outcome: Not Progressing  7/21/2022 1601 by Silvia Tate RN  Outcome: Progressing  Flowsheets (Taken 7/21/2022 0800 by Benja Berman RN)  Discharge to home or other facility with appropriate resources: Identify barriers to discharge with patient and caregiver     Problem: Skin/Tissue Integrity  Goal: Absence of new skin breakdown  Description: 1. Monitor for areas of redness and/or skin breakdown  2. Assess vascular access sites hourly  3. Every 4-6 hours minimum:  Change oxygen saturation probe site  4. Every 4-6 hours:  If on nasal continuous positive airway pressure, respiratory therapy assess nares and determine need for appliance change or resting period.   7/22/2022 0005 by Randy Martinez RN  Outcome: Not Progressing  7/21/2022 1629 by Benja Berman RN  Outcome: Not Progressing  7/21/2022 1601 by Silvia Tate RN  Outcome: Progressing  7/21/2022 1600 by Silvia Tate RN  Outcome: Progressing     Problem: Safety - Adult  Goal: Free from fall injury  7/22/2022 0005 by Randy Martinez RN  Outcome: Not Progressing  7/21/2022 1629 by Benja Berman RN  Outcome: Not Progressing  7/21/2022 1601 by Silvia Tate RN  Outcome: Progressing  7/21/2022 1600 by Silvia Tate RN  Outcome: Progressing     Problem: Pain  Goal: Verbalizes/displays adequate comfort level or baseline comfort level  7/22/2022 0005 by aRndy Martinez RN  Outcome: Not Progressing  7/21/2022 1629 by Benja Berman RN  Outcome: Not Progressing  7/21/2022 1601 by Silvia Tate RN  Outcome: Progressing  Flowsheets  Taken 7/21/2022 1200 by Benja Berman RN  Verbalizes/displays adequate comfort level or baseline comfort level: Assess pain using appropriate pain scale  Taken 7/21/2022 0800 by Benja Berman RN  Verbalizes/displays adequate comfort level or baseline comfort level: Assess pain using appropriate pain scale     Problem: Nutrition Deficit:  Goal: Optimize nutritional status  7/22/2022 0005 by Clyde Perkins RN  Outcome: Not Progressing  7/21/2022 1629 by Ilene Oswald RN  Outcome: Not Progressing  7/21/2022 1601 by Daija Ramos RN  Outcome: Progressing     Problem: Chronic Conditions and Co-morbidities  Goal: Patient's chronic conditions and co-morbidity symptoms are monitored and maintained or improved  7/22/2022 0005 by Clyde Perkins RN  Outcome: Not Progressing  7/21/2022 1629 by Ilene Oswald RN  Outcome: Not Progressing  7/21/2022 1601 by Daija Ramos RN  Outcome: Progressing  7/21/2022 1600 by Daija Ramos RN  Outcome: Progressing  Flowsheets (Taken 7/21/2022 0800 by Ilene Oswald RN)  Care Plan - Patient's Chronic Conditions and Co-Morbidity Symptoms are Monitored and Maintained or Improved: Monitor and assess patient's chronic conditions and comorbid symptoms for stability, deterioration, or improvement     Problem: ABCDS Injury Assessment  Goal: Absence of physical injury  7/22/2022 0005 by Clyde Perkins RN  Outcome: Not Progressing  7/21/2022 1629 by Ilene Oswald RN  Outcome: Not Progressing  7/21/2022 1601 by Daija Ramos RN  Outcome: Progressing  7/21/2022 1600 by Daija Ramos RN  Outcome: Progressing

## 2022-07-22 NOTE — PROGRESS NOTES
Palliative Care Progress Note  7/22/2022 10:24 AM    Patient:  Memo Fagan  YOB: 1955  Primary Care Physician: Augusta Ordonez MD  Advance Directive: Full Code  Admit Date: 7/19/2022       Hospital Day: 3  Portions of this note have been copied forward, however, changed to reflect the most current clinical status of this patient. CHIEF COMPLAINT/REASON FOR CONSULTATION Goals of care, code status, family support    SUBJECTIVE:  Ms. Fagan is intubated/sedated. No s/s distress noted    Review of Systems:   14 point review of systems is negative except as specifically addressed above. Objective:   VITALS:  BP (!) 93/53   Pulse 64   Temp 97.1 °F (36.2 °C) (Temporal)   Resp 16   Ht 5' 4\" (1.626 m)   Wt 200 lb 8 oz (90.9 kg)   SpO2 98%   BMI 34.42 kg/m²   24HR INTAKE/OUTPUT:    Intake/Output Summary (Last 24 hours) at 7/22/2022 1024  Last data filed at 7/22/2022 1000  Gross per 24 hour   Intake 2989 ml   Output 4895 ml   Net -1906 ml       General appearance: 72 yo female, intubated, NAD  Head: Normocephalic, without obvious abnormality, atraumatic  Eyes: conjunctivae/corneas clear.  PERRL  Ears: normal external ears and nose  Neck: no JVD, supple, symmetrical, trachea midline   Lungs: diminished to ausculation, mechanically ventilated, equal chest rise  Heart: RRR, S1, S2 normal, no murmur  Abdomen: soft,obese,  no grimacing to palpation, non-distended, bowel sounds present    Extremities:trace bilateral lower extremity edema w/ cellulitic changes that appear improved  Skin: warm, dry, BLE red/giulia  Neurologic: intubated/sedated, moving extremities spontaneously, wakes to voice, following commands intermittently    Medications:      dexmedetomidine HCl in NaCl 1.2 mcg/kg/hr (07/22/22 0744)    lactated ringers 100 mL/hr at 07/22/22 0604    norepinephrine 1 mcg/min (07/22/22 0840)    sodium chloride      propofol Stopped (07/22/22 0806)      vancomycin  1,250 mg IntraVENous Q12H    meropenem questions and emotional support provided. Will follow. Recommendations:   Palliative care: GOC- continue all medical treatment and monitor for improvement. Family hopes pt will return home when medically stable. Code status: FULL CODE  Shock, distributive -mgmt per Hospitalist, IVF resuscitation, Empiric abx. Wean pressor support as able. Follow cultures  Acute hypoxemic respiratory failure 2/2 cocnern for CAP vs aspiration pneumonitis-intubated 07/19/2022, stable on minimal ventilator settings. 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    Time Spent Counseling > 50%:  YES                                   Total Time Spent with patient/family counseling, workup/treatment review, counseling and placement of orders/preparation of this note: 27 minutes    Electronically signed by MARYANNE Jimenes CNP on 7/22/2022 at 10:24 AM    (Please note that portions of this note were completed with a voice recognition program.  Sabrina Perez made to edit the dictations but occasionally words are mis-transcribed.) yes

## 2022-07-22 NOTE — PROGRESS NOTES
Sedation turned off at 0840. Patient alert and following commands. SBT started on pt at 1000 pt tolerated well TV around 370 and SBI in the 70's. Provider notified. D/t slightly low TV pt placed back on previous vent settings and sedation resumed.

## 2022-07-22 NOTE — PROGRESS NOTES
Physician Progress Note      Palmer Dhaliwal  [facility] #:                  [id number]  :                       1955  ADMIT DATE:       2022 1:04 PM  DISCH DATE:  RESPONDING  PROVIDER #:        Brielle MONDRAGON          QUERY TEXT:    Patient admitted with hypovolemic shock. Noted documentation of Septic shock   by Dr. Emmer Nyhan on  note . If possible, please document in progress notes and discharge summary if you   are evaluating and /or treating any of the following: The medical record reflects the following:  Risk Factors: Pneumonia, Acute respiratory failure  Clinical Indicators:  WBC  7.7.  temp 99.6, B/P 74/27, RR 38, PH 7.1, lactic   acid 1.8. Treatment:  Labs, CXR, PICC line, Intubated,  ml/hr, Merrem, Levophed,   Vanco, Rocephin,    Thank you    Maribel Rose RN, BSN, [facility]  [phone]  Options provided:  -- hypovolemic shock confirmed and Septic shock ruled out  -- Septic shock confirmed and hypovolemic shock  ruled out  -- Hypovolemic shock  and Septic shock  confirmed  -- Hypovolemic shock  and Septic shock ruled out  -- Other - I will add my own diagnosis  -- Disagree - Not applicable / Not valid  -- Disagree - Clinically unable to determine / Unknown  -- Refer to Clinical Documentation Reviewer    PROVIDER RESPONSE TEXT:    After study, Septic shock confirmed and hypovolemic shock ruled out.     Query created by: Olive Shown on 2022 7:36 AM      Electronically signed by:  Caitlin Romo 2022 11:22 AM

## 2022-07-23 NOTE — PLAN OF CARE
Problem: Discharge Planning  Goal: Discharge to home or other facility with appropriate resources  7/23/2022 0016 by Rufus Tyler RN  Outcome: Progressing     Problem: Skin/Tissue Integrity  Goal: Absence of new skin breakdown  Description: 1. Monitor for areas of redness and/or skin breakdown  2. Assess vascular access sites hourly  3. Every 4-6 hours minimum:  Change oxygen saturation probe site  4. Every 4-6 hours:  If on nasal continuous positive airway pressure, respiratory therapy assess nares and determine need for appliance change or resting period.   7/23/2022 0016 by Rufus Tyler RN  Outcome: Progressing     Problem: Safety - Adult  Goal: Free from fall injury  7/23/2022 0016 by Rufus Tyler RN  Outcome: Progressing     Problem: Pain  Goal: Verbalizes/displays adequate comfort level or baseline comfort level  7/23/2022 0016 by Rufus Tyler RN  Outcome: Progressing     Problem: Nutrition Deficit:  Goal: Optimize nutritional status  7/23/2022 0016 by Rufus Tyler RN  Outcome: Progressing     Problem: Chronic Conditions and Co-morbidities  Goal: Patient's chronic conditions and co-morbidity symptoms are monitored and maintained or improved  7/23/2022 0016 by Rufus Tyler RN  Outcome: Progressing     Problem: ABCDS Injury Assessment  Goal: Absence of physical injury  7/23/2022 0016 by Rufus Tyler RN  Outcome: Progressing

## 2022-07-23 NOTE — PLAN OF CARE
Problem: Discharge Planning  Goal: Discharge to home or other facility with appropriate resources  7/23/2022 0932 by Aline Cordon RN  Outcome: Progressing  7/23/2022 0016 by Rosa Jones RN  Outcome: Progressing     Problem: Skin/Tissue Integrity  Goal: Absence of new skin breakdown  Description: 1. Monitor for areas of redness and/or skin breakdown  2. Assess vascular access sites hourly  3. Every 4-6 hours minimum:  Change oxygen saturation probe site  4. Every 4-6 hours:  If on nasal continuous positive airway pressure, respiratory therapy assess nares and determine need for appliance change or resting period.   7/23/2022 0932 by Aline Cordon RN  Outcome: Progressing  7/23/2022 0016 by Rosa Jones RN  Outcome: Progressing     Problem: Safety - Adult  Goal: Free from fall injury  7/23/2022 0932 by Aline Cordon RN  Outcome: Progressing  7/23/2022 0016 by Rosa Jones RN  Outcome: Progressing     Problem: Pain  Goal: Verbalizes/displays adequate comfort level or baseline comfort level  7/23/2022 0932 by Aline Cordon RN  Outcome: Progressing  7/23/2022 0016 by Rosa Jones RN  Outcome: Progressing

## 2022-07-23 NOTE — PROGRESS NOTES
Hospitalist Progress Note  [facility]     Patient: Bob Arn Day  : 1955  MRN: [id number]  Code Status: Full Code    Hospital Day: 4   Date of Service: 2022    Subjective:   Patient seen and examined. Intubated and sedated. Past Medical History:   Diagnosis Date    Atrial flutter by electrocardiogram ([address]     Constipation     Coronary artery disease     Diabetes mellitus (HCC)     Essential hypertension     Hyperlipidemia     IBS (irritable bowel syndrome)     Iron deficiency anemia     Menopause     Paroxysmal SVT (supraventricular tachycardia) (HCC)     RA (rheumatoid arthritis) (HCC)     Rheumatoid arthritis (HCC)     Traumatic retroperitoneal hematoma     fell from ladder 2021       Past Surgical History:   Procedure Laterality Date    CARDIAC SURGERY      CABG (Dr. Belinda Mcdonnell) Mohan Sanchez 89      Stent     CHOLECYSTECTOMY      CORONARY ARTERY BYPASS GRAFT      DILATION AND CURETTAGE OF UTERUS N/A 2019    DILATATION AND CURETTAGE HYSTEROSCOPY W/ Lanetta Girt performed by Evangelist Nickerson MD at [facility] OR    TOTAL KNEE ARTHROPLASTY         Family History   Problem Relation Age of Onset    Heart Attack Father     Prostate Cancer Father     Stroke Father     Heart Attack Brother     Stroke Brother     Breast Cancer Maternal Aunt     Cancer Paternal Uncle         Lung       Social History     Socioeconomic History    Marital status:       Spouse name: Not on file    Number of children: Not on file    Years of education: Not on file    Highest education level: Not on file   Occupational History    Not on file   Tobacco Use    Smoking status: Former     Packs/day: 0.50     Years: 26.00     Pack years: 13.00     Types: Cigarettes     Start date: 6/10/1974     Quit date: 2000     Years since quittin.8    Smokeless tobacco: Never   Vaping Use    Vaping Use: Never used   Substance and Sexual Activity    Alcohol use: No    Drug use: No    Sexual activity: Yes   Other Topics Concern    Not on file   Social History Narrative    Not on file     Social Determinants of Health     Financial Resource Strain: Low Risk     Difficulty of Paying Living Expenses: Not hard at all   Food Insecurity: No Food Insecurity    Worried About Running Out of Food in the Last Year: Never true    Ran Out of Food in the Last Year: Never true   Transportation Needs: Not on file   Physical Activity: Inactive    Days of Exercise per Week: 0 days    Minutes of Exercise per Session: 0 min   Stress: Not on file   Social Connections: Not on file   Intimate Partner Violence: Not on file   Housing Stability: Not on file       Current Facility-Administered Medications   Medication Dose Route Frequency Provider Last Rate Last Admin    polyethylene glycol (GLYCOLAX) packet 17 g  17 g Oral Daily MARYANNE Cedeno - CNP   17 g at 07/22/22 1639    sodium bicarbonate 8.4 % injection 50 mEq  50 mEq IntraVENous PRN Kiah Pearce MD        potassium chloride 20 mEq/50 mL IVPB (Central Line)  20 mEq IntraVENous PRN Kiah Pearce MD 50 mL/hr at 07/23/22 1011 20 mEq at 07/23/22 1011    Or    potassium chloride 10 mEq/100 mL IVPB (Peripheral Line)  10 mEq IntraVENous PRN Kiah Pearce MD   Stopped at 07/21/22 0507    magnesium sulfate 2000 mg in 50 mL IVPB premix  2,000 mg IntraVENous PRN Kiah Pearce MD   Stopped at 07/23/22 0506    dexmedetomidine (PRECEDEX) 400 mcg in sodium chloride 0.9 % 100 mL infusion  0.1-1.5 mcg/kg/hr IntraVENous Continuous Laurita Christopher MD   Stopped at 07/22/22 1529    meropenem (MERREM) 1000 mg in sodium chloride 0.9% 50 mL (duplex)  1,000 mg IntraVENous Q8H Laurita Christopher MD   Stopped at 07/23/22 4058    lactated ringers infusion   IntraVENous Continuous Laurita Christopher  mL/hr at 07/22/22 1435 New Bag at 07/22/22 1435    sodium chloride flush 0.9 % injection 5-40 mL  5-40 mL IntraVENous 2 times per day Sukhjinder Lozano DO   10 mL at 07/22/22 1935    sodium chloride flush 0.9 % injection 5-40 mL  5-40 mL IntraVENous PRN Essie Davies, DO        norepinephrine (LEVOPHED) 16 mg in sodium chloride 0.9 % 250 mL infusion  1-100 mcg/min IntraVENous Continuous Essie Davies DO 3.8 mL/hr at 07/23/22 8031 4 mcg/min at 07/23/22 0937    0.9 % sodium chloride infusion   IntraVENous PRN Essie Acevesugh, DO        enoxaparin (LOVENOX) injection 40 mg  40 mg SubCUTAneous Daily Essie Davies, DO   40 mg at 07/22/22 1332    ondansetron (ZOFRAN-ODT) disintegrating tablet 4 mg  4 mg Oral Q8H PRN Essie Davies,         Or    ondansetron [facility]) injection 4 mg  4 mg IntraVENous Q6H PRN Essie Davies, DO        magnesium hydroxide (MILK OF MAGNESIA) 400 MG/5ML suspension 30 mL  30 mL Oral Daily PRN Essie Davies, DO        acetaminophen (TYLENOL) tablet 650 mg  650 mg Oral Q6H PRN Essie Davies, DO        Or    acetaminophen (TYLENOL) suppository 650 mg  650 mg Rectal Q6H PRN Essie Davies, DO        propofol injection  5-50 mcg/kg/min IntraVENous Continuous Essie Davies DO   Stopped at 07/23/22 0962         dexmedetomidine HCl in NaCl Stopped (07/22/22 1529)    lactated ringers 100 mL/hr at 07/22/22 1435    norepinephrine 4 mcg/min (07/23/22 0937)    sodium chloride      propofol Stopped (07/23/22 0826)        Objective:   BP (!) 85/52 Comment: Levo increased to 4  Pulse 91   Temp 97.1 °F (36.2 °C) (Temporal)   Resp 21   Ht 5' 4\" (1.626 m)   Wt 200 lb 12.8 oz (91.1 kg)   SpO2 93%   BMI 34.47 kg/m²     General: no acute distress  HEENT: normocephalic, atraumatic  Neck: supple, symmetrical, trachea midline  Lungs: slowly improving bilateral rhonchi  Cardiovascular: s1 and s2 normal  Abdomen: soft, positive bowel sounds  Extremities: bilateral lower extremity CSSTI without crepitus or bullae  Neuro: intubated and sedated  Skin: normal color and texture    Recent Labs     07/21/22  0638 07/22/22  0000 07/23/22  0155   WBC 7.4 6.4 3.9*   RBC 3.57* 3.86* 4.04*   HGB 11.1* 12.0 12.3   HCT 35.5* 38.8 39.1   MCV 99.4* 100.5* 96.8   MCH 31.1* 31.1* 30.4   MCHC 31.3* 30.9* 31.5*    111* 83*     Recent Labs     07/21/22  0638 07/21/22  1700 07/22/22  0000 07/22/22  0229 07/23/22  0155 07/23/22  0820   *  --  149*  --  150*  --    K 3.4*   < > 3.7 3.4 2.6* 3.2*   ANIONGAP 15  --  13  --  11  --    *  --  120*  --  119*  --    CO2 18*  --  16*  --  20*  --    BUN 6*  --  4*  --  6*  --    CREATININE 0.5  --  0.5  --  0.5  --    GLUCOSE 149*  --  171*  --  182*  --    CALCIUM 7.7*  --  8.2*  --  8.3*  --     < > = values in this interval not displayed. Recent Labs     07/21/22  0638 07/22/22  0000 07/23/22  0155   MG 2.0 1.9 1.6     Recent Labs     07/21/22  0638 07/22/22  0000 07/23/22  0155   AST 17 18 15   ALT 12 13 15   BILITOT 1.6* 1.5* 1.1   ALKPHOS 101 91 84     No results for input(s): PH, PO2, PCO2, HCO3, BE, O2SAT in the last 72 hours. No results for input(s): TROPONINI in the last 72 hours. No results for input(s): INR in the last 72 hours. No results for input(s): LACTA in the last 72 hours. Intake/Output Summary (Last 24 hours) at 7/23/2022 1018  Last data filed at 7/23/2022 0800  Gross per 24 hour   Intake 2091 ml   Output 4570 ml   Net -2479 ml       ECHO Complete 2D W Doppler W Color    Result Date: 7/21/2022  Transthoracic Echocardiography Report (TTE)  Demographics   Patient Name   Lacie Stevenson  Date of Study           07/21/2022   MRN            [id number]        Gender                  Female   Date of Birth  1955    Room Number             L-0150   Age            73 year(s)   Height:        64 inches     Referring Physician     Quang Lynne   Weight:        203 pounds    Sonographer             Chrissie Farfan   BSA:           1.97 m^2      Interpreting Physician  Dell Burt MD   BMI:           34.85 kg/m^2  Procedure Type of Study   TTE procedure:ECHO 2D W/DOPPLER/COLOR/CONTRAST. Study Location: Echo Lab Technical Quality: Limited visualization due to patient on ventilator. Patient Status: Inpatient Contrast Medium: Definity. HR: 93 bpm BP: 129/38 mmHg Indications:Atrial fibrillation and Coronary artery disease. Conclusions   Summary  Normal left ventricular cavity with overall normal systolic function. EF  45-50%  Morphologically normal valves with mod TR  Pericardium and ascending aorta normal  IVC mildly dilated   Signature   ----------------------------------------------------------------  Electronically signed by Jaimee Freire MD(Interpreting  physician) on 07/21/2022 03:52 PM  ----------------------------------------------------------------   Findings   Mitral Valve  Structurally normal mitral valve with normal leaflet mobility. No evidence  of mitral valve stenosis or mild mitral regurgitation. Aortic Valve  Aortic valve appears to be tricuspid. Structurally normal aortic valve. No significant aortic regurgitation or stenosis is noted. Tricuspid Valve  Tricuspid valve is structurally normal.  Mod tricuspid regurgitation. Pulmonic Valve  The pulmonic valve was not well visualized. Left Atrium  Normal size left atrium. Left Ventricle  Normal left ventricular size with preserved LV function and an estimated  ejection fraction of approximately 45-50%. No evidence of left ventricular mass or thrombus noted. Right Atrium  Normal right atrial dimension with no evidence of thrombus or mass noted. Right Ventricle  Normal right ventricular size with preserved RV function. Pericardial Effusion  No evidence of significant pericardial effusion is noted. Pleural Effusion  No evidence of pleural effusion. Miscellaneous  Aortic root is within normal limits. 2D Measurements and Calculations(cm)   LVIDd: 4.54 cm                      LVIDs: 3.71 cm  IVSd: 1.09 cm  LVPWd: 1 cm                         AO Root Dimension: 2.1 cm  Rt. Vent.  Dimension: 3.39 cm        LA Dimension: 3.8 cm  % Ejection Fraction: 40 %           LA Area: 13.9 cm^2  LA Volume: 35.3 ml                  LV Systolic dimension: 3.59FE  LA Volume Index: 18 ml/m^2          LV PW diastolic: 1cm  LV dimension: 4.54 cm               LVOT diameter: 1.9 cm                                      RA Systolic pressure: 3mmHg  LVEDV: 67.8 ml                      RV Diastolic dimension: 0.00AI  LVESV:38.9 ml                       LVEDVI: 34 ml/m^2  Cardic Output (CO): 5.82l/min       LVESVI: 20 ml/m^2  Ascending Aorta: 2.3 cm  Doppler Measurements and Calculations   AV Peak Velocity:211 cm/s              MV Peak E-Wave: 99.2 cm/s  AV Mean Velocity:137 cm/s              MV Peak A-Wave: 56.3 cm/s  AV Peak Gradient: 17.81 mmHg           MV E/A Ratio: 1.76 %  AV Mean Gradient: 9 mmHg               MV Mean velocity: 70.8cm/s  AV Area (Continuity):1.62 cm^2         MV Peak Gradient: 3.94 mmHg  AV VTI:38.6 cm/s                       MV Mean gradient: 2 mmHg  TR Velocity:271 cm/s                   MV P1/2t: 80 msec  TR Gradient:29.38 mmHg                 MVA by PHT2.75 cm^2  Estimated RAP:3 mmHg  RVSP:35 mmHg   MV E' septal velocity: 7.94cm/s  MV E' lateral velocity:8.59 cm/s      XR CHEST PORTABLE    Result Date: 7/22/2022  Exam: X-RAY OF Counts include [facility] beds at the [facility] Clinical data:OG tube placement. Technique:Single frontal view of the chest. Prior studies:  Chest x-ray done on same date. Findings: Tip of the ET tube at T4 above the luba. Enteric tube extends over the left upper quadrant with tip at the gastric body. Left central catheter with tip at the right atrium just distal to the SVC junction. Mixed interstitial and airspace abnormalities in the mid and lower lung fields with small pleural fluid. Upper lung fields are relatively clear. Moderate enlargement of the cardiac silhouette, although exaggerated by portable technique. Median sternotomy wires. Multiple cardiac monitor wires.     1. Enteric tube extends over the left upper quadrant with tip at the gastric body. 2. Abnormalities in the mid and lower lung fields, slightly better aerated and likely CHF; consider aspiration. Recommendation: Follow up as clinically indicated. Electronically Signed by Eliu Moya MD at 22-Jul-2022 01:01:59 PM             XR CHEST PORTABLE    Result Date: 7/22/2022  Exam: X-RAY OF [facility] Clinical data:Ventilator-dependent respiratory failure (VDRF). Technique:Single view of the chest. Prior studies: Chest x-ray dated 07/19/2022 Findings:Tubes lines, grossly as before. Postoperative changes, with sternotomy. Stable cardiac outline. Bilateral pleural effusions, with associated dependent lung opacities, as before, as well as some patchy airspace disease in the right mid lung, mildly increased from prior. No pneumothorax. Mildly increased patchy airspace disease in the right mid lung/perihilar region. Otherwise, no significant interval change, with findings as before. Recommendation: Follow up as clinically indicated. Electronically Signed by Jimi Gordon MD at 22-Jul-2022 06:57:08 AM              Assessment and Plan:   Ventilator dependent acute respiratory failure  Suspect secondary to below processes  Titrate minute ventilation for pH 7.35  Follow ABG/CXR  Follow plateau pressure  Failed SBT 7/23/2022     Concern for CAP versus aspiration pneumonitis  Empiric broad-spectrum antibiotics  Follow cultures  Molecular respiratory panel negative     Bilateral lower extremity CSSTI  Broad-spectrum antibiotics  Imaging as warranted  Elevate extremities     Septic shock  Secondary to above processes  Broad-spectrum antibiotics  Follow cultures  IVF  Norepinephrine gtt  Lactate 1.8  TTE reviewed     Known left retroperitoneal hematoma  Surgery following  States stable  Slowly resolving  No intervention required  Surgery since signed off     DVT prophylaxis  SCDs     Extensive discussions with patient's son and sister in regards to current clinical state.   All

## 2022-07-24 NOTE — PROGRESS NOTES
Patient breathing 35 to 40 times a minute. Volumes 315-360. Placed back on regular vent settings. Changing sedation in hopes of weaning tomorrow.

## 2022-07-24 NOTE — PROGRESS NOTES
Hospitalist Progress Note  [facility]     Patient: Sana Mendoza Day  : 1955  MRN: [id number]  Code Status: Full Code    Hospital Day: 5   Date of Service: 2022    Subjective:   Patient seen and examined. Intubated and sedated. Past Medical History:   Diagnosis Date    Atrial flutter by electrocardiogram ([address]     Constipation     Coronary artery disease     Diabetes mellitus (HCC)     Essential hypertension     Hyperlipidemia     IBS (irritable bowel syndrome)     Iron deficiency anemia     Menopause     Paroxysmal SVT (supraventricular tachycardia) (HCC)     RA (rheumatoid arthritis) (HCC)     Rheumatoid arthritis (HCC)     Traumatic retroperitoneal hematoma     fell from ladder 2021       Past Surgical History:   Procedure Laterality Date    CARDIAC SURGERY      CABG (Dr. Andrew Holt) [address] 89      Stent     CHOLECYSTECTOMY      CORONARY ARTERY BYPASS GRAFT      DILATION AND CURETTAGE OF UTERUS N/A 2019    DILATATION AND CURETTAGE HYSTEROSCOPY W/ Sophie Nubrendon performed by Gaylyn Paget, MD at [facility] OR    TOTAL KNEE ARTHROPLASTY         Family History   Problem Relation Age of Onset    Heart Attack Father     Prostate Cancer Father     Stroke Father     Heart Attack Brother     Stroke Brother     Breast Cancer Maternal Aunt     Cancer Paternal Uncle         Lung       Social History     Socioeconomic History    Marital status:       Spouse name: Not on file    Number of children: Not on file    Years of education: Not on file    Highest education level: Not on file   Occupational History    Not on file   Tobacco Use    Smoking status: Former     Packs/day: 0.50     Years: 26.00     Pack years: 13.00     Types: Cigarettes     Start date: 6/10/1974     Quit date: 2000     Years since quittin.8    Smokeless tobacco: Never   Vaping Use    Vaping Use: Never used   Substance and Sexual Activity    Alcohol use: No    Drug use: No    Sexual activity: Yes   Other Topics Concern    Not on file   Social History Narrative    Not on file     Social Determinants of Health     Financial Resource Strain: Low Risk     Difficulty of Paying Living Expenses: Not hard at all   Food Insecurity: No Food Insecurity    Worried About Running Out of Food in the Last Year: Never true    Ran Out of Food in the Last Year: Never true   Transportation Needs: Not on file   Physical Activity: Inactive    Days of Exercise per Week: 0 days    Minutes of Exercise per Session: 0 min   Stress: Not on file   Social Connections: Not on file   Intimate Partner Violence: Not on file   Housing Stability: Not on file       Current Facility-Administered Medications   Medication Dose Route Frequency Provider Last Rate Last Admin    insulin lispro (HUMALOG) injection vial 0-4 Units  0-4 Units SubCUTAneous TID WC Juwan Herrera MD        insulin lispro (HUMALOG) injection vial 0-4 Units  0-4 Units SubCUTAneous Nightly Juwan Herrera MD        polyethylene glycol (GLYCOLAX) packet 17 g  17 g Oral Daily MARYANNE Cedeno CNP   17 g at 07/24/22 0711    sodium bicarbonate 8.4 % injection 50 mEq  50 mEq IntraVENous PRN Jenn Mena MD        potassium chloride 20 mEq/50 mL IVPB (Central Line)  20 mEq IntraVENous PRN Jenn Mena MD 50 mL/hr at 07/24/22 0426 20 mEq at 07/24/22 0426    Or    potassium chloride 10 mEq/100 mL IVPB (Peripheral Line)  10 mEq IntraVENous PRGAVIN Mena MD   Stopped at 07/21/22 0507    magnesium sulfate 2000 mg in 50 mL IVPB premix  2,000 mg IntraVENous PRN Jenn Mena MD   Stopped at 07/23/22 0506    dexmedetomidine (PRECEDEX) 400 mcg in sodium chloride 0.9 % 100 mL infusion  0.1-1.5 mcg/kg/hr IntraVENous Continuous Juwan Herrera MD   Stopped at 07/22/22 1529    meropenem (MERREM) 1000 mg in sodium chloride 0.9% 50 mL (duplex)  1,000 mg IntraVENous Q8H Juwan Herrera MD   Stopped at 07/24/22 0617    sodium chloride flush 0.9 % injection 5-40 mL  5-40 mL IntraVENous PRN José Miguel Coup, DO        norepinephrine (LEVOPHED) 16 mg in sodium chloride 0.9 % 250 mL infusion  1-100 mcg/min IntraVENous Continuous José Miguel Coup, DO   Stopped at 07/24/22 0850    0.9 % sodium chloride infusion   IntraVENous PRN José Miguel Coup, DO        [Held by provider] enoxaparin (LOVENOX) injection 40 mg  40 mg SubCUTAneous Daily José Miguel Coup, DO   40 mg at 07/22/22 1332    ondansetron (ZOFRAN-ODT) disintegrating tablet 4 mg  4 mg Oral Q8H PRN José Miguel Coup, DO        Or    ondansetron [facility]) injection 4 mg  4 mg IntraVENous Q6H PRN José Miguel Coup, DO        magnesium hydroxide (MILK OF MAGNESIA) 400 MG/5ML suspension 30 mL  30 mL Oral Daily PRN José Miguel Coup, DO        acetaminophen (TYLENOL) tablet 650 mg  650 mg Oral Q6H PRN José Miguel Coup, DO   650 mg at 07/23/22 2003    Or    acetaminophen (TYLENOL) suppository 650 mg  650 mg Rectal Q6H PRN José Miguel Coup, DO        propofol injection  5-50 mcg/kg/min IntraVENous Continuous José Miguel Coup, DO 5.6 mL/hr at 07/24/22 0955 10 mcg/kg/min at 07/24/22 0955         dexmedetomidine HCl in NaCl Stopped (07/22/22 1529)    norepinephrine Stopped (07/24/22 0850)    sodium chloride      propofol 10 mcg/kg/min (07/24/22 0955)        Objective:   BP (!) 126/54   Pulse (!) 114   Temp 97.8 °F (36.6 °C) (Temporal)   Resp 27   Ht 5' 4\" (1.626 m)   Wt 204 lb 6.4 oz (92.7 kg)   SpO2 95%   BMI 35.09 kg/m²     General: no acute distress  HEENT: normocephalic, atraumatic  Neck: supple, symmetrical, trachea midline  Lungs: slowly improving bilateral rhonchi  Cardiovascular: s1 and s2 normal  Abdomen: soft, positive bowel sounds  Extremities: bilateral lower extremity CSSTI without crepitus or bullae  Neuro: intubated and sedated    Recent Labs     07/22/22  0000 07/23/22  0155 07/24/22  0108   WBC 6.4 3.9* 4.5*   RBC 3.86* 4.04* 3.75*   HGB 12.0 12.3 11.5*   HCT 38.8 39.1 35.7*   .5* 96.8 95.2   MCH 31.1* 30.4 30.7   MCHC 30.9* 31.5* 32.2*   * 83* 76*     Recent Labs     07/22/22  0000 07/22/22  0229 07/23/22  0155 07/23/22  0820 07/23/22  1330 07/24/22  0108 07/24/22  0607   *  --  150*  --   --  153*  --    K 3.7   < > 2.6*   < > 3.6 3.0* 3.7   ANIONGAP 13  --  11  --   --  11  --    *  --  119*  --   --  122*  --    CO2 16*  --  20*  --   --  20*  --    BUN 4*  --  6*  --   --  7*  --    CREATININE 0.5  --  0.5  --   --  0.5  --    GLUCOSE 171*  --  182*  --   --  217*  --    CALCIUM 8.2*  --  8.3*  --   --  8.5*  --     < > = values in this interval not displayed. Recent Labs     07/22/22  0000 07/23/22  0155 07/24/22  0108   MG 1.9 1.6 1.8     Recent Labs     07/22/22  0000 07/23/22  0155 07/24/22  0108   AST 18 15 15   ALT 13 15 14   BILITOT 1.5* 1.1 0.9   ALKPHOS 91 84 89     No results for input(s): PH, PO2, PCO2, HCO3, BE, O2SAT in the last 72 hours. No results for input(s): TROPONINI in the last 72 hours. No results for input(s): INR in the last 72 hours. No results for input(s): LACTA in the last 72 hours. Intake/Output Summary (Last 24 hours) at 7/24/2022 1027  Last data filed at 7/24/2022 0800  Gross per 24 hour   Intake 1857.82 ml   Output 1685 ml   Net 172.82 ml       XR CHEST PORTABLE    Result Date: 7/22/2022  Exam: X-RAY OF THECHEST Clinical data:OG tube placement. Technique:Single frontal view of the chest. Prior studies:  Chest x-ray done on same date. Findings: Tip of the ET tube at T4 above the luba. Enteric tube extends over the left upper quadrant with tip at the gastric body. Left central catheter with tip at the right atrium just distal to the SVC junction. Mixed interstitial and airspace abnormalities in the mid and lower lung fields with small pleural fluid. Upper lung fields are relatively clear. Moderate enlargement of the cardiac silhouette, although exaggerated by portable technique. Median sternotomy wires.   Multiple cardiac monitor wires. 1. Enteric tube extends over the left upper quadrant with tip at the gastric body. 2. Abnormalities in the mid and lower lung fields, slightly better aerated and likely CHF; consider aspiration. Recommendation: Follow up as clinically indicated. Electronically Signed by Imani Costa MD at 22-Jul-2022 01:01:59 PM              Assessment and Plan:   Ventilator dependent acute respiratory failure  Suspect secondary to below processes  Titrate minute ventilation for pH 7.35  Follow ABG/CXR  Follow plateau pressure  Failed SBT 7/23/2022  Repeat     Concern for CAP versus aspiration pneumonitis  Empiric broad-spectrum antibiotics  Follow cultures  Molecular respiratory panel negative     Bilateral lower extremity CSSTI  Broad-spectrum antibiotics  Imaging as warranted  Elevate extremities     Septic shock  Secondary to above processes  Broad-spectrum antibiotics  Follow cultures  IVF  Norepinephrine gtt  Lactate 1.8  TTE reviewed     Known left retroperitoneal hematoma  Surgery following  States stable  Slowly resolving  No intervention required  Surgery since signed off    Hypernatremia  D5W  Follow sodium/glucose  Avoid rapid overcorrection    DVT prophylaxis  SCDs     Extensive discussions with patient's son and sister in regards to current clinical state. All questions sought and answered.     Total critical care time: 49 minutes    Galileo Davis MD   7/24/2022 10:27 AM

## 2022-07-24 NOTE — PLAN OF CARE
Problem: Discharge Planning  Goal: Discharge to home or other facility with appropriate resources  7/24/2022 0841 by Meryl Toledo RN  Outcome: Progressing  Flowsheets (Taken 7/24/2022 0800)  Discharge to home or other facility with appropriate resources: Identify barriers to discharge with patient and caregiver  7/24/2022 0023 by Manisha Monsalve RN  Outcome: Progressing     Problem: Skin/Tissue Integrity  Goal: Absence of new skin breakdown  Description: 1. Monitor for areas of redness and/or skin breakdown  2. Assess vascular access sites hourly  3. Every 4-6 hours minimum:  Change oxygen saturation probe site  4. Every 4-6 hours:  If on nasal continuous positive airway pressure, respiratory therapy assess nares and determine need for appliance change or resting period.   7/24/2022 0841 by Meryl Toledo RN  Outcome: Progressing  7/24/2022 0023 by Manisha Monsalve RN  Outcome: Progressing     Problem: Safety - Adult  Goal: Free from fall injury  7/24/2022 0841 by Meryl Toledo RN  Outcome: Progressing  Flowsheets (Taken 7/24/2022 0800)  Free From Fall Injury: Instruct family/caregiver on patient safety  7/24/2022 0023 by Manisha Monsalve RN  Outcome: Progressing     Problem: Pain  Goal: Verbalizes/displays adequate comfort level or baseline comfort level  7/24/2022 0841 by Meryl Toledo RN  Outcome: Progressing  Flowsheets (Taken 7/24/2022 0700)  Verbalizes/displays adequate comfort level or baseline comfort level: Encourage patient to monitor pain and request assistance  7/24/2022 0023 by Manisha Monsalve RN  Outcome: Progressing     Problem: Nutrition Deficit:  Goal: Optimize nutritional status  7/24/2022 0841 by Meryl Toledo RN  Outcome: Progressing  7/24/2022 0023 by Manisha Monsalve RN  Outcome: Progressing  Flowsheets (Taken 7/23/2022 1053 by Dashawn Montalvo, MS, RD, LD)  Nutrient intake appropriate for improving, restoring, or maintaining nutritional needs:   Assess nutritional status and recommend course of action   Recommend, monitor, and adjust tube feedings and TPN/PPN based on assessed needs     Problem: Chronic Conditions and Co-morbidities  Goal: Patient's chronic conditions and co-morbidity symptoms are monitored and maintained or improved  7/24/2022 0841 by Meryl Lee RN  Outcome: Progressing  Flowsheets (Taken 7/24/2022 0800)  Care Plan - Patient's Chronic Conditions and Co-Morbidity Symptoms are Monitored and Maintained or Improved: Monitor and assess patient's chronic conditions and comorbid symptoms for stability, deterioration, or improvement  7/24/2022 0023 by Stefania Mckeon RN  Outcome: Progressing     Problem: ABCDS Injury Assessment  Goal: Absence of physical injury  7/24/2022 0841 by Meryl Lee RN  Outcome: Progressing  7/24/2022 0023 by Stefania Mckeon RN  Outcome: Progressing

## 2022-07-24 NOTE — PROGRESS NOTES
Patient changed back to regular vent settings. Still not staying awake well. SBI 95.  Will try again in couple hours

## 2022-07-24 NOTE — PROGRESS NOTES
Patient nodding to me and following commands some. Still very weak. Placed on cpap mode. Friend at bedside.

## 2022-07-24 NOTE — PLAN OF CARE
Problem: Discharge Planning  Goal: Discharge to home or other facility with appropriate resources  Outcome: Progressing     Problem: Skin/Tissue Integrity  Goal: Absence of new skin breakdown  Description: 1. Monitor for areas of redness and/or skin breakdown  2. Assess vascular access sites hourly  3. Every 4-6 hours minimum:  Change oxygen saturation probe site  4. Every 4-6 hours:  If on nasal continuous positive airway pressure, respiratory therapy assess nares and determine need for appliance change or resting period.   Outcome: Progressing     Problem: Safety - Adult  Goal: Free from fall injury  Outcome: Progressing     Problem: Pain  Goal: Verbalizes/displays adequate comfort level or baseline comfort level  Outcome: Progressing     Problem: Nutrition Deficit:  Goal: Optimize nutritional status  Outcome: Progressing  Flowsheets (Taken 7/23/2022 1053 by Neno Bhakta, MS, RD, LD)  Nutrient intake appropriate for improving, restoring, or maintaining nutritional needs:   Assess nutritional status and recommend course of action   Recommend, monitor, and adjust tube feedings and TPN/PPN based on assessed needs     Problem: Chronic Conditions and Co-morbidities  Goal: Patient's chronic conditions and co-morbidity symptoms are monitored and maintained or improved  Outcome: Progressing     Problem: ABCDS Injury Assessment  Goal: Absence of physical injury  Outcome: Progressing

## 2022-07-25 NOTE — PLAN OF CARE
Problem: Discharge Planning  Goal: Discharge to home or other facility with appropriate resources  Outcome: Progressing     Problem: Skin/Tissue Integrity  Goal: Absence of new skin breakdown  Description: 1. Monitor for areas of redness and/or skin breakdown  2. Assess vascular access sites hourly  3. Every 4-6 hours minimum:  Change oxygen saturation probe site  4. Every 4-6 hours:  If on nasal continuous positive airway pressure, respiratory therapy assess nares and determine need for appliance change or resting period.   Outcome: Progressing     Problem: Safety - Adult  Goal: Free from fall injury  Outcome: Progressing     Problem: Pain  Goal: Verbalizes/displays adequate comfort level or baseline comfort level  Outcome: Progressing     Problem: Nutrition Deficit:  Goal: Optimize nutritional status  Outcome: Progressing     Problem: Chronic Conditions and Co-morbidities  Goal: Patient's chronic conditions and co-morbidity symptoms are monitored and maintained or improved  Outcome: Progressing     Problem: ABCDS Injury Assessment  Goal: Absence of physical injury  Outcome: Progressing

## 2022-07-25 NOTE — PROGRESS NOTES
Palliative Care Progress Note  7/25/2022 12:32 PM    Patient:  Hailey Fagan  YOB: 1955  Primary Care Physician: Brenton Barrios MD  Advance Directive: Full Code  Admit Date: 7/19/2022       Hospital Day: 6  Portions of this note have been copied forward, however, changed to reflect the most current clinical status of this patient. CHIEF COMPLAINT/REASON FOR CONSULTATION Goals of care, code status, family support    SUBJECTIVE:  Ms. Fagan remains intubated, on sedation vacation. Opening her eyes and nodding her head, able to follow a few simple commands. Review of Systems:   14 point review of systems is negative except as specifically addressed above. Objective:   VITALS:  BP (!) 128/44   Pulse (!) 104   Temp (!) 96.4 °F (35.8 °C) (Temporal)   Resp 17   Ht 5' 4\" (1.626 m)   Wt 197 lb 9.6 oz (89.6 kg)   SpO2 94%   BMI 33.92 kg/m²   24HR INTAKE/OUTPUT:    Intake/Output Summary (Last 24 hours) at 7/25/2022 1232  Last data filed at 7/25/2022 0715  Gross per 24 hour   Intake 1380.33 ml   Output 3090 ml   Net -1709.67 ml         General appearance: 74 yo female, ill appearing, NAD, intubated   Head: Normocephalic, without obvious abnormality, atraumatic  Eyes: conjunctivae/corneas clear.  PERRL, EOM's intact   Ears: normal external ears and nose  Neck: no JVD, supple, symmetrical, trachea midline   Lungs: mechanically ventilated, respirations even/unlabored  Heart: tachycardic, regular rate, S1, S2 normal, no murmur  Abdomen:soft,obese,  non-tender; non-distended, bowel sounds present    Extremities:1+ bilateral lower extremity edema w/ cellulitic changes that appear improved  Skin: warm, dry  Neurologic: intubated, opening eyes, able to nod her head to command    Medications:      dextrose 50 mL/hr at 07/25/22 0605    dexmedetomidine HCl in NaCl Stopped (07/25/22 0200)    norepinephrine Stopped (07/24/22 0850)    sodium chloride      propofol Stopped (07/24/22 1355)      insulin lispro 0-4 Units SubCUTAneous TID     insulin lispro  0-4 Units SubCUTAneous Nightly    amiodarone  100 mg Oral Daily    acetylcysteine  600 mg Inhalation BID    polyethylene glycol  17 g Oral Daily    meropenem  1,000 mg IntraVENous Q8H    [Held by provider] enoxaparin  40 mg SubCUTAneous Daily     levalbuterol, sodium bicarbonate, potassium chloride **OR** potassium chloride, magnesium sulfate, sodium chloride flush, sodium chloride, ondansetron **OR** ondansetron, magnesium hydroxide, acetaminophen **OR** acetaminophen  Diet NPO  ADULT TUBE FEEDING; Orogastric; Peptide Based High Protein; Continuous; 20; Yes; 5; Q 6 hours; 28; 10; Q 1 hour; Protein; 2 proteinex as tf flush daily     Lab and other Data:     Recent Labs     07/23/22 0155 07/24/22  0108 07/25/22  0350   WBC 3.9* 4.5* 4.8   HGB 12.3 11.5* 10.9*   PLT 83* 76* 85*       Recent Labs     07/23/22  0155 07/23/22  0820 07/24/22  0108 07/24/22  0607 07/24/22  1532 07/24/22  2208 07/25/22  0420 07/25/22  1011   *  --  153*  --    < > 148* 151* 149*   K 2.6*   < > 3.0* 3.7  --   --  3.0*  --    *  --  122*  --   --   --  115*  --    CO2 20*  --  20*  --   --   --  26  --    BUN 6*  --  7*  --   --   --  11  --    CREATININE 0.5  --  0.5  --   --   --  0.5  --    GLUCOSE 182*  --  217*  --   --   --  258*  --     < > = values in this interval not displayed. Recent Labs     07/23/22  0155 07/24/22  0108 07/25/22  0420   AST 15 15 17   ALT 15 14 15   BILITOT 1.1 0.9 1.0   ALKPHOS 84 89 106*       Assessment/Plan   Principal Problem:    Hypovolemic shock (HCC)  Active Problems:    Retroperitoneal hematoma    Palliative care patient    Acute respiratory failure with hypoxia (HCC)    Paroxysmal atrial fibrillation (HCC)    Diastolic dysfunction    Coronary artery disease    Type 2 diabetes mellitus with hyperglycemia  Resolved Problems:    * No resolved hospital problems. *    Visit Summary:  Chart reviewed. No acute overnight events.  Sedation vacation today. Patient has been able to open her eyes, nod her head. She does not appear in distress. She failed SBT's over the weekend. Plans to continue SBT's today. I presented to patient's bedside a second time once her son and sister arrived. We discussed goals which are to successfully extubate her. Her son confirms he would want her re-intubated if extubation was attempted and she again develops respiratory distress. D/w her RN who states patient failed SBT again today. Her son is interested in talking to social work in regard to discharge planning. We discussed probable need for rehab as patient was fairly weak prior to this hospitalization. We also discussed potential need for LTACH. I placed consult and discussed with SW. Will continue to follow. Recommendations:   Palliative care: GOC-unchanged-prolong life. 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    Time Spent Counseling > 50%:  YES                                   Total Time Spent with patient/family counseling, workup/treatment review, and placement of orders/preparation of this note: 38 minutes    Electronically signed by Rachid Villanueva PA-C on 7/25/2022 at 12:32 PM    (Please note that portions of this note were completed with a voice recognition program.  Laina Stacks made to edit the dictations but occasionally words are mis-transcribed.)

## 2022-07-25 NOTE — CARE COORDINATION
JOLLY reviewing.  Referral per Dr. Winston Quinteros   Electronically signed by BarreraTUNDE Zapien on 7/25/2022 at 5:48 PM

## 2022-07-25 NOTE — PROGRESS NOTES
Hospitalist Progress Note  [facility]     Patient: Paula Lugo Day  : 1955  MRN: [id number]  Code Status: Full Code    Hospital Day: 6   Date of Service: 2022    Subjective:   Patient seen and examined. Intubated. Sedation vacation. Past Medical History:   Diagnosis Date    Atrial flutter by electrocardiogram ([address]     Constipation     Coronary artery disease     Diabetes mellitus (HCC)     Essential hypertension     Hyperlipidemia     IBS (irritable bowel syndrome)     Iron deficiency anemia     Menopause     Paroxysmal SVT (supraventricular tachycardia) (HCC)     RA (rheumatoid arthritis) (HCC)     Rheumatoid arthritis (HCC)     Traumatic retroperitoneal hematoma     fell from ladder 2021       Past Surgical History:   Procedure Laterality Date    CARDIAC SURGERY      CABG (Dr. Laurie Crain) [facility] 89      Stent     CHOLECYSTECTOMY      CORONARY ARTERY BYPASS GRAFT      DILATION AND CURETTAGE OF UTERUS N/A 2019    DILATATION AND CURETTAGE HYSTEROSCOPY W/ Sadie David performed by Jaxon Downey MD at [facility] OR    TOTAL KNEE ARTHROPLASTY         Family History   Problem Relation Age of Onset    Heart Attack Father     Prostate Cancer Father     Stroke Father     Heart Attack Brother     Stroke Brother     Breast Cancer Maternal Aunt     Cancer Paternal Uncle         Lung       Social History     Socioeconomic History    Marital status:      Spouse name: Not on file    Number of children: Not on file    Years of education: Not on file    Highest education level: Not on file   Occupational History    Not on file   Tobacco Use    Smoking status: Former     Packs/day: 0.50     Years: 26.00     Pack years: 13.00     Types: Cigarettes     Start date: 6/10/1974     Quit date: 2000     Years since quittin.8    Smokeless tobacco: Never   Vaping Use    Vaping Use: Never used   Substance and Sexual Activity    Alcohol use: No    Drug use:  No kg/m²     General: no acute distress  HEENT: normocephalic, atraumatic  Neck: supple, symmetrical, trachea midline  Lungs: slowly improving bilateral rhonchi  Cardiovascular: s1 and s2 normal  Abdomen: soft, positive bowel sounds  Extremities: bilateral lower extremity CSSTI without crepitus or bullae  Neuro: intubated, sedation vacation    Recent Labs     07/23/22  0155 07/24/22  0108 07/25/22  0350   WBC 3.9* 4.5* 4.8   RBC 4.04* 3.75* 3.56*   HGB 12.3 11.5* 10.9*   HCT 39.1 35.7* 34.3*   MCV 96.8 95.2 96.3   MCH 30.4 30.7 30.6   MCHC 31.5* 32.2* 31.8*   PLT 83* 76* 85*     Recent Labs     07/23/22  0155 07/23/22  0820 07/24/22  0108 07/24/22  0607 07/24/22  1532 07/24/22  2208 07/25/22  0420   *  --  153*  --  151* 148* 151*   K 2.6*   < > 3.0* 3.7  --   --  3.0*   ANIONGAP 11  --  11  --   --   --  10   *  --  122*  --   --   --  115*   CO2 20*  --  20*  --   --   --  26   BUN 6*  --  7*  --   --   --  11   CREATININE 0.5  --  0.5  --   --   --  0.5   GLUCOSE 182*  --  217*  --   --   --  258*   CALCIUM 8.3*  --  8.5*  --   --   --  8.7*    < > = values in this interval not displayed. Recent Labs     07/23/22 0155 07/24/22 0108 07/25/22  0420   MG 1.6 1.8 1.6     Recent Labs     07/23/22 0155 07/24/22  0108 07/25/22  0420   AST 15 15 17   ALT 15 14 15   BILITOT 1.1 0.9 1.0   ALKPHOS 84 89 106*     No results for input(s): PH, PO2, PCO2, HCO3, BE, O2SAT in the last 72 hours. No results for input(s): TROPONINI in the last 72 hours. No results for input(s): INR in the last 72 hours. No results for input(s): LACTA in the last 72 hours. Intake/Output Summary (Last 24 hours) at 7/25/2022 1130  Last data filed at 7/25/2022 0715  Gross per 24 hour   Intake 1437.56 ml   Output 3815 ml   Net -2377.44 ml       No results found.      Assessment and Plan:   Ventilator dependent acute respiratory failure  Suspect secondary to below processes  Titrate minute ventilation for pH 7.35  Follow ABG/CXR  Follow plateau pressure  Failed SBT again on 7/25/2022     Concern for CAP versus aspiration pneumonitis  Empiric broad-spectrum antibiotics  Follow cultures  Molecular respiratory panel negative     Bilateral lower extremity CSSTI  Broad-spectrum antibiotics  Imaging as warranted  Elevate extremities     Septic shock  Secondary to above processes  Broad-spectrum antibiotics  Follow cultures  IVF  Trial off norepinephrine gtt  Lactate 1.8  TTE reviewed     Known left retroperitoneal hematoma  Surgery following  States stable  Slowly resolving  No intervention required  Surgery since signed off     Hypernatremia  D5W  Follow sodium/glucose     DVT prophylaxis  SCDs     Extensive discussions with patient's son and sister in regards to current clinical state. All questions sought and answered.     Total critical care time: 53 minutes    Laurita Christopher MD   7/25/2022 11:30 AM

## 2022-07-26 NOTE — PROGRESS NOTES
Palliative Care Progress Note  7/26/2022 1:36 PM    Patient:  Melinda Fagan  YOB: 1955  Primary Care Physician: Lavinia Boles MD  Advance Directive: Full Code  Admit Date: 7/19/2022       Hospital Day: 7  Portions of this note have been copied forward, however, changed to reflect the most current clinical status of this patient. CHIEF COMPLAINT/REASON FOR CONSULTATION Goals of care, code status, family support    SUBJECTIVE:  Ms. Fagan remains intubated, no acute overnight events noted. Review of Systems:   14 point review of systems is negative except as specifically addressed above. Objective:   VITALS:  BP (!) 114/52   Pulse 87   Temp 98.3 °F (36.8 °C) (Temporal)   Resp 24   Ht 5' 4\" (1.626 m)   Wt 196 lb 12.8 oz (89.3 kg)   SpO2 96%   BMI 33.78 kg/m²   24HR INTAKE/OUTPUT:    Intake/Output Summary (Last 24 hours) at 7/26/2022 1336  Last data filed at 7/26/2022 1202  Gross per 24 hour   Intake 2804.65 ml   Output 1880 ml   Net 924.65 ml     General appearance: 74 yo female, no acute distress, intubated   Head: Normocephalic, without obvious abnormality, atraumatic  Eyes: conjunctivae/corneas clear.  PERRL, EOM's intact   Ears: normal external ears and nose  Neck: no JVD, supple, symmetrical, trachea midline   Lungs: respirations even/unlabored, mechanically ventilated  Heart: tachycardic regular rate, S1, S2 normal, no murmur  Abdomen:soft, obese, non-tender; non-distended, bowel sounds present    Extremities:trace-1+ bilateral lower extremity edema w/ cellulitic changes that appear improved  Skin: warm, dry  Neurologic: intubated, opening eyes, able to nod her head to command    Medications:      dextrose      dextrose 50 mL/hr at 07/25/22 1914    dexmedetomidine HCl in NaCl 0.2 mcg/kg/hr (07/26/22 0517)    norepinephrine Stopped (07/24/22 0850)    sodium chloride      propofol Stopped (07/24/22 1355)      insulin glargine  0.25 Units/kg SubCUTAneous Nightly    insulin lispro 0-4 Units SubCUTAneous TID     insulin lispro  0-4 Units SubCUTAneous Nightly    amiodarone  100 mg Oral Daily    acetylcysteine  600 mg Inhalation BID    polyethylene glycol  17 g Oral Daily    meropenem  1,000 mg IntraVENous Q8H    [Held by provider] enoxaparin  40 mg SubCUTAneous Daily     glucose, dextrose bolus **OR** dextrose bolus, glucagon (rDNA), dextrose, levalbuterol, sodium bicarbonate, potassium chloride **OR** potassium chloride, magnesium sulfate, sodium chloride flush, sodium chloride, ondansetron **OR** ondansetron, magnesium hydroxide, acetaminophen **OR** acetaminophen  Diet NPO  ADULT TUBE FEEDING; Orogastric; Peptide Based High Protein; Continuous; 20; Yes; 5; Q 6 hours; 52; 10; Q 1 hour     Lab and other Data:     Recent Labs     07/24/22 0108 07/25/22  0350 07/26/22  0200   WBC 4.5* 4.8 5.4   HGB 11.5* 10.9* 10.7*   PLT 76* 85* 89*     Recent Labs     07/24/22  0108 07/24/22  0607 07/25/22  0420 07/25/22  1011 07/25/22  1955 07/26/22  0200 07/26/22  0831   *   < > 151* 149* 146* 143 145   K 3.0*   < > 3.0* 3.3* 3.6 3.6  --    *  --  115*  --   --  111  --    CO2 20*  --  26  --   --  24  --    BUN 7*  --  11  --   --  12  --    CREATININE 0.5  --  0.5  --   --  0.4*  --    GLUCOSE 217*  --  258*  --   --  318*  --     < > = values in this interval not displayed. Recent Labs     07/24/22 0108 07/25/22  0420 07/26/22  0200   AST 15 17 19   ALT 14 15 15   BILITOT 0.9 1.0 0.8   ALKPHOS 89 106* 107*     Assessment/Plan   Principal Problem:    Hypovolemic shock (HCC)  Active Problems:    Retroperitoneal hematoma    Palliative care patient    Acute respiratory failure with hypoxia (HCC)    Paroxysmal atrial fibrillation (HCC)    Diastolic dysfunction    Coronary artery disease    Type 2 diabetes mellitus with hyperglycemia  Resolved Problems:    * No resolved hospital problems. *    Visit Summary:  Chart reviewed. No acute overnight events noted.  Met with patient's son at her bedside. He is agreeable to [facility] referral at this time. He remains hopeful that patient will be extubated and be able to work with therapy to reach her previous quality of life. Recommendations:   Palliative care: GOC-unchanged-prolong life. 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    Time Spent Counseling > 50%:  YES                                   Total Time Spent with patient/family counseling, workup/treatment review, and placement of orders/preparation of this note: 26 minutes    Electronically signed by Nick Plasencia PA-C on 7/26/2022 at 1:36 PM    (Please note that portions of this note were completed with a voice recognition program.  Akilah Hendricks made to edit the dictations but occasionally words are mis-transcribed.)

## 2022-07-26 NOTE — CARE COORDINATION
Possible LTACH tomorrow; Beth Button is coming to eval tomorrow AM; Pt's son at bedside is agreeable.    Electronically signed by TUNDE Eller on 7/26/2022 at 6:08 PM

## 2022-07-26 NOTE — PROGRESS NOTES
[facility] Vascular Access Team:  Rounding Note (CLABSI Bundle Compliance)    Ayesha Shannon Day  [id number]   Admitted - 7/19/2022  1:04 PM  Admission Diagnosis -   Hypovolemic shock ([address] [R57.1]  Attending Physician - Esthela Beltran, DO    Active LDAs -   PICC Triple Lumen 68/52/12 Left Cephalic (Active)   Central Line Being Utilized Yes 07/26/22 1123   Criteria for Appropriate Use Hemodynamically unstable, requiring monitoring lines, vasopressors, or volume resuscitation 07/26/22 1123   Site Assessment Clean, dry & intact 07/26/22 1123   Phlebitis Assessment No symptoms 07/26/22 1123   Infiltration Assessment 0 07/26/22 1123   External Catheter Length (cm) 4 cm 07/22/22 1600   Proximal Lumen Color/Status Infusing 07/26/22 1123   Medial Lumen Status Infusing 07/26/22 1123   Distal Lumen Color/Status Infusing 07/26/22 1123   Line Care Connections checked and tightened 07/26/22 1123   Alcohol Cap Used Yes 07/26/22 1123   Date of Last Dressing Change 07/26/22 07/26/22 1123   Dressing Type Transparent; Antimicrobial;Securing device 07/26/22 1123   Dressing Status Clean, dry & intact; New dressing applied 07/26/22 1123   Dressing Intervention New;Dressing changed 07/26/22 1123       CLABSI Bundle:  Dressing Type: Tegaderm  Continued Need for Line: Yes  Indication: Critical Gtts  Last Dressing Change: 7/26/2022  CHG Biopatch/Gel in Place: Yes  Dressing Intact: Yes  Dressing PRN: Yes  Redness: No  Tubing Labeled: Yes  Tubing Changed Appropriately: Yes  ETOH Caps: Yes    FINDINGS:  6 [place] Triple Lumen PICC is in place, Day 7, in patient's left cephalic vein. Dressing had old drainage until today - primary RN changed dressing. Site is free of S/S of infection and is working appropriately. IMPRESSION/PLAN:  1.  Monitor for S/S of infection - Change Dressing PRN       Electronically Signed By: Amalia Contreras RN on 7/26/2022 at 11:48 AM denies pain/discomfort

## 2022-07-26 NOTE — PLAN OF CARE
Problem: Discharge Planning  Goal: Discharge to home or other facility with appropriate resources  Outcome: Progressing Towards Goal     Problem: Skin/Tissue Integrity  Goal: Absence of new skin breakdown  Description: 1. Monitor for areas of redness and/or skin breakdown  2. Assess vascular access sites hourly  3. Every 4-6 hours minimum:  Change oxygen saturation probe site  4. Every 4-6 hours:  If on nasal continuous positive airway pressure, respiratory therapy assess nares and determine need for appliance change or resting period.   Outcome: Progressing Towards Goal     Problem: Safety - Adult  Goal: Free from fall injury  Outcome: Progressing Towards Goal     Problem: Pain  Goal: Verbalizes/displays adequate comfort level or baseline comfort level  Outcome: Progressing Towards Goal     Problem: Nutrition Deficit:  Goal: Optimize nutritional status  Outcome: Progressing Towards Goal     Problem: Chronic Conditions and Co-morbidities  Goal: Patient's chronic conditions and co-morbidity symptoms are monitored and maintained or improved  Outcome: Progressing Towards Goal     Problem: ABCDS Injury Assessment  Goal: Absence of physical injury  Outcome: Progressing Towards Goal

## 2022-07-26 NOTE — PROGRESS NOTES
Hospitalist Progress Note    Patient:  Adele Fagan  YOB: 1955  Date of Service: 7/26/2022  MRN: [id number]   Acct: [de-identified]   Primary Care Physician: Harrison Gracia MD  Advance Directive: Full Code  Admit Date: 7/19/2022       Hospital Day: 7  Referring Provider: Jose Ansari DO    Patient Seen, Chart, Consults, Notes, Labs, Radiology studies reviewed. Subjective:  Heather Rosales is a 73 y.o. female  whom we are following for hypovolemic shock, hypoxemic respiratory failure, retroperitoneal hematoma. She required intubation on 7/19. Unfortunately we have been unable to successfully wean and liberate her from the ventilator. Arrangements have been made for LTAC consideration. She may be going tomorrow. She is on assist-control rate of 12, tidal volume of 420, 5 of PEEP, FiO2 of 0.3.     Allergies:  Codeine, Albuterol, Clindamycin/lincomycin, Januvia [sitagliptin], Augmentin [amoxicillin-pot clavulanate], and Sulfa antibiotics    Medicines:  Current Facility-Administered Medications   Medication Dose Route Frequency Provider Last Rate Last Admin    glucose chewable tablet 16 g  4 tablet Oral PRN Jose Snare, DO        dextrose bolus 10% 125 mL  125 mL IntraVENous PRN Jose Snare, DO        Or    dextrose bolus 10% 250 mL  250 mL IntraVENous PRN Jose Snare, DO        glucagon (rDNA) injection 1 mg  1 mg SubCUTAneous PRN Jose Ansari, DO        dextrose 10 % infusion   IntraVENous Continuous PRN Jose Ansari, DO        insulin glargine (LANTUS) injection vial 22 Units  0.25 Units/kg SubCUTAneous Nightly Jose Ansari, DO        insulin lispro (HUMALOG) injection vial 0-4 Units  0-4 Units SubCUTAneous TID  Ambreen Miranda MD   3 Units at 07/26/22 1623    insulin lispro (HUMALOG) injection vial 0-4 Units  0-4 Units SubCUTAneous Nightly Ambreen Miranda MD        dextrose 5 % solution   IntraVENous Continuous Ambreen Miranda MD 50 mL/hr at 07/26/22 308 [facility] at 07/26/22 1405    amiodarone (CORDARONE) tablet 100 mg  100 mg Oral Daily Brittany Garcia MD   100 mg at 07/26/22 0731    acetylcysteine (MUCOMYST) 20 % solution 600 mg  600 mg Inhalation BID Brittany Garcia MD   600 mg at 07/26/22 0756    levalbuterol (XOPENEX) nebulization 0.63 mg  0.63 mg Nebulization BID PRN Brittany Garcia MD   0.63 mg at 07/26/22 0756    polyethylene glycol (GLYCOLAX) packet 17 g  17 g Oral Daily Nikhil SegoviaMARYANNE - CNP   17 g at 07/24/22 0711    sodium bicarbonate 8.4 % injection 50 mEq  50 mEq IntraVENous PRN Jennifer Pettit MD        potassium chloride 20 mEq/50 mL IVPB (Central Line)  20 mEq IntraVENous PRN Jennifer Pettit MD 50 mL/hr at 07/25/22 1620 20 mEq at 07/25/22 1620    Or    potassium chloride 10 mEq/100 mL IVPB (Peripheral Line)  10 mEq IntraVENous PRN Jennifer Pettit MD   Stopped at 07/21/22 0507    magnesium sulfate 2000 mg in 50 mL IVPB premix  2,000 mg IntraVENous PRN Jennifer Pettit MD   Stopped at 07/25/22 0715    dexmedetomidine (PRECEDEX) 400 mcg in sodium chloride 0.9 % 100 mL infusion  0.1-1.5 mcg/kg/hr IntraVENous Continuous Brittany Garcia MD 4.5 mL/hr at 07/26/22 0517 0.2 mcg/kg/hr at 07/26/22 0517    meropenem (MERREM) 1000 mg in sodium chloride 0.9% 50 mL (duplex)  1,000 mg IntraVENous Q8H Brittany Garcia MD   Stopped at 07/26/22 1516    sodium chloride flush 0.9 % injection 5-40 mL  5-40 mL IntraVENous PRN Ana Paula Sequin, DO        norepinephrine (LEVOPHED) 16 mg in sodium chloride 0.9 % 250 mL infusion  1-100 mcg/min IntraVENous Continuous Ana Paula Sequin, DO   Stopped at 07/24/22 0850    0.9 % sodium chloride infusion   IntraVENous PRN Ana Paula Sequin, DO        [Held by provider] enoxaparin (LOVENOX) injection 40 mg  40 mg SubCUTAneous Daily Ana Paula Sequin, DO   40 mg at 07/22/22 1332    ondansetron (ZOFRAN-ODT) disintegrating tablet 4 mg  4 mg Oral Q8H PRN Ana Paula Sequin, DO        Or    ondansetron [facility]) injection 4 mg  4 mg IntraVENous Q6H PRN Garcia Snare, DO        magnesium hydroxide (MILK OF MAGNESIA) 400 MG/5ML suspension 30 mL  30 mL Oral Daily PRN Garcia Snare, DO        acetaminophen (TYLENOL) tablet 650 mg  650 mg Oral Q6H PRN Garcia Snare, DO   650 mg at 07/23/22 2003    Or    acetaminophen (TYLENOL) suppository 650 mg  650 mg Rectal Q6H PRN Garcia Snare, DO        propofol injection  5-50 mcg/kg/min IntraVENous Continuous Garcia Snare, DO   Stopped at 07/24/22 1355       Past Medical History:  Past Medical History:   Diagnosis Date    Atrial flutter by electrocardiogram ([facility] [address]     Constipation     Coronary artery disease     Diabetes mellitus ([facility] [address]     Essential hypertension     Hyperlipidemia     IBS (irritable bowel syndrome)     Iron deficiency anemia     Menopause     Paroxysmal SVT (supraventricular tachycardia) (HCC)     RA (rheumatoid arthritis) (HCC)     Rheumatoid arthritis ([facility] [address]     Traumatic retroperitoneal hematoma     fell from ladder november 2021       Past Surgical History:  Past Surgical History:   Procedure Laterality Date    CARDIAC SURGERY  2000    CABG (Dr. Keyes Handler) [address]  2009    Stent     CHOLECYSTECTOMY      CORONARY ARTERY BYPASS GRAFT  2000    DILATION AND CURETTAGE OF UTERUS N/A 8/16/2019    DILATATION AND CURETTAGE HYSTEROSCOPY W/ Josue Samina performed by Ashlee Lopez MD at [facility] OR    TOTAL KNEE ARTHROPLASTY         Family History  Family History   Problem Relation Age of Onset    Heart Attack Father     Prostate Cancer Father     Stroke Father     Heart Attack Brother     Stroke Brother     Breast Cancer Maternal Aunt     Cancer Paternal Uncle         Lung       Social History  Social History     Socioeconomic History    Marital status:       Spouse name: Not on file    Number of children: Not on file    Years of education: Not on file    Highest education level: Not on file   Occupational History    Not on file   Tobacco Use Smoking status: Former     Packs/day: 0.50     Years: 26.00     Pack years: 13.00     Types: Cigarettes     Start date: 6/10/1974     Quit date: 2000     Years since quittin.8    Smokeless tobacco: Never   Vaping Use    Vaping Use: Never used   Substance and Sexual Activity    Alcohol use: No    Drug use: No    Sexual activity: Yes   Other Topics Concern    Not on file   Social History Narrative    Not on file     Social Determinants of Health     Financial Resource Strain: Low Risk     Difficulty of Paying Living Expenses: Not hard at all   Food Insecurity: No Food Insecurity    Worried About Running Out of Food in the Last Year: Never true    Ran Out of Food in the Last Year: Never true   Transportation Needs: Not on file   Physical Activity: Inactive    Days of Exercise per Week: 0 days    Minutes of Exercise per Session: 0 min   Stress: Not on file   Social Connections: Not on file   Intimate Partner Violence: Not on file   Housing Stability: Not on file         Review of Systems:    Review of Systems   Unable to perform ROS: Intubated         Objective:  Blood pressure (!) 115/54, pulse 91, temperature 98.5 °F (36.9 °C), temperature source Temporal, resp. rate 25, height 5' 4\" (1.626 m), weight 196 lb 12.8 oz (89.3 kg), SpO2 95 %, not currently breastfeeding. Intake/Output Summary (Last 24 hours) at 2022 1802  Last data filed at 2022 1600  Gross per 24 hour   Intake 2265.1 ml   Output 1855 ml   Net 410.1 ml       Physical Exam  Vitals and nursing note reviewed. Constitutional:       Appearance: She is ill-appearing. Interventions: She is sedated and intubated. HENT:      Head: Normocephalic and atraumatic. Right Ear: External ear normal.      Left Ear: External ear normal.      Nose: Nose normal.      Mouth/Throat:      Mouth: Mucous membranes are moist.   Eyes:      Conjunctiva/sclera: Conjunctivae normal.      Pupils: Pupils are equal, round, and reactive to light. Cardiovascular:      Rate and Rhythm: Normal rate and regular rhythm. Heart sounds: Normal heart sounds. Pulmonary:      Effort: Pulmonary effort is normal. She is intubated. Breath sounds: Normal breath sounds. Abdominal:      General: Abdomen is flat. Palpations: Abdomen is soft. Musculoskeletal:         General: Swelling present. Cervical back: Neck supple. No rigidity. No muscular tenderness. Right lower leg: Edema present. Left lower leg: Edema present. Skin:     General: Skin is warm and dry. Labs:  BMP:   Recent Labs     07/24/22  0108 07/24/22  0607 07/25/22  0420 07/25/22  1011 07/25/22  1955 07/26/22  0200 07/26/22  0831 07/26/22  1358   *   < > 151* 149* 146* 143 145 146*   K 3.0*   < > 3.0* 3.3* 3.6 3.6  --   --    *  --  115*  --   --  111  --   --    CO2 20*  --  26  --   --  24  --   --    BUN 7*  --  11  --   --  12  --   --    CREATININE 0.5  --  0.5  --   --  0.4*  --   --    CALCIUM 8.5*  --  8.7*  --   --  8.4*  --   --     < > = values in this interval not displayed. CBC:   Recent Labs     07/24/22 0108 07/25/22  0350 07/26/22  0200   WBC 4.5* 4.8 5.4   HGB 11.5* 10.9* 10.7*   HCT 35.7* 34.3* 34.5*   MCV 95.2 96.3 97.7   PLT 76* 85* 89*     LIVER PROFILE:   Recent Labs     07/24/22  0108 07/25/22  0420 07/26/22  0200   AST 15 17 19   ALT 14 15 15   BILITOT 0.9 1.0 0.8   ALKPHOS 89 106* 107*     PT/INR: No results for input(s): PROTIME, INR in the last 72 hours. APTT: No results for input(s): APTT in the last 72 hours. BNP:  No results for input(s): BNP in the last 72 hours. Ionized Calcium:No results for input(s): IONCA in the last 72 hours. Magnesium:  Recent Labs     07/25/22  0420 07/25/22  1011 07/26/22  0200   MG 1.6 2.3 2.1     Phosphorus:No results for input(s): PHOS in the last 72 hours.   HgbA1C:   Recent Labs     07/26/22  0200   LABA1C 9.3*     Hepatic:   Recent Labs     07/24/22  0108 07/25/22  0420 07/26/22  0200 ALKPHOS 89 106* 107*   ALT 14 15 15   AST 15 17 19   PROT 4.8* 4.8* 4.9*   BILITOT 0.9 1.0 0.8   LABALBU 2.1* 2.5* 2.0*     Lactic Acid: No results for input(s): LACTA in the last 72 hours. Troponin: No results for input(s): CKTOTAL, CKMB, TROPONINT in the last 72 hours. ABGs: No results for input(s): PH, PCO2, PO2, HCO3, O2SAT in the last 72 hours. CRP:  No results for input(s): CRP in the last 72 hours. Sed Rate:  No results for input(s): SEDRATE in the last 72 hours. Cultures:   No results for input(s): CULTURE in the last 72 hours. No results for input(s): BC, Elige Oshea in the last 72 hours. No results for input(s): CXSURG in the last 72 hours. Radiology reports as per the Radiologist  Radiology: XR CHEST PORTABLE    Result Date: 7/19/2022  NO PRIOR REPORT AVAILABLE Exam: X-RAY OF [facility] Clinical data:Intubation. Technique:Single view of the chest. Prior studies: No prior studies submitted. Findings: The ET tube lies 4 cm above the level of the [place] in satisfactory position. Postop changes are noted status post CABG. Cardiac silhouette is at large. The vascular pattern is increased . Findings consistent with the suspected congestive heart failure. Left-sided pleural effusion is suspected. Suspect Right lower lobe infiltrate/atelectasis. Please correlate clinically. No evidence of pleural effusion or pneumothorax. No acute osseous abnormality is detected. satisfactory ET tube placement. The distal tip lies 4 cm above the [place]. Postop changes status post CABG  cardiomegaly increased vascular pattern. Congestive heart failure is suspected and cannot be excluded suspect right lower lobe infiltrate small left-sided pleural effusion Recommendation: Follow up as clinically indicated. Electronically Signed by Sheridan Ormond at 19-Jul-2022 09:08:27 PM                Assessment     Hypovolemic shock. Resolved. Acute hypoxemic respiratory failure. Continue ventilatory support.   Unable to wean

## 2022-07-26 NOTE — PLAN OF CARE
Nutrition Problem #1: Inadequate oral intake  Intervention: Food and/or Nutrient Delivery: Continue NPO, Continue Current Tube Feeding  Nutritional

## 2022-07-27 PROBLEM — K66.1 RETROPERITONEAL HEMATOMA: Status: RESOLVED | Noted: 2022-01-01 | Resolved: 2022-01-01

## 2022-07-27 PROBLEM — K68.3 RETROPERITONEAL HEMATOMA: Status: RESOLVED | Noted: 2022-01-01 | Resolved: 2022-01-01

## 2022-07-27 PROBLEM — E11.65 TYPE 2 DIABETES MELLITUS WITH HYPERGLYCEMIA (HCC): Status: RESOLVED | Noted: 2021-07-26 | Resolved: 2022-01-01

## 2022-07-27 PROBLEM — R57.1 HYPOVOLEMIC SHOCK (HCC): Status: RESOLVED | Noted: 2022-01-01 | Resolved: 2022-01-01

## 2022-07-27 PROBLEM — I48.0 PAROXYSMAL ATRIAL FIBRILLATION (HCC): Status: RESOLVED | Noted: 2018-10-19 | Resolved: 2022-01-01

## 2022-07-27 PROBLEM — I51.89 DIASTOLIC DYSFUNCTION: Status: RESOLVED | Noted: 2019-02-14 | Resolved: 2022-01-01

## 2022-07-27 NOTE — PROGRESS NOTES
Comprehensive Nutrition Assessment    Type and Reason for Visit:  Reassess    Nutrition Recommendations/Plan:   Modify current EN formual.  Follow for labs and EN tolerance     Malnutrition Assessment:  Malnutrition Status: At risk for malnutrition (Comment) (07/27/22 1122)    Context:  Acute Illness     Findings of the 6 clinical characteristics of malnutrition:  Energy Intake:  Mild decrease in energy intake (Comment)  Weight Loss:  1% to 2% over 1 week     Body Fat Loss:  No significant body fat loss     Muscle Mass Loss:  No significant muscle mass loss    Fluid Accumulation:  Mild Extremities   Strength:  Not Performed    Nutrition Assessment:    Propofol remains off. Accuchek's [id number]. Will try changing EN formula to Glucerna 1.5 and continuing 2 Proteinex as tf flush. Aware possible LTAC transfer today. Nutrition Related Findings:    on vent Wound Type: Pressure Injury       Current Nutrition Intake & Therapies:    Average Meal Intake: NPO  Average Supplements Intake: NPO  Current Tube Feeding (TF) Orders:  Feeding Route: Nasogastric  Formula: Peptide Based High Protein  Schedule: Continuous  Feeding Regimen: Modifying current EN d/t no Propofol = Vital High Protein with a goal rate of 52ml/hr ad 10ml free watr flush  Additives/Modulars: None  Water Flushes: 10ml hourly  Current TF & Flush Orders Provides: Vital High Protein @ 28ml/hr with 2 Proteinex and 10ml free water flush = 672 kcals wtih 58g protein, 74g CHO and 561ml free water from formula and 240ml from flush. Proteinex adds another 208 kcals with 52g protein  Goal TF & Flush Orders Provides: Vital High Protein @ goal rate of 52ml/hr = 1248 kcals wtih 108g protein, 138g CHO and 1043ml free water from formula and 240ml from flush    Anthropometric Measures:  Height: 5' 4\" (162.6 cm)  Ideal Body Weight (IBW): 120 lbs (55 kg)    Admission Body Weight: 200 lb 3.2 oz (90.8 kg)  Current Body Weight: 197 lb (89.4 kg), 164.2 % IBW.  Weight Source: Bed Scale  Current BMI (kg/m2): 33.8  Usual Body Weight: 199 lb 9.6 oz (90.5 kg) (4/13/2022)  % Weight Change (Calculated): 1.9    BMI Categories: Obese Class 1 (BMI 30.0-34. 9)    Estimated Daily Nutrient Needs:  Energy Requirements Based On: Kcal/kg  Weight Used for Energy Requirements: Current  Energy (kcal/day): 6936-4923 kcals (11-14 kcals/kg)  Weight Used for Protein Requirements: Ideal  Protein (g/day): 109  Method Used for Fluid Requirements: 1 ml/kcal  Fluid (ml/day): 9005-6395 ml    Nutrition Diagnosis:   Inadequate oral intake related to acute injury/trauma, impaired respiratory function as evidenced by NPO or clear liquid status due to medical condition, intubation, nutrition support - enteral nutrition    Nutrition Interventions:   Food and/or Nutrient Delivery: Continue NPO, Continue Current Tube Feeding  Nutrition Education/Counseling: No recommendation at this time  Coordination of Nutrition Care: Continue to monitor while inpatient  Plan of Care discussed with: nursing    Goals:  Previous Goal Met: Progressing toward Goal(s)  Goals: Meet at least 75% of estimated needs, Tolerate nutrition support at goal rate       Nutrition Monitoring and Evaluation:   Behavioral-Environmental Outcomes: None Identified  Food/Nutrient Intake Outcomes: Enteral Nutrition Intake/Tolerance  Physical Signs/Symptoms Outcomes: Biochemical Data, Fluid Status or Edema, Weight, Skin    Discharge Planning:     Too soon to determine     Darrell Joy MS, RD, LD  Contact: [phone]

## 2022-07-27 NOTE — PROGRESS NOTES
Patient awake and following commands. Attempted SBT with patient. Volume 300-360, SBI in the 80's. Placed patient back on original vent settings.

## 2022-07-27 NOTE — DISCHARGE SUMMARY
Discharge Summary    Ravin Naegeli Day  :  1955  MRN:  [id number]    Admit date:  2022  Discharge date: 2022     Admitting Physician:  Tonja Muse DO    Advance Directive: Full Code    Consults: general surgery and palliative care    Primary Care Physician:  Sanket Castañeda MD    Discharge Diagnoses:  Principal Problem (Resolved): Hypovolemic shock (HCC)  Active Problems:    Palliative care patient    Acute respiratory failure with hypoxia (HCC)  Resolved Problems:    Retroperitoneal hematoma    Paroxysmal atrial fibrillation (HCC)    Diastolic dysfunction    Coronary artery disease    Type 2 diabetes mellitus with hyperglycemia      Significant Diagnostic Studies:   XR CHEST PORTABLE    Result Date: 2022  NO PRIOR REPORT AVAILABLE Exam: X-RAY OF THECHEST Clinical data:Intubation. Technique:Single view of the chest. Prior studies: No prior studies submitted. Findings: The ET tube lies 4 cm above the level of the [place] in satisfactory position. Postop changes are noted status post CABG. Cardiac silhouette is at large. The vascular pattern is increased . Findings consistent with the suspected congestive heart failure. Left-sided pleural effusion is suspected. Suspect Right lower lobe infiltrate/atelectasis. Please correlate clinically. No evidence of pleural effusion or pneumothorax. No acute osseous abnormality is detected. satisfactory ET tube placement. The distal tip lies 4 cm above the [place]. Postop changes status post CABG  cardiomegaly increased vascular pattern. Congestive heart failure is suspected and cannot be excluded suspect right lower lobe infiltrate small left-sided pleural effusion Recommendation: Follow up as clinically indicated.  Electronically Signed by Rosario Renteria at 2022 09:08:27 PM               CBC:   Recent Labs     22  0350 22  0200 22  0120   WBC 4.8 5.4 4.9   HGB 10.9* 10.7* 10.4*   PLT 85* 89* 98*     BMP:    Recent Labs 07/25/22  0420 07/25/22  1011 07/25/22  1955 07/26/22  0200 07/26/22  0831 07/26/22  1358 07/26/22  1950 07/27/22  0120   *   < > 146* 143   < > 146* 145 144   K 3.0*   < > 3.6 3.6  --   --   --  3.0*   *  --   --  111  --   --   --  109   CO2 26  --   --  24  --   --   --  25   BUN 11  --   --  12  --   --   --  11   CREATININE 0.5  --   --  0.4*  --   --   --  0.4*   GLUCOSE 258*  --   --  318*  --   --   --  316*    < > = values in this interval not displayed. INR: No results for input(s): INR in the last 72 hours. Lipids: No results for input(s): CHOL, HDL in the last 72 hours. Invalid input(s): LDLCALCU  ABGs:No results for input(s): PHART, IQS0SWN, PO2ART, TCT6UIZ, BEART, HGBAE, S9GCQGLZ, CARBOXHGBART, 02THERAPY in the last 72 hours. HgBA1c:    Recent Labs     07/26/22  0200   LABA1C 9.3*       Procedures: intubation, mechanical ventilation, PICC     Hospital Course: Ms. Fagan was admitted to [facility] on 7/19 in transfer from [facility].  She presented there with a pSBO and had NG decompression. She developed hypovolemic shock and respiratory failure. She had a previous history of retroperitoneal hematoma from November of 2021. CT showed this was reorganizing and resolving. She required intubation on admission and fluid resuscitation. We were unable to wean and liberate her from the ventilator. She recovered from the standpoint of her GI issue. No problems regarding retroperitoneal hematoma. She was evaluated and accepted to the [facility] and will be transferred later today. Physical Exam:  Vital Signs: /66   Pulse (!) 106   Temp 97.3 °F (36.3 °C) (Temporal)   Resp 24   Ht 5' 4\" (1.626 m)   Wt 197 lb 8 oz (89.6 kg)   SpO2 96%   BMI 33.90 kg/m²   General appearance:. Alert and Cooperative   HEENT: Normocephalic. Chest: clear to auscultation bilaterally without wheezes or rhonchi.   Cardiac: Normal heart tones with regular rate and rhythm, S1, S2 normal. No murmurs, gallops, or rubs auscultated. Abdomen:soft, non-tender; normal bowel sounds, no masses, no organomegaly. Extremities: No clubbing or cyanosis. No peripheral edema. Peripheral pulses palpable. Neurologic: Grossly intact. Discharge Medications:         Medication List        CONTINUE taking these medications      amiodarone 100 MG tablet  Commonly known as: PACERONE     fluticasone 50 MCG/ACT nasal spray  Commonly known as: FLONASE  2 sprays by Each Nostril route daily     folic acid 1 MG tablet  Commonly known as: FOLVITE     FreeStyle InsuLinx System w/Device Kit  1 each by Does not apply route daily     * FreeStyle InsuLinx Test strip  Generic drug: blood glucose test strips  1 each by In Vitro route daily As needed. * FREESTYLE LITE strip  Generic drug: blood glucose test strips  CHECK BLOOD GLUCOSE ONCE DAILY AS NEEDED     * FREESTYLE LITE strip  Generic drug: blood glucose test strips  Infuse 1 each intravenously 2 times daily As needed. * FreeStyle Lancets Misc  TEST SUGAR DAILY AS DIRECTED     * FreeStyle Lancets Misc  TEST SUGAR DAILY AS DIRECTED UP TO 4 TIMES A DAY     * FreeStyle Lancets Misc  USE AS DIRECTED     furosemide 20 MG tablet  Commonly known as: LASIX     glyBURIDE 5 MG tablet  Commonly known as: DIABETA  Take 2 tablets every morning & 1 tablet every evening. isosorbide mononitrate 30 MG extended release tablet  Commonly known as: IMDUR  TAKE 1 TABLET BY MOUTH DAILY     ketoconazole 2 % cream  Commonly known as: NIZORAL  Apply topically daily.      losartan 25 MG tablet  Commonly known as: COZAAR  TAKE 1 TABLET BY MOUTH DAILY     metFORMIN 500 MG tablet  Commonly known as: GLUCOPHAGE  TAKE 1 TABLET BY MOUTH ONCE EVERY MORNING AND ONE TABLET EVERY EVENING     metoprolol tartrate 50 MG tablet  Commonly known as: LOPRESSOR  TAKE 1 TABLET BY MOUTH 2 TIMES A DAY     mupirocin 2 % ointment  Commonly known as: BACTROBAN  Apply topically 2 times daily x 5-7 days nitroGLYCERIN 0.4 MG SL tablet  Commonly known as: NITROSTAT  PLACE 1 TABLET UNDER THE TONGUE EVERY 5 MINUTES AS NEEDED FOR CHEST PAIN     * nystatin 812091 UNIT/GM cream  Commonly known as: MYCOSTATIN  Apply topically 2 times daily. * nystatin 544168 UNIT/GM powder  Commonly known as: MYCOSTATIN  Apply topically 4 times daily. pantoprazole 40 MG tablet  Commonly known as: PROTONIX  TAKE 1 TABLET BY MOUTH 2 TIMES A DAY     potassium chloride 20 MEQ extended release tablet  Commonly known as: KLOR-CON M  TAKE 1 TABLET BY MOUTH ONCE DAILY WHILE TAKING FUROSEMIDE     predniSONE 5 MG tablet  Commonly known as: DELTASONE     rosuvastatin 5 MG tablet  Commonly known as: CRESTOR  TAKE ONE TABLET BY MOUTH ONCE NIGHTLY     verapamil 240 MG extended release capsule  Commonly known as: VERELAN     Xeljanz XR 11 MG Tb24  Generic drug: Tofacitinib Citrate ER           * This list has 8 medication(s) that are the same as other medications prescribed for you. Read the directions carefully, and ask your doctor or other care provider to review them with you. STOP taking these medications      apixaban 5 MG Tabs tablet  Commonly known as: ELIQUIS     aspirin EC 81 MG EC tablet     cephALEXin 500 MG capsule  Commonly known as: KEFLEX     simvastatin 40 MG tablet  Commonly known as: ZOCOR              Discharge Instructions: Follow up with Carmen Arteaga MD in 3-5 days. Take medications as directed. Resume activity as tolerated. Diet: Diet NPO  ADULT TUBE FEEDING; Orogastric; Diabetic; Continuous; 29; No; 10; Q 1 hour; Protein; 2 Proteinex as tf flush daily     Disposition: Patient is medically stable and will be transferred to [facility]. Time spent on discharge 40 minutes.     Signed:  Ivette Santillan DO

## 2022-07-27 NOTE — PLAN OF CARE
Problem: Discharge Planning  Goal: Discharge to home or other facility with appropriate resources  7/27/2022 0056 by Curtis Kelly RN  Outcome: Progressing Towards Goal  Flowsheets (Taken 7/26/2022 1600 by Henri Flores RN)  Discharge to home or other facility with appropriate resources: Identify barriers to discharge with patient and caregiver     Problem: Skin/Tissue Integrity  Goal: Absence of new skin breakdown  Description: 1. Monitor for areas of redness and/or skin breakdown  2. Assess vascular access sites hourly  3. Every 4-6 hours minimum:  Change oxygen saturation probe site  4. Every 4-6 hours:  If on nasal continuous positive airway pressure, respiratory therapy assess nares and determine need for appliance change or resting period.   7/27/2022 0056 by Curtis Kelly RN  Outcome: Progressing Towards Goal     Problem: Safety - Adult  Goal: Free from fall injury  7/27/2022 0056 by Curtis Kelly RN  Outcome: Progressing Towards Goal     Problem: Pain  Goal: Verbalizes/displays adequate comfort level or baseline comfort level  7/27/2022 0056 by Curtis Kelly RN  Outcome: Progressing Towards Goal  Flowsheets  Taken 7/26/2022 1600 by Henri Flores RN  Verbalizes/displays adequate comfort level or baseline comfort level:   Encourage patient to monitor pain and request assistance   Assess pain using appropriate pain scale  Taken 7/26/2022 1202 by Henri Flores RN  Verbalizes/displays adequate comfort level or baseline comfort level: Assess pain using appropriate pain scale     Problem: Nutrition Deficit:  Goal: Optimize nutritional status  7/27/2022 0056 by Curtis Kelly RN  Outcome: Progressing Towards Goal     Problem: Chronic Conditions and Co-morbidities  Goal: Patient's chronic conditions and co-morbidity symptoms are monitored and maintained or improved  7/27/2022 0056 by Curtis Kelly RN  Outcome: Progressing Towards Goal  Flowsheets (Taken 7/26/2022 1600 by Henri Flores RN)  Care Plan - Patient's Chronic Conditions and Co-Morbidity Symptoms are Monitored and Maintained or Improved: Monitor and assess patient's chronic conditions and comorbid symptoms for stability, deterioration, or improvement     Problem: ABCDS Injury Assessment  Goal: Absence of physical injury  7/27/2022 0056 by Parish Weebr RN  Outcome: Progressing Towards Goal

## 2022-07-27 NOTE — PLAN OF CARE
Problem: Discharge Planning  Goal: Discharge to home or other facility with appropriate resources  Outcome: Progressing Towards Goal  Flowsheets (Taken 7/26/2022 1600 by Gwendolyn Curran RN)  Discharge to home or other facility with appropriate resources: Identify barriers to discharge with patient and caregiver     Problem: Skin/Tissue Integrity  Goal: Absence of new skin breakdown  Description: 1. Monitor for areas of redness and/or skin breakdown  2. Assess vascular access sites hourly  3. Every 4-6 hours minimum:  Change oxygen saturation probe site  4. Every 4-6 hours:  If on nasal continuous positive airway pressure, respiratory therapy assess nares and determine need for appliance change or resting period.   Outcome: Progressing Towards Goal     Problem: Safety - Adult  Goal: Free from fall injury  Outcome: Progressing Towards Goal     Problem: Pain  Goal: Verbalizes/displays adequate comfort level or baseline comfort level  Outcome: Progressing Towards Goal  Flowsheets  Taken 7/26/2022 1600 by Gwendolyn Curran RN  Verbalizes/displays adequate comfort level or baseline comfort level:   Encourage patient to monitor pain and request assistance   Assess pain using appropriate pain scale  Taken 7/26/2022 1202 by Gwendolyn Curran RN  Verbalizes/displays adequate comfort level or baseline comfort level: Assess pain using appropriate pain scale     Problem: Nutrition Deficit:  Goal: Optimize nutritional status  7/27/2022 0056 by Franco Brock RN  Outcome: Progressing Towards Goal  7/26/2022 1309 by Romeo Stanford MS, RD, LD  Outcome: Progressing Towards Goal  Flowsheets (Taken 7/26/2022 1254)  Nutrient intake appropriate for improving, restoring, or maintaining nutritional needs:   Assess nutritional status and recommend course of action   Monitor oral intake, labs, and treatment plans   Recommend, monitor, and adjust tube feedings and TPN/PPN based on assessed needs     Problem: Chronic Conditions and

## 2022-07-31 NOTE — PROGRESS NOTES
Physician Progress Note      PATIENT:               Jonelle Guardado  CSN #:                  [id number]  :                       1955  ADMIT DATE:       2022 1:04 PM  [address] DATE:        2022 1:00 PM  RESPONDING  PROVIDER #:        Marques Alcantara 1936 [facility] DO          QUERY TEXT:    Pt admitted with acute respiratory failure . Pt noted to have Sepsis. If   possible, please document in progress notes and discharge summary the present   on admission status of Sepsis:    The medical record reflects the following:  Risk Factors: Small bowel obstruction with mass. Pneumonia, Acute respiratory   failure  Clinical Indicators: RR 34, Pulse 106, 107, B/P 92/36  Treatment:  ml/hr, Levophed, Sodium Bicarbonate  100 ml/hr.  labs,      Thank you    Angela Torre RN, BSN, [facility]  [phone]  Options provided:  -- Yes, Sepsis was present at the time of the order to admit to the hospital  -- No, Sepsis  was not present on admission and developed during the inpatient   stay  -- Other - I will add my own diagnosis  -- Disagree - Not applicable / Not valid  -- Disagree - Clinically unable to determine / Unknown  -- Refer to Clinical Documentation Reviewer    PROVIDER RESPONSE TEXT:    Yes, Sepsis was present at the time of the order to admit to the hospital.    Query created by: Soham Reynoso on 2022 12:50 PM      Electronically signed by:  Kamlesh Rosales DO 2022 5:36 PM

## 2022-08-01 NOTE — PROGRESS NOTES
PULMONARY AND CRITICAL CARE PROGRESS NOTE - [facility]    Patient: Teresa Eubanks Day    1955   [id number]  08/01/22   07:07 CDT  Referring Provider: Carson Bill MD     Chief Complaint: Mechanically Ventilated     Interval history: She remains mechanically ventilated on no continuous sedation. She did not awaken or respond this am. Oxygen saturation stable on peep of 5 and fio2 0.45.  Levophed drip infusing. Zosyn infusing. TF infusing. ABG and chest film reviewed. Temp 100.0.  Culture so far NGTD. WBC 5.98. She is assisting the vent. Flow is decreased to 45. She is Day # 14 on the vent.     Review of Systems: Review of Systems   Unable to perform ROS: Intubated      Physical Exam:  Vital signs: T:100.0 BP:108/48  P:85   R:22 Sat:94  Physical Exam  Constitutional:       General: She is not in acute distress.     Appearance: She is ill-appearing. She is not diaphoretic.      Interventions: She is intubated.   HENT:      Head: Normocephalic.      Nose: Nose normal.      Mouth/Throat:      Mouth: Mucous membranes are moist.      Comments: OG with tf infusing  Eyes:      General: No scleral icterus.  Cardiovascular:      Rate and Rhythm: Normal rate.   Pulmonary:      Effort: Pulmonary effort is normal. No respiratory distress. She is intubated.      Breath sounds: No wheezing or rhonchi.   Abdominal:      General: There is no distension.   Genitourinary:     Comments: Evans  fms  Musculoskeletal:      Right lower leg: Edema present.      Left lower leg: Edema present.      Comments: scds   Skin:     Coloration: Skin is not pale.      Comments: Bilateral mitts   Neurological:      Comments: Did not respond to voice          Electronically signed by MINNIE Antoine, 8/1/2022, 07:07 CDT      Physician Substantive Portion: Medical Decision Making:    Laboratory Data:  Results from last 7 days   Lab Units 08/01/22  0613 07/28/22  0523 07/27/22  0120   WBC 10*3/mm3 5.98 5.67  4.9   HEMOGLOBIN g/dL 9.3* 10.1* 10.4*   PLATELETS 10*3/mm3 205 128* 98*     Results from last 7 days   Lab Units 07/30/22  0517 07/29/22  1358 07/28/22  0523 07/27/22  0910 07/27/22  0120   SODIUM mmol/L  --  142 145 146* 144   POTASSIUM mmol/L  --  3.4* 3.6 3.5 3.0*   BUN mg/dL  --  8 7*  --  11   CREATININE mg/dL 0.54* 0.41* 0.48*  --  0.4*     Results from last 7 days   Lab Units 08/01/22  0414 07/31/22  0428 07/30/22  0409   PH, ARTERIAL pH units 7.527* 7.470* 7.508*   PCO2, ARTERIAL mm Hg 41.2 40.9 35.2   PO2 ART mm Hg 55.2* 73.2* 57.4*   FIO2 % 35 35 35     No results found for: BLOODCX, URINECX, WOUNDCX, MRSACX, RESPCX, STOOLCX    Recent films:  No radiology results for the last day    My radiograph interpretation/independent review of imaging: bilaberal pl effusions, infiltrate/atelectasis right base    Pulmonary Assessment:    1. Acute resp failure with hypoxia stable, has been unable to liberate from vent  2. Hypotension requiring vasopressors  3. Metabolic alkalosis  4. Elevated bnp suggesting fluid overload  5. Pleural effusions, too small to warrant drainage  6. Anemia stable    Recommend/plan:   · Continue vent, flow adjusted to optimize synchrony; otherwise continue same; she is alkalotic but assisting vent with every breath.  · Discussed with RT; repeat spontaneous breathing trial today  · Tracheostomy candidate if she fails spontaneous breathing trial   · Continue abx      This visit was performed by both a physician and an [facility] Practice RN.  I personally evaluated and examined the patient.  I performed all aspects of the medical decision making as documented.  Electronically signed by Bart Estrada MD, 8/1/2022, 15:00 CDT

## 2022-08-01 NOTE — PROGRESS NOTES
JUAN ANTONIO Serrano APRN        Internal Medicine Progress Note    8/1/2022   12:11 CDT    Name:  Teresa Serna  MRN:    [id number]     Acct:     [id number]   Room:  [address] [place] [address] Day: 0     Admit Date: 7/27/2022  1:42 PM  PCP: Teresa Contreras MD    Subjective:     C/C: need for continued vent weaning    Interval History: Status:  stayed the same. Resting in bed. No family at bedside. Low grade fever today. Tolerating current vent settings (A/C) with 45% 02, unable to wean from vent. Heart rates in the 100s. Moving around in bed at times. No purposeful response to stimulation - withdraws from pain. BS remain elevated. PICC line removed yesterday and new one placed. Remains on Levophed. Cultures pending.     Review of Systems   Unable to perform ROS: intubated         Medications:     Allergies:   Allergies   Allergen Reactions   • Codeine Palpitations and Other (See Comments)     Chest pain   • Exenatide Arrhythmia   • Sulfa Antibiotics Hives and Rash     Reaction: hives   • Albuterol Palpitations     High heart rate   • Sitagliptin Swelling   • Clindamycin/Lincomycin Unknown - High Severity   • Amoxicillin-Pot Clavulanate Palpitations       Current Meds:   Current Facility-Administered Medications:   •  acetaminophen (TYLENOL) tablet 650 mg, 650 mg, Oral, Q4H PRN **OR** acetaminophen (TYLENOL) suppository 650 mg, 650 mg, Rectal, Q4H PRN, Carson Bill MD  •  amiodarone (PACERONE) tablet 100 mg, 100 mg, Oral, Q24H, Carson Bill MD  •  chlorhexidine (PERIDEX) 0.12 % solution 15 mL, 15 mL, Mouth/Throat, Q12H, Carson Bill MD  •  dexmedetomidine (PRECEDEX) 400 mcg in 100 mL NS infusion, 0.2-1.5 mcg/kg/hr, Intravenous, Titrated, Jaimie Hunter APRN  •  dextrose (D50W) (25 g/50 mL) IV injection 25 g, 25 g, Intravenous, Q15 Min PRN, Carson Bill MD  •  dextrose (GLUTOSE) oral gel 15 g, 15 g, Oral, Q15 Min PRN, Carson Bill MD  •   furosemide (LASIX) tablet 20 mg, 20 mg, Oral, BID, Betty Merrill, APRN  •  glucagon (human recombinant) (GLUCAGEN DIAGNOSTIC) injection 1 mg, 1 mg, Subcutaneous, Q15 Min PRN, Carson Bill MD  •  insulin detemir (LEVEMIR) injection 25 Units, 25 Units, Subcutaneous, Nightly, Betty Merrill, APRN  •  Insulin Lispro (humaLOG) injection 2-7 Units, 2-7 Units, Subcutaneous, Q6H, Carson Bill MD  •  levalbuterol (XOPENEX) nebulizer solution 0.63 mg, 0.63 mg, Nebulization, BID PRN, Carson Bill MD  •  LORazepam (ATIVAN) tablet 1 mg, 1 mg, Nasogastric, Q4H PRN, Clarissa Metz, APRN  •  magnesium hydroxide (MILK OF MAGNESIA) suspension 10 mL, 10 mL, Oral, Daily PRN, Carson Bill MD  •  metoprolol tartrate (LOPRESSOR) tablet 50 mg, 50 mg, Oral, Q12H, Betty Merrill, APRN  •  Midazolam HCl (PF) (VERSED) injection 2 mg, 2 mg, Intravenous, Q1H PRN, Carson Bill MD  •  norepinephrine (LEVOPHED) 8 mg in 250 mL NS infusion (premix), 0.02-0.3 mcg/kg/min, Intravenous, Titrated, Carson Bill MD  •  ondansetron (ZOFRAN) tablet 4 mg, 4 mg, Oral, Q6H PRN **OR** ondansetron (ZOFRAN) injection 4 mg, 4 mg, Intravenous, Q6H PRN, Carson Bill MD  •  piperacillin-tazobactam (ZOSYN) 4.5 g/100 mL 0.9% NS IVPB (mbp), 4.5 g, Intravenous, Q8H, Carson Bill MD  •  polyethylene glycol (MIRALAX) packet 17 g, 17 g, Oral, Daily, Carson Bill MD  •  potassium chloride (KAYCIEL) 20 mEq/15 mL solution 20 mEq, 20 mEq, Nasogastric, Daily, Carson Bill MD  •  sodium chloride 0.9 % flush 10 mL, 10 mL, Intravenous, Q12H, Carson Bill MD  •  sodium chloride 0.9 % flush 10 mL, 10 mL, Intravenous, PRN, Carson Bill MD  •  vancomycin (VANCOCIN) 1,000 mg in sodium chloride 0.9 % 250 mL IVPB, 1,000 mg, Intravenous, Q12H, Carson Bill MD    Data:     Code Status:    There are no questions and answers to display.  "      Family History   Problem Relation Age of Onset   • Cancer Father    • Coronary artery disease Father    • Asthma Father    • Heart failure Mother    • Kidney disease Mother    • Arthritis Mother    • Pancreatitis Sister    • Heart attack Brother    • Colon cancer Neg Hx    • Colon polyps Neg Hx        Social History     Socioeconomic History   • Marital status:    Tobacco Use   • Smoking status: Former Smoker     Years: 20.00     Types: Cigarettes     Quit date: 2000     Years since quittin.5   • Smokeless tobacco: Never Used   Vaping Use   • Vaping Use: Never used   Substance and Sexual Activity   • Alcohol use: No   • Drug use: No   • Sexual activity: Defer       Vitals:  Ht 167.6 cm (66\")   Wt 111 kg (244 lb 6.4 oz)   BMI 39.45 kg/m²     T 100 P 85 R 22 /48 Sp02 94% (vent fi02 45%/peep 5)        I/O (24Hr):  No intake or output data in the 24 hours ending 22 1211    Labs and imaging:      Recent Results (from the past 12 hour(s))   POC Glucose Once    Collection Time: 22 12:21 AM    Specimen: Blood   Result Value Ref Range    Glucose 299 (H) 70 - 130 mg/dL   Blood Gas, Arterial -    Collection Time: 22  4:14 AM    Specimen: Arterial Blood   Result Value Ref Range    Site Right Brachial     Nigel's Test N/A     pH, Arterial 7.527 (H) 7.350 - 7.450 pH units    pCO2, Arterial 41.2 35.0 - 45.0 mm Hg    pO2, Arterial 55.2 (L) 83.0 - 108.0 mm Hg    HCO3, Arterial 34.2 (H) 20.0 - 26.0 mmol/L    Base Excess, Arterial 10.5 (H) 0.0 - 2.0 mmol/L    O2 Saturation, Arterial 88.6 (L) 94.0 - 99.0 %    Temperature 37.0 C    Barometric Pressure for Blood Gas 751 mmHg    Modality Ventilator     FIO2 35 %    Ventilator Mode AC     Set Tidal Volume 420     Set Mech Resp Rate 12.0     Collected by YOLANDA CLANCY     pCO2, Temperature Corrected 41.2 35 - 45 mm Hg    pH, Temp Corrected 7.527 (H) 7.350 - 7.450 pH Units    pO2, Temperature Corrected 55.2 (L) 83 - 108 mm Hg   POC Glucose " Once    Collection Time: 08/01/22  5:11 AM    Specimen: Blood   Result Value Ref Range    Glucose 264 (H) 70 - 130 mg/dL   Basic Metabolic Panel    Collection Time: 08/01/22  6:13 AM    Specimen: Blood   Result Value Ref Range    Glucose 306 (H) 65 - 99 mg/dL    BUN 10 8 - 23 mg/dL    Creatinine 0.51 (L) 0.57 - 1.00 mg/dL    Sodium 144 136 - 145 mmol/L    Potassium 2.6 (C) 3.5 - 5.2 mmol/L    Chloride 104 98 - 107 mmol/L    CO2 33.0 (H) 22.0 - 29.0 mmol/L    Calcium 8.6 8.6 - 10.5 mg/dL    BUN/Creatinine Ratio 19.6 7.0 - 25.0    Anion Gap 7.0 5.0 - 15.0 mmol/L    eGFR 103.7 >60.0 mL/min/1.73   CBC Auto Differential    Collection Time: 08/01/22  6:13 AM    Specimen: Blood   Result Value Ref Range    WBC 5.98 3.40 - 10.80 10*3/mm3    RBC 3.11 (L) 3.77 - 5.28 10*6/mm3    Hemoglobin 9.3 (L) 12.0 - 15.9 g/dL    Hematocrit 31.0 (L) 34.0 - 46.6 %    MCV 99.7 (H) 79.0 - 97.0 fL    MCH 29.9 26.6 - 33.0 pg    MCHC 30.0 (L) 31.5 - 35.7 g/dL    RDW 19.3 (H) 12.3 - 15.4 %    RDW-SD 67.7 (H) 37.0 - 54.0 fl    MPV 11.0 6.0 - 12.0 fL    Platelets 205 140 - 450 10*3/mm3   Manual Differential    Collection Time: 08/01/22  6:13 AM    Specimen: Blood   Result Value Ref Range    Neutrophil % 78.4 (H) 42.7 - 76.0 %    Lymphocyte % 16.5 (L) 19.6 - 45.3 %    Eosinophil % 1.0 0.3 - 6.2 %    Bands %  2.1 0.0 - 5.0 %    Atypical Lymphocyte % 2.1 0.0 - 5.0 %    Neutrophils Absolute 4.81 1.70 - 7.00 10*3/mm3    Lymphocytes Absolute 1.11 0.70 - 3.10 10*3/mm3    Eosinophils Absolute 0.06 0.00 - 0.40 10*3/mm3    Anisocytosis Slight/1+ None Seen    Macrocytes Slight/1+ None Seen    Poikilocytes Slight/1+ None Seen    Polychromasia Mod/2+ None Seen    Target Cells Slight/1+ None Seen    WBC Morphology Normal Normal    Platelet Morphology Normal Normal   Vancomycin, Trough Please call results to Pharmacy prior to administering next dose    Collection Time: 08/01/22  8:54 AM    Specimen: Blood   Result Value Ref Range    Vancomycin Trough 16.60 5.00 -  20.00 mcg/mL   POC Glucose Once    Collection Time: 08/01/22 10:50 AM    Specimen: Blood   Result Value Ref Range    Glucose 287 (H) 70 - 130 mg/dL                               Physical Examination:        Physical Exam  Vitals and nursing note reviewed.   Constitutional:       Appearance: Normal appearance. She is obese.      Interventions: She is intubated.   HENT:      Head: Normocephalic and atraumatic.      Right Ear: External ear normal.      Left Ear: External ear normal.      Nose: Nose normal.      Mouth/Throat:      Comments: ETT to vent  OGT  Eyes:      Extraocular Movements: Extraocular movements intact.      Conjunctiva/sclera: Conjunctivae normal.      Pupils: Pupils are equal, round, and reactive to light.   Cardiovascular:      Rate and Rhythm: Regular rhythm. Tachycardia present.      Pulses: Normal pulses.      Heart sounds: Normal heart sounds.   Pulmonary:      Effort: Pulmonary effort is normal. She is intubated.      Breath sounds: Normal breath sounds.   Abdominal:      General: Bowel sounds are normal.      Palpations: Abdomen is soft.   Musculoskeletal:      Cervical back: Normal range of motion and neck supple.      Comments: Generalized weakness   Skin:     General: Skin is warm and dry.   Neurological:      Comments: Withdraws from pain  Nonverbal due to ETT   Psychiatric:         Mood and Affect: Mood normal.         Behavior: Behavior normal.           Assessment:             * No active hospital problems. *    Past Medical History:   Diagnosis Date   • Abnormal stress test    • Atrial flutter (HCC)    • CAD (coronary artery disease)    • CAD in native artery     CABG x 3   • COVID-19 vaccine series completed 2021    [id number]   • Diverticulosis    • Essential hypertension     Tricuspid valve insufficiency, unspecified etiology    • History of adenomatous polyp of colon    • Hyperlipemia, mixed    • Hyperlipidemia    • Hypertension    • IBS (irritable bowel syndrome)    • MI, old     • Mitral valve prolapse    • Near syncope    • Obesity, morbid ([facility])    • Palpitations    • Paroxysmal SVT (supraventricular tachycardia) ([facility])    • Pedal edema    • Precordial pain    • Rheumatoid arthritis ([facility])    • S/P CABG x 3    • Type 2 diabetes mellitus (HCC)    • Venous insufficiency of both lower extremities         Plan:          1. Acute hypoxic respiratory failure  2. DM2 with hyperglycemia  3. Rheumatoid arthritis  4. Dysphagia  5. History of CAD s/p CABG  6. Morbid obesity  7. Anxiety  8. History of retroperitoneal hematoma  9. UTI    Continue current treatment. Monitor counts. Increase activity. Labs Monday. Vent weaning under direction of pulmonology.Consult ENT for trach placement to help assist with vent weaning. Continue nutrition support via AMONs. Continue IV antibiotics and await final cultures. Glycemic control-increase to medium dose sliding scale. Maintain patient safety.         Electronically signed by MINNIE Lemos on 8/1/2022 at 12:11 CDT

## 2022-08-01 NOTE — PROGRESS NOTES
"Pharmacy Dosing Service  Pharmacokinetics  Vancomycin Follow-up Evaluation     Assessment/Action/Plan:  Current Order: Vancomycin 1000 mg IVPB every 12 hours  Current end date:8-3-22  Levels: 16.6 on 8-1   Additional antimicrobial agent(s): Zosyn    Recent Doses/Levels  Vancomycin 1000 mg Q12hrs @ 2022/07/31 20:30  Vancomycin level: 16.6 mcg/mL @ 2022/08/01 08:54  Estimated true trough: 16.7 mcg/mL  Estimated AUC: 493 mcg*hr/mL    Estimated Pharmacokinetic Parameters  Clearance method: Bayesian modeling ([employer] 2005 (general inpatient)) [one drug level]  Vd method: Bayesian modeling ([employer] 2005 (general inpatient))  Vd: 112.2 L (1.01 L/kg)  Dennys: 0.0361 hr-1 (T1/2 = 19.2 hrs)  CLvanco: 4.05 L/hr    Vancomycin dose: 1000 mg IV Q12hrs (infused over 1 hr)  Estimated AUC/PRAFUL: 493 mcg*hr/mL (assumed PRAFUL 1 mcg/mL)  Estimated peak: 24.9 mcg/mL  Estimated trough: 16.7 mcg/mL  Therapeutic target: AUC/PRAFUL 400 to 600 mcg*hr/mL     Vancomycin trough evaluated today which was within goal and correlates with drug exposure target. Continue Vancomycin 1000 MG IV Q12hrs. Patient remains on Zosyn as well. Pharmacy will continue to follow daily and adjust dose accordingly.         Subjective:  Teresa Serna is a 66 y.o. female currently on Vancomycin 1000 mg IV every 12 hours for the treatment of \"sepsis\", day 5 of 7 of treatment.    No diagnosis found.    Anti-Infectives (From admission, onward)      Ordered     Dose/Rate Route Frequency Start Stop    07/30/22 0625  vancomycin (VANCOCIN) 1,000 mg in sodium chloride 0.9 % 250 mL IVPB        Ordering Provider: Carson Bill MD    1,000 mg  over 60 Minutes Intravenous Every 12 Hours 07/30/22 0900 08/04/22 2059 07/28/22 1401  piperacillin-tazobactam (ZOSYN) 4.5 g/100 mL 0.9% NS IVPB (mbp)        Ordering Provider: Carson Bill MD    4.5 g  over 4 Hours Intravenous Every 8 Hours 07/28/22 2230 08/04/22 1429    07/28/22 1408  vancomycin (VANCOCIN) 2,000 mg in " "sodium chloride 0.9 % 500 mL IVPB        Ordering Provider: Carson Bill MD    2,000 mg  over 180 Minutes Intravenous Once (LTACH) 07/28/22 1600 07/29/22 0359          Diuretics (From admission, onward)      Ordered     Dose/Rate Route Frequency Start Stop    07/31/22 1117  furosemide (LASIX) injection 40 mg        Ordering Provider: Bart Estrada MD    40 mg Intravenous Once (LTACH) 07/31/22 1215 08/01/22 0014    07/29/22 1018  furosemide (LASIX) tablet 20 mg        Ordering Provider: Betty Merrill APRN    20 mg Oral 2 Times Daily (Diuretics) 07/29/22 1115              Past Medical / Surgical / Social History reviewed.     Objective:  Ht: 167.6 cm (66\"); Wt: 111 kg (244 lb 6.4 oz) Body mass index is 39.45 kg/m².  Estimated Creatinine Clearance: 138.9 mL/min (A) (by C-G formula based on SCr of 0.51 mg/dL (L)).    BUN   Date Value Ref Range Status   08/01/2022 10 8 - 23 mg/dL Final   07/29/2022 8 8 - 23 mg/dL Final      Creatinine   Date Value Ref Range Status   08/01/2022 0.51 (L) 0.57 - 1.00 mg/dL Final   07/30/2022 0.54 (L) 0.57 - 1.00 mg/dL Final   07/29/2022 0.41 (L) 0.57 - 1.00 mg/dL Final      Lab Results   Component Value Date    WBC 5.98 08/01/2022    WBC 5.67 07/28/2022    WBC 4.9 07/27/2022      Lab Results   Component Value Date    ALBUMIN 2.30 (L) 07/28/2022       Lab Results   Component Value Date    VANCOTROUGH 16.60 08/01/2022         Culture Results:  Microbiology Results (last 10 days)       Procedure Component Value - Date/Time    Blood Culture With HUMBLE - Blood, Blood, Central Line [[id number]]  (Normal) Collected: 07/30/22 1220    Lab Status: Preliminary result Specimen: Blood, Central Line Updated: 07/31/22 1301     Blood Culture No growth at 24 hours    Blood Culture - Blood, Arm, Left [[id number]]  (Normal) Collected: 07/28/22 1030    Lab Status: Preliminary result Specimen: Blood from Arm, Left Updated: 07/31/22 1101     Blood Culture No growth at 3 days    Blood Culture " - Blood, Arm, Right [[id number]]  (Normal) Collected: 07/28/22 1026    Lab Status: Preliminary result Specimen: Blood from Arm, Right Updated: 07/31/22 1101     Blood Culture No growth at 3 days    Urine Culture - Urine, Urine, Clean Catch [[id number]]  (Normal) Collected: 07/28/22 1018    Lab Status: Final result Specimen: Urine, Clean Catch Updated: 07/29/22 1318     Urine Culture No growth    Respiratory Culture - Sputum, Bronchus [[id number]] Collected: 07/27/22 1622    Lab Status: Final result Specimen: Sputum from Bronchus Updated: 07/29/22 0857     Respiratory Culture No growth     Gram Stain Many (4+) WBCs per low power field      No organisms seen            Dereje Dee, Pharmacy Intern  08/01/22 10:57 CDT

## 2022-08-02 PROBLEM — J96.22 ACUTE ON CHRONIC RESPIRATORY FAILURE WITH HYPOXIA AND HYPERCAPNIA (HCC): Status: ACTIVE | Noted: 2022-01-01

## 2022-08-02 PROBLEM — J96.21 ACUTE ON CHRONIC RESPIRATORY FAILURE WITH HYPOXIA AND HYPERCAPNIA (HCC): Status: ACTIVE | Noted: 2022-01-01

## 2022-08-02 PROBLEM — Z99.11 VENTILATOR DEPENDENT (HCC): Status: ACTIVE | Noted: 2022-01-01

## 2022-08-02 NOTE — PROGRESS NOTES
PULMONARY AND CRITICAL CARE PROGRESS NOTE - [facility]    Patient: Teresa Eubanks Day    1955   Acct# [id number]  08/02/22   08:37 CDT  Referring Provider: Carson Bill MD     Chief Complaint: Mechanically Ventilated     Interval history: She remains mechanically ventilated, day #15.  Levophed is infusing but can be titrated down due to stable blood pressures.  Nursing is giving a dose of as needed Ativan for restlessness.  The patient is back in PSV 12/5 this morning.  Per RT report, she tolerated PSV for about 5 hours yesterday before becoming fatigued.  She utilized full support overnight.  She has bilateral mitts on due to trying to self extubate.  Her son Lupillo is at bedside and is agreeable to tracheostomy placement if she does not get liberated from the ventilator in the next couple of days.  She is alert and able to follow commands.  Lung sounds are coarse today and Xopenex nebs have been made scheduled instead of as needed.  She is afebrile.    Review of Systems: Review of Systems   Unable to perform ROS: Intubated      Physical Exam:  Vital signs: T:98.4 BP:132/53  P:87   R:37 Sat:92%  Physical Exam  Vitals reviewed. Exam conducted with a chaperone present.   Constitutional:       General: She is not in acute distress.     Appearance: She is ill-appearing. She is not diaphoretic.      Interventions: She is intubated.      Comments: Bilateral hand mitts in place   HENT:      Head: Normocephalic.      Nose: Nose normal.      Mouth/Throat:      Mouth: Mucous membranes are moist.      Comments: OG with tf infusing  Eyes:      General: No scleral icterus.  Cardiovascular:      Rate and Rhythm: Normal rate.   Pulmonary:      Effort: Tachypnea present. No respiratory distress. She is intubated.      Breath sounds: Rhonchi (Coarse throughout) present. No wheezing.   Abdominal:      General: There is no distension.   Genitourinary:     Comments: Cristina maxwell  Musculoskeletal:       Right lower leg: Edema present.      Left lower leg: Edema present.      Comments: scds   Skin:     Coloration: Skin is not pale.      Comments: Bilateral mitts   Neurological:      Mental Status: She is alert.     Electronically signed by MINNIE Blankenship, 8/2/2022, 08:37 CDT      Physician Substantive Portion: Medical Decision Making:    Laboratory Data:  Results from last 7 days   Lab Units 08/01/22  0613 07/28/22  0523 07/27/22  0120   WBC 10*3/mm3 5.98 5.67 4.9   HEMOGLOBIN g/dL 9.3* 10.1* 10.4*   PLATELETS 10*3/mm3 205 128* 98*     Results from last 7 days   Lab Units 08/02/22  0243 08/01/22  1555 08/01/22  0613 07/30/22  0517 07/29/22  1358   SODIUM mmol/L 147*  --  144  --  142   POTASSIUM mmol/L 3.3* 3.5 2.6*  --  3.4*   BUN mg/dL 10  --  10  --  8   CREATININE mg/dL 0.52*  --  0.51* 0.54* 0.41*     Results from last 7 days   Lab Units 08/02/22  0457 08/01/22  0414 07/31/22  0428   PH, ARTERIAL pH units 7.506* 7.527* 7.470*   PCO2, ARTERIAL mm Hg 42.4 41.2 40.9   PO2 ART mm Hg 63.2* 55.2* 73.2*   FIO2 % 45 35 35     No results found for: BLOODCX, URINECX, WOUNDCX, MRSACX, RESPCX, STOOLCX    Recent films:  XR Chest 1 View    Result Date: 8/2/2022  1. Endotracheal tube is in good position. Lung volumes are stable. 2. Bibasilar atelectasis with bilateral layering pleural effusions, perhaps moderate sized effusion on the right.   This report was finalized on 08/02/2022 07:49 by Dr Rigoberto Padilla, .    XR Chest 1 View    Result Date: 8/1/2022  1. No significant interval change.   This report was finalized on 08/01/2022 08:23 by Dr Rigoberto Padilla, .     My radiograph interpretation/independent review of imaging: ET tube okay.  Increased pleural effusion on the right.    Pulmonary Assessment:    1. Acute respiratory failure with hypoxia, stable, failing to extubate  2. Pleural effusion on the right, increasing, probably moderate sized  3. Metabolic encephalopathy related to illness and medications,  stable  4. Probable ICU weakness related to her protracted illness, interfering with liberation from the ventilator    Recommend/plan:   · Continue spontaneous breathing trials.  However patient is little tachypneic at the time were seeing her this morning while on 12 cm of pressure support which is a greater level of support than I would consider spontaneous  · Continue bronchodilators  · Continue monitoring pleural effusion.  May need to consider thoracentesis if this worsens in the setting of failing to extubate.  · Recommend tracheostomy this week if she fails to extubate in the next day or so.      This visit was performed by both a physician and an [facility] RN.  I personally evaluated and examined the patient.  I performed all aspects of the medical decision making as documented.  Electronically signed by Bart Estrada MD, 8/2/2022, 11:34 CDT

## 2022-08-02 NOTE — PROGRESS NOTES
Fly Bill M.D.  MINNIE Garcia        Internal Medicine Progress Note    8/2/2022   16:26 CDT    Name:  Teresa Serna  MRN:    [id number]     Acct:     [id number]   Room:  [address] [place] [address] Day: 0     Admit Date: 7/27/2022  1:42 PM  PCP: Teresa Contreras MD    Subjective:     C/C: need for continued vent weaning    Interval History: Status:  stayed the same. Resting in bed. No family at bedside. Tolerating current vent settings (A/C) with 45% 02, unable to wean from vent. Heart rates in the 100s. Moving around in bed at times. No purposeful response to stimulation - withdraws from pain. Patient remains in mitten restraints.  BS remain elevated.  Remains on Levophed, nurse attempting to wean today. Cultures pending.     Review of Systems   Unable to perform ROS: intubated         Medications:     Allergies:   Allergies   Allergen Reactions   • Codeine Palpitations and Other (See Comments)     Chest pain   • Exenatide Arrhythmia   • Sulfa Antibiotics Hives and Rash     Reaction: hives   • Albuterol Palpitations     High heart rate   • Sitagliptin Swelling   • Clindamycin/Lincomycin Unknown - High Severity   • Amoxicillin-Pot Clavulanate Palpitations       Current Meds:   Current Facility-Administered Medications:   •  acetaminophen (TYLENOL) tablet 650 mg, 650 mg, Oral, Q4H PRN **OR** acetaminophen (TYLENOL) suppository 650 mg, 650 mg, Rectal, Q4H PRN, Carson Bill MD  •  amiodarone (PACERONE) tablet 100 mg, 100 mg, Oral, Q24H, Carson Bill MD  •  chlorhexidine (PERIDEX) 0.12 % solution 15 mL, 15 mL, Mouth/Throat, Q12H, Carson Bill MD  •  dexmedetomidine (PRECEDEX) 400 mcg in 100 mL NS infusion, 0.2-1.5 mcg/kg/hr, Intravenous, Titrated, Jaimie Hunter APRN  •  dextrose (D50W) (25 g/50 mL) IV injection 25 g, 25 g, Intravenous, Q15 Min PRN, Carson Bill MD  •  dextrose (GLUTOSE) oral gel 15 g, 15 g, Oral, Q15 Min PRN, Carson Bill MD  •   famotidine (PEPCID) tablet 20 mg, 20 mg, Nasogastric, BID, Carson Bill MD  •  furosemide (LASIX) tablet 20 mg, 20 mg, Oral, BID, Betty Merrill, MINNIE  •  glucagon (human recombinant) (GLUCAGEN DIAGNOSTIC) injection 1 mg, 1 mg, Subcutaneous, Q15 Min PRN, Carson Bill MD  •  insulin detemir (LEVEMIR) injection 25 Units, 25 Units, Subcutaneous, Nightly, Betty Merrill, MINNIE  •  Insulin Lispro (humaLOG) injection 0-9 Units, 0-9 Units, Subcutaneous, Q6H, Carson Bill MD  •  levalbuterol (XOPENEX) nebulizer solution 0.63 mg, 0.63 mg, Nebulization, BID PRN, Carson Bill MD  •  levalbuterol (XOPENEX) nebulizer solution 0.63 mg, 0.63 mg, Nebulization, 4x Daily - RT, Jamaal Frye, APRN  •  LORazepam (ATIVAN) tablet 1 mg, 1 mg, Nasogastric, Q4H PRN, Clarissa eMtz, APRN  •  magnesium hydroxide (MILK OF MAGNESIA) suspension 10 mL, 10 mL, Oral, Daily PRN, Carson Bill MD  •  metoprolol tartrate (LOPRESSOR) tablet 50 mg, 50 mg, Oral, Q12H, Betty Merrill, APRN  •  Midazolam HCl (PF) (VERSED) injection 2 mg, 2 mg, Intravenous, Q1H PRN, Carson Bill MD  •  norepinephrine (LEVOPHED) 8 mg in 250 mL NS infusion (premix), 0.02-0.3 mcg/kg/min, Intravenous, Titrated, Carson Bill MD  •  ondansetron (ZOFRAN) tablet 4 mg, 4 mg, Oral, Q6H PRN **OR** ondansetron (ZOFRAN) injection 4 mg, 4 mg, Intravenous, Q6H PRN, Carson Bill MD  •  piperacillin-tazobactam (ZOSYN) 4.5 g/100 mL 0.9% NS IVPB (mbp), 4.5 g, Intravenous, Q8H, Carson Bill MD  •  polyethylene glycol (MIRALAX) packet 17 g, 17 g, Oral, Daily, Carson Bill MD  •  potassium chloride (KAYCIEL) 20 mEq/15 mL solution 20 mEq, 20 mEq, Nasogastric, Daily, Carson Bill MD  •  sodium chloride 0.9 % flush 10 mL, 10 mL, Intravenous, Q12H, Carson Bill MD  •  sodium chloride 0.9 % flush 10 mL, 10 mL, Intravenous, PRN, Carson Bill,  "MD  •  vancomycin (VANCOCIN) 1,000 mg in sodium chloride 0.9 % 250 mL IVPB, 1,000 mg, Intravenous, Q12H, Carson Bill MD    Data:     Code Status:    There are no questions and answers to display.       Family History   Problem Relation Age of Onset   • Cancer Father    • Coronary artery disease Father    • Asthma Father    • Heart failure Mother    • Kidney disease Mother    • Arthritis Mother    • Pancreatitis Sister    • Heart attack Brother    • Colon cancer Neg Hx    • Colon polyps Neg Hx        Social History     Socioeconomic History   • Marital status:    Tobacco Use   • Smoking status: Former Smoker     Years: 20.00     Types: Cigarettes     Quit date: 2000     Years since quittin.5   • Smokeless tobacco: Never Used   Vaping Use   • Vaping Use: Never used   Substance and Sexual Activity   • Alcohol use: No   • Drug use: No   • Sexual activity: Defer       Vitals:  Ht 167.6 cm (66\")   Wt 111 kg (244 lb 6.4 oz)   BMI 39.45 kg/m²     T 98.4 P 87 R 16 /53 Sp02 90% (vent fi02 45%/peep 5)        I/O (24Hr):  No intake or output data in the 24 hours ending 22 1626    Labs and imaging:      Recent Results (from the past 12 hour(s))   Blood Gas, Arterial -    Collection Time: 22  4:57 AM    Specimen: Arterial Blood   Result Value Ref Range    Site Left Radial     Nigel's Test Positive     pH, Arterial 7.506 (H) 7.350 - 7.450 pH units    pCO2, Arterial 42.4 35.0 - 45.0 mm Hg    pO2, Arterial 63.2 (L) 83.0 - 108.0 mm Hg    HCO3, Arterial 33.5 (H) 20.0 - 26.0 mmol/L    Base Excess, Arterial 9.5 (H) 0.0 - 2.0 mmol/L    O2 Saturation, Arterial 91.2 (L) 94.0 - 99.0 %    Temperature 37.0 C    Barometric Pressure for Blood Gas 751 mmHg    Modality Ventilator     FIO2 45 %    Ventilator Mode AC     Set Tidal Volume 420     Set Mech Resp Rate 12.0     PEEP 5.0     Collected by [facility]     pCO2, Temperature Corrected 42.4 35 - 45 mm Hg    pH, Temp Corrected 7.506 (H) " 7.350 - 7.450 pH Units    pO2, Temperature Corrected 63.2 (L) 83 - 108 mm Hg   POC Glucose Once    Collection Time: 08/02/22  5:37 AM    Specimen: Blood   Result Value Ref Range    Glucose 228 (H) 70 - 130 mg/dL   POC Glucose Once    Collection Time: 08/02/22  7:12 AM    Specimen: Blood   Result Value Ref Range    Glucose 150 (H) 70 - 130 mg/dL   POC Glucose Once    Collection Time: 08/02/22 11:14 AM    Specimen: Blood   Result Value Ref Range    Glucose 251 (H) 70 - 130 mg/dL                               Physical Examination:        Physical Exam  Vitals and nursing note reviewed.   Constitutional:       Appearance: Normal appearance. She is obese.      Interventions: She is intubated.   HENT:      Head: Normocephalic and atraumatic.      Right Ear: External ear normal.      Left Ear: External ear normal.      Nose: Nose normal.      Mouth/Throat:      Comments: ETT to vent  OGT  Eyes:      Extraocular Movements: Extraocular movements intact.      Conjunctiva/sclera: Conjunctivae normal.      Pupils: Pupils are equal, round, and reactive to light.   Cardiovascular:      Rate and Rhythm: Regular rhythm. Tachycardia present.      Pulses: Normal pulses.      Heart sounds: Normal heart sounds.   Pulmonary:      Effort: Pulmonary effort is normal. She is intubated.      Breath sounds: Normal breath sounds.   Abdominal:      General: Bowel sounds are normal.      Palpations: Abdomen is soft.   Musculoskeletal:      Cervical back: Normal range of motion and neck supple.      Comments: Generalized weakness   Skin:     General: Skin is warm and dry.   Neurological:      Comments: Withdraws from pain  Nonverbal due to ETT   Psychiatric:         Mood and Affect: Mood normal.         Behavior: Behavior normal.           Assessment:             Ventilator dependent (HCC)    Acute on chronic respiratory failure with hypoxia and hypercapnia (HCC)    Past Medical History:   Diagnosis Date   • Abnormal stress test    • Atrial  flutter (HCC)    • CAD (coronary artery disease)    • CAD in native artery     CABG x 3   • COVID-19 vaccine series completed 2021    [id number]   • Diverticulosis    • Essential hypertension     Tricuspid valve insufficiency, unspecified etiology    • History of adenomatous polyp of colon    • Hyperlipemia, mixed    • Hyperlipidemia    • Hypertension    • IBS (irritable bowel syndrome)    • MI, old    • Mitral valve prolapse    • Near syncope    • Obesity, morbid ([facility])    • Palpitations    • Paroxysmal SVT (supraventricular tachycardia) ([facility])    • Pedal edema    • Precordial pain    • Rheumatoid arthritis ([facility])    • S/P CABG x 3    • Type 2 diabetes mellitus (HCC)    • Venous insufficiency of both lower extremities         Plan:          1. Acute hypoxic respiratory failure  2. DM2 with hyperglycemia  3. Rheumatoid arthritis  4. Dysphagia  5. History of CAD s/p CABG  6. Morbid obesity  7. Anxiety  8. History of retroperitoneal hematoma  9. UTI    Continue current treatment. Monitor counts. Increase activity. Monitor labs. Vent weaning under direction of pulmonology. Consult ENT for trach placement to help assist with vent weaning. Continue nutrition support via AMONs. Continue IV antibiotics and await final cultures. Glycemic control-increase to medium dose sliding scale. Maintain patient safety.         Electronically signed by MINNIE Lemos on 8/2/2022 at 16:26 CDT

## 2022-08-02 NOTE — CONSULTS
Stephanie Henry MD Consult Note      Patient Care Team:  Teresa Contreras MD as PCP - General (Family Medicine)  Jagdeep Reyes MD as Cardiologist (Cardiology)    Chief complaint need for enteral access    Subjective .     History of present illness: Patient is admitted on LTAC for respiratory failure.  She is currently intubated on tube feeds.  She is now day 15 of intubation and ENT has been consulted to place tracheostomy.  I have been consulted for insertion of G-tube at that time.  She has been tolerating tube feeds well without incident.    Review of Systems  Pertinent items are noted in HPI, all other systems reviewed and negative    History  Past Medical History:   Diagnosis Date   • Abnormal stress test    • Atrial flutter (HCC)    • CAD (coronary artery disease)    • CAD in native artery     CABG x 3   • COVID-19 vaccine series completed 2021 021221/031221   • Diverticulosis    • Essential hypertension     Tricuspid valve insufficiency, unspecified etiology    • History of adenomatous polyp of colon    • Hyperlipemia, mixed    • Hyperlipidemia    • Hypertension    • IBS (irritable bowel syndrome)    • MI, old    • Mitral valve prolapse    • Near syncope    • Obesity, morbid (HCC)    • Palpitations    • Paroxysmal SVT (supraventricular tachycardia) (HCC)    • Pedal edema    • Precordial pain    • Rheumatoid arthritis (HCC)    • S/P CABG x 3    • Type 2 diabetes mellitus (HCC)    • Venous insufficiency of both lower extremities    ,   Past Surgical History:   Procedure Laterality Date   • APPENDECTOMY     • CARDIAC CATHETERIZATION Left 06/18/2012    Intensify medical therapy   • CARDIAC CATHETERIZATION Left 05/05/2002    surgery   • CHOLECYSTECTOMY  1975   • COLONOSCOPY N/A 1/31/2018    Diverticulosis in the entire examined colon; One 4mm tubular adenomatous polyp in the transverse colon; Non-bleeding internal hemorrhoids; Repeat 5 years   • COLONOSCOPY  02/10/2012    Diverticulosis in the entire  examined colon; Non-bleeding internal hemorrhoids; Repeat 7 years   • COLONOSCOPY  2009    Diverticulosis; Repeat 7 years   • CORONARY ARTERY BYPASS GRAFT  2002    LIMA to LAD, SVG to D1, SVG to OM1 - x3   • CORONARY STENT PLACEMENT  09/2009    X1 - Stent to LAD, Drug Eluting   • DILATATION AND CURETTAGE     • ENDOSCOPY N/A 2018    Esophageal mucosal changes suspicious for short-segment De Anda's esophagus-biopsied; Small HH; Two gastric polyps-Clip (MR conditional) was placed-biopsied; Normal examined duodenum   • ENDOSCOPY  2015    Probable short-segment De Anda's esophagus-biopsied; HH; Gastritis-biopsied; Duodenal diverticulum; Repeat 1 year   • REPLACEMENT TOTAL KNEE Right    ,   Family History   Problem Relation Age of Onset   • Cancer Father    • Coronary artery disease Father    • Asthma Father    • Heart failure Mother    • Kidney disease Mother    • Arthritis Mother    • Pancreatitis Sister    • Heart attack Brother    • Colon cancer Neg Hx    • Colon polyps Neg Hx    ,   Social History     Tobacco Use   • Smoking status: Former Smoker     Years: 20.00     Types: Cigarettes     Quit date: 2000     Years since quittin.5   • Smokeless tobacco: Never Used   Vaping Use   • Vaping Use: Never used   Substance Use Topics   • Alcohol use: No   • Drug use: No   ,   Medications Prior to Admission   Medication Sig Dispense Refill Last Dose   • amiodarone (PACERONE) 100 MG tablet TAKE ONE TABLET BY MOUTH EVERY DAY 30 tablet 11    • aspirin 81 MG EC tablet Take 81 mg by mouth Daily.      • Eliquis 5 MG tablet tablet TAKE 1 TABLET BY MOUTH EVERY 12 HOURS 180 tablet 4    • folic acid (FOLVITE) 1 MG tablet Take 1 mg by mouth Daily.      • furosemide (Lasix) 40 MG tablet Please take 2 tabs in AM and 1 Qafternoon x2 days then increase to 2 tabs BID 28 tablet 0    • glyburide (DIAbeta) 5 MG tablet Take 5 mg by mouth 2 (Two) Times a Day With Meals. Take 2QAM & 2QPM      • isosorbide  mononitrate (IMDUR) 30 MG 24 hr tablet Take 30 mg by mouth Daily.      • lidocaine (LIDODERM) 5 % Place 1 patch on the skin as directed by provider Daily. Remove & Discard patch within 12 hours or as directed by MD 6 each 0    • losartan (COZAAR) 25 MG tablet Take 25 mg by mouth Daily.      • metFORMIN (GLUCOPHAGE) 500 MG tablet Take 500 mg by mouth Daily With Breakfast.      • metoprolol tartrate (LOPRESSOR) 50 MG tablet Take 50 mg by mouth 2 (Two) Times a Day.      • nitroglycerin (NITROSTAT) 0.4 MG SL tablet Place 0.4 mg under the tongue Every 5 (Five) Minutes As Needed for Chest Pain. Take no more than 3 doses in 15 minutes.      • pantoprazole (PROTONIX) 40 MG EC tablet Take 1 tablet by mouth Every 12 (Twelve) Hours. 60 tablet 1    • potassium chloride (K-DUR) 10 MEQ CR tablet Take 1 tablet by mouth Daily. (Patient taking differently: Take 20 mEq by mouth Daily.) 90 tablet 3    • potassium chloride (K-DUR,KLOR-CON) 20 MEQ CR tablet Please take 1 and 1/2 tab daily x1 week 11 tablet 0    • predniSONE (DELTASONE) 1 MG tablet Take 4 mg by mouth Daily.      • predniSONE (DELTASONE) 5 MG tablet Take 5 mg by mouth Every Morning.      • rosuvastatin (CRESTOR) 5 MG tablet Take 5 mg by mouth Daily.      • tiZANidine (Zanaflex) 4 MG tablet Take 1 tablet by mouth At Night As Needed for Muscle Spasms. 3 tablet 0    • Tofacitinib Citrate ER (XELJANZ) 11 MG tablet sustained-release 24 hour Take 11 mg by mouth Daily.      • verapamil SR (CALAN-SR) 240 MG CR tablet TAKE ONE TABLET BY MOUTH ONCE DAILY 90 tablet 3    , Scheduled Meds:  amiodarone, 100 mg, Oral, Q24H  chlorhexidine, 15 mL, Mouth/Throat, Q12H  famotidine, 20 mg, Nasogastric, BID  furosemide, 20 mg, Oral, BID  insulin detemir, 25 Units, Subcutaneous, Nightly  insulin lispro, 0-9 Units, Subcutaneous, Q6H  levalbuterol, 0.63 mg, Nebulization, 4x Daily - RT  metoprolol tartrate, 50 mg, Oral, Q12H  piperacillin-tazobactam, 4.5 g, Intravenous, Q8H  polyethylene glycol,  17 g, Oral, Daily  potassium chloride, 20 mEq, Nasogastric, Daily  sodium chloride, 10 mL, Intravenous, Q12H  vancomycin, 1,000 mg, Intravenous, Q12H    , Continuous Infusions:  dexmedetomidine, 0.2-1.5 mcg/kg/hr  norepinephrine, 0.02-0.3 mcg/kg/min    , PRN Meds:  •  acetaminophen **OR** acetaminophen  •  dextrose  •  dextrose  •  glucagon (human recombinant)  •  levalbuterol  •  LORazepam  •  magnesium hydroxide  •  midazolam  •  ondansetron **OR** ondansetron  •  sodium chloride and Allergies:  Codeine, Exenatide, Sulfa antibiotics, Albuterol, Sitagliptin, Clindamycin/lincomycin, and Amoxicillin-pot clavulanate    Objective     Vital Signs        Intake & Output (last 3 days)     None           Physical Exam:     General Appearance:   Intubated, sedated, unresponsive   Head:   Endotracheal tube and Dobbhoff feeding tube in place   Eyes:            Lids and lashes normal, conjunctivae and sclerae normal, no   icterus, no pallor, corneas clear, PERRLA   Ears:    Ears appear intact with no abnormalities noted   Neck:   No adenopathy, supple, trachea midline   Back:     No kyphosis present, no scoliosis present, no skin lesions,      erythema or scars, no tenderness to percussion or                   palpation,  range of motion normal   Lungs:     Clear to auscultation,respirations regular, even and                  unlabored    Heart:    Regular rhythm and normal rate, normal S1 and S2, no            murmur, no gallop, no rub, no click   Chest Wall:    No abnormalities observed   Abdomen:    Obese she has an upper midline incision and right paramedian incision.  No hepatosplenomegaly  noted no hernias noted normal bowel sounds   Rectal:     Deferred   Extremities:  Slight edematous   Pulses:   Pulses palpable and equal bilaterally   Skin:   No bleeding, bruising or rash   Lymph nodes:   No palpable adenopathy   Neurologic:   No focal deficits       Lab Results (last 72 hours)     Procedure Component Value Units  Date/Time    Blood Culture With HUMBLE - Blood, Blood, Central Line [[id number]]  (Normal) Collected: 07/30/22 1220    Specimen: Blood, Central Line Updated: 08/02/22 1303     Blood Culture No growth at 3 days    POC Glucose Once [[id number]]  (Abnormal) Collected: 08/02/22 1114    Specimen: Blood Updated: 08/02/22 1137     Glucose 251 mg/dL      Comment: : CHAYO Wrenter ID: [id number]       Blood Culture - Blood, Arm, Left [[id number]]  (Normal) Collected: 07/28/22 1030    Specimen: Blood from Arm, Left Updated: 08/02/22 1103     Blood Culture No growth at 5 days    Blood Culture - Blood, Arm, Right [[id number]]  (Normal) Collected: 07/28/22 1026    Specimen: Blood from Arm, Right Updated: 08/02/22 1103     Blood Culture No growth at 5 days    POC Glucose Once [[id number]]  (Abnormal) Collected: 08/02/22 0712    Specimen: Blood Updated: 08/02/22 0724     Glucose 150 mg/dL      Comment: : ANIKA ColonndaMeter ID: [id number]       POC Glucose Once [[id number]]  (Abnormal) Collected: 08/02/22 0537    Specimen: Blood Updated: 08/02/22 0552     Glucose 228 mg/dL      Comment: : ELISA tracey CarlaMeter ID: [id number]       Blood Gas, Arterial - [[id number]]  (Abnormal) Collected: 08/02/22 0457    Specimen: Arterial Blood Updated: 08/02/22 0452     Site Left Radial     Nigel's Test Positive     pH, Arterial 7.506 pH units      Comment: 83 Value above reference range        pCO2, Arterial 42.4 mm Hg      pO2, Arterial 63.2 mm Hg      Comment: 84 Value below reference range        HCO3, Arterial 33.5 mmol/L      Comment: 83 Value above reference range        Base Excess, Arterial 9.5 mmol/L      Comment: 83 Value above reference range        O2 Saturation, Arterial 91.2 %      Comment: 84 Value below reference range        Temperature 37.0 C      Barometric Pressure for Blood Gas 751 mmHg      Modality Ventilator     FIO2 45 %      Ventilator Mode AC     Set Tidal Volume 420     Set [facility]  Resp Rate 12.0     PEEP 5.0     Collected by SHERON FRANZ     Comment: Meter: [id number]     :  [id number]        pCO2, Temperature Corrected 42.4 mm Hg      pH, Temp Corrected 7.506 pH Units      pO2, Temperature Corrected 63.2 mm Hg     Basic Metabolic Panel [[id number]]  (Abnormal) Collected: 08/02/22 0243    Specimen: Blood Updated: 08/02/22 0402     Glucose 279 mg/dL      BUN 10 mg/dL      Creatinine 0.52 mg/dL      Sodium 147 mmol/L      Potassium 3.3 mmol/L      Chloride 105 mmol/L      CO2 31.0 mmol/L      Calcium 8.5 mg/dL      BUN/Creatinine Ratio 19.2     Anion Gap 11.0 mmol/L      eGFR 103.3 mL/min/1.73      Comment: National Kidney Foundation and American Society of Nephrology (ASN) Task Force recommended calculation based on the Chronic Kidney Disease Epidemiology Collaboration (CKD-EPI) equation refit without adjustment for race.       Narrative:      GFR Normal >60  Chronic Kidney Disease <60  Kidney Failure <15      POC Glucose Once [[id number]]  (Abnormal) Collected: 08/01/22 2342    Specimen: Blood Updated: 08/01/22 2354     Glucose 286 mg/dL      Comment: : ELISA tracey CarlaMeter ID: [id number]       Magnesium [[id number]]  (Normal) Collected: 08/01/22 1555    Specimen: Blood Updated: 08/01/22 1909     Magnesium 2.0 mg/dL     Potassium [[id number]]  (Normal) Collected: 08/01/22 1555    Specimen: Blood Updated: 08/01/22 1657     Potassium 3.5 mmol/L      Comment: Slight hemolysis detected by analyzer. Results may be affected.       POC Glucose Once [[id number]]  (Abnormal) Collected: 08/01/22 1619    Specimen: Blood Updated: 08/01/22 1656     Glucose 286 mg/dL      Comment: : CHAYO OconnelleMeter ID: [id number]       POC Glucose Once [[id number]]  (Abnormal) Collected: 08/01/22 1050    Specimen: Blood Updated: 08/01/22 1113     Glucose 287 mg/dL      Comment: : CHAYO OconnelleMeter ID: [id number]       Vancomycin, Trough Please call results to  Pharmacy prior to administering next dose [[id number]]  (Normal) Collected: 08/01/22 0854    Specimen: Blood Updated: 08/01/22 0914     Vancomycin Trough 16.60 mcg/mL     Basic Metabolic Panel [[id number]]  (Abnormal) Collected: 08/01/22 0613    Specimen: Blood Updated: 08/01/22 0717     Glucose 306 mg/dL      BUN 10 mg/dL      Creatinine 0.51 mg/dL      Sodium 144 mmol/L      Potassium 2.6 mmol/L      Chloride 104 mmol/L      CO2 33.0 mmol/L      Calcium 8.6 mg/dL      BUN/Creatinine Ratio 19.6     Anion Gap 7.0 mmol/L      eGFR 103.7 mL/min/1.73      Comment: National Kidney Foundation and American Society of Nephrology (ASN) Task Force recommended calculation based on the Chronic Kidney Disease Epidemiology Collaboration (CKD-EPI) equation refit without adjustment for race.       Narrative:      GFR Normal >60  Chronic Kidney Disease <60  Kidney Failure <15      Manual Differential [[id number]]  (Abnormal) Collected: 08/01/22 0613    Specimen: Blood Updated: 08/01/22 0716     Neutrophil % 78.4 %      Lymphocyte % 16.5 %      Eosinophil % 1.0 %      Bands %  2.1 %      Atypical Lymphocyte % 2.1 %      Neutrophils Absolute 4.81 10*3/mm3      Lymphocytes Absolute 1.11 10*3/mm3      Eosinophils Absolute 0.06 10*3/mm3      Anisocytosis Slight/1+     Macrocytes Slight/1+     Poikilocytes Slight/1+     Polychromasia Mod/2+     Target Cells Slight/1+     WBC Morphology Normal     Platelet Morphology Normal    CBC & Differential [[id number]]  (Abnormal) Collected: 08/01/22 0613    Specimen: Blood Updated: 08/01/22 0716    Narrative:      The following orders were created for panel order CBC & Differential.  Procedure                               Abnormality         Status                     ---------                               -----------         ------                     CBC Auto Differential[[id number]]        Abnormal            Final result                 Please view results for these tests on the individual orders.     CBC Auto Differential [[id number]]  (Abnormal) Collected: 08/01/22 0613    Specimen: Blood Updated: 08/01/22 0716     WBC 5.98 10*3/mm3      RBC 3.11 10*6/mm3      Hemoglobin 9.3 g/dL      Hematocrit 31.0 %      MCV 99.7 fL      MCH 29.9 pg      MCHC 30.0 g/dL      RDW 19.3 %      RDW-SD 67.7 fl      MPV 11.0 fL      Platelets 205 10*3/mm3     Narrative:      The previously reported component NRBC is no longer being reported. Previous result was 0.8 /100 WBC (Reference Range: 0.0-0.2 /100 WBC) on 8/1/2022 at 0654 CDT.    POC Glucose Once [[id number]]  (Abnormal) Collected: 08/01/22 0511    Specimen: Blood Updated: 08/01/22 0523     Glucose 264 mg/dL      Comment: : MKEMAPro Breath MDodyMeter ID: [id number]       Blood Gas, Arterial - [[id number]]  (Abnormal) Collected: 08/01/22 0414    Specimen: Arterial Blood Updated: 08/01/22 0408     Site Right Brachial     Nigel's Test N/A     pH, Arterial 7.527 pH units      Comment: 83 Value above reference range        pCO2, Arterial 41.2 mm Hg      pO2, Arterial 55.2 mm Hg      Comment: 84 Value below reference range        HCO3, Arterial 34.2 mmol/L      Comment: 83 Value above reference range        Base Excess, Arterial 10.5 mmol/L      Comment: 83 Value above reference range        O2 Saturation, Arterial 88.6 %      Comment: 84 Value below reference range        Temperature 37.0 C      Barometric Pressure for Blood Gas 751 mmHg      Modality Ventilator     FIO2 35 %      Ventilator Mode AC     Set Tidal Volume 420     Set [facility] Resp Rate 12.0     Collected by YOLANDA CLANCY     Comment: Meter: [id number]     :  [id number]        pCO2, Temperature Corrected 41.2 mm Hg      pH, Temp Corrected 7.527 pH Units      pO2, Temperature Corrected 55.2 mm Hg     POC Glucose Once [[id number]]  (Abnormal) Collected: 08/01/22 0021    Specimen: Blood Updated: 08/01/22 0032     Glucose 299 mg/dL      Comment: : MKERNELLPro Breath MDodyMeter ID: [id number]        POC Glucose Once [[id number]]  (Abnormal) Collected: 07/31/22 1942    Specimen: Blood Updated: 07/31/22 1959     Glucose 282 mg/dL      Comment: : IMAN Yomi JavedhMeter ID: [id number]       POC Glucose Once [[id number]]  (Abnormal) Collected: 07/31/22 1643    Specimen: Blood Updated: 07/31/22 1655     Glucose 298 mg/dL      Comment: : [email or web address]  (High) MistyMeter ID: [id number]       POC Glucose Once [[id number]]  (Abnormal) Collected: 07/31/22 1128    Specimen: Blood Updated: 07/31/22 1139     Glucose 289 mg/dL      Comment: : STAN Rod BrittanyMeter ID: [id number]       POC Glucose Once [[id number]]  (Abnormal) Collected: 07/31/22 0438    Specimen: Blood Updated: 07/31/22 0450     Glucose 284 mg/dL      Comment: : MKERAUDELIA Ezio MelodyMeter ID: [id number]       Blood Gas, Arterial - [[id number]]  (Abnormal) Collected: 07/31/22 0428    Specimen: Arterial Blood Updated: 07/31/22 0422     Site Right Brachial     Nigel's Test N/A     pH, Arterial 7.470 pH units      Comment: 83 Value above reference range        pCO2, Arterial 40.9 mm Hg      pO2, Arterial 73.2 mm Hg      Comment: 84 Value below reference range        HCO3, Arterial 29.7 mmol/L      Comment: 83 Value above reference range        Base Excess, Arterial 5.6 mmol/L      Comment: 83 Value above reference range        O2 Saturation, Arterial 95.0 %      Temperature 37.0 C      Barometric Pressure for Blood Gas 752 mmHg      Modality Ventilator     FIO2 35 %      Ventilator Mode AC     Set Tidal Volume 450     Set Mech Resp Rate 12.0     PEEP 5.0     Collected by YOLANDA CLANCY     Comment: Meter: [id number]     :  [id number]        pCO2, Temperature Corrected 40.9 mm Hg      pH, Temp Corrected 7.470 pH Units      pO2, Temperature Corrected 73.2 mm Hg     POC Glucose Once [[id number]]  (Abnormal) Collected: 07/30/22 2346    Specimen: Blood Updated: 07/30/22 2356     Glucose 315 mg/dL      Comment:  : ROHAN DukesMeter ID: [id number]       POC Glucose Once [[id number]]  (Abnormal) Collected: 07/30/22 1930    Specimen: Blood Updated: 07/30/22 1945     Glucose 276 mg/dL      Comment: : ROHAN DukesMeter ID: [id number]       POC Glucose Once [[id number]]  (Abnormal) Collected: 07/30/22 1629    Specimen: Blood Updated: 07/30/22 1641     Glucose 266 mg/dL      Comment: : TATIANA Jesus Juan J EdwardsMeter ID: [id number]           Imaging Results (Last 72 Hours)     Procedure Component Value Units Date/Time    XR Chest 1 View [[id number]] Collected: 08/02/22 0747     Updated: 08/02/22 0752    Narrative:      XR CHEST 1 VW- 8/2/2022 3:45 AM CDT     HISTORY: ventilator     COMPARISON: Chest exam dated 8/1/2022.     FINDINGS:      Endotracheal tube is in place. Stable lung volumes. Bibasilar  atelectasis with bilateral layering pleural effusions, perhaps moderate  in size on the right. No pneumothorax. Heart size is stable. Pulmonary  vasculature are nondilated. Enteric tube courses below the diaphragm  with tip beyond the field of view. Previous coronary bypass. Right-sided  PICC line with tip projecting over the right atrium.       Impression:      1. Endotracheal tube is in good position. Lung volumes are stable.  2. Bibasilar atelectasis with bilateral layering pleural effusions,  perhaps moderate sized effusion on the right.        This report was finalized on 08/02/2022 07:49 by Dr Rigoberto Padilla, .    XR Chest 1 View [[id number]] Collected: 08/01/22 0822     Updated: 08/01/22 0826    Narrative:      XR CHEST 1 VW- 8/1/2022 3:30 AM CDT     HISTORY: Mechanical ventilation     COMPARISON: 7/31/2022.     FINDINGS:      Endotracheal tube is in place. Tube tip is approximately 2.3 cm above  the luis a. Lung volumes are stable, down to the posterior eighth ribs.  Bilateral small layering pleural effusions. No pneumothorax. Heart size  is stable. Pulmonary vasculature are  nondilated. Enteric tube courses  below the diaphragm with tip beyond the field of view. Previous coronary  bypass.        Impression:      1. No significant interval change.        This report was finalized on 08/01/2022 08:23 by Dr Rigoberto Padilla, .    XR Chest 1 View [[id number]] Collected: 07/31/22 0612     Updated: 07/31/22 0618    Narrative:      EXAMINATION: XR CHEST 1 VW-     7/31/2022 3:45 AM CDT     HISTORY: Respiratory failure. Ventilator.     One view chest x-ray.     Comparison is made with 07/30/2022.     Mild infiltrate or edema throughout both lungs with small amounts of  pleural fluid.  Questionable mild increased infiltrate on the right though overall I  think the slight worsened appearance of the chest is probably based on  hypoaeration.     The endotracheal tube and nasogastric tube remain in good position.     A left-sided vascular line has been removed and a right PICC line has  been placed. The right PICC line is in good position with the tip in the  right atrium.     Summary:  1. Diffuse infiltrate or edema is again seen. Infiltrate is stable or  slightly worse. No improvement.  2. Well-positioned lines and tubes.  This report was finalized on 07/31/2022 06:15 by Dr. Chance Lanza MD.         I have reviewed independently her CT scan that was done in the emergency room in June of this year.  She does have some enlargement to the left lateral lobe of the liver as well as mild changes in the antral aspect of her stomach    I have reviewed her recent blood work       Assessment & Plan       Ventilator dependent (HCC)    Acute on chronic respiratory failure with hypoxia and hypercapnia (HCC)      Need for enteral access- we will plan on gastrostomy tube placement in coordination with Dr. Linares.  I have reached out to Dr. Rios and we will coordinate.  2.  I have attempted to call the sister to discuss procedure as well as to discuss her past surgical history.  Her scars could be explained by  cholecystectomy open as well as appendectomy open but I like to confirm that she is not had prior gastric surgery.  Her stomach does look a little funny in the antrum and pylorus area.  This would greatly increase scarring which could impact the level of difficulty of the operation.  She also does have hepatomegaly which will also increase the level of difficulty of the operation          Stephanie Henry MD  08/02/22  15:38 CDT

## 2022-08-02 NOTE — PROGRESS NOTES
Pharmacy Dosing Service  Antimicrobial  Vancomycin      Current order: Vancomycin 1000 mg IVPB every 12 hours  Indication: sepsis  Start date: 07/28/22  Current end date: 08/04/22  _______________________________________________________________________    Recent Doses/Levels  Vancomycin 1000 mg Q12hrs @ 2022/07/31 20:30  Vancomycin level: 16.6 mcg/mL @ 2022/08/01 08:54  Estimated true trough: 16.7 mcg/mL  Estimated AUC: 493 mcg*hr/mL     Estimated Pharmacokinetic Parameters  Clearance method: Bayesian modeling (Toney 2005 (general inpatient)) [one drug level]  Vd method: Bayesian modeling (Toney 2005 (general inpatient))  Vd: 112.2 L (1.01 L/kg)  Dennys: 0.0361 hr-1 (T1/2 = 19.2 hrs)  CLvanco: 4.05 L/hr     Dosing Recommendations  Vancomycin dose: 1000 mg IV Q12hrs (infused over 1 hr)  Estimated AUC/PRAFUL: 493 mcg*hr/mL (assumed PRAFUL 1 mcg/mL)  Estimated peak: 24.9 mcg/mL  Estimated trough: 16.7 mcg/mL  Therapeutic target: AUC/PRAFUL 400 to 600 mcg*hr/mL    _________________________________________________________________    Assessment:  08/01 troughlevel returned within general therapeutic range and correlates with drug exposure target. Of note, patient receiving concurrent Zosyn and Lasix. Other risk factors for vancomycin-associated nephrotoxicity include morbid obesity and low albumin. Currently, BUN and SCr remain stable. Patient has ~48 hours of antibiotic therapy remaining. Cultures remain NGTD, but low-grade fever has been reported recently.    Plan:  Continue current vancomycin dosage.  Pharmacy will continue to follow daily.       Subjective:  Teresa Serna is a 66 y.o. female currently on day #5 of IV vancomycin therapy.    No diagnosis found.    Anti-Infectives (From admission, onward)      Ordered     Dose/Rate Route Frequency Start Stop    07/30/22 0625  vancomycin (VANCOCIN) 1,000 mg in sodium chloride 0.9 % 250 mL IVPB        Ordering Provider: Carson Bill MD    1,000 mg  over 60 Minutes  "Intravenous Every 12 Hours 07/30/22 0900 08/04/22 2059    07/28/22 1401  piperacillin-tazobactam (ZOSYN) 4.5 g/100 mL 0.9% NS IVPB (mbp)        Ordering Provider: Carson Bill MD    4.5 g  over 4 Hours Intravenous Every 8 Hours 07/28/22 2230 08/04/22 1429    07/28/22 1408  vancomycin (VANCOCIN) 2,000 mg in sodium chloride 0.9 % 500 mL IVPB        Ordering Provider: Carson Bill MD    2,000 mg  over 180 Minutes Intravenous Once (LTACH) 07/28/22 1600 07/29/22 0359          Diuretics (From admission, onward)      Ordered     Dose/Rate Route Frequency Start Stop    07/31/22 1117  furosemide (LASIX) injection 40 mg        Ordering Provider: Bart Estrada MD    40 mg Intravenous Once (LTACH) 07/31/22 1215 08/01/22 0014    07/29/22 1018  furosemide (LASIX) tablet 20 mg        Ordering Provider: Betty Merrill APRN    20 mg Oral 2 Times Daily (Diuretics) 07/29/22 1115              Past Medical / Surgical / Social History reviewed.     Objective:  Ht: 167.6 cm (66\"); Wt: 111 kg (244 lb 6.4 oz) Body mass index is 39.45 kg/m².  Estimated Creatinine Clearance: 136.2 mL/min (A) (by C-G formula based on SCr of 0.52 mg/dL (L)).    BUN   Date Value Ref Range Status   08/02/2022 10 8 - 23 mg/dL Final   08/01/2022 10 8 - 23 mg/dL Final   07/29/2022 8 8 - 23 mg/dL Final   07/27/2022 11 8 - 23 mg/dL Final   07/26/2022 12 8 - 23 mg/dL Final   07/25/2022 11 8 - 23 mg/dL Final      Creatinine   Date Value Ref Range Status   08/02/2022 0.52 (L) 0.57 - 1.00 mg/dL Final   08/01/2022 0.51 (L) 0.57 - 1.00 mg/dL Final   07/30/2022 0.54 (L) 0.57 - 1.00 mg/dL Final   07/27/2022 0.4 (L) 0.5 - 0.9 mg/dL Final   07/26/2022 0.4 (L) 0.5 - 0.9 mg/dL Final   07/25/2022 0.5 0.5 - 0.9 mg/dL Final      Lab Results   Component Value Date    WBC 5.98 08/01/2022    WBC 5.67 07/28/2022    WBC 4.9 07/27/2022      Lab Results   Component Value Date    ALBUMIN 2.30 (L) 07/28/2022       Lab Results   Component Value Date    " JODEESSM [facility] 16.60 08/01/2022         Culture Results:  Microbiology Results (last 10 days)       Procedure Component Value - Date/Time    Blood Culture With HUMBLE - Blood, Blood, Central Line [[id number]]  (Normal) Collected: 07/30/22 1220    Lab Status: Preliminary result Specimen: Blood, Central Line Updated: 08/01/22 1300     Blood Culture No growth at 2 days    Blood Culture - Blood, Arm, Left [[id number]]  (Normal) Collected: 07/28/22 1030    Lab Status: Preliminary result Specimen: Blood from Arm, Left Updated: 08/01/22 1102     Blood Culture No growth at 4 days    Blood Culture - Blood, Arm, Right [[id number]]  (Normal) Collected: 07/28/22 1026    Lab Status: Preliminary result Specimen: Blood from Arm, Right Updated: 08/01/22 1102     Blood Culture No growth at 4 days    Urine Culture - Urine, Urine, Clean Catch [[id number]]  (Normal) Collected: 07/28/22 1018    Lab Status: Final result Specimen: Urine, Clean Catch Updated: 07/29/22 1318     Urine Culture No growth    Respiratory Culture - Sputum, Bronchus [[id number]] Collected: 07/27/22 1622    Lab Status: Final result Specimen: Sputum from Bronchus Updated: 07/29/22 0857     Respiratory Culture No growth     Gram Stain Many (4+) WBCs per low power field      No organisms seen            Darwin Caballero, PharmD  08/02/22 09:41 CDT

## 2022-08-02 NOTE — CONSULTS
[facility] Otolaryngology Head and Neck Surgery  CONSULT  Patient Name: Teresa Serna  : 1955  MRN: [id number]  Primary Care Physician:  Tersea Contreras MD  Referring Physician: Carson Bill MD  Date of admission: 2022  Length of Stay: 0  Room: [facility]      Subjective    Subjective   History of Present Illness  Chief Complaint/Reason for Consultation: Airway management  Accompanied by: RT  Teresa Serna is a 66 y.o. female who is been transferred to [facility] for long-term ventilator liberation, possible airway management.  The patient became acutely ill in [facility].  She developed respiratory distress and was ultimately transferred to [facility].  She required intubation on 2022.  She has been intubated since that time.  Initially the patient had been maintained on some pressor support.  She is transferred on the ventilator, orally intubated.  She is unable to give any substantial history.  ENT services been consulted for possible trach placement.  RT reports patient has remained on the ventilator.  She now approaches day 15 of intubation.  They wish consideration for airway management, possible trach placement.  There is discussion of G-tube placement as well.  Patient is seen, chart is reviewed    Review of Systems   Unable to perform ROS: Intubated      Otherwise complete ROS is negative except as mentioned above.    Past Medical History:   Past Medical History:   Diagnosis Date   • Abnormal stress test    • Atrial flutter (HCC)    • CAD (coronary artery disease)    • CAD in native artery     CABG x 3   • COVID-19 vaccine series completed     [id number]   • Diverticulosis    • Essential hypertension     Tricuspid valve insufficiency, unspecified etiology    • History of adenomatous polyp of colon    • Hyperlipemia, mixed    • Hyperlipidemia    • Hypertension    • IBS (irritable bowel syndrome)    • MI, old    • Mitral valve prolapse    •  Near syncope    • Obesity, morbid ([facility])    • Palpitations    • Paroxysmal SVT (supraventricular tachycardia) ([facility])    • Pedal edema    • Precordial pain    • Rheumatoid arthritis ([facility])    • S/P CABG x 3    • Type 2 diabetes mellitus ([facility])    • Venous insufficiency of both lower extremities      Past Surgical History:  Past Surgical History:   Procedure Laterality Date   • APPENDECTOMY     • CARDIAC CATHETERIZATION Left 06/18/2012    Intensify medical therapy   • CARDIAC CATHETERIZATION Left 05/05/2002    surgery   • CHOLECYSTECTOMY  1975   • COLONOSCOPY N/A 1/31/2018    Diverticulosis in the entire examined colon; One 4mm tubular adenomatous polyp in the transverse colon; Non-bleeding internal hemorrhoids; Repeat 5 years   • COLONOSCOPY  02/10/2012    Diverticulosis in the entire examined colon; Non-bleeding internal hemorrhoids; Repeat 7 years   • COLONOSCOPY  01/23/2009    Diverticulosis; Repeat 7 years   • CORONARY ARTERY BYPASS GRAFT  05/05/2002    LIMA to LAD, SVG to D1, SVG to OM1 - x3   • CORONARY STENT PLACEMENT  09/2009    X1 - Stent to LAD, Drug Eluting   • DILATATION AND CURETTAGE     • ENDOSCOPY N/A 1/31/2018    Esophageal mucosal changes suspicious for short-segment De Anda's esophagus-biopsied; Small HH; Two gastric polyps-Clip (MR conditional) was placed-biopsied; Normal examined duodenum   • ENDOSCOPY  03/23/2015    Probable short-segment De Anda's esophagus-biopsied; HH; Gastritis-biopsied; Duodenal diverticulum; Repeat 1 year   • REPLACEMENT TOTAL KNEE Right        Family History: Her family history includes Arthritis in her mother; Asthma in her father; Cancer in her father; Coronary artery disease in her father; Heart attack in her brother; Heart failure in her mother; Kidney disease in her mother; Pancreatitis in her sister.   Social History: She  reports that she quit smoking about 22 years ago. Her smoking use included cigarettes. She quit after 20.00 years of use. She has never used smokeless  tobacco. She reports that she does not drink alcohol and does not use drugs.    Home Medications:  Tofacitinib Citrate ER, amiodarone, apixaban, aspirin, folic acid, furosemide, glyburide, isosorbide mononitrate, lidocaine, losartan, metFORMIN, metoprolol tartrate, nitroglycerin, pantoprazole, potassium chloride, predniSONE, rosuvastatin, tiZANidine, and verapamil SR    Allergies:  She is allergic to codeine, exenatide, sulfa antibiotics, albuterol, sitagliptin, clindamycin/lincomycin, and amoxicillin-pot clavulanate.    Objective    Objective   Vitals:         Physical Exam  Constitutional:       General: She is not in acute distress.     Appearance: She is well-developed. She is obese. She is not ill-appearing.      Interventions: She is sedated and intubated.   HENT:      Head: Normocephalic and atraumatic.      Right Ear: External ear normal.      Nose: Nose normal.        Mouth/Throat:      Lips: Pink.      Mouth: Mucous membranes are dry.      Dentition: Normal dentition. No dental tenderness.      Tongue: No lesions. Tongue does not deviate from midline.      Comments: Macroglossia  Orally intubated  Eyes:      General: Lids are normal.   Neck:      Thyroid: No thyroid mass or thyromegaly.   Pulmonary:      Effort: No tachypnea, respiratory distress or retractions. She is intubated.      Comments: Mechanical ventilation, orally intubated  Musculoskeletal:      Cervical back: No crepitus. Decreased range of motion.   Lymphadenopathy:      Cervical: No cervical adenopathy.   Neurological:      Comments: Withdrawals from examination  Facial grimaces   Psychiatric:         Behavior: Behavior is uncooperative.      Comments: Not evaluated         Result Review    Result Review:  I have personally reviewed the results from the time of this admission to 8/2/2022 14:01 CDT and agree with these findings:  [x]  Laboratory  []  Microbiology  [x]  Radiology  []  EKG/Telemetry   []  Cardiology/Vascular   []   Pathology  [x]  Old records  []  Other:  Most notable findings include: Alkalosis, hypoxia, elevated blood glucose, mild hyponatremia, hypokalemia, hypocalcemia  Chest x-ray reviewed, agree with radiology interpretation  Progress Notes by Bart Estrada MD (08/01/2022 07:07)   Progress Notes by Bart Estrada MD (07/31/2022 08:07)   Progress Notes by Carson Bill MD (07/31/2022 09:44)   Progress Notes by My Barnes APRN (08/01/2022 12:11)   Basic Metabolic Panel (08/02/2022 02:43)   Blood Gas, Arterial - (08/02/2022 04:57)   XR Chest 1 View (08/02/2022 03:50)       Assessment & Plan    Assessment / Plan   Brief Patient Summary:  Teresa Serna is a 66 y.o. female who is on mechanical ventilation, endotracheal tube in position.  She appears to be stable on the ventilator.  She is not progressing off the ventilator.  She is now in consideration for tracheostomy tube placement.  She is certainly a candidate because of prolonged ventilation, prolonged intubation, vent dependence.  I will recommend tracheostomy tube since the patient is unable to wean.  She is also in consideration for G-tube placement.  Perhaps, this can be coordinated.  ENT service will plan tracheostomy tube placement soon.    Active Hospital Problems:  Active Hospital Problems    Diagnosis    • Ventilator dependent (HCC)    • Acute on chronic respiratory failure with hypoxia and hypercapnia (HCC)            Plan:   • Airway management-the patient is a candidate for tracheostomy placement.  I recommend tracheostomy tube placement within the next few days if patient does not make substantial efforts to wean from ventilator.  • Nutrition-the patient continues with NG tube.  She is under consideration for G-tube placement.  This can be coordinated with the general surgery service.  • Respiratory failure-patient remains on mechanical ventilation.  She is followed by the pulmonary service.    I discussed the patient's findings  and my recommendations with RT.  Following patient as in-patient. Further recommendations will be made based on serial examinations.    Medications/Orders for this encounter: No new medications ordered.  No New orders written.        DVT prophylaxis:  No DVT prophylaxis order currently exists.    Discharge Planning: Per primary team    Electronically signed by Luis Medina Jr, MD, 08/02/22, 1:11 PM CDT.

## 2022-08-03 NOTE — PROGRESS NOTES
Adult Nutrition  Assessment/PES    Patient Name:  Teresa Serna  YOB: 1955  MRN: [id number]  Admit Date:  7/27/2022    Assessment Date:  8/3/2022   Reason for Assessment     Row Name 08/03/22 1435          Reason for Assessment    Reason For Assessment follow-up protocol;TF/PN     Diagnosis cardiac disease;diabetes diagnosis/complications;infection/sepsis;pulmonary disease;renal disease     Identified At Risk by Screening Criteria tube feeding or parenteral nutrition                Nutrition/Diet History     Row Name 08/03/22 1435          Nutrition/Diet History    Typical Intake (Food/Fluid/EN/PN) Attempting SBTs. TF tolerated at goal and no residual. Wt 244.4lb. FMS. Mild edema. Continue wound care to coccyx and right lower extremity.     Enteral Nutrition Regimen Diabetisource AC. Final goal rate 60 mL/hr. Free water 40 mL/hr.     Factors Affecting Nutritional Intake enteral/parenteral device(s);respiratory difficulty/therapies                Anthropometrics     Row Name 08/03/22 1437          Anthropometrics    Weight for Calculation 111 kg (244 lb 6.4 oz)                Labs/Tests/Procedures/Meds     Row Name 08/03/22 1436          Labs/Procedures/Meds    Lab Results Reviewed reviewed     Lab Results Comments Glu            Diagnostic Tests/Procedures    Diagnostic Test/Procedure Reviewed reviewed            Medications    Pertinent Medications Reviewed reviewed                Physical Findings     Row Name 08/03/22 1436          Physical Findings    Overall Physical Appearance Vent, wound to coccyx and right lower extremity, mild edema, FMS, OGT.                Estimated/Assessed Needs - Anthropometrics     Row Name 08/03/22 1437          Anthropometrics    Weight for Calculation 111 kg (244 lb 6.4 oz)            Estimated/Assessed Needs    Additional Documentation Fluid Requirements (Group);KCAL/KG (Group);Protein Requirements (Group)            KCAL/KG    KCAL/KG 14 Kcal/Kg (kcal);15  Kcal/Kg (kcal)     14 Kcal/Kg (kcal) 1552.026     15 Kcal/Kg (kcal) 1662.885            Protein Requirements    Weight Used For Protein Calculations 59 kg (130 lb)  IBW     Est Protein Requirement Amount (gms/kg) 1.4 gm protein     Estimated Protein Requirements (gms/day) 82.56            Fluid Requirements    Fluid Requirements (mL/day) 1663     Estimated Fluid Requirement Method RDA Method     RDA Method (mL) 1663                Nutrition Prescription Ordered     Row Name 08/03/22 1437          Nutrition Prescription PO    Current PO Diet NPO            Nutrition Prescription EN    Enteral Route OG     Product Diabetisource AC (Glucerna 1.2)     TF Delivery Method Continuous     Continuous TF Goal Rate (mL/hr) 60 mL/hr     Continuous TF Current Rate (mL/hr) 60 mL/hr     Continuous TF Goal Volume (mL) 1320 mL     Water flush (mL)  40 mL     Water Flush Frequency Per hour                Evaluation of Received Nutrient/Fluid Intake     Row Name 08/03/22 1438          Nutrient/Fluid Evaluation    Number of Days Evaluated 3 days            Calories Evaluation    Total Calorie Target (kcal) 1663     Oral Calories (kcal) 0     Enteral Calories (kcal) 1602     Parenteral Calories (kcal) 0     Other Calories (kcal) 0     Total Calories (kcal) 1602     % of Kcal Needs 96.33            Protein Evaluation    Total Protein Target (g) 83     Oral Protein (g) 0     Enteral Protein (g) 80     Parenteral Protein (g) 0     Other Protein (g) 0     Total Protein (g) 80     % of Protein Needs 96.39            Intake Assessment    Energy/Calorie Requirement Assessment meeting needs     Protein Requirement Assessment meeting needs     Fluid Requirement Assessment exceeds needs     Average Calorie Intake (days) 3     Average Protein Intake (days) 3     Tolerance tolerating            Fluid Intake Evaluation    Total Fluid Target (mL) 1663     Oral Fluid (mL) 0     Enteral Fluid (mL) 2092     Parenteral Fluid (mL) 0     Other Fluid (mL)  1251  IVF     Total Fluid Intake (mL) 3343     % Total Fluid Intake 201.02            PO Evaluation    % PO Intake NPO            EN Evaluation    Number of Days EN Intake Evaluated 3 days     EN Average Volume Delivered (mL/day) 1335 mL/day     % Goal Volume  101 %     TF Residual 0     TF Tolerance Other (comment)  liquid stool in FMS     HOB Greater than or equal to 30 degress                     Problem/Interventions:   Problem 1     Row Name 08/03/22 1439          Nutrition Diagnoses Problem 1    Problem 1 Needs Alternate     Etiology (related to) Medical Diagnosis     Infectious Disease Sepsis     Pulmonary/Critical Care Ventilator     Signs/Symptoms (evidenced by) NPO;PO diet not tolerated;No EN Route Available;EN Intake Delivery     Percent (%) of EN goal 102 %                      Intervention Goal     Row Name 08/03/22 1440          Intervention Goal    General Improved nutrition related lab(s);Meet nutritional needs for age/condition;Reduce/improve symptoms;Disease management/therapy     TF/PN Maintain TF/PN;Tolerate TF at goal;Deliver (%) goal;Deliver estimated need (%)     Deliver % of Goal 75 %     Deliver % of Estimated Need 100 %  sole source NTN at this time     Weight Appropriate weight loss                Nutrition Intervention     Row Name 08/03/22 1440          Nutrition Intervention    RD/Tech Action Care plan reviewd;Follow Tx progress                Nutrition Prescription     Row Name 08/03/22 1440          Nutrition Prescription EN    Enteral Prescription Continue same protocol                Education/Evaluation     Row Name 08/03/22 1440          Education    Education No discharge needs identified at this time            Monitor/Evaluation    Monitor Per protocol                 Electronically signed by:  Juani Cardona RD  08/03/22 14:40 CDT

## 2022-08-03 NOTE — PROGRESS NOTES
Fly Bill M.D.  MINNIE Garcia        Internal Medicine Progress Note    8/3/2022   15:35 CDT    Name:  Teresa Serna  MRN:    [id number]     Acct:     [id number]   Room:  [address] [place] [address] Day: 0     Admit Date: 7/27/2022  1:42 PM  PCP: Teresa Contreras MD    Subjective:     C/C: need for continued vent weaning    Interval History: Status:  stayed the same. Resting in bed. No family at bedside. Tolerating current vent settings (A/C) with 45% 02, unable to wean from vent. No purposeful response to stimulation - withdraws from pain. Patient remains in mitten restraints.  BS remain elevated but stable. Hematuria noted to parsons, nurses state she has been pulling at parsons during the night.     Review of Systems   Unable to perform ROS: intubated         Medications:     Allergies:   Allergies   Allergen Reactions   • Codeine Palpitations and Other (See Comments)     Chest pain   • Exenatide Arrhythmia   • Sulfa Antibiotics Hives and Rash     Reaction: hives   • Albuterol Palpitations     High heart rate   • Sitagliptin Swelling   • Clindamycin/Lincomycin Unknown - High Severity   • Amoxicillin-Pot Clavulanate Palpitations       Current Meds:   Current Facility-Administered Medications:   •  acetaminophen (TYLENOL) tablet 650 mg, 650 mg, Oral, Q4H PRN **OR** acetaminophen (TYLENOL) suppository 650 mg, 650 mg, Rectal, Q4H PRN, Carson Bill MD  •  amiodarone (PACERONE) tablet 100 mg, 100 mg, Oral, Q24H, Carson Bill MD  •  chlorhexidine (PERIDEX) 0.12 % solution 15 mL, 15 mL, Mouth/Throat, Q12H, Carson Bill MD  •  dexmedetomidine (PRECEDEX) 400 mcg in 100 mL NS infusion, 0.2-1.5 mcg/kg/hr, Intravenous, Titrated, Jaimie Hunter APRN  •  dextrose (D50W) (25 g/50 mL) IV injection 25 g, 25 g, Intravenous, Q15 Min PRN, Carson Bill MD  •  dextrose (GLUTOSE) oral gel 15 g, 15 g, Oral, Q15 Min PRN, Carson Bill MD  •  famotidine (PEPCID) tablet  20 mg, 20 mg, Nasogastric, BID, Carson Bill MD  •  furosemide (LASIX) tablet 20 mg, 20 mg, Oral, BID, Betty Merrill, MINNIE  •  glucagon (human recombinant) (GLUCAGEN DIAGNOSTIC) injection 1 mg, 1 mg, Subcutaneous, Q15 Min PRN, Carson Bill MD  •  insulin detemir (LEVEMIR) injection 25 Units, 25 Units, Subcutaneous, Nightly, Betty Merrill, MINNIE  •  Insulin Lispro (humaLOG) injection 0-9 Units, 0-9 Units, Subcutaneous, Q6H, Carson Bill MD  •  levalbuterol (XOPENEX) nebulizer solution 0.63 mg, 0.63 mg, Nebulization, BID PRN, Carson Bill MD  •  levalbuterol (XOPENEX) nebulizer solution 0.63 mg, 0.63 mg, Nebulization, 4x Daily - RT, Jamaal Frye, APRN  •  LORazepam (ATIVAN) tablet 1 mg, 1 mg, Nasogastric, Q4H PRN, Clarissa Metz, APRN  •  magnesium hydroxide (MILK OF MAGNESIA) suspension 10 mL, 10 mL, Oral, Daily PRN, Carson Bill MD  •  metoprolol tartrate (LOPRESSOR) tablet 50 mg, 50 mg, Oral, Q12H, Betty Merrill, APRN  •  Midazolam HCl (PF) (VERSED) injection 2 mg, 2 mg, Intravenous, Q1H PRN, Carson Bill MD  •  norepinephrine (LEVOPHED) 8 mg in 250 mL NS infusion (premix), 0.02-0.3 mcg/kg/min, Intravenous, Titrated, Carson Bill MD  •  ondansetron (ZOFRAN) tablet 4 mg, 4 mg, Oral, Q6H PRN **OR** ondansetron (ZOFRAN) injection 4 mg, 4 mg, Intravenous, Q6H PRN, Carson Bill MD  •  piperacillin-tazobactam (ZOSYN) 4.5 g/100 mL 0.9% NS IVPB (mbp), 4.5 g, Intravenous, Q8H, Carson Bill MD  •  polyethylene glycol (MIRALAX) packet 17 g, 17 g, Oral, Daily, Carson Bill MD  •  potassium chloride (KAYCIEL) 20 mEq/15 mL solution 20 mEq, 20 mEq, Nasogastric, Daily, Carson Bill MD  •  sodium chloride 0.9 % flush 10 mL, 10 mL, Intravenous, Q12H, Carson Bill MD  •  sodium chloride 0.9 % flush 10 mL, 10 mL, Intravenous, PRN, Carson Bill MD  •  vancomycin (VANCOCIN)  "1,000 mg in sodium chloride 0.9 % 250 mL IVPB, 1,000 mg, Intravenous, Q12H, Carson Bill MD    Data:     Code Status:    There are no questions and answers to display.       Family History   Problem Relation Age of Onset   • Cancer Father    • Coronary artery disease Father    • Asthma Father    • Heart failure Mother    • Kidney disease Mother    • Arthritis Mother    • Pancreatitis Sister    • Heart attack Brother    • Colon cancer Neg Hx    • Colon polyps Neg Hx        Social History     Socioeconomic History   • Marital status:    Tobacco Use   • Smoking status: Former Smoker     Years: 20.00     Types: Cigarettes     Quit date: 2000     Years since quittin.5   • Smokeless tobacco: Never Used   Vaping Use   • Vaping Use: Never used   Substance and Sexual Activity   • Alcohol use: No   • Drug use: No   • Sexual activity: Defer       Vitals:  Ht 167.6 cm (66\")   Wt 111 kg (244 lb 6.4 oz)   BMI 39.45 kg/m²     T 99.3 P 96 R 28 /46 Sp02 90% (vent fi02 45%/peep 5)        I/O (24Hr):  No intake or output data in the 24 hours ending 22 1535    Labs and imaging:      Recent Results (from the past 12 hour(s))   POC Glucose Once    Collection Time: 22  5:03 AM    Specimen: Blood   Result Value Ref Range    Glucose 274 (H) 70 - 130 mg/dL   POC Glucose Once    Collection Time: 22 11:19 AM    Specimen: Blood   Result Value Ref Range    Glucose 281 (H) 70 - 130 mg/dL                               Physical Examination:        Physical Exam  Vitals and nursing note reviewed.   Constitutional:       Appearance: Normal appearance. She is obese.      Interventions: She is intubated.   HENT:      Head: Normocephalic and atraumatic.      Right Ear: External ear normal.      Left Ear: External ear normal.      Nose: Nose normal.      Mouth/Throat:      Comments: ETT to vent  OGT  Eyes:      Extraocular Movements: Extraocular movements intact.      Conjunctiva/sclera: Conjunctivae " normal.      Pupils: Pupils are equal, round, and reactive to light.   Cardiovascular:      Rate and Rhythm: Regular rhythm. Tachycardia present.      Pulses: Normal pulses.      Heart sounds: Normal heart sounds.   Pulmonary:      Effort: Pulmonary effort is normal. She is intubated.      Breath sounds: Normal breath sounds.   Abdominal:      General: Bowel sounds are normal.      Palpations: Abdomen is soft.   Musculoskeletal:      Cervical back: Normal range of motion and neck supple.      Comments: Generalized weakness   Skin:     General: Skin is warm and dry.   Neurological:      Comments: Withdraws from pain  Nonverbal due to ETT   Psychiatric:         Mood and Affect: Mood normal.         Behavior: Behavior normal.           Assessment:             Ventilator dependent (HCC)    Acute on chronic respiratory failure with hypoxia and hypercapnia ([facility])    Past Medical History:   Diagnosis Date   • Abnormal stress test    • Atrial flutter ([facility])    • CAD (coronary artery disease)    • CAD in native artery     CABG x 3   • COVID-19 vaccine series completed 2021    [id number]   • Diverticulosis    • Essential hypertension     Tricuspid valve insufficiency, unspecified etiology    • History of adenomatous polyp of colon    • Hyperlipemia, mixed    • Hyperlipidemia    • Hypertension    • IBS (irritable bowel syndrome)    • MI, old    • Mitral valve prolapse    • Near syncope    • Obesity, morbid ([facility])    • Palpitations    • Paroxysmal SVT (supraventricular tachycardia) ([facility])    • Pedal edema    • Precordial pain    • Rheumatoid arthritis ([facility])    • S/P CABG x 3    • Type 2 diabetes mellitus (HCC)    • Venous insufficiency of both lower extremities         Plan:          1. Acute hypoxic respiratory failure  2. DM2 with hyperglycemia  3. Rheumatoid arthritis  4. Dysphagia  5. History of CAD s/p CABG  6. Morbid obesity  7. Anxiety  8. History of retroperitoneal hematoma  9. UTI    Continue current treatment. Monitor  counts. Increase activity. Monitor labs. Vent weaning under direction of pulmonology. Consult ENT for trach placement to help assist with vent weaning. Continue nutrition support via AMONs. Continue IV antibiotics and await final cultures. Glycemic control-increase to medium dose sliding scale. Maintain patient safety. Monitor hematuria if does not clear up, obtain ua.         Electronically signed by MINNIE Pizarro on 8/3/2022 at 15:35 CDT

## 2022-08-03 NOTE — PROGRESS NOTES
PULMONARY AND CRITICAL CARE PROGRESS NOTE - [facility]    Patient: Teresa Eubanks Day    1955   Acct# [id number]  08/03/22   07:14 CDT  Referring Provider: Carson Bill MD     Chief Complaint: Mechanically Ventilated     Interval history: She remains mechanically ventilated, day #16.  Levophed is infusing and RN states titration is per MAP and her MAP was 53 this am. She is in full support this am with ET 29 and sat of 93% on FIo2 of .45. The patient tolerated PSV 12/5  For 3.5 hours yesterday. RN is unsure if she had any Ativan last night or this am. Patient did briefly open eyes when she had a cough spell otherwise did not respond to voice. She has bilateral mitts on due to trying to self extubate. No family at bedside. Plan is for coordinated PEG and Trach per Gen sx and ENT to be determined. Temp 99.3. No overnight events. No new labs or ABGs for review. CXR with diffuse bilateral infiltrate/ edema with some improvement of the right upper lobe, persistent left lower lobe consolidation. RN states she has opened eyes to touch and tried to move RN's hand.     Review of Systems: Review of Systems   Unable to perform ROS: Intubated      Physical Exam:  Vital signs: T:99.3 BP:117/46  P:96   R:28 Sat:93%  Physical Exam  Vitals reviewed. Exam conducted with a chaperone present.   Constitutional:       General: She is not in acute distress.     Appearance: She is ill-appearing. She is not diaphoretic.      Interventions: She is intubated.      Comments: Bilateral hand mitts in place   HENT:      Head: Normocephalic.      Nose: Nose normal.      Mouth/Throat:      Mouth: Mucous membranes are moist.      Comments: OG with tf infusing  Eyes:      General: No scleral icterus.  Cardiovascular:      Rate and Rhythm: Normal rate.   Pulmonary:      Effort: No respiratory distress. She is intubated.      Breath sounds: Rhonchi (Coarse throughout) present. No wheezing.   Abdominal:      General:  There is no distension.   Genitourinary:     Comments: Evans  fms  Musculoskeletal:      Right lower leg: Edema present.      Left lower leg: Edema present.      Comments: scds   Skin:     Coloration: Skin is not pale.      Comments: Bilateral mitts   Neurological:      Comments: Per RN opens eyes to touch, however no response for me this am      Electronically signed by MINNIE Antoine, 8/3/2022, 07:14 CDT      Physician Substantive Portion: Medical Decision Making:    Laboratory Data:  Results from last 7 days   Lab Units 08/01/22  0613 07/28/22  0523   WBC 10*3/mm3 5.98 5.67   HEMOGLOBIN g/dL 9.3* 10.1*   PLATELETS 10*3/mm3 205 128*     Results from last 7 days   Lab Units 08/02/22  1622 08/02/22  0243 08/01/22  1555 08/01/22  0613 07/30/22  0517 07/29/22  1358   SODIUM mmol/L  --  147*  --  144  --  142   POTASSIUM mmol/L 4.2 3.3* 3.5 2.6*  --  3.4*   BUN mg/dL  --  10  --  10  --  8   CREATININE mg/dL  --  0.52*  --  0.51* 0.54* 0.41*     Results from last 7 days   Lab Units 08/02/22  0457 08/01/22  0414 07/31/22  0428   PH, ARTERIAL pH units 7.506* 7.527* 7.470*   PCO2, ARTERIAL mm Hg 42.4 41.2 40.9   PO2 ART mm Hg 63.2* 55.2* 73.2*   FIO2 % 45 35 35     No results found for: BLOODCX, URINECX, WOUNDCX, MRSACX, RESPCX, STOOLCX    Recent films:  XR Chest 1 View    Result Date: 8/2/2022  1. Endotracheal tube is in good position. Lung volumes are stable. 2. Bibasilar atelectasis with bilateral layering pleural effusions, perhaps moderate sized effusion on the right.   This report was finalized on 08/02/2022 07:49 by Dr Rigoberto Padilla, .    My radiograph interpretation/independent review of imaging: devices ok, overall improved from several days ago    Pulmonary Assessment:    1. Acute resp failure requiring vent, with hypoxia, persistent  2. rll pneumonia under tx  3. Intubated, needs tracheostomy  4. Metabolic encephalopathy due to above problems and sedatives  5. Hypotension requiring pressors  stable  6. Metabolic alkalosis stable  7.   Recommend/plan:   · Continue spontaneous breathing trials but it does not appear she will be ready to liberate from vent until after she gets tracheostomy  · Continue bronchodilators.  · Continue abx, day 8 zosyn, vancomycin day 5      This visit was performed by both a physician and an [facility] RN.  I personally evaluated and examined the patient.  I performed all aspects of the medical decision making as documented.  Electronically signed by Bart Estrada MD, 8/3/2022, 17:28 CDT

## 2022-08-03 NOTE — PROGRESS NOTES
[facility] Fall Risk Assessment Note    Name: Teresa Serna  MRN: [id number]  CSN: [id number]  Room: [facility]  Admit Date/Time: 7/27/2022  1:42 PM.    Currently ordered medications associated with an increased risk for fall include:    Scheduled Meds:  amiodarone, 100 mg, Oral, Q24H  furosemide, 20 mg, Oral, BID  insulin detemir, 25 Units, Subcutaneous, Nightly  insulin lispro, 0-9 Units, Subcutaneous, Q6H  metoprolol tartrate, 50 mg, Oral, Q12H  polyethylene glycol, 17 g, Oral, Daily          Continuous Infusions:dexmedetomidine, 0.2-1.5 mcg/kg/hr  norepinephrine, 0.02-0.3 mcg/kg/min        PRN Meds:    •  LORazepam  •  midazolam  •  ondansetron     Dereje Dee, Pharmacy Intern  08/03/22 12:58 CDT

## 2022-08-03 NOTE — PROGRESS NOTES
Stephanie Henry MD Progress Note     LOS: 0 days   Patient Care Team:  Teresa Contreras MD as PCP - General (Family Medicine)  Jagdeep Reyes MD as Cardiologist (Cardiology)        Subjective     She is intubated and sedated.  No new events overnight.  Still on Levophed drip to maintain blood pressure.    Objective     Vital Signs       Intake & Output (last 3 days)     None          Physical Exam:     General Appearance:   Intubated and sedated   Lungs:    Coarse breath sounds bilaterally    Heart:    Regular rhythm and normal rate, normal S1 and S2, no            murmur, no gallop, no rub   Chest Wall:    No abnormalities observed   Abdomen:      Soft nondistended no organomegaly that I can appreciate on exam positive bowel sounds   Extremities:  Slight pedal edema   Results Review:     I reviewed the patient's new clinical results.    Lab Results (last 72 hours)     Procedure Component Value Units Date/Time    POC Glucose Once [[id number]]  (Abnormal) Collected: 08/03/22 0503    Specimen: Blood Updated: 08/03/22 0517     Glucose 274 mg/dL      Comment: : ELISA SmithaMeter ID: [id number]       POC Glucose Once [[id number]]  (Abnormal) Collected: 08/02/22 2311    Specimen: Blood Updated: 08/02/22 2333     Glucose 262 mg/dL      Comment: : ELISA tracey CarlaMeter ID: [id number]       POC Glucose Once [[id number]]  (Abnormal) Collected: 08/02/22 1649    Specimen: Blood Updated: 08/02/22 1731     Glucose 239 mg/dL      Comment: : CHAYO Wrenter ID: [id number]       Potassium [[id number]]  (Normal) Collected: 08/02/22 1622    Specimen: Blood Updated: 08/02/22 1658     Potassium 4.2 mmol/L     Magnesium [[id number]]  (Normal) Collected: 08/02/22 1622    Specimen: Blood Updated: 08/02/22 1658     Magnesium 2.1 mg/dL     Blood Culture With HUMBLE - Blood, Blood, Central Line [[id number]]  (Normal) Collected: 07/30/22 1220    Specimen: Blood, Central Line Updated: 08/02/22 1303      Blood Culture No growth at 3 days    POC Glucose Once [[id number]]  (Abnormal) Collected: 08/02/22 1114    Specimen: Blood Updated: 08/02/22 1137     Glucose 251 mg/dL      Comment: : CHAYO Wrenter ID: [id number]       Blood Culture - Blood, Arm, Left [[id number]]  (Normal) Collected: 07/28/22 1030    Specimen: Blood from Arm, Left Updated: 08/02/22 1103     Blood Culture No growth at 5 days    Blood Culture - Blood, Arm, Right [[id number]]  (Normal) Collected: 07/28/22 1026    Specimen: Blood from Arm, Right Updated: 08/02/22 1103     Blood Culture No growth at 5 days    POC Glucose Once [[id number]]  (Abnormal) Collected: 08/02/22 0712    Specimen: Blood Updated: 08/02/22 0724     Glucose 150 mg/dL      Comment: : ANIKA GodoyaMeter ID: [id number]       POC Glucose Once [[id number]]  (Abnormal) Collected: 08/02/22 0537    Specimen: Blood Updated: 08/02/22 0552     Glucose 228 mg/dL      Comment: : ELISA SmithaMeter ID: [id number]       Blood Gas, Arterial - [[id number]]  (Abnormal) Collected: 08/02/22 0457    Specimen: Arterial Blood Updated: 08/02/22 0452     Site Left Radial     Nigel's Test Positive     pH, Arterial 7.506 pH units      Comment: 83 Value above reference range        pCO2, Arterial 42.4 mm Hg      pO2, Arterial 63.2 mm Hg      Comment: 84 Value below reference range        HCO3, Arterial 33.5 mmol/L      Comment: 83 Value above reference range        Base Excess, Arterial 9.5 mmol/L      Comment: 83 Value above reference range        O2 Saturation, Arterial 91.2 %      Comment: 84 Value below reference range        Temperature 37.0 C      Barometric Pressure for Blood Gas 751 mmHg      Modality Ventilator     FIO2 45 %      Ventilator Mode AC     Set Tidal Volume 420     Set Mech Resp Rate 12.0     PEEP 5.0     Collected by SHERON FRANZ     Comment: Meter: [id number]     :  [id number]        pCO2, Temperature Corrected 42.4 mm Hg       pH, Temp Corrected 7.506 pH Units      pO2, Temperature Corrected 63.2 mm Hg     Basic Metabolic Panel [[id number]]  (Abnormal) Collected: 08/02/22 0243    Specimen: Blood Updated: 08/02/22 0402     Glucose 279 mg/dL      BUN 10 mg/dL      Creatinine 0.52 mg/dL      Sodium 147 mmol/L      Potassium 3.3 mmol/L      Chloride 105 mmol/L      CO2 31.0 mmol/L      Calcium 8.5 mg/dL      BUN/Creatinine Ratio 19.2     Anion Gap 11.0 mmol/L      eGFR 103.3 mL/min/1.73      Comment: National Kidney Foundation and American Society of Nephrology (ASN) Task Force recommended calculation based on the Chronic Kidney Disease Epidemiology Collaboration (CKD-EPI) equation refit without adjustment for race.       Narrative:      GFR Normal >60  Chronic Kidney Disease <60  Kidney Failure <15      POC Glucose Once [[id number]]  (Abnormal) Collected: 08/01/22 2342    Specimen: Blood Updated: 08/01/22 2354     Glucose 286 mg/dL      Comment: : EDENRRMIHAELA SmithaMeter ID: [id number]       Magnesium [[id number]]  (Normal) Collected: 08/01/22 1555    Specimen: Blood Updated: 08/01/22 1909     Magnesium 2.0 mg/dL     Potassium [[id number]]  (Normal) Collected: 08/01/22 1555    Specimen: Blood Updated: 08/01/22 1657     Potassium 3.5 mmol/L      Comment: Slight hemolysis detected by analyzer. Results may be affected.       POC Glucose Once [[id number]]  (Abnormal) Collected: 08/01/22 1619    Specimen: Blood Updated: 08/01/22 1656     Glucose 286 mg/dL      Comment: : CHAYO OconnelleMewill ID: [id number]       POC Glucose Once [[id number]]  (Abnormal) Collected: 08/01/22 1050    Specimen: Blood Updated: 08/01/22 1113     Glucose 287 mg/dL      Comment: : HCAYO OconnelleMewill ID: [id number]       Vancomycin, Trough Please call results to Pharmacy prior to administering next dose [[id number]]  (Normal) Collected: 08/01/22 0854    Specimen: Blood Updated: 08/01/22 0914     Vancomycin Trough 16.60 mcg/mL     Basic  Metabolic Panel [[id number]]  (Abnormal) Collected: 08/01/22 0613    Specimen: Blood Updated: 08/01/22 0717     Glucose 306 mg/dL      BUN 10 mg/dL      Creatinine 0.51 mg/dL      Sodium 144 mmol/L      Potassium 2.6 mmol/L      Chloride 104 mmol/L      CO2 33.0 mmol/L      Calcium 8.6 mg/dL      BUN/Creatinine Ratio 19.6     Anion Gap 7.0 mmol/L      eGFR 103.7 mL/min/1.73      Comment: National Kidney Foundation and American Society of Nephrology (ASN) Task Force recommended calculation based on the Chronic Kidney Disease Epidemiology Collaboration (CKD-EPI) equation refit without adjustment for race.       Narrative:      GFR Normal >60  Chronic Kidney Disease <60  Kidney Failure <15      Manual Differential [[id number]]  (Abnormal) Collected: 08/01/22 0613    Specimen: Blood Updated: 08/01/22 0716     Neutrophil % 78.4 %      Lymphocyte % 16.5 %      Eosinophil % 1.0 %      Bands %  2.1 %      Atypical Lymphocyte % 2.1 %      Neutrophils Absolute 4.81 10*3/mm3      Lymphocytes Absolute 1.11 10*3/mm3      Eosinophils Absolute 0.06 10*3/mm3      Anisocytosis Slight/1+     Macrocytes Slight/1+     Poikilocytes Slight/1+     Polychromasia Mod/2+     Target Cells Slight/1+     WBC Morphology Normal     Platelet Morphology Normal    CBC & Differential [[id number]]  (Abnormal) Collected: 08/01/22 0613    Specimen: Blood Updated: 08/01/22 0716    Narrative:      The following orders were created for panel order CBC & Differential.  Procedure                               Abnormality         Status                     ---------                               -----------         ------                     CBC Auto Differential[[id number]]        Abnormal            Final result                 Please view results for these tests on the individual orders.    CBC Auto Differential [[id number]]  (Abnormal) Collected: 08/01/22 0613    Specimen: Blood Updated: 08/01/22 0716     WBC 5.98 10*3/mm3      RBC 3.11 10*6/mm3       Hemoglobin 9.3 g/dL      Hematocrit 31.0 %      MCV 99.7 fL      MCH 29.9 pg      MCHC 30.0 g/dL      RDW 19.3 %      RDW-SD 67.7 fl      MPV 11.0 fL      Platelets 205 10*3/mm3     Narrative:      The previously reported component NRBC is no longer being reported. Previous result was 0.8 /100 WBC (Reference Range: 0.0-0.2 /100 WBC) on 8/1/2022 at 0654 T.    POC Glucose Once [[id number]]  (Abnormal) Collected: 08/01/22 0511    Specimen: Blood Updated: 08/01/22 0523     Glucose 264 mg/dL      Comment: : MKCHETNA Holguin MelodyMeter ID: [id number]       Blood Gas, Arterial - [[id number]]  (Abnormal) Collected: 08/01/22 0414    Specimen: Arterial Blood Updated: 08/01/22 0408     Site Right Brachial     Nigel's Test N/A     pH, Arterial 7.527 pH units      Comment: 83 Value above reference range        pCO2, Arterial 41.2 mm Hg      pO2, Arterial 55.2 mm Hg      Comment: 84 Value below reference range        HCO3, Arterial 34.2 mmol/L      Comment: 83 Value above reference range        Base Excess, Arterial 10.5 mmol/L      Comment: 83 Value above reference range        O2 Saturation, Arterial 88.6 %      Comment: 84 Value below reference range        Temperature 37.0 C      Barometric Pressure for Blood Gas 751 mmHg      Modality Ventilator     FIO2 35 %      Ventilator Mode AC     Set Tidal Volume 420     Set Mech Resp Rate 12.0     Collected by YOLANDA CLANCY     Comment: Meter: [id number]     :  [id number]        pCO2, Temperature Corrected 41.2 mm Hg      pH, Temp Corrected 7.527 pH Units      pO2, Temperature Corrected 55.2 mm Hg     POC Glucose Once [[id number]]  (Abnormal) Collected: 08/01/22 0021    Specimen: Blood Updated: 08/01/22 0032     Glucose 299 mg/dL      Comment: : ROHAN Ezio MelodyMeter ID: [id number]       POC Glucose Once [[id number]]  (Abnormal) Collected: 07/31/22 1942    Specimen: Blood Updated: 07/31/22 1959     Glucose 282 mg/dL      Comment: : IMAN  Yomi Goldmaneter ID: [id number]       POC Glucose Once [[id number]]  (Abnormal) Collected: 07/31/22 1643    Specimen: Blood Updated: 07/31/22 1655     Glucose 298 mg/dL      Comment: : [email or web address]  (High) MichaelyMeter ID: [id number]       POC Glucose Once [[id number]]  (Abnormal) Collected: 07/31/22 1128    Specimen: Blood Updated: 07/31/22 1139     Glucose 289 mg/dL      Comment: : STAN Rod BrittanyMeter ID: [id number]           Imaging Results (Last 72 Hours)     Procedure Component Value Units Date/Time    XR Chest 1 View [[id number]] Collected: 08/03/22 0729     Updated: 08/03/22 0735    Narrative:      EXAMINATION: XR CHEST 1 VW-     8/3/2022 4:03 AM CDT     HISTORY: Respiratory failure. Ventilator. Initial hospitalization for  abdominal pain and nausea/vomiting related to bowel obstruction. Unable  to wean from ventilator. Recent PICC line replacement.     One view chest x-ray.     Comparison is made with yesterday at 3:21 AM.     Stable heart size.     Persistent left basilar consolidation.     Patchy bilateral infiltrate or edema with slight clearing of the right  upper lobe.     No pneumothorax.     The endotracheal tube, nasogastric tube, and right PICC line remain in  good position.  The endotracheal tube tip lies 48 mm above the luis a.     Summary:  1. Well-positioned lines and tubes.  2. Persistent left lower lobe consolidation.  3. Diffuse bilateral infiltrate or edema with partial clearing of the  right upper lobe.     This report was finalized on 08/03/2022 07:32 by Dr. Chance Lanza MD.    XR Chest 1 View [[id number]] Collected: 08/02/22 0747     Updated: 08/02/22 0752    Narrative:      XR CHEST 1 VW- 8/2/2022 3:45 AM CDT     HISTORY: ventilator     COMPARISON: Chest exam dated 8/1/2022.     FINDINGS:      Endotracheal tube is in place. Stable lung volumes. Bibasilar  atelectasis with bilateral layering pleural effusions, perhaps moderate  in size on the right. No pneumothorax.  Heart size is stable. Pulmonary  vasculature are nondilated. Enteric tube courses below the diaphragm  with tip beyond the field of view. Previous coronary bypass. Right-sided  PICC line with tip projecting over the right atrium.       Impression:      1. Endotracheal tube is in good position. Lung volumes are stable.  2. Bibasilar atelectasis with bilateral layering pleural effusions,  perhaps moderate sized effusion on the right.        This report was finalized on 08/02/2022 07:49 by Dr Rigoberto Padilla, .    XR Chest 1 View [[id number]] Collected: 08/01/22 0822     Updated: 08/01/22 0826    Narrative:      XR CHEST 1 VW- 8/1/2022 3:30 AM CDT     HISTORY: Mechanical ventilation     COMPARISON: 7/31/2022.     FINDINGS:      Endotracheal tube is in place. Tube tip is approximately 2.3 cm above  the luis a. Lung volumes are stable, down to the posterior eighth ribs.  Bilateral small layering pleural effusions. No pneumothorax. Heart size  is stable. Pulmonary vasculature are nondilated. Enteric tube courses  below the diaphragm with tip beyond the field of view. Previous coronary  bypass.        Impression:      1. No significant interval change.        This report was finalized on 08/01/2022 08:23 by Dr Rigoberto Padilla, .              Assessment & Plan       Ventilator dependent (HCC)    Acute on chronic respiratory failure with hypoxia and hypercapnia (HCC)      #1 ventilator dependency with acute on chronic respiratory failure- I reviewed pulmonary's notes.  Defer to vent management to them.  ENT plans for tracheostomy on Friday.  2.  Need for enteral access- again attempted to call sister to get further surgical history.  Unsuccessful.  We will plan G-tube at time of tracheostomy on Friday.  Will attempt to call son although I suspect the sister would have a better surgical history given that the scars look like a very old problem and she was very young.  3.  DVT prophylaxis- continue SCD stockings.  4.  Ulcer  prophylaxis-patient is currently on Pepcid.  5.  Nutrition- maintaining tube feeds at 60 cc an hour.        Stephanie Henry MD  08/03/22  09:18 CDT

## 2022-08-04 NOTE — PROGRESS NOTES
PULMONARY AND CRITICAL CARE PROGRESS NOTE - [facility]    Patient: Teresa Eubanks Day    1955   [id number]  08/04/22   07:21 CDT  Referring Provider: Carson Bill MD     Chief Complaint: Mechanically Ventilated     Interval history: She remains mechanically ventilated, day #17.  Levophed, Zosyn and tube feeds infusing. Plan is for Peg and Trach tomorrow. Zosyn and Vanc completed today. She was tried in PSV twice yesterday however did not tolerate. She remains in full support. She is assisting the vent. ET 32, sat 94% on FIo2 .45 and peep of 5. ABG and CXR reviewed. She had no purposeful movements this am and did not open eyes to voice. No family at bedside. No overnight events.     Review of Systems: Review of Systems   Unable to perform ROS: Intubated      Physical Exam:  Vital signs: T:100.9 BP:91/69  P:99   R:25 Sat:94%  Physical Exam  Vitals reviewed. Exam conducted with a chaperone present.   Constitutional:       General: She is not in acute distress.     Appearance: She is ill-appearing. She is not diaphoretic.      Interventions: She is intubated.      Comments: Bilateral hand mitts in place   HENT:      Head: Normocephalic.      Nose: Nose normal.      Mouth/Throat:      Mouth: Mucous membranes are moist.      Comments: OG with tf infusing  Eyes:      General: No scleral icterus.  Cardiovascular:      Rate and Rhythm: Normal rate.   Pulmonary:      Effort: No respiratory distress. She is intubated.      Breath sounds: Rhonchi (Coarse throughout) present. No wheezing.   Abdominal:      General: There is no distension.   Genitourinary:     Comments: Evans  fms  Musculoskeletal:      Right lower leg: Edema present.      Left lower leg: Edema present.      Comments: scds   Skin:     Coloration: Skin is not pale.      Comments: Bilateral mitts   Neurological:      Comments: No purposeful response      Electronically signed by MINNIE Antoine, 8/4/2022, 07:21 CDT       Physician Substantive Portion: Medical Decision Making:    Laboratory Data:  Results from last 7 days   Lab Units 08/04/22  0319 08/01/22  0613   WBC 10*3/mm3 8.75 5.98   HEMOGLOBIN g/dL 9.6* 9.3*   PLATELETS 10*3/mm3 251 205     Results from last 7 days   Lab Units 08/04/22  0319 08/02/22  1622 08/02/22  0243 08/01/22  1555 08/01/22  0613   SODIUM mmol/L 144  --  147*  --  144   POTASSIUM mmol/L 3.6 4.2 3.3*   < > 2.6*   BUN mg/dL 14  --  10  --  10   CREATININE mg/dL 0.80  --  0.52*  --  0.51*   CRP mg/dL 21.47*  --   --   --   --     < > = values in this interval not displayed.     Results from last 7 days   Lab Units 08/04/22  0442 08/02/22  0457 08/01/22  0414   PH, ARTERIAL pH units 7.500* 7.506* 7.527*   PCO2, ARTERIAL mm Hg 40.9 42.4 41.2   PO2 ART mm Hg 107.0 63.2* 55.2*   FIO2 % 45 45 35     No results found for: BLOODCX, URINECX, WOUNDCX, MRSACX, RESPCX, STOOLCX    Recent films:  XR Chest 1 View    Result Date: 8/4/2022  Impression: 1. No significant interval change. 2. Endotracheal tube is in good position. Lung volumes are stable. 3. Stable left lower lobe consolidation, likely atelectasis.  This report was finalized on 08/04/2022 07:13 by Dr Rigoberto Padilla, .    My radiograph interpretation/independent review of imaging: ett ok, mild bibasilar infiltrates, appear better than prior days    Pulmonary Assessment:    1. Acute resp failure with hypoxia, unable to liberate from vent, repeatedly failing spontaneous breathing trials.  2. Metabolic alkalosis stable  3. Anemia stable  4. Elevated crp  5. Diabetes under tx    Recommend/plan:   · Await tracheostomy  · No vent changes today  · Complete vancomycin and discontinue    This visit was performed by both a physician and an [facility] RN.  I personally evaluated and examined the patient.  I performed all aspects of the medical decision making as documented.  Electronically signed by Bart Estrada MD, 8/4/2022, 10:43 CDT

## 2022-08-04 NOTE — PROGRESS NOTES
Stephanie Henry MD Progress Note     LOS: 0 days   Patient Care Team:  Teresa Contreras MD as PCP - General (Family Medicine)  Jagdeep Reyes MD as Cardiologist (Cardiology)        Subjective     Chief complaint: Need for enteral access, inability to swallow    Interval history.  No new complaints.  Remains intubated and sedated.    Objective     Vital Signs       Intake & Output (last 3 days)     None          Physical Exam:     General Appearance:   Intubated, sedated   Lungs:     respirations regular, even and unlabored    Heart:    Regular rhythm and normal rate, normal S1 and S2, no            murmur, no gallop, no rub   Chest Wall:    No abnormalities observed   Abdomen:      Obese nondistended good bowel sounds   Extremities: No edema,    Results Review:     I reviewed the patient's new clinical results.    Lab Results (last 72 hours)     Procedure Component Value Units Date/Time    Blood Culture With HUMBLE - Blood, Blood, Central Line [[id number]]  (Normal) Collected: 07/30/22 1220    Specimen: Blood, Central Line Updated: 08/04/22 1305     Blood Culture No growth at 5 days    Urinalysis With Culture If Indicated - Urine, Clean Catch [[id number]]  (Abnormal) Collected: 08/04/22 1129    Specimen: Urine, Clean Catch Updated: 08/04/22 1147     Color, UA --     Comment: pink        Appearance, UA Cloudy     pH, UA 5.5     Specific Gravity, UA 1.010     Glucose, UA Negative     Ketones, UA Negative     Bilirubin, UA Negative     Blood, UA Large (3+)     Protein,  mg/dL (2+)     Leuk Esterase, UA Moderate (2+)     Nitrite, UA Negative     Urobilinogen, UA 0.2 E.U./dL    Narrative:      In absence of clinical symptoms, the presence of pyuria, bacteria, and/or nitrites on the urinalysis result does not correlate with infection.    Urinalysis, Microscopic Only - Urine, Clean Catch [[id number]]  (Abnormal) Collected: 08/04/22 1129    Specimen: Urine, Clean Catch Updated: 08/04/22 1147     RBC, UA Too Numerous to  Count /HPF      WBC, UA 21-30 /HPF      Bacteria, UA Trace /HPF      Squamous Epithelial Cells, UA None Seen /HPF      Hyaline Casts, UA None Seen /LPF      Methodology Automated Microscopy    Urine Culture - Urine, Urine, Clean Catch [[id number]] Collected: 08/04/22 1129    Specimen: Urine, Clean Catch Updated: 08/04/22 1147    POC Glucose Once [[id number]]  (Abnormal) Collected: 08/04/22 1123    Specimen: Blood Updated: 08/04/22 1134     Glucose 264 mg/dL      Comment: : EMELINA Collins VictoriaMeter ID: [id number]       POC Glucose Once [[id number]]  (Abnormal) Collected: 08/04/22 0514    Specimen: Blood Updated: 08/04/22 0527     Glucose 305 mg/dL      Comment: : LEISA tracey CarlaMeter ID: [id number]       Sedimentation Rate [[id number]]  (Abnormal) Collected: 08/04/22 0319    Specimen: Blood Updated: 08/04/22 0448     Sed Rate 87 mm/hr     Manual Differential [[id number]]  (Abnormal) Collected: 08/04/22 0319    Specimen: Blood Updated: 08/04/22 0437     Neutrophil % 74.7 %      Lymphocyte % 16.2 %      Monocyte % 3.0 %      Eosinophil % 1.0 %      Bands %  4.0 %      Atypical Lymphocyte % 1.0 %      Neutrophils Absolute 6.89 10*3/mm3      Lymphocytes Absolute 1.51 10*3/mm3      Monocytes Absolute 0.26 10*3/mm3      Eosinophils Absolute 0.09 10*3/mm3      Anisocytosis Slight/1+     Macrocytes Slight/1+     Ovalocytes Slight/1+     Poikilocytes Slight/1+     Polychromasia Mod/2+     Stomatocytes Slight/1+     WBC Morphology Normal     Clumped Platelets Present    Blood Gas, Arterial - [[id number]]  (Abnormal) Collected: 08/04/22 0442    Specimen: Arterial Blood Updated: 08/04/22 0435     Site Left Radial     Nigel's Test Positive     pH, Arterial 7.500 pH units      Comment: 83 Value above reference range        pCO2, Arterial 40.9 mm Hg      pO2, Arterial 107.0 mm Hg      HCO3, Arterial 31.9 mmol/L      Comment: 83 Value above reference range        Base Excess, Arterial 8.0 mmol/L      Comment:  83 Value above reference range        O2 Saturation, Arterial 96.1 %      Temperature 37.0 C      Barometric Pressure for Blood Gas 749 mmHg      Modality Ventilator     FIO2 45 %      Ventilator Mode AC     Set Tidal Volume 420     Set Mech Resp Rate 12.0     PEEP 5.0     Collected by MARY SALAZAR RRT     Comment: Meter: [id number]     :  [id number]        pCO2, Temperature Corrected 40.9 mm Hg      pH, Temp Corrected 7.500 pH Units      pO2, Temperature Corrected 107 mm Hg     Comprehensive Metabolic Panel [[id number]]  (Abnormal) Collected: 08/04/22 0319    Specimen: Blood Updated: 08/04/22 0412     Glucose 307 mg/dL      BUN 14 mg/dL      Creatinine 0.80 mg/dL      Sodium 144 mmol/L      Potassium 3.6 mmol/L      Comment: Slight hemolysis detected by analyzer. Results may be affected.        Chloride 104 mmol/L      CO2 31.0 mmol/L      Calcium 8.6 mg/dL      Total Protein 5.1 g/dL      Albumin 2.20 g/dL      ALT (SGPT) 17 U/L      AST (SGOT) 38 U/L      Alkaline Phosphatase 207 U/L      Total Bilirubin 1.3 mg/dL      Globulin 2.9 gm/dL      A/G Ratio 0.8 g/dL      BUN/Creatinine Ratio 17.5     Anion Gap 9.0 mmol/L      eGFR 81.9 mL/min/1.73      Comment: National Kidney Foundation and American Society of Nephrology (ASN) Task Force recommended calculation based on the Chronic Kidney Disease Epidemiology Collaboration (CKD-EPI) equation refit without adjustment for race.       Narrative:      GFR Normal >60  Chronic Kidney Disease <60  Kidney Failure <15      C-reactive Protein [[id number]]  (Abnormal) Collected: 08/04/22 0319    Specimen: Blood Updated: 08/04/22 0408     C-Reactive Protein 21.47 mg/dL     CBC & Differential [[id number]]  (Abnormal) Collected: 08/04/22 0319    Specimen: Blood Updated: 08/04/22 9757    Narrative:      The following orders were created for panel order CBC & Differential.  Procedure                               Abnormality         Status                     ---------                                -----------         ------                     CBC Auto Differential[[id number]]        Abnormal            Final result                 Please view results for these tests on the individual orders.    CBC Auto Differential [[id number]]  (Abnormal) Collected: 08/04/22 0319    Specimen: Blood Updated: 08/04/22 0353     WBC 8.75 10*3/mm3      RBC 3.16 10*6/mm3      Hemoglobin 9.6 g/dL      Hematocrit 31.9 %      .9 fL      MCH 30.4 pg      MCHC 30.1 g/dL      RDW 19.5 %      RDW-SD 69.7 fl      MPV 10.6 fL      Platelets 251 10*3/mm3      nRBC 1.5 /100 WBC     POC Glucose Once [[id number]]  (Abnormal) Collected: 08/04/22 0013    Specimen: Blood Updated: 08/04/22 0024     Glucose 282 mg/dL      Comment: : ELISA kyung CarlaMeter ID: [id number]       POC Glucose Once [[id number]]  (Abnormal) Collected: 08/03/22 1623    Specimen: Blood Updated: 08/03/22 1635     Glucose 253 mg/dL      Comment: : STAN Rod BrittanyMeter ID: [id number]       POC Glucose Once [[id number]]  (Abnormal) Collected: 08/03/22 1119    Specimen: Blood Updated: 08/03/22 1130     Glucose 281 mg/dL      Comment: : EMELINA VenegasMeter ID: [id number]       POC Glucose Once [[id number]]  (Abnormal) Collected: 08/03/22 0503    Specimen: Blood Updated: 08/03/22 0517     Glucose 274 mg/dL      Comment: : ELISA kyung CarlaMeter ID: [id number]       POC Glucose Once [[id number]]  (Abnormal) Collected: 08/02/22 2311    Specimen: Blood Updated: 08/02/22 2333     Glucose 262 mg/dL      Comment: : [email or web address] kyung CarlaMeter ID: [id number]       POC Glucose Once [[id number]]  (Abnormal) Collected: 08/02/22 1649    Specimen: Blood Updated: 08/02/22 1731     Glucose 239 mg/dL      Comment: : CHAYO OconnelleMeter ID: [id number]       Potassium [[id number]]  (Normal) Collected: 08/02/22 1622    Specimen: Blood Updated: 08/02/22 1658     Potassium 4.2 mmol/L      Magnesium [[id number]]  (Normal) Collected: 08/02/22 1622    Specimen: Blood Updated: 08/02/22 1658     Magnesium 2.1 mg/dL     POC Glucose Once [[id number]]  (Abnormal) Collected: 08/02/22 1114    Specimen: Blood Updated: 08/02/22 1137     Glucose 251 mg/dL      Comment: : CHAYO Wrenter ID: [id number]       Blood Culture - Blood, Arm, Left [[id number]]  (Normal) Collected: 07/28/22 1030    Specimen: Blood from Arm, Left Updated: 08/02/22 1103     Blood Culture No growth at 5 days    Blood Culture - Blood, Arm, Right [[id number]]  (Normal) Collected: 07/28/22 1026    Specimen: Blood from Arm, Right Updated: 08/02/22 1103     Blood Culture No growth at 5 days    POC Glucose Once [[id number]]  (Abnormal) Collected: 08/02/22 0712    Specimen: Blood Updated: 08/02/22 0724     Glucose 150 mg/dL      Comment: : ANIKA GodoyaMeter ID: [id number]       POC Glucose Once [[id number]]  (Abnormal) Collected: 08/02/22 0537    Specimen: Blood Updated: 08/02/22 0552     Glucose 228 mg/dL      Comment: : ELISA SmithaMeter ID: [id number]       Blood Gas, Arterial - [[id number]]  (Abnormal) Collected: 08/02/22 0457    Specimen: Arterial Blood Updated: 08/02/22 0452     Site Left Radial     Nigel's Test Positive     pH, Arterial 7.506 pH units      Comment: 83 Value above reference range        pCO2, Arterial 42.4 mm Hg      pO2, Arterial 63.2 mm Hg      Comment: 84 Value below reference range        HCO3, Arterial 33.5 mmol/L      Comment: 83 Value above reference range        Base Excess, Arterial 9.5 mmol/L      Comment: 83 Value above reference range        O2 Saturation, Arterial 91.2 %      Comment: 84 Value below reference range        Temperature 37.0 C      Barometric Pressure for Blood Gas 751 mmHg      Modality Ventilator     FIO2 45 %      Ventilator Mode AC     Set Tidal Volume 420     Set Mech Resp Rate 12.0     PEEP 5.0     Collected by SHERON FRANZ     Comment:  Meter: [id number]     :  [id number]        pCO2, Temperature Corrected 42.4 mm Hg      pH, Temp Corrected 7.506 pH Units      pO2, Temperature Corrected 63.2 mm Hg     Basic Metabolic Panel [[id number]]  (Abnormal) Collected: 08/02/22 0243    Specimen: Blood Updated: 08/02/22 0402     Glucose 279 mg/dL      BUN 10 mg/dL      Creatinine 0.52 mg/dL      Sodium 147 mmol/L      Potassium 3.3 mmol/L      Chloride 105 mmol/L      CO2 31.0 mmol/L      Calcium 8.5 mg/dL      BUN/Creatinine Ratio 19.2     Anion Gap 11.0 mmol/L      eGFR 103.3 mL/min/1.73      Comment: National Kidney Foundation and American Society of Nephrology (ASN) Task Force recommended calculation based on the Chronic Kidney Disease Epidemiology Collaboration (CKD-EPI) equation refit without adjustment for race.       Narrative:      GFR Normal >60  Chronic Kidney Disease <60  Kidney Failure <15      POC Glucose Once [[id number]]  (Abnormal) Collected: 08/01/22 2342    Specimen: Blood Updated: 08/01/22 2354     Glucose 286 mg/dL      Comment: : [email or web address] kyung CarlaMeter ID: [id number]       Magnesium [[id number]]  (Normal) Collected: 08/01/22 1555    Specimen: Blood Updated: 08/01/22 1909     Magnesium 2.0 mg/dL     Potassium [[id number]]  (Normal) Collected: 08/01/22 1555    Specimen: Blood Updated: 08/01/22 1657     Potassium 3.5 mmol/L      Comment: Slight hemolysis detected by analyzer. Results may be affected.       POC Glucose Once [[id number]]  (Abnormal) Collected: 08/01/22 1619    Specimen: Blood Updated: 08/01/22 1656     Glucose 286 mg/dL      Comment: : CHAYO OconnelleMeter ID: [id number]           Imaging Results (Last 72 Hours)     Procedure Component Value Units Date/Time    XR Chest 1 View [[id number]] Resulted: 08/04/22 1518     Updated: 08/04/22 1518    XR Chest 1 View [[id number]] Collected: 08/04/22 0711     Updated: 08/04/22 0716    Narrative:      Frontal supine radiograph of the chest 8/4/2022 3:20 AM  CDT     History: ventilator     Comparison: Chest exam dated 8/3/2022      Findings:   Lines and tubes are stable in position. Reidentified a left lower lobe  consolidation. Lung volumes are stable. The cardiomediastinal silhouette  and pulmonary vascularity are unchanged.  No acute osseous or soft  tissue abnormality is noted. Right-sided PICC line is in good position.       Impression:      Impression:   1. No significant interval change.  2. Endotracheal tube is in good position. Lung volumes are stable.  3. Stable left lower lobe consolidation, likely atelectasis.      This report was finalized on 08/04/2022 07:13 by Dr Rigoberto Padilla, .    XR Chest 1 View [[id number]] Collected: 08/03/22 0729     Updated: 08/03/22 0735    Narrative:      EXAMINATION: XR CHEST 1 VW-     8/3/2022 4:03 AM CDT     HISTORY: Respiratory failure. Ventilator. Initial hospitalization for  abdominal pain and nausea/vomiting related to bowel obstruction. Unable  to wean from ventilator. Recent PICC line replacement.     One view chest x-ray.     Comparison is made with yesterday at 3:21 AM.     Stable heart size.     Persistent left basilar consolidation.     Patchy bilateral infiltrate or edema with slight clearing of the right  upper lobe.     No pneumothorax.     The endotracheal tube, nasogastric tube, and right PICC line remain in  good position.  The endotracheal tube tip lies 48 mm above the luis a.     Summary:  1. Well-positioned lines and tubes.  2. Persistent left lower lobe consolidation.  3. Diffuse bilateral infiltrate or edema with partial clearing of the  right upper lobe.     This report was finalized on 08/03/2022 07:32 by Dr. Chance Lanza MD.    XR Chest 1 View [[id number]] Collected: 08/02/22 0747     Updated: 08/02/22 0752    Narrative:      XR CHEST 1 VW- 8/2/2022 3:45 AM CDT     HISTORY: ventilator     COMPARISON: Chest exam dated 8/1/2022.     FINDINGS:      Endotracheal tube is in place. Stable lung volumes.  Bibasilar  atelectasis with bilateral layering pleural effusions, perhaps moderate  in size on the right. No pneumothorax. Heart size is stable. Pulmonary  vasculature are nondilated. Enteric tube courses below the diaphragm  with tip beyond the field of view. Previous coronary bypass. Right-sided  PICC line with tip projecting over the right atrium.       Impression:      1. Endotracheal tube is in good position. Lung volumes are stable.  2. Bibasilar atelectasis with bilateral layering pleural effusions,  perhaps moderate sized effusion on the right.        This report was finalized on 08/02/2022 07:49 by Dr Rigoberto Padilla, .              Assessment & Plan       Acute respiratory failure with hypoxia (HCC)    Ventilator dependent (HCC)    Acute on chronic respiratory failure with hypoxia and hypercapnia (HCC)      I called the patient's sister to get more history and explained the operation to her.  She states that her only abdominal surgery that she is aware of is her gallbladder which must of been through an open upper midline incision and her appendix which is probably from her right paramedian incision.  She states that she did have some unknown problem when she was very very young and they gave her a small chance of survival.  She does not remember if she had surgery at that time.  I discussed gastrostomy tube placement at the time of the tracheostomy.  The risks of bleeding infection injury to the stomach liver or other structures all were discussed.  The possibility of scarring from her prior surgeries or hepatomegaly or obesity adding to the level of difficulty operation also was discussed with her.  She understands and all questions have been answered.    I will order to hold tube feeds tonight and IV antibiotics in preparation for surgery    Stephanie Henry MD  08/04/22  15:48 CDT

## 2022-08-04 NOTE — PROGRESS NOTES
[facility] Otolaryngology Head and Neck Surgery  INPATIENT PROGRESS NOTE    Patient Name: Teresa Serna  : 1955   MRN: [id number]  Date of Admission: 2022  Today's Date: 22  Length of Stay: 0  592/1    Carson Bill MD   Primary Care Physician: Teresa Contreras MD  Surgical Procedures Since Admission:    Subjective   Subjective   Chief Complaint: Airway management  HPI   Accompanied by: RT  Since last examined, Teresa Serna has remained on mechanical ventilation, endotracheal tube in position.  She is unable to give any history.  RT reports patient has remained stable on the ventilator.  Patient is seen, chart is reviewed    Review of Systems   No change from prior inquiry  All pertinent negatives and positives are as above. All other systems have been reviewed and are negative unless otherwise stated.   Objective   Objective   Vitals:        Physical Exam  Constitutional:       General: She is not in acute distress.     Appearance: She is well-developed. She is obese. She is not ill-appearing.      Interventions: She is sedated and intubated.      Comments: Lying in bed, mechanical ventilation, orally intubated  Grimaces to stimulation   HENT:      Head: Normocephalic and atraumatic.      Right Ear: External ear normal.      Left Ear: External ear normal.      Nose: Nose normal.      Mouth/Throat:      Lips: Pink.      Mouth: Mucous membranes are dry.      Dentition: Normal dentition.      Comments: Orally intubated  Eyes:      General: Lids are normal.      Conjunctiva/sclera: Conjunctivae normal.   Neck:      Comments: Neck-very short, very thick  Pulmonary:      Effort: No tachypnea, respiratory distress or retractions. She is intubated.      Comments: Mechanical ventilation, orally intubated  Musculoskeletal:      Cervical back: No crepitus. No pain with movement. Decreased range of motion.   Lymphadenopathy:      Cervical: No cervical adenopathy.    Neurological:      Mental Status: She is unresponsive.   Psychiatric:      Comments: Not evaluated           Result Review    Result Review:  I have personally reviewed the results from the time of this admission to 8/4/2022 08:31 CDT and agree with these findings:  []  Laboratory  []  Microbiology  []  Radiology  []  EKG/Telemetry   []  Cardiology/Vascular   []  Pathology  [x]  Old records  []  Other:  Most notable findings include: No labs pertinent to ENT this morning  Progress Notes by Bart Estrada MD (08/03/2022 07:14)   Progress Notes by Clarissa Metz APRN (08/04/2022 07:41)       Assessment & Plan   Assessment / Plan   Brief Patient Summary:  Teresa Serna is a 66 y.o. female who remains on mechanical ventilation, orally intubated.  She is now proceeding to tracheostomy placement.  I recommend tracheostomy placement at this point time.  She will receive a gastrostomy tube at the same time.    Active Hospital Problems:   Active Hospital Problems    Diagnosis    • Ventilator dependent (HCC)    • Acute on chronic respiratory failure with hypoxia and hypercapnia (HCC)      Plan:   • Airway management-the patient has remained intubated for an extended period.  She is going to proceed to tracheostomy placement tomorrow.  • Nutrition-the patient is to have a gastrostomy tube.  Per primary team  • Respiratory failure-patient remains on mechanical ventilation.  She is followed by the pulmonary service.  • Metabolic encephalopathy-Per primary team  Discussed Plan with RT  Following patient as in-patient. Further recommendations will be made based on serial examinations.    Medications/Orders for this encounter: No new medications ordered.  Orders-  Preop orders written for consent and trach placement tomorrow     Discharge Planning: Per primary team        DVT prophylaxis:  No DVT prophylaxis order currently exists.     Electronically signed by Luis Medina Jr, MD, 08/04/22, 8:31 AM CDT.

## 2022-08-04 NOTE — PROGRESS NOTES
Fly Bill M.D.  MINNIE Garcia        Internal Medicine Progress Note    8/4/2022   07:42 CDT    Name:  Teresa Serna  MRN:    [id number]     Acct:     [id number]   Room:  [address] [place] [address] Day: 0     Admit Date: 7/27/2022  1:42 PM  PCP: Teresa Contreras MD    Subjective:     C/C: need for continued vent weaning    Interval History: Status:  stayed the same. Resting in bed. No family at bedside. Tolerating current vent settings (A/C) with 45% 02, unable to wean from vent. No purposeful response to stimulation - withdraws from pain. Patient remains in mitten restraints. BS elevated. Low grade temp.  UA ordered this am, staff report continued hematuria and possible spasms due to leaking around parsons.     Review of Systems   Unable to perform ROS: intubated         Medications:     Allergies:   Allergies   Allergen Reactions   • Codeine Palpitations and Other (See Comments)     Chest pain   • Exenatide Arrhythmia   • Sulfa Antibiotics Hives and Rash     Reaction: hives   • Albuterol Palpitations     High heart rate   • Sitagliptin Swelling   • Clindamycin/Lincomycin Unknown - High Severity   • Amoxicillin-Pot Clavulanate Palpitations       Current Meds:   Current Facility-Administered Medications:   •  acetaminophen (TYLENOL) tablet 650 mg, 650 mg, Oral, Q4H PRN **OR** acetaminophen (TYLENOL) suppository 650 mg, 650 mg, Rectal, Q4H PRN, Carson Bill MD  •  amiodarone (PACERONE) tablet 100 mg, 100 mg, Oral, Q24H, Carson Bill MD  •  chlorhexidine (PERIDEX) 0.12 % solution 15 mL, 15 mL, Mouth/Throat, Q12H, Carson Bill MD  •  dexmedetomidine (PRECEDEX) 400 mcg in 100 mL NS infusion, 0.2-1.5 mcg/kg/hr, Intravenous, Titrated, Jaimie Hunter APRN  •  dextrose (D50W) (25 g/50 mL) IV injection 25 g, 25 g, Intravenous, Q15 Min PRN, Carson Bill MD  •  dextrose (GLUTOSE) oral gel 15 g, 15 g, Oral, Q15 Min PRN, Carson Bill MD  •  famotidine  (PEPCID) tablet 20 mg, 20 mg, Nasogastric, BID, Carson Bill MD  •  furosemide (LASIX) tablet 20 mg, 20 mg, Oral, BID, Betty Merrill, MINNIE  •  glucagon (human recombinant) (GLUCAGEN DIAGNOSTIC) injection 1 mg, 1 mg, Subcutaneous, Q15 Min PRN, Carson Bill MD  •  insulin detemir (LEVEMIR) injection 25 Units, 25 Units, Subcutaneous, Nightly, Betty Merrill, MINNIE  •  Insulin Lispro (humaLOG) injection 0-9 Units, 0-9 Units, Subcutaneous, Q6H, Carson Bill MD  •  levalbuterol (XOPENEX) nebulizer solution 0.63 mg, 0.63 mg, Nebulization, BID PRN, Carson Bill MD  •  levalbuterol (XOPENEX) nebulizer solution 0.63 mg, 0.63 mg, Nebulization, 4x Daily - RT, Jamaal Frye, APRN  •  LORazepam (ATIVAN) tablet 1 mg, 1 mg, Nasogastric, Q4H PRN, Clarissa Metz, APRN  •  magnesium hydroxide (MILK OF MAGNESIA) suspension 10 mL, 10 mL, Oral, Daily PRN, Carson Bill MD  •  metoprolol tartrate (LOPRESSOR) tablet 50 mg, 50 mg, Oral, Q12H, Betty Merrill, APRN  •  Midazolam HCl (PF) (VERSED) injection 2 mg, 2 mg, Intravenous, Q1H PRN, Carson Bill MD  •  norepinephrine (LEVOPHED) 8 mg in 250 mL NS infusion (premix), 0.02-0.3 mcg/kg/min, Intravenous, Titrated, Carson Bill MD  •  ondansetron (ZOFRAN) tablet 4 mg, 4 mg, Oral, Q6H PRN **OR** ondansetron (ZOFRAN) injection 4 mg, 4 mg, Intravenous, Q6H PRN, Carson Bill MD  •  piperacillin-tazobactam (ZOSYN) 4.5 g/100 mL 0.9% NS IVPB (mbp), 4.5 g, Intravenous, Q8H, Carson Bill MD  •  polyethylene glycol (MIRALAX) packet 17 g, 17 g, Oral, Daily, Carson Bill MD  •  potassium chloride (KAYCIEL) 20 mEq/15 mL solution 20 mEq, 20 mEq, Nasogastric, Daily, Carson Bill MD  •  sodium chloride 0.9 % flush 10 mL, 10 mL, Intravenous, Q12H, Carson Bill MD  •  sodium chloride 0.9 % flush 10 mL, 10 mL, Intravenous, PRN, Carson Bill MD  •   "vancomycin (VANCOCIN) 1,000 mg in sodium chloride 0.9 % 250 mL IVPB, 1,000 mg, Intravenous, Q12H, Carson Bill MD    Data:     Code Status:    There are no questions and answers to display.       Family History   Problem Relation Age of Onset   • Cancer Father    • Coronary artery disease Father    • Asthma Father    • Heart failure Mother    • Kidney disease Mother    • Arthritis Mother    • Pancreatitis Sister    • Heart attack Brother    • Colon cancer Neg Hx    • Colon polyps Neg Hx        Social History     Socioeconomic History   • Marital status:    Tobacco Use   • Smoking status: Former Smoker     Years: 20.00     Types: Cigarettes     Quit date: 2000     Years since quittin.5   • Smokeless tobacco: Never Used   Vaping Use   • Vaping Use: Never used   Substance and Sexual Activity   • Alcohol use: No   • Drug use: No   • Sexual activity: Defer       Vitals:  Ht 167.6 cm (66\")   Wt 111 kg (244 lb 6.4 oz)   BMI 39.45 kg/m²     T 100.9 P 103 R 19 /57 Sp02 96% (vent fi02 45%/peep 5)        I/O (24Hr):  No intake or output data in the 24 hours ending 22 0742    Labs and imaging:      Recent Results (from the past 12 hour(s))   POC Glucose Once    Collection Time: 22 12:13 AM    Specimen: Blood   Result Value Ref Range    Glucose 282 (H) 70 - 130 mg/dL   Comprehensive Metabolic Panel    Collection Time: 22  3:19 AM    Specimen: Blood   Result Value Ref Range    Glucose 307 (H) 65 - 99 mg/dL    BUN 14 8 - 23 mg/dL    Creatinine 0.80 0.57 - 1.00 mg/dL    Sodium 144 136 - 145 mmol/L    Potassium 3.6 3.5 - 5.2 mmol/L    Chloride 104 98 - 107 mmol/L    CO2 31.0 (H) 22.0 - 29.0 mmol/L    Calcium 8.6 8.6 - 10.5 mg/dL    Total Protein 5.1 (L) 6.0 - 8.5 g/dL    Albumin 2.20 (L) 3.50 - 5.20 g/dL    ALT (SGPT) 17 1 - 33 U/L    AST (SGOT) 38 (H) 1 - 32 U/L    Alkaline Phosphatase 207 (H) 39 - 117 U/L    Total Bilirubin 1.3 (H) 0.0 - 1.2 mg/dL    Globulin 2.9 gm/dL    A/G " Ratio 0.8 g/dL    BUN/Creatinine Ratio 17.5 7.0 - 25.0    Anion Gap 9.0 5.0 - 15.0 mmol/L    eGFR 81.9 >60.0 mL/min/1.73   Sedimentation Rate    Collection Time: 08/04/22  3:19 AM    Specimen: Blood   Result Value Ref Range    Sed Rate 87 (H) 0 - 30 mm/hr   C-reactive Protein    Collection Time: 08/04/22  3:19 AM    Specimen: Blood   Result Value Ref Range    C-Reactive Protein 21.47 (H) 0.00 - 0.50 mg/dL   CBC Auto Differential    Collection Time: 08/04/22  3:19 AM    Specimen: Blood   Result Value Ref Range    WBC 8.75 3.40 - 10.80 10*3/mm3    RBC 3.16 (L) 3.77 - 5.28 10*6/mm3    Hemoglobin 9.6 (L) 12.0 - 15.9 g/dL    Hematocrit 31.9 (L) 34.0 - 46.6 %    .9 (H) 79.0 - 97.0 fL    MCH 30.4 26.6 - 33.0 pg    MCHC 30.1 (L) 31.5 - 35.7 g/dL    RDW 19.5 (H) 12.3 - 15.4 %    RDW-SD 69.7 (H) 37.0 - 54.0 fl    MPV 10.6 6.0 - 12.0 fL    Platelets 251 140 - 450 10*3/mm3    nRBC 1.5 (H) 0.0 - 0.2 /100 WBC   Manual Differential    Collection Time: 08/04/22  3:19 AM    Specimen: Blood   Result Value Ref Range    Neutrophil % 74.7 42.7 - 76.0 %    Lymphocyte % 16.2 (L) 19.6 - 45.3 %    Monocyte % 3.0 (L) 5.0 - 12.0 %    Eosinophil % 1.0 0.3 - 6.2 %    Bands %  4.0 0.0 - 5.0 %    Atypical Lymphocyte % 1.0 0.0 - 5.0 %    Neutrophils Absolute 6.89 1.70 - 7.00 10*3/mm3    Lymphocytes Absolute 1.51 0.70 - 3.10 10*3/mm3    Monocytes Absolute 0.26 0.10 - 0.90 10*3/mm3    Eosinophils Absolute 0.09 0.00 - 0.40 10*3/mm3    Anisocytosis Slight/1+ None Seen    Macrocytes Slight/1+ None Seen    Ovalocytes Slight/1+ None Seen    Poikilocytes Slight/1+ None Seen    Polychromasia Mod/2+ None Seen    Stomatocytes Slight/1+ None Seen    WBC Morphology Normal Normal    Clumped Platelets Present None Seen   Blood Gas, Arterial -    Collection Time: 08/04/22  4:42 AM    Specimen: Arterial Blood   Result Value Ref Range    Site Left Radial     Nigel's Test Positive     pH, Arterial 7.500 (H) 7.350 - 7.450 pH units    pCO2, Arterial 40.9 35.0 -  45.0 mm Hg    pO2, Arterial 107.0 83.0 - 108.0 mm Hg    HCO3, Arterial 31.9 (H) 20.0 - 26.0 mmol/L    Base Excess, Arterial 8.0 (H) 0.0 - 2.0 mmol/L    O2 Saturation, Arterial 96.1 94.0 - 99.0 %    Temperature 37.0 C    Barometric Pressure for Blood Gas 749 mmHg    Modality Ventilator     FIO2 45 %    Ventilator Mode AC     Set Tidal Volume 420     Set Mech Resp Rate 12.0     PEEP 5.0     Collected by MARY SALAZAR RRT     pCO2, Temperature Corrected 40.9 35 - 45 mm Hg    pH, Temp Corrected 7.500 (H) 7.350 - 7.450 pH Units    pO2, Temperature Corrected 107 83 - 108 mm Hg   POC Glucose Once    Collection Time: 08/04/22  5:14 AM    Specimen: Blood   Result Value Ref Range    Glucose 305 (H) 70 - 130 mg/dL                               Physical Examination:        Physical Exam  Vitals and nursing note reviewed.   Constitutional:       Appearance: Normal appearance. She is obese.      Interventions: She is intubated.   HENT:      Head: Normocephalic and atraumatic.      Right Ear: External ear normal.      Left Ear: External ear normal.      Nose: Nose normal.      Mouth/Throat:      Comments: ETT to vent  OGT  Eyes:      Extraocular Movements: Extraocular movements intact.      Conjunctiva/sclera: Conjunctivae normal.      Pupils: Pupils are equal, round, and reactive to light.   Cardiovascular:      Rate and Rhythm: Regular rhythm. Tachycardia present.      Pulses: Normal pulses.      Heart sounds: Normal heart sounds.   Pulmonary:      Effort: Pulmonary effort is normal. She is intubated.      Breath sounds: Normal breath sounds.   Abdominal:      General: Bowel sounds are normal.      Palpations: Abdomen is soft.   Musculoskeletal:      Cervical back: Normal range of motion and neck supple.      Comments: Generalized weakness   Skin:     General: Skin is warm and dry.   Neurological:      Comments: Withdraws from pain  Nonverbal due to ETT   Psychiatric:         Mood and Affect: Mood normal.         Behavior:  Behavior normal.           Assessment:             Ventilator dependent (HCC)    Acute on chronic respiratory failure with hypoxia and hypercapnia (HCC)    Past Medical History:   Diagnosis Date   • Abnormal stress test    • Atrial flutter (HCC)    • CAD (coronary artery disease)    • CAD in native artery     CABG x 3   • COVID-19 vaccine series completed 2021 [id number]   • Diverticulosis    • Essential hypertension     Tricuspid valve insufficiency, unspecified etiology    • History of adenomatous polyp of colon    • Hyperlipemia, mixed    • Hyperlipidemia    • Hypertension    • IBS (irritable bowel syndrome)    • MI, old    • Mitral valve prolapse    • Near syncope    • Obesity, morbid (HCC)    • Palpitations    • Paroxysmal SVT (supraventricular tachycardia) ([facility])    • Pedal edema    • Precordial pain    • Rheumatoid arthritis (HCC)    • S/P CABG x 3    • Type 2 diabetes mellitus (HCC)    • Venous insufficiency of both lower extremities         Plan:          1. Acute hypoxic respiratory failure  2. DM2 with hyperglycemia  3. Rheumatoid arthritis  4. Dysphagia  5. History of CAD s/p CABG  6. Morbid obesity  7. Anxiety  8. History of retroperitoneal hematoma  9. UTI    Continue current treatment. Monitor counts. Increase activity. Monitor labs. Vent weaning under direction of pulmonology. Consult ENT for trach placement to help assist with vent weaning. Continue nutrition support via AMONs. Continue IV antibiotics and await final cultures. Glycemic control-increase to medium dose sliding scale. Maintain patient safety. UA, Rocephin.         Electronically signed by MINNIE Pizarro on 8/4/2022 at 07:42 CDT

## 2022-08-05 NOTE — PROGRESS NOTES
[facility]  BISHOP Bill M.D.  MINNIE Garcia        Internal Medicine Progress Note    8/5/2022   14:17 CDT    Name:  Teresa Serna  MRN:    [id number]     Acct:     [id number]   Room:  [address] [place] [address] Day: 0     Admit Date: 7/27/2022  1:42 PM  PCP: Teresa Contreras MD    Subjective:     C/C: need for continued vent weaning    Interval History: Status:  stayed the same. Resting in bed. No family at bedside. Afebrile.  Tolerating current vent settings (A/C) with 45% 02, unable to wean from vent. No purposeful response to stimulation - withdraws from pain. Plan is to take patient to OR for Trach and Peg today. Urinalysis reviewed. Discussed with pharmacy, will monitor temperature curve and other symptoms before starting antibiotics.     Review of Systems   Unable to perform ROS: intubated         Medications:     Allergies:   Allergies   Allergen Reactions   • Codeine Palpitations and Other (See Comments)     Chest pain   • Exenatide Arrhythmia   • Sulfa Antibiotics Hives and Rash     Reaction: hives   • Albuterol Palpitations     High heart rate   • Sitagliptin Swelling   • Clindamycin/Lincomycin Unknown - High Severity   • Amoxicillin-Pot Clavulanate Palpitations       Current Meds:   Current Facility-Administered Medications:   •  acetaminophen (TYLENOL) tablet 650 mg, 650 mg, Oral, Q4H PRN **OR** acetaminophen (TYLENOL) suppository 650 mg, 650 mg, Rectal, Q4H PRN, Stephanie Henry MD  •  amiodarone (PACERONE) tablet 100 mg, 100 mg, Oral, Q24H, Stephanie Henry MD  •  chlorhexidine (PERIDEX) 0.12 % solution 15 mL, 15 mL, Mouth/Throat, Q12H, Stephanie Henry MD  •  dexmedetomidine (PRECEDEX) 400 mcg in 100 mL NS infusion, 0.2-1.5 mcg/kg/hr, Intravenous, Titrated, Stephanie Henry MD  •  dextrose (D50W) (25 g/50 mL) IV injection 25 g, 25 g, Intravenous, Q15 Min PRN, Stephanie Henry MD  •  dextrose (GLUTOSE) oral gel 15 g, 15 g, Oral, Q15 Min PRN, Stephanie Henry MD  •  famotidine (PEPCID) tablet  20 mg, 20 mg, Nasogastric, BID, Stephanie Henry MD  •  furosemide (LASIX) tablet 20 mg, 20 mg, Oral, BID, Stephanie Henry MD  •  glucagon (human recombinant) (GLUCAGEN DIAGNOSTIC) injection 1 mg, 1 mg, Subcutaneous, Q15 Min PRN, Stephanie Henry MD  •  insulin detemir (LEVEMIR) injection 25 Units, 25 Units, Subcutaneous, Nightly, Stephanie Henry MD  •  Insulin Lispro (humaLOG) injection 0-9 Units, 0-9 Units, Subcutaneous, Q6H, Stephanie Henry MD  •  lactated ringers infusion, 100 mL/hr, Intravenous, Continuous, Stephanie Henry MD, Last Rate: 100 mL/hr at 08/05/22 1037, Currently Infusing at 08/05/22 1037  •  levalbuterol (XOPENEX) nebulizer solution 0.63 mg, 0.63 mg, Nebulization, BID PRN, Stephanie Henry MD  •  levalbuterol (XOPENEX) nebulizer solution 0.63 mg, 0.63 mg, Nebulization, 4x Daily - RT, Stephanie Henry MD  •  magnesium hydroxide (MILK OF MAGNESIA) suspension 10 mL, 10 mL, Oral, Daily PRN, Stephanie Henry MD  •  metoprolol tartrate (LOPRESSOR) tablet 50 mg, 50 mg, Oral, Q12H, Stephanie Henry MD  •  Midazolam HCl (PF) (VERSED) injection 2 mg, 2 mg, Intravenous, Q1H PRN, Stephanie Henry MD  •  norepinephrine (LEVOPHED) 8 mg in 250 mL NS infusion (premix), 0.02-0.3 mcg/kg/min, Intravenous, Titrated, Stephanie Henry MD, Last Rate: 17.25 mL/hr at 08/05/22 1226, 0.08 mcg/kg/min at 08/05/22 1226  •  ondansetron (ZOFRAN) tablet 4 mg, 4 mg, Oral, Q6H PRN **OR** ondansetron (ZOFRAN) injection 4 mg, 4 mg, Intravenous, Q6H PRN, Stephanie Henry MD  •  polyethylene glycol (MIRALAX) packet 17 g, 17 g, Oral, Daily, Stephanie Henry MD  •  potassium chloride (KAYCIEL) 20 mEq/15 mL solution 20 mEq, 20 mEq, Nasogastric, Daily, Stephanie Henry MD  •  sodium chloride 0.9 % flush 10 mL, 10 mL, Intravenous, Q12H, Stephanie Henry MD  •  sodium chloride 0.9 % flush 10 mL, 10 mL, Intravenous, PRN, Stephanie Henry MD    Data:     Code Status:    There are no questions and answers to display.       Family History  "  Problem Relation Age of Onset   • Cancer Father    • Coronary artery disease Father    • Asthma Father    • Heart failure Mother    • Kidney disease Mother    • Arthritis Mother    • Pancreatitis Sister    • Heart attack Brother    • Colon cancer Neg Hx    • Colon polyps Neg Hx        Social History     Socioeconomic History   • Marital status:    Tobacco Use   • Smoking status: Former Smoker     Years: 20.00     Types: Cigarettes     Quit date: 2000     Years since quittin.5   • Smokeless tobacco: Never Used   Vaping Use   • Vaping Use: Never used   Substance and Sexual Activity   • Alcohol use: No   • Drug use: No   • Sexual activity: Defer       Vitals:  /53   Pulse 100   Temp 98.1 °F (36.7 °C) (Temporal)   Resp 12   Ht 167.6 cm (66\")   Wt 111 kg (244 lb 6.4 oz)   SpO2 95%   BMI 39.45 kg/m²     T 97.7 P 97 R 20 /51 Sp02 96% (vent fi02 45%/peep 5)        I/O (24Hr):  No intake or output data in the 24 hours ending 22 1417    Labs and imaging:      Recent Results (from the past 12 hour(s))   POC Glucose Once    Collection Time: 22  5:12 AM    Specimen: Blood   Result Value Ref Range    Glucose 150 (H) 70 - 130 mg/dL   Blood Gas, Arterial -    Collection Time: 22  5:46 AM    Specimen: Arterial Blood   Result Value Ref Range    Site Right Radial     Nigel's Test N/A     pH, Arterial 7.497 (H) 7.350 - 7.450 pH units    pCO2, Arterial 43.1 35.0 - 45.0 mm Hg    pO2, Arterial 63.7 (L) 83.0 - 108.0 mm Hg    HCO3, Arterial 33.3 (H) 20.0 - 26.0 mmol/L    Base Excess, Arterial 9.1 (H) 0.0 - 2.0 mmol/L    O2 Saturation, Arterial 91.2 (L) 94.0 - 99.0 %    Temperature 37.0 C    Barometric Pressure for Blood Gas 751 mmHg    Modality Ventilator     FIO2 45 %    Ventilator Mode AC     Set Tidal Volume 420     Set Mech Resp Rate 12.0     PEEP 5.0     Collected by MARY TURNBOW RRT     pCO2, Temperature Corrected 43.1 35 - 45 mm Hg    pH, Temp Corrected 7.497 (H) 7.350 - 7.450 " pH Units    pO2, Temperature Corrected 63.7 (L) 83 - 108 mm Hg   COVID-19,[facility],Nasal Swab No Transport Media 3-4 HR TAT - Swab, Nasal Cavity    Collection Time: 08/05/22  5:50 AM    Specimen: Nasal Cavity; Swab   Result Value Ref Range    COVID19 Not Detected Not Detected - Ref. Range   POC Glucose Once    Collection Time: 08/05/22 12:34 PM    Specimen: Blood   Result Value Ref Range    Glucose 146 (H) 70 - 130 mg/dL                               Physical Examination:        Physical Exam  Vitals and nursing note reviewed.   Constitutional:       Appearance: Normal appearance. She is obese.      Interventions: She is intubated.   HENT:      Head: Normocephalic and atraumatic.      Right Ear: External ear normal.      Left Ear: External ear normal.      Nose: Nose normal.      Mouth/Throat:      Comments: ETT to vent  OGT  Eyes:      Extraocular Movements: Extraocular movements intact.      Conjunctiva/sclera: Conjunctivae normal.      Pupils: Pupils are equal, round, and reactive to light.   Cardiovascular:      Rate and Rhythm: Regular rhythm. Tachycardia present.      Pulses: Normal pulses.      Heart sounds: Normal heart sounds.   Pulmonary:      Effort: Pulmonary effort is normal. She is intubated.      Breath sounds: Normal breath sounds.   Abdominal:      General: Bowel sounds are normal.      Palpations: Abdomen is soft.   Musculoskeletal:      Cervical back: Normal range of motion and neck supple.      Comments: Generalized weakness   Skin:     General: Skin is warm and dry.   Neurological:      Comments: Withdraws from pain  Nonverbal due to ETT   Psychiatric:         Mood and Affect: Mood normal.         Behavior: Behavior normal.           Assessment:             Acute respiratory failure with hypoxia (HCC)    Ventilator dependent (HCC)    Acute on chronic respiratory failure with hypoxia and hypercapnia (HCC)    Past Medical History:   Diagnosis Date   • Abnormal stress test    • Atrial  flutter (HCC)    • CAD (coronary artery disease)    • CAD in native artery     CABG x 3   • COVID-19 vaccine series completed 2021    [id number]   • Diverticulosis    • Essential hypertension     Tricuspid valve insufficiency, unspecified etiology    • History of adenomatous polyp of colon    • Hyperlipemia, mixed    • Hyperlipidemia    • Hypertension    • IBS (irritable bowel syndrome)    • MI, old    • Mitral valve prolapse    • Near syncope    • Obesity, morbid ([facility])    • Palpitations    • Paroxysmal SVT (supraventricular tachycardia) ([facility])    • Pedal edema    • Precordial pain    • Rheumatoid arthritis (HCC)    • S/P CABG x 3    • Type 2 diabetes mellitus (HCC)    • Venous insufficiency of both lower extremities         Plan:          1. Acute hypoxic respiratory failure  2. DM2 with hyperglycemia  3. Rheumatoid arthritis  4. Dysphagia  5. History of CAD s/p CABG  6. Morbid obesity  7. Anxiety  8. History of retroperitoneal hematoma  9. UTI    Continue current treatment. Monitor counts. Increase activity. Monitor labs. Vent weaning under direction of pulmonology. Consult ENT for trach placement to help assist with vent weaning. Continue nutrition support via AMONs. Continue IV antibiotics and await final cultures. Glycemic control-increase to medium dose sliding scale. Maintain patient safety. Trach and peg today.         Electronically signed by MINNIE Pizarro on 8/5/2022 at 14:17 CDT

## 2022-08-05 NOTE — PROGRESS NOTES
PULMONARY AND CRITICAL CARE PROGRESS NOTE - [facility]    Patient: Teresa Eubanks Day    1955   Acct# [id number]  08/05/22   07:10 CDT  Referring Provider: Carson Bill MD     Chief Complaint: Mechanically Ventilated     Interval history: She remains mechanically ventilated, day #18. Plan is for Peg and Trach today. Zosyn and Vanc completed yesterday. She remains in full support. She is assisting the vent. ET 34, sat 94% on FIo2 .45 and peep of 5. ABG and CXR reviewed.  No overnight events.     Review of Systems: Review of Systems   Unable to perform ROS: Intubated      Physical Exam:  Vital signs: T:97.7 BP:125/51  P:97   R:20 Sat:95%  Physical Exam  Vitals reviewed. Exam conducted with a chaperone present.   Constitutional:       General: She is not in acute distress.     Appearance: She is ill-appearing. She is not diaphoretic.      Interventions: She is intubated.      Comments: Bilateral hand mitts in place   HENT:      Head: Normocephalic.      Nose: Nose normal.      Mouth/Throat:      Mouth: Mucous membranes are moist.      Comments: OG with tf infusing  Eyes:      General: No scleral icterus.  Cardiovascular:      Rate and Rhythm: Normal rate.   Pulmonary:      Effort: No respiratory distress. She is intubated.      Breath sounds: Rhonchi (Coarse throughout) present. No wheezing.   Abdominal:      General: There is no distension.   Genitourinary:     Comments: Evans  fms  Musculoskeletal:      Right lower leg: Edema present.      Left lower leg: Edema present.      Comments: scds   Skin:     Coloration: Skin is not pale.      Comments: Bilateral mitts   Neurological:      Comments: No purposeful response      Electronically signed by MINNIE Antoine, 8/5/2022, 07:10 CDT      Physician Substantive Portion: Medical Decision Making:    Laboratory Data:  Results from last 7 days   Lab Units 08/04/22  0319 08/01/22  0613   WBC 10*3/mm3 8.75 5.98   HEMOGLOBIN g/dL 9.6*  9.3*   PLATELETS 10*3/mm3 251 205     Results from last 7 days   Lab Units 08/04/22  0319 08/02/22  1622 08/02/22  0243 08/01/22  1555 08/01/22  0613   SODIUM mmol/L 144  --  147*  --  144   POTASSIUM mmol/L 3.6 4.2 3.3*   < > 2.6*   BUN mg/dL 14  --  10  --  10   CREATININE mg/dL 0.80  --  0.52*  --  0.51*   CRP mg/dL 21.47*  --   --   --   --     < > = values in this interval not displayed.     Results from last 7 days   Lab Units 08/05/22  0546 08/04/22  0442 08/02/22  0457   PH, ARTERIAL pH units 7.497* 7.500* 7.506*   PCO2, ARTERIAL mm Hg 43.1 40.9 42.4   PO2 ART mm Hg 63.7* 107.0 63.2*   FIO2 % 45 45 45     No results found for: BLOODCX, URINECX, WOUNDCX, MRSACX, RESPCX, STOOLCX    Recent films:  XR Chest 1 View    Result Date: 8/4/2022  Satisfactory position of the endotracheal tube. Interval removal of the NG tube. There is increased pulmonary edema with small bilateral pleural effusions. No pneumothorax is identified. The right-sided PICC remains in satisfactory position. This report was finalized on 08/04/2022 17:16 by Dr. Robert Jiang MD.    XR Chest 1 View    Result Date: 8/4/2022  Impression: 1. No significant interval change. 2. Endotracheal tube is in good position. Lung volumes are stable. 3. Stable left lower lobe consolidation, likely atelectasis.  This report was finalized on 08/04/2022 07:13 by Dr Rigoberto Padilla, .    My radiograph interpretation/independent review of imaging: improved infiltrates, ett ok    Pulmonary Assessment:    1. Acute resp failure with hypoxia, stable, unable to liberate from vent  2. Persistent need for mechanical ventilation; tracheostomy pending  3. Metabolic alkalosis stable  4. Anemia stable    Recommend/plan:   · Tracheostomy today  · Minimize sedation afterward  · Mobilize as tolerated  · Monitor hgb    This visit was performed by both a physician and an Advanced Practice RN.  I personally evaluated and examined the patient.  I performed all aspects of the medical  decision making as documented.  Electronically signed by Bart Estrada MD, 8/5/2022, 09:37 CDT

## 2022-08-05 NOTE — OP NOTE
[facility] Otolaryngology Head and Neck Surgery  OPERATIVE NOTE  Teresa Serna  8/5/2022    Pre-op Diagnosis:   Ventilator dependence (HCC) [Z99.11]  Respiratory failure, acute and chronic (HCC) [J96.20]    Post-op Diagnosis:     Post-Op Diagnosis Codes:     * Ventilator dependence (HCC) [Z99.11]     * Respiratory failure, acute and chronic (HCC) [J96.20]    Procedure/CPT® Codes:      Procedure(s):  tracheostomy  laryngoscopy  GASTROSTOMY FEEDING TUBE INSERTION    Surgeon(s):  Luis Medina Jr., MD Tyrrell, Dana R, MD    Anesthesia:   General    Staff:   Circulator: Dustin Medeiros RN  Scrub Person: Chintan Hightower; Kiarra oNrton; Day De Anda    Estimated Blood Loss:   5 mL    Specimens:   none      Drains:   none    Findings:  Neck extremely short, extremely thick  Trachea deep in the neck, very friable anterior cartilage  Thyroid isthmus-thick, hypertrophied  Nasopharynx-clear to palpation  Oral cavity-narrow maxillary arch, and intact dentition, very dry oral mucosa, no oral mucosal lesions, no tongue lesions, tonsillar arch intact  Oropharynx-lateral pharyngeal wall redundancy, posterior pharyngeal wall thickening and hyperemic  Hypopharynx-lateral wall redundancy, posterior wall erosion where the ET tube was sitting  Supraglottis-epiglottis floppy and red, aryepiglottic folds red, edematous, thickened; arytenoids hydropic and enlarged, floppy  False vocal folds very red and thickened  True vocal folds mid cord erosion, edema  Subglottis not well-visualized    Complications:   Patient required reintubation from above because of ruptured endotracheal tube balloon    Assistants:  none    Implants:  Tracheostomy tube- Shiley DIC #8 Legacy    Time Out::  A time out was performed to confirm the patient, procedure and laterality.    Reason for the Operation: Teresa Serna is a 66 y.o. female who has had a history of respiratory failure, prolonged intubation, ventilator  dependence.  Preoperative discussion was carried out. Risks, benefits, options for therapy and complications were explained in clear and simple language.    Procedure Description:  The patient was taken back to the operating room, positioned on the operating table and placed under satisfactory anesthesia by the anesthesia staff.      Injections- Lidocaine 1%, Epinephrine 1:100k 4 mL  Approach- Transcervical, Transoral     Tracheostomy:  The head was extended and positioned.  The suprasternal area was injected.  A horizontal incision was made approximately 2 fingerbreadths above the sternal notch.  The dissection was continued through the deeper tissues.  A cervical lipectomy was carried out to form skin flaps.  The Strap muscles were identified, divided vertically in the midline and retracted laterally.  The thyroid isthmus was identified and divided.  The trachea was identified and cleaned.  An inverted U shaped  (Roberta) flap was developed between the 2nd and 3rd tracheal rings.  The tracheal flap was sutured to the inferior skin flap with 0 Ethibond suture.  The endotracheal tube was withdrawn above the level of the stoma.  The trach tube was inserted into the lumen of the trachea under direct visualization.  Position was confirmed with auscultation, O2 and CO2 monitoring.  The flange of the trachea was sutured to the skin with 0 Ethibond in all 4 corners.  A sponge was placed beneath the flange.  Velcro trach tube love was placed.  This part of the procedure was terminated.     Direct Laryngoscopy:  The teeth were protected.  The head was positioned and extended.  The oral cavity. oropharynx, hypopharynx and nasopharynx were palpated.  The laryngoscope was inserted down to the true vocal cords and into the pyriform recesses.  The upper aerodigestive tract was carefully inspected with the findings noted above.  The scope was then removed.    The operative site was inspected for retained foreign bodies and  instruments.   Sponge/needle count was Correct  Hemostasis was satisfactory.  The patient was then turned over to the anesthesia team and allowed to wake from anesthesia.     Disposition: The patient was transported to PACU in Critical condition.     Postoperative discussion held with: No one present at end of surgery    Luis Medina Jr, MD  8/5/2022  12:21 CDT

## 2022-08-05 NOTE — OP NOTE
GASTROSTOMY FEEDING TUBE INSERTION  Procedure Note    Teresa Eubanks Day  8/5/2022    Pre-op Diagnosis:   Ventilator dependence (HCC) [Z99.11]  Respiratory failure, acute and chronic (HCC) [J96.20]    Post-op Diagnosis:     Same    Procedure/CPT® Codes:      Procedure(s):  tracheostomy  laryngoscopy  GASTROSTOMY FEEDING TUBE INSERTION    Surgeon(s):  Luis Medina Jr., MD Tyrrell, Dana R, MD    Anesthesia: General    Staff:   Circulator: Dustin Medeiros RN  Scrub Person: Chintan Hightower; Kiarra Norton; Day De Anda    Estimated Blood Loss: Minimal    Specimens:                None      Drains:   Gastrostomy/Enterostomy Gastrostomy 1 22 Fr. RUQ (Active)       Urethral Catheter (Active)   Site Assessment Clean 08/05/22 1014   Collection Container Standard drainage bag 08/05/22 1014   Securement Method Securing device 08/05/22 1014         Complications: None      The patient was brought to the operating room and placed in the supine position.  After induction of general anesthesia and infusion of IV antibiotics, the patient was prepped and draped in the usual sterile fashion.  Upper midline incision was made and the abdomen entered.  There was a fair amount of adhesions in the upper midline from prior surgery.  The stomach is also was stuck to the undersurface of the left lateral lobe of the liver.  This need to be dissected free.  She did have pretty significant fatty infiltration of the liver..  There was a small rent noted in the left lateral lobe of the liver from retraction.  I placed a sheet of Surgicel in that area.  No significant bleeding was noted at the end of the operation.  The stomach was grasped with a Reyno and introduced into the operative field.  2 pursestring sutures of 2-0 silk were placed.  A 22 Serbian PRAFUL gastrostomy tube was passed through a left upper quadrant incision and introduced into the field.  It is passed through gastrotomy and the purse string sutures tied.   Four-quadrant sutures from the anterior abdominal wall to the anterior gastric wall were placed.  The balloon was inflated and the stomach and G-tube was placed up to the abdominal wall.  The sutures were tied.  Abdomen was inspected.  Hemostasis was adequate.  The wound was closed with interrupted 0 Vicryl.  The wound irrigated and skin was reapproximated with interrupted 3-0 subcutaneous and running 4-0 Vicryl subcuticular sutures.  The G-tube was secured to the abdominal wall with 2-0 silk.  Dressings were placed patient was awakened and transferred to the recovery room in stable condition having tolerated the procedure well.  At the end of the procedure all counts were correct.      Stephanie Henry MD     Date: 8/5/2022  Time: 11:59 CDT

## 2022-08-05 NOTE — ANESTHESIA PREPROCEDURE EVALUATION
Anesthesia Evaluation     Patient summary reviewed   no history of anesthetic complications:  NPO Solid Status: > 8 hours  NPO Liquid Status: > 8 hours           Airway   Comment: ETT 7.5 at 23 cm  Dental - normal exam     Pulmonary    (+) a smoker Former,     ROS comment: Ventilator dependence  Respiratory failure  Cardiovascular     ECG reviewed    (+) hypertension, valvular problems/murmurs, past MI , CAD, CABG, cardiac stents dysrhythmias (s/p cardioversion) Paroxysmal Atrial Fib, hyperlipidemia,     ROS comment: Levophed 0.6    Echo:  ·Left ventricular ejection fraction appears to be 51 - 55%. Left ventricular systolic function is low normal.  ·Left ventricular diastolic function was normal.  ·Left atrial volume is mildly increased.  ·The right atrial cavity is severely dilated.  ·Moderate to severe tricuspid valve regurgitation is present.  ·The right ventricular cavity is moderate to severely dilated.  ·Moderately reduced right ventricular systolic function noted.  ·Estimated right ventricular systolic pressure from tricuspid regurgitation is normal (<35 mmHg).        Neuro/Psych- negative ROS  (-) seizures, TIA, CVA  GI/Hepatic/Renal/Endo    (+) obesity, morbid obesity,  diabetes mellitus type 2 using insulin,   (-) liver disease, no renal disease    Musculoskeletal     Abdominal    Substance History      OB/GYN          Other   arthritis, autoimmune disease rheumatoid arthritis,      ROS/Med Hx Other: Retroperitoneal hematoma                    Anesthesia Plan    ASA 4     general     (Chart reviewed and report given by bedside RN. Previously obtained family consent per LTACH RN prior to arrival for procedure and anesthesia )  intravenous induction

## 2022-08-05 NOTE — ANESTHESIA POSTPROCEDURE EVALUATION
"Patient: Teresa Serna    Procedure Summary     Date: 08/05/22 Room / Location:  [facility] OR 06 /  [facility] OR    Anesthesia Start: 1037 Anesthesia Stop: 1232    Procedures:       tracheostomy (N/A Neck)      laryngoscopy (N/A Throat)      GASTROSTOMY FEEDING TUBE INSERTION (N/A Abdomen) Diagnosis:       Ventilator dependence (HCC)      Respiratory failure, acute and chronic (HCC)      (Ventilator dependence (HCC) [Z99.11])      (Respiratory failure, acute and chronic (HCC) [J96.20])    Surgeons: Luis Medina Jr., MD; Stephanie Henry MD Provider: Yomi Guaman CRNA    Anesthesia Type: general ASA Status: 4          Anesthesia Type: general    Vitals  Vitals Value Taken Time   /52 08/05/22 1252   Temp 98.1 °F (36.7 °C) 08/05/22 1226   Pulse 100 08/05/22 1250   Resp 12 08/05/22 1240   SpO2 95 % 08/05/22 1250   Vitals shown include unvalidated device data.        Post Anesthesia Care and Evaluation    Patient location during evaluation: PACU  Patient participation: complete - patient participated  Level of consciousness: awake and alert  Pain management: adequate    Airway patency: patent  Anesthetic complications: No anesthetic complications  PONV Status: none  Cardiovascular status: acceptable and hemodynamically stable  Respiratory status: acceptable  Hydration status: acceptable    Comments: Blood pressure 127/53, pulse 100, temperature 98.1 °F (36.7 °C), temperature source Temporal, resp. rate 12, height 167.6 cm (66\"), weight 111 kg (244 lb 6.4 oz), SpO2 95 %, not currently breastfeeding.    Patient discharged from PACU based upon Parker score. Please see RN notes for further details      "

## 2022-08-06 PROBLEM — Z43.0 ACUTE TRACHEOSTOMY MANAGEMENT (HCC): Status: ACTIVE | Noted: 2022-01-01

## 2022-08-06 NOTE — PROGRESS NOTES
[facility] Otolaryngology Head and Neck Surgery  INPATIENT PROGRESS NOTE    Patient Name: Teresa Serna  : 1955   MRN: [id number]  Date of Admission: 2022  Today's Date: 22  Length of Stay: 0  592/1    Carson Bill MD   Primary Care Physician: Teresa Contreras MD  Surgical Procedures Since Admission:  Procedure(s):  tracheostomy  laryngoscopy  GASTROSTOMY FEEDING TUBE INSERTION  Surgeon:  Luis Medina Jr., MD  Status:  1 Day Post-Op  -------------------    Subjective   Subjective   Chief Complaint: Tracheostomy management  HPI   Accompanied by: RT  Since last examined, Teresa Serna has remained on mechanical ventilation, trach in position.  She tolerated surgery yesterday at which time she had G-tube placement tracheostomy.  She is grimacing but not vocalizing.  Yesterday, I discussed patient's health history with her family.  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  This may account for her abnormal tracheal findings at surgery.  RT reports that the patient has a stable trach in position.  She remains on mechanical ventilation.  Patient seen, chart is reviewed    Review of Systems   No change from prior inquiry  All pertinent negatives and positives are as above. All other systems have been reviewed and are negative unless otherwise stated.   Objective   Objective   Vitals:   Temp:  [98.1 °F (36.7 °C)] 98.1 °F (36.7 °C)  Heart Rate:  [] 100  Resp:  [12] 12  BP: (121-132)/(50-53) 127/53  Output by Drain (mL) 22 0701 - 22 1900 22 1901 - 22 0700 22 0701 - 22 1103 Range Total   Requested LDAs do not have output data documented.       Physical Exam  Constitutional:       General: She is not in acute distress.     Appearance: She is well-developed. She is obese. She is not ill-appearing.      Interventions: She is sedated and intubated.      Comments: Lying in bed,  mechanical ventilation, trach in position  Grimaces to stimulation   HENT:      Head: Normocephalic and atraumatic.      Right Ear: External ear normal.      Left Ear: External ear normal.      Nose: Nose normal.      Mouth/Throat:      Lips: Pink.      Mouth: Mucous membranes are dry.      Dentition: Normal dentition.      Comments: Orally intubated  Eyes:      General: Lids are normal.      Conjunctiva/sclera: Conjunctivae normal.   Neck:      Trachea: Tracheostomy present.      Comments: Neck-very short, very thick    TRACHEOSTOMY SITE:    Tracheostomy tube type: Shiley #8 cuffed DIC Legacy    Stoma: Fresh    Voice: Not evaluated  Date placed: 8/5/2022  Date last changed: Never    Pulmonary:      Effort: No tachypnea, respiratory distress or retractions. She is intubated.      Comments: Mechanical ventilation, trach in position  Musculoskeletal:      Cervical back: No crepitus. No pain with movement. Decreased range of motion.   Lymphadenopathy:      Cervical: No cervical adenopathy.   Neurological:      Mental Status: She is lethargic.      Comments: Grimaces   Psychiatric:      Comments: Not evaluated           Result Review    Result Review:  I have personally reviewed the results from the time of this admission to 8/6/2022 11:03 CDT and agree with these findings:  [x]  Laboratory  []  Microbiology  [x]  Radiology  []  EKG/Telemetry   []  Cardiology/Vascular   []  Pathology  [x]  Old records  []  Other:  Most notable findings include: Alkalosis, hypoxia  Chest x-ray reviewed, agree with radiology interpretation  Progress Notes by Bethany Chin APRN (08/06/2022 08:42)   Blood Gas, Arterial - (08/06/2022 04:31)   XR Chest 1 View (08/05/2022 12:44)       Assessment & Plan   Assessment / Plan   Brief Patient Summary:  Teresa Serna is a 66 y.o. female who remains on mechanical ventilation, trach in position.  She has a fresh trach.  She has a stable trach at this point in time.  I am concerned about the  anterior tracheal wall, which was grossly abnormal upon placement of the trach.  I will follow this closely.  I will continue routine trach care.    Active Hospital Problems:   Active Hospital Problems    Diagnosis    • Ventilator dependent (HCC)    • Acute on chronic respiratory failure with hypoxia and hypercapnia (HCC)    • Acute respiratory failure with hypoxia (HCC)      Plan:   • Tracheostomy management-the patient has a stable trach in position.  I will continue tracheostomy care.  • Respiratory failure-the patient has remained on mechanical ventilation.  She is followed by the pulmonary service.  Discussed Plan with RT  Following patient as in-patient. Further recommendations will be made based on serial examinations.    Medications/Orders for this encounter: No new medications ordered.  No New orders written.    Discharge Planning: Per primary team        DVT prophylaxis:  No DVT prophylaxis order currently exists.     Electronically signed by Luis Medina Jr, MD, 08/06/22, 11:03 AM CDT.

## 2022-08-06 NOTE — PROGRESS NOTES
Fly Bill M.D.  MINNIE Garcia        Internal Medicine Progress Note    8/6/2022   11:57 CDT    Name:  Teresa Serna  MRN:    [id number]     Acct:     [id number]   Room:  [address] [place] [address] Day: 0     Admit Date: 7/27/2022  1:42 PM  PCP: Teresa Contreras MD    Subjective:     C/C: need for continued vent weaning    Interval History: Status:  stayed the same. Resting in bed. No family at bedside. Afebrile.  Tolerating current vent settings (A/C) with 40% 02, unable to wean from vent. No purposeful response to stimulation - withdraws from pain. OR for Trach and Peg yesterday. Urinalysis pending resluts. Rocephin 1 gram Q day for 5 days started today.   Will continue to monitor temperature curve and other symptoms.     Review of Systems   Unable to perform ROS: intubated         Medications:     Allergies:   Allergies   Allergen Reactions   • Codeine Palpitations and Other (See Comments)     Chest pain   • Exenatide Arrhythmia   • Sulfa Antibiotics Hives and Rash     Reaction: hives   • Albuterol Palpitations     High heart rate   • Sitagliptin Swelling   • Clindamycin/Lincomycin Unknown - High Severity   • Amoxicillin-Pot Clavulanate Palpitations       Current Meds:   Current Facility-Administered Medications:   •  acetaminophen (TYLENOL) tablet 650 mg, 650 mg, Per G Tube, Q4H PRN **OR** acetaminophen (TYLENOL) suppository 650 mg, 650 mg, Rectal, Q4H PRN, Carson Bill MD  •  amiodarone (PACERONE) tablet 100 mg, 100 mg, Per G Tube, Q24H, Carson Bill MD  •  cefTRIAXone (ROCEPHIN) 1 g in sodium chloride 0.9 % 100 mL IVPB-VTB, 1 g, Intravenous, Once, Carson Bill MD  •  chlorhexidine (PERIDEX) 0.12 % solution 15 mL, 15 mL, Mouth/Throat, Q12H, Stephanie Henry MD  •  dexmedetomidine (PRECEDEX) 400 mcg in 100 mL NS infusion, 0.2-1.5 mcg/kg/hr, Intravenous, Titrated, Stephanie Henry MD  •  dextrose (D50W) (25 g/50 mL) IV injection 25 g, 25 g, Intravenous, Q15 Min  PRN, Stephanie Henry MD  •  dextrose (GLUTOSE) oral gel 15 g, 15 g, Oral, Q15 Min PRN, Stephanie Henry MD  •  famotidine (PEPCID) tablet 20 mg, 20 mg, Per G Tube, BID, Carson Bill MD  •  furosemide (LASIX) tablet 20 mg, 20 mg, Per G Tube, BID, Carson Bill MD  •  glucagon (human recombinant) (GLUCAGEN DIAGNOSTIC) injection 1 mg, 1 mg, Subcutaneous, Q15 Min PRN, Stephanie Henry MD  •  insulin detemir (LEVEMIR) injection 25 Units, 25 Units, Subcutaneous, Nightly, Stephanie Henry MD  •  Insulin Lispro (humaLOG) injection 0-9 Units, 0-9 Units, Subcutaneous, Q6H, Stephanie Henry MD  •  lactated ringers infusion, 100 mL/hr, Intravenous, Continuous, Stephanie Henry MD, Last Rate: 100 mL/hr at 08/05/22 1037, Currently Infusing at 08/05/22 1037  •  levalbuterol (XOPENEX) nebulizer solution 0.63 mg, 0.63 mg, Nebulization, BID PRN, Stephanie Henry MD  •  levalbuterol (XOPENEX) nebulizer solution 0.63 mg, 0.63 mg, Nebulization, 4x Daily - RT, Stephanie Henry MD  •  magnesium hydroxide (MILK OF MAGNESIA) suspension 10 mL, 10 mL, Per G Tube, Daily PRN, Carson Bill MD  •  metoprolol tartrate (LOPRESSOR) tablet 50 mg, 50 mg, Per G Tube, Q12H, Carson Bill MD  •  Midazolam HCl (PF) (VERSED) injection 2 mg, 2 mg, Intravenous, Q1H PRN, Stephanie Henry MD  •  norepinephrine (LEVOPHED) 8 mg in 250 mL NS infusion (premix), 0.02-0.3 mcg/kg/min, Intravenous, Titrated, Stephanie Henry MD, Last Rate: 17.25 mL/hr at 08/05/22 1226, 0.08 mcg/kg/min at 08/05/22 1226  •  ondansetron (ZOFRAN) tablet 4 mg, 4 mg, Per G Tube, Q6H PRN **OR** ondansetron (ZOFRAN) injection 4 mg, 4 mg, Intravenous, Q6H PRN, Carson Bill MD  •  polyethylene glycol (MIRALAX) packet 17 g, 17 g, Per G Tube, Daily, Carson Bill MD  •  potassium chloride (KAYCIEL) 20 mEq/15 mL solution 20 mEq, 20 mEq, Per G Tube, Daily, Carson Bill MD  •  sodium chloride 0.9 % flush 10 mL, 10 mL, Intravenous,  "Q12H, Stephanie Henry MD  •  sodium chloride 0.9 % flush 10 mL, 10 mL, Intravenous, PRN, Stephanie Henry MD    Data:     Code Status:    There are no questions and answers to display.       Family History   Problem Relation Age of Onset   • Cancer Father    • Coronary artery disease Father    • Asthma Father    • Heart failure Mother    • Kidney disease Mother    • Arthritis Mother    • Pancreatitis Sister    • Heart attack Brother    • Colon cancer Neg Hx    • Colon polyps Neg Hx        Social History     Socioeconomic History   • Marital status:    Tobacco Use   • Smoking status: Former Smoker     Years: 20.00     Types: Cigarettes     Quit date: 2000     Years since quittin.5   • Smokeless tobacco: Never Used   Vaping Use   • Vaping Use: Never used   Substance and Sexual Activity   • Alcohol use: No   • Drug use: No   • Sexual activity: Defer       Vitals:  /53   Pulse 100   Temp 98.1 °F (36.7 °C) (Temporal)   Resp 12   Ht 167.6 cm (66\")   Wt 111 kg (244 lb 6.4 oz)   SpO2 95%   BMI 39.45 kg/m²     T 97.7 P 97 R 20 /51 Sp02 96% (vent fi02 45%/peep 5)        I/O (24Hr):  No intake or output data in the 24 hours ending 22 1157    Labs and imaging:      Recent Results (from the past 12 hour(s))   Blood Gas, Arterial -    Collection Time: 22  4:31 AM    Specimen: Arterial Blood   Result Value Ref Range    Site Right Brachial     Nigel's Test N/A     pH, Arterial 7.485 (H) 7.350 - 7.450 pH units    pCO2, Arterial 41.0 35.0 - 45.0 mm Hg    pO2, Arterial 69.7 (L) 83.0 - 108.0 mm Hg    HCO3, Arterial 30.8 (H) 20.0 - 26.0 mmol/L    Base Excess, Arterial 6.8 (H) 0.0 - 2.0 mmol/L    O2 Saturation, Arterial 93.1 (L) 94.0 - 99.0 %    Temperature 37.0 C    Barometric Pressure for Blood Gas 753 mmHg    Modality Ventilator     FIO2 45 %    Ventilator Mode AC     Set Tidal Volume 600     Set Mech Resp Rate 12.0     PEEP 5.0     Collected by [id number]     pCO2, Temperature Corrected " 41.0 35 - 45 mm Hg    pH, Temp Corrected 7.485 (H) 7.350 - 7.450 pH Units    pO2, Temperature Corrected 69.7 (L) 83 - 108 mm Hg   POC Glucose Once    Collection Time: 08/06/22  5:14 AM    Specimen: Blood   Result Value Ref Range    Glucose 214 (H) 70 - 130 mg/dL   POC Glucose Once    Collection Time: 08/06/22 10:54 AM    Specimen: Blood   Result Value Ref Range    Glucose 199 (H) 70 - 130 mg/dL                               Physical Examination:        Physical Exam  Vitals and nursing note reviewed.   Constitutional:       Appearance: Normal appearance. She is obese.      Interventions: She is intubated.   HENT:      Head: Normocephalic and atraumatic.      Right Ear: External ear normal.      Left Ear: External ear normal.      Nose: Nose normal.      Mouth/Throat:      Comments: ETT to vent  OGT  Eyes:      Extraocular Movements: Extraocular movements intact.      Conjunctiva/sclera: Conjunctivae normal.      Pupils: Pupils are equal, round, and reactive to light.   Cardiovascular:      Rate and Rhythm: Regular rhythm. Tachycardia present.      Pulses: Normal pulses.      Heart sounds: Normal heart sounds.   Pulmonary:      Effort: Pulmonary effort is normal. She is intubated.      Breath sounds: Normal breath sounds.   Abdominal:      General: Bowel sounds are normal.      Palpations: Abdomen is soft.   Musculoskeletal:      Cervical back: Normal range of motion and neck supple.      Comments: Generalized weakness   Skin:     General: Skin is warm and dry.   Neurological:      Comments: Withdraws from pain  Nonverbal due to ETT   Psychiatric:         Mood and Affect: Mood normal.         Behavior: Behavior normal.           Assessment:             Acute respiratory failure with hypoxia (HCC)    Ventilator dependent (HCC)    Acute on chronic respiratory failure with hypoxia and hypercapnia (HCC)    Acute tracheostomy management (HCC)    Past Medical History:   Diagnosis Date   • Abnormal stress test    • Atrial  flutter (HCC)    • CAD (coronary artery disease)    • CAD in native artery     CABG x 3   • COVID-19 vaccine series completed 2021    [id number]   • Diverticulosis    • Essential hypertension     Tricuspid valve insufficiency, unspecified etiology    • History of adenomatous polyp of colon    • Hyperlipemia, mixed    • Hyperlipidemia    • Hypertension    • IBS (irritable bowel syndrome)    • MI, old    • Mitral valve prolapse    • Near syncope    • Obesity, morbid (HCC)    • Palpitations    • Paroxysmal SVT (supraventricular tachycardia) ([facility])    • Pedal edema    • Precordial pain    • Rheumatoid arthritis (HCC)    • S/P CABG x 3    • Type 2 diabetes mellitus (HCC)    • Venous insufficiency of both lower extremities         Plan:          1. Acute hypoxic respiratory failure  2. DM2 with hyperglycemia  3. Rheumatoid arthritis  4. Dysphagia  5. History of CAD s/p CABG  6. Morbid obesity  7. Anxiety  8. History of retroperitoneal hematoma  9. UTI    Continue current treatment. Monitor counts. Increase activity. Monitor labs. Vent weaning under direction of pulmonology. Consult ENT for trach placement to help assist with vent weaning. Continue nutrition support via AMONs. Continue IV antibiotics and await final cultures. Glycemic control-increase to medium dose sliding scale. Maintain patient safety. Trach and peg today.         Electronically signed by MINNIE Lakhani on 8/6/2022 at 11:57 CDT      I have discussed the care of Teresa Serna, including pertinent history and exam findings, with the nurse practitioner.    I have seen and examined the patient and the key elements of all parts of the encounter have been performed by me.  I agree with the assessment, plan and orders as documented by Chintan HARTMAN, after I modified the exam findings and the plan of treatments and the final version is my approved version of the assessment.        Electronically signed by Carson Bill MD on 8/6/2022 at 20:11  CDT

## 2022-08-06 NOTE — PROGRESS NOTES
PULMONARY AND CRITICAL CARE PROGRESS NOTE - [facility]    Patient: Teresa Eubanks Day    1955   Acct# [id number]  08/06/22   08:42 CDT  Referring Provider: Carson Bill MD     Chief Complaint: Mechanically Ventilated     Interval history: She remains mechanically ventilated. She is POD #1 Trach and Peg. Temp is 99.4. Attending notes antibiotics for UTI but then also notes would just monitor temp curve. She was given a one time dose of Rocephin last night. RN is advised to discuss with attending. She is on no sedatives. She does not like to be touched/ assessed. When touching her shoulder she clamped her eyes and grimaced. She did not follow commands. She was on VC with Vt of 420 preop and came back to the LTAC with Vt of 600. Her ABGs and imaging are reviewed. WBC is 10.79, Creatinine is a little elevated at 1.48, Na 148, Hgb 9. She is placed in PSV 15/5 with Fio2 .40 and her sat is staying in the 90s with Vt of 390-450s, Mv 9.57 and Et 33. RT is advised to see how she tolerates and to go back to full support for distress.  No overnight events.     Review of Systems: Review of Systems   Unable to perform ROS: Intubated      Physical Exam:  Vital signs: T:99.4 BP:106/46  P:108   R:18 Sat:95%  Physical Exam  Vitals reviewed. Exam conducted with a chaperone present.   Constitutional:       General: She is not in acute distress.     Appearance: She is ill-appearing. She is not diaphoretic.      Interventions: She is intubated.   HENT:      Head: Normocephalic.      Nose: Nose normal.      Mouth/Throat:      Mouth: Mucous membranes are moist.      Comments: OG with tf infusing  Eyes:      General: No scleral icterus.  Neck:      Comments: Vent to trach, trach site C/D/I   Cardiovascular:      Rate and Rhythm: Normal rate.   Pulmonary:      Effort: No respiratory distress. She is intubated.      Breath sounds: Rhonchi (Coarse throughout) present. No wheezing.   Abdominal:      General:  There is no distension.      Comments: Peg site C/D/I    Genitourinary:     Comments: Evans  fms  Musculoskeletal:      Right lower leg: Edema present.      Left lower leg: Edema present.      Comments: scds   Skin:     Coloration: Skin is not pale.   Neurological:      Comments: No purposeful response      Electronically signed by MINNIE Antoine, 8/6/2022, 08:42 CDT      Physician Substantive Portion: Medical Decision Making:    Laboratory Data:  Results from last 7 days   Lab Units 08/05/22 1837 08/04/22 0319 08/01/22  0613   WBC 10*3/mm3 10.79 8.75 5.98   HEMOGLOBIN g/dL 9.0* 9.6* 9.3*   PLATELETS 10*3/mm3 257 251 205     Results from last 7 days   Lab Units 08/05/22  1837 08/04/22 0319 08/02/22  1622 08/02/22  0243   SODIUM mmol/L 148* 144  --  147*   POTASSIUM mmol/L 3.9 3.6 4.2 3.3*   BUN mg/dL 15 14  --  10   CREATININE mg/dL 1.48* 0.80  --  0.52*   CRP mg/dL  --  21.47*  --   --      Results from last 7 days   Lab Units 08/06/22  0431 08/05/22  0546 08/04/22  0442   PH, ARTERIAL pH units 7.485* 7.497* 7.500*   PCO2, ARTERIAL mm Hg 41.0 43.1 40.9   PO2 ART mm Hg 69.7* 63.7* 107.0   FIO2 % 45 45 45     No results found for: BLOODCX, URINECX, WOUNDCX, MRSACX, RESPCX, STOOLCX    Recent films:  XR Chest 1 View    Result Date: 8/6/2022  Stable satisfactory position of the tracheostomy tube and right-sided PICC. No pneumothorax is identified. Diffuse atelectasis, mild increase in vascular congestion compared with yesterday. This report was finalized on 08/06/2022 08:29 by Dr. Robert Jiang MD.    XR Chest 1 View    Result Date: 8/4/2022  Satisfactory position of the endotracheal tube. Interval removal of the NG tube. There is increased pulmonary edema with small bilateral pleural effusions. No pneumothorax is identified. The right-sided PICC remains in satisfactory position. This report was finalized on 08/04/2022 17:16 by Dr. Robert Jiang MD.    My radiograph interpretation/independent review  of imaging: Reviewed and agree with current interpretation.    Pulmonary Assessment:    1. Acute hypoxic respiratory failure  2. On mechanically assisted ventilation  3. S/p tracheostomy and PEG tube placement  4. Metabolic alkalosis  5. Abdominal sepsis  6. Cellulitis  7. Anemia with history of retroperitoneal hematoma.  8. Coronary disease s/p CABG  9. Rheumatoid arthritis with possible rheumatoid lung  10. History of atrial flutter.  11. Diabetes mellitus type 2  12. History of dysphagia.    Recommend/plan:   · She is on full assist-control volume control ventilation and was on pressure support ventilation for few hours earlier today.   · We will continue current ventilator settings.  Current mechanical ventilation.  Continue routine respiratory care.  · Pulmonary toilet.  Bronchodilator treatment.  On Xopenex 4 times daily.  · Monitor hemoglobin and transfuse blood if needed.  Hyponatremia improved.  · Still has hypoxemia.  Continue nutritional support PT OT  · Pain and anxiety control DVT and stress ulcer prophylaxis.  · Continue current treatment plan supportive care.  Tracheostomy and PEG tube placed.  Start tube feeding  · Repeat labs and imaging studies from time to time.  · CODE STATUS: Full.  Overall prognosis: Guarded.  · We will follow.    This visit was performed by both a physician and an [facility] RN.  I personally evaluated and examined the patient.  I performed all aspects of the medical decision making as documented.     Electronically signed by     Anamaria Levine MD,  Pulmonologist/Intensivist   8/6/2022, 15:13 CDT

## 2022-08-07 NOTE — PROGRESS NOTES
Fly Bill M.D.  MINNIE Garcia        Internal Medicine Progress Note    8/7/2022   09:57 CDT    Name:  Teresa Serna  MRN:    [id number]     Acct:     [id number]   Room:  [address] [place] [address] Day: 0     Admit Date: 7/27/2022  1:42 PM  PCP: Teresa Contreras MD    Subjective:     C/C: need for continued vent weaning    Interval History: Status:  stayed the same. Resting in bed. No family at bedside. Afebrile.  Tolerating current vent settings (A/C) with 40% 02, unable to wean from vent. No purposeful response to stimulation - withdraws from pain. OR for Trach and Peg yesterday. Urinalysis pending resluts. Rocephin 1 gram Q day for 5 days started today.   Will continue to monitor temperature curve and other symptoms. Chest xray, changes noted, scheduled for bronchoscopy per Pulmonary, Moderate increase in left hemithorax opacification and probable diffuse atelectasis. This may be related to mucous plugging. Underlying pneumonia is not excluded and there appears to be increased pleural effusion on the left.      Review of Systems   Unable to perform ROS: intubated         Medications:     Allergies:   Allergies   Allergen Reactions   • Codeine Palpitations and Other (See Comments)     Chest pain   • Exenatide Arrhythmia   • Sulfa Antibiotics Hives and Rash     Reaction: hives   • Albuterol Palpitations     High heart rate   • Sitagliptin Swelling   • Clindamycin/Lincomycin Unknown - High Severity   • Amoxicillin-Pot Clavulanate Palpitations       Current Meds:   Current Facility-Administered Medications:   •  acetaminophen (TYLENOL) tablet 650 mg, 650 mg, Per G Tube, Q4H PRN **OR** acetaminophen (TYLENOL) suppository 650 mg, 650 mg, Rectal, Q4H PRN, Carson Bill MD  •  amiodarone (PACERONE) tablet 100 mg, 100 mg, Per G Tube, Q24H, Carson Bill MD  •  cefTRIAXone (ROCEPHIN) 1 g in sodium chloride 0.9 % 100 mL IVPB-VTB, 1 g, Intravenous, Q24H, Carson Bill MD  •   chlorhexidine (PERIDEX) 0.12 % solution 15 mL, 15 mL, Mouth/Throat, Q12H, Stephanie Henry MD  •  dexmedetomidine (PRECEDEX) 400 mcg in 100 mL NS infusion, 0.2-1.5 mcg/kg/hr, Intravenous, Titrated, Stephanie Henry MD  •  dextrose (D50W) (25 g/50 mL) IV injection 25 g, 25 g, Intravenous, Q15 Min PRN, Stephanie Henry MD  •  dextrose (GLUTOSE) oral gel 15 g, 15 g, Oral, Q15 Min PRN, Stephanie Henry MD  •  famotidine (PEPCID) tablet 20 mg, 20 mg, Per G Tube, BID, Carson Bill MD  •  furosemide (LASIX) tablet 20 mg, 20 mg, Per G Tube, BID, Carson Bill MD  •  glucagon (human recombinant) (GLUCAGEN DIAGNOSTIC) injection 1 mg, 1 mg, Subcutaneous, Q15 Min PRN, Stephanie Henry MD  •  insulin detemir (LEVEMIR) injection 25 Units, 25 Units, Subcutaneous, Nightly, Stephanie Henry MD  •  Insulin Lispro (humaLOG) injection 0-9 Units, 0-9 Units, Subcutaneous, Q6H, Stephanie Henry MD  •  lactated ringers infusion, 100 mL/hr, Intravenous, Continuous, Stephanie Henry MD, Last Rate: 100 mL/hr at 08/05/22 1037, Currently Infusing at 08/05/22 1037  •  levalbuterol (XOPENEX) nebulizer solution 0.63 mg, 0.63 mg, Nebulization, BID PRN, Stephanie Henry MD  •  levalbuterol (XOPENEX) nebulizer solution 0.63 mg, 0.63 mg, Nebulization, 4x Daily - RT, Stephanie Henry MD  •  magnesium hydroxide (MILK OF MAGNESIA) suspension 10 mL, 10 mL, Per G Tube, Daily PRN, Carson Bill MD  •  metoprolol tartrate (LOPRESSOR) tablet 50 mg, 50 mg, Per G Tube, Q12H, Carson Bill MD  •  Midazolam HCl (PF) (VERSED) injection 2 mg, 2 mg, Intravenous, Q1H PRN, Stephanie Henry MD  •  norepinephrine (LEVOPHED) 8 mg in 250 mL NS infusion (premix), 0.02-0.3 mcg/kg/min, Intravenous, Titrated, Stephanie Henry MD, Last Rate: 17.25 mL/hr at 08/05/22 1226, 0.08 mcg/kg/min at 08/05/22 1226  •  ondansetron (ZOFRAN) tablet 4 mg, 4 mg, Per G Tube, Q6H PRN **OR** ondansetron (ZOFRAN) injection 4 mg, 4 mg, Intravenous, Q6H PRN,  "Carson Bill MD  •  polyethylene glycol (MIRALAX) packet 17 g, 17 g, Per G Tube, Daily, Carson Bill MD  •  potassium chloride (KAYCIEL) 20 mEq/15 mL solution 20 mEq, 20 mEq, Per G Tube, Daily, Carson Bill MD  •  sodium chloride 0.9 % flush 10 mL, 10 mL, Intravenous, Q12H, Stephanie Henry MD  •  sodium chloride 0.9 % flush 10 mL, 10 mL, Intravenous, PRN, Stephanie Henry MD    Data:     Code Status:    There are no questions and answers to display.       Family History   Problem Relation Age of Onset   • Cancer Father    • Coronary artery disease Father    • Asthma Father    • Heart failure Mother    • Kidney disease Mother    • Arthritis Mother    • Pancreatitis Sister    • Heart attack Brother    • Colon cancer Neg Hx    • Colon polyps Neg Hx        Social History     Socioeconomic History   • Marital status:    Tobacco Use   • Smoking status: Former Smoker     Years: 20.00     Types: Cigarettes     Quit date: 2000     Years since quittin.5   • Smokeless tobacco: Never Used   Vaping Use   • Vaping Use: Never used   Substance and Sexual Activity   • Alcohol use: No   • Drug use: No   • Sexual activity: Defer       Vitals:  /53   Pulse 100   Temp 98.1 °F (36.7 °C) (Temporal)   Resp 12   Ht 167.6 cm (66\")   Wt 111 kg (244 lb 6.4 oz)   SpO2 95%   BMI 39.45 kg/m²     T 99.3 P 95 R 22 /47 Sp02 93% (vent fi02 80%/peep 5)        I/O (24Hr):  No intake or output data in the 24 hours ending 22 0957    Labs and imaging:      Recent Results (from the past 12 hour(s))   POC Glucose Once    Collection Time: 22 12:01 AM    Specimen: Blood   Result Value Ref Range    Glucose 236 (H) 70 - 130 mg/dL   POC Glucose Once    Collection Time: 22  4:37 AM    Specimen: Blood   Result Value Ref Range    Glucose 265 (H) 70 - 130 mg/dL   Blood Gas, Arterial -    Collection Time: 22  8:13 AM    Specimen: Arterial Blood   Result Value Ref Range    Site " Left Brachial     Nigel's Test N/A     pH, Arterial 7.465 (H) 7.350 - 7.450 pH units    pCO2, Arterial 44.3 35.0 - 45.0 mm Hg    pO2, Arterial 68.3 (L) 83.0 - 108.0 mm Hg    HCO3, Arterial 31.9 (H) 20.0 - 26.0 mmol/L    Base Excess, Arterial 7.3 (H) 0.0 - 2.0 mmol/L    O2 Saturation, Arterial 92.9 (L) 94.0 - 99.0 %    Temperature 37.0 C    Barometric Pressure for Blood Gas 754 mmHg    Modality Ventilator     FIO2 80 %    Ventilator Mode AC     Set Tidal Volume 420     Set Mech Resp Rate 12.0     PEEP 5.0     Collected by JASMIN MOSQUEDA. LTACH RT     pCO2, Temperature Corrected 44.3 35 - 45 mm Hg    pH, Temp Corrected 7.465 (H) 7.350 - 7.450 pH Units    pO2, Temperature Corrected 68.3 (L) 83 - 108 mm Hg           Physical Examination:        Physical Exam  Vitals and nursing note reviewed.   Constitutional:       Appearance: Normal appearance. She is obese.      Interventions: She is intubated.   HENT:      Head: Normocephalic and atraumatic.      Right Ear: External ear normal.      Left Ear: External ear normal.      Nose: Nose normal.      Mouth/Throat:      Comments: ETT to vent  OGT  Eyes:      Extraocular Movements: Extraocular movements intact.      Conjunctiva/sclera: Conjunctivae normal.      Pupils: Pupils are equal, round, and reactive to light.   Cardiovascular:      Rate and Rhythm: Regular rhythm. Tachycardia present.      Pulses: Normal pulses.      Heart sounds: Normal heart sounds.   Pulmonary:      Effort: Pulmonary effort is normal. She is intubated.      Breath sounds: Harsh right, decreased on left side.   Abdominal:      General: Bowel sounds are normal.      Palpations: Abdomen is soft.   Musculoskeletal:      Cervical back: Normal range of motion and neck supple.      Comments: Generalized weakness   Skin:     General: Skin is warm and dry.   Neurological:      Comments: Withdraws from pain  Nonverbal due to ETT   Psychiatric:         Mood and Affect: Mood normal.         Behavior: Behavior  normal.           Assessment:             Acute respiratory failure with hypoxia (HCC)    Ventilator dependent (HCC)    Acute on chronic respiratory failure with hypoxia and hypercapnia (HCC)    Acute tracheostomy management (HCC)    Past Medical History:   Diagnosis Date   • Abnormal stress test    • Atrial flutter (HCC)    • CAD (coronary artery disease)    • CAD in native artery     CABG x 3   • COVID-19 vaccine series completed 2021 021221/225222   • Diverticulosis    • Essential hypertension     Tricuspid valve insufficiency, unspecified etiology    • History of adenomatous polyp of colon    • Hyperlipemia, mixed    • Hyperlipidemia    • Hypertension    • IBS (irritable bowel syndrome)    • MI, old    • Mitral valve prolapse    • Near syncope    • Obesity, morbid (HCC)    • Palpitations    • Paroxysmal SVT (supraventricular tachycardia) (HCC)    • Pedal edema    • Precordial pain    • Rheumatoid arthritis (HCC)    • S/P CABG x 3    • Type 2 diabetes mellitus (HCC)    • Venous insufficiency of both lower extremities         Plan:          1. Acute hypoxic respiratory failure  2. DM2 with hyperglycemia  3. Rheumatoid arthritis  4. Dysphagia  5. History of CAD s/p CABG  6. Morbid obesity  7. Anxiety  8. History of retroperitoneal hematoma  9. UTI    Continue current treatment. Monitor counts. Increase activity. Monitor labs. Vent weaning under direction of pulmonology. Consult ENT for trach placement to help assist with vent weaning. Continue nutrition support via AMONs. Continue IV antibiotics and await final cultures. Glycemic control-increase to medium dose sliding scale. Maintain patient safety. Trach and peg 8/5/2022.         Electronically signed by MINNIE Lakhani on 8/7/2022 at 09:57 CDT

## 2022-08-07 NOTE — PROCEDURES
Bronchoscopy Procedure Note    Date of Operation: 08/07/22    Pre-op Diagnosis: Atelectasis of the left lung.    Post-op Diagnosis: Mucus plugging of bronchial tree    Surgeon: Anamaria Levine MD , Pulmonologist/Intensivist    Anesthesia: Patient was already on mechanical ventilation and had a size 8 Shiley tracheostomy tube in place.  Patient was given morphine 2 mg IV, Ativan 1 mg IV and lidocaine 2% 5 cc was instilled in the airway before the procedure.    Operation: Flexible fiberoptic bronchoscopy, diagnostic and therapeutic without c-arm    Bronchoscopy, Therapeutic was performed    Findings: Mucus plugging on the left mainstem bronchus    Specimen: Bronchial washing was obtained from the left mainstem bronchus    Estimated Blood Loss: None    Complications: None patient tolerated the procedure well.    Indications and History:  The patient is a 66 y.o.  female..  The risks, benefits, complications, treatment options and expected outcomes were discussed with the patient's family.  She was unable to give her own consent because she was on ventilator.  The possibilities of reaction to medication, pulmonary aspiration, perforation of a viscus, bleeding, failure to diagnose a condition and creating a complication requiring transfusion or operation were discussed with the patient's son  who freely signed the consent.  Time out was taken prior to the procedure.    Time of starting the procedure: 12:35 PM.  Time of ending the procedure: 12:50 PM    Description of Procedure:    After the medications are given patient's tracheostomy tube was connected with an adapter and fiberoptic bronchoscopy was performed at the bedside in LTAC unit.    Disposable fiberoptic bronchoscope was used with video monitor.  Bronchoscope was lubricated and was advanced to the luis a through tracheostomy.  Luis A was sharp and pink in color.    The trachea, mainstem bronchi, RUL, RML, Bronchus Intermedius, RLL, SKINNY, SKINNY upper division and  lingula, and LLL, and all primary segmental airways were examined and were normal except as described below:    Endobronchial findings:   A thick and sticky brownish-yellow mucous plug noted in the left mainstem bronchus occluding mostly left upper lobe and lingular segment but left lower lobe was patent.  Right-sided bronchial tree was patent without any endobronchial lesion any hemorrhage any pneumonia or any other inflammatory changes.  After the mucous plug was suctioned and the bronchial tree was lavaged with normal saline flushes.  Left-sided bronchial washing was obtained    Washes were collected by suction.  It was thick and yellow and some red discoloration noted.  It was not frankly hemorrhagic but was red in color.  After all the lobes were inspected the bronchoscope was withdrawn and the position of the trachea was confirmed 3 cm above the luis a.    Further planning:  · Bronchial washing was sent to the laboratory for microbiologic and histopathology test  · Chest x-ray ordered after the bronchoscopy.  · Patient was placed back on the ventilator this is contributing control settings  · We will follow the x-ray and the bronchial washing results and we will continue following.    Total time spent in doing the procedure was 15 minutes.    Electronically signed by    Anamaria Levine MD   Pulmonologist/Intensivist   at 13:32 CDT, 8/7/2022

## 2022-08-07 NOTE — PROGRESS NOTES
"      PULMONARY AND CRITICAL CARE PROGRESS NOTE - [facility]    Patient: Teresa Eubanks Day    1955   Acct# [id number]  08/07/22   08:38 CDT  Referring Provider: Carson Bill MD     Chief Complaint: Mechanically Ventilated     Interval history: She remains mechanically ventilated. She is POD #2 Trach and Peg. Temp spiked to 1.2.8 last night around 1900 and treated with tylenol. Sputum culture is sent. Temp this am i is 99.5. CXR discussed with Dr. Levine given likely mucus plug. He placed a call to RT to have supplies ready for bedside broch. RT did mention she was able to extract one large plug with suctioning/ lavage. She also noted patient grimaces with pain and does not have anything other then Tylenol. Order was placed per Dr. Levine for PRN Morpine, PRN Ativan and discontinuation of the Versed. She remains on antibiotics, Rocephin for UTI. She is on no IV sedation. She has Norepinephrine and tube feeds infusing. She does not follow commands. She was tried on PSV 15/5 yesterday and only tolerated for a few hours. She remains on VC with increased FIO2 demand. She was on .80 this am and Rt has bene able to wean to .55. Her sat is 97% with ET of 33. RN states CBC and CMP have been ordered to check creatinine as she is \"puffy\" and they are wanting to increase her Lasix. She is currently on 20 mg twice daily. Creatinine on 8/5 was 1.48.     Review of Systems: Review of Systems   Unable to perform ROS: Intubated      Physical Exam:  Vital signs: T:99.5 BP:102/45  P:79   R:28 Sat:97%  Physical Exam  Vitals reviewed. Exam conducted with a chaperone present.   Constitutional:       General: She is not in acute distress.     Appearance: She is ill-appearing. She is not diaphoretic.      Interventions: She is intubated.   HENT:      Head: Normocephalic.      Nose: Nose normal.      Mouth/Throat:      Mouth: Mucous membranes are moist.      Comments: OG with tf infusing  Eyes:      General: " No scleral icterus.  Neck:      Comments: Vent to trach, trach site C/D/I   Cardiovascular:      Rate and Rhythm: Normal rate.   Pulmonary:      Effort: No respiratory distress. She is intubated.      Breath sounds: Examination of the left-middle field reveals decreased breath sounds. Examination of the left-lower field reveals decreased breath sounds. Decreased breath sounds and rhonchi (Coarse ) present. No wheezing.   Abdominal:      General: There is no distension.      Comments: Peg site C/D/I    Genitourinary:     Comments: Evans  fms  Musculoskeletal:      Right lower leg: Edema present.      Left lower leg: Edema present.      Comments: scds   Skin:     Coloration: Skin is not pale.   Neurological:      Comments: No purposeful response   Grimaces to touch     Electronically signed by MINNIE Antoine, 8/7/2022, 08:38 CDT      Physician Substantive Portion: Medical Decision Making:    Laboratory Data:  Results from last 7 days   Lab Units 08/05/22 1837 08/04/22  0319 08/01/22  0613   WBC 10*3/mm3 10.79 8.75 5.98   HEMOGLOBIN g/dL 9.0* 9.6* 9.3*   PLATELETS 10*3/mm3 257 251 205     Results from last 7 days   Lab Units 08/05/22  1837 08/04/22  0319 08/02/22  1622 08/02/22  0243   SODIUM mmol/L 148* 144  --  147*   POTASSIUM mmol/L 3.9 3.6 4.2 3.3*   BUN mg/dL 15 14  --  10   CREATININE mg/dL 1.48* 0.80  --  0.52*   CRP mg/dL  --  21.47*  --   --      Results from last 7 days   Lab Units 08/07/22  0813 08/06/22  0431 08/05/22  0546   PH, ARTERIAL pH units 7.465* 7.485* 7.497*   PCO2, ARTERIAL mm Hg 44.3 41.0 43.1   PO2 ART mm Hg 68.3* 69.7* 63.7*   FIO2 % 80 45 45     No results found for: BLOODCX, URINECX, WOUNDCX, MRSACX, RESPCX, STOOLCX    Recent films:  XR Chest 1 View    Result Date: 8/7/2022  Moderate increase in left hemithorax opacification and probable diffuse atelectasis. This may be related to mucous plugging. Underlying pneumonia is not excluded and there appears to be increased pleural  effusion on the left. The tracheostomy tube and right-sided PICC appear in good position as before. There is improvement in the patient's volume status. This report was finalized on 08/07/2022 07:42 by Dr. Robert Jiang MD.    XR Chest 1 View    Result Date: 8/6/2022  Stable satisfactory position of the tracheostomy tube and right-sided PICC. No pneumothorax is identified. Diffuse atelectasis, mild increase in vascular congestion compared with yesterday. This report was finalized on 08/06/2022 08:29 by Dr. Robert Jiang MD.    My radiograph interpretation/independent review of imaging: Left-sided mucous plug and atelectasis noted..  There is also left-sided pleural effusion noted.  Pulmonary edema has improved.    Pulmonary Assessment:  1. Acute hypoxic respiratory failure  2. On mechanically assisted ventilation  3. Left-sided mucous plug and atelectasis.  4. Pleural effusion  5. S/p tracheostomy and PEG tube placement  6. Metabolic alkalosis  7. Abdominal sepsis  8. Cellulitis of lower extremities  9. Anemia with history of retroperitoneal hematoma.  10. Coronary disease s/p CABG  11. Rheumatoid arthritis with possible rheumatoid lung  12. History of atrial flutter.  13. Diabetes mellitus type 2  14. History of dysphagia.    Recommend/plan:   · Patient is on currently full assist-control volume control ventilation on current ventilator settings are tidal volume 420 rate 14, PEEP of 5, FiO2 55% with oxygen saturation 94%.  · Chest x-ray reviewed which showed pleural effusion left-sided and mucous plugging with atelectasis and the pulm edema has improved.  · I did a bedside bronchoscopy and mucous plug was removed.  Postprocedure chest x-ray ordered and bronchial washing obtained from left side was sent to the lab.  · She was not on any sedation or pain control except Tylenol and she was started on Ativan and morphine as needed.  Due to Ativan shortage of Versed was advised back I used morphine and Versed during  the procedure.  · Patient will be continued on assist-control mechanical ventilation and titrate PEEP and FiO2 to maintain oxygen saturation more than 92%.  · Continue pulmonary treatment routine respiratory care.  She is on Xopenex for bronchodilation.  Monitor hemoglobin blood transfusion as needed.  · She still appears volume overloaded and bilateral leg swelling noted with cellulitis.  · I started her on Lasix 40 mg once a day and will monitor renal function and electrolytes closely.  I ordered repeat labs tomorrow morning  · Pulmonary toilet.  As patient continues to have mucous plugging I started the patient on Mucomyst times a day.  · Reviewed current treatment plan.  Continue current care plan.  DVT and ulcer prophylaxis and pain and anxiety control  · Tracheostomy management by ENT.  PEG tube in place continue tube feeding.  Nutritional support.  · Repeat labs and imaging studies from time to time.  · CODE STATUS: Full.  Overall prognosis: Guarded.    · We will follow.    This visit was performed by both a physician and an [facility] RN.  I personally evaluated and examined the patient.  I performed all aspects of the medical decision making as documented.    Electronically signed by     Anamaria Levine MD,  Pulmonologist/Intensivist   8/7/2022, 13:23 CDT

## 2022-08-07 NOTE — PROGRESS NOTES
"Pharmacy Dosing Service  Automatic Renal Adjustment  Famotidine    Assessment/Action/Plan:  Noted serum creatinine trend. Patient on Famotidine twice daily. Will adjust to 20 mg per tube once daily for now. Follow renal function     Subjective:  Tereas Serna is a 66 y.o. female     Additional Factors Considered:  Patient disposition per documentation  Disease state or condition being treated    Objective:  Ht: 167.6 cm (66\"); Wt: 111 kg (244 lb 6.4 oz)  Estimated Creatinine Clearance: 41.4 mL/min (A) (by C-G formula based on SCr of 1.71 mg/dL (H)).   Creatinine   Date Value Ref Range Status   08/07/2022 1.71 (H) 0.57 - 1.00 mg/dL Final   08/05/2022 1.48 (H) 0.57 - 1.00 mg/dL Final   08/04/2022 0.80 0.57 - 1.00 mg/dL Final   07/27/2022 0.4 (L) 0.5 - 0.9 mg/dL Final   07/26/2022 0.4 (L) 0.5 - 0.9 mg/dL Final   07/25/2022 0.5 0.5 - 0.9 mg/dL Final       Terry Kim, PharmD  08/07/22 18:23 CDT    "

## 2022-08-08 NOTE — PROGRESS NOTES
Fly Bill M.D.  MINNIE Garcia        Internal Medicine Progress Note    8/8/2022   12:26 CDT    Name:  Teresa Serna  MRN:    [id number]     Acct:     [id number]   Room:  [address] [place] [address] Day: 0     Admit Date: 7/27/2022  1:42 PM  PCP: Teresa Contreras MD    Subjective:     C/C: need for continued vent weaning    Interval History: Status:  stayed the same. Resting in bed. No family at bedside. Low grade temp noted.  Tolerating current vent settings (A/C) with 40% 02. No purposeful response to stimulation - withdraws from pain. Decreased renal function and hyperkalemia with hyponatremia noted. WBC elevated. Continues with levophed. Pseudomonas in sputum.     Review of Systems   Unable to perform ROS: intubated         Medications:     Allergies:   Allergies   Allergen Reactions   • Codeine Palpitations and Other (See Comments)     Chest pain   • Exenatide Arrhythmia   • Sulfa Antibiotics Hives and Rash     Reaction: hives   • Albuterol Palpitations     High heart rate   • Sitagliptin Swelling   • Clindamycin/Lincomycin Unknown - High Severity   • Amoxicillin-Pot Clavulanate Palpitations       Current Meds:   Current Facility-Administered Medications:   •  acetaminophen (TYLENOL) tablet 650 mg, 650 mg, Per G Tube, Q4H PRN **OR** acetaminophen (TYLENOL) suppository 650 mg, 650 mg, Rectal, Q4H PRN, Carson Bill MD  •  acetylcysteine (MUCOMYST) 20 % nebulizer solution 1.5 mL, 1.5 mL, Nebulization, 4x Daily - RT, Anamaria Levine MD  •  amiodarone (PACERONE) tablet 100 mg, 100 mg, Per G Tube, Q24H, Carson Bill MD  •  cefTRIAXone (ROCEPHIN) 1 g in sodium chloride 0.9 % 100 mL IVPB-VTB, 1 g, Intravenous, Q24H, Carson Bill MD  •  chlorhexidine (PERIDEX) 0.12 % solution 15 mL, 15 mL, Mouth/Throat, Q12H, Stephanie Henry MD  •  dexmedetomidine (PRECEDEX) 400 mcg in 100 mL NS infusion, 0.2-1.5 mcg/kg/hr, Intravenous, Titrated, Stephanie Henry MD  •  dextrose  (2) assistive person (D50W) (25 g/50 mL) IV injection 25 g, 25 g, Intravenous, Q15 Min PRN, Stephanie Henry MD  •  dextrose (GLUTOSE) oral gel 15 g, 15 g, Oral, Q15 Min PRN, Stephanie Henry MD  •  famotidine (PEPCID) tablet 20 mg, 20 mg, Per G Tube, Daily, Carson Bill MD  •  furosemide (LASIX) injection 40 mg, 40 mg, Intravenous, Daily, Anamaria Levine MD  •  glucagon (human recombinant) (GLUCAGEN DIAGNOSTIC) injection 1 mg, 1 mg, Subcutaneous, Q15 Min PRN, Stephanie Henry MD  •  insulin detemir (LEVEMIR) injection 25 Units, 25 Units, Subcutaneous, Nightly, Stephanie Henry MD  •  Insulin Lispro (humaLOG) injection 0-9 Units, 0-9 Units, Subcutaneous, Q6H, Stephanie Henry MD  •  lactated ringers infusion, 100 mL/hr, Intravenous, Continuous, Stephanie Henry MD, Last Rate: 100 mL/hr at 08/05/22 1037, Currently Infusing at 08/05/22 1037  •  levalbuterol (XOPENEX) nebulizer solution 0.63 mg, 0.63 mg, Nebulization, BID PRN, Stephanie Henry MD  •  levalbuterol (XOPENEX) nebulizer solution 0.63 mg, 0.63 mg, Nebulization, 4x Daily - RT, Stephanie Henry MD  •  lidocaine (XYLOCAINE) 1 % injection 10 mL, 10 mL, Subcutaneous, Once, Anamaria Levine MD  •  magnesium hydroxide (MILK OF MAGNESIA) suspension 10 mL, 10 mL, Per G Tube, Daily PRN, Carson Bill MD  •  metoprolol tartrate (LOPRESSOR) tablet 50 mg, 50 mg, Per G Tube, Q12H, Carson Bill MD  •  Morphine sulfate (PF) injection 2 mg, 2 mg, Intravenous, Q4H PRN, Anamaria Levine MD  •  norepinephrine (LEVOPHED) 8 mg in 250 mL NS infusion (premix), 0.02-0.3 mcg/kg/min, Intravenous, Titrated, Stephanie Henry MD, Last Rate: 17.25 mL/hr at 08/05/22 1226, 0.08 mcg/kg/min at 08/05/22 1226  •  ondansetron (ZOFRAN) tablet 4 mg, 4 mg, Per G Tube, Q6H PRN **OR** ondansetron (ZOFRAN) injection 4 mg, 4 mg, Intravenous, Q6H PRN, Carson Bill MD  •  polyethylene glycol (MIRALAX) packet 17 g, 17 g, Per G Tube, Daily, Carson Bill MD  •  potassium  "chloride (KAYCIEL) 20 mEq/15 mL solution 20 mEq, 20 mEq, Per G Tube, Daily, Carson Bill MD  •  sodium chloride 0.9 % flush 10 mL, 10 mL, Intravenous, Q12H, Stephanie Henry MD  •  sodium chloride 0.9 % flush 10 mL, 10 mL, Intravenous, PRN, Stephanie Henry MD    Data:     Code Status:    There are no questions and answers to display.       Family History   Problem Relation Age of Onset   • Cancer Father    • Coronary artery disease Father    • Asthma Father    • Heart failure Mother    • Kidney disease Mother    • Arthritis Mother    • Pancreatitis Sister    • Heart attack Brother    • Colon cancer Neg Hx    • Colon polyps Neg Hx        Social History     Socioeconomic History   • Marital status:    Tobacco Use   • Smoking status: Former Smoker     Years: 20.00     Types: Cigarettes     Quit date: 2000     Years since quittin.5   • Smokeless tobacco: Never Used   Vaping Use   • Vaping Use: Never used   Substance and Sexual Activity   • Alcohol use: No   • Drug use: No   • Sexual activity: Defer       Vitals:  /53   Pulse 100   Temp 98.1 °F (36.7 °C) (Temporal)   Resp 12   Ht 167.6 cm (66\")   Wt 116 kg (255 lb 4.8 oz)   SpO2 95%   BMI 41.21 kg/m²     T 99.5 P 88 R 31 /54 Sp02 97% (vent)        I/O (24Hr):  No intake or output data in the 24 hours ending 22 1226    Labs and imaging:      Recent Results (from the past 12 hour(s))   Comprehensive Metabolic Panel    Collection Time: 22  2:55 AM    Specimen: Blood   Result Value Ref Range    Glucose 280 (H) 65 - 99 mg/dL    BUN 25 (H) 8 - 23 mg/dL    Creatinine 1.61 (H) 0.57 - 1.00 mg/dL    Sodium 150 (H) 136 - 145 mmol/L    Potassium 5.4 (H) 3.5 - 5.2 mmol/L    Chloride 113 (H) 98 - 107 mmol/L    CO2 28.0 22.0 - 29.0 mmol/L    Calcium 8.6 8.6 - 10.5 mg/dL    Total Protein 4.7 (L) 6.0 - 8.5 g/dL    Albumin 1.50 (L) 3.50 - 5.20 g/dL    ALT (SGPT) 9 1 - 33 U/L    AST (SGOT) 28 1 - 32 U/L    Alkaline Phosphatase 140 " (H) 39 - 117 U/L    Total Bilirubin 0.7 0.0 - 1.2 mg/dL    Globulin 3.2 gm/dL    A/G Ratio 0.5 g/dL    BUN/Creatinine Ratio 15.5 7.0 - 25.0    Anion Gap 9.0 5.0 - 15.0 mmol/L    eGFR 35.4 (L) >60.0 mL/min/1.73   Blood Gas, Arterial -    Collection Time: 08/08/22  4:16 AM    Specimen: Arterial Blood   Result Value Ref Range    Site Right Radial     Nigel's Test Positive     pH, Arterial 7.474 (H) 7.350 - 7.450 pH units    pCO2, Arterial 43.8 35.0 - 45.0 mm Hg    pO2, Arterial 62.9 (L) 83.0 - 108.0 mm Hg    HCO3, Arterial 32.2 (H) 20.0 - 26.0 mmol/L    Base Excess, Arterial 7.8 (H) 0.0 - 2.0 mmol/L    O2 Saturation, Arterial 92.8 (L) 94.0 - 99.0 %    Temperature 37.0 C    Barometric Pressure for Blood Gas 752 mmHg    Modality Ventilator     FIO2 45 %    Ventilator Mode AC     Set Tidal Volume 420     Set Mech Resp Rate 12.0     PEEP 5.0     Collected by [facility]     pCO2, Temperature Corrected 43.8 35 - 45 mm Hg    pH, Temp Corrected 7.474 (H) 7.350 - 7.450 pH Units    pO2, Temperature Corrected 62.9 (L) 83 - 108 mm Hg   POC Glucose Once    Collection Time: 08/08/22  4:31 AM    Specimen: Blood   Result Value Ref Range    Glucose 252 (H) 70 - 130 mg/dL   CBC Auto Differential    Collection Time: 08/08/22  9:49 AM    Specimen: Blood   Result Value Ref Range    WBC 14.25 (H) 3.40 - 10.80 10*3/mm3    RBC 3.26 (L) 3.77 - 5.28 10*6/mm3    Hemoglobin 9.8 (L) 12.0 - 15.9 g/dL    Hematocrit 32.8 (L) 34.0 - 46.6 %    .6 (H) 79.0 - 97.0 fL    MCH 30.1 26.6 - 33.0 pg    MCHC 29.9 (L) 31.5 - 35.7 g/dL    RDW 19.3 (H) 12.3 - 15.4 %    RDW-SD 68.8 (H) 37.0 - 54.0 fl    MPV 11.1 6.0 - 12.0 fL    Platelets 244 140 - 450 10*3/mm3   Manual Differential    Collection Time: 08/08/22  9:49 AM    Specimen: Blood   Result Value Ref Range    Neutrophil % 65.0 42.7 - 76.0 %    Lymphocyte % 7.0 (L) 19.6 - 45.3 %    Monocyte % 5.0 5.0 - 12.0 %    Eosinophil % 4.0 0.3 - 6.2 %    Basophil % 1.0 0.0 - 1.5 %    Bands %  18.0 (H) 0.0 -  5.0 %    Neutrophils Absolute 11.83 (H) 1.70 - 7.00 10*3/mm3    Lymphocytes Absolute 1.00 0.70 - 3.10 10*3/mm3    Monocytes Absolute 0.71 0.10 - 0.90 10*3/mm3    Eosinophils Absolute 0.57 (H) 0.00 - 0.40 10*3/mm3    Basophils Absolute 0.14 0.00 - 0.20 10*3/mm3    nRBC 1.0 (H) 0.0 - 0.2 /100 WBC    Anisocytosis Slight/1+ None Seen    Hypochromia Slight/1+ None Seen    Poikilocytes Slight/1+ None Seen    Polychromasia Slight/1+ None Seen    WBC Morphology Normal Normal    Platelet Morphology Normal Normal   POC Glucose Once    Collection Time: 08/08/22 11:14 AM    Specimen: Blood   Result Value Ref Range    Glucose 240 (H) 70 - 130 mg/dL           Physical Examination:        Physical Exam  Vitals and nursing note reviewed.   Constitutional:       Appearance: Normal appearance. She is obese.      Interventions: She is intubated.   HENT:      Head: Normocephalic and atraumatic.      Right Ear: External ear normal.      Left Ear: External ear normal.      Nose: Nose normal.      Mouth/Throat:      Mouth: Mucous membranes are dry.      Pharynx: Oropharynx is clear.   Eyes:      Extraocular Movements: Extraocular movements intact.      Conjunctiva/sclera: Conjunctivae normal.      Pupils: Pupils are equal, round, and reactive to light.   Neck:      Comments: Trach to vent  Cardiovascular:      Rate and Rhythm: Normal rate and regular rhythm.      Pulses: Normal pulses.      Heart sounds: Normal heart sounds.   Pulmonary:      Effort: Pulmonary effort is normal. She is intubated.      Breath sounds: Normal breath sounds.   Abdominal:      General: Bowel sounds are normal.      Palpations: Abdomen is soft.      Comments: g tube   Musculoskeletal:      Cervical back: Normal range of motion and neck supple.      Comments: Generalized weakness   Skin:     General: Skin is warm and dry.   Neurological:      Comments: Withdraws from pain  Otherwise, unresponsive   Psychiatric:         Mood and Affect: Mood normal.          Behavior: Behavior normal.           Assessment:             Acute respiratory failure with hypoxia (HCC)    Ventilator dependent (HCC)    Acute on chronic respiratory failure with hypoxia and hypercapnia (HCC)    Acute tracheostomy management ([facility])    Past Medical History:   Diagnosis Date   • Abnormal stress test    • Atrial flutter (HCC)    • CAD (coronary artery disease)    • CAD in native artery     CABG x 3   • COVID-19 vaccine series completed 2021    [id number]   • Diverticulosis    • Essential hypertension     Tricuspid valve insufficiency, unspecified etiology    • History of adenomatous polyp of colon    • Hyperlipemia, mixed    • Hyperlipidemia    • Hypertension    • IBS (irritable bowel syndrome)    • MI, old    • Mitral valve prolapse    • Near syncope    • Obesity, morbid ([facility])    • Palpitations    • Paroxysmal SVT (supraventricular tachycardia) ([facility])    • Pedal edema    • Precordial pain    • Rheumatoid arthritis (HCC)    • S/P CABG x 3    • Type 2 diabetes mellitus (HCC)    • Venous insufficiency of both lower extremities         Plan:          1. Acute hypoxic respiratory failure  2. DM2 with hyperglycemia  3. Rheumatoid arthritis  4. Dysphagia  5. History of CAD s/p CABG  6. Morbid obesity  7. Anxiety  8. History of retroperitoneal hematoma  9. UTI  10. Pseudomonas in sputum  11. Hypernatremia    Continue current treatment. Monitor counts. Increase activity. Monitor labs. Vent weaning under direction of pulmonology. Trach management per ENT.  Continue nutrition support via AMONs. Continue IV antibiotics. Glycemic control. Maintain patient safety. Wean pressors as able. Hold lasix and potassium. Monitor renal function and electrolytes closely.         Electronically signed by MINNIE Simpson on 8/8/2022 at 12:26 CDT

## 2022-08-08 NOTE — PROGRESS NOTES
PULMONARY AND CRITICAL CARE PROGRESS NOTE - [facility]    Patient: Teresa Eubanks Day    1955   [id number]  08/08/22   07:08 CDT  Referring Provider: Carson Bill MD     Chief Complaint: Mechanically Ventilated     Interval history: She remains mechanically ventilated. She had mucus plugs and underwent beside bronch by Dr. Levine. Cultures are pending. She remains on Rocephin for UTI. Temp 99.5. She had PRN Morphine and Ativan added to her regimen yesterday however she was changed to Valium secondary to nationwide shortage of Ativan. Valium is discontinued today. She does not follow commands. She is dyssynchronous and her flow is adjusted as well as FIO2. No overnight events. She is on Lasix 40 mg IV daily. Creatinine is 1.61. Continue to monitor.     Review of Systems: Review of Systems   Unable to perform ROS: Intubated      Physical Exam:  Vital signs: T:99.5 BP:135/54  P:88   R:29 Sat:95%  Physical Exam  Vitals reviewed. Exam conducted with a chaperone present.   Constitutional:       General: She is not in acute distress.     Appearance: She is ill-appearing. She is not diaphoretic.      Interventions: She is intubated.   HENT:      Head: Normocephalic.      Nose: Nose normal.      Mouth/Throat:      Mouth: Mucous membranes are moist.   Eyes:      General: No scleral icterus.  Neck:      Comments: Vent to trach, trach site C/D/I   Cardiovascular:      Rate and Rhythm: Normal rate.   Pulmonary:      Effort: No respiratory distress. She is intubated.      Breath sounds: Examination of the left-middle field reveals decreased breath sounds. Examination of the left-lower field reveals decreased breath sounds. Decreased breath sounds and rhonchi (Coarse ) present. No wheezing.   Abdominal:      General: There is no distension.      Comments: Gtube    Genitourinary:     Comments: Evans  fms  Musculoskeletal:      Right lower leg: Edema present.      Left lower leg: Edema  present.      Comments: scds   Skin:     Coloration: Skin is not pale.   Neurological:      Comments: No purposeful response   Grimaces to touch     Electronically signed by MINNIE Antoine, 8/8/2022, 07:08 CDT      Physician Substantive Portion: Medical Decision Making:    Laboratory Data:  Results from last 7 days   Lab Units 08/05/22  1837 08/04/22  0319   WBC 10*3/mm3 10.79 8.75   HEMOGLOBIN g/dL 9.0* 9.6*   PLATELETS 10*3/mm3 257 251     Results from last 7 days   Lab Units 08/08/22  0255 08/07/22  1049 08/05/22  1837 08/04/22  0319   SODIUM mmol/L 150* 150* 148* 144   POTASSIUM mmol/L 5.4* 4.6 3.9 3.6   BUN mg/dL 25* 19 15 14   CREATININE mg/dL 1.61* 1.71* 1.48* 0.80   CRP mg/dL  --   --   --  21.47*     Results from last 7 days   Lab Units 08/08/22  0416 08/07/22  0813 08/06/22  0431   PH, ARTERIAL pH units 7.474* 7.465* 7.485*   PCO2, ARTERIAL mm Hg 43.8 44.3 41.0   PO2 ART mm Hg 62.9* 68.3* 69.7*   FIO2 % 45 80 45     No results found for: BLOODCX, URINECX, WOUNDCX, MRSACX, RESPCX, STOOLCX    Recent films:  XR Chest 1 View    Result Date: 8/7/2022  Stable satisfactory position of tracheostomy tube. There is improved aeration of the left lung following bronchoscopy though persistent consolidation and volume loss is present in the left lung base but no pneumothorax is identified.   This report was finalized on 08/07/2022 13:38 by Dr. Robert Jiang MD.    XR Chest 1 View    Result Date: 8/7/2022  Moderate increase in left hemithorax opacification and probable diffuse atelectasis. This may be related to mucous plugging. Underlying pneumonia is not excluded and there appears to be increased pleural effusion on the left. The tracheostomy tube and right-sided PICC appear in good position as before. There is improvement in the patient's volume status. This report was finalized on 08/07/2022 07:42 by Dr. Robert Jiang MD.    My radiograph interpretation/independent review of imaging: mild atelectasis  LLL    Pulmonary Assessment:    1. Acute resp failure  2. Atelectasis LLL better  3. Metabolic encephalopathy stable    Recommend/plan:   · Vent flow adjusted up to optimize synchrony  · Unable to liberate from vent currently  · Continue trache care  · Enteral nutrition  · Continue main meds  · Try to minimize or eliminate other sedatives  · Mobilize when able.      This visit was performed by both a physician and an [facility] Practice RN.  I personally evaluated and examined the patient.  I performed all aspects of the medical decision making as documented.  Electronically signed by Bart Estrada MD, 8/8/2022, 08:57 CDT

## 2022-08-09 NOTE — PROGRESS NOTES
ACH Fall Risk Assessment Note    Name: Teresa Serna  MRN: [id number]  CSN: [id number]  Room: [facility]  Admit Date/Time: 7/27/2022  1:42 PM.    Currently ordered medications associated with an increased risk for fall include:    Scheduled Meds:  amiodarone, 100 mg, Per G Tube, Q24H  furosemide, 40 mg, Intravenous, Daily  metoprolol tartrate, 50 mg, Per G Tube, Q12H  polyethylene glycol, 17 g, Per G Tube, Daily    Continuous Infusions:.  dexmedetomidine, 0.2-1.5 mcg/kg/hr  norepinephrine, 0.02-0.3 mcg/kg/min, Last Rate: 0.08 mcg/kg/min (08/05/22 1226)      PRN Meds:  •  Morphine  •  ondansetron     Dereje Dee, Pharmacy Intern  08/09/22 14:03 CDT

## 2022-08-09 NOTE — PROGRESS NOTES
PULMONARY AND CRITICAL CARE PROGRESS NOTE - [facility]  Patient: Teresa Eubanks Day    1955   Acct# [id number]  08/09/22   07:41 CDT  Referring Provider: Carson Bill MD  Chief Complaint: Mechanically ventilated  Interval history: Pt is unable to provide history due to mechanical ventilation.  She has had low-grade fever.  She is on Levophed.  MAP 57.  She is receiving nutrition.  Sputum culture showing heavy growth of Pseudomonas.  Urine culture showing Candida.  Blood cultures pending.  She is receiving cefepime.  She is not on antifungals.  She has not had a blood culture from her PICC yet.  PICC replaced 7-30.  Review of Systems:   Cannot obtain due to mechanical ventilated state  Ventilator Settings: Mode: AC, rate 14 Vt/PC: 420 PEEP: 5  FiO2 0.4  Physical Exam:  Vital signs: T: 100.4   BP: 119/45   P: 104   R: 30   sat: 97 end-tidal 24  Physical Exam  Physical Exam  Vitals reviewed. Exam conducted with a chaperone present.   Constitutional:       General: She is not in acute distress.     Appearance: She is ill-appearing. She is not diaphoretic.      Interventions: She is intubated, grimaced with stimuli, nonspecific  HENT:      Head: Normocephalic.      Nose: Nose normal.      Mouth/Throat:      Mouth: Mucous membranes are moist.   Eyes:      General: No scleral icterus.  Neck:      Comments: Vent to trach, trach site C/D/I   Cardiovascular:      Rate and Rhythm: Normal rhythm, rate 104  Pulmonary:      Effort: No respiratory distress. She is intubated.      Breath sounds: Examination of the left-middle field reveals decreased breath sounds. Examination of the left-lower field reveals decreased breath sounds. Decreased breath sounds and rhonchi (Coarse ) present. No wheezing.   Abdominal:      General: There is no distension.  Soft, positive bowel sounds     Comments: G-tube    Genitourinary:     Comments: Evans  fms  Musculoskeletal:      Right lower leg: Edema present.       Left lower leg: Edema present.      Comments: scds   Skin:     Coloration: Skin is not pale.   Neurological:      Comments: No purposeful response, Grimaces to touch     Electronically signed by MINNIE Bautista, 8/9/2022, 08:46 CDT     Physician Substantive Portion: Medical Decision Making:   Results from last 7 days   Lab Units 08/09/22  0356 08/08/22  0949 08/05/22  1837   WBC 10*3/mm3 11.08* 14.25* 10.79   HEMOGLOBIN g/dL 8.3* 9.8* 9.0*   PLATELETS 10*3/mm3 237 244 257     Results from last 7 days   Lab Units 08/09/22  0356 08/08/22  0255 08/07/22  1049   SODIUM mmol/L 150* 150* 150*   POTASSIUM mmol/L 3.4* 5.4* 4.6   BUN mg/dL 30* 25* 19   CREATININE mg/dL 1.35* 1.61* 1.71*     Results from last 7 days   Lab Units 08/09/22  0456 08/08/22  0416 08/07/22  0813   PH, ARTERIAL pH units 7.464* 7.474* 7.465*   PCO2, ARTERIAL mm Hg 42.9 43.8 44.3   PO2 ART mm Hg 59.8* 62.9* 68.3*   FIO2 % 40 45 80     Blood Culture   Date Value Ref Range Status   08/05/2022 No growth at 3 days  Preliminary     Urine Culture   Date Value Ref Range Status   08/05/2022 No growth  Final   08/04/2022 No growth  Final     Respiratory Culture   Date Value Ref Range Status   08/07/2022 Heavy growth (4+) Pseudomonas species (A)  Preliminary   08/07/2022 No Normal Respiratory Doris (A)  Preliminary   08/07/2022 Moderate growth (3+) Pseudomonas species (A)  Preliminary   08/07/2022 No Normal Respiratory Doris (A)  Preliminary     Electronically signed by MINNIE Bautista, 8/9/2022, 07:41 CDT    Recent films:  XR Chest 1 View    Result Date: 8/9/2022  1.. Cardiomegaly with mild vascular congestion demonstrating mild interval improvement. 2. Small effusions with bibasilar atelectasis. This report was finalized on 08/09/2022 06:58 by Dr. Ignacio Perkins MD.    XR Chest 1 View    Result Date: 8/8/2022   No change in appearance of the chest. This report was finalized on 08/08/2022 07:16 by Dr. Yaniv Eddy MD.    XR Chest 1  View    Result Date: 8/7/2022  Stable satisfactory position of tracheostomy tube. There is improved aeration of the left lung following bronchoscopy though persistent consolidation and volume loss is present in the left lung base but no pneumothorax is identified.   This report was finalized on 08/07/2022 13:38 by Dr. Robert Jiang MD.    My radiograph interpretation/independent review of imaging: devices ok, bibasilar atelectasis    Pulmonary Assessment:    1. Acute resp failure with hypoxia requiring mechanical ventilation  2. Waxing and waning atelectasis with intermittent mucus plugging, requiring bronchoscopy x1, preventing liberation from ventilator  3. Fever, now with pyuria, fungus in urine, pseudomonas on resp cx  4. Metabolic encephalopathy stable  5. Tracheostomy status    Recommend/plan:   · Continue bronchodilators  · Continue ventilator with current settings, titrate oxygen  · Add echinocandin for fungus  · Cefepime for pseudomonas  · Not ready to liberate from vent    This visit was performed by both a physician and an Advanced Practice RN.  I personally evaluated and examined the patient.  I performed all aspects of the medical decision making as documented.  Electronically signed by Bart Estrada MD, 8/9/2022, 08:59 CDT

## 2022-08-09 NOTE — PROGRESS NOTES
Fly Bill M.D.  MINNIE Garcia        Internal Medicine Progress Note    8/9/2022   12:23 CDT    Name:  Teresa Serna  MRN:    [id number]     Acct:     [id number]   Room:  [address] [place] [address] Day: 0     Admit Date: 7/27/2022  1:42 PM  PCP: Teresa Contreras MD    Subjective:     C/C: need for continued vent weaning    Interval History: Status:  stayed the same. Resting in bed. No family at bedside. Continues with low grade temp.  Tolerating current vent settings (A/C) with 40% 02. No purposeful response to stimulation - withdraws from pain. Counts improved. WBC remains elevated, but trending toward normal. Continues with levophed. Pseudomonas in sputum. Repeat cultures pending.     Review of Systems   Unable to perform ROS: intubated         Medications:     Allergies:   Allergies   Allergen Reactions   • Codeine Palpitations and Other (See Comments)     Chest pain   • Exenatide Arrhythmia   • Sulfa Antibiotics Hives and Rash     Reaction: hives   • Albuterol Palpitations     High heart rate   • Sitagliptin Swelling   • Clindamycin/Lincomycin Unknown - High Severity   • Amoxicillin-Pot Clavulanate Palpitations       Current Meds:   Current Facility-Administered Medications:   •  acetaminophen (TYLENOL) tablet 650 mg, 650 mg, Per G Tube, Q4H PRN **OR** acetaminophen (TYLENOL) suppository 650 mg, 650 mg, Rectal, Q4H PRN **OR** acetaminophen (TYLENOL) 160 MG/5ML solution 650 mg, 650 mg, Per G Tube, Q4H PRN, Carson Bill MD  •  acetylcysteine (MUCOMYST) 20 % nebulizer solution 1.5 mL, 1.5 mL, Nebulization, BID PRN, Jaimie Hunter APRN  •  amiodarone (PACERONE) tablet 100 mg, 100 mg, Per G Tube, Q24H, Carson Bill MD  •  cefepime (MAXIPIME) 2 g/100 mL 0.9% NS (mbp), 2 g, Intravenous, Q8H, Carson Bill MD  •  chlorhexidine (PERIDEX) 0.12 % solution 15 mL, 15 mL, Mouth/Throat, Q12H, Stephanie Henry MD  •  dexmedetomidine (PRECEDEX) 400 mcg in 100 mL NS infusion,  0.2-1.5 mcg/kg/hr, Intravenous, Titrated, Stephanie Henry MD  •  dextrose (D50W) (25 g/50 mL) IV injection 25 g, 25 g, Intravenous, Q15 Min PRN, Stephanie Henry MD  •  dextrose (GLUTOSE) oral gel 15 g, 15 g, Oral, Q15 Min PRN, Stephanie Henry MD  •  famotidine (PEPCID) tablet 20 mg, 20 mg, Per G Tube, Daily, Carson Bill MD  •  furosemide (LASIX) injection 40 mg, 40 mg, Intravenous, Daily, Anamaria Levine MD  •  glucagon (human recombinant) (GLUCAGEN DIAGNOSTIC) injection 1 mg, 1 mg, Subcutaneous, Q15 Min PRN, Stephanie Henry MD  •  insulin detemir (LEVEMIR) injection 25 Units, 25 Units, Subcutaneous, Nightly, Stephanie Henry MD  •  Insulin Lispro (humaLOG) injection 0-9 Units, 0-9 Units, Subcutaneous, Q6H, Stephanie Henry MD  •  lactated ringers infusion, 100 mL/hr, Intravenous, Continuous, Stephanie Henry MD, Last Rate: 100 mL/hr at 08/05/22 1037, Currently Infusing at 08/05/22 1037  •  levalbuterol (XOPENEX) nebulizer solution 0.63 mg, 0.63 mg, Nebulization, BID PRN, Stephanie Henry MD  •  levalbuterol (XOPENEX) nebulizer solution 0.63 mg, 0.63 mg, Nebulization, 4x Daily - RT, Stephanie Henry MD  •  lidocaine (XYLOCAINE) 1 % injection 10 mL, 10 mL, Subcutaneous, Once, Anamaria Levine MD  •  magnesium hydroxide (MILK OF MAGNESIA) suspension 10 mL, 10 mL, Per G Tube, Daily PRN, Carson Bill MD  •  metoprolol tartrate (LOPRESSOR) tablet 50 mg, 50 mg, Per G Tube, Q12H, Carson Bill MD  •  micafungin sodium (MYCAMINE) 100 mg in sodium chloride 0.9 % 100 mL IVPB, 100 mg, Intravenous, Q24H, Dale, Jaimie M, APRN  •  Morphine sulfate (PF) injection 2 mg, 2 mg, Intravenous, Q4H PRN, SensarmaAnamaria MD  •  norepinephrine (LEVOPHED) 8 mg in 250 mL NS infusion (premix), 0.02-0.3 mcg/kg/min, Intravenous, Titrated, Stephanie Henry MD, Last Rate: 17.25 mL/hr at 08/05/22 1226, 0.08 mcg/kg/min at 08/05/22 1226  •  ondansetron (ZOFRAN) tablet 4 mg, 4 mg, Per G Tube, Q6H PRN **OR**  "ondansetron (ZOFRAN) injection 4 mg, 4 mg, Intravenous, Q6H PRN, Carson Bill MD  •  polyethylene glycol (MIRALAX) packet 17 g, 17 g, Per G Tube, Daily, Carson Bill MD  •  potassium chloride (KLOR-CON) packet 40 mEq, 40 mEq, Per G Tube, Q4H, Carson Bill MD  •  sodium chloride 0.9 % flush 10 mL, 10 mL, Intravenous, Q12H, Stephanie Henry MD  •  sodium chloride 0.9 % flush 10 mL, 10 mL, Intravenous, PRN, Stephanie Henry MD    Data:     Code Status:    There are no questions and answers to display.       Family History   Problem Relation Age of Onset   • Cancer Father    • Coronary artery disease Father    • Asthma Father    • Heart failure Mother    • Kidney disease Mother    • Arthritis Mother    • Pancreatitis Sister    • Heart attack Brother    • Colon cancer Neg Hx    • Colon polyps Neg Hx        Social History     Socioeconomic History   • Marital status:    Tobacco Use   • Smoking status: Former Smoker     Years: 20.00     Types: Cigarettes     Quit date: 2000     Years since quittin.5   • Smokeless tobacco: Never Used   Vaping Use   • Vaping Use: Never used   Substance and Sexual Activity   • Alcohol use: No   • Drug use: No   • Sexual activity: Defer       Vitals:  /53   Pulse 100   Temp 98.1 °F (36.7 °C) (Temporal)   Resp 12   Ht 167.6 cm (66\")   Wt 116 kg (255 lb 4.8 oz)   SpO2 95%   BMI 41.21 kg/m²     T 100.4 P 100 R 27 /45 Sp02 96% (vent)        I/O (24Hr):  No intake or output data in the 24 hours ending 22 1223    Labs and imaging:      Recent Results (from the past 12 hour(s))   Basic Metabolic Panel    Collection Time: 22  3:56 AM    Specimen: Blood   Result Value Ref Range    Glucose 193 (H) 65 - 99 mg/dL    BUN 30 (H) 8 - 23 mg/dL    Creatinine 1.35 (H) 0.57 - 1.00 mg/dL    Sodium 150 (H) 136 - 145 mmol/L    Potassium 3.4 (L) 3.5 - 5.2 mmol/L    Chloride 111 (H) 98 - 107 mmol/L    CO2 28.0 22.0 - 29.0 mmol/L    Calcium " 8.9 8.6 - 10.5 mg/dL    BUN/Creatinine Ratio 22.2 7.0 - 25.0    Anion Gap 11.0 5.0 - 15.0 mmol/L    eGFR 43.7 (L) >60.0 mL/min/1.73   CBC Auto Differential    Collection Time: 08/09/22  3:56 AM    Specimen: Blood   Result Value Ref Range    WBC 11.08 (H) 3.40 - 10.80 10*3/mm3    RBC 2.79 (L) 3.77 - 5.28 10*6/mm3    Hemoglobin 8.3 (L) 12.0 - 15.9 g/dL    Hematocrit 28.2 (L) 34.0 - 46.6 %    .1 (H) 79.0 - 97.0 fL    MCH 29.7 26.6 - 33.0 pg    MCHC 29.4 (L) 31.5 - 35.7 g/dL    RDW 19.3 (H) 12.3 - 15.4 %    RDW-SD 69.1 (H) 37.0 - 54.0 fl    MPV 10.7 6.0 - 12.0 fL    Platelets 237 140 - 450 10*3/mm3    Neutrophil % 71.8 42.7 - 76.0 %    Lymphocyte % 12.1 (L) 19.6 - 45.3 %    Monocyte % 6.9 5.0 - 12.0 %    Eosinophil % 7.8 (H) 0.3 - 6.2 %    Basophil % 0.5 0.0 - 1.5 %    Immature Grans % 0.9 (H) 0.0 - 0.5 %    Neutrophils, Absolute 7.97 (H) 1.70 - 7.00 10*3/mm3    Lymphocytes, Absolute 1.34 0.70 - 3.10 10*3/mm3    Monocytes, Absolute 0.76 0.10 - 0.90 10*3/mm3    Eosinophils, Absolute 0.86 (H) 0.00 - 0.40 10*3/mm3    Basophils, Absolute 0.05 0.00 - 0.20 10*3/mm3    Immature Grans, Absolute 0.10 (H) 0.00 - 0.05 10*3/mm3    nRBC 0.6 (H) 0.0 - 0.2 /100 WBC   Blood Gas, Arterial -    Collection Time: 08/09/22  4:56 AM    Specimen: Arterial Blood   Result Value Ref Range    Site Right Radial     Nigel's Test Positive     pH, Arterial 7.464 (H) 7.350 - 7.450 pH units    pCO2, Arterial 42.9 35.0 - 45.0 mm Hg    pO2, Arterial 59.8 (L) 83.0 - 108.0 mm Hg    HCO3, Arterial 30.7 (H) 20.0 - 26.0 mmol/L    Base Excess, Arterial 6.3 (H) 0.0 - 2.0 mmol/L    O2 Saturation, Arterial 90.5 (L) 94.0 - 99.0 %    Temperature 37.0 C    Barometric Pressure for Blood Gas 751 mmHg    Modality Ventilator     FIO2 40 %    Ventilator Mode AC     Set Tidal Volume 420     Set Mech Resp Rate 12.0     PEEP 5.0     Collected by MARY ZAVALA RRT     pCO2, Temperature Corrected 42.9 35 - 45 mm Hg    pH, Temp Corrected 7.464 (H) 7.350 - 7.450 pH Units     pO2, Temperature Corrected 59.8 (L) 83 - 108 mm Hg   POC Glucose Once    Collection Time: 08/09/22  5:06 AM    Specimen: Blood   Result Value Ref Range    Glucose 177 (H) 70 - 130 mg/dL   Miscellaneous Micro Request - Specimen Not Sent, Specimen Not Sent    Collection Time: 08/09/22 10:09 AM    Specimen: Specimen Not Sent   Result Value Ref Range    Extra Tube     POC Glucose Once    Collection Time: 08/09/22 11:20 AM    Specimen: Blood   Result Value Ref Range    Glucose 156 (H) 70 - 130 mg/dL           Physical Examination:        Physical Exam  Vitals and nursing note reviewed.   Constitutional:       Appearance: Normal appearance. She is obese.      Interventions: She is intubated.   HENT:      Head: Normocephalic and atraumatic.      Right Ear: External ear normal.      Left Ear: External ear normal.      Nose: Nose normal.      Mouth/Throat:      Mouth: Mucous membranes are dry.      Pharynx: Oropharynx is clear.   Eyes:      Extraocular Movements: Extraocular movements intact.      Conjunctiva/sclera: Conjunctivae normal.      Pupils: Pupils are equal, round, and reactive to light.   Neck:      Comments: Trach to vent  Cardiovascular:      Rate and Rhythm: Normal rate and regular rhythm.      Pulses: Normal pulses.      Heart sounds: Normal heart sounds.   Pulmonary:      Effort: Pulmonary effort is normal. She is intubated.      Breath sounds: Normal breath sounds.   Abdominal:      General: Bowel sounds are normal.      Palpations: Abdomen is soft.      Comments: g tube   Musculoskeletal:      Cervical back: Normal range of motion and neck supple.      Comments: Generalized weakness   Skin:     General: Skin is warm and dry.   Neurological:      Comments: Withdraws from pain  Otherwise, unresponsive   Psychiatric:         Mood and Affect: Mood normal.         Behavior: Behavior normal.           Assessment:             Acute respiratory failure with hypoxia (HCC)    Ventilator dependent (HCC)    Acute on  chronic respiratory failure with hypoxia and hypercapnia (HCC)    Acute tracheostomy management (HCC)    Past Medical History:   Diagnosis Date   • Abnormal stress test    • Atrial flutter (HCC)    • CAD (coronary artery disease)    • CAD in native artery     CABG x 3   • COVID-19 vaccine series completed 2021 [id number]/[id number]   • Diverticulosis    • Essential hypertension     Tricuspid valve insufficiency, unspecified etiology    • History of adenomatous polyp of colon    • Hyperlipemia, mixed    • Hyperlipidemia    • Hypertension    • IBS (irritable bowel syndrome)    • MI, old    • Mitral valve prolapse    • Near syncope    • Obesity, morbid (HCC)    • Palpitations    • Paroxysmal SVT (supraventricular tachycardia) (HCC)    • Pedal edema    • Precordial pain    • Rheumatoid arthritis (HCC)    • S/P CABG x 3    • Type 2 diabetes mellitus (HCC)    • Venous insufficiency of both lower extremities         Plan:          1. Acute hypoxic respiratory failure  2. DM2 with hyperglycemia  3. Rheumatoid arthritis  4. Dysphagia  5. History of CAD s/p CABG  6. Morbid obesity  7. Anxiety  8. History of retroperitoneal hematoma  9. UTI  10. Pseudomonas in sputum  11. Hypernatremia  12. Hypokalemia    Continue current treatment. Monitor counts. Increase activity. Monitor labs. Vent weaning under direction of pulmonology. Trach management per ENT.  Continue nutrition support via AMONs. Continue IV antibiotics. Await final cultures. Glycemic control. Maintain patient safety. Wean pressors as able. Monitor renal function and electrolytes closely.         Electronically signed by MINNIE Simpson on 8/9/2022 at 12:23 CDT     I have discussed the care of Teresa Serna, including pertinent history and exam findings, with the nurse practitioner.    I have seen and examined the patient and the key elements of all parts of the encounter have been performed by me.  I agree with the assessment, plan and orders as documented by  MINNIE Garcia, after I modified the exam findings and the plan of treatments and the final version is my approved version of the assessment.        Electronically signed by Carson Bill MD on 8/9/2022 at 19:38 CDT

## 2022-08-10 NOTE — PROGRESS NOTES
Adult Nutrition  Assessment/PES    Patient Name:  Teresa Serna  YOB: 1955  MRN: [id number]  Admit Date:  7/27/2022    Assessment Date:  8/10/2022   Reason for Assessment     Row Name 08/10/22 1231          Reason for Assessment    Reason For Assessment follow-up protocol;TF/PN     Diagnosis pulmonary disease;cardiac disease;diabetes diagnosis/complications;infection/sepsis;renal disease     Identified At Risk by Screening Criteria tube feeding or parenteral nutrition                Nutrition/Diet History     Row Name 08/10/22 1231          Nutrition/Diet History    Typical Intake (Food/Fluid/EN/PN) TF tolerated at goal. Antibiotics started for pseudomonas in sputum. Coccyx wound continues with wound care. Wt stable 116kg.     Enteral Nutrition Regimen Diabetisource AC. Final goal rate 60 mL/hr. Free water 40 mL/hr.     Factors Affecting Nutritional Intake enteral/parenteral device(s);respiratory difficulty/therapies;cognitive status/motor function                Anthropometrics     Row Name 08/10/22 1234          Anthropometrics    Weight for Calculation 111 kg (244 lb 6.4 oz)                Labs/Tests/Procedures/Meds     Row Name 08/10/22 1232          Labs/Procedures/Meds    Lab Results Reviewed reviewed     Lab Results Comments Glu, Na, Cl, BUN, Cr, CRP, K+, WBC, H/H            Diagnostic Tests/Procedures    Diagnostic Test/Procedure Reviewed reviewed            Medications    Pertinent Medications Reviewed reviewed     Pertinent Medications Comments See MAR                Physical Findings     Row Name 08/10/22 1233          Physical Findings    Overall Physical Appearance Trach to vent, coccyx wound, generalized edema, FMS.     Enteral Access Devices PEG                Estimated/Assessed Needs - Anthropometrics     Row Name 08/10/22 1234          Anthropometrics    Weight for Calculation 111 kg (244 lb 6.4 oz)            Estimated/Assessed Needs    Additional Documentation Fluid Requirements  (Group);KCAL/KG (Group);Protein Requirements (Group)            KCAL/KG    KCAL/KG 14 Kcal/Kg (kcal);15 Kcal/Kg (kcal)     14 Kcal/Kg (kcal) 1552.026     15 Kcal/Kg (kcal) 1662.885            Protein Requirements    Weight Used For Protein Calculations 59 kg (130 lb)  IBW     Est Protein Requirement Amount (gms/kg) 1.4 gm protein     Estimated Protein Requirements (gms/day) 82.56            Fluid Requirements    Fluid Requirements (mL/day) 1663     Estimated Fluid Requirement Method RDA Method     RDA Method (mL) 1663                Nutrition Prescription Ordered     Row Name 08/10/22 1235          Nutrition Prescription PO    Current PO Diet NPO            Nutrition Prescription EN    Enteral Route PEG     Product Diabetisource AC (Glucerna 1.2)     TF Delivery Method Continuous     Continuous TF Goal Rate (mL/hr) 60 mL/hr     Continuous TF Current Rate (mL/hr) 60 mL/hr     Continuous TF Goal Volume (mL) 1320 mL     Water flush (mL)  40 mL     Water Flush Frequency Per hour                Evaluation of Received Nutrient/Fluid Intake     Row Name 08/10/22 1235          Nutrient/Fluid Evaluation    Number of Days Evaluated 3 days            Calories Evaluation    Total Calorie Target (kcal) 1663     Oral Calories (kcal) 0     Enteral Calories (kcal) 2064     Parenteral Calories (kcal) 0     Other Calories (kcal) 0     Total Calories (kcal) 2064     % of Kcal Needs 124.11            Protein Evaluation    Total Protein Target (g) 83     Oral Protein (g) 0     Enteral Protein (g) 103     Parenteral Protein (g) 0     Other Protein (g) 0     Total Protein (g) 103     % of Protein Needs 124.1            Intake Assessment    Energy/Calorie Requirement Assessment meeting needs     Protein Requirement Assessment meeting needs     Fluid Requirement Assessment exceeds needs;meeting needs     Average Calorie Intake (days) 3     Average Protein Intake (days) 3     Tolerance tolerating            Fluid Intake Evaluation    Total  Fluid Target (mL) 1663     Oral Fluid (mL) 0     Enteral Fluid (mL) 2243     Parenteral Fluid (mL) 0     Other Fluid (mL) 792     Total Fluid Intake (mL) 3035     % Total Fluid Intake 182.5            PO Evaluation    % PO Intake NPO            EN Evaluation    Number of Days EN Intake Evaluated 3 days     EN Average Volume Delivered (mL/day) 1720 mL/day     % Goal Volume  130 %     TF Residual 0     TF Tolerance Other (comment)  liquid stool FMS     HOB Greater than or equal to 30 degress                     Problem/Interventions:   Problem 1     Row Name 08/10/22 1237          Nutrition Diagnoses Problem 1    Problem 1 Needs Alternate     Etiology (related to) Medical Diagnosis     Infectious Disease Sepsis     Pulmonary/Critical Care Ventilator     Signs/Symptoms (evidenced by) NPO;PO diet not tolerated;No EN Route Available;EN Intake Delivery     Percent (%) of EN goal 130 %                      Intervention Goal     Row Name 08/10/22 1237          Intervention Goal    General Improved nutrition related lab(s);Reduce/improve symptoms;Meet nutritional needs for age/condition;Disease management/therapy     TF/PN Tolerate TF at goal;Deliver (%) goal;Deliver estimated need (%)     Deliver % of Goal 75 %     Deliver % of Estimated Need 100 %     Weight No significant weight loss                Nutrition Intervention     Row Name 08/10/22 1237          Nutrition Intervention    RD/Tech Action Care plan reviewd;Follow Tx progress                Nutrition Prescription     Row Name 08/10/22 1237          Nutrition Prescription EN    Enteral Prescription Continue same protocol                Education/Evaluation     Row Name 08/10/22 1237          Education    Education No discharge needs identified at this time            Monitor/Evaluation    Monitor Per protocol                 Electronically signed by:  Juani Cardona RD  08/10/22 12:37 CDT

## 2022-08-10 NOTE — PROGRESS NOTES
PULMONARY AND CRITICAL CARE PROGRESS NOTE - [facility]  Patient: Teresa Eubanks Day    1955   Acct# [id number]  08/10/22   07:32 CDT  Referring Provider: Carson Bill MD  Chief Complaint: Mechanically ventilated  Interval history: Pt is unable to provide history due to mechanical ventilation.  She is afebrile today.  Pseudomonas is sensitive to cefepime.  She continues on Levophed but no sedation.  Nutrition is infusing to PEG.  Per nursing, she does not follow any commands but does grimace occasionally.  ABGs and chest x-ray are stable.  No overnight events reported.  Review of Systems:   Cannot obtain due to mechanical ventilated state  Ventilator Settings: Mode: AC, rate 14 Vt/PC: 420 PEEP: 5  FiO2 0.5  Physical Exam:  Vital signs: T: 97.3   BP: 114/46   P: 94   R: 26   sat: 95 end-tidal 29  Physical Exam  Physical Exam  Vitals reviewed. Exam conducted with RN present.   Constitutional:       General: She is not in acute distress.     Appearance: She is ill-appearing. She is not diaphoretic. She is obese.      Interventions: She is intubated, grimaced with stimuli, nonspecific  HENT:      Head: Normocephalic.      Nose: Nose normal.      Mouth/Throat:      Mouth: Mucous membranes are moist.   Eyes:      General: No scleral icterus.  Neck:       Comments: Vent to trach, trach site C/D/I   Cardiovascular:      Rate and Rhythm: Normal rhythm  Pulmonary:      Effort: No respiratory distress. She is intubated.      Breath sounds: Examination of the left-middle field reveals decreased breath sounds. Examination of the left-lower field reveals decreased breath sounds. Decreased breath sounds present. No wheezing.   Abdominal:      General: There is no distension.  Soft, positive bowel sounds     Comments: G-tube with nutrition infusing    Genitourinary:     Comments: Evans & fecal management in place   Musculoskeletal:      Right lower leg: Edema present.      Left lower leg: Edema  present.      Comments: scds   Skin:     Coloration: Skin is not pale.   Neurological:      Comments: No purposeful response. Does not follow any commands.    Electronically signed by MINNIE Blankenship, 8/10/2022, 07:32 CDT     Physician Substantive Portion: Medical Decision Making:   Results from last 7 days   Lab Units 08/09/22  0356 08/08/22  0949 08/05/22  1837   WBC 10*3/mm3 11.08* 14.25* 10.79   HEMOGLOBIN g/dL 8.3* 9.8* 9.0*   PLATELETS 10*3/mm3 237 244 257     Results from last 7 days   Lab Units 08/10/22  0435 08/09/22  0356 08/08/22  0255 08/07/22  1049   SODIUM mmol/L  --  150* 150* 150*   POTASSIUM mmol/L 4.4 3.4* 5.4* 4.6   BUN mg/dL  --  30* 25* 19   CREATININE mg/dL  --  1.35* 1.61* 1.71*     Results from last 7 days   Lab Units 08/10/22  0433 08/09/22  0456 08/08/22  0416   PH, ARTERIAL pH units 7.481* 7.464* 7.474*   PCO2, ARTERIAL mm Hg 40.4 42.9 43.8   PO2 ART mm Hg 54.7* 59.8* 62.9*   FIO2 % 40 40 45     Blood Culture   Date Value Ref Range Status   08/05/2022 No growth at 3 days  Preliminary     Urine Culture   Date Value Ref Range Status   08/05/2022 No growth  Final   08/04/2022 No growth  Final     Respiratory Culture   Date Value Ref Range Status   08/07/2022 Heavy growth (4+) Pseudomonas species (A)  Preliminary   08/07/2022 No Normal Respiratory Doris (A)  Preliminary   08/07/2022 Moderate growth (3+) Pseudomonas species (A)  Preliminary   08/07/2022 No Normal Respiratory Doris (A)  Preliminary     Electronically signed by MINNIE Blankenship, 8/10/2022, 07:32 CDT    Recent films:  XR Chest 1 View    Result Date: 8/10/2022   No change in appearance of the chest. This report was finalized on 08/10/2022 07:10 by Dr. Yaniv Eddy MD.    XR Chest 1 View    Result Date: 8/9/2022  1.. Cardiomegaly with mild vascular congestion demonstrating mild interval improvement. 2. Small effusions with bibasilar atelectasis. This report was finalized on 08/09/2022 06:58 by Dr. Pierre  MD Gregorio.    My radiograph interpretation/independent review of imaging: LLL atelectasis, RLL atelectasis/infiltrate    Pulmonary Assessment:    1. Acute resp failure with hypoxia improving  2. Atelectasis aggravating above, improved  3. Pseudomonas tracheobronchitis, possible pneumonia; on abx  4. Tracheostomy status.  5. Yeast in urine  6. Fever uncertain etiology stabilizing    Recommend/plan:   · Continue cefepime for pseudomonas; sensitivity verified on c/s panel report  · Continue antifungal abx  · Fio2 increased to 0.5.  Suspect related to problems 1,2,3 above; monitor and increase peep should hypoxia develop despite current fio2.  · Continue bronchodilators  · Minimize sedation  · Discussed plan with nursing and RT      This visit was performed by both a physician and an [facility] RN.  I personally evaluated and examined the patient.  I performed all aspects of the medical decision making as documented.  Electronically signed by Bart Estrada MD, 8/10/2022, 08:38 CDT

## 2022-08-10 NOTE — PROGRESS NOTES
Fly Bill M.D.  MINNIE Garcia        Internal Medicine Progress Note    8/10/2022   10:45 CDT    Name:  Teresa Serna  MRN:    [id number]     Acct:     [id number]   Room:  [address] [place] [address] Day: 0     Admit Date: 7/27/2022  1:42 PM  PCP: Teresa Contreras MD    Subjective:     C/C: need for continued vent weaning    Interval History: Status:  stayed the same. Resting in bed. Family at bedside. Afebrile.  Tolerating current vent settings (A/C) with 50% 02. No purposeful response to stimulation - withdraws from pain.  Continues with levophed. Pseudomonas in sputum. Repeat cultures pending.     Review of Systems   Unable to perform ROS: intubated         Medications:     Allergies:   Allergies   Allergen Reactions   • Codeine Palpitations and Other (See Comments)     Chest pain   • Exenatide Arrhythmia   • Sulfa Antibiotics Hives and Rash     Reaction: hives   • Albuterol Palpitations     High heart rate   • Sitagliptin Swelling   • Clindamycin/Lincomycin Unknown - High Severity   • Amoxicillin-Pot Clavulanate Palpitations       Current Meds:   Current Facility-Administered Medications:   •  acetaminophen (TYLENOL) tablet 650 mg, 650 mg, Per G Tube, Q4H PRN **OR** acetaminophen (TYLENOL) suppository 650 mg, 650 mg, Rectal, Q4H PRN **OR** acetaminophen (TYLENOL) 160 MG/5ML solution 650 mg, 650 mg, Per G Tube, Q4H PRN, Carson Bill MD  •  acetylcysteine (MUCOMYST) 20 % nebulizer solution 1.5 mL, 1.5 mL, Nebulization, BID PRN, Jaimie Hunter APRN  •  amiodarone (PACERONE) tablet 100 mg, 100 mg, Per G Tube, Q24H, Carson Bill MD  •  cefepime (MAXIPIME) 2 g/100 mL 0.9% NS (mbp), 2 g, Intravenous, Q8H, Carson Bill MD  •  chlorhexidine (PERIDEX) 0.12 % solution 15 mL, 15 mL, Mouth/Throat, Q12H, Stephanie Henry MD  •  dexmedetomidine (PRECEDEX) 400 mcg in 100 mL NS infusion, 0.2-1.5 mcg/kg/hr, Intravenous, Titrated, Stephanie Henry MD  •  dextrose (D50W) (25  g/50 mL) IV injection 25 g, 25 g, Intravenous, Q15 Min PRN, Stephanie Henry MD  •  dextrose (GLUTOSE) oral gel 15 g, 15 g, Oral, Q15 Min PRN, Stephanie Henry MD  •  famotidine (PEPCID) tablet 20 mg, 20 mg, Per G Tube, Daily, Carson Bill MD  •  furosemide (LASIX) injection 40 mg, 40 mg, Intravenous, Daily, Anamaria Levine MD  •  glucagon (human recombinant) (GLUCAGEN DIAGNOSTIC) injection 1 mg, 1 mg, Subcutaneous, Q15 Min PRN, Stephanie Henry MD  •  insulin detemir (LEVEMIR) injection 25 Units, 25 Units, Subcutaneous, Nightly, Stephanie Henry MD  •  Insulin Lispro (humaLOG) injection 0-9 Units, 0-9 Units, Subcutaneous, Q6H, Stephanie Henry MD  •  lactated ringers infusion, 100 mL/hr, Intravenous, Continuous, Stephanie Henry MD, Last Rate: 100 mL/hr at 08/05/22 1037, Currently Infusing at 08/05/22 1037  •  levalbuterol (XOPENEX) nebulizer solution 0.63 mg, 0.63 mg, Nebulization, BID PRN, Stephanie Henry MD  •  levalbuterol (XOPENEX) nebulizer solution 0.63 mg, 0.63 mg, Nebulization, 4x Daily - RT, Stephanie Henry MD  •  lidocaine (XYLOCAINE) 1 % injection 10 mL, 10 mL, Subcutaneous, Once, Anamaria Levine MD  •  magnesium hydroxide (MILK OF MAGNESIA) suspension 10 mL, 10 mL, Per G Tube, Daily PRN, Carson Bill MD  •  metoprolol tartrate (LOPRESSOR) tablet 50 mg, 50 mg, Per G Tube, Q12H, Carson Bill MD  •  micafungin sodium (MYCAMINE) 100 mg in sodium chloride 0.9 % 100 mL IVPB, 100 mg, Intravenous, Q24H, Jaimie Hunter APRN  •  Morphine sulfate (PF) injection 2 mg, 2 mg, Intravenous, Q4H PRN, Anamaria Levine MD  •  norepinephrine (LEVOPHED) 8 mg in 250 mL NS infusion (premix), 0.02-0.3 mcg/kg/min, Intravenous, Titrated, Stephanie Henry MD, Last Rate: 17.25 mL/hr at 08/05/22 1226, 0.08 mcg/kg/min at 08/05/22 1226  •  ondansetron (ZOFRAN) tablet 4 mg, 4 mg, Per G Tube, Q6H PRN **OR** ondansetron (ZOFRAN) injection 4 mg, 4 mg, Intravenous, Q6H PRN, Carson Bill,  "MD  •  polyethylene glycol (MIRALAX) packet 17 g, 17 g, Per G Tube, Daily, Carson Bill MD  •  sodium chloride 0.9 % flush 10 mL, 10 mL, Intravenous, Q12H, Stephanie Henry MD  •  sodium chloride 0.9 % flush 10 mL, 10 mL, Intravenous, PRN, Stephanie Henry MD    Data:     Code Status:    There are no questions and answers to display.       Family History   Problem Relation Age of Onset   • Cancer Father    • Coronary artery disease Father    • Asthma Father    • Heart failure Mother    • Kidney disease Mother    • Arthritis Mother    • Pancreatitis Sister    • Heart attack Brother    • Colon cancer Neg Hx    • Colon polyps Neg Hx        Social History     Socioeconomic History   • Marital status:    Tobacco Use   • Smoking status: Former Smoker     Years: 20.00     Types: Cigarettes     Quit date: 2000     Years since quittin.5   • Smokeless tobacco: Never Used   Vaping Use   • Vaping Use: Never used   Substance and Sexual Activity   • Alcohol use: No   • Drug use: No   • Sexual activity: Defer       Vitals:  /53   Pulse 100   Temp 98.1 °F (36.7 °C) (Temporal)   Resp 12   Ht 167.6 cm (66\")   Wt 116 kg (255 lb 4.8 oz)   SpO2 95%   BMI 41.21 kg/m²     T 97.3 P 94 R 26 /45 Sp02 95% (vent)        I/O (24Hr):  No intake or output data in the 24 hours ending 08/10/22 1045    Labs and imaging:      Recent Results (from the past 12 hour(s))   POC Glucose Once    Collection Time: 22 11:51 PM    Specimen: Blood   Result Value Ref Range    Glucose 214 (H) 70 - 130 mg/dL   Blood Gas, Arterial -    Collection Time: 08/10/22  4:33 AM    Specimen: Arterial Blood   Result Value Ref Range    Site Right Radial     Nigel's Test Positive     pH, Arterial 7.481 (H) 7.350 - 7.450 pH units    pCO2, Arterial 40.4 35.0 - 45.0 mm Hg    pO2, Arterial 54.7 (C) 83.0 - 108.0 mm Hg    HCO3, Arterial 30.2 (H) 20.0 - 26.0 mmol/L    Base Excess, Arterial 6.1 (H) 0.0 - 2.0 mmol/L    O2 Saturation, " Arterial 89.7 (L) 94.0 - 99.0 %    Temperature 37.0 C    Barometric Pressure for Blood Gas 752 mmHg    Modality Ventilator     FIO2 40 %    Ventilator Mode AC     Set Tidal Volume 420     Set Mech Resp Rate 14.0     PEEP 5.0     Notified Who MARY ZAVALA RRT     Notified By [id number]     Notified Time 08/10/2022 04:34     Collected by MARY ZAVALA RRT     pCO2, Temperature Corrected 40.4 35 - 45 mm Hg    pH, Temp Corrected 7.481 (H) 7.350 - 7.450 pH Units    pO2, Temperature Corrected 54.7 (C) 83 - 108 mm Hg   Potassium    Collection Time: 08/10/22  4:35 AM    Specimen: Blood   Result Value Ref Range    Potassium 4.4 3.5 - 5.2 mmol/L   POC Glucose Once    Collection Time: 08/10/22  5:43 AM    Specimen: Blood   Result Value Ref Range    Glucose 194 (H) 70 - 130 mg/dL           Physical Examination:        Physical Exam  Vitals and nursing note reviewed.   Constitutional:       Appearance: Normal appearance. She is obese.      Interventions: She is intubated.   HENT:      Head: Normocephalic and atraumatic.      Right Ear: External ear normal.      Left Ear: External ear normal.      Nose: Nose normal.      Mouth/Throat:      Mouth: Mucous membranes are dry.      Pharynx: Oropharynx is clear.   Eyes:      Extraocular Movements: Extraocular movements intact.      Conjunctiva/sclera: Conjunctivae normal.      Pupils: Pupils are equal, round, and reactive to light.   Neck:      Comments: Trach to vent  Cardiovascular:      Rate and Rhythm: Normal rate and regular rhythm.      Pulses: Normal pulses.      Heart sounds: Normal heart sounds.   Pulmonary:      Effort: Pulmonary effort is normal. She is intubated.      Breath sounds: Normal breath sounds.   Abdominal:      General: Bowel sounds are normal.      Palpations: Abdomen is soft.      Comments: g tube   Musculoskeletal:      Cervical back: Normal range of motion and neck supple.      Comments: Generalized weakness   Skin:     General: Skin is warm and dry.    Neurological:      Comments: Withdraws from pain  Otherwise, unresponsive   Psychiatric:         Mood and Affect: Mood normal.         Behavior: Behavior normal.           Assessment:             Acute respiratory failure with hypoxia (HCC)    Ventilator dependent (HCC)    Acute on chronic respiratory failure with hypoxia and hypercapnia ([facility])    Acute tracheostomy management ([facility])    Past Medical History:   Diagnosis Date   • Abnormal stress test    • Atrial flutter (HCC)    • CAD (coronary artery disease)    • CAD in native artery     CABG x 3   • COVID-19 vaccine series completed 2021 [id number]   • Diverticulosis    • Essential hypertension     Tricuspid valve insufficiency, unspecified etiology    • History of adenomatous polyp of colon    • Hyperlipemia, mixed    • Hyperlipidemia    • Hypertension    • IBS (irritable bowel syndrome)    • MI, old    • Mitral valve prolapse    • Near syncope    • Obesity, morbid ([facility])    • Palpitations    • Paroxysmal SVT (supraventricular tachycardia) ([facility])    • Pedal edema    • Precordial pain    • Rheumatoid arthritis ([facility])    • S/P CABG x 3    • Type 2 diabetes mellitus ([facility])    • Venous insufficiency of both lower extremities         Plan:          1. Acute hypoxic respiratory failure  2. DM2 with hyperglycemia  3. Rheumatoid arthritis  4. Dysphagia  5. History of CAD s/p CABG  6. Morbid obesity  7. Anxiety  8. History of retroperitoneal hematoma  9. UTI  10. Pseudomonas in sputum  11. Hypernatremia  12. Hypokalemia    Continue current treatment. Monitor counts. Increase activity. Monitor labs. Vent weaning under direction of pulmonology. Trach management per ENT.  Continue nutrition support via AMONs. Continue IV antibiotics. Await final cultures. Glycemic control. Maintain patient safety. Wean pressors as able. Monitor renal function and electrolytes closely.         Electronically signed by MINNIE Simpson on 8/10/2022 at 10:45 CDT     I have discussed  the care of Teresa Serna, including pertinent history and exam findings, with the nurse practitioner.    I have seen and examined the patient and the key elements of all parts of the encounter have been performed by me.  I agree with the assessment, plan and orders as documented by MINNIE Garcia, after I modified the exam findings and the plan of treatments and the final version is my approved version of the assessment.        Electronically signed by Carson Bill MD on 8/10/2022 at 16:49 CDT

## 2022-08-10 NOTE — PROGRESS NOTES
[facility] Otolaryngology Head and Neck Surgery  INPATIENT PROGRESS NOTE    Patient Name: Teresa Serna  : 1955   MRN: [id number]  Date of Admission: 2022  Today's Date: 08/10/22  Length of Stay: 0  592/1    Carson Bill MD   Primary Care Physician: Teresa Contreras MD  Surgical Procedures Since Admission:  Procedure(s):  tracheostomy  laryngoscopy  GASTROSTOMY FEEDING TUBE INSERTION  Surgeon:  Luis Medina Jr., MD  Status:  5 Days Post-Op  -------------------    Subjective   Subjective   Chief Complaint: Tracheostomy management  HPI   Accompanied by: RT  Since last examined, Teresa Serna remains on mechanical ventilation, trach in position.  She is unable to give history.  RT reports patient is at a stable trach in position.  She is not weaned off the ventilator in any amount.  She is not responsive.  Patient seen, chart reviewed    Review of Systems   No change from prior inquiry  All pertinent negatives and positives are as above. All other systems have been reviewed and are negative unless otherwise stated.   Objective   Objective   Vitals:    Output by Drain (mL) 22 0701 - 22 1900 22 1901 - 08/10/22 0700 08/10/22 0701 - 08/10/22 0851 Range Total   Requested LDAs do not have output data documented.       Physical Exam  Constitutional:       General: She is not in acute distress.     Appearance: She is well-developed. She is obese. She is not ill-appearing.      Interventions: She is sedated and intubated.      Comments: Lying in bed, mechanical ventilation, trach in position  Grimaces to stimulation   HENT:      Head: Normocephalic and atraumatic.      Right Ear: External ear normal.      Left Ear: External ear normal.      Nose: Nose normal.      Mouth/Throat:      Lips: Pink.      Mouth: Mucous membranes are dry.      Dentition: Normal dentition.      Comments: Orally intubated  Eyes:      General: Lids are normal.       Conjunctiva/sclera: Conjunctivae normal.   Neck:      Trachea: Tracheostomy present.      Comments: Neck-very short, very thick    TRACHEOSTOMY SITE:    Tracheostomy tube type: Shiley #8 cuffed DIC Legacy    Stoma: Fresh    Voice: Not evaluated  Date placed: 8/5/2022  Date last changed: Never    Pulmonary:      Effort: No tachypnea, respiratory distress or retractions. She is intubated.      Comments: Mechanical ventilation, trach in position  Musculoskeletal:      Cervical back: No crepitus. No pain with movement. Decreased range of motion.   Lymphadenopathy:      Cervical: No cervical adenopathy.   Neurological:      Mental Status: She is lethargic.      Comments: Grimaces   Psychiatric:      Comments: Not evaluated           Result Review    Result Review:  I have personally reviewed the results from the time of this admission to 8/10/2022 08:51 CDT and agree with these findings:  [x]  Laboratory  []  Microbiology  [x]  Radiology  []  EKG/Telemetry   []  Cardiology/Vascular   []  Pathology  [x]  Old records  []  Other:  Most notable findings include: Hypoxia, alkalosis  Chest x-ray reviewed, agree with radiology interpretation  Progress Notes by Carson Bill MD (08/09/2022 12:00)   Progress Notes by Bart Estrada MD (08/10/2022 07:32)   Blood Gas, Arterial - (08/10/2022 04:33)   XR Chest 1 View (08/07/2022 13:32)       Assessment & Plan   Assessment / Plan   Brief Patient Summary:  Teresa Serna is a 66 y.o. female who has remained on mechanical ventilation, trach in position.  She currently has a stable trach in position.  I will continue current trach care.    Active Hospital Problems:   Active Hospital Problems    Diagnosis    • Acute tracheostomy management (HCC)    • Ventilator dependent (HCC)    • Acute on chronic respiratory failure with hypoxia and hypercapnia (HCC)    • Acute respiratory failure with hypoxia (HCC)      Plan:   • Tracheostomy management-the patient is stable trach in  position.  I will continue current trach care.   • Respiratory failure-the patient remains on mechanical ventilation.  She is noted to be hypoxic.  She is followed by the pulmonary service.  Discussed Plan with RT  Following patient as in-patient. Further recommendations will be made based on serial examinations.    Medications/Orders for this encounter: No new medications ordered.  No New orders written.    Discharge Planning: Per primary team        DVT prophylaxis:  No DVT prophylaxis order currently exists.     Electronically signed by Luis Medina Jr, MD, 08/10/22, 8:51 AM CDT.

## 2022-08-11 NOTE — PROGRESS NOTES
PULMONARY AND CRITICAL CARE PROGRESS NOTE - [facility]  Patient: Teresa Eubanks Day    1955   Acct# [id number]  08/11/22   07:31 CDT  Referring Provider: Carson Bill MD  Chief Complaint: Mechanically ventilated  Interval history: Pt is unable to provide history due to mechanical ventilation.  She continues on cefepime for Pseudomonas treatment.  She is afebrile.  Levophed is off.  Nutrition is infusing to PEG.  She is not having any purposeful movements.  RT reports that she does cough when she is suctioned.  Her pupils are reactive.  We will get a CT of the brain today for further evaluation.  Chest x-ray is stable.  ABGs are pending.  No overnight events reported.  Review of Systems:   Cannot obtain due to mechanical ventilated state  Ventilator Settings: Mode: AC, rate 14 Vt/PC: 420 PEEP: 5  FiO2 0.5  Physical Exam:  Vital signs: T: 97.1   BP: 104/43   P: 88   R: 26   sat: 95 end-tidal 29  Physical Exam  Physical Exam  Vitals reviewed.   Constitutional:       General: She is not in acute distress.     Appearance: She is ill-appearing. She is not diaphoretic. She is obese.      Interventions: She is intubated and minimally responsive  HENT:      Head: Normocephalic.      Nose: Nose normal.      Mouth: Mucous membranes are moist.   Eyes:      General: No scleral icterus.  Pupils are reactive.  Neck:       Comments: Vent to trach, trach site C/D/I   Cardiovascular:      Rate and Rhythm: Normal rhythm  Pulmonary:      Effort: No respiratory distress. She is intubated.      Breath sounds: Examination of the left-middle field reveals decreased breath sounds. Examination of the left-lower field reveals decreased breath sounds. Decreased breath sounds present. No wheezing.   Abdominal:      General: There is no distension.  Soft, positive bowel sounds     Comments: G-tube with nutrition infusing    Genitourinary:     Comments: Evans & fecal management in place   Musculoskeletal:       Right lower leg: Edema present.      Left lower leg: Edema present.      Comments: scds   Skin:     Coloration: Skin is not pale.   Neurological:      Comments: No purposeful response. Does not follow any commands.    Electronically signed by MINNIE Blankenship, 8/11/2022, 07:31 CDT     Physician Substantive Portion: Medical Decision Making:   Results from last 7 days   Lab Units 08/11/22  0559 08/09/22  0356 08/08/22  0949   WBC 10*3/mm3 12.34* 11.08* 14.25*   HEMOGLOBIN g/dL 8.4* 8.3* 9.8*   PLATELETS 10*3/mm3 237 237 244     Results from last 7 days   Lab Units 08/11/22  0450 08/10/22  0435 08/09/22  0356 08/08/22  0255   SODIUM mmol/L 150*  --  150* 150*   POTASSIUM mmol/L 4.2 4.4 3.4* 5.4*   BUN mg/dL 47*  --  30* 25*   CREATININE mg/dL 1.78*  --  1.35* 1.61*     Results from last 7 days   Lab Units 08/10/22  0433 08/09/22  0456 08/08/22  0416   PH, ARTERIAL pH units 7.481* 7.464* 7.474*   PCO2, ARTERIAL mm Hg 40.4 42.9 43.8   PO2 ART mm Hg 54.7* 59.8* 62.9*   FIO2 % 40 40 45     Blood Culture   Date Value Ref Range Status   08/05/2022 No growth at 3 days  Preliminary     Urine Culture   Date Value Ref Range Status   08/05/2022 No growth  Final   08/04/2022 No growth  Final     Respiratory Culture   Date Value Ref Range Status   08/07/2022 Heavy growth (4+) Pseudomonas species (A)  Preliminary   08/07/2022 No Normal Respiratory Doris (A)  Preliminary   08/07/2022 Moderate growth (3+) Pseudomonas species (A)  Preliminary   08/07/2022 No Normal Respiratory Doris (A)  Preliminary     Electronically signed by MINNIE Blankenship, 8/11/2022, 07:31 CDT    Recent films:  XR Chest 1 View    Result Date: 8/11/2022  Impression: 1. No significant interval change since previous exam.   This report was finalized on 08/11/2022 07:05 by Dr. Ignacio Perkins MD.    XR Chest 1 View    Result Date: 8/10/2022   No change in appearance of the chest. This report was finalized on 08/10/2022 07:10 by Dr. Purvis  MD Surjit.    My radiograph interpretation/independent review of imaging: Increased markings bilaterally, tracheostomy tube in place.    Pulmonary Assessment:    1. Acute respiratory failure with hypoxia requiring mechanical ventilation, better  2. Probable Pseudomonas respiratory tract infection, treated, improved  3. Metabolic encephalopathy with mental status depression, altered mental status, change in mental status.  Currently doll's eyes are present.  Pupils are reactive.  Some grimace to noxious stimulus.  Otherwise not responding.    Recommend/plan:   · CT brain  · Continue ventilator with current settings, not ready to liberate  · Hold all sedation  · Continue monitoring creatinine, gradually worsening  · Continue bronchodilators  · Continue monitoring blood pressure, try to hold off on resuming pressors if possible      This visit was performed by both a physician and an [facility] RN.  I personally evaluated and examined the patient.  I performed all aspects of the medical decision making as documented.  Electronically signed by Bart Estrada MD, 8/11/2022, 10:54 CDT

## 2022-08-11 NOTE — PROGRESS NOTES
Fly Bill M.D.  MINNIE Garcia        Internal Medicine Progress Note    8/11/2022   12:45 CDT    Name:  Teresa Serna  MRN:    [id number]     Acct:     [id number]   Room:  [address] [place] [address] Day: 0     Admit Date: 7/27/2022  1:42 PM  PCP: Teresa Contreras MD    Subjective:     C/C: need for continued vent weaning    Interval History: Status:  stayed the same. Resting in bed. Family at bedside. Afebrile.  Tolerating current vent settings (A/C) with 40% 02. No purposeful response to stimulation - withdraws from pain. Off pressor support. Pseudomonas in sputum. Repeat cultures pending. Sodium elevated with decline in renal function.     Review of Systems   Unable to perform ROS: intubated         Medications:     Allergies:   Allergies   Allergen Reactions   • Codeine Palpitations and Other (See Comments)     Chest pain   • Exenatide Arrhythmia   • Sulfa Antibiotics Hives and Rash     Reaction: hives   • Albuterol Palpitations     High heart rate   • Sitagliptin Swelling   • Clindamycin/Lincomycin Unknown - High Severity   • Amoxicillin-Pot Clavulanate Palpitations       Current Meds:   Current Facility-Administered Medications:   •  acetaminophen (TYLENOL) tablet 650 mg, 650 mg, Per G Tube, Q4H PRN **OR** acetaminophen (TYLENOL) suppository 650 mg, 650 mg, Rectal, Q4H PRN **OR** acetaminophen (TYLENOL) 160 MG/5ML solution 650 mg, 650 mg, Per G Tube, Q4H PRN, Carson Bill MD  •  acetylcysteine (MUCOMYST) 20 % nebulizer solution 1.5 mL, 1.5 mL, Nebulization, BID PRN, Jaimie Hunter APRN  •  amiodarone (PACERONE) tablet 100 mg, 100 mg, Per G Tube, Q24H, Carson Bill MD  •  cefepime (MAXIPIME) 2 g/100 mL 0.9% NS (mbp), 2 g, Intravenous, Q8H, Carson iBll MD  •  chlorhexidine (PERIDEX) 0.12 % solution 15 mL, 15 mL, Mouth/Throat, Q12H, Stephanie Henry MD  •  dextrose (D50W) (25 g/50 mL) IV injection 25 g, 25 g, Intravenous, Q15 Min PRN, Stephanie Henry MD  •   dextrose (GLUTOSE) oral gel 15 g, 15 g, Oral, Q15 Min PRN, Stephanie Henry MD  •  Enoxaparin Sodium (LOVENOX) syringe 40 mg, 40 mg, Subcutaneous, Q24H, Betty Merrill APRN  •  famotidine (PEPCID) tablet 20 mg, 20 mg, Per G Tube, Daily, Carson Bill MD  •  furosemide (LASIX) injection 40 mg, 40 mg, Intravenous, Daily, Anamaria Levine MD  •  glucagon (human recombinant) (GLUCAGEN DIAGNOSTIC) injection 1 mg, 1 mg, Subcutaneous, Q15 Min PRN, Stephanie Henry MD  •  insulin detemir (LEVEMIR) injection 25 Units, 25 Units, Subcutaneous, Nightly, Stephanie Henry MD  •  Insulin Lispro (humaLOG) injection 0-9 Units, 0-9 Units, Subcutaneous, Q6H, Stephanie Henry MD  •  lactated ringers infusion, 100 mL/hr, Intravenous, Continuous, Stephanie Henry MD, Last Rate: 100 mL/hr at 08/05/22 1037, Currently Infusing at 08/05/22 1037  •  levalbuterol (XOPENEX) nebulizer solution 0.63 mg, 0.63 mg, Nebulization, BID PRN, Stephanie Henry MD  •  levalbuterol (XOPENEX) nebulizer solution 0.63 mg, 0.63 mg, Nebulization, 4x Daily - RT, Stephanie Henry MD  •  lidocaine (XYLOCAINE) 1 % injection 10 mL, 10 mL, Subcutaneous, Once, Anamaria Levine MD  •  magnesium hydroxide (MILK OF MAGNESIA) suspension 10 mL, 10 mL, Per G Tube, Daily PRN, Carson Bill MD  •  metoprolol tartrate (LOPRESSOR) tablet 50 mg, 50 mg, Per G Tube, Q12H, Carson Bill MD  •  micafungin sodium (MYCAMINE) 100 mg in sodium chloride 0.9 % 100 mL IVPB, 100 mg, Intravenous, Q24H, Jaimie Hunter APRN  •  Morphine sulfate (PF) injection 2 mg, 2 mg, Intravenous, Q4H PRN, Anamaria Levine MD  •  norepinephrine (LEVOPHED) 8 mg in 250 mL NS infusion (premix), 0.02-0.3 mcg/kg/min, Intravenous, Titrated, Stephanie Henry MD, Last Rate: 17.25 mL/hr at 08/05/22 1226, 0.08 mcg/kg/min at 08/05/22 1226  •  ondansetron (ZOFRAN) tablet 4 mg, 4 mg, Per G Tube, Q6H PRN **OR** ondansetron (ZOFRAN) injection 4 mg, 4 mg, Intravenous, Q6H PRN, Fly  "Carson Purvis MD  •  polyethylene glycol (MIRALAX) packet 17 g, 17 g, Per G Tube, Daily, FlyCarson MD  •  sodium chloride 0.9 % flush 10 mL, 10 mL, Intravenous, Q12H, Stephanie Henry MD  •  sodium chloride 0.9 % flush 10 mL, 10 mL, Intravenous, PRN, Stephanie Henry MD    Data:     Code Status:    There are no questions and answers to display.       Family History   Problem Relation Age of Onset   • Cancer Father    • Coronary artery disease Father    • Asthma Father    • Heart failure Mother    • Kidney disease Mother    • Arthritis Mother    • Pancreatitis Sister    • Heart attack Brother    • Colon cancer Neg Hx    • Colon polyps Neg Hx        Social History     Socioeconomic History   • Marital status:    Tobacco Use   • Smoking status: Former Smoker     Years: 20.00     Types: Cigarettes     Quit date: 2000     Years since quittin.5   • Smokeless tobacco: Never Used   Vaping Use   • Vaping Use: Never used   Substance and Sexual Activity   • Alcohol use: No   • Drug use: No   • Sexual activity: Defer       Vitals:  /53   Pulse 100   Temp 98.1 °F (36.7 °C) (Temporal)   Resp 12   Ht 167.6 cm (66\")   Wt 116 kg (255 lb 4.8 oz)   SpO2 95%   BMI 41.21 kg/m²     T 97.1 P 88 R 26 /43 Sp02 95% (vent)        I/O (24Hr):  No intake or output data in the 24 hours ending 22 1245    Labs and imaging:      Recent Results (from the past 12 hour(s))   Basic Metabolic Panel    Collection Time: 22  4:50 AM    Specimen: Blood   Result Value Ref Range    Glucose 151 (H) 65 - 99 mg/dL    BUN 47 (H) 8 - 23 mg/dL    Creatinine 1.78 (H) 0.57 - 1.00 mg/dL    Sodium 150 (H) 136 - 145 mmol/L    Potassium 4.2 3.5 - 5.2 mmol/L    Chloride 111 (H) 98 - 107 mmol/L    CO2 29.0 22.0 - 29.0 mmol/L    Calcium 9.0 8.6 - 10.5 mg/dL    BUN/Creatinine Ratio 26.4 (H) 7.0 - 25.0    Anion Gap 10.0 5.0 - 15.0 mmol/L    eGFR 31.4 (L) >60.0 mL/min/1.73   CBC Auto Differential    Collection Time: " 08/11/22  5:59 AM    Specimen: Blood   Result Value Ref Range    WBC 12.34 (H) 3.40 - 10.80 10*3/mm3    RBC 2.80 (L) 3.77 - 5.28 10*6/mm3    Hemoglobin 8.4 (L) 12.0 - 15.9 g/dL    Hematocrit 28.5 (L) 34.0 - 46.6 %    .8 (H) 79.0 - 97.0 fL    MCH 30.0 26.6 - 33.0 pg    MCHC 29.5 (L) 31.5 - 35.7 g/dL    RDW 18.7 (H) 12.3 - 15.4 %    RDW-SD 69.4 (H) 37.0 - 54.0 fl    MPV 11.0 6.0 - 12.0 fL    Platelets 237 140 - 450 10*3/mm3   Manual Differential    Collection Time: 08/11/22  5:59 AM    Specimen: Blood   Result Value Ref Range    Neutrophil % 72.7 42.7 - 76.0 %    Lymphocyte % 9.1 (L) 19.6 - 45.3 %    Monocyte % 6.1 5.0 - 12.0 %    Eosinophil % 8.1 (H) 0.3 - 6.2 %    Bands %  2.0 0.0 - 5.0 %    Metamyelocyte % 1.0 (H) 0.0 - 0.0 %    Myelocyte % 1.0 (H) 0.0 - 0.0 %    Neutrophils Absolute 9.22 (H) 1.70 - 7.00 10*3/mm3    Lymphocytes Absolute 1.12 0.70 - 3.10 10*3/mm3    Monocytes Absolute 0.75 0.10 - 0.90 10*3/mm3    Eosinophils Absolute 1.00 (H) 0.00 - 0.40 10*3/mm3    nRBC 4.0 (H) 0.0 - 0.2 /100 WBC    Anisocytosis Slight/1+ None Seen    Elliptocytes Slight/1+ None Seen    Macrocytes Slight/1+ None Seen    Ovalocytes Slight/1+ None Seen    Poikilocytes Slight/1+ None Seen    Polychromasia Slight/1+ None Seen    Target Cells Slight/1+ None Seen    WBC Morphology Normal Normal    Platelet Morphology Normal Normal   Miscellaneous Micro Request - Specimen Not Sent, Specimen Not Sent    Collection Time: 08/11/22  8:27 AM    Specimen: Specimen Not Sent   Result Value Ref Range    Extra Tube     Blood Gas, Arterial -    Collection Time: 08/11/22  8:53 AM    Specimen: Arterial Blood   Result Value Ref Range    Site Left Brachial     Nigel's Test N/A     pH, Arterial 7.521 (H) 7.350 - 7.450 pH units    pCO2, Arterial 34.0 (L) 35.0 - 45.0 mm Hg    pO2, Arterial 71.1 (L) 83.0 - 108.0 mm Hg    HCO3, Arterial 27.8 (H) 20.0 - 26.0 mmol/L    Base Excess, Arterial 4.7 (H) 0.0 - 2.0 mmol/L    O2 Saturation, Arterial 98.2 94.0 -  99.0 %    Temperature 37.0 C    Barometric Pressure for Blood Gas 753 mmHg    Modality Ventilator     FIO2 40 %    Ventilator Mode AC     Set Tidal Volume 420     Set Mech Resp Rate 14.0     PEEP 5.0     Collected by ROBERT AMEZCUA RRT     pCO2, Temperature Corrected 34.0 (L) 35 - 45 mm Hg    pH, Temp Corrected 7.521 (H) 7.350 - 7.450 pH Units    pO2, Temperature Corrected 71.1 (L) 83 - 108 mm Hg   POC Glucose Once    Collection Time: 08/11/22 11:34 AM    Specimen: Blood   Result Value Ref Range    Glucose 160 (H) 70 - 130 mg/dL           Physical Examination:        Physical Exam  Vitals and nursing note reviewed.   Constitutional:       Appearance: Normal appearance. She is obese.      Interventions: She is intubated.   HENT:      Head: Normocephalic and atraumatic.      Right Ear: External ear normal.      Left Ear: External ear normal.      Nose: Nose normal.      Mouth/Throat:      Mouth: Mucous membranes are dry.      Pharynx: Oropharynx is clear.   Eyes:      Extraocular Movements: Extraocular movements intact.      Conjunctiva/sclera: Conjunctivae normal.      Pupils: Pupils are equal, round, and reactive to light.   Neck:      Comments: Trach to vent  Cardiovascular:      Rate and Rhythm: Normal rate and regular rhythm.      Pulses: Normal pulses.      Heart sounds: Normal heart sounds.   Pulmonary:      Effort: Pulmonary effort is normal. She is intubated.      Breath sounds: Normal breath sounds.   Abdominal:      General: Bowel sounds are normal.      Palpations: Abdomen is soft.      Comments: g tube   Musculoskeletal:      Cervical back: Normal range of motion and neck supple.      Comments: Generalized weakness   Skin:     General: Skin is warm and dry.   Neurological:      Comments: Withdraws from pain  Otherwise, unresponsive   Psychiatric:         Mood and Affect: Mood normal.         Behavior: Behavior normal.           Assessment:             Acute respiratory failure with hypoxia (HCC)    Ventilator  dependent (HCC)    Acute on chronic respiratory failure with hypoxia and hypercapnia (HCC)    Acute tracheostomy management (HCC)    Past Medical History:   Diagnosis Date   • Abnormal stress test    • Atrial flutter (HCC)    • CAD (coronary artery disease)    • CAD in native artery     CABG x 3   • COVID-19 vaccine series completed 2021    [id number]   • Diverticulosis    • Essential hypertension     Tricuspid valve insufficiency, unspecified etiology    • History of adenomatous polyp of colon    • Hyperlipemia, mixed    • Hyperlipidemia    • Hypertension    • IBS (irritable bowel syndrome)    • MI, old    • Mitral valve prolapse    • Near syncope    • Obesity, morbid (HCC)    • Palpitations    • Paroxysmal SVT (supraventricular tachycardia) ([facility])    • Pedal edema    • Precordial pain    • Rheumatoid arthritis ([facility])    • S/P CABG x 3    • Type 2 diabetes mellitus (HCC)    • Venous insufficiency of both lower extremities         Plan:          1. Acute hypoxic respiratory failure  2. DM2 with hyperglycemia  3. Rheumatoid arthritis  4. Dysphagia  5. History of CAD s/p CABG  6. Morbid obesity  7. Anxiety  8. History of retroperitoneal hematoma  9. UTI  10. Pseudomonas in sputum  11. Hypernatremia  12. Hypokalemia    Continue current treatment. Monitor counts. Increase activity. Monitor labs. Vent weaning under direction of pulmonology. Trach management per ENT.  Continue nutrition support via AMONs. Continue IV antibiotics. Await final cultures. Glycemic control. Maintain patient safety. Wean pressors as able. Monitor renal function and electrolytes closely. Add free water flushes. Await findings from CT.        Electronically signed by MINNIE Simpson on 8/11/2022 at 12:45 CDT

## 2022-08-11 NOTE — PROGRESS NOTES
[facility] Otolaryngology Head and Neck Surgery  INPATIENT PROGRESS NOTE    Patient Name: Teresa Serna  : 1955   MRN: [id number]  Date of Admission: 2022  Today's Date: 22  Length of Stay: 0  592/1    Carson Bill MD   Primary Care Physician: Teresa Contreras MD  Surgical Procedures Since Admission:  Procedure(s):  tracheostomy  laryngoscopy  GASTROSTOMY FEEDING TUBE INSERTION  Surgeon:  Luis Medina Jr., MD  Status:  6 Days Post-Op  -------------------    Subjective   Subjective   Chief Complaint: Tracheostomy management  HPI   Accompanied by: No one  Since last examined, Teresa Serna has remained on mechanical ventilation, trach in position.  She is unable to give history.  She is unresponsive to me this morning.  She does slightly grimace.  RT reports patient has a stable trach.  She does have a problem with the trach site and that she has some inferior skin stomal erosion.  They are currently packing this area.  Patient seen, chart is reviewed    Review of Systems   No change from prior inquiry  All pertinent negatives and positives are as above. All other systems have been reviewed and are negative unless otherwise stated.   Objective   Objective   Vitals:    Output by Drain (mL) 08/10/22 0701 - 08/10/22 1900 08/10/22 1901 - 22 0700 22 0701 - 22 0957 Range Total   Requested LDAs do not have output data documented.       Physical Exam  Constitutional:       General: She is not in acute distress.     Appearance: She is well-developed. She is obese. She is not ill-appearing.      Interventions: She is sedated and intubated.      Comments: Lying in bed, mechanical ventilation, trach in position  Grimaces to stimulation   HENT:      Head: Normocephalic and atraumatic.      Right Ear: External ear normal.      Left Ear: External ear normal.      Nose: Nose normal.      Mouth/Throat:      Lips: Pink.      Mouth: Mucous membranes  are dry.      Dentition: Normal dentition.      Comments: Orally intubated  Eyes:      General: Lids are normal.      Conjunctiva/sclera: Conjunctivae normal.   Neck:      Trachea: Tracheostomy present.      Comments: Neck-very short, very thick    TRACHEOSTOMY SITE:    Tracheostomy tube type: Shiley #8 cuffed DIC Legacy    Stoma: Necrosis of tissue at the inferior stoma, packing in position, no pus    Voice: Not evaluated  Date placed: 8/5/2022  Date last changed: Never    Pulmonary:      Effort: No tachypnea, respiratory distress or retractions. She is intubated.      Comments: Mechanical ventilation, trach in position  Musculoskeletal:      Cervical back: No crepitus. No pain with movement. Decreased range of motion.   Lymphadenopathy:      Cervical: No cervical adenopathy.   Neurological:      Mental Status: She is lethargic.      Comments: Grimaces   Psychiatric:      Comments: Not evaluated           Result Review    Result Review:  I have personally reviewed the results from the time of this admission to 8/11/2022 09:57 CDT and agree with these findings:  []  Laboratory  []  Microbiology  []  Radiology  []  EKG/Telemetry   []  Cardiology/Vascular   []  Pathology  []  Old records  []  Other:  Most notable findings include: Elevated white count, anemia, hyperglycemia, elevated creatinine  Progress Notes by Carson Bill MD (08/10/2022 10:35)   Progress Notes by Jamaal Frye APRN (08/11/2022 07:31)   Basic Metabolic Panel (08/11/2022 04:50)   CBC & Differential (08/11/2022 05:59)       Assessment & Plan   Assessment / Plan   Brief Patient Summary:  Teresa Serna is a 66 y.o. female who remains on mechanical ventilation, trach in position.  She has a stable trach in position.  I am concerned about her trach site necrosis.  I will follow this closely and continue wound care.  I will continue current trach care.    Active Hospital Problems:   Active Hospital Problems    Diagnosis    • Acute  tracheostomy management (HCC)    • Ventilator dependent (HCC)    • Acute on chronic respiratory failure with hypoxia and hypercapnia (HCC)    • Acute respiratory failure with hypoxia (HCC)      Plan:   • Tracheostomy management-the patient has a stable trach in position.  I will continue current trach care.  • Wound necrosis-patient has inferior stomal necrosis.  I will follow this for wound care at this point in time.  RT is already packing the wound.  • Respiratory failure-patient remains on mechanical ventilation.  She is followed by the pulmonary service.  Discussed Plan with RT  Following patient as in-patient. Further recommendations will be made based on serial examinations.    Medications/Orders for this encounter: No new medications ordered.  No New orders written.    Discharge Planning: Per primary team        DVT prophylaxis:  Medical DVT prophylaxis orders are present.     Electronically signed by Luis Medina Jr, MD, 08/11/22, 9:57 AM CDT.

## 2022-08-12 NOTE — PROGRESS NOTES
PULMONARY AND CRITICAL CARE PROGRESS NOTE - [facility]  Patient: Teresa Eubanks Day    1955   [id number]  08/12/22   08:28 CDT  Referring Provider: Carson Bill MD  Chief Complaint: Mechanically ventilated  Interval history: Pt is unable to provide history due to mechanical ventilation.  She continues on cefepime for Pseudomonas treatment.  She is afebrile.  Nutrition is infusing to PEG.  She only grimaces; does not follow any commands; no purposeful movements.  CT of the brain from yesterday did not show anything acute.  Per nursing report she received a dose of IV morphine last night.  ABGs and chest x-ray are stable.  No overnight events reported.  There is no family at bedside.  Review of Systems:   Cannot obtain due to mechanical ventilated state  Ventilator Settings: Mode: AC, rate 14 Vt/PC: 420 PEEP: 5  FiO2 0.5  Physical Exam:  Vital signs: T: 98.3   BP: 115/47   P: 81   R: 24   sat: 95 end-tidal 38  Physical Exam  Physical Exam  Vitals reviewed.   Constitutional:       General: She is not in acute distress.     Appearance: She is ill-appearing. She is not diaphoretic. She is obese.      Interventions: She is intubated and minimally responsive  HENT:      Head: Normocephalic.      Nose: Nose normal.      Mouth: Mucous membranes are moist.   Eyes:      General: No scleral icterus.  Pupils are reactive.  Neck:       Comments: Vent to trach, trach site C/D/I   Cardiovascular:      Rate and Rhythm: Normal rhythm  Pulmonary:      Effort: No respiratory distress. She is intubated.      Breath sounds: Examination of the left-middle field reveals decreased breath sounds. Examination of the left-lower field reveals decreased breath sounds. Decreased breath sounds present. No wheezing.   Abdominal:      General: There is no distension.  Soft, positive bowel sounds     Comments: G-tube with nutrition infusing    Genitourinary:     Comments: Evans & fecal management in place    Musculoskeletal:      Right lower leg: Erythema     Left lower leg: Erythema with some areas of blistering     Comments: scds   Skin:     Coloration: Skin is not pale.   Neurological:      Comments: No purposeful response. Does not follow any commands.  Grimaces to sternal rub.    Electronically signed by MINNIE Blankenship, 8/12/2022, 08:28 CDT     Physician Substantive Portion: Medical Decision Making:   Results from last 7 days   Lab Units 08/11/22  0559 08/09/22  0356 08/08/22  0949   WBC 10*3/mm3 12.34* 11.08* 14.25*   HEMOGLOBIN g/dL 8.4* 8.3* 9.8*   PLATELETS 10*3/mm3 237 237 244     Results from last 7 days   Lab Units 08/12/22  0359 08/11/22  0450 08/10/22  0435 08/09/22  0356   SODIUM mmol/L 148* 150*  --  150*   POTASSIUM mmol/L 3.8 4.2 4.4 3.4*   BUN mg/dL 56* 47*  --  30*   CREATININE mg/dL 2.02* 1.78*  --  1.35*     Results from last 7 days   Lab Units 08/12/22  0435 08/11/22  0853 08/10/22  0433   PH, ARTERIAL pH units 7.491* 7.521* 7.481*   PCO2, ARTERIAL mm Hg 36.2 34.0* 40.4   PO2 ART mm Hg 63.5* 71.1* 54.7*   FIO2 % 40 40 40     Blood Culture   Date Value Ref Range Status   08/05/2022 No growth at 3 days  Preliminary     Urine Culture   Date Value Ref Range Status   08/05/2022 No growth  Final   08/04/2022 No growth  Final     Respiratory Culture   Date Value Ref Range Status   08/07/2022 Heavy growth (4+) Pseudomonas species (A)  Preliminary   08/07/2022 No Normal Respiratory Doris (A)  Preliminary   08/07/2022 Moderate growth (3+) Pseudomonas species (A)  Preliminary   08/07/2022 No Normal Respiratory Doris (A)  Preliminary     Electronically signed by MINNIE Blankenship, 8/12/2022, 08:28 CDT    Recent films:  CT Head Without Contrast    Result Date: 8/11/2022  1. No acute intracranial abnormality. 2. Chronic cerebral atrophy. 3. Evidence of acute on chronic maxillary and sphenoid sinusitis. Bilateral mastoid effusion.            This report was finalized on 08/11/2022  16:22 by Dr. Deepak Pulliam MD.    XR Chest 1 View    Result Date: 8/12/2022  1. No change.   This report was finalized on 08/12/2022 07:14 by Dr. Deepak Pulliam MD.    XR Chest 1 View    Result Date: 8/11/2022  Impression: 1. No significant interval change since previous exam.   This report was finalized on 08/11/2022 07:05 by Dr. Ignacio Perkins MD.    My radiograph interpretation/independent review of imaging: bilat infiltrate, same, trache ok    Pulmonary Assessment:    1. Acute resp failure with hypoxia, compensated  2. Pulmonary infiltrates stable, empiric tx for pneumonia  3. Metabolic encephalopathy    Recommend/plan:   · Ct head reviewed showing chronic changes  · Monitor for improvement while minimizing sedation  · D/c morphine  · D/c mucomyst  · No vent changes      This visit was performed by both a physician and an Advanced Practice RN.  I personally evaluated and examined the patient.  I performed all aspects of the medical decision making as documented.  Electronically signed by Bart Estrada MD, 8/12/2022, 09:59 CDT

## 2022-08-12 NOTE — PROGRESS NOTES
[facility] Otolaryngology Head and Neck Surgery  INPATIENT PROGRESS NOTE    Patient Name: Teresa Serna  : 1955   MRN: [id number]  Date of Admission: 2022  Today's Date: 22  Length of Stay: 0  592/1    Carson Bill MD   Primary Care Physician: Teresa Contreras MD  Surgical Procedures Since Admission:  Procedure(s):  tracheostomy  laryngoscopy  GASTROSTOMY FEEDING TUBE INSERTION  Surgeon:  Luis Medina Jr., MD  Status:  7 Days Post-Op  -------------------    Subjective   Subjective   Chief Complaint: Tracheostomy management  HPI   Accompanied by: Son, sister, RT  Since last examined, Teresa Serna has remained on mechanical ventilation, trach in position.  She is unresponsive to me, and is on the ventilator, thus unable to answer questions.  RT reports patient's had a stable trach.  They have not done wound care on the trach today.  Patient seen, chart reviewed    Review of Systems   No change from prior inquiry  All pertinent negatives and positives are as above. All other systems have been reviewed and are negative unless otherwise stated.   Objective   Objective   Vitals:    Output by Drain (mL) 22 0701 - 22 1900 22 1901 - 22 0700 22 0701 - 22 1351 Range Total   Requested LDAs do not have output data documented.       Physical Exam  Constitutional:       General: She is not in acute distress.     Appearance: She is well-developed. She is obese. She is not ill-appearing.      Interventions: She is sedated and intubated.      Comments: Lying in bed, mechanical ventilation, trach in position  Grimaces to stimulation   HENT:      Head: Normocephalic and atraumatic.      Right Ear: External ear normal.      Left Ear: External ear normal.      Nose: Nose normal.      Mouth/Throat:      Lips: Pink.      Mouth: Mucous membranes are dry.      Dentition: Normal dentition.      Comments: Orally intubated  Eyes:       General: Lids are normal.      Conjunctiva/sclera: Conjunctivae normal.   Neck:      Trachea: Tracheostomy present.      Comments: Neck-very short, very thick    TRACHEOSTOMY SITE:    Tracheostomy tube type: Shiley #8 cuffed DIC Legacy    Stoma: Necrotic tissue present inferiorly, no packing in place, appears to be mostly superficial and not invading the tracheostomy site internally.    Voice: Not evaluated  Date placed: 8/5/2022  Date last changed: Never    Pulmonary:      Effort: No tachypnea, respiratory distress or retractions. She is intubated.      Comments: Mechanical ventilation, trach in position  Musculoskeletal:      Cervical back: No crepitus. No pain with movement. Decreased range of motion.   Lymphadenopathy:      Cervical: No cervical adenopathy.   Neurological:      Mental Status: She is lethargic.      Comments: Grimaces   Psychiatric:      Comments: Not evaluated           Result Review    Result Review:  I have personally reviewed the results from the time of this admission to 8/12/2022 13:51 CDT and agree with these findings:  []  Laboratory  []  Microbiology  []  Radiology  []  EKG/Telemetry   []  Cardiology/Vascular   []  Pathology  [x]  Old records  []  Other:  Most notable findings include: None pertinent to ENT today  Progress Notes by Betty Merrill APRN (08/12/2022 09:00)   Progress Notes by Bart Estrada MD (08/12/2022 08:28)   Consults by Luis Roberson MD (08/12/2022 13:37)   CT Head Without Contrast (08/11/2022 14:45)     Assessment & Plan   Assessment / Plan   Brief Patient Summary:  Teresa Serna is a 66 y.o. female who who is remained mechanical ventilation, trach in position.  She she has a stable trach in position.  I will continue trach care.  She does have some necrotic area in the inferior aspect of the skin portion of the tracheostoma and including the deep tissues, fat.  I will continue wound care to this area.    Active Hospital Problems:   Active Hospital  Problems    Diagnosis    • Acute tracheostomy management (HCC)    • Ventilator dependent (HCC)    • Acute on chronic respiratory failure with hypoxia and hypercapnia (HCC)    • Acute respiratory failure with hypoxia (HCC)      Plan:   • Tracheostomy management-the patient is stable trach in position.  I will continue tracheostomy tube care.  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  I will continue wound care with packing and changes.  • Respiratory failure-patient remains on mechanical ventilation.  She is followed by pulmonary service.  Discussed Plan with RT  Following patient as in-patient. Further recommendations will be made based on serial examinations.    Medications/Orders for this encounter: No new medications ordered.  No New orders written.    Discharge Planning: Per primary team        DVT prophylaxis:  Medical DVT prophylaxis orders are present.     Electronically signed by Luis Medina Jr, MD, 08/12/22, 1:51 PM CDT.

## 2022-08-12 NOTE — CONSULTS
INFECTIOUS DISEASES CONSULT NOTE    Patient:  Teresa Eubanks Day 66 y.o. female  ROOM # 592/1  YOB: 1955  MRN: [id number]  CSN:  [id number]  Admit date: 7/27/2022   Admitting Physician: Carson Bill MD  Primary Care Physician: Teresa Contreras MD  REFERRING PROVIDER: Carson Bill, *    Reason for Consultation: Fungemia    History of Present Illness/Chief Complaint: 66-year-old woman.  Currently sedated on mechanical ventilation.  She has had tracheostomy.  She has feeding tube in place.  History is obtained from her sister and chart review.  They indicate for number of weeks or a few months she had had worsening lower extremity edema.  She then developed some increased abdominal girth consistent with ascites and abdominal discomfort.  She had presented to [facility] on the 17th.  It indicates she was transferred to [facility] on the 19th.  She was then transferred to [facility] on the 27th.  She has had a PICC line in place right arm.  She has a feeding tube in place left upper abdominal area.  Blood cultures obtained on August 9 of grown Candida glabrata.  She is currently on IV antifungal therapy with micafungin.  She is also receiving cefepime.  Per review of LTAC admit H&P she was transferred here to continue attempts at weaning from ventilatory support.  She had apparently presented to [facility] with abdominal pain.  There was thought to be small bowel obstruction and mesenteric mass.  She had a previous history of retroperitoneal hematoma.  During her stay she had had problems with respiratory difficulty and eventually required intubation.  She is also had tracheostomy.  Per review of the admit H&P general surgery had evaluated her at [facility].  After review of imaging they apparently felt the area of possible mass was a retroperitoneal hematoma.  She had apparently had received empiric antibiotic treatment for pneumonia.  She was  transferred to LTAC for further weaning from the vent.  She has had positive blood culture for Candida glabrata as outlined above.    Current Scheduled Medications:   amiodarone, 100 mg, Per G Tube, Q24H  cefepime, 2 g, Intravenous, Q12H  chlorhexidine, 15 mL, Mouth/Throat, Q12H  famotidine, 20 mg, Per G Tube, Daily  heparin (porcine), 5,000 Units, Subcutaneous, Q8H  insulin detemir, 25 Units, Subcutaneous, Nightly  insulin lispro, 0-9 Units, Subcutaneous, Q6H  levalbuterol, 0.63 mg, Nebulization, 4x Daily - RT  lidocaine, 10 mL, Subcutaneous, Once  metoprolol tartrate, 50 mg, Per G Tube, Q12H  micafungin (MYCAMINE) IV, 100 mg, Intravenous, Q24H  polyethylene glycol, 17 g, Per G Tube, Daily  sodium chloride, 10 mL, Intravenous, Q12H      Current PRN Medications:  •  acetaminophen **OR** acetaminophen **OR** acetaminophen  •  dextrose  •  dextrose  •  glucagon (human recombinant)  •  levalbuterol  •  magnesium hydroxide  •  ondansetron **OR** ondansetron  •  sodium chloride    Allergies:    Allergies   Allergen Reactions   • Codeine Palpitations and Other (See Comments)     Chest pain   • Exenatide Arrhythmia   • Sulfa Antibiotics Hives and Rash     Reaction: hives   • Albuterol Palpitations     High heart rate   • Sitagliptin Swelling   • Clindamycin/Lincomycin Unknown - High Severity   • Amoxicillin-Pot Clavulanate Palpitations     Past Medical History: Coronary artery disease.  Coronary artery bypass grafting.  Hypertension.  Mitral valve prolapse.  Obesity.  Paroxysmal supraventricular tachycardia.  Rheumatoid arthritis.  Diabetes mellitus.  Atrial flutter.  Diverticulosis.    Past Surgical History: Appendectomy.  Cholecystectomy.  Coronary artery bypass grafting.  Coronary artery stenting.  D&C.  Total knee arthroplasty.    Social History: Remote history of tobacco use.  No alcohol use.  No illicit drug use.    Family History: Cancer.  Coronary artery disease.  Asthma.  Pancreatitis.  Arthritis.  Kidney  "disease.    Exposure History: No apparent close contacts of been ill    Review of Systems unobtainable from patient due to sedation and mechanical ventilation    Vital Signs:  /53   Pulse 100   Temp 98.1 °F (36.7 °C) (Temporal)   Resp 12   Ht 167.6 cm (66\")   Wt 116 kg (255 lb 4.8 oz)   SpO2 95%   BMI 41.21 kg/m²  No data recorded.    Physical Exam  Vital signs - reviewed.  Line/IV (PICC line right arm) site - No erythema or tenderness.  Sclera nonicteric without conjunctival hemorrhages  Lungs are clear without wheezing  Heart without systolic or diastolic murmur  Abdomen with linear midline incision superior abdominal area.  No induration or drainage.  Feeding tube in place left upper quadrant area.  No surrounding erythema or purulence  Extremities trace edema  Skin without drug rash    Lab Results:  CBC:   Results from last 7 days   Lab Units 08/11/22  0559 08/09/22  0356 08/08/22  0949 08/05/22  1837   WBC 10*3/mm3 12.34* 11.08* 14.25* 10.79   HEMOGLOBIN g/dL 8.4* 8.3* 9.8* 9.0*   HEMATOCRIT % 28.5* 28.2* 32.8* 31.3*   PLATELETS 10*3/mm3 237 237 244 257   LYMPHOCYTE % % 9.1*  --  7.0*  --    MONOCYTES % % 6.1  --  5.0  --      CMP:   Results from last 7 days   Lab Units 08/12/22  1019 08/12/22  0359 08/11/22  0450 08/10/22  0435 08/09/22  0356 08/08/22  0255 08/07/22  1049 08/05/22  1837   SODIUM mmol/L 148* 148* 150*  --  150* 150* 150* 148*   POTASSIUM mmol/L 3.5 3.8 4.2 4.4 3.4* 5.4* 4.6 3.9   CHLORIDE mmol/L 108* 107 111*  --  111* 113* 113* 109*   CO2 mmol/L 27.0 27.0 29.0  --  28.0 28.0 28.0 29.0   BUN mg/dL 58* 56* 47*  --  30* 25* 19 15   CREATININE mg/dL 2.06* 2.02* 1.78*  --  1.35* 1.61* 1.71* 1.48*   CALCIUM mg/dL 9.6 9.6 9.0  --  8.9 8.6 9.4 9.4   BILIRUBIN mg/dL  --   --   --   --   --  0.7  --  1.4*   ALK PHOS U/L  --   --   --   --   --  140*  --  164*   ALT (SGPT) U/L  --   --   --   --   --  9  --  18   AST (SGOT) U/L  --   --   --   --   --  28  --  77*   GLUCOSE mg/dL 152* 169* " 151*  --  193* 280* 251* 167*     Blood cultures drawn August 9, 2022:  Component   Ref Range & Units    BCID, PCR   Negative by BCID PCR. Culture to Follow. Candida glabrata. Identification by BCID2 PCR. Abnormal     BOTTLE TYPE  Anaerobic Bottle      Radiology:   Head CT without contrast:  IMPRESSION:  1. No acute intracranial abnormality.  2. Chronic cerebral atrophy.  3. Evidence of acute on chronic maxillary and sphenoid sinusitis.  Bilateral mastoid effusion.  This report was finalized on 08/11/2022 16:22 by Dr. Deepak Pulliam MD.    Additional Studies Reviewed:     Impression:   1.  Fungemia with Candida glabrata  2.  Respiratory failure  3.  Pulmonary infiltrates-on empiric treatment for pneumonia  4.  Coronary artery disease  5.  Obesity  6.  Encephalopathy    Recommendations:    Continue cefepime at present  Continue micafungin  Given her fungemia she will need her current PICC line removed  She will need placement of a new PICC line to maintain her IV access  Will await susceptibility on Candida glabrata to see if higher dose fluconazole will be an option based on the PRAFUL  Continue to review history  Continue to follow      Luis French MD  08/12/22  13:38 CDT

## 2022-08-12 NOTE — PROGRESS NOTES
Fly Bill M.D.  MINNIE Garcia        Internal Medicine Progress Note    8/12/2022   09:17 CDT    Name:  Teresa Serna  MRN:    [id number]     Acct:     [id number]   Room:  [address] [place] [address] Day: 0     Admit Date: 7/27/2022  1:42 PM  PCP: Teresa Contreras MD    Subjective:     C/C: need for continued vent weaning    Interval History: Status:  stayed the same. Resting in bed. No family at bedside. Afebrile.  Tolerating current vent settings (A/C) with 40% 02. No purposeful response to stimulation. Off pressor support. Pseudomonas in sputum. Repeat cultures pending. Sodium remains elevated with continued decline in renal function. No acute findings noted on CT head.     Review of Systems   Unable to perform ROS: intubated         Medications:     Allergies:   Allergies   Allergen Reactions   • Codeine Palpitations and Other (See Comments)     Chest pain   • Exenatide Arrhythmia   • Sulfa Antibiotics Hives and Rash     Reaction: hives   • Albuterol Palpitations     High heart rate   • Sitagliptin Swelling   • Clindamycin/Lincomycin Unknown - High Severity   • Amoxicillin-Pot Clavulanate Palpitations       Current Meds:   Current Facility-Administered Medications:   •  acetaminophen (TYLENOL) tablet 650 mg, 650 mg, Per G Tube, Q4H PRN **OR** acetaminophen (TYLENOL) suppository 650 mg, 650 mg, Rectal, Q4H PRN **OR** acetaminophen (TYLENOL) 160 MG/5ML solution 650 mg, 650 mg, Per G Tube, Q4H PRN, Carson Bill MD  •  acetylcysteine (MUCOMYST) 20 % nebulizer solution 1.5 mL, 1.5 mL, Nebulization, BID PRN, Jaimie Hunetr APRN  •  amiodarone (PACERONE) tablet 100 mg, 100 mg, Per G Tube, Q24H, Carson Bill MD  •  cefepime (MAXIPIME) 2 g/100 mL 0.9% NS (mbp), 2 g, Intravenous, Q8H, Carson Bill MD  •  chlorhexidine (PERIDEX) 0.12 % solution 15 mL, 15 mL, Mouth/Throat, Q12H, Stephanie Henry MD  •  dextrose (D50W) (25 g/50 mL) IV injection 25 g, 25 g, Intravenous,  Q15 Min PRN, Stephanie Henry MD  •  dextrose (GLUTOSE) oral gel 15 g, 15 g, Oral, Q15 Min PRN, Stephanie Henry MD  •  Enoxaparin Sodium (LOVENOX) syringe 40 mg, 40 mg, Subcutaneous, Q24H, Betty Merrill APRN  •  famotidine (PEPCID) tablet 20 mg, 20 mg, Per G Tube, Daily, Carson Bill MD  •  furosemide (LASIX) injection 40 mg, 40 mg, Intravenous, Daily, Anamaria Levine MD  •  glucagon (human recombinant) (GLUCAGEN DIAGNOSTIC) injection 1 mg, 1 mg, Subcutaneous, Q15 Min PRN, Stephanie Henry MD  •  insulin detemir (LEVEMIR) injection 25 Units, 25 Units, Subcutaneous, Nightly, Stephanie Henry MD  •  Insulin Lispro (humaLOG) injection 0-9 Units, 0-9 Units, Subcutaneous, Q6H, Stephanie Henry MD  •  levalbuterol (XOPENEX) nebulizer solution 0.63 mg, 0.63 mg, Nebulization, BID PRN, Stephanie Henry MD  •  levalbuterol (XOPENEX) nebulizer solution 0.63 mg, 0.63 mg, Nebulization, 4x Daily - RT, Stephanie Henry MD  •  lidocaine (XYLOCAINE) 1 % injection 10 mL, 10 mL, Subcutaneous, Once, Anamaria Levine MD  •  magnesium hydroxide (MILK OF MAGNESIA) suspension 10 mL, 10 mL, Per G Tube, Daily PRN, Carson Bill MD  •  metoprolol tartrate (LOPRESSOR) tablet 50 mg, 50 mg, Per G Tube, Q12H, Carson Bill MD  •  micafungin sodium (MYCAMINE) 100 mg in sodium chloride 0.9 % 100 mL IVPB, 100 mg, Intravenous, Q24H, Jaimie Hunter APRN  •  Morphine sulfate (PF) injection 2 mg, 2 mg, Intravenous, Q4H PRN, Anamaria Levine MD  •  norepinephrine (LEVOPHED) 8 mg in 250 mL NS infusion (premix), 0.02-0.3 mcg/kg/min, Intravenous, Titrated, Stephanie Henry MD, Last Rate: 17.25 mL/hr at 08/05/22 1226, 0.08 mcg/kg/min at 08/05/22 1226  •  ondansetron (ZOFRAN) tablet 4 mg, 4 mg, Per G Tube, Q6H PRN **OR** ondansetron (ZOFRAN) injection 4 mg, 4 mg, Intravenous, Q6H PRN, Carson Bill MD  •  polyethylene glycol (MIRALAX) packet 17 g, 17 g, Per G Tube, Daily, Carson Bill MD  •   "sodium chloride 0.9 % flush 10 mL, 10 mL, Intravenous, Q12H, Stephanie Henry MD  •  sodium chloride 0.9 % flush 10 mL, 10 mL, Intravenous, PRN, Stephanie Henry MD    Data:     Code Status:    There are no questions and answers to display.       Family History   Problem Relation Age of Onset   • Cancer Father    • Coronary artery disease Father    • Asthma Father    • Heart failure Mother    • Kidney disease Mother    • Arthritis Mother    • Pancreatitis Sister    • Heart attack Brother    • Colon cancer Neg Hx    • Colon polyps Neg Hx        Social History     Socioeconomic History   • Marital status:    Tobacco Use   • Smoking status: Former Smoker     Years: 20.00     Types: Cigarettes     Quit date: 2000     Years since quittin.5   • Smokeless tobacco: Never Used   Vaping Use   • Vaping Use: Never used   Substance and Sexual Activity   • Alcohol use: No   • Drug use: No   • Sexual activity: Defer       Vitals:  /53   Pulse 100   Temp 98.1 °F (36.7 °C) (Temporal)   Resp 12   Ht 167.6 cm (66\")   Wt 116 kg (255 lb 4.8 oz)   SpO2 95%   BMI 41.21 kg/m²     T 98.3 P 81 R 24 /47 Sp02 96% (vent)        I/O (24Hr):  No intake or output data in the 24 hours ending 22 0917    Labs and imaging:      Recent Results (from the past 12 hour(s))   POC Glucose Once    Collection Time: 22 11:57 PM    Specimen: Blood   Result Value Ref Range    Glucose 200 (H) 70 - 130 mg/dL   Basic Metabolic Panel    Collection Time: 22  3:59 AM    Specimen: Blood   Result Value Ref Range    Glucose 169 (H) 65 - 99 mg/dL    BUN 56 (H) 8 - 23 mg/dL    Creatinine 2.02 (H) 0.57 - 1.00 mg/dL    Sodium 148 (H) 136 - 145 mmol/L    Potassium 3.8 3.5 - 5.2 mmol/L    Chloride 107 98 - 107 mmol/L    CO2 27.0 22.0 - 29.0 mmol/L    Calcium 9.6 8.6 - 10.5 mg/dL    BUN/Creatinine Ratio 27.7 (H) 7.0 - 25.0    Anion Gap 14.0 5.0 - 15.0 mmol/L    eGFR 26.9 (L) >60.0 mL/min/1.73   TSH    Collection Time: " 08/12/22  3:59 AM    Specimen: Blood   Result Value Ref Range    TSH 0.919 0.270 - 4.200 uIU/mL   Magnesium    Collection Time: 08/12/22  3:59 AM    Specimen: Blood   Result Value Ref Range    Magnesium 2.5 (H) 1.6 - 2.4 mg/dL   Blood Gas, Arterial -    Collection Time: 08/12/22  4:35 AM    Specimen: Arterial Blood   Result Value Ref Range    Site Right Radial     Nigel's Test N/A     pH, Arterial 7.491 (H) 7.350 - 7.450 pH units    pCO2, Arterial 36.2 35.0 - 45.0 mm Hg    pO2, Arterial 63.5 (L) 83.0 - 108.0 mm Hg    HCO3, Arterial 27.7 (H) 20.0 - 26.0 mmol/L    Base Excess, Arterial 4.1 (H) 0.0 - 2.0 mmol/L    O2 Saturation, Arterial 93.1 (L) 94.0 - 99.0 %    Temperature 37.0 C    Barometric Pressure for Blood Gas 752 mmHg    Modality Ventilator     FIO2 40 %    Ventilator Mode AC     Set Tidal Volume 420     Set Mech Resp Rate 12.0     PEEP 5.0     Collected by YOLANDA COLEMAN RRT     pCO2, Temperature Corrected 36.2 35 - 45 mm Hg    pH, Temp Corrected 7.491 (H) 7.350 - 7.450 pH Units    pO2, Temperature Corrected 63.5 (L) 83 - 108 mm Hg   POC Glucose Once    Collection Time: 08/12/22  5:43 AM    Specimen: Blood   Result Value Ref Range    Glucose 162 (H) 70 - 130 mg/dL           Physical Examination:        Physical Exam  Vitals and nursing note reviewed.   Constitutional:       Appearance: Normal appearance. She is obese.      Interventions: She is intubated.   HENT:      Head: Normocephalic and atraumatic.      Right Ear: External ear normal.      Left Ear: External ear normal.      Nose: Nose normal.      Mouth/Throat:      Mouth: Mucous membranes are dry.      Pharynx: Oropharynx is clear.   Eyes:      Extraocular Movements: Extraocular movements intact.      Conjunctiva/sclera: Conjunctivae normal.      Pupils: Pupils are equal, round, and reactive to light.   Neck:      Comments: Trach to vent  Cardiovascular:      Rate and Rhythm: Normal rate and regular rhythm.      Pulses: Normal pulses.      Heart sounds:  Normal heart sounds.   Pulmonary:      Effort: Pulmonary effort is normal. She is intubated.      Breath sounds: Normal breath sounds.   Abdominal:      General: Bowel sounds are normal.      Palpations: Abdomen is soft.      Comments: g tube   Musculoskeletal:      Cervical back: Normal range of motion and neck supple.      Comments: Generalized weakness   Skin:     General: Skin is warm and dry.      Comments: Face flushed   Neurological:      Comments: Minimal response to stimulation   Psychiatric:         Mood and Affect: Mood normal.         Behavior: Behavior normal.           Assessment:             Acute respiratory failure with hypoxia (HCC)    Ventilator dependent (HCC)    Acute on chronic respiratory failure with hypoxia and hypercapnia ([facility])    Acute tracheostomy management ([facility])    Past Medical History:   Diagnosis Date   • Abnormal stress test    • Atrial flutter ([facility])    • CAD (coronary artery disease)    • CAD in native artery     CABG x 3   • COVID-19 vaccine series completed 2021 021221/031221   • Diverticulosis    • Essential hypertension     Tricuspid valve insufficiency, unspecified etiology    • History of adenomatous polyp of colon    • Hyperlipemia, mixed    • Hyperlipidemia    • Hypertension    • IBS (irritable bowel syndrome)    • MI, old    • Mitral valve prolapse    • Near syncope    • Obesity, morbid ([facility])    • Palpitations    • Paroxysmal SVT (supraventricular tachycardia) ([facility])    • Pedal edema    • Precordial pain    • Rheumatoid arthritis ([facility])    • S/P CABG x 3    • Type 2 diabetes mellitus ([facility])    • Venous insufficiency of both lower extremities         Plan:          1. Acute hypoxic respiratory failure  2. DM2 with hyperglycemia  3. Rheumatoid arthritis  4. Dysphagia  5. History of CAD s/p CABG  6. Morbid obesity  7. Anxiety  8. History of retroperitoneal hematoma  9. UTI  10. Pseudomonas in sputum  11. Hypernatremia  12. Hypokalemia    Continue current treatment. Monitor  counts. Increase activity. Monitor labs. Vent weaning under direction of pulmonology. Trach management per ENT.  Continue nutrition support via AMONs. Continue IV antibiotics. Glycemic control. Maintain patient safety. Monitor renal function and electrolytes closely. Continue free water flushes. Hold lasix.         Electronically signed by MINNIE Simpson on 8/12/2022 at 09:17 CDT

## 2022-08-12 NOTE — CONSULTS
12 cm Midline catheter placed in pt left basilic vein. Pt tolerated procedure well. Line flushes and draws well. Sterile dressing applied.

## 2022-08-13 NOTE — PROGRESS NOTES
Fly Bill M.D.  MINNIE Garcia        Internal Medicine Progress Note    8/13/2022   11:33 CDT    Name:  Teresa Srena  MRN:    [id number]     Acct:     [id number]   Room:  [address] [place] [address] Day: 0     Admit Date: 7/27/2022  1:42 PM  PCP: Teresa Contreras MD    Subjective:     C/C: need for continued vent weaning    Interval History: Status:  stayed the same. Resting in bed. No family at bedside. Afebrile.  Tolerating current vent settings (A/C) with 45% 02. No purposeful response to stimulation. Continues off pressor support. Continues with worsening renal function.   Review of Systems   Unable to perform ROS: intubated         Medications:     Allergies:   Allergies   Allergen Reactions   • Codeine Palpitations and Other (See Comments)     Chest pain   • Exenatide Arrhythmia   • Sulfa Antibiotics Hives and Rash     Reaction: hives   • Albuterol Palpitations     High heart rate   • Sitagliptin Swelling   • Clindamycin/Lincomycin Unknown - High Severity   • Amoxicillin-Pot Clavulanate Palpitations       Current Meds:   Current Facility-Administered Medications:   •  acetaminophen (TYLENOL) tablet 650 mg, 650 mg, Per G Tube, Q4H PRN **OR** acetaminophen (TYLENOL) suppository 650 mg, 650 mg, Rectal, Q4H PRN **OR** acetaminophen (TYLENOL) 160 MG/5ML solution 650 mg, 650 mg, Per G Tube, Q4H PRN, Carson Bill MD  •  amiodarone (PACERONE) tablet 100 mg, 100 mg, Per G Tube, Q24H, Carson Bill MD  •  cefepime (MAXIPIME) 2 g/100 mL 0.9% NS (mbp), 2 g, Intravenous, Q12H, Carson Bill MD  •  chlorhexidine (PERIDEX) 0.12 % solution 15 mL, 15 mL, Mouth/Throat, Q12H, Stephanie Henry MD  •  dextrose (D50W) (25 g/50 mL) IV injection 25 g, 25 g, Intravenous, Q15 Min PRN, Stephanie Henry MD  •  dextrose (GLUTOSE) oral gel 15 g, 15 g, Oral, Q15 Min PRN, Stephanie Henry MD  •  famotidine (PEPCID) tablet 20 mg, 20 mg, Per G Tube, Daily, Carson Bill MD  •   glucagon (human recombinant) (GLUCAGEN DIAGNOSTIC) injection 1 mg, 1 mg, Subcutaneous, Q15 Min PRN, Stephanie Henry MD  •  heparin (porcine) 5000 UNIT/ML injection 5,000 Units, 5,000 Units, Subcutaneous, Q8H, Carson Bill MD  •  insulin detemir (LEVEMIR) injection 25 Units, 25 Units, Subcutaneous, Nightly, Stephanie Henry MD  •  Insulin Lispro (humaLOG) injection 0-9 Units, 0-9 Units, Subcutaneous, Q6H, Stephanie Henry MD  •  levalbuterol (XOPENEX) nebulizer solution 0.63 mg, 0.63 mg, Nebulization, BID PRN, Stephanie Henry MD  •  levalbuterol (XOPENEX) nebulizer solution 0.63 mg, 0.63 mg, Nebulization, 4x Daily - RT, Stephanie Henry MD  •  lidocaine (XYLOCAINE) 1 % injection 10 mL, 10 mL, Subcutaneous, Once, Anamaria Levine MD  •  magnesium hydroxide (MILK OF MAGNESIA) suspension 10 mL, 10 mL, Per G Tube, Daily PRN, Carson Bill MD  •  metoprolol tartrate (LOPRESSOR) tablet 50 mg, 50 mg, Per G Tube, Q12H, Carson Bill MD  •  micafungin sodium (MYCAMINE) 100 mg in sodium chloride 0.9 % 100 mL IVPB, 100 mg, Intravenous, Q24H, Jaimie Hunter APRN  •  Morphine sulfate (PF) injection 2 mg, 2 mg, Intravenous, Q4H PRN, Jamaal Frye, MINNIE  •  norepinephrine (LEVOPHED) 8 mg in 250 mL NS infusion (premix), 0.02-0.3 mcg/kg/min, Intravenous, Titrated, Stephanie Henry MD, Last Rate: 17.25 mL/hr at 08/05/22 1226, 0.08 mcg/kg/min at 08/05/22 1226  •  ondansetron (ZOFRAN) tablet 4 mg, 4 mg, Per G Tube, Q6H PRN **OR** ondansetron (ZOFRAN) injection 4 mg, 4 mg, Intravenous, Q6H PRN, Carson Bill MD  •  polyethylene glycol (MIRALAX) packet 17 g, 17 g, Per G Tube, Daily, Carson Bill MD  •  sodium chloride 0.9 % flush 10 mL, 10 mL, Intravenous, Q12H, Stephanie Henry MD  •  sodium chloride 0.9 % flush 10 mL, 10 mL, Intravenous, PRN, Stephanie Henry MD    Data:     Code Status:    There are no questions and answers to display.       Family History   Problem  "Relation Age of Onset   • Cancer Father    • Coronary artery disease Father    • Asthma Father    • Heart failure Mother    • Kidney disease Mother    • Arthritis Mother    • Pancreatitis Sister    • Heart attack Brother    • Colon cancer Neg Hx    • Colon polyps Neg Hx        Social History     Socioeconomic History   • Marital status:    Tobacco Use   • Smoking status: Former Smoker     Years: 20.00     Types: Cigarettes     Quit date: 2000     Years since quittin.5   • Smokeless tobacco: Never Used   Vaping Use   • Vaping Use: Never used   Substance and Sexual Activity   • Alcohol use: No   • Drug use: No   • Sexual activity: Defer       Vitals:  /53   Pulse 100   Temp 98.1 °F (36.7 °C) (Temporal)   Resp 12   Ht 167.6 cm (66\")   Wt 116 kg (255 lb 4.8 oz)   SpO2 95%   BMI 41.21 kg/m²     T 98.1 P 80 R 16 /42 Sp02 93% (vent)        I/O (24Hr):  No intake or output data in the 24 hours ending 22 1133    Labs and imaging:      Recent Results (from the past 12 hour(s))   POC Glucose Once    Collection Time: 22 12:05 AM    Specimen: Blood   Result Value Ref Range    Glucose 188 (H) 70 - 130 mg/dL   Blood Gas, Arterial -    Collection Time: 22  4:26 AM    Specimen: Arterial Blood   Result Value Ref Range    Site Right Radial     Nigel's Test Positive     pH, Arterial 7.467 (H) 7.350 - 7.450 pH units    pCO2, Arterial 36.9 35.0 - 45.0 mm Hg    pO2, Arterial 69.6 (L) 83.0 - 108.0 mm Hg    HCO3, Arterial 26.6 (H) 20.0 - 26.0 mmol/L    Base Excess, Arterial 2.8 (H) 0.0 - 2.0 mmol/L    O2 Saturation, Arterial 94.2 94.0 - 99.0 %    Temperature 37.0 C    Barometric Pressure for Blood Gas 753 mmHg    Modality Ventilator     FIO2 45 %    Ventilator Mode AC     Set Tidal Volume 420     Set Mech Resp Rate 12.0     PEEP 5.0     Collected by YOLANDA COLEMAN RRT     pCO2, Temperature Corrected 36.9 35 - 45 mm Hg    pH, Temp Corrected 7.467 (H) 7.350 - 7.450 pH Units    pO2, Temperature " Corrected 69.6 (L) 83 - 108 mm Hg   POC Glucose Once    Collection Time: 08/13/22  5:24 AM    Specimen: Blood   Result Value Ref Range    Glucose 103 70 - 130 mg/dL   Basic Metabolic Panel    Collection Time: 08/13/22  8:50 AM    Specimen: Blood   Result Value Ref Range    Glucose 130 (H) 65 - 99 mg/dL    BUN 62 (H) 8 - 23 mg/dL    Creatinine 2.27 (H) 0.57 - 1.00 mg/dL    Sodium 148 (H) 136 - 145 mmol/L    Potassium 3.5 3.5 - 5.2 mmol/L    Chloride 106 98 - 107 mmol/L    CO2 27.0 22.0 - 29.0 mmol/L    Calcium 9.4 8.6 - 10.5 mg/dL    BUN/Creatinine Ratio 27.3 (H) 7.0 - 25.0    Anion Gap 15.0 5.0 - 15.0 mmol/L    eGFR 23.4 (L) >60.0 mL/min/1.73           Physical Examination:        Physical Exam  Vitals and nursing note reviewed.   Constitutional:       Appearance: Normal appearance. She is obese.      Interventions: She is intubated.   HENT:      Head: Normocephalic and atraumatic.      Right Ear: External ear normal.      Left Ear: External ear normal.      Nose: Nose normal.      Mouth/Throat:      Mouth: Mucous membranes are dry.      Pharynx: Oropharynx is clear.   Eyes:      Extraocular Movements: Extraocular movements intact.      Conjunctiva/sclera: Conjunctivae normal.      Pupils: Pupils are equal, round, and reactive to light.   Neck:      Comments: Trach to vent  Cardiovascular:      Rate and Rhythm: Normal rate and regular rhythm.      Pulses: Normal pulses.      Heart sounds: Normal heart sounds.   Pulmonary:      Effort: Pulmonary effort is normal. She is intubated.      Breath sounds: Normal breath sounds.   Abdominal:      General: Bowel sounds are normal.      Palpations: Abdomen is soft.      Comments: g tube   Musculoskeletal:      Cervical back: Normal range of motion and neck supple.      Comments: Generalized weakness   Skin:     General: Skin is warm and dry.   Neurological:      Comments: Minimal response to stimulation   Psychiatric:         Mood and Affect: Mood normal.         Behavior:  Behavior normal.           Assessment:             Acute respiratory failure with hypoxia (HCC)    Ventilator dependent (HCC)    Acute on chronic respiratory failure with hypoxia and hypercapnia (HCC)    Acute tracheostomy management (HCC)    Past Medical History:   Diagnosis Date   • Abnormal stress test    • Atrial flutter (HCC)    • CAD (coronary artery disease)    • CAD in native artery     CABG x 3   • COVID-19 vaccine series completed 2021    [id number]   • Diverticulosis    • Essential hypertension     Tricuspid valve insufficiency, unspecified etiology    • History of adenomatous polyp of colon    • Hyperlipemia, mixed    • Hyperlipidemia    • Hypertension    • IBS (irritable bowel syndrome)    • MI, old    • Mitral valve prolapse    • Near syncope    • Obesity, morbid (HCC)    • Palpitations    • Paroxysmal SVT (supraventricular tachycardia) ([facility])    • Pedal edema    • Precordial pain    • Rheumatoid arthritis (HCC)    • S/P CABG x 3    • Type 2 diabetes mellitus (HCC)    • Venous insufficiency of both lower extremities         Plan:          1. Acute hypoxic respiratory failure  2. DM2 with hyperglycemia  3. Rheumatoid arthritis  4. Dysphagia  5. History of CAD s/p CABG  6. Morbid obesity  7. Anxiety  8. History of retroperitoneal hematoma  9. UTI  10. Pseudomonas in sputum  11. Hypernatremia  12. Hypokalemia  13. Fungemia  14. Funguria    Continue current treatment. Monitor counts. Increase activity. Monitor labs. Vent weaning under direction of pulmonology. Trach management per ENT.  Continue nutrition support via AMONs. Continue IV antibiotics and antifungals. Glycemic control. Maintain patient safety. Monitor renal function and electrolytes closely. Continue free water flushes.         Electronically signed by MINNIE Simpson on 8/13/2022 at 11:33 CDT   I have discussed the care of Teresa Serna, including pertinent history and exam findings, with the nurse practitioner.    I have seen  and examined the patient and the key elements of all parts of the encounter have been performed by me.  I agree with the assessment, plan and orders as documented by MINNIE Garcia, after I modified the exam findings and the plan of treatments and the final version is my approved version of the assessment.        Electronically signed by Carson Bill MD on 8/13/2022 at 22:55 CDT

## 2022-08-13 NOTE — PROGRESS NOTES
PULMONARY AND CRITICAL CARE PROGRESS NOTE - [facility]  Patient: Teresa Eubanks Day    1955   [id number]  08/13/22   09:30 CDT  Referring Provider: Carson Bill MD  Chief Complaint: Mechanically ventilated  Interval history: Pt is unable to provide history due to mechanical ventilation.  She continues on cefepime for Pseudomonas treatment.  She is afebrile.  CXR and ABG are stable.  RT reports that she has developed a tracheostomy fistula and is due to have the packing changed when trach care is done this morning. She has some audible leaking around her trach site.  We will keep her in volume control. Other vent changes are noted below.  Nutrition is infusing to PEG.  She only grimaces; does not follow any commands; no purposeful movements.  No overnight events reported.  There is no family at bedside.  Review of Systems:   Cannot obtain due to mechanical ventilated state  Ventilator Settings: Mode: AC, rate 14 Vt/PC: 380 Flow 45L/min PEEP: 5  FiO2 0.45   Physical Exam:  Vital signs: T: 98.1  BP: 102/42   P: 80   R: 16   sat: 93 end-tidal 38  Physical Exam  Physical Exam  Vitals reviewed.   Constitutional:       General: She is not in acute distress.     Appearance: She is ill-appearing. She is not diaphoretic. She is obese.      Interventions: She is intubated and minimally responsive  HENT:      Head: Normocephalic.      Nose: Nose normal.      Mouth: Mucous membranes are moist.   Eyes:      General: No scleral icterus.   Neck:       Comments: Vent to trach with some audible leaking  Cardiovascular:      Rate and Rhythm: Normal rhythm  Pulmonary:      Effort: No respiratory distress. She is intubated.      Breath sounds: Examination of the left-middle field reveals decreased breath sounds. Examination of the left-lower field reveals decreased breath sounds. Decreased breath sounds present. No wheezing.   Abdominal:      General: There is no distension.  Soft.      Comments:  G-tube with nutrition infusing    Genitourinary:     Comments: Evans & fecal management in place   Musculoskeletal:      Right lower leg: Erythema     Left lower leg: Erythema with some areas of blistering     Comments: scds   Skin:     Coloration: Skin is not pale.   Neurological:      Comments: No purposeful response. Does not follow any commands.  Grimaces to sternal rub.    Electronically signed by MINNIE Blankenship, 8/13/2022, 09:30 CDT     Physician Substantive Portion: Medical Decision Making:   Results from last 7 days   Lab Units 08/11/22  0559 08/09/22  0356 08/08/22  0949   WBC 10*3/mm3 12.34* 11.08* 14.25*   HEMOGLOBIN g/dL 8.4* 8.3* 9.8*   PLATELETS 10*3/mm3 237 237 244     Results from last 7 days   Lab Units 08/12/22  1019 08/12/22  0359 08/11/22  0450   SODIUM mmol/L 148* 148* 150*   POTASSIUM mmol/L 3.5 3.8 4.2   BUN mg/dL 58* 56* 47*   CREATININE mg/dL 2.06* 2.02* 1.78*     Results from last 7 days   Lab Units 08/13/22  0426 08/12/22  0435 08/11/22  0853   PH, ARTERIAL pH units 7.467* 7.491* 7.521*   PCO2, ARTERIAL mm Hg 36.9 36.2 34.0*   PO2 ART mm Hg 69.6* 63.5* 71.1*   FIO2 % 45 40 40     Blood Culture   Date Value Ref Range Status   08/05/2022 No growth at 3 days  Preliminary     Urine Culture   Date Value Ref Range Status   08/05/2022 No growth  Final   08/04/2022 No growth  Final     Respiratory Culture   Date Value Ref Range Status   08/07/2022 Heavy growth (4+) Pseudomonas species (A)  Preliminary   08/07/2022 No Normal Respiratory Doris (A)  Preliminary   08/07/2022 Moderate growth (3+) Pseudomonas species (A)  Preliminary   08/07/2022 No Normal Respiratory Doris (A)  Preliminary     Electronically signed by MINNIE Blankenship, 8/13/2022, 09:30 CDT    Recent films:  CT Head Without Contrast    Result Date: 8/11/2022  1. No acute intracranial abnormality. 2. Chronic cerebral atrophy. 3. Evidence of acute on chronic maxillary and sphenoid sinusitis. Bilateral mastoid  effusion.            This report was finalized on 08/11/2022 16:22 by Dr. Deepak Pulliam MD.    XR Chest 1 View    Result Date: 8/13/2022  Impression: 1. No significant interval change since previous exam.   This report was finalized on 08/13/2022 08:27 by Dr. Ignacio Perkins MD.    XR Chest 1 View    Result Date: 8/12/2022  1. No change.   This report was finalized on 08/12/2022 07:14 by Dr. Deepak Pulliam MD.    My radiograph interpretation/independent review of imaging: R basilar infiltrate, tracheostomy ok    Pulmonary Assessment:    1. Acute resp failure with hypoxia  2. Sepsis/nosocomial pneumonia improved  3. candidemia    Recommend/plan:   · Continue abx; appreciate ID help  · Continue aerosol bronchodilators  · Still not awake enough to liberate from vent  · Settings adjusted with lower vt to 380 ml and lower peak flow to try to reduce or eliminate air leak around tracheostomy tube.  · Continue to follow up cxr and abg    This visit was performed by both a physician and an [facility] RN.  I personally evaluated and examined the patient.  I performed all aspects of the medical decision making as documented.  Electronically signed by Bart Estrada MD, 8/13/2022, 10:02 CDT

## 2022-08-13 NOTE — PROGRESS NOTES
Infectious Diseases Progress Note    Patient:  Teresa Serna  YOB: 1955  MRN: [id number]   Admit date: 7/27/2022   Admitting Physician: Carson Bill MD  Primary Care Physician: Teresa Contreras MD    Chief Complaint/Interval History: She is without fever.  She is not on pressor support.  She remains on the ventilator.  That line was on the right.  She now has a midline on the left.  There is no a lot of purulent secretions within her ET tube.    No intake or output data in the 24 hours ending 08/13/22 1803  Allergies:   Allergies   Allergen Reactions   • Codeine Palpitations and Other (See Comments)     Chest pain   • Exenatide Arrhythmia   • Sulfa Antibiotics Hives and Rash     Reaction: hives   • Albuterol Palpitations     High heart rate   • Sitagliptin Swelling   • Clindamycin/Lincomycin Unknown - High Severity   • Amoxicillin-Pot Clavulanate Palpitations     Current Scheduled Medications:   amiodarone, 100 mg, Per G Tube, Q24H  cefepime, 2 g, Intravenous, Q12H  chlorhexidine, 15 mL, Mouth/Throat, Q12H  famotidine, 20 mg, Per G Tube, Daily  heparin (porcine), 5,000 Units, Subcutaneous, Q8H  insulin detemir, 25 Units, Subcutaneous, Nightly  insulin lispro, 0-9 Units, Subcutaneous, Q6H  levalbuterol, 0.63 mg, Nebulization, 4x Daily - RT  lidocaine, 10 mL, Subcutaneous, Once  metoprolol tartrate, 50 mg, Per G Tube, Q12H  micafungin (MYCAMINE) IV, 100 mg, Intravenous, Q24H  polyethylene glycol, 17 g, Per G Tube, Daily  sodium chloride, 10 mL, Intravenous, Q12H      Current PRN Medications:  •  acetaminophen **OR** acetaminophen **OR** acetaminophen  •  dextrose  •  dextrose  •  glucagon (human recombinant)  •  levalbuterol  •  magnesium hydroxide  •  Morphine  •  ondansetron **OR** ondansetron  •  sodium chloride    Review of Systems unobtainable from patient due to sedation and mechanical relation    Vital Signs:  /53   Pulse 100   Temp 98.1 °F (36.7 °C) (Temporal)   Resp  "12   Ht 167.6 cm (66\")   Wt 116 kg (255 lb 4.8 oz)   SpO2 95%   BMI 41.21 kg/m²     Physical Exam  Vital signs - reviewed.  Line/IV (left arm midline) site - No erythema, warmth, induration, or tenderness.  Prior PICC line site right arm without erythema or induration  Lungs with diminished breath sounds in the bases  No crackles or wheezes  Abdomen soft and nontender    Lab Results:  CBC:   Results from last 7 days   Lab Units 08/11/22  0559 08/09/22  0356 08/08/22  0949   WBC 10*3/mm3 12.34* 11.08* 14.25*   HEMOGLOBIN g/dL 8.4* 8.3* 9.8*   HEMATOCRIT % 28.5* 28.2* 32.8*   PLATELETS 10*3/mm3 237 237 244     BMP:  Results from last 7 days   Lab Units 08/13/22  0850 08/12/22  1019 08/12/22  0359 08/11/22  0450 08/10/22  0435 08/09/22  0356 08/08/22  0255 08/07/22  1049   SODIUM mmol/L 148* 148* 148* 150*  --  150* 150* 150*   POTASSIUM mmol/L 3.5 3.5 3.8 4.2 4.4 3.4* 5.4* 4.6   CHLORIDE mmol/L 106 108* 107 111*  --  111* 113* 113*   CO2 mmol/L 27.0 27.0 27.0 29.0  --  28.0 28.0 28.0   BUN mg/dL 62* 58* 56* 47*  --  30* 25* 19   CREATININE mg/dL 2.27* 2.06* 2.02* 1.78*  --  1.35* 1.61* 1.71*   GLUCOSE mg/dL 130* 152* 169* 151*  --  193* 280* 251*   CALCIUM mg/dL 9.4 9.6 9.6 9.0  --  8.9 8.6 9.4   ALT (SGPT) U/L  --   --   --   --   --   --  9  --      Culture Results:   Blood Culture   Date Value Ref Range Status   08/09/2022 Yeast isolated (C)  Preliminary     Comment:     Infectious disease consultation is highly recommended to rule out distant foci of infection.   08/08/2022 No growth at 5 days  Final   08/08/2022 No growth at 5 days  Final     Urine Culture   Date Value Ref Range Status   08/08/2022 Yeast isolated (A)  Final     Respiratory Culture   Date Value Ref Range Status   08/07/2022 Heavy growth (4+) Pseudomonas aeruginosa MDRO (C)  Final     Comment:     Multi drug resistant Pseudomonas, patient may be an isolation risk.   08/07/2022 No Normal Respiratory Doris (A)  Final   08/07/2022 Moderate growth " (3+) Pseudomonas aeruginosa MDRO (C)  Final     Comment:     Multi drug resistant Pseudomonas, patient may be an isolation risk.   08/07/2022 No Normal Respiratory Doris (A)  Final     Radiology: None  Additional Studies Reviewed: None    Impression:   1.  Fungemia with Candida glabrata  2.  Respiratory failure  3.  Pulmonary infiltrates-Pseudomonas in sputum.  On antibiotic treatment with cefepime.  4.  Coronary artery disease  5.  Obesity  6.  Encephalopathy    Recommendations:   Continue micafungin  Continue cefepime  Follow-up blood cultures to make sure fungemia clearing  Await susceptibility  Supportive care    Luis French MD

## 2022-08-14 NOTE — PROGRESS NOTES
PULMONARY AND CRITICAL CARE PROGRESS NOTE - [facility]  Patient: Teresa Eubanks Day    1955   [id number]  08/14/22   10:05 CDT  Referring Provider: Carson Bill MD  Chief Complaint: Mechanically ventilated  Interval history: Pt is unable to provide history due to mechanical ventilation.  02 sat is 96%.  She continues on cefepime for Pseudomonas treatment and Micafungin for candida (glabrata).  She remains afebrile.  CXR and ABG are stable.  Creatinine is up to 2.52.  Nutrition is infusing to PEG.  She only grimaces; does not follow any commands; no purposeful movements. Trach leak is better today. Ventilator has been adjusted by Dr. Estrada.   No overnight events reported.  There is no family at bedside.  Review of Systems:   Cannot obtain due to mechanical ventilated state  Ventilator Settings: Mode: AC, rate 24 Vt/PC: 400 Flow 70L/min PEEP: 5  FiO2 0.35   Physical Exam:  Vital signs: T: 97.7  BP: 120/42   P: 83   R: 26   sat: 98 end-tidal 34  Physical Exam  Physical Exam  Vitals reviewed.   Constitutional:       General: She is not in acute distress.     Appearance: She is ill-appearing. She is not diaphoretic. She is obese.      Interventions: She is intubated and minimally responsive  HENT:      Head: Normocephalic.      Nose: Nose normal.      Mouth: Mucous membranes are moist.   Eyes:      General: No scleral icterus.   Neck:       Comments: Vent to trach with some audible leaking  Cardiovascular:      Rate and Rhythm: Normal rhythm  Pulmonary:      Effort: No respiratory distress. She is intubated.      Breath sounds: Examination of the left-middle field reveals decreased breath sounds. Examination of the left-lower field reveals decreased breath sounds. Decreased breath sounds present. No wheezing.   Abdominal:      General: There is no distension.  Soft.      Comments: G-tube with nutrition infusing    Genitourinary:     Comments: Evans & fecal management in place    Musculoskeletal:      Right lower leg: Erythema with blistering      Left lower leg: Erythema with blistering     Comments: scds   Skin:     Coloration: Skin is not pale.   Neurological:      Comments: No purposeful response. Does not follow any commands.  Grimaces to sternal rub.    Electronically signed by MINNIE Blankenship, 8/14/2022, 10:05 CDT     Physician Substantive Portion: Medical Decision Making:   Results from last 7 days   Lab Units 08/11/22  0559 08/09/22  0356 08/08/22  0949   WBC 10*3/mm3 12.34* 11.08* 14.25*   HEMOGLOBIN g/dL 8.4* 8.3* 9.8*   PLATELETS 10*3/mm3 237 237 244     Results from last 7 days   Lab Units 08/14/22  0453 08/13/22  0850 08/12/22  1019   SODIUM mmol/L 145 148* 148*   POTASSIUM mmol/L 3.9 3.5 3.5   BUN mg/dL 68* 62* 58*   CREATININE mg/dL 2.52* 2.27* 2.06*     Results from last 7 days   Lab Units 08/14/22  0419 08/13/22  0426 08/12/22  0435   PH, ARTERIAL pH units 7.408 7.467* 7.491*   PCO2, ARTERIAL mm Hg 40.4 36.9 36.2   PO2 ART mm Hg 71.7* 69.6* 63.5*   FIO2 % 40 45 40     Blood Culture   Date Value Ref Range Status   08/05/2022 No growth at 3 days  Preliminary     Urine Culture   Date Value Ref Range Status   08/05/2022 No growth  Final   08/04/2022 No growth  Final     Respiratory Culture   Date Value Ref Range Status   08/07/2022 Heavy growth (4+) Pseudomonas species (A)  Preliminary   08/07/2022 No Normal Respiratory Doris (A)  Preliminary   08/07/2022 Moderate growth (3+) Pseudomonas species (A)  Preliminary   08/07/2022 No Normal Respiratory Doris (A)  Preliminary     Electronically signed by MINNIE Blankenship, 8/14/2022, 10:05 CDT    Recent films:  XR Chest 1 View    Result Date: 8/14/2022  Impression: 1. No significant interval change since previous exam.   This report was finalized on 08/14/2022 09:20 by Dr. Ignacio Perkins MD.    XR Chest 1 View    Result Date: 8/13/2022  Impression: 1. No significant interval change since previous exam.    This report was finalized on 08/13/2022 08:27 by Dr. Ignacio Perkins MD.    My radiograph interpretation/independent review of imaging: bilat increased markings, tracheostomy tube in place midline    Pulmonary Assessment:    1. Acute resp failure with hypoxia requiring mechanical ventilation  2. Metabolic encephalopathy  3. Candidemia under tx  4. S/p pseudomonas pneumonia  5. Anemia stable  6. Pt has mild to moderate flow dyssynchrony    Recommend/plan:   · Vent adjustments for flow.  ACVC/Vt 400/ R 24/70 lpm ramp/ Fio2 0.35/peep 5  · Minimize sedation  · Continue trache care  · Mobilize as tolerated, difficult with persistent encephalopathy      This visit was performed by both a physician and an Advanced Practice RN.  I personally evaluated and examined the patient.  I performed all aspects of the medical decision making as documented.  Electronically signed by Bart Estrada MD, 8/14/2022, 10:56 CDT

## 2022-08-14 NOTE — PROGRESS NOTES
Fly Bill M.D.  MINNIE Garcia        Internal Medicine Progress Note    8/14/2022   08:36 CDT    Name:  Teresa Serna  MRN:    [id number]     Acct:     [id number]   Room:  [address] [place] [address] Day: 0     Admit Date: 7/27/2022  1:42 PM  PCP: Teresa Contreras MD    Subjective:     C/C: need for continued vent weaning    Interval History: Status:  stayed the same. Resting in bed. No family at bedside. Afebrile.  Tolerating current vent settings (A/C) with 45% 02. No purposeful response to stimulation. RT at bedside packing trach. Renal functions continue to worsen. BS stable.   Review of Systems   Unable to perform ROS: intubated         Medications:     Allergies:   Allergies   Allergen Reactions   • Codeine Palpitations and Other (See Comments)     Chest pain   • Exenatide Arrhythmia   • Sulfa Antibiotics Hives and Rash     Reaction: hives   • Albuterol Palpitations     High heart rate   • Sitagliptin Swelling   • Clindamycin/Lincomycin Unknown - High Severity   • Amoxicillin-Pot Clavulanate Palpitations       Current Meds:   Current Facility-Administered Medications:   •  acetaminophen (TYLENOL) tablet 650 mg, 650 mg, Per G Tube, Q4H PRN **OR** acetaminophen (TYLENOL) suppository 650 mg, 650 mg, Rectal, Q4H PRN **OR** acetaminophen (TYLENOL) 160 MG/5ML solution 650 mg, 650 mg, Per G Tube, Q4H PRN, Carson Bill MD  •  amiodarone (PACERONE) tablet 100 mg, 100 mg, Per G Tube, Q24H, Carson Bill MD  •  cefepime (MAXIPIME) 2 g/100 mL 0.9% NS (mbp), 2 g, Intravenous, Q12H, Carson Bill MD  •  chlorhexidine (PERIDEX) 0.12 % solution 15 mL, 15 mL, Mouth/Throat, Q12H, Stephanie Henry MD  •  dextrose (D50W) (25 g/50 mL) IV injection 25 g, 25 g, Intravenous, Q15 Min PRN, Stephanie Henry MD  •  dextrose (GLUTOSE) oral gel 15 g, 15 g, Oral, Q15 Min PRN, Stephanie Henry MD  •  famotidine (PEPCID) tablet 20 mg, 20 mg, Per G Tube, Daily, Carson Bill MD  •   glucagon (human recombinant) (GLUCAGEN DIAGNOSTIC) injection 1 mg, 1 mg, Subcutaneous, Q15 Min PRN, Stephanie Henry MD  •  heparin (porcine) 5000 UNIT/ML injection 5,000 Units, 5,000 Units, Subcutaneous, Q8H, Carson Bill MD  •  insulin detemir (LEVEMIR) injection 25 Units, 25 Units, Subcutaneous, Nightly, Stephanie Henry MD  •  Insulin Lispro (humaLOG) injection 0-9 Units, 0-9 Units, Subcutaneous, Q6H, Stephanie Henry MD  •  levalbuterol (XOPENEX) nebulizer solution 0.63 mg, 0.63 mg, Nebulization, BID PRN, Stephanie Henry MD  •  levalbuterol (XOPENEX) nebulizer solution 0.63 mg, 0.63 mg, Nebulization, 4x Daily - RT, Stephanie Henry MD  •  lidocaine (XYLOCAINE) 1 % injection 10 mL, 10 mL, Subcutaneous, Once, Anamaria Levine MD  •  magnesium hydroxide (MILK OF MAGNESIA) suspension 10 mL, 10 mL, Per G Tube, Daily PRN, Carson Bill MD  •  metoprolol tartrate (LOPRESSOR) tablet 50 mg, 50 mg, Per G Tube, Q12H, Carson Bill MD  •  micafungin sodium (MYCAMINE) 100 mg in sodium chloride 0.9 % 100 mL IVPB, 100 mg, Intravenous, Q24H, Jaimie Hunter APRN  •  Morphine sulfate (PF) injection 2 mg, 2 mg, Intravenous, Q4H PRN, Jamaal Frye, MINNIE  •  norepinephrine (LEVOPHED) 8 mg in 250 mL NS infusion (premix), 0.02-0.3 mcg/kg/min, Intravenous, Titrated, Stephanie Henry MD, Last Rate: 17.25 mL/hr at 08/05/22 1226, 0.08 mcg/kg/min at 08/05/22 1226  •  ondansetron (ZOFRAN) tablet 4 mg, 4 mg, Per G Tube, Q6H PRN **OR** ondansetron (ZOFRAN) injection 4 mg, 4 mg, Intravenous, Q6H PRN, Carson Bill MD  •  polyethylene glycol (MIRALAX) packet 17 g, 17 g, Per G Tube, Daily, Carson Bill MD  •  sodium chloride 0.9 % flush 10 mL, 10 mL, Intravenous, Q12H, Stephanie Henry MD  •  sodium chloride 0.9 % flush 10 mL, 10 mL, Intravenous, PRN, Stephanie Henry MD    Data:     Code Status:    There are no questions and answers to display.       Family History   Problem  "Relation Age of Onset   • Cancer Father    • Coronary artery disease Father    • Asthma Father    • Heart failure Mother    • Kidney disease Mother    • Arthritis Mother    • Pancreatitis Sister    • Heart attack Brother    • Colon cancer Neg Hx    • Colon polyps Neg Hx        Social History     Socioeconomic History   • Marital status:    Tobacco Use   • Smoking status: Former Smoker     Years: 20.00     Types: Cigarettes     Quit date: 2000     Years since quittin.5   • Smokeless tobacco: Never Used   Vaping Use   • Vaping Use: Never used   Substance and Sexual Activity   • Alcohol use: No   • Drug use: No   • Sexual activity: Defer       Vitals:  /53   Pulse 100   Temp 98.1 °F (36.7 °C) (Temporal)   Resp 12   Ht 167.6 cm (66\")   Wt 116 kg (255 lb 4.8 oz)   SpO2 95%   BMI 41.21 kg/m²     T 97.7 P 83 R 35 /48 Sp02 98% (vent)        I/O (24Hr):  No intake or output data in the 24 hours ending 22 0836    Labs and imaging:      Recent Results (from the past 12 hour(s))   POC Glucose Once    Collection Time: 22 11:47 PM    Specimen: Blood   Result Value Ref Range    Glucose 204 (H) 70 - 130 mg/dL   Blood Gas, Arterial -    Collection Time: 22  4:19 AM    Specimen: Arterial Blood   Result Value Ref Range    Site Right Radial     Nigel's Test Positive     pH, Arterial 7.408 7.350 - 7.450 pH units    pCO2, Arterial 40.4 35.0 - 45.0 mm Hg    pO2, Arterial 71.7 (L) 83.0 - 108.0 mm Hg    HCO3, Arterial 25.5 20.0 - 26.0 mmol/L    Base Excess, Arterial 0.7 0.0 - 2.0 mmol/L    O2 Saturation, Arterial      Temperature 37.0 C    Barometric Pressure for Blood Gas 750 mmHg    Modality Ventilator     FIO2 40 %    Ventilator Mode AC     Set Tidal Volume 380     Set Mech Resp Rate 24.0     PEEP 5.0     Collected by YOLANDA COLEMAN RRT     pCO2, Temperature Corrected 40.4 35 - 45 mm Hg    pH, Temp Corrected 7.408 7.350 - 7.450 pH Units    pO2, Temperature Corrected 71.7 (L) 83 - 108 mm " Hg   Basic Metabolic Panel    Collection Time: 08/14/22  4:53 AM    Specimen: Blood   Result Value Ref Range    Glucose 197 (H) 65 - 99 mg/dL    BUN 68 (H) 8 - 23 mg/dL    Creatinine 2.52 (H) 0.57 - 1.00 mg/dL    Sodium 145 136 - 145 mmol/L    Potassium 3.9 3.5 - 5.2 mmol/L    Chloride 105 98 - 107 mmol/L    CO2 25.0 22.0 - 29.0 mmol/L    Calcium 9.7 8.6 - 10.5 mg/dL    BUN/Creatinine Ratio 27.0 (H) 7.0 - 25.0    Anion Gap 15.0 5.0 - 15.0 mmol/L    eGFR 20.7 (L) >60.0 mL/min/1.73   POC Glucose Once    Collection Time: 08/14/22  4:55 AM    Specimen: Blood   Result Value Ref Range    Glucose 191 (H) 70 - 130 mg/dL           Physical Examination:        Physical Exam  Vitals and nursing note reviewed.   Constitutional:       Appearance: Normal appearance. She is obese.      Interventions: She is intubated.   HENT:      Head: Normocephalic and atraumatic.      Right Ear: External ear normal.      Left Ear: External ear normal.      Nose: Nose normal.      Mouth/Throat:      Mouth: Mucous membranes are dry.      Pharynx: Oropharynx is clear.   Eyes:      Extraocular Movements: Extraocular movements intact.      Conjunctiva/sclera: Conjunctivae normal.      Pupils: Pupils are equal, round, and reactive to light.   Neck:      Comments: Trach to vent  Cardiovascular:      Rate and Rhythm: Normal rate and regular rhythm.      Pulses: Normal pulses.      Heart sounds: Normal heart sounds.   Pulmonary:      Effort: Pulmonary effort is normal. She is intubated.      Breath sounds: Normal breath sounds.   Abdominal:      General: Bowel sounds are normal.      Palpations: Abdomen is soft.      Comments: g tube   Musculoskeletal:      Cervical back: Normal range of motion and neck supple.      Comments: Generalized weakness   Skin:     General: Skin is warm and dry.   Neurological:      Comments: Minimal response to stimulation   Psychiatric:         Mood and Affect: Mood normal.         Behavior: Behavior normal.            Assessment:             Acute respiratory failure with hypoxia (HCC)    Ventilator dependent (HCC)    Acute on chronic respiratory failure with hypoxia and hypercapnia (HCC)    Acute tracheostomy management (HCC)    Past Medical History:   Diagnosis Date   • Abnormal stress test    • Atrial flutter (HCC)    • CAD (coronary artery disease)    • CAD in native artery     CABG x 3   • COVID-19 vaccine series completed 2021    [id number]   • Diverticulosis    • Essential hypertension     Tricuspid valve insufficiency, unspecified etiology    • History of adenomatous polyp of colon    • Hyperlipemia, mixed    • Hyperlipidemia    • Hypertension    • IBS (irritable bowel syndrome)    • MI, old    • Mitral valve prolapse    • Near syncope    • Obesity, morbid ([facility])    • Palpitations    • Paroxysmal SVT (supraventricular tachycardia) ([facility])    • Pedal edema    • Precordial pain    • Rheumatoid arthritis ([facility])    • S/P CABG x 3    • Type 2 diabetes mellitus (HCC)    • Venous insufficiency of both lower extremities         Plan:          1. Acute hypoxic respiratory failure  2. DM2 with hyperglycemia  3. Rheumatoid arthritis  4. Dysphagia  5. History of CAD s/p CABG  6. Morbid obesity  7. Anxiety  8. History of retroperitoneal hematoma  9. UTI  10. Pseudomonas in sputum  11. Hypernatremia  12. Hypokalemia  13. Fungemia  14. Funguria    Continue current treatment. Monitor counts. Increase activity. Monitor labs. Vent weaning under direction of pulmonology. Trach management per ENT.  Continue nutrition support via AMONs. Continue IV antibiotics and antifungals. Glycemic control. Maintain patient safety. Monitor renal function and electrolytes closely. Continue free water flushes.         Electronically signed by MINNIE Pizarro on 8/14/2022 at 08:36 CDT

## 2022-08-15 NOTE — PROGRESS NOTES
Fly Bill M.D.  MINNIE Garcia        Internal Medicine Progress Note    8/15/2022   11:19 CDT    Name:  Teresa Serna  MRN:    [id number]     Acct:     [id number]   Room:  [address] [place] [address] Day: 0     Admit Date: 7/27/2022  1:42 PM  PCP: Teresa Contreras MD    Subjective:     C/C: need for continued vent weaning    Interval History: Status:  stayed the same. Resting in bed. No family at bedside. Low grade temp noted.  Tolerating current vent settings (A/C) with 30% 02. No purposeful response to stimulation. Continues off pressor support. Continues with worsening renal function.   Review of Systems   Unable to perform ROS: intubated         Medications:     Allergies:   Allergies   Allergen Reactions   • Codeine Palpitations and Other (See Comments)     Chest pain   • Exenatide Arrhythmia   • Sulfa Antibiotics Hives and Rash     Reaction: hives   • Albuterol Palpitations     High heart rate   • Sitagliptin Swelling   • Clindamycin/Lincomycin Unknown - High Severity   • Amoxicillin-Pot Clavulanate Palpitations       Current Meds:   Current Facility-Administered Medications:   •  acetaminophen (TYLENOL) tablet 650 mg, 650 mg, Per G Tube, Q4H PRN **OR** acetaminophen (TYLENOL) suppository 650 mg, 650 mg, Rectal, Q4H PRN **OR** acetaminophen (TYLENOL) 160 MG/5ML solution 650 mg, 650 mg, Per G Tube, Q4H PRN, Carson Bill MD  •  amiodarone (PACERONE) tablet 100 mg, 100 mg, Per G Tube, Q24H, Carson Bill MD  •  cefepime (MAXIPIME) 2 g/100 mL 0.9% NS (mbp), 2 g, Intravenous, Q12H, Carson Bill MD  •  chlorhexidine (PERIDEX) 0.12 % solution 15 mL, 15 mL, Mouth/Throat, Q12H, Stephanie Henry MD  •  dextrose (D50W) (25 g/50 mL) IV injection 25 g, 25 g, Intravenous, Q15 Min PRN, Stephanie Henry MD  •  dextrose (GLUTOSE) oral gel 15 g, 15 g, Oral, Q15 Min PRN, Stephanie Henry MD  •  famotidine (PEPCID) tablet 20 mg, 20 mg, Per G Tube, Daily, Carson Bill,  MD  •  glucagon (human recombinant) (GLUCAGEN DIAGNOSTIC) injection 1 mg, 1 mg, Subcutaneous, Q15 Min PRN, Stephanie Henry MD  •  heparin (porcine) 5000 UNIT/ML injection 5,000 Units, 5,000 Units, Subcutaneous, Q8H, Carson Bill MD  •  insulin detemir (LEVEMIR) injection 25 Units, 25 Units, Subcutaneous, Nightly, Stephanie Henry MD  •  Insulin Lispro (humaLOG) injection 0-9 Units, 0-9 Units, Subcutaneous, Q6H, Stephanie Henry MD  •  levalbuterol (XOPENEX) nebulizer solution 0.63 mg, 0.63 mg, Nebulization, BID PRN, Stephanie Henry MD  •  levalbuterol (XOPENEX) nebulizer solution 0.63 mg, 0.63 mg, Nebulization, 4x Daily - RT, Stephanie Henry MD  •  lidocaine (XYLOCAINE) 1 % injection 10 mL, 10 mL, Subcutaneous, Once, Anamaria Levine MD  •  magnesium hydroxide (MILK OF MAGNESIA) suspension 10 mL, 10 mL, Per G Tube, Daily PRN, Carson Bill MD  •  metoprolol tartrate (LOPRESSOR) tablet 50 mg, 50 mg, Per G Tube, Q12H, Carson Bill MD  •  micafungin sodium (MYCAMINE) 100 mg in sodium chloride 0.9 % 100 mL IVPB, 100 mg, Intravenous, Q24H, Jiamie Hunter APRN  •  Morphine sulfate (PF) injection 2 mg, 2 mg, Intravenous, Q4H PRN, Jamaal Frye, MINNIE  •  norepinephrine (LEVOPHED) 8 mg in 250 mL NS infusion (premix), 0.02-0.3 mcg/kg/min, Intravenous, Titrated, Stephanie Henry MD, Last Rate: 17.25 mL/hr at 08/05/22 1226, 0.08 mcg/kg/min at 08/05/22 1226  •  ondansetron (ZOFRAN) tablet 4 mg, 4 mg, Per G Tube, Q6H PRN **OR** ondansetron (ZOFRAN) injection 4 mg, 4 mg, Intravenous, Q6H PRN, Carson Bill MD  •  polyethylene glycol (MIRALAX) packet 17 g, 17 g, Per G Tube, Daily, Carson Bill MD  •  sodium chloride 0.9 % flush 10 mL, 10 mL, Intravenous, Q12H, Stephanie Henry MD  •  sodium chloride 0.9 % flush 10 mL, 10 mL, Intravenous, PRN, Stephanie Henry MD    Data:     Code Status:    There are no questions and answers to display.       Family History   Problem  "Relation Age of Onset   • Cancer Father    • Coronary artery disease Father    • Asthma Father    • Heart failure Mother    • Kidney disease Mother    • Arthritis Mother    • Pancreatitis Sister    • Heart attack Brother    • Colon cancer Neg Hx    • Colon polyps Neg Hx        Social History     Socioeconomic History   • Marital status:    Tobacco Use   • Smoking status: Former Smoker     Years: 20.00     Types: Cigarettes     Quit date: 2000     Years since quittin.5   • Smokeless tobacco: Never Used   Vaping Use   • Vaping Use: Never used   Substance and Sexual Activity   • Alcohol use: No   • Drug use: No   • Sexual activity: Defer       Vitals:  /53   Pulse 100   Temp 98.1 °F (36.7 °C) (Temporal)   Resp 12   Ht 167.6 cm (66\")   Wt 126 kg (278 lb 12.8 oz)   SpO2 95%   BMI 45.00 kg/m²     T 99.1 P 89 R 39 /102 Sp02 95% (vent)        I/O (24Hr):  No intake or output data in the 24 hours ending 08/15/22 1119    Labs and imaging:      Recent Results (from the past 12 hour(s))   POC Glucose Once    Collection Time: 22 11:52 PM    Specimen: Blood   Result Value Ref Range    Glucose 179 (H) 70 - 130 mg/dL   Blood Gas, Arterial -    Collection Time: 08/15/22  4:09 AM    Specimen: Arterial Blood   Result Value Ref Range    Site Right Radial     Nigel's Test Positive     pH, Arterial 7.426 7.350 - 7.450 pH units    pCO2, Arterial 35.8 35.0 - 45.0 mm Hg    pO2, Arterial 56.7 (L) 83.0 - 108.0 mm Hg    HCO3, Arterial 23.5 20.0 - 26.0 mmol/L    Base Excess, Arterial -0.7 (L) 0.0 - 2.0 mmol/L    O2 Saturation, Arterial 89.1 (L) 94.0 - 99.0 %    Temperature 37.0 C    Barometric Pressure for Blood Gas 748 mmHg    Modality Ventilator     FIO2 30 %    Ventilator Mode AC     Set Tidal Volume 400     Set Mech Resp Rate 24.0     PEEP 5.0     Collected by YOLANDA COLEMAN RRT     pCO2, Temperature Corrected 35.8 35 - 45 mm Hg    pH, Temp Corrected 7.426 7.350 - 7.450 pH Units    pO2, Temperature " Corrected 56.7 (L) 83 - 108 mm Hg   POC Glucose Once    Collection Time: 08/15/22  4:39 AM    Specimen: Blood   Result Value Ref Range    Glucose 193 (H) 70 - 130 mg/dL           Physical Examination:        Physical Exam  Vitals and nursing note reviewed.   Constitutional:       Appearance: Normal appearance. She is obese.      Interventions: She is intubated.   HENT:      Head: Normocephalic and atraumatic.      Right Ear: External ear normal.      Left Ear: External ear normal.      Nose: Nose normal.      Mouth/Throat:      Mouth: Mucous membranes are dry.      Pharynx: Oropharynx is clear.   Eyes:      Extraocular Movements: Extraocular movements intact.      Conjunctiva/sclera: Conjunctivae normal.      Pupils: Pupils are equal, round, and reactive to light.   Neck:      Comments: Trach to vent  Cardiovascular:      Rate and Rhythm: Normal rate and regular rhythm.      Pulses: Normal pulses.      Heart sounds: Normal heart sounds.   Pulmonary:      Effort: Pulmonary effort is normal. She is intubated.      Breath sounds: Normal breath sounds.   Abdominal:      General: Bowel sounds are normal.      Palpations: Abdomen is soft.      Comments: g tube   Musculoskeletal:      Cervical back: Normal range of motion and neck supple.      Comments: Generalized weakness   Skin:     General: Skin is warm and dry.   Neurological:      Comments: Minimal response to stimulation   Psychiatric:         Mood and Affect: Mood normal.         Behavior: Behavior normal.           Assessment:             Acute respiratory failure with hypoxia (HCC)    Ventilator dependent (HCC)    Acute on chronic respiratory failure with hypoxia and hypercapnia (HCC)    Acute tracheostomy management (HCC)    Past Medical History:   Diagnosis Date   • Abnormal stress test    • Atrial flutter (HCC)    • CAD (coronary artery disease)    • CAD in native artery     CABG x 3   • COVID-19 vaccine series completed 2021 [id number]   •  Diverticulosis    • Essential hypertension     Tricuspid valve insufficiency, unspecified etiology    • History of adenomatous polyp of colon    • Hyperlipemia, mixed    • Hyperlipidemia    • Hypertension    • IBS (irritable bowel syndrome)    • MI, old    • Mitral valve prolapse    • Near syncope    • Obesity, morbid ([facility])    • Palpitations    • Paroxysmal SVT (supraventricular tachycardia) ([facility])    • Pedal edema    • Precordial pain    • Rheumatoid arthritis ([facility])    • S/P CABG x 3    • Type 2 diabetes mellitus ([facility])    • Venous insufficiency of both lower extremities         Plan:          1. Acute hypoxic respiratory failure  2. DM2 with hyperglycemia  3. Rheumatoid arthritis  4. Dysphagia  5. History of CAD s/p CABG  6. Morbid obesity  7. Anxiety  8. History of retroperitoneal hematoma  9. UTI  10. Pseudomonas in sputum  11. Hypernatremia  12. Hypokalemia  13. Fungemia  14. Funguria    Continue current treatment. Monitor counts. Increase activity. Monitor labs. Vent weaning under direction of pulmonology. Trach management per ENT.  Continue nutrition support via AMONs. Continue IV antibiotics and antifungals. Glycemic control. Maintain patient safety. Monitor renal function and electrolytes closely.Labs today.         Electronically signed by MINNIE Simpson on 8/15/2022 at 11:19 CDT

## 2022-08-15 NOTE — PROGRESS NOTES
"Infectious Diseases Progress Note    Patient:  Teresa Serna  YOB: 1955  MRN: [id number]   Admit date: 7/27/2022   Admitting Physician: Carson Bill MD  Primary Care Physician: Teresa Contreras MD    Chief Complaint/Interval History: She remains on vent.  She is on 30% FiO2.  She is on 5 of PEEP.  Still does not seem to be responding much.  She is without fever.    No intake or output data in the 24 hours ending 08/15/22 0715  Allergies:   Allergies   Allergen Reactions   • Codeine Palpitations and Other (See Comments)     Chest pain   • Exenatide Arrhythmia   • Sulfa Antibiotics Hives and Rash     Reaction: hives   • Albuterol Palpitations     High heart rate   • Sitagliptin Swelling   • Clindamycin/Lincomycin Unknown - High Severity   • Amoxicillin-Pot Clavulanate Palpitations     Current Scheduled Medications:   amiodarone, 100 mg, Per G Tube, Q24H  cefepime, 2 g, Intravenous, Q12H  chlorhexidine, 15 mL, Mouth/Throat, Q12H  famotidine, 20 mg, Per G Tube, Daily  heparin (porcine), 5,000 Units, Subcutaneous, Q8H  insulin detemir, 25 Units, Subcutaneous, Nightly  insulin lispro, 0-9 Units, Subcutaneous, Q6H  levalbuterol, 0.63 mg, Nebulization, 4x Daily - RT  lidocaine, 10 mL, Subcutaneous, Once  metoprolol tartrate, 50 mg, Per G Tube, Q12H  micafungin (MYCAMINE) IV, 100 mg, Intravenous, Q24H  polyethylene glycol, 17 g, Per G Tube, Daily  sodium chloride, 10 mL, Intravenous, Q12H      Current PRN Medications:  •  acetaminophen **OR** acetaminophen **OR** acetaminophen  •  dextrose  •  dextrose  •  glucagon (human recombinant)  •  levalbuterol  •  magnesium hydroxide  •  Morphine  •  ondansetron **OR** ondansetron  •  sodium chloride    Review of Systems Unobtainable from patient due to mental status.    Vital Signs:  /53   Pulse 100   Temp 98.1 °F (36.7 °C) (Temporal)   Resp 12   Ht 167.6 cm (66\")   Wt 126 kg (278 lb 12.8 oz)   SpO2 95%   BMI 45.00 kg/m²     Physical " Exam  Vital signs - reviewed.  Line/IV site - No erythema, warmth, induration, or tenderness.  Lungs without wheezing  Abdomen nondistended  Extremities edematous  Skin without rash    Lab Results:  CBC:   Results from last 7 days   Lab Units 08/11/22  0559 08/09/22  0356 08/08/22  0949   WBC 10*3/mm3 12.34* 11.08* 14.25*   HEMOGLOBIN g/dL 8.4* 8.3* 9.8*   HEMATOCRIT % 28.5* 28.2* 32.8*   PLATELETS 10*3/mm3 237 237 244     BMP:  Results from last 7 days   Lab Units 08/14/22  0453 08/13/22  0850 08/12/22  1019 08/12/22  0359 08/11/22  0450 08/10/22  0435 08/09/22  0356   SODIUM mmol/L 145 148* 148* 148* 150*  --  150*   POTASSIUM mmol/L 3.9 3.5 3.5 3.8 4.2 4.4 3.4*   CHLORIDE mmol/L 105 106 108* 107 111*  --  111*   CO2 mmol/L 25.0 27.0 27.0 27.0 29.0  --  28.0   BUN mg/dL 68* 62* 58* 56* 47*  --  30*   CREATININE mg/dL 2.52* 2.27* 2.06* 2.02* 1.78*  --  1.35*   GLUCOSE mg/dL 197* 130* 152* 169* 151*  --  193*   CALCIUM mg/dL 9.7 9.4 9.6 9.6 9.0  --  8.9     Culture Results:   Blood Culture   Date Value Ref Range Status   08/09/2022 Candida glabrata (C)  Final     Comment:     Infectious disease consultation is highly recommended to rule out distant foci of infection.   08/08/2022 No growth at 5 days  Final   08/08/2022 No growth at 5 days  Final     Urine Culture   Date Value Ref Range Status   08/08/2022 Yeast isolated (A)  Final     Susceptibility     Candida glabrata     PRAFUL     Fluconazole 24 ug/ml Susceptible-dose dependent 1     Micafungin <=0.06 ug/ml Susceptible              1 Appended report. These results have been appended to a previously preliminary verified report.   Fluconazole is susceptible with the use of higher dose fluconazole (12 mg/kg daily or 800 mg daily).     Radiology: None    Additional Studies Reviewed: None    Impression:   1.  Fungemia with Candida glabrata  2.  Respiratory failure  3.  Pulmonary infiltrate-Pseudomonas in sputum.  Remains on cefepime  4.  Coronary artery disease  5.   Obesity  6.  Encephalopathy    Recommendations:   Ordered follow-up blood cultures to make sure fungemia has cleared  Continue micafungin  Continue cefepime  Shortly before her fungemia, creatinine was 1.3-creatinine is increased to 2.5  Have ordered renal ultrasound to make sure no hydronephrosis suggestive of obstruction  Continue to follow    Luis French MD

## 2022-08-15 NOTE — PROGRESS NOTES
[facility] Otolaryngology Head and Neck Surgery  INPATIENT PROGRESS NOTE    Patient Name: Teresa Serna  : 1955   MRN: [id number]  Date of Admission: 2022  Today's Date: 08/15/22  Length of Stay: 0  592/1    Carson Bill MD   Primary Care Physician: Teresa Contreras MD  Surgical Procedures Since Admission:  Procedure(s):  tracheostomy  laryngoscopy  GASTROSTOMY FEEDING TUBE INSERTION  Surgeon:  Luis Medina Jr., MD  Status:  10 Days Post-Op  -------------------    Subjective   Subjective   Chief Complaint: Tracheostomy management  HPI   Accompanied by: RT  Since last examined, Teresa Serna has remained on mechanical ventilation, trach in position.  She is unable to give history.  RT reports patient has been stable on the ventilator.  She has been unresponsive.  They continue to pack the inferior aspect of the tracheostoma.  Patient is seen, chart is reviewed    Review of Systems   No change from prior inquiry  All pertinent negatives and positives are as above. All other systems have been reviewed and are negative unless otherwise stated.   Objective   Objective   Vitals:    Output by Drain (mL) 22 0701 - 22 1900 22 1901 - 08/15/22 0700 08/15/22 0701 - 08/15/22 0749 Range Total   Requested LDAs do not have output data documented.       Physical Exam  Constitutional:       General: She is not in acute distress.     Appearance: She is well-developed. She is obese. She is not ill-appearing.      Interventions: She is sedated and intubated.      Comments: Lying in bed, mechanical ventilation, trach in position  Grimaces to stimulation   HENT:      Head: Normocephalic and atraumatic.      Right Ear: External ear normal.      Left Ear: External ear normal.      Nose: Nose normal.      Mouth/Throat:      Lips: Pink.      Mouth: Mucous membranes are dry.      Dentition: Normal dentition.      Comments: Orally intubated  Eyes:      General:  Lids are normal.      Conjunctiva/sclera: Conjunctivae normal.   Neck:      Trachea: Tracheostomy present.      Comments: Neck-very short, very thick    TRACHEOSTOMY SITE:    Tracheostomy tube type: Shiley #8 cuffed DIC Legacy    Stoma: Stoma appears to be healing.  There is iodophor present inferiorly.  This is removed and the stoma evaluated.  There is very minimal necrotic debris.  There appears to be clear secretions coming from the trachea..    Voice: Not evaluated  Date placed: 8/5/2022  Date last changed: Never    Pulmonary:      Effort: No tachypnea, respiratory distress or retractions. She is intubated.      Comments: Mechanical ventilation, trach in position  Musculoskeletal:      Cervical back: No crepitus. No pain with movement. Decreased range of motion.   Lymphadenopathy:      Cervical: No cervical adenopathy.   Neurological:      Comments: Grimaces   Psychiatric:      Comments: Not evaluated           Result Review    Result Review:  I have personally reviewed the results from the time of this admission to 8/15/2022 07:49 CDT and agree with these findings:  [x]  Laboratory  []  Microbiology  [x]  Radiology  []  EKG/Telemetry   []  Cardiology/Vascular   []  Pathology  [x]  Old records  []  Other:  Most notable findings include: No labs pertinent to ENT  CT scan reviewed, agree with radiology interpretation  Progress Notes by Bart Estrada MD (08/14/2022 07:35)   Progress Notes by Bart Estrada MD (08/14/2022 07:35)   CT Head Without Contrast (08/11/2022 14:45)       Assessment & Plan   Assessment / Plan   Brief Patient Summary:  Teresa Serna is a 66 y.o. female who remains on mechanical ventilation, trach in position.  She has stable tracheostomy tube.  She does have some necrosis of the inferior tracheostoma.  This appears to be healing appropriately.  I will continue dressing changes.    Active Hospital Problems:   Active Hospital Problems    Diagnosis    • Acute tracheostomy  management (HCC)    • Ventilator dependent (HCC)    • Acute on chronic respiratory failure with hypoxia and hypercapnia (HCC)    • Acute respiratory failure with hypoxia (HCC)      Plan:   • Tracheostomy management-the patient has stable trach in position.  I will continue current trach tube care.  I will continue packing the inferior stoma and allow to granulate in.  • Respiratory failure-the patient remains on mechanical ventilation.  She is followed by the pulmonary team.  • Sinusitis present-patient is currently treated with IV antibiotics.  This should be appropriate coverage for her sinusitis.  She is having no other symptoms.  Discussed Plan with RT  Following patient as in-patient. Further recommendations will be made based on serial examinations.    Medications/Orders for this encounter: No new medications ordered.  No New orders written.    Discharge Planning: Per primary team        DVT prophylaxis:  Medical DVT prophylaxis orders are present.     Electronically signed by Luis Medina Jr, MD, 08/15/22, 7:49 AM CDT.

## 2022-08-15 NOTE — CONSULTS
Nephrology ([facility]) Consult Note      Patient:  Teresa Serna  YOB: 1955  Date of Service: 8/15/2022  MRN: [id number]   Acct: [id number]   Primary Care Physician: Teresa Contreras MD  Advance Directive:   There are no questions and answers to display.     Admit Date: 7/27/2022       Hospital Day: 0  Referring Provider: Carson Bill MD      Patient personally seen and examined.  Complete chart including Consults, Notes, Operative Reports, Labs, Cardiology, and Radiology studies reviewed as able.        Subjective:  Teresa Serna is a 66 y.o. female for whom we were consulted for evaluation and treatment of acute renal failure.  Patient on the ventilator and unable to participate in the initial history and physical examination.  She was originally admitted with small bowel obstruction and mesenteric mass at [facility].  She was also intubated on 7/19 and noted to have a left-sided retroperitoneal hematoma and placed on antibiotics for pneumonia.  She was continued on the ventilator and transferred to the LTAC on 7/28.  Creatinine 0.5 on admission and begin its upward trend on 8/4 through 8/7 with an improvement through 8/9 and continued worsening since that time.  No recorded I&O and 1 weight of 126 kilos recorded.  No family present at the bedside.  Also developed Candida fungemia which required treatment with micafungin.  Had previously required Levophed but appears to been off for several days.    Temp- 99.1  Pulse- 89  Resp- 39  BP- 88/39 on monitor at time of exam  O2%- 95      Allergies:  Codeine, Exenatide, Sulfa antibiotics, Albuterol, Sitagliptin, Clindamycin/lincomycin, and Amoxicillin-pot clavulanate    Home Meds:  Medications Prior to Admission   Medication Sig Dispense Refill Last Dose   • amiodarone (PACERONE) 100 MG tablet TAKE ONE TABLET BY MOUTH EVERY DAY 30 tablet 11    • aspirin 81 MG EC tablet Take 81 mg by mouth Daily.      • Eliquis 5  MG tablet tablet TAKE 1 TABLET BY MOUTH EVERY 12 HOURS 180 tablet 4    • folic acid (FOLVITE) 1 MG tablet Take 1 mg by mouth Daily.      • furosemide (Lasix) 40 MG tablet Please take 2 tabs in AM and 1 Qafternoon x2 days then increase to 2 tabs BID 28 tablet 0    • glyburide (DIAbeta) 5 MG tablet Take 5 mg by mouth 2 (Two) Times a Day With Meals. Take 2QAM & 2QPM      • isosorbide mononitrate (IMDUR) 30 MG 24 hr tablet Take 30 mg by mouth Daily.      • lidocaine (LIDODERM) 5 % Place 1 patch on the skin as directed by provider Daily. Remove & Discard patch within 12 hours or as directed by MD 6 each 0    • losartan (COZAAR) 25 MG tablet Take 25 mg by mouth Daily.      • metFORMIN (GLUCOPHAGE) 500 MG tablet Take 500 mg by mouth Daily With Breakfast.      • metoprolol tartrate (LOPRESSOR) 50 MG tablet Take 50 mg by mouth 2 (Two) Times a Day.      • nitroglycerin (NITROSTAT) 0.4 MG SL tablet Place 0.4 mg under the tongue Every 5 (Five) Minutes As Needed for Chest Pain. Take no more than 3 doses in 15 minutes.      • pantoprazole (PROTONIX) 40 MG EC tablet Take 1 tablet by mouth Every 12 (Twelve) Hours. 60 tablet 1    • potassium chloride (K-DUR) 10 MEQ CR tablet Take 1 tablet by mouth Daily. (Patient taking differently: Take 20 mEq by mouth Daily.) 90 tablet 3    • potassium chloride (K-DUR,KLOR-CON) 20 MEQ CR tablet Please take 1 and 1/2 tab daily x1 week 11 tablet 0    • predniSONE (DELTASONE) 1 MG tablet Take 4 mg by mouth Daily.      • predniSONE (DELTASONE) 5 MG tablet Take 5 mg by mouth Every Morning.      • rosuvastatin (CRESTOR) 5 MG tablet Take 5 mg by mouth Daily.      • tiZANidine (Zanaflex) 4 MG tablet Take 1 tablet by mouth At Night As Needed for Muscle Spasms. 3 tablet 0    • Tofacitinib Citrate ER (XELJANZ) 11 MG tablet sustained-release 24 hour Take 11 mg by mouth Daily.      • verapamil SR (CALAN-SR) 240 MG CR tablet TAKE ONE TABLET BY MOUTH ONCE DAILY 90 tablet 3        Medicines:  Current  Facility-Administered Medications   Medication Dose Route Frequency Provider Last Rate Last Admin   • acetaminophen (TYLENOL) tablet 650 mg  650 mg Per G Tube Q4H PRN Carson Bill MD        Or   • acetaminophen (TYLENOL) suppository 650 mg  650 mg Rectal Q4H PRN Carson Bill MD        Or   • acetaminophen (TYLENOL) 160 MG/5ML solution 650 mg  650 mg Per G Tube Q4H PRN Carson Bill MD       • amiodarone (PACERONE) tablet 100 mg  100 mg Per G Tube Q24H Carson Bill MD       • cefepime (MAXIPIME) 2 g/100 mL 0.9% NS (mbp)  2 g Intravenous Q12H Carson Bill MD       • chlorhexidine (PERIDEX) 0.12 % solution 15 mL  15 mL Mouth/Throat Q12H Stephanie Henry MD       • dextrose (D50W) (25 g/50 mL) IV injection 25 g  25 g Intravenous Q15 Min PRN Stephanie Henry MD       • dextrose (GLUTOSE) oral gel 15 g  15 g Oral Q15 Min PRN Stephanie Henry MD       • famotidine (PEPCID) tablet 20 mg  20 mg Per G Tube Daily Carson Bill MD       • glucagon (human recombinant) (GLUCAGEN DIAGNOSTIC) injection 1 mg  1 mg Subcutaneous Q15 Min PRN Stephanie Henry MD       • heparin (porcine) 5000 UNIT/ML injection 5,000 Units  5,000 Units Subcutaneous Q8H Carson Bill MD       • insulin detemir (LEVEMIR) injection 25 Units  25 Units Subcutaneous Nightly Stephanie Henry MD       • Insulin Lispro (humaLOG) injection 0-9 Units  0-9 Units Subcutaneous Q6H Stephanie Henry MD       • levalbuterol (XOPENEX) nebulizer solution 0.63 mg  0.63 mg Nebulization BID PRN Stephanie Henry MD       • levalbuterol (XOPENEX) nebulizer solution 0.63 mg  0.63 mg Nebulization 4x Daily - RT Stephanie Henry MD       • lidocaine (XYLOCAINE) 1 % injection 10 mL  10 mL Subcutaneous Once Anamaria Levine MD       • magnesium hydroxide (MILK OF MAGNESIA) suspension 10 mL  10 mL Per G Tube Daily PRN Carson Bill MD       • metoprolol tartrate (LOPRESSOR) tablet 50 mg  50 mg Per G Tube Q12H  Carson Bill MD       • micafungin sodium (MYCAMINE) 100 mg in sodium chloride 0.9 % 100 mL IVPB  100 mg Intravenous Q24H Jaimie Hunter APRN       • Morphine sulfate (PF) injection 2 mg  2 mg Intravenous Q4H PRN Jamaal Frye APRN       • norepinephrine (LEVOPHED) 8 mg in 250 mL NS infusion (premix)  0.02-0.3 mcg/kg/min Intravenous Titrated Stephanie Henry MD 17.25 mL/hr at 08/05/22 1226 0.08 mcg/kg/min at 08/05/22 1226   • ondansetron (ZOFRAN) tablet 4 mg  4 mg Per G Tube Q6H PRN Carson Bill MD        Or   • ondansetron (ZOFRAN) injection 4 mg  4 mg Intravenous Q6H PRN Carson Bill MD       • polyethylene glycol (MIRALAX) packet 17 g  17 g Per G Tube Daily Carson Bill MD       • sodium chloride 0.9 % flush 10 mL  10 mL Intravenous Q12H Stephanie Henry MD       • sodium chloride 0.9 % flush 10 mL  10 mL Intravenous PRN Stephanie Henry MD           Past Medical History:  Past Medical History:   Diagnosis Date   • Abnormal stress test    • Atrial flutter (HCC)    • CAD (coronary artery disease)    • CAD in native artery     CABG x 3   • COVID-19 vaccine series completed 2021 021221/031221   • Diverticulosis    • Essential hypertension     Tricuspid valve insufficiency, unspecified etiology    • History of adenomatous polyp of colon    • Hyperlipemia, mixed    • Hyperlipidemia    • Hypertension    • IBS (irritable bowel syndrome)    • MI, old    • Mitral valve prolapse    • Near syncope    • Obesity, morbid (HCC)    • Palpitations    • Paroxysmal SVT (supraventricular tachycardia) ([facility])    • Pedal edema    • Precordial pain    • Rheumatoid arthritis (HCC)    • S/P CABG x 3    • Type 2 diabetes mellitus (HCC)    • Venous insufficiency of both lower extremities        Past Surgical History:  Past Surgical History:   Procedure Laterality Date   • APPENDECTOMY     • CARDIAC CATHETERIZATION Left 06/18/2012    Intensify medical therapy   • CARDIAC  CATHETERIZATION Left 05/05/2002    surgery   • CHOLECYSTECTOMY  1975   • COLONOSCOPY N/A 1/31/2018    Diverticulosis in the entire examined colon; One 4mm tubular adenomatous polyp in the transverse colon; Non-bleeding internal hemorrhoids; Repeat 5 years   • COLONOSCOPY  02/10/2012    Diverticulosis in the entire examined colon; Non-bleeding internal hemorrhoids; Repeat 7 years   • COLONOSCOPY  01/23/2009    Diverticulosis; Repeat 7 years   • CORONARY ARTERY BYPASS GRAFT  05/05/2002    LIMA to LAD, SVG to D1, SVG to OM1 - x3   • CORONARY STENT PLACEMENT  09/2009    X1 - Stent to LAD, Drug Eluting   • DILATATION AND CURETTAGE     • ENDOSCOPY N/A 1/31/2018    Esophageal mucosal changes suspicious for short-segment De Anda's esophagus-biopsied; Small HH; Two gastric polyps-Clip (MR conditional) was placed-biopsied; Normal examined duodenum   • ENDOSCOPY  03/23/2015    Probable short-segment De Anda's esophagus-biopsied; HH; Gastritis-biopsied; Duodenal diverticulum; Repeat 1 year   • GASTROSTOMY FEEDING TUBE INSERTION N/A 8/5/2022    Procedure: GASTROSTOMY FEEDING TUBE INSERTION;  Surgeon: Stephanie Henry MD;  Location: [facility] OR;  Service: General;  Laterality: N/A;   • LARYNGOSCOPY N/A 8/5/2022    Procedure: laryngoscopy;  Surgeon: Luis Medina Jr., MD;  Location: [facility] OR;  Service: ENT;  Laterality: N/A;   • REPLACEMENT TOTAL KNEE Right    • TRACHEOSTOMY N/A 8/5/2022    Procedure: tracheostomy;  Surgeon: Luis Medina Jr., MD;  Location:  [facility] OR;  Service: ENT;  Laterality: N/A;       Family History  Family History   Problem Relation Age of Onset   • Cancer Father    • Coronary artery disease Father    • Asthma Father    • Heart failure Mother    • Kidney disease Mother    • Arthritis Mother    • Pancreatitis Sister    • Heart attack Brother    • Colon cancer Neg Hx    • Colon polyps Neg Hx        Social History  Social History     Socioeconomic History   • Marital status:    Tobacco  Use   • Smoking status: Former Smoker     Years: 20.00     Types: Cigarettes     Quit date: 2000     Years since quittin.5   • Smokeless tobacco: Never Used   Vaping Use   • Vaping Use: Never used   Substance and Sexual Activity   • Alcohol use: No   • Drug use: No   • Sexual activity: Defer         Review of Systems:  History obtained from unobtainable from patient due to mental status        Objective:  Patient Vitals for the past 24 hrs:   Weight   08/15/22 0100 126 kg (278 lb 12.8 oz)     No intake or output data in the 24 hours ending 08/15/22 173  General: Ill-appearing on ventilator via trach  Chest:  ctab  CVS: regular rate and rhythm  Abdominal: soft, nontender, positive bowel sounds  Extremities: ble edema  Skin: warm and dry without rash      Labs:  Results from last 7 days   Lab Units 22  0559 22  0356   WBC 10*3/mm3 12.34* 11.08*   HEMOGLOBIN g/dL 8.4* 8.3*   HEMATOCRIT % 28.5* 28.2*   PLATELETS 10*3/mm3 237 237         Results from last 7 days   Lab Units 08/15/22  1415 22  0453 22  0850   SODIUM mmol/L 140 145 148*   POTASSIUM mmol/L 4.4 3.9 3.5   CHLORIDE mmol/L 101 105 106   CO2 mmol/L 22.0 25.0 27.0   BUN mg/dL 81* 68* 62*   CREATININE mg/dL 3.05* 2.52* 2.27*   CALCIUM mg/dL 9.3 9.7 9.4   EGFR mL/min/1.73 16.4* 20.7* 23.4*   GLUCOSE mg/dL 189* 197* 130*       Radiology:   Imaging Results (Last 72 Hours)     Procedure Component Value Units Date/Time    US Renal Bilateral [[id number]] Collected: 08/15/22 1727     Updated: 08/15/22 1731    Narrative:      EXAMINATION: US RENAL BILATERAL-     8/15/2022 3:23 PM CDT     HISTORY: evaluate for hydronephrosis; Z99.11-Dependence on respirator  (ventilator) status; J96.20-Acute and chronic respiratory failure,  unspecified whether with hypoxia or hypercapnia     The ultrasound examination of the kidneys bilaterally is performed.  There is no previous study for comparison.     The right kidney measures 11.5 x 6.1 x 5 cm. There  is no evidence of a  mass. No hydronephrosis. There is normal corticomedullary  differentiation.     The left kidney measures 12.4 x 6.6 x 5.3 cm. There is no evidence of a  mass. No hydronephrosis. A normal corticomedullary differentiation.     The urinary bladder is poorly distended. No obvious intrinsic  abnormality.       Impression:      1. Normal renal sonography.  This report was finalized on 08/15/2022 17:28 by Dr. Deepak Pulliam MD.    XR Chest 1 View [[id number]] Collected: 08/15/22 0706     Updated: 08/15/22 0710    Narrative:      EXAMINATION: Chest 1 view 8/15/2022     HISTORY: Respiratory failure. Patient on mechanical ventilation.     FINDINGS: Today's exam is compared to previous study one day earlier.  There is extensive interstitial and alveolar disease within both lungs  showing slight worsening when compared to the previous exam,  particularly in the lung bases. There is a developing right-sided  pleural effusion with blunting the costophrenic angle. Tracheostomy tube  remains in place.       Impression:      .  1. Slight worsening of the patient's mixed interstitial and alveolar  disease within the lungs for which I would favor pulmonary edema. There  is a developing small right-sided effusion.  This report was finalized on 08/15/2022 07:07 by Dr. Ignacio Perkins MD.    XR Chest 1 View [[id number]] Collected: 08/14/22 0920     Updated: 08/14/22 0923    Narrative:      Frontal supine radiograph of the chest 8/14/2022 3:45 AM CDT     History: ventilator; Z99.11-Dependence on respirator (ventilator)  status; J96.20-Acute and chronic respiratory failure, unspecified  whether with hypoxia or hypercapnia     Comparison: 08/13/2022      Findings:   Lines and tubes are stable in position. No new opacities or  pneumothoraces are visualized in the chest. The cardiomediastinal  silhouette and pulmonary vascularity are unchanged.       No acute osseous or soft tissue abnormality is noted.         Impression:      Impression:   1. No significant interval change since previous exam.        This report was finalized on 08/14/2022 09:20 by Dr. Ignacio Perkins MD.    XR Chest 1 View [[id number]] Collected: 08/13/22 0826     Updated: 08/13/22 0830    Narrative:      Frontal supine radiograph of the chest 8/13/2022 4:05 AM CDT     History: ventilator; Z99.11-Dependence on respirator (ventilator)  status; J96.20-Acute and chronic respiratory failure, unspecified  whether with hypoxia or hypercapnia     Comparison: 08/12/2022      Findings:   The tracheostomy tube remains unchanged. The PICC line has been  removed.. No new opacities or pneumothoraces are visualized in the  chest. The cardiomediastinal silhouette and pulmonary vascularity are  unchanged.       No acute osseous or soft tissue abnormality is noted.        Impression:      Impression:   1. No significant interval change since previous exam.        This report was finalized on 08/13/2022 08:27 by Dr. Ignacio Perkins MD.          Culture:  Blood Culture   Date Value Ref Range Status   08/09/2022 Candida glabrata (C)  Final     Comment:     Infectious disease consultation is highly recommended to rule out distant foci of infection.         Assessment   Acute renal failure  Acute hypoxemic respiratory failure which required mechanical ventilation  Candida glabrata sepsis  Pneumocystis pneumonia  Obesity  Rheumatoid arthritis  CAD status post CABG  History of retroperitoneal hematoma  Anemia  Hypotension    Plan:  Discussed with nursing  Work-up ordered and ongoing  IV antibiotic/antifungal per ID  Unable to assess volume history given available data  Await urine studies for further evaluation of volume status  Suggest fluid bolus and/or pressor support to maintain adequate MAP and perfusion pressure    Thank you for the consult, we appreciate the opportunity to provide care to your patients.  Feel free to contact me if I can be of any further assistance.       Ibrahima Weller MD  8/15/2022  17:38 CDT

## 2022-08-15 NOTE — PROGRESS NOTES
PULMONARY AND CRITICAL CARE PROGRESS NOTE - [facility]  Patient: Teresa Eubanks Day    1955   Acct# [id number]  08/15/22   08:18 CDT  Referring Provider: Carson Bill MD  Chief Complaint: Mechanically ventilated  Interval history: Pt is unable to provide history due to mechanical ventilation.  She remains encephalopathic not responding to stimuli.  Oxygen saturation 94% on PEEP of 5 and FiO2 0.3.  Flow dyssynchrony observed and ventilator settings adjusted.  ET CO2 26.  Minute volume 15 L.  ABG and chest film reviewed.  No documented fevers.  No other issues overnight.  Review of Systems:   Cannot obtain due to mechanical ventilated state  Ventilator Settings: Mode: AC, rate 24 Vt/PC: 400 Flow 70L/min PEEP: 5  FiO2 0.35   Physical Exam:  Vital signs: T: Unavailable BP: 119/80   P: 90   R: 30   sat: 94 end-tidal 26  Physical Exam  Constitutional:       General: She is not in acute distress.     Appearance: She is ill-appearing. She is not diaphoretic.   HENT:      Head: Normocephalic.      Nose: Nose normal.      Mouth/Throat:      Mouth: Mucous membranes are moist.   Eyes:      General: No scleral icterus.  Neck:      Comments: Trach to vent  Cardiovascular:      Rate and Rhythm: Normal rate and regular rhythm.   Pulmonary:      Effort: Pulmonary effort is normal. No respiratory distress.      Breath sounds: Decreased breath sounds present. No wheezing or rhonchi.   Abdominal:      General: There is no distension.      Comments: PEG with tube feeding infusing   Genitourinary:     Comments: Evans cath  Musculoskeletal:      Right lower leg: Edema present.      Left lower leg: Edema present.   Skin:     Coloration: Skin is not pale.   Neurological:      Comments: Grimaces to sternal rub           Electronically signed by MINNIE Cortes, 8/15/2022, 08:18 CDT     Physician Substantive Portion: Medical Decision Making:   Results from last 7 days   Lab Units 08/11/22  7058  08/09/22  0356 08/08/22  0949   WBC 10*3/mm3 12.34* 11.08* 14.25*   HEMOGLOBIN g/dL 8.4* 8.3* 9.8*   PLATELETS 10*3/mm3 237 237 244     Results from last 7 days   Lab Units 08/14/22  0453 08/13/22  0850 08/12/22  1019   SODIUM mmol/L 145 148* 148*   POTASSIUM mmol/L 3.9 3.5 3.5   BUN mg/dL 68* 62* 58*   CREATININE mg/dL 2.52* 2.27* 2.06*     Results from last 7 days   Lab Units 08/15/22  0409 08/14/22  0419 08/13/22  0426   PH, ARTERIAL pH units 7.426 7.408 7.467*   PCO2, ARTERIAL mm Hg 35.8 40.4 36.9   PO2 ART mm Hg 56.7* 71.7* 69.6*   FIO2 % 30 40 45     Blood Culture   Date Value Ref Range Status   08/05/2022 No growth at 3 days  Preliminary     Urine Culture   Date Value Ref Range Status   08/05/2022 No growth  Final   08/04/2022 No growth  Final     Respiratory Culture   Date Value Ref Range Status   08/07/2022 Heavy growth (4+) Pseudomonas species (A)  Preliminary   08/07/2022 No Normal Respiratory Doris (A)  Preliminary   08/07/2022 Moderate growth (3+) Pseudomonas species (A)  Preliminary   08/07/2022 No Normal Respiratory Doris (A)  Preliminary     Electronically signed by MINNIE Cortes, 8/15/2022, 08:18 CDT    Recent films:  XR Chest 1 View    Result Date: 8/15/2022  . 1. Slight worsening of the patient's mixed interstitial and alveolar disease within the lungs for which I would favor pulmonary edema. There is a developing small right-sided effusion. This report was finalized on 08/15/2022 07:07 by Dr. Ignacio Perkins MD.    XR Chest 1 View    Result Date: 8/14/2022  Impression: 1. No significant interval change since previous exam.   This report was finalized on 08/14/2022 09:20 by Dr. Ignacio Perkins MD.    My radiograph interpretation/independent review of imaging: bilateral infiltrates small effusion c/w chf    Pulmonary Assessment:    1. Acute resp failure with hypoxia requiring vent  2. Increasing fluid overload and increasing creat  3. Metabolic  encephalopathy  4. RA  5. Candidemia  6. Pseudomonas pulmonary infection  7. Flow dyssynchrony noted on review of vent graphics today    Recommend/plan:   · Not ready to liberate from vent  · Flow adjusted to 60 lpm to optimize synchrony  · Continue bronchodilators  · Appreciate nephrology assistance; fluid a difficult issue with worsening fluid overload along with increasing creat.      This visit was performed by both a physician and an Advanced Practice RN.  I personally evaluated and examined the patient.  I performed all aspects of the medical decision making as documented.  Electronically signed by Bart Estrada MD, 8/15/2022, 13:33 CDT

## 2022-08-16 NOTE — PROGRESS NOTES
[facility] Fall Risk Assessment Note    Name: Teresa Serna  MRN: [id number]  CSN: [id number]  Room: [facility]  Admit Date/Time: 7/27/2022  1:42 PM.    Currently ordered medications associated with an increased risk for fall include:    Scheduled Meds:  amiodarone, 100 mg, Per G Tube, Q24H  insulin detemir, 25 Units, Subcutaneous, Nightly  insulin lispro, 0-9 Units, Subcutaneous, Q6H  metoprolol tartrate, 50 mg, Per G Tube, Q12H  polyethylene glycol, 17 g, Per G Tube, Daily    Continuous Infusions:norepinephrine, 0.02-0.3 mcg/kg/min, Last Rate: 0.08 mcg/kg/min (08/05/22 1226)      PRN Meds:  •  Morphine  •  ondansetron     Dereje Dee, Pharmacy Intern  08/16/22 15:02 CDT

## 2022-08-16 NOTE — PROGRESS NOTES
Nephrology ([facility]) Progress Note      Patient:  Teresa Serna  YOB: 1955  Date of Service: 8/16/2022  MRN: [id number]   Acct: [id number]   Primary Care Physician: Teresa Contreras MD  Advance Directive:   There are no questions and answers to display.     Admit Date: 7/27/2022       Hospital Day: 0  Referring Provider: Carson Bill MD      Patient personally seen and examined.  Complete chart including Consults, Notes, Operative Reports, Labs, Cardiology, and Radiology studies reviewed as able.        Subjective:  Teresa Serna is a 66 y.o. female for whom we were consulted for evaluation and treatment of acute renal failure.  Patient on the ventilator and unable to participate in the initial history and physical examination.  She was originally admitted with small bowel obstruction and mesenteric mass at [facility].  She was also intubated on 7/19 and noted to have a left-sided retroperitoneal hematoma and placed on antibiotics for pneumonia.  She was continued on the ventilator and transferred to the LTAC on 7/28.  Creatinine 0.5 on admission and begin its upward trend on 8/4 through 8/7 with an improvement through 8/9 and continued worsening since that time.  No recorded I&O and 1 weight of 126 kilos recorded.  No family present at the bedside.  Also developed Candida fungemia which required treatment with micafungin.  Had previously required Levophed but appears to been off for several days.    Today, no overnight events.  She remains on the ventilator via trach and unable to participate in the history and physical examination.  Renal function worsened a bit overnight again.  Tube feeds infusing via GT.    Temp- 98.6  Pulse- 71  Resp- 29  BP- 109/46  O2%- 99      Allergies:  Codeine, Exenatide, Sulfa antibiotics, Albuterol, Sitagliptin, Clindamycin/lincomycin, and Amoxicillin-pot clavulanate    Home Meds:  Medications Prior to Admission   Medication  Sig Dispense Refill Last Dose   • amiodarone (PACERONE) 100 MG tablet TAKE ONE TABLET BY MOUTH EVERY DAY 30 tablet 11    • aspirin 81 MG EC tablet Take 81 mg by mouth Daily.      • Eliquis 5 MG tablet tablet TAKE 1 TABLET BY MOUTH EVERY 12 HOURS 180 tablet 4    • folic acid (FOLVITE) 1 MG tablet Take 1 mg by mouth Daily.      • furosemide (Lasix) 40 MG tablet Please take 2 tabs in AM and 1 Qafternoon x2 days then increase to 2 tabs BID 28 tablet 0    • glyburide (DIAbeta) 5 MG tablet Take 5 mg by mouth 2 (Two) Times a Day With Meals. Take 2QAM & 2QPM      • isosorbide mononitrate (IMDUR) 30 MG 24 hr tablet Take 30 mg by mouth Daily.      • lidocaine (LIDODERM) 5 % Place 1 patch on the skin as directed by provider Daily. Remove & Discard patch within 12 hours or as directed by MD 6 each 0    • losartan (COZAAR) 25 MG tablet Take 25 mg by mouth Daily.      • metFORMIN (GLUCOPHAGE) 500 MG tablet Take 500 mg by mouth Daily With Breakfast.      • metoprolol tartrate (LOPRESSOR) 50 MG tablet Take 50 mg by mouth 2 (Two) Times a Day.      • nitroglycerin (NITROSTAT) 0.4 MG SL tablet Place 0.4 mg under the tongue Every 5 (Five) Minutes As Needed for Chest Pain. Take no more than 3 doses in 15 minutes.      • pantoprazole (PROTONIX) 40 MG EC tablet Take 1 tablet by mouth Every 12 (Twelve) Hours. 60 tablet 1    • potassium chloride (K-DUR) 10 MEQ CR tablet Take 1 tablet by mouth Daily. (Patient taking differently: Take 20 mEq by mouth Daily.) 90 tablet 3    • potassium chloride (K-DUR,KLOR-CON) 20 MEQ CR tablet Please take 1 and 1/2 tab daily x1 week 11 tablet 0    • predniSONE (DELTASONE) 1 MG tablet Take 4 mg by mouth Daily.      • predniSONE (DELTASONE) 5 MG tablet Take 5 mg by mouth Every Morning.      • rosuvastatin (CRESTOR) 5 MG tablet Take 5 mg by mouth Daily.      • tiZANidine (Zanaflex) 4 MG tablet Take 1 tablet by mouth At Night As Needed for Muscle Spasms. 3 tablet 0    • Tofacitinib Citrate ER (XELJANZ) 11 MG  tablet sustained-release 24 hour Take 11 mg by mouth Daily.      • verapamil SR (CALAN-SR) 240 MG CR tablet TAKE ONE TABLET BY MOUTH ONCE DAILY 90 tablet 3        Medicines:  Current Facility-Administered Medications   Medication Dose Route Frequency Provider Last Rate Last Admin   • acetaminophen (TYLENOL) tablet 650 mg  650 mg Per G Tube Q4H PRN Carson Bill MD        Or   • acetaminophen (TYLENOL) suppository 650 mg  650 mg Rectal Q4H PRN Carson Bill MD        Or   • acetaminophen (TYLENOL) 160 MG/5ML solution 650 mg  650 mg Per G Tube Q4H PRN Carson Bill MD       • amiodarone (PACERONE) tablet 100 mg  100 mg Per G Tube Q24H Carson Bill MD       • cefepime (MAXIPIME) 2 g/100 mL 0.9% NS (mbp)  2 g Intravenous Q12H Carson Bill MD       • chlorhexidine (PERIDEX) 0.12 % solution 15 mL  15 mL Mouth/Throat Q12H Stephanie Henry MD       • dextrose (D50W) (25 g/50 mL) IV injection 25 g  25 g Intravenous Q15 Min PRN Stephanie Henry MD       • dextrose (GLUTOSE) oral gel 15 g  15 g Oral Q15 Min PRN Stephanie Henry MD       • famotidine (PEPCID) tablet 20 mg  20 mg Per G Tube Daily Carson Bill MD       • glucagon (human recombinant) (GLUCAGEN DIAGNOSTIC) injection 1 mg  1 mg Subcutaneous Q15 Min PRN Stephanie Henry MD       • heparin (porcine) 5000 UNIT/ML injection 5,000 Units  5,000 Units Subcutaneous Q8H Carson Bill MD       • insulin detemir (LEVEMIR) injection 25 Units  25 Units Subcutaneous Nightly Stephanie Henry MD       • Insulin Lispro (humaLOG) injection 0-9 Units  0-9 Units Subcutaneous Q6H Stephanie Henry MD       • levalbuterol (XOPENEX) nebulizer solution 0.63 mg  0.63 mg Nebulization BID PRN Stephanie Henry MD       • levalbuterol (XOPENEX) nebulizer solution 0.63 mg  0.63 mg Nebulization 4x Daily - RT Stephanie Henry MD       • lidocaine (XYLOCAINE) 1 % injection 10 mL  10 mL Subcutaneous Once Anamaria Levine MD       •  magnesium hydroxide (MILK OF MAGNESIA) suspension 10 mL  10 mL Per G Tube Daily PRN Carson Bill MD       • metoprolol tartrate (LOPRESSOR) tablet 50 mg  50 mg Per G Tube Q12H Carson Bill MD       • micafungin sodium (MYCAMINE) 100 mg in sodium chloride 0.9 % 100 mL IVPB  100 mg Intravenous Q24H Jaimie Hunter APRN       • Morphine sulfate (PF) injection 2 mg  2 mg Intravenous Q4H PRN Jamaal Frye APRN       • norepinephrine (LEVOPHED) 8 mg in 250 mL NS infusion (premix)  0.02-0.3 mcg/kg/min Intravenous Titrated Stephanie Henry MD 17.25 mL/hr at 08/05/22 1226 0.08 mcg/kg/min at 08/05/22 1226   • ondansetron (ZOFRAN) tablet 4 mg  4 mg Per G Tube Q6H PRN Carson Bill MD        Or   • ondansetron (ZOFRAN) injection 4 mg  4 mg Intravenous Q6H PRN Carson Bill MD       • polyethylene glycol (MIRALAX) packet 17 g  17 g Per G Tube Daily Carson Bill MD       • sodium chloride 0.9 % flush 10 mL  10 mL Intravenous Q12H Stephanie Henry MD       • sodium chloride 0.9 % flush 10 mL  10 mL Intravenous PRN Stephanie Henry MD           Past Medical History:  Past Medical History:   Diagnosis Date   • Abnormal stress test    • Atrial flutter (HCC)    • CAD (coronary artery disease)    • CAD in native artery     CABG x 3   • COVID-19 vaccine series completed 2021 021221/031221   • Diverticulosis    • Essential hypertension     Tricuspid valve insufficiency, unspecified etiology    • History of adenomatous polyp of colon    • Hyperlipemia, mixed    • Hyperlipidemia    • Hypertension    • IBS (irritable bowel syndrome)    • MI, old    • Mitral valve prolapse    • Near syncope    • Obesity, morbid ([facility])    • Palpitations    • Paroxysmal SVT (supraventricular tachycardia) ([facility])    • Pedal edema    • Precordial pain    • Rheumatoid arthritis (HCC)    • S/P CABG x 3    • Type 2 diabetes mellitus (HCC)    • Venous insufficiency of both lower extremities        Past  Surgical History:  Past Surgical History:   Procedure Laterality Date   • APPENDECTOMY     • CARDIAC CATHETERIZATION Left 06/18/2012    Intensify medical therapy   • CARDIAC CATHETERIZATION Left 05/05/2002    surgery   • CHOLECYSTECTOMY  1975   • COLONOSCOPY N/A 1/31/2018    Diverticulosis in the entire examined colon; One 4mm tubular adenomatous polyp in the transverse colon; Non-bleeding internal hemorrhoids; Repeat 5 years   • COLONOSCOPY  02/10/2012    Diverticulosis in the entire examined colon; Non-bleeding internal hemorrhoids; Repeat 7 years   • COLONOSCOPY  01/23/2009    Diverticulosis; Repeat 7 years   • CORONARY ARTERY BYPASS GRAFT  05/05/2002    LIMA to LAD, SVG to D1, SVG to OM1 - x3   • CORONARY STENT PLACEMENT  09/2009    X1 - Stent to LAD, Drug Eluting   • DILATATION AND CURETTAGE     • ENDOSCOPY N/A 1/31/2018    Esophageal mucosal changes suspicious for short-segment De Anda's esophagus-biopsied; Small HH; Two gastric polyps-Clip (MR conditional) was placed-biopsied; Normal examined duodenum   • ENDOSCOPY  03/23/2015    Probable short-segment De Anda's esophagus-biopsied; HH; Gastritis-biopsied; Duodenal diverticulum; Repeat 1 year   • GASTROSTOMY FEEDING TUBE INSERTION N/A 8/5/2022    Procedure: GASTROSTOMY FEEDING TUBE INSERTION;  Surgeon: Stephanie Henry MD;  Location: [facility] OR;  Service: General;  Laterality: N/A;   • LARYNGOSCOPY N/A 8/5/2022    Procedure: laryngoscopy;  Surgeon: Luis Medina Jr., MD;  Location: [facility] OR;  Service: ENT;  Laterality: N/A;   • REPLACEMENT TOTAL KNEE Right    • TRACHEOSTOMY N/A 8/5/2022    Procedure: tracheostomy;  Surgeon: Luis Medina Jr., MD;  Location: [facility] OR;  Service: ENT;  Laterality: N/A;       Family History  Family History   Problem Relation Age of Onset   • Cancer Father    • Coronary artery disease Father    • Asthma Father    • Heart failure Mother    • Kidney disease Mother    • Arthritis Mother    • Pancreatitis Sister    •  Heart attack Brother    • Colon cancer Neg Hx    • Colon polyps Neg Hx        Social History  Social History     Socioeconomic History   • Marital status:    Tobacco Use   • Smoking status: Former Smoker     Years: 20.00     Types: Cigarettes     Quit date: 2000     Years since quittin.5   • Smokeless tobacco: Never Used   Vaping Use   • Vaping Use: Never used   Substance and Sexual Activity   • Alcohol use: No   • Drug use: No   • Sexual activity: Defer       Review of Systems:  History obtained from unobtainable from patient due to mental status      Objective:  No data found.  No intake or output data in the 24 hours ending 22 1248  General: On the ventilator unable to participate in the history and physical examination  Chest:  clear to auscultation bilaterally without respiratory distress  CVS: regular rate and rhythm  Abdominal: soft, nontender, positive bowel sounds, obese  Extremities: ble edema  Skin: warm and dry without rash      Labs:  Results from last 7 days   Lab Units 22  0559   WBC 10*3/mm3 12.34*   HEMOGLOBIN g/dL 8.4*   HEMATOCRIT % 28.5*   PLATELETS 10*3/mm3 237         Results from last 7 days   Lab Units 22  0438 08/15/22  1415 22  0453   SODIUM mmol/L 138 140 145   POTASSIUM mmol/L 4.6 4.4 3.9   CHLORIDE mmol/L 98 101 105   CO2 mmol/L 21.0* 22.0 25.0   BUN mg/dL 86* 81* 68*   CREATININE mg/dL 3.20* 3.05* 2.52*   CALCIUM mg/dL 9.8 9.3 9.7   EGFR mL/min/1.73 15.5* 16.4* 20.7*   GLUCOSE mg/dL 220* 189* 197*       Radiology:   Imaging Results (Last 72 Hours)     Procedure Component Value Units Date/Time    XR Chest 1 View [[id number]] Collected: 22     Updated: 22    Narrative:      Frontal supine radiograph of the chest 2022 3:35 AM CDT     History: ventilator; Z99.11-Dependence on respirator (ventilator)  status; J96.20-Acute and chronic respiratory failure, unspecified  whether with hypoxia or hypercapnia     Comparison:  8/15/2022      Findings:   Lines and tubes are stable in position. No new opacities or  pneumothoraces are visualized in the chest. The cardiomediastinal  silhouette and pulmonary vascularity are unchanged.       No acute osseous or soft tissue abnormality is noted.        Impression:      Impression:   1. No significant interval change since previous exam.        This report was finalized on 08/16/2022 07:06 by Dr. Ignacio Perkins MD.    US Renal Bilateral [[id number]] Collected: 08/15/22 1727     Updated: 08/15/22 1731    Narrative:      EXAMINATION: US RENAL BILATERAL-     8/15/2022 3:23 PM CDT     HISTORY: evaluate for hydronephrosis; Z99.11-Dependence on respirator  (ventilator) status; J96.20-Acute and chronic respiratory failure,  unspecified whether with hypoxia or hypercapnia     The ultrasound examination of the kidneys bilaterally is performed.  There is no previous study for comparison.     The right kidney measures 11.5 x 6.1 x 5 cm. There is no evidence of a  mass. No hydronephrosis. There is normal corticomedullary  differentiation.     The left kidney measures 12.4 x 6.6 x 5.3 cm. There is no evidence of a  mass. No hydronephrosis. A normal corticomedullary differentiation.     The urinary bladder is poorly distended. No obvious intrinsic  abnormality.       Impression:      1. Normal renal sonography.  This report was finalized on 08/15/2022 17:28 by Dr. Deepak Pulliam MD.    XR Chest 1 View [[id number]] Collected: 08/15/22 0706     Updated: 08/15/22 0710    Narrative:      EXAMINATION: Chest 1 view 8/15/2022     HISTORY: Respiratory failure. Patient on mechanical ventilation.     FINDINGS: Today's exam is compared to previous study one day earlier.  There is extensive interstitial and alveolar disease within both lungs  showing slight worsening when compared to the previous exam,  particularly in the lung bases. There is a developing right-sided  pleural effusion with blunting the costophrenic  angle. Tracheostomy tube  remains in place.       Impression:      .  1. Slight worsening of the patient's mixed interstitial and alveolar  disease within the lungs for which I would favor pulmonary edema. There  is a developing small right-sided effusion.  This report was finalized on 08/15/2022 07:07 by Dr. Ignacio Perkins MD.    XR Chest 1 View [[id number]] Collected: 08/14/22 0920     Updated: 08/14/22 0923    Narrative:      Frontal supine radiograph of the chest 8/14/2022 3:45 AM CDT     History: ventilator; Z99.11-Dependence on respirator (ventilator)  status; J96.20-Acute and chronic respiratory failure, unspecified  whether with hypoxia or hypercapnia     Comparison: 08/13/2022      Findings:   Lines and tubes are stable in position. No new opacities or  pneumothoraces are visualized in the chest. The cardiomediastinal  silhouette and pulmonary vascularity are unchanged.       No acute osseous or soft tissue abnormality is noted.        Impression:      Impression:   1. No significant interval change since previous exam.        This report was finalized on 08/14/2022 09:20 by Dr. Ignacio Perkins MD.          Culture:  Blood Culture   Date Value Ref Range Status   08/15/2022 No growth at less than 24 hours  Preliminary   08/15/2022 Abnormal Stain (C)  Preliminary         Assessment   Acute renal failure  Acute hypoxemic respiratory failure which required mechanical ventilation  Candida glabrata sepsis  Pneumocystis pneumonia  Obesity  Rheumatoid arthritis  CAD status post CABG  History of retroperitoneal hematoma  Anemia  Hypotension  Metabolic acidosis  Hypomagnesemia  Hyperphosphatemia     Plan:  Discussed with nursing  Work-up reviewed to date  IV antibiotic/antifungal per ID  Fractional excretion of sodium and urea not consistent with prerenal state  Also noted hematuria and proteinuria which quantifies to a significant degree, proteinuria appears to be a longer-term finding perhaps related to some  underlying diabetic nephropathy and hematuria could be related to catheter usage however given the ongoing acute renal failure we will suggest immunologic work-up  Otherwise, if no change in current clinical course, would suggest initiation of renal placement therapy in the next 1 to 2 days        Ibrahima Weller MD  8/16/2022  12:48 CDT

## 2022-08-16 NOTE — PROGRESS NOTES
PULMONARY AND CRITICAL CARE PROGRESS NOTE - [facility]  Patient: Teresa Eubanks Day    1955   [id number]  08/16/22   07:41 CDT  Referring Provider: Carson Bill MD  Chief Complaint: Mechanically ventilated  Interval history: Pt is unable to provide history due to mechanical ventilation.  She remains encephalopathic not responding to stimuli.  Oxygen saturation 99% on PEEP of 5 and FiO2 0.3.  She is assisting the ventilator.  No ventilator dyssynchrony observed.  ET CO2 26.  Minute volume 13 L.  ABG and chest film reviewed.  Kidney function continues to worsen.  She is receiving enteral nutrition.  No documented fevers.  No new events overnight.  Review of Systems:   Cannot obtain due to mechanical ventilated state  Ventilator Settings: Mode: AC, rate 24 Vt/PC: 400 Flow 60L/min PEEP: 5  FiO2 0.3   Physical Exam:  Vital signs: T: 98.6 BP: 109/46 P: 71   R: 29   sat: 99 end-tidal 26  Physical Exam  Constitutional:       General: She is not in acute distress.     Appearance: She is ill-appearing. She is not diaphoretic.   HENT:      Head: Normocephalic.      Nose: Nose normal.      Mouth/Throat:      Mouth: Mucous membranes are moist.   Eyes:      General: No scleral icterus.  Neck:      Comments: Trach to vent  Cardiovascular:      Rate and Rhythm: Normal rate and regular rhythm.   Pulmonary:      Effort: Pulmonary effort is normal. No respiratory distress.      Breath sounds: Decreased breath sounds present. No wheezing or rhonchi.   Abdominal:      General: There is no distension.      Comments: PEG with tube feeding infusing   Genitourinary:     Comments: Evans cath  Musculoskeletal:         General: Swelling present.      Right lower leg: Edema present.      Left lower leg: Edema present.   Skin:     Coloration: Skin is not pale.   Neurological:      Comments: Grimaces to sternal rub         Electronically signed by MINNIE Cortes, 8/16/2022, 07:41 CDT     Physician  Substantive Portion: Medical Decision Making:   Results from last 7 days   Lab Units 08/11/22  0559   WBC 10*3/mm3 12.34*   HEMOGLOBIN g/dL 8.4*   PLATELETS 10*3/mm3 237     Results from last 7 days   Lab Units 08/16/22  0438 08/15/22  1415 08/14/22  0453   SODIUM mmol/L 138 140 145   POTASSIUM mmol/L 4.6 4.4 3.9   BUN mg/dL 86* 81* 68*   CREATININE mg/dL 3.20* 3.05* 2.52*     Results from last 7 days   Lab Units 08/16/22  0325 08/15/22  0409 08/14/22  0419   PH, ARTERIAL pH units 7.377 7.426 7.408   PCO2, ARTERIAL mm Hg 36.1 35.8 40.4   PO2 ART mm Hg 60.9* 56.7* 71.7*   FIO2 % 30 30 40     Blood Culture   Date Value Ref Range Status   08/05/2022 No growth at 3 days  Preliminary     Urine Culture   Date Value Ref Range Status   08/05/2022 No growth  Final   08/04/2022 No growth  Final     Respiratory Culture   Date Value Ref Range Status   08/07/2022 Heavy growth (4+) Pseudomonas species (A)  Preliminary   08/07/2022 No Normal Respiratory Doris (A)  Preliminary   08/07/2022 Moderate growth (3+) Pseudomonas species (A)  Preliminary   08/07/2022 No Normal Respiratory Doris (A)  Preliminary     Electronically signed by MINNIE Cortes, 8/16/2022, 07:41 CDT    Recent films:  XR Chest 1 View    Result Date: 8/16/2022  Impression: 1. No significant interval change since previous exam.   This report was finalized on 08/16/2022 07:06 by Dr. Ignacio Perkins MD.    XR Chest 1 View    Result Date: 8/15/2022  . 1. Slight worsening of the patient's mixed interstitial and alveolar disease within the lungs for which I would favor pulmonary edema. There is a developing small right-sided effusion. This report was finalized on 08/15/2022 07:07 by Dr. Ignacio Perkins MD.    US Renal Bilateral    Result Date: 8/15/2022  1. Normal renal sonography. This report was finalized on 08/15/2022 17:28 by Dr. Deepak Pulliam MD.    My radiograph interpretation/independent review of imaging: pulmonary edema pleural effusions    Pulmonary  Assessment:    1. Acute resp failure with hypoxia requiring mechanical ventilation, threat to life/pulmonary function.  2. Fluid overload  3. Renal failure  4. CHF  5. Encephalopathy, persistent  6. Candidemia  7. S/p gram neg pneumonia  8. Ventilator dependent due to all the above    Recommend/plan:   · Continue vent as is, with reduction of flow to 55 lpm this am  · Not ready to liberate from vent  · Continue tx for infection, fluid overload.  Appreciate ID, nephrology    This visit was performed by both a physician and an [facility] Practice RN.  I personally evaluated and examined the patient.  I performed all aspects of the medical decision making as documented.  Electronically signed by Bart Estrada MD, 8/16/2022, 08:12 CDT

## 2022-08-16 NOTE — PROGRESS NOTES
[facility] Otolaryngology Head and Neck Surgery  INPATIENT PROGRESS NOTE    Patient Name: Teresa Serna  : 1955   MRN: [id number]  Date of Admission: 2022  Today's Date: 22  Length of Stay: 0  592/1    Carson Bill MD   Primary Care Physician: Teresa Contreras MD  Surgical Procedures Since Admission:  Procedure(s):  tracheostomy  laryngoscopy  GASTROSTOMY FEEDING TUBE INSERTION  Surgeon:  Luis Medina Jr., MD  Status:  11 Days Post-Op  -------------------    Subjective   Subjective   Chief Complaint: Trach mangement  HPI   Accompanied by: RT  Since last examined, Teresa Serna has remained on mechanical ventilation, trach in position.  She is unresponsive today.  She is unable to give history.  RT reports that the patient has stable trach in position.  Her eroded inferior trach stoma is improving with daily dressing changes.  RT reports it is healing quite well.  Patient is seen, chart is reviewed    Review of Systems   No change from prior inquiry  All pertinent negatives and positives are as above. All other systems have been reviewed and are negative unless otherwise stated.   Objective   Objective   Vitals:    Output by Drain (mL) 08/15/22 0701 - 08/15/22 1900 08/15/22 1901 - 22 0700 22 0701 - 22 1230 Range Total   Requested LDAs do not have output data documented.       Physical Exam  Constitutional:       General: She is not in acute distress.     Appearance: She is well-developed. She is obese. She is not ill-appearing.      Interventions: She is intubated.      Comments: Lying in bed, mechanical ventilation, trach in position  Grimaces to stimulation   HENT:      Head: Normocephalic and atraumatic.      Right Ear: External ear normal.      Left Ear: External ear normal.      Nose: Nose normal.      Mouth/Throat:      Lips: Pink.      Mouth: Mucous membranes are dry.      Dentition: Normal dentition.      Comments: Orally  intubated  Eyes:      General: Lids are normal.      Conjunctiva/sclera: Conjunctivae normal.   Neck:      Trachea: Tracheostomy present.      Comments: Neck-very short, very thick    TRACHEOSTOMY SITE:    Tracheostomy tube type: Shiley #8 cuffed DIC Legacy    Stoma: Stable, inferior edge of the stoma with granulation tissue, minimal necrotic debris, dressing removed and replaced    Voice: Not evaluated  Date placed: 8/5/2022  Date last changed: Never    Pulmonary:      Effort: No tachypnea, respiratory distress or retractions. She is intubated.      Comments: Mechanical ventilation, trach in position  Musculoskeletal:      Cervical back: No crepitus. No pain with movement. Decreased range of motion.   Lymphadenopathy:      Cervical: No cervical adenopathy.   Neurological:      Comments: Grimaces   Psychiatric:      Comments: Not evaluated           Result Review    Result Review:  I have personally reviewed the results from the time of this admission to 8/16/2022 12:30 CDT and agree with these findings:  []  Laboratory  []  Microbiology  []  Radiology  []  EKG/Telemetry   []  Cardiology/Vascular   []  Pathology  []  Old records  []  Other:  Most notable findings include: No labs pertinent ENT today.  Progress Notes by Bart Estrada MD (08/16/2022 07:20)   Progress Notes by Betty Merrill APRN (08/16/2022 11:25)     Assessment & Plan   Assessment / Plan   Brief Patient Summary:  Teresa Serna is a 66 y.o. female who remains on mechanical ventilation, trach in position.  She has a stable trach in position.  Her trach site is healing appropriately now.  I will continue current trach care.    Active Hospital Problems:   Active Hospital Problems    Diagnosis    • Acute tracheostomy management (HCC)    • Ventilator dependent (HCC)    • Acute on chronic respiratory failure with hypoxia and hypercapnia (HCC)    • Acute respiratory failure with hypoxia (HCC)      Plan:   • Tracheostomy management-the patient  has a stable trach in position.  She will continue trach dressing changes as well as tracheostomy management.  • Respiratory failure-patient remains on mechanical ventilation.  She is followed by the pulmonary service.  Metabolic encephalopathy-Per primary team  Discussed Plan with RT  Following patient as in-patient. Further recommendations will be made based on serial examinations.    Medications/Orders for this encounter: No new medications ordered.  No New orders written.    Discharge Planning: Per primary team        DVT prophylaxis:  Medical DVT prophylaxis orders are present.     Electronically signed by Luis Medina Jr, MD, 08/16/22, 12:30 PM CDT.

## 2022-08-16 NOTE — PROGRESS NOTES
Fly Bill M.D.  MINNIE Garcia        Internal Medicine Progress Note    8/16/2022   11:42 CDT    Name:  Teresa Serna  MRN:    [id number]     Acct:     [id number]   Room:  [address] [place] [address] Day: 0     Admit Date: 7/27/2022  1:42 PM  PCP: Teresa Contreras MD    Subjective:     C/C: need for continued vent weaning    Interval History: Status:  stayed the same. Resting in bed. No family at bedside. Afebrile.  Tolerating current vent settings (A/C) with 30% 02. No purposeful response to stimulation. Continues off pressor support. Continues with worsening renal function.   Review of Systems   Unable to perform ROS: intubated         Medications:     Allergies:   Allergies   Allergen Reactions   • Codeine Palpitations and Other (See Comments)     Chest pain   • Exenatide Arrhythmia   • Sulfa Antibiotics Hives and Rash     Reaction: hives   • Albuterol Palpitations     High heart rate   • Sitagliptin Swelling   • Clindamycin/Lincomycin Unknown - High Severity   • Amoxicillin-Pot Clavulanate Palpitations       Current Meds:   Current Facility-Administered Medications:   •  acetaminophen (TYLENOL) tablet 650 mg, 650 mg, Per G Tube, Q4H PRN **OR** acetaminophen (TYLENOL) suppository 650 mg, 650 mg, Rectal, Q4H PRN **OR** acetaminophen (TYLENOL) 160 MG/5ML solution 650 mg, 650 mg, Per G Tube, Q4H PRN, Carson Bill MD  •  amiodarone (PACERONE) tablet 100 mg, 100 mg, Per G Tube, Q24H, Carson Bill MD  •  cefepime (MAXIPIME) 2 g/100 mL 0.9% NS (mbp), 2 g, Intravenous, Q12H, Carson Bill MD  •  chlorhexidine (PERIDEX) 0.12 % solution 15 mL, 15 mL, Mouth/Throat, Q12H, Stephanie Henry MD  •  dextrose (D50W) (25 g/50 mL) IV injection 25 g, 25 g, Intravenous, Q15 Min PRN, Stephanie Henry MD  •  dextrose (GLUTOSE) oral gel 15 g, 15 g, Oral, Q15 Min PRN, Stephanie Henry MD  •  famotidine (PEPCID) tablet 20 mg, 20 mg, Per G Tube, Daily, Carson Bill MD  •   glucagon (human recombinant) (GLUCAGEN DIAGNOSTIC) injection 1 mg, 1 mg, Subcutaneous, Q15 Min PRN, Stephanie Henry MD  •  heparin (porcine) 5000 UNIT/ML injection 5,000 Units, 5,000 Units, Subcutaneous, Q8H, Carson Bill MD  •  insulin detemir (LEVEMIR) injection 25 Units, 25 Units, Subcutaneous, Nightly, Stephanie Henry MD  •  Insulin Lispro (humaLOG) injection 0-9 Units, 0-9 Units, Subcutaneous, Q6H, Stephanie Henry MD  •  levalbuterol (XOPENEX) nebulizer solution 0.63 mg, 0.63 mg, Nebulization, BID PRN, Stephanie Henry MD  •  levalbuterol (XOPENEX) nebulizer solution 0.63 mg, 0.63 mg, Nebulization, 4x Daily - RT, Stephanie Henry MD  •  lidocaine (XYLOCAINE) 1 % injection 10 mL, 10 mL, Subcutaneous, Once, Anamaria Levine MD  •  magnesium hydroxide (MILK OF MAGNESIA) suspension 10 mL, 10 mL, Per G Tube, Daily PRN, Carson Bill MD  •  metoprolol tartrate (LOPRESSOR) tablet 50 mg, 50 mg, Per G Tube, Q12H, Carson Bill MD  •  micafungin sodium (MYCAMINE) 100 mg in sodium chloride 0.9 % 100 mL IVPB, 100 mg, Intravenous, Q24H, Jaimie Hunter APRN  •  Morphine sulfate (PF) injection 2 mg, 2 mg, Intravenous, Q4H PRN, Jamaal Frye, MINNIE  •  norepinephrine (LEVOPHED) 8 mg in 250 mL NS infusion (premix), 0.02-0.3 mcg/kg/min, Intravenous, Titrated, Stephanie Henry MD, Last Rate: 17.25 mL/hr at 08/05/22 1226, 0.08 mcg/kg/min at 08/05/22 1226  •  ondansetron (ZOFRAN) tablet 4 mg, 4 mg, Per G Tube, Q6H PRN **OR** ondansetron (ZOFRAN) injection 4 mg, 4 mg, Intravenous, Q6H PRN, Carson Bill MD  •  polyethylene glycol (MIRALAX) packet 17 g, 17 g, Per G Tube, Daily, Carson Bill MD  •  sodium chloride 0.9 % flush 10 mL, 10 mL, Intravenous, Q12H, Stephanie Henry MD  •  sodium chloride 0.9 % flush 10 mL, 10 mL, Intravenous, PRN, Stephanie Henry MD    Data:     Code Status:    There are no questions and answers to display.       Family History   Problem  "Relation Age of Onset   • Cancer Father    • Coronary artery disease Father    • Asthma Father    • Heart failure Mother    • Kidney disease Mother    • Arthritis Mother    • Pancreatitis Sister    • Heart attack Brother    • Colon cancer Neg Hx    • Colon polyps Neg Hx        Social History     Socioeconomic History   • Marital status:    Tobacco Use   • Smoking status: Former Smoker     Years: 20.00     Types: Cigarettes     Quit date: 2000     Years since quittin.5   • Smokeless tobacco: Never Used   Vaping Use   • Vaping Use: Never used   Substance and Sexual Activity   • Alcohol use: No   • Drug use: No   • Sexual activity: Defer       Vitals:  /53   Pulse 100   Temp 98.1 °F (36.7 °C) (Temporal)   Resp 12   Ht 167.6 cm (66\")   Wt 126 kg (278 lb 12.8 oz)   SpO2 95%   BMI 45.00 kg/m²     T 98.6 P 71 R 29 /46 Sp02 99% (vent)        I/O (24Hr):  No intake or output data in the 24 hours ending 22 1142    Labs and imaging:      Recent Results (from the past 12 hour(s))   Blood Gas, Arterial -    Collection Time: 22  3:25 AM    Specimen: Arterial Blood   Result Value Ref Range    Site Right Brachial     Nigel's Test N/A     pH, Arterial 7.377 7.350 - 7.450 pH units    pCO2, Arterial 36.1 35.0 - 45.0 mm Hg    pO2, Arterial 60.9 (L) 83.0 - 108.0 mm Hg    HCO3, Arterial 21.2 20.0 - 26.0 mmol/L    Base Excess, Arterial -3.6 (L) 0.0 - 2.0 mmol/L    O2 Saturation, Arterial 89.3 (L) 94.0 - 99.0 %    Temperature 37.0 C    Barometric Pressure for Blood Gas 749 mmHg    Modality Ventilator     FIO2 30 %    Ventilator Mode AC     Set Tidal Volume 400     Set Mech Resp Rate 24.0     PEEP 5.0     Collected by SHITAL CLANCY     pCO2, Temperature Corrected 36.1 35 - 45 mm Hg    pH, Temp Corrected 7.377 7.350 - 7.450 pH Units    pO2, Temperature Corrected 60.9 (L) 83 - 108 mm Hg   Basic Metabolic Panel    Collection Time: 22  4:38 AM    Specimen: Blood   Result Value Ref Range    " Glucose 220 (H) 65 - 99 mg/dL    BUN 86 (H) 8 - 23 mg/dL    Creatinine 3.20 (H) 0.57 - 1.00 mg/dL    Sodium 138 136 - 145 mmol/L    Potassium 4.6 3.5 - 5.2 mmol/L    Chloride 98 98 - 107 mmol/L    CO2 21.0 (L) 22.0 - 29.0 mmol/L    Calcium 9.8 8.6 - 10.5 mg/dL    BUN/Creatinine Ratio 26.9 (H) 7.0 - 25.0    Anion Gap 19.0 (H) 5.0 - 15.0 mmol/L    eGFR 15.5 (L) >60.0 mL/min/1.73   Magnesium    Collection Time: 08/16/22  4:38 AM    Specimen: Blood   Result Value Ref Range    Magnesium 3.0 (H) 1.6 - 2.4 mg/dL   Phosphorus    Collection Time: 08/16/22  4:38 AM    Specimen: Blood   Result Value Ref Range    Phosphorus 6.3 (H) 2.5 - 4.5 mg/dL   POC Glucose Once    Collection Time: 08/16/22  5:24 AM    Specimen: Blood   Result Value Ref Range    Glucose 215 (H) 70 - 130 mg/dL   POC Glucose Once    Collection Time: 08/16/22 11:21 AM    Specimen: Blood   Result Value Ref Range    Glucose 195 (H) 70 - 130 mg/dL           Physical Examination:        Physical Exam  Vitals and nursing note reviewed.   Constitutional:       Appearance: Normal appearance. She is obese.      Interventions: She is intubated.   HENT:      Head: Normocephalic and atraumatic.      Right Ear: External ear normal.      Left Ear: External ear normal.      Nose: Nose normal.      Mouth/Throat:      Mouth: Mucous membranes are dry.      Pharynx: Oropharynx is clear.   Eyes:      Extraocular Movements: Extraocular movements intact.      Conjunctiva/sclera: Conjunctivae normal.      Pupils: Pupils are equal, round, and reactive to light.   Neck:      Comments: Trach to vent  Cardiovascular:      Rate and Rhythm: Normal rate and regular rhythm.      Pulses: Normal pulses.      Heart sounds: Normal heart sounds.   Pulmonary:      Effort: Pulmonary effort is normal. She is intubated.      Breath sounds: Normal breath sounds.   Abdominal:      General: Bowel sounds are normal.      Palpations: Abdomen is soft.      Comments: g tube   Musculoskeletal:       Cervical back: Normal range of motion and neck supple.      Comments: Generalized weakness   Skin:     General: Skin is warm and dry.   Neurological:      Comments: Minimal response to stimulation   Psychiatric:         Mood and Affect: Mood normal.         Behavior: Behavior normal.           Assessment:             Acute respiratory failure with hypoxia ([facility])    Ventilator dependent ([facility])    Acute on chronic respiratory failure with hypoxia and hypercapnia ([facility])    Acute tracheostomy management ([facility])    Past Medical History:   Diagnosis Date   • Abnormal stress test    • Atrial flutter ([facility])    • CAD (coronary artery disease)    • CAD in native artery     CABG x 3   • COVID-19 vaccine series completed 2021 [id number]   • Diverticulosis    • Essential hypertension     Tricuspid valve insufficiency, unspecified etiology    • History of adenomatous polyp of colon    • Hyperlipemia, mixed    • Hyperlipidemia    • Hypertension    • IBS (irritable bowel syndrome)    • MI, old    • Mitral valve prolapse    • Near syncope    • Obesity, morbid ([facility])    • Palpitations    • Paroxysmal SVT (supraventricular tachycardia) ([facility])    • Pedal edema    • Precordial pain    • Rheumatoid arthritis ([facility])    • S/P CABG x 3    • Type 2 diabetes mellitus ([facility])    • Venous insufficiency of both lower extremities         Plan:          1. Acute hypoxic respiratory failure  2. DM2 with hyperglycemia  3. Rheumatoid arthritis  4. Dysphagia  5. History of CAD s/p CABG  6. Morbid obesity  7. Anxiety  8. History of retroperitoneal hematoma  9. UTI  10. Pseudomonas in sputum  11. Hypernatremia  12. Hypokalemia  13. Fungemia  14. Funguria    Continue current treatment. Monitor counts. Increase activity. Monitor labs. Vent weaning under direction of pulmonology. Trach management per ENT.  Continue nutrition support via AMONs. Continue IV antibiotics and antifungals. Glycemic control. Maintain patient safety. Monitor renal function and  electrolytes closely. Further recommendations per nephrology.         Electronically signed by MINNIE Simpson on 8/16/2022 at 11:42 CDT

## 2022-08-17 NOTE — PROGRESS NOTES
PULMONARY AND CRITICAL CARE PROGRESS NOTE - [facility]  Patient: Teresa Eubanks Day    1955   Acct# [id number]  08/17/22   07:29 CDT  Referring Provider: Carson Bill MD  Chief Complaint: Mechanically ventilated  Interval history: Pt is unable to provide history due to mechanical ventilation.  She remains encephalopathic not responding to stimuli.  Oxygen saturation 92% on PEEP of 5 and FiO2 0.3.  ET CO2 27.  Minute volume 12 L.  BUN 95, creatinine 3.49.  ABG and chest film reviewed.  Nephrology and ID following.  No documented fevers.  She is receiving enteral nutrition.  No other issues overnight  Review of Systems:   Cannot obtain due to mechanical ventilated state  Ventilator Settings: Mode: AC, rate 24 Vt/PC: 400 Flow 55L/min PEEP: 5  FiO2 0.3   Physical Exam:  Vital signs: T: 98.3 BP: 101/36 P: 71   R: 33   sat: 69 end-tidal 27  Physical Exam  Vitals reviewed.   Constitutional:       General: She is not in acute distress.     Appearance: She is ill-appearing. She is not diaphoretic.   HENT:      Head: Normocephalic.      Nose: Nose normal.      Mouth/Throat:      Mouth: Mucous membranes are moist.   Eyes:      General: No scleral icterus.  Neck:      Comments: Trach to vent  Cardiovascular:      Rate and Rhythm: Normal rate and regular rhythm.   Pulmonary:      Effort: Pulmonary effort is normal. No respiratory distress.      Breath sounds: Decreased breath sounds present. No wheezing or rhonchi.   Abdominal:      General: There is no distension.      Comments: PEG with tube feeding infusing   Genitourinary:     Comments: Evans cath  Musculoskeletal:         General: Swelling present.      Right lower leg: Edema present.      Left lower leg: Edema present.   Skin:     Coloration: Skin is not pale.   Neurological:      Comments: Grimaces to sternal rub         Electronically signed by MINNIE Cortes, 8/17/2022, 07:29 CDT     Physician Substantive Portion: Medical Decision  Making:   Results from last 7 days   Lab Units 08/11/22  0559   WBC 10*3/mm3 12.34*   HEMOGLOBIN g/dL 8.4*   PLATELETS 10*3/mm3 237     Results from last 7 days   Lab Units 08/17/22  0340 08/16/22  0438 08/15/22  1415   SODIUM mmol/L 133* 138 140   POTASSIUM mmol/L 5.3* 4.6 4.4   BUN mg/dL 95* 86* 81*   CREATININE mg/dL 3.49* 3.20* 3.05*     Results from last 7 days   Lab Units 08/17/22  0359 08/16/22  0325 08/15/22  0409   PH, ARTERIAL pH units 7.400 7.377 7.426   PCO2, ARTERIAL mm Hg 30.3* 36.1 35.8   PO2 ART mm Hg 132.0* 60.9* 56.7*   FIO2 % 35 30 30     Blood Culture   Date Value Ref Range Status   08/05/2022 No growth at 3 days  Preliminary     Urine Culture   Date Value Ref Range Status   08/05/2022 No growth  Final   08/04/2022 No growth  Final     Respiratory Culture   Date Value Ref Range Status   08/07/2022 Heavy growth (4+) Pseudomonas species (A)  Preliminary   08/07/2022 No Normal Respiratory Doris (A)  Preliminary   08/07/2022 Moderate growth (3+) Pseudomonas species (A)  Preliminary   08/07/2022 No Normal Respiratory Doris (A)  Preliminary     Electronically signed by MINNIE Cortes, 8/17/2022, 07:29 CDT    Recent films:  XR Chest 1 View    Result Date: 8/17/2022  1. Persistent pulmonary vascular congestion and small bibasal pleural effusion. 2. Other findings as above.     This report was finalized on 08/17/2022 07:08 by Dr. Deepak Pulliam MD.    XR Chest 1 View    Result Date: 8/16/2022  Impression: 1. No significant interval change since previous exam.   This report was finalized on 08/16/2022 07:06 by Dr. Ignacio Perkins MD.    US Renal Bilateral    Result Date: 8/15/2022  1. Normal renal sonography. This report was finalized on 08/15/2022 17:28 by Dr. Deepak Pulliam MD.       My radiograph interpretation/independent review of imaging: Reviewed and agree with current interpretation    Pulmonary Assessment:    1. Acute hypoxic respiratory failure  2. On mechanically assisted ventilation s/p  tracheostomy and PEG tube placement  3. Congestive heart failure with fluid overload  4. Acute kidney injury  5. Encephalopathy  6. Candidemia  7. Gram-negative pneumonia and sepsis improved  8. Unable to wean from the ventilator.    Recommend/plan:   · Patient not ready for ventilator weaning on assist control volume control ventilation  · Continue current ventilator settings.  She is on PEEP of 5 and FiO2 30%.  We will try spontaneous breathing trial later.  · Antibiotic and antifungal treatment per ID.  Nephrology also following  · Vital signs stable.  Continue routine respiratory care and bronchodilator treatment  · Tracheostomy in place.  ENT managing tracheostomy.  · Fluid management and acute kidney injury with chronic kidney disease managed by nephrology.  · Monitor renal function and electrolytes labs and imaging studies as needed.  · Tube feeding for nutritional support physical activity as tolerated.  · Pain and anxiety control and DVT and ulcer prophylaxis reviewed.  · CODE STATUS: Full.  Overall prognosis: Guarded.  · We will follow.    This visit was performed by both a physician and an [facility] RN.  I personally evaluated and examined the patient.  I performed all aspects of the medical decision making as documented.    Electronically signed by     Anamaria Levine MD,  Pulmonologist/Intensivist   8/17/2022, 10:56 CDT

## 2022-08-17 NOTE — PROGRESS NOTES
Fly Bill M.D.  MINNIE Garcia        Internal Medicine Progress Note    8/17/2022   10:32 CDT    Name:  Teresa Serna  MRN:    [id number]     Acct:     [id number]   Room:  [address] [place] [address] Day: 0     Admit Date: 7/27/2022  1:42 PM  PCP: Teresa Contreras MD    Subjective:     C/C: need for continued vent weaning    Interval History: Status:  stayed the same. Resting in bed. No family at bedside. Afebrile.  Tolerating current vent settings (A/C) with 30% 02. No purposeful response to stimulation. Continues off pressor support. Continues with worsening renal function.   Review of Systems   Unable to perform ROS: intubated         Medications:     Allergies:   Allergies   Allergen Reactions   • Codeine Palpitations and Other (See Comments)     Chest pain   • Exenatide Arrhythmia   • Sulfa Antibiotics Hives and Rash     Reaction: hives   • Albuterol Palpitations     High heart rate   • Sitagliptin Swelling   • Clindamycin/Lincomycin Unknown - High Severity   • Amoxicillin-Pot Clavulanate Palpitations       Current Meds:   Current Facility-Administered Medications:   •  acetaminophen (TYLENOL) tablet 650 mg, 650 mg, Per G Tube, Q4H PRN **OR** acetaminophen (TYLENOL) suppository 650 mg, 650 mg, Rectal, Q4H PRN **OR** acetaminophen (TYLENOL) 160 MG/5ML solution 650 mg, 650 mg, Per G Tube, Q4H PRN, Carson Bill MD  •  amiodarone (PACERONE) tablet 100 mg, 100 mg, Per G Tube, Q24H, Carson Bill MD  •  cefepime (MAXIPIME) 2 g/100 mL 0.9% NS (mbp), 2 g, Intravenous, Q24H, Carson Bill MD  •  chlorhexidine (PERIDEX) 0.12 % solution 15 mL, 15 mL, Mouth/Throat, Q12H, Stephanie Henry MD  •  dextrose (D50W) (25 g/50 mL) IV injection 25 g, 25 g, Intravenous, Q15 Min PRN, Stephanie Henry MD  •  dextrose (GLUTOSE) oral gel 15 g, 15 g, Oral, Q15 Min PRN, Stephanie Henry MD  •  famotidine (PEPCID) tablet 20 mg, 20 mg, Per G Tube, Daily, Carson Bill MD  •   glucagon (human recombinant) (GLUCAGEN DIAGNOSTIC) injection 1 mg, 1 mg, Subcutaneous, Q15 Min PRN, Stephanie Henry MD  •  heparin (porcine) 5000 UNIT/ML injection 5,000 Units, 5,000 Units, Subcutaneous, Q8H, Carson Bill MD  •  insulin detemir (LEVEMIR) injection 25 Units, 25 Units, Subcutaneous, Nightly, Stephanie Henry MD  •  Insulin Lispro (humaLOG) injection 0-9 Units, 0-9 Units, Subcutaneous, Q6H, Stephanie Henry MD  •  levalbuterol (XOPENEX) nebulizer solution 0.63 mg, 0.63 mg, Nebulization, BID PRN, Stephanie Henry MD  •  levalbuterol (XOPENEX) nebulizer solution 0.63 mg, 0.63 mg, Nebulization, 4x Daily - RT, Stephanie Henry MD  •  lidocaine (XYLOCAINE) 1 % injection 10 mL, 10 mL, Subcutaneous, Once, Anamaria Levine MD  •  magnesium hydroxide (MILK OF MAGNESIA) suspension 10 mL, 10 mL, Per G Tube, Daily PRN, Carson Bill MD  •  metoprolol tartrate (LOPRESSOR) tablet 50 mg, 50 mg, Per G Tube, Q12H, Carson Bill MD  •  micafungin sodium (MYCAMINE) 100 mg in sodium chloride 0.9 % 100 mL IVPB, 100 mg, Intravenous, Q24H, Jaimie Hunter APRN  •  Morphine sulfate (PF) injection 2 mg, 2 mg, Intravenous, Q4H PRN, Jamaal Frye, MINNIE  •  norepinephrine (LEVOPHED) 8 mg in 250 mL NS infusion (premix), 0.02-0.3 mcg/kg/min, Intravenous, Titrated, Stephanie Henry MD, Last Rate: 17.25 mL/hr at 08/05/22 1226, 0.08 mcg/kg/min at 08/05/22 1226  •  ondansetron (ZOFRAN) tablet 4 mg, 4 mg, Per G Tube, Q6H PRN **OR** ondansetron (ZOFRAN) injection 4 mg, 4 mg, Intravenous, Q6H PRN, Carson Bill MD  •  polyethylene glycol (MIRALAX) packet 17 g, 17 g, Per G Tube, Daily, Carson Bill MD  •  sodium chloride 0.9 % flush 10 mL, 10 mL, Intravenous, Q12H, Stephanie Henry MD  •  sodium chloride 0.9 % flush 10 mL, 10 mL, Intravenous, PRN, Stephanie Henry MD    Data:     Code Status:    There are no questions and answers to display.       Family History   Problem  "Relation Age of Onset   • Cancer Father    • Coronary artery disease Father    • Asthma Father    • Heart failure Mother    • Kidney disease Mother    • Arthritis Mother    • Pancreatitis Sister    • Heart attack Brother    • Colon cancer Neg Hx    • Colon polyps Neg Hx        Social History     Socioeconomic History   • Marital status:    Tobacco Use   • Smoking status: Former Smoker     Years: 20.00     Types: Cigarettes     Quit date: 2000     Years since quittin.5   • Smokeless tobacco: Never Used   Vaping Use   • Vaping Use: Never used   Substance and Sexual Activity   • Alcohol use: No   • Drug use: No   • Sexual activity: Defer       Vitals:  /53   Pulse 100   Temp 98.1 °F (36.7 °C) (Temporal)   Resp 12   Ht 167.6 cm (66\")   Wt 126 kg (278 lb 12.8 oz)   SpO2 95%   BMI 45.00 kg/m²     T 98.3 P 69 R 33 /36 Sp02 92% (vent)        I/O (24Hr):  No intake or output data in the 24 hours ending 22 1032    Labs and imaging:      Recent Results (from the past 12 hour(s))   POC Glucose Once    Collection Time: 22 12:29 AM    Specimen: Blood   Result Value Ref Range    Glucose 237 (H) 70 - 130 mg/dL   Basic Metabolic Panel    Collection Time: 22  3:40 AM    Specimen: Blood   Result Value Ref Range    Glucose 213 (H) 65 - 99 mg/dL    BUN 95 (H) 8 - 23 mg/dL    Creatinine 3.49 (H) 0.57 - 1.00 mg/dL    Sodium 133 (L) 136 - 145 mmol/L    Potassium 5.3 (H) 3.5 - 5.2 mmol/L    Chloride 95 (L) 98 - 107 mmol/L    CO2 18.0 (L) 22.0 - 29.0 mmol/L    Calcium 9.5 8.6 - 10.5 mg/dL    BUN/Creatinine Ratio 27.2 (H) 7.0 - 25.0    Anion Gap 20.0 (H) 5.0 - 15.0 mmol/L    eGFR 14.0 (L) >60.0 mL/min/1.73   Blood Gas, Arterial -    Collection Time: 22  3:59 AM    Specimen: Arterial Blood   Result Value Ref Range    Site Right Brachial     Nigel's Test Positive     pH, Arterial 7.400 7.350 - 7.450 pH units    pCO2, Arterial 30.3 (L) 35.0 - 45.0 mm Hg    pO2, Arterial 132.0 (H) 83.0 - " 108.0 mm Hg    HCO3, Arterial 18.8 (L) 20.0 - 26.0 mmol/L    Base Excess, Arterial -5.4 (L) 0.0 - 2.0 mmol/L    O2 Saturation, Arterial 99.3 (H) 94.0 - 99.0 %    Temperature 37.0 C    Barometric Pressure for Blood Gas 750 mmHg    Modality Ventilator     FIO2 35 %    Ventilator Mode AC     Set Tidal Volume 400     Set Mech Resp Rate 24.0     PEEP 5.0     Collected by Katie CLANCY     pCO2, Temperature Corrected 30.3 (L) 35 - 45 mm Hg    pH, Temp Corrected 7.400 7.350 - 7.450 pH Units    pO2, Temperature Corrected 132 (H) 83 - 108 mm Hg   POC Glucose Once    Collection Time: 08/17/22  4:53 AM    Specimen: Blood   Result Value Ref Range    Glucose 216 (H) 70 - 130 mg/dL           Physical Examination:        Physical Exam  Vitals and nursing note reviewed.   Constitutional:       Appearance: Normal appearance. She is obese.      Interventions: She is intubated.   HENT:      Head: Normocephalic and atraumatic.      Right Ear: External ear normal.      Left Ear: External ear normal.      Nose: Nose normal.      Mouth/Throat:      Mouth: Mucous membranes are dry.      Pharynx: Oropharynx is clear.   Eyes:      Extraocular Movements: Extraocular movements intact.      Conjunctiva/sclera: Conjunctivae normal.      Pupils: Pupils are equal, round, and reactive to light.   Neck:      Comments: Trach to vent  Cardiovascular:      Rate and Rhythm: Normal rate and regular rhythm.      Pulses: Normal pulses.      Heart sounds: Normal heart sounds.   Pulmonary:      Effort: Pulmonary effort is normal. She is intubated.      Breath sounds: Normal breath sounds.   Abdominal:      General: Bowel sounds are normal.      Palpations: Abdomen is soft.      Comments: g tube   Musculoskeletal:      Cervical back: Normal range of motion and neck supple.      Comments: Generalized weakness   Skin:     General: Skin is warm and dry.   Neurological:      Comments: Minimal response to stimulation   Psychiatric:         Mood and Affect: Mood  normal.         Behavior: Behavior normal.           Assessment:             Acute respiratory failure with hypoxia (HCC)    Ventilator dependent (HCC)    Acute on chronic respiratory failure with hypoxia and hypercapnia (HCC)    Acute tracheostomy management (HCC)    Past Medical History:   Diagnosis Date   • Abnormal stress test    • Atrial flutter (HCC)    • CAD (coronary artery disease)    • CAD in native artery     CABG x 3   • COVID-19 vaccine series completed 2021    [id number]   • Diverticulosis    • Essential hypertension     Tricuspid valve insufficiency, unspecified etiology    • History of adenomatous polyp of colon    • Hyperlipemia, mixed    • Hyperlipidemia    • Hypertension    • IBS (irritable bowel syndrome)    • MI, old    • Mitral valve prolapse    • Near syncope    • Obesity, morbid (HCC)    • Palpitations    • Paroxysmal SVT (supraventricular tachycardia) (HCC)    • Pedal edema    • Precordial pain    • Rheumatoid arthritis (HCC)    • S/P CABG x 3    • Type 2 diabetes mellitus (HCC)    • Venous insufficiency of both lower extremities         Plan:          1. Acute hypoxic respiratory failure  2. DM2 with hyperglycemia  3. Rheumatoid arthritis  4. Dysphagia  5. History of CAD s/p CABG  6. Morbid obesity  7. Anxiety  8. History of retroperitoneal hematoma  9. UTI  10. Pseudomonas in sputum  11. Hypernatremia  12. Hypokalemia  13. Fungemia  14. Funguria    Continue current treatment. Monitor counts. Increase activity. Monitor labs. Vent weaning under direction of pulmonology. Trach management per ENT.  Continue nutrition support via AMONs. Continue IV antibiotics and antifungals. Glycemic control. Maintain patient safety. Monitor renal function and electrolytes closely. Further recommendations per nephrology.         Electronically signed by MINNIE Simpson on 8/17/2022 at 10:32 CDT   I have discussed the care of  Cha Serna, including pertinent history and exam findings, with the  nurse practitioner.    I have seen and examined the patient and the key elements of all parts of the encounter have been performed by me.  I agree with the assessment, plan and orders as documented by MINNIE Garcia, after I modified the exam findings and the plan of treatments and the final version is my approved version of the assessment.        Electronically signed by Carson Bill MD on 8/17/2022 at 14:49 CDT

## 2022-08-17 NOTE — PROGRESS NOTES
Nephrology ([facility]) Progress Note      Patient:  Teresa Serna  YOB: 1955  Date of Service: 8/17/2022  MRN: [id number]   Acct: [id number]   Primary Care Physician: Teresa Contreras MD  Advance Directive:   There are no questions and answers to display.     Admit Date: 7/27/2022       Hospital Day: 0  Referring Provider: Carson Bill MD      Patient personally seen and examined.  Complete chart including Consults, Notes, Operative Reports, Labs, Cardiology, and Radiology studies reviewed as able.        Subjective:  Teresa Serna is a 66 y.o. female for whom we were consulted for evaluation and treatment of acute renal failure.  Patient on the ventilator and unable to participate in the initial history and physical examination.  She was originally admitted with small bowel obstruction and mesenteric mass at [facility].  She was also intubated on 7/19 and noted to have a left-sided retroperitoneal hematoma and placed on antibiotics for pneumonia.  She was continued on the ventilator and transferred to the LTAC on 7/28.  Creatinine 0.5 on admission and begin its upward trend on 8/4 through 8/7 with an improvement through 8/9 and continued worsening since that time.  No recorded I&O and 1 weight of 126 kilos recorded.  No family present at the bedside.  Also developed Candida fungemia which required treatment with micafungin.  Had previously required Levophed but appears to been off for several days.    Today, no overnight events.  She remains on the ventilator via trach and unable to participate in the history and physical examination.  Renal function worsened a bit overnight again.  Tube feeds infusing via GT.    Temp- 98.3  Pulse- 69  Resp- 33  BP- 101/36  O2%- 92      Allergies:  Codeine, Exenatide, Sulfa antibiotics, Albuterol, Sitagliptin, Clindamycin/lincomycin, and Amoxicillin-pot clavulanate    Home Meds:  Medications Prior to Admission   Medication  Sig Dispense Refill Last Dose   • amiodarone (PACERONE) 100 MG tablet TAKE ONE TABLET BY MOUTH EVERY DAY 30 tablet 11    • aspirin 81 MG EC tablet Take 81 mg by mouth Daily.      • Eliquis 5 MG tablet tablet TAKE 1 TABLET BY MOUTH EVERY 12 HOURS 180 tablet 4    • folic acid (FOLVITE) 1 MG tablet Take 1 mg by mouth Daily.      • furosemide (Lasix) 40 MG tablet Please take 2 tabs in AM and 1 Qafternoon x2 days then increase to 2 tabs BID 28 tablet 0    • glyburide (DIAbeta) 5 MG tablet Take 5 mg by mouth 2 (Two) Times a Day With Meals. Take 2QAM & 2QPM      • isosorbide mononitrate (IMDUR) 30 MG 24 hr tablet Take 30 mg by mouth Daily.      • lidocaine (LIDODERM) 5 % Place 1 patch on the skin as directed by provider Daily. Remove & Discard patch within 12 hours or as directed by MD 6 each 0    • losartan (COZAAR) 25 MG tablet Take 25 mg by mouth Daily.      • metFORMIN (GLUCOPHAGE) 500 MG tablet Take 500 mg by mouth Daily With Breakfast.      • metoprolol tartrate (LOPRESSOR) 50 MG tablet Take 50 mg by mouth 2 (Two) Times a Day.      • nitroglycerin (NITROSTAT) 0.4 MG SL tablet Place 0.4 mg under the tongue Every 5 (Five) Minutes As Needed for Chest Pain. Take no more than 3 doses in 15 minutes.      • pantoprazole (PROTONIX) 40 MG EC tablet Take 1 tablet by mouth Every 12 (Twelve) Hours. 60 tablet 1    • potassium chloride (K-DUR) 10 MEQ CR tablet Take 1 tablet by mouth Daily. (Patient taking differently: Take 20 mEq by mouth Daily.) 90 tablet 3    • potassium chloride (K-DUR,KLOR-CON) 20 MEQ CR tablet Please take 1 and 1/2 tab daily x1 week 11 tablet 0    • predniSONE (DELTASONE) 1 MG tablet Take 4 mg by mouth Daily.      • predniSONE (DELTASONE) 5 MG tablet Take 5 mg by mouth Every Morning.      • rosuvastatin (CRESTOR) 5 MG tablet Take 5 mg by mouth Daily.      • tiZANidine (Zanaflex) 4 MG tablet Take 1 tablet by mouth At Night As Needed for Muscle Spasms. 3 tablet 0    • Tofacitinib Citrate ER (XELJANZ) 11 MG  tablet sustained-release 24 hour Take 11 mg by mouth Daily.      • verapamil SR (CALAN-SR) 240 MG CR tablet TAKE ONE TABLET BY MOUTH ONCE DAILY 90 tablet 3        Medicines:  Current Facility-Administered Medications   Medication Dose Route Frequency Provider Last Rate Last Admin   • acetaminophen (TYLENOL) tablet 650 mg  650 mg Per G Tube Q4H PRN Carson Bill MD        Or   • acetaminophen (TYLENOL) suppository 650 mg  650 mg Rectal Q4H PRN Carson Bill MD        Or   • acetaminophen (TYLENOL) 160 MG/5ML solution 650 mg  650 mg Per G Tube Q4H PRN Carson Bill MD       • amiodarone (PACERONE) tablet 100 mg  100 mg Per G Tube Q24H Carson Bill MD       • bacitracin ointment 1 application  1 application Topical Q12H Betty Merrill APRN       • cefepime (MAXIPIME) 2 g/100 mL 0.9% NS (mbp)  2 g Intravenous Q24H Carson Bill MD       • chlorhexidine (PERIDEX) 0.12 % solution 15 mL  15 mL Mouth/Throat Q12H Stephanie Henry MD       • dextrose (D50W) (25 g/50 mL) IV injection 25 g  25 g Intravenous Q15 Min PRN Stephanie Henry MD       • dextrose (GLUTOSE) oral gel 15 g  15 g Oral Q15 Min PRN Stephanie Henry MD       • famotidine (PEPCID) tablet 20 mg  20 mg Per G Tube Daily Carson Bill MD       • glucagon (human recombinant) (GLUCAGEN DIAGNOSTIC) injection 1 mg  1 mg Subcutaneous Q15 Min PRN Stephanie Henry MD       • heparin (porcine) 5000 UNIT/ML injection 5,000 Units  5,000 Units Subcutaneous Q8H Carson Bill MD       • insulin detemir (LEVEMIR) injection 25 Units  25 Units Subcutaneous Nightly Stephanie Henry MD       • Insulin Lispro (humaLOG) injection 0-9 Units  0-9 Units Subcutaneous Q6H Stephanie Henry MD       • levalbuterol (XOPENEX) nebulizer solution 0.63 mg  0.63 mg Nebulization BID PRN Stephanie Henry MD       • levalbuterol (XOPENEX) nebulizer solution 0.63 mg  0.63 mg Nebulization 4x Daily - RT Stephanie Henry MD       •  lidocaine (XYLOCAINE) 1 % injection 10 mL  10 mL Subcutaneous Once Anamaria Levine MD       • magnesium hydroxide (MILK OF MAGNESIA) suspension 10 mL  10 mL Per G Tube Daily PRN Carson Bill MD       • metoprolol tartrate (LOPRESSOR) tablet 50 mg  50 mg Per G Tube Q12H Carson Bill MD       • micafungin sodium (MYCAMINE) 100 mg in sodium chloride 0.9 % 100 mL IVPB  100 mg Intravenous Q24H Jaimie Hunter APRFRANCIE       • Morphine sulfate (PF) injection 2 mg  2 mg Intravenous Q4H PRN Jamaal Frye APRN       • norepinephrine (LEVOPHED) 8 mg in 250 mL NS infusion (premix)  0.02-0.3 mcg/kg/min Intravenous Titrated Stephanie Henry MD 17.25 mL/hr at 08/05/22 1226 0.08 mcg/kg/min at 08/05/22 1226   • ondansetron (ZOFRAN) tablet 4 mg  4 mg Per G Tube Q6H PRN Carson Bill MD        Or   • ondansetron (ZOFRAN) injection 4 mg  4 mg Intravenous Q6H PRN Carson Bill MD       • polyethylene glycol (MIRALAX) packet 17 g  17 g Per G Tube Daily Carson Bill MD       • sodium chloride 0.9 % flush 10 mL  10 mL Intravenous Q12H Stephanie Henry MD       • sodium chloride 0.9 % flush 10 mL  10 mL Intravenous PRN Stephanie Henry MD       • sodium zirconium cyclosilicate (LOKELMA) pack 10 g  10 g Per G Tube Once Betty Merrill APRN           Past Medical History:  Past Medical History:   Diagnosis Date   • Abnormal stress test    • Atrial flutter (HCC)    • CAD (coronary artery disease)    • CAD in native artery     CABG x 3   • COVID-19 vaccine series completed 2021 021221/031221   • Diverticulosis    • Essential hypertension     Tricuspid valve insufficiency, unspecified etiology    • History of adenomatous polyp of colon    • Hyperlipemia, mixed    • Hyperlipidemia    • Hypertension    • IBS (irritable bowel syndrome)    • MI, old    • Mitral valve prolapse    • Near syncope    • Obesity, morbid (HCC)    • Palpitations    • Paroxysmal SVT (supraventricular  tachycardia) (HCC)    • Pedal edema    • Precordial pain    • Rheumatoid arthritis (HCC)    • S/P CABG x 3    • Type 2 diabetes mellitus (HCC)    • Venous insufficiency of both lower extremities        Past Surgical History:  Past Surgical History:   Procedure Laterality Date   • APPENDECTOMY     • CARDIAC CATHETERIZATION Left 06/18/2012    Intensify medical therapy   • CARDIAC CATHETERIZATION Left 05/05/2002    surgery   • CHOLECYSTECTOMY  1975   • COLONOSCOPY N/A 1/31/2018    Diverticulosis in the entire examined colon; One 4mm tubular adenomatous polyp in the transverse colon; Non-bleeding internal hemorrhoids; Repeat 5 years   • COLONOSCOPY  02/10/2012    Diverticulosis in the entire examined colon; Non-bleeding internal hemorrhoids; Repeat 7 years   • COLONOSCOPY  01/23/2009    Diverticulosis; Repeat 7 years   • CORONARY ARTERY BYPASS GRAFT  05/05/2002    LIMA to LAD, SVG to D1, SVG to OM1 - x3   • CORONARY STENT PLACEMENT  09/2009    X1 - Stent to LAD, Drug Eluting   • DILATATION AND CURETTAGE     • ENDOSCOPY N/A 1/31/2018    Esophageal mucosal changes suspicious for short-segment De Anda's esophagus-biopsied; Small HH; Two gastric polyps-Clip (MR conditional) was placed-biopsied; Normal examined duodenum   • ENDOSCOPY  03/23/2015    Probable short-segment De Anda's esophagus-biopsied; HH; Gastritis-biopsied; Duodenal diverticulum; Repeat 1 year   • GASTROSTOMY FEEDING TUBE INSERTION N/A 8/5/2022    Procedure: GASTROSTOMY FEEDING TUBE INSERTION;  Surgeon: Stephanie Henry MD;  Location: [facility] OR;  Service: General;  Laterality: N/A;   • LARYNGOSCOPY N/A 8/5/2022    Procedure: laryngoscopy;  Surgeon: Luis Medina Jr., MD;  Location: [facility] OR;  Service: ENT;  Laterality: N/A;   • REPLACEMENT TOTAL KNEE Right    • TRACHEOSTOMY N/A 8/5/2022    Procedure: tracheostomy;  Surgeon: Luis Medina Jr., MD;  Location: [facility] OR;  Service: ENT;  Laterality: N/A;       Family History  Family History    Problem Relation Age of Onset   • Cancer Father    • Coronary artery disease Father    • Asthma Father    • Heart failure Mother    • Kidney disease Mother    • Arthritis Mother    • Pancreatitis Sister    • Heart attack Brother    • Colon cancer Neg Hx    • Colon polyps Neg Hx        Social History  Social History     Socioeconomic History   • Marital status:    Tobacco Use   • Smoking status: Former Smoker     Years: 20.00     Types: Cigarettes     Quit date: 2000     Years since quittin.5   • Smokeless tobacco: Never Used   Vaping Use   • Vaping Use: Never used   Substance and Sexual Activity   • Alcohol use: No   • Drug use: No   • Sexual activity: Defer       Review of Systems:  History obtained from unobtainable from patient due to mental status      Objective:  No data found.  No intake or output data in the 24 hours ending 22 1729  General: On the ventilator unable to participate in the history and physical examination  Chest:  clear to auscultation bilaterally without respiratory distress  CVS: regular rate and rhythm  Abdominal: soft, nontender, positive bowel sounds, obese  Extremities: ble edema  Skin: warm and dry without rash      Labs:  Results from last 7 days   Lab Units 22  1103 22  0559   WBC 10*3/mm3 27.32* 12.34*   HEMOGLOBIN g/dL 7.4* 8.4*   HEMATOCRIT % 25.5* 28.5*   PLATELETS 10*3/mm3 340 237         Results from last 7 days   Lab Units 22  0340 22  0438 08/15/22  1415   SODIUM mmol/L 133* 138 140   POTASSIUM mmol/L 5.3* 4.6 4.4   CHLORIDE mmol/L 95* 98 101   CO2 mmol/L 18.0* 21.0* 22.0   BUN mg/dL 95* 86* 81*   CREATININE mg/dL 3.49* 3.20* 3.05*   CALCIUM mg/dL 9.5 9.8 9.3   EGFR mL/min/1.73 14.0* 15.5* 16.4*   GLUCOSE mg/dL 213* 220* 189*       Radiology:   Imaging Results (Last 72 Hours)     Procedure Component Value Units Date/Time    XR Chest 1 View [[id number]] Collected: 22     Updated: 22    Narrative:       EXAMINATION: XR CHEST 1 VW-     8/17/2022 4:05 AM CDT     HISTORY: ventilator; Z99.11-Dependence on respirator (ventilator)  status; J96.20-Acute and chronic respiratory failure, unspecified  whether with hypoxia or hypercapnia     A frontal projection of the chest is compared with the previous study  dated 8/16/2022.     The lungs are poorly expanded.     There is a persistent pulmonary vascular congestion and small bibasal  pleural effusion. No significant change.     No pneumothorax.     The heart size is not evaluated due to the AP projection. There is  evidence of previous cardiac surgery.     A tracheostomy tube is seen in place. A small rectangular/tubular  structure is seen projected over the mid chest just below the distal end  of the tracheostomy tube. This may represent an artifact. The type of  artifact not certain. This may be clinically correlated.     No acute bony abnormality.       Impression:      1. Persistent pulmonary vascular congestion and small bibasal pleural  effusion.  2. Other findings as above.              This report was finalized on 08/17/2022 07:08 by Dr. Deepak Pulliam MD.    XR Chest 1 View [[id number]] Collected: 08/16/22 0705     Updated: 08/16/22 0709    Narrative:      Frontal supine radiograph of the chest 8/16/2022 3:35 AM CDT     History: ventilator; Z99.11-Dependence on respirator (ventilator)  status; J96.20-Acute and chronic respiratory failure, unspecified  whether with hypoxia or hypercapnia     Comparison: 8/15/2022      Findings:   Lines and tubes are stable in position. No new opacities or  pneumothoraces are visualized in the chest. The cardiomediastinal  silhouette and pulmonary vascularity are unchanged.       No acute osseous or soft tissue abnormality is noted.        Impression:      Impression:   1. No significant interval change since previous exam.        This report was finalized on 08/16/2022 07:06 by Dr. Ignacio Perkins MD.    US Renal Bilateral  [[id number]] Collected: 08/15/22 1727     Updated: 08/15/22 1731    Narrative:      EXAMINATION: US RENAL BILATERAL-     8/15/2022 3:23 PM CDT     HISTORY: evaluate for hydronephrosis; Z99.11-Dependence on respirator  (ventilator) status; J96.20-Acute and chronic respiratory failure,  unspecified whether with hypoxia or hypercapnia     The ultrasound examination of the kidneys bilaterally is performed.  There is no previous study for comparison.     The right kidney measures 11.5 x 6.1 x 5 cm. There is no evidence of a  mass. No hydronephrosis. There is normal corticomedullary  differentiation.     The left kidney measures 12.4 x 6.6 x 5.3 cm. There is no evidence of a  mass. No hydronephrosis. A normal corticomedullary differentiation.     The urinary bladder is poorly distended. No obvious intrinsic  abnormality.       Impression:      1. Normal renal sonography.  This report was finalized on 08/15/2022 17:28 by Dr. Deepak Pulliam MD.    XR Chest 1 View [[id number]] Collected: 08/15/22 0706     Updated: 08/15/22 0710    Narrative:      EXAMINATION: Chest 1 view 8/15/2022     HISTORY: Respiratory failure. Patient on mechanical ventilation.     FINDINGS: Today's exam is compared to previous study one day earlier.  There is extensive interstitial and alveolar disease within both lungs  showing slight worsening when compared to the previous exam,  particularly in the lung bases. There is a developing right-sided  pleural effusion with blunting the costophrenic angle. Tracheostomy tube  remains in place.       Impression:      .  1. Slight worsening of the patient's mixed interstitial and alveolar  disease within the lungs for which I would favor pulmonary edema. There  is a developing small right-sided effusion.  This report was finalized on 08/15/2022 07:07 by Dr. Ignacio Perkins MD.          Culture:  Blood Culture   Date Value Ref Range Status   08/15/2022 No growth at less than 24 hours  Preliminary   08/15/2022  Abnormal Stain (C)  Preliminary         Assessment   Acute renal failure  Acute hypoxemic respiratory failure which required mechanical ventilation  Candida glabrata sepsis  Pneumocystis pneumonia  Obesity  Rheumatoid arthritis  CAD status post CABG  History of retroperitoneal hematoma  Anemia  Hypotension  Metabolic acidosis  Hypomagnesemia  Hyperphosphatemia     Plan:  Discussed with nursing, family  Work-up reviewed to date  IV antibiotic/antifungal per ID  Fractional excretion of sodium and urea not consistent with prerenal state  Also noted hematuria and proteinuria which quantifies to a significant degree, proteinuria appears to be a longer-term finding perhaps related to some underlying diabetic nephropathy and hematuria could be related to catheter usage however given the ongoing acute renal failure we will order immunologic work-up  Otherwise, if no change in current clinical course, would suggest initiation of renal replacement therapy tomorrow, discussed with patient's sister who was present in the room who will review with the patient's son as the current next of kin/decision-maker and update us about any changes to goals of care and/or consent for dialysis        Ibrahima Weller MD  8/17/2022  17:29 CDT

## 2022-08-18 NOTE — PROGRESS NOTES
Fly Bill M.D.  MINNIE Garcia        Internal Medicine Progress Note    8/18/2022   14:09 CDT    Name:  Teresa Serna  MRN:    [id number]     Acct:     [id number]   Room:  [address] [place] [address] Day: 0     Admit Date: 7/27/2022  1:42 PM  PCP: Teresa Contreras MD    Subjective:     C/C: need for continued vent weaning    Interval History: Status:  stayed the same. Resting in bed. Family at bedside. Afebrile.  Tolerating current vent settings (A/C) with 50% 02. No purposeful response to stimulation. Continues off pressor support. Continues with worsening renal function. Has had issues with heart rate and rhythm this morning. Family planning to transition to comfort/end of life care later today.   Review of Systems   Unable to perform ROS: intubated         Medications:     Allergies:   Allergies   Allergen Reactions   • Codeine Palpitations and Other (See Comments)     Chest pain   • Exenatide Arrhythmia   • Sulfa Antibiotics Hives and Rash     Reaction: hives   • Albuterol Palpitations     High heart rate   • Sitagliptin Swelling   • Clindamycin/Lincomycin Unknown - High Severity   • Amoxicillin-Pot Clavulanate Palpitations       Current Meds:   Current Facility-Administered Medications:   •  acetaminophen (TYLENOL) tablet 650 mg, 650 mg, Per G Tube, Q4H PRN **OR** acetaminophen (TYLENOL) suppository 650 mg, 650 mg, Rectal, Q4H PRN **OR** acetaminophen (TYLENOL) 160 MG/5ML solution 650 mg, 650 mg, Per G Tube, Q4H PRN, Carson Bill MD  •  bacitracin ointment 1 application, 1 application, Topical, Q12H, Betty Merrill APRN  •  chlorhexidine (PERIDEX) 0.12 % solution 15 mL, 15 mL, Mouth/Throat, Q12H, Stephanie Henry MD  •  dextrose (D50W) (25 g/50 mL) IV injection 25 g, 25 g, Intravenous, Q15 Min PRN, Stephanie Henry MD  •  dextrose (GLUTOSE) oral gel 15 g, 15 g, Oral, Q15 Min PRN, Stephanie Henry MD  •  glucagon (human recombinant) (GLUCAGEN DIAGNOSTIC) injection 1 mg, 1  "mg, Subcutaneous, Q15 Min PRN, Stephanie Henry MD  •  magnesium hydroxide (MILK OF MAGNESIA) suspension 10 mL, 10 mL, Per G Tube, Daily PRN, Carson Bill MD  •  Morphine sulfate (PF) injection 2 mg, 2 mg, Intravenous, Q4H PRN, Jamaal Frye APRN  •  ondansetron (ZOFRAN) tablet 4 mg, 4 mg, Per G Tube, Q6H PRN **OR** ondansetron (ZOFRAN) injection 4 mg, 4 mg, Intravenous, Q6H PRN, Carson Bill MD  •  sodium chloride 0.9 % flush 10 mL, 10 mL, Intravenous, Q12H, Stephanie Henry MD  •  sodium chloride 0.9 % flush 10 mL, 10 mL, Intravenous, PRN, Stephanie Henry MD    Data:     Code Status:    There are no questions and answers to display.       Family History   Problem Relation Age of Onset   • Cancer Father    • Coronary artery disease Father    • Asthma Father    • Heart failure Mother    • Kidney disease Mother    • Arthritis Mother    • Pancreatitis Sister    • Heart attack Brother    • Colon cancer Neg Hx    • Colon polyps Neg Hx        Social History     Socioeconomic History   • Marital status:    Tobacco Use   • Smoking status: Former Smoker     Years: 20.00     Types: Cigarettes     Quit date: 2000     Years since quittin.5   • Smokeless tobacco: Never Used   Vaping Use   • Vaping Use: Never used   Substance and Sexual Activity   • Alcohol use: No   • Drug use: No   • Sexual activity: Defer       Vitals:  /53   Pulse 100   Temp 98.1 °F (36.7 °C) (Temporal)   Resp 12   Ht 167.6 cm (66\")   Wt 126 kg (278 lb 12.8 oz)   SpO2 95%   BMI 45.00 kg/m²     T 97.6 P 81 R 29 BP 88/35 Sp02 99% (vent)        I/O (24Hr):  No intake or output data in the 24 hours ending 22 1409    Labs and imaging:      Recent Results (from the past 12 hour(s))   Blood Gas, Arterial -    Collection Time: 22  3:59 AM    Specimen: Arterial Blood   Result Value Ref Range    Site Right Brachial     Nigel's Test N/A     pH, Arterial 7.280 (L) 7.350 - 7.450 pH units    pCO2, " Arterial 40.7 35.0 - 45.0 mm Hg    pO2, Arterial 58.4 (L) 83.0 - 108.0 mm Hg    HCO3, Arterial 19.1 (L) 20.0 - 26.0 mmol/L    Base Excess, Arterial -7.1 (L) 0.0 - 2.0 mmol/L    O2 Saturation, Arterial 85.3 (L) 94.0 - 99.0 %    Temperature 37.0 C    Barometric Pressure for Blood Gas 751 mmHg    Modality Ventilator     FIO2 55 %    Ventilator Mode AC     Set Tidal Volume 400     Set Mech Resp Rate 20.0     PEEP 5.0     Collected by MARY DIAZ RT     pCO2, Temperature Corrected 40.7 35 - 45 mm Hg    pH, Temp Corrected 7.280 (L) 7.350 - 7.450 pH Units    pO2, Temperature Corrected 58.4 (L) 83 - 108 mm Hg   Basic Metabolic Panel    Collection Time: 08/18/22  4:18 AM    Specimen: Blood   Result Value Ref Range    Glucose 183 (H) 65 - 99 mg/dL     (H) 8 - 23 mg/dL    Creatinine 4.03 (H) 0.57 - 1.00 mg/dL    Sodium 132 (L) 136 - 145 mmol/L    Potassium 6.0 (H) 3.5 - 5.2 mmol/L    Chloride 92 (L) 98 - 107 mmol/L    CO2 20.0 (L) 22.0 - 29.0 mmol/L    Calcium 9.4 8.6 - 10.5 mg/dL    BUN/Creatinine Ratio 26.6 (H) 7.0 - 25.0    Anion Gap 20.0 (H) 5.0 - 15.0 mmol/L    eGFR 11.8 (L) >60.0 mL/min/1.73   POC Glucose Once    Collection Time: 08/18/22  4:51 AM    Specimen: Blood   Result Value Ref Range    Glucose 189 (H) 70 - 130 mg/dL   POC Glucose Once    Collection Time: 08/18/22 11:05 AM    Specimen: Blood   Result Value Ref Range    Glucose 178 (H) 70 - 130 mg/dL           Physical Examination:        Physical Exam  Vitals and nursing note reviewed.   Constitutional:       Appearance: Normal appearance. She is obese.      Interventions: She is intubated.   HENT:      Head: Normocephalic and atraumatic.      Right Ear: External ear normal.      Left Ear: External ear normal.      Nose: Nose normal.      Mouth/Throat:      Mouth: Mucous membranes are dry.      Pharynx: Oropharynx is clear.   Eyes:      Extraocular Movements: Extraocular movements intact.      Conjunctiva/sclera: Conjunctivae normal.      Pupils: Pupils  are equal, round, and reactive to light.   Neck:      Comments: Trach to vent  Cardiovascular:      Rate and Rhythm: Normal rate and regular rhythm.      Pulses: Normal pulses.      Heart sounds: Normal heart sounds.   Pulmonary:      Effort: Pulmonary effort is normal. She is intubated.      Breath sounds: Normal breath sounds.   Abdominal:      General: Bowel sounds are normal.      Palpations: Abdomen is soft.      Comments: g tube   Musculoskeletal:      Cervical back: Normal range of motion and neck supple.      Comments: Generalized weakness   Skin:     General: Skin is warm and dry.   Neurological:      Comments: Minimal response to stimulation   Psychiatric:         Mood and Affect: Mood normal.         Behavior: Behavior normal.           Assessment:             Acute respiratory failure with hypoxia (HCC)    Ventilator dependent (HCC)    Acute on chronic respiratory failure with hypoxia and hypercapnia ([facility])    Acute tracheostomy management ([facility])    Past Medical History:   Diagnosis Date   • Abnormal stress test    • Atrial flutter ([facility])    • CAD (coronary artery disease)    • CAD in native artery     CABG x 3   • COVID-19 vaccine series completed 2021    [id number]   • Diverticulosis    • Essential hypertension     Tricuspid valve insufficiency, unspecified etiology    • History of adenomatous polyp of colon    • Hyperlipemia, mixed    • Hyperlipidemia    • Hypertension    • IBS (irritable bowel syndrome)    • MI, old    • Mitral valve prolapse    • Near syncope    • Obesity, morbid ([facility])    • Palpitations    • Paroxysmal SVT (supraventricular tachycardia) ([facility])    • Pedal edema    • Precordial pain    • Rheumatoid arthritis ([facility])    • S/P CABG x 3    • Type 2 diabetes mellitus ([facility])    • Venous insufficiency of both lower extremities         Plan:          1. Acute hypoxic respiratory failure  2. DM2 with hyperglycemia  3. Rheumatoid arthritis  4. Dysphagia  5. History of CAD s/p CABG  6. Morbid  obesity  7. Anxiety  8. History of retroperitoneal hematoma  9. UTI  10. Pseudomonas in sputum  11. Hypernatremia  12. Hypokalemia  13. Fungemia  14. Funguria    Continue current treatment. Transition to comfort based care later today. Support extended to family.         Electronically signed by MINNIE Simpson on 8/18/2022 at 14:09 CDT

## 2022-08-18 NOTE — PROGRESS NOTES
"Infectious Diseases Progress Note    Patient:  Teresa Serna  YOB: 1955  MRN: [id number]   Admit date: 7/27/2022   Admitting Physician: Carson Bill MD  Primary Care Physician: Teresa Contreras MD    Chief Complaint/Interval History: She has had further deterioration.  She has had hypotension.  She has had increasing white blood cell count.  Son, daughter-in-law, and sister at bedside.  Son indicates he had made the decision yesterday to transition to comfort care.  Nurse had indicated they had made the decision for comfort care as well.  Talked with the son at bedside.  He seemed very comfortable with that decision.  He indicates he recognizes her chances of meaningful recovery are exceedingly small.  Offered support for decision for comfort measures.    No intake or output data in the 24 hours ending 08/18/22 1410  Allergies:   Allergies   Allergen Reactions   • Codeine Palpitations and Other (See Comments)     Chest pain   • Exenatide Arrhythmia   • Sulfa Antibiotics Hives and Rash     Reaction: hives   • Albuterol Palpitations     High heart rate   • Sitagliptin Swelling   • Clindamycin/Lincomycin Unknown - High Severity   • Amoxicillin-Pot Clavulanate Palpitations     Current Scheduled Medications:   bacitracin, 1 application, Topical, Q12H  chlorhexidine, 15 mL, Mouth/Throat, Q12H  sodium chloride, 10 mL, Intravenous, Q12H      Current PRN Medications:  •  acetaminophen **OR** acetaminophen **OR** acetaminophen  •  dextrose  •  dextrose  •  glucagon (human recombinant)  •  magnesium hydroxide  •  Morphine  •  ondansetron **OR** ondansetron  •  sodium chloride    Review of Systems see HPI    Vital Signs:  /53   Pulse 100   Temp 98.1 °F (36.7 °C) (Temporal)   Resp 12   Ht 167.6 cm (66\")   Wt 126 kg (278 lb 12.8 oz)   SpO2 95%   BMI 45.00 kg/m²     Physical Exam  Vital signs - reviewed.  Line/IV site - No erythema, warmth, induration, or tenderness.  Currently remains " on ventilator    Lab Results:  CBC:   Results from last 7 days   Lab Units 08/17/22  1103   WBC 10*3/mm3 27.32*   HEMOGLOBIN g/dL 7.4*   HEMATOCRIT % 25.5*   PLATELETS 10*3/mm3 340     BMP:  Results from last 7 days   Lab Units 08/18/22  0418 08/17/22  0340 08/16/22  0438 08/15/22  1415 08/14/22  0453 08/13/22  0850 08/12/22  1019   SODIUM mmol/L 132* 133* 138 140 145 148* 148*   POTASSIUM mmol/L 6.0* 5.3* 4.6 4.4 3.9 3.5 3.5   CHLORIDE mmol/L 92* 95* 98 101 105 106 108*   CO2 mmol/L 20.0* 18.0* 21.0* 22.0 25.0 27.0 27.0   BUN mg/dL 107* 95* 86* 81* 68* 62* 58*   CREATININE mg/dL 4.03* 3.49* 3.20* 3.05* 2.52* 2.27* 2.06*   GLUCOSE mg/dL 183* 213* 220* 189* 197* 130* 152*   CALCIUM mg/dL 9.4 9.5 9.8 9.3 9.7 9.4 9.6     Culture Results:   Blood Culture   Date Value Ref Range Status   08/15/2022 No growth at 2 days  Preliminary   08/15/2022 Staphylococcus, coagulase negative (C)  Final     Radiology: None  Additional Studies Reviewed: None    Impression:   Per discussion with patient's son Tab transitioning to comfort care    Recommendations:   No further recommendations from ID standpoint at present  Offered support    Luis French MD

## 2022-08-18 NOTE — PROGRESS NOTES
Nephrology ([facility]) Progress Note      Patient:  Teresa Serna  YOB: 1955  Date of Service: 8/18/2022  MRN: [id number]   Acct: [id number]   Primary Care Physician: Teresa Contreras MD  Advance Directive:   There are no questions and answers to display.     Admit Date: 7/27/2022       Hospital Day: 0  Referring Provider: Carson Bill MD      Patient personally seen and examined.  Complete chart including Consults, Notes, Operative Reports, Labs, Cardiology, and Radiology studies reviewed as able.        Subjective:  Teresa Serna is a 66 y.o. female for whom we were consulted for evaluation and treatment of acute renal failure.  Patient on the ventilator and unable to participate in the initial history and physical examination.  She was originally admitted with small bowel obstruction and mesenteric mass at [facility].  She was also intubated on 7/19 and noted to have a left-sided retroperitoneal hematoma and placed on antibiotics for pneumonia.  She was continued on the ventilator and transferred to the LTAC on 7/28.  Creatinine 0.5 on admission and begin its upward trend on 8/4 through 8/7 with an improvement through 8/9 and continued worsening since that time.  No recorded I&O and 1 weight of 126 kilos recorded.  No family present at the bedside.  Also developed Candida fungemia which required treatment with micafungin.  Had previously required Levophed but appears to been off for several days.    Today, no overnight events.  She remains on the ventilator via trach and unable to participate in the history and physical examination.  Renal function worsened a bit overnight again. Family now at bedside planning comfort care    Temp- 97.6  Pulse- 81  Resp- 29  BP- 88/35  O2%- 99      Allergies:  Codeine, Exenatide, Sulfa antibiotics, Albuterol, Sitagliptin, Clindamycin/lincomycin, and Amoxicillin-pot clavulanate    Home Meds:  Medications Prior to Admission    Medication Sig Dispense Refill Last Dose   • amiodarone (PACERONE) 100 MG tablet TAKE ONE TABLET BY MOUTH EVERY DAY 30 tablet 11    • aspirin 81 MG EC tablet Take 81 mg by mouth Daily.      • Eliquis 5 MG tablet tablet TAKE 1 TABLET BY MOUTH EVERY 12 HOURS 180 tablet 4    • folic acid (FOLVITE) 1 MG tablet Take 1 mg by mouth Daily.      • furosemide (Lasix) 40 MG tablet Please take 2 tabs in AM and 1 Qafternoon x2 days then increase to 2 tabs BID 28 tablet 0    • glyburide (DIAbeta) 5 MG tablet Take 5 mg by mouth 2 (Two) Times a Day With Meals. Take 2QAM & 2QPM      • isosorbide mononitrate (IMDUR) 30 MG 24 hr tablet Take 30 mg by mouth Daily.      • lidocaine (LIDODERM) 5 % Place 1 patch on the skin as directed by provider Daily. Remove & Discard patch within 12 hours or as directed by MD 6 each 0    • losartan (COZAAR) 25 MG tablet Take 25 mg by mouth Daily.      • metFORMIN (GLUCOPHAGE) 500 MG tablet Take 500 mg by mouth Daily With Breakfast.      • metoprolol tartrate (LOPRESSOR) 50 MG tablet Take 50 mg by mouth 2 (Two) Times a Day.      • nitroglycerin (NITROSTAT) 0.4 MG SL tablet Place 0.4 mg under the tongue Every 5 (Five) Minutes As Needed for Chest Pain. Take no more than 3 doses in 15 minutes.      • pantoprazole (PROTONIX) 40 MG EC tablet Take 1 tablet by mouth Every 12 (Twelve) Hours. 60 tablet 1    • potassium chloride (K-DUR) 10 MEQ CR tablet Take 1 tablet by mouth Daily. (Patient taking differently: Take 20 mEq by mouth Daily.) 90 tablet 3    • potassium chloride (K-DUR,KLOR-CON) 20 MEQ CR tablet Please take 1 and 1/2 tab daily x1 week 11 tablet 0    • predniSONE (DELTASONE) 1 MG tablet Take 4 mg by mouth Daily.      • predniSONE (DELTASONE) 5 MG tablet Take 5 mg by mouth Every Morning.      • rosuvastatin (CRESTOR) 5 MG tablet Take 5 mg by mouth Daily.      • tiZANidine (Zanaflex) 4 MG tablet Take 1 tablet by mouth At Night As Needed for Muscle Spasms. 3 tablet 0    • Tofacitinib Citrate ER  (XELJANZ) 11 MG tablet sustained-release 24 hour Take 11 mg by mouth Daily.      • verapamil SR (CALAN-SR) 240 MG CR tablet TAKE ONE TABLET BY MOUTH ONCE DAILY 90 tablet 3        Medicines:  Current Facility-Administered Medications   Medication Dose Route Frequency Provider Last Rate Last Admin   • acetaminophen (TYLENOL) tablet 650 mg  650 mg Per G Tube Q4H PRN Carson Bill MD        Or   • acetaminophen (TYLENOL) suppository 650 mg  650 mg Rectal Q4H PRN Carson Bill MD        Or   • acetaminophen (TYLENOL) 160 MG/5ML solution 650 mg  650 mg Per G Tube Q4H PRN Carson Bill MD       • bacitracin ointment 1 application  1 application Topical Q12H Betty Merrill APRN       • chlorhexidine (PERIDEX) 0.12 % solution 15 mL  15 mL Mouth/Throat Q12H Stephanie Henry MD       • dextrose (D50W) (25 g/50 mL) IV injection 25 g  25 g Intravenous Q15 Min PRN Stephanie Henry MD       • dextrose (GLUTOSE) oral gel 15 g  15 g Oral Q15 Min PRN Stephanie Henry MD       • glucagon (human recombinant) (GLUCAGEN DIAGNOSTIC) injection 1 mg  1 mg Subcutaneous Q15 Min PRN Stephanie Henry MD       • LORazepam (ATIVAN) 2 MG/ML concentrated solution 2 mg  2 mg Sublingual Q2H PRN Betty Merrill APRN       • magnesium hydroxide (MILK OF MAGNESIA) suspension 10 mL  10 mL Per G Tube Daily PRN Carson Bill MD       • Morphine sulfate (PF) injection 2 mg  2 mg Intravenous Q1H PRN Betty Merrill APRN       • Morphine sulfate PCA 1 mg/mL 30 mL syringe  1 mg/hr Intravenous Continuous Betty Merrill APRN       • ondansetron (ZOFRAN) tablet 4 mg  4 mg Per G Tube Q6H PRN Carson Bill MD        Or   • ondansetron (ZOFRAN) injection 4 mg  4 mg Intravenous Q6H PRN Carson Bill MD       • sodium chloride 0.9 % flush 10 mL  10 mL Intravenous Q12H Stephanie Henry MD       • sodium chloride 0.9 % flush 10 mL  10 mL Intravenous PRN Stephanie Henry MD       • sodium  chloride 0.9 % infusion  20 mL/hr Intravenous Continuous Betty Merrill APRN           Past Medical History:  Past Medical History:   Diagnosis Date   • Abnormal stress test    • Atrial flutter (HCC)    • CAD (coronary artery disease)    • CAD in native artery     CABG x 3   • COVID-19 vaccine series completed 2021 [id number]/[id number]   • Diverticulosis    • Essential hypertension     Tricuspid valve insufficiency, unspecified etiology    • History of adenomatous polyp of colon    • Hyperlipemia, mixed    • Hyperlipidemia    • Hypertension    • IBS (irritable bowel syndrome)    • MI, old    • Mitral valve prolapse    • Near syncope    • Obesity, morbid (HCC)    • Palpitations    • Paroxysmal SVT (supraventricular tachycardia) ([facility])    • Pedal edema    • Precordial pain    • Rheumatoid arthritis (HCC)    • S/P CABG x 3    • Type 2 diabetes mellitus (HCC)    • Venous insufficiency of both lower extremities        Past Surgical History:  Past Surgical History:   Procedure Laterality Date   • APPENDECTOMY     • CARDIAC CATHETERIZATION Left 06/18/2012    Intensify medical therapy   • CARDIAC CATHETERIZATION Left 05/05/2002    surgery   • CHOLECYSTECTOMY  1975   • COLONOSCOPY N/A 1/31/2018    Diverticulosis in the entire examined colon; One 4mm tubular adenomatous polyp in the transverse colon; Non-bleeding internal hemorrhoids; Repeat 5 years   • COLONOSCOPY  02/10/2012    Diverticulosis in the entire examined colon; Non-bleeding internal hemorrhoids; Repeat 7 years   • COLONOSCOPY  01/23/2009    Diverticulosis; Repeat 7 years   • CORONARY ARTERY BYPASS GRAFT  05/05/2002    LIMA to LAD, SVG to D1, SVG to OM1 - x3   • CORONARY STENT PLACEMENT  09/2009    X1 - Stent to LAD, Drug Eluting   • DILATATION AND CURETTAGE     • ENDOSCOPY N/A 1/31/2018    Esophageal mucosal changes suspicious for short-segment De Anda's esophagus-biopsied; Small HH; Two gastric polyps-Clip (MR conditional) was placed-biopsied; Normal examined  duodenum   • ENDOSCOPY  2015    Probable short-segment De Anda's esophagus-biopsied; HH; Gastritis-biopsied; Duodenal diverticulum; Repeat 1 year   • GASTROSTOMY FEEDING TUBE INSERTION N/A 2022    Procedure: GASTROSTOMY FEEDING TUBE INSERTION;  Surgeon: Stephanie Henry MD;  Location: [facility] OR;  Service: General;  Laterality: N/A;   • LARYNGOSCOPY N/A 2022    Procedure: laryngoscopy;  Surgeon: Luis Medina Jr., MD;  Location: [facility] OR;  Service: ENT;  Laterality: N/A;   • REPLACEMENT TOTAL KNEE Right    • TRACHEOSTOMY N/A 2022    Procedure: tracheostomy;  Surgeon: Luis Medina Jr., MD;  Location: [facility] OR;  Service: ENT;  Laterality: N/A;       Family History  Family History   Problem Relation Age of Onset   • Cancer Father    • Coronary artery disease Father    • Asthma Father    • Heart failure Mother    • Kidney disease Mother    • Arthritis Mother    • Pancreatitis Sister    • Heart attack Brother    • Colon cancer Neg Hx    • Colon polyps Neg Hx        Social History  Social History     Socioeconomic History   • Marital status:    Tobacco Use   • Smoking status: Former Smoker     Years: 20.00     Types: Cigarettes     Quit date: 2000     Years since quittin.5   • Smokeless tobacco: Never Used   Vaping Use   • Vaping Use: Never used   Substance and Sexual Activity   • Alcohol use: No   • Drug use: No   • Sexual activity: Defer       Review of Systems:  History obtained from unobtainable from patient due to mental status      Objective:  No data found.  No intake or output data in the 24 hours ending 22 1716  General: On the ventilator unable to participate in the history and physical examination  Chest:  clear to auscultation bilaterally without respiratory distress  CVS: regular rate and rhythm  Abdominal: soft, nontender, positive bowel sounds, obese  Extremities: ble edema  Skin: warm and dry without rash      Labs:  Results from last 7 days   Lab  Units 08/17/22  1103   WBC 10*3/mm3 27.32*   HEMOGLOBIN g/dL 7.4*   HEMATOCRIT % 25.5*   PLATELETS 10*3/mm3 340         Results from last 7 days   Lab Units 08/18/22  0418 08/17/22  0340 08/16/22  0438   SODIUM mmol/L 132* 133* 138   POTASSIUM mmol/L 6.0* 5.3* 4.6   CHLORIDE mmol/L 92* 95* 98   CO2 mmol/L 20.0* 18.0* 21.0*   BUN mg/dL 107* 95* 86*   CREATININE mg/dL 4.03* 3.49* 3.20*   CALCIUM mg/dL 9.4 9.5 9.8   EGFR mL/min/1.73 11.8* 14.0* 15.5*   GLUCOSE mg/dL 183* 213* 220*       Radiology:   Imaging Results (Last 72 Hours)     Procedure Component Value Units Date/Time    XR Chest 1 View [[id number]] Collected: 08/18/22 0712     Updated: 08/18/22 0716    Narrative:      EXAMINATION: Chest one view 8/18/2022     HISTORY: Respiratory failure. Mechanical ventilation.     FINDINGS: Today's exam is compared to previous study of one day earlier.  There is worsening volume overload/congestive failure with increasing  pulmonary edema and increasing effusions. There is moderate  cardiomegaly. A tracheostomy tube remains in place.       Impression:      1.. Worsening pulmonary edema and increasing effusions.  This report was finalized on 08/18/2022 07:13 by Dr. Ignacio Perkins MD.    XR Chest 1 View [[id number]] Collected: 08/17/22 0706     Updated: 08/17/22 0712    Narrative:      EXAMINATION: XR CHEST 1 VW-     8/17/2022 4:05 AM CDT     HISTORY: ventilator; Z99.11-Dependence on respirator (ventilator)  status; J96.20-Acute and chronic respiratory failure, unspecified  whether with hypoxia or hypercapnia     A frontal projection of the chest is compared with the previous study  dated 8/16/2022.     The lungs are poorly expanded.     There is a persistent pulmonary vascular congestion and small bibasal  pleural effusion. No significant change.     No pneumothorax.     The heart size is not evaluated due to the AP projection. There is  evidence of previous cardiac surgery.     A tracheostomy tube is seen in place. A small  rectangular/tubular  structure is seen projected over the mid chest just below the distal end  of the tracheostomy tube. This may represent an artifact. The type of  artifact not certain. This may be clinically correlated.     No acute bony abnormality.       Impression:      1. Persistent pulmonary vascular congestion and small bibasal pleural  effusion.  2. Other findings as above.              This report was finalized on 08/17/2022 07:08 by Dr. Deepak Pulliam MD.    XR Chest 1 View [[id number]] Collected: 08/16/22 0705     Updated: 08/16/22 0709    Narrative:      Frontal supine radiograph of the chest 8/16/2022 3:35 AM CDT     History: ventilator; Z99.11-Dependence on respirator (ventilator)  status; J96.20-Acute and chronic respiratory failure, unspecified  whether with hypoxia or hypercapnia     Comparison: 8/15/2022      Findings:   Lines and tubes are stable in position. No new opacities or  pneumothoraces are visualized in the chest. The cardiomediastinal  silhouette and pulmonary vascularity are unchanged.       No acute osseous or soft tissue abnormality is noted.        Impression:      Impression:   1. No significant interval change since previous exam.        This report was finalized on 08/16/2022 07:06 by Dr. Ignacio Perkins MD.    US Renal Bilateral [[id number]] Collected: 08/15/22 1727     Updated: 08/15/22 1731    Narrative:      EXAMINATION: US RENAL BILATERAL-     8/15/2022 3:23 PM CDT     HISTORY: evaluate for hydronephrosis; Z99.11-Dependence on respirator  (ventilator) status; J96.20-Acute and chronic respiratory failure,  unspecified whether with hypoxia or hypercapnia     The ultrasound examination of the kidneys bilaterally is performed.  There is no previous study for comparison.     The right kidney measures 11.5 x 6.1 x 5 cm. There is no evidence of a  mass. No hydronephrosis. There is normal corticomedullary  differentiation.     The left kidney measures 12.4 x 6.6 x 5.3 cm. There is  no evidence of a  mass. No hydronephrosis. A normal corticomedullary differentiation.     The urinary bladder is poorly distended. No obvious intrinsic  abnormality.       Impression:      1. Normal renal sonography.  This report was finalized on 08/15/2022 17:28 by Dr. Deepak Pulliam MD.          Culture:  Blood Culture   Date Value Ref Range Status   08/15/2022 No growth at less than 24 hours  Preliminary   08/15/2022 Abnormal Stain (C)  Preliminary         Assessment   Acute renal failure  Acute hypoxemic respiratory failure which required mechanical ventilation  Candida glabrata sepsis  Pneumocystis pneumonia  Obesity  Rheumatoid arthritis  CAD status post CABG  History of retroperitoneal hematoma  Anemia  Hypotension  Metabolic acidosis  Hypomagnesemia  Hyperphosphatemia     Plan:  Discussed with nursing, family including son  Work-up reviewed to date  Now planning comfort care, no plans for dialysis        Ibrahima Weller MD  8/18/2022  17:16 CDT

## 2022-08-18 NOTE — PROGRESS NOTES
[facility]  INPATIENT PROGRESS NOTE    Patient Name: Teresa Serna  : 1955   MRN: [id number]  Date of Admission: 2022  Today's Date: 22  Length of Stay: 0  592/1    Carson Bill MD   Primary Care Physician: Teresa Contreras MD  Surgical Procedures Since Admission:  Procedure(s):  tracheostomy  laryngoscopy  GASTROSTOMY FEEDING TUBE INSERTION  Surgeon:  Luis Medina Jr., MD  Status:  13 Days Post-Op  -------------------    Subjective   Subjective   Chief Complaint: Tracheostomy management  HPI   Accompanied by: RT  Since last examined, Teresa Serna has remained on mechanical ventilation, trach in position.  She has been no real progress on the ventilator.  She is unable to give any history.  RT reports patient is stable on the ventilator.  She may be requiring dialysis soon.  Apparently, there is a conversation regarding possible comfort measures.  There are no issues with the tracheostomy tube.  Patient seen, chart is reviewed    Review of Systems   No change from prior inquiry  All pertinent negatives and positives are as above. All other systems have been reviewed and are negative unless otherwise stated.   Objective   Objective   Vitals:    Output by Drain (mL) 22 0701 - 22 1900 22 1901 - 22 0700 22 0701 - 22 0827 Range Total   Requested LDAs do not have output data documented.       Physical Exam  Constitutional:       General: She is not in acute distress.     Appearance: She is well-developed. She is obese. She is not ill-appearing.      Interventions: She is intubated.      Comments: Lying in bed, mechanical ventilation, trach in position  Grimaces to stimulation   HENT:      Head: Normocephalic and atraumatic.      Right Ear: External ear normal.      Left Ear: External ear normal.      Nose: Nose normal.      Mouth/Throat:      Lips: Pink.      Mouth: Mucous  membranes are dry.      Dentition: Normal dentition.      Comments: Orally intubated  Eyes:      General: Lids are normal.      Conjunctiva/sclera: Conjunctivae normal.   Neck:      Trachea: Tracheostomy present.      Comments: Neck-very short, very thick    TRACHEOSTOMY SITE:    Tracheostomy tube type: Shiley #8 cuffed DIC Legacy    Stoma: Stable, inferior edge of the stoma with granulation tissue, minimal necrotic debris, dressing removed and replaced    Voice: Not evaluated  Date placed: 8/5/2022  Date last changed: Never    Pulmonary:      Effort: No tachypnea, respiratory distress or retractions. She is intubated.      Comments: Mechanical ventilation, trach in position  Musculoskeletal:      Cervical back: No crepitus. No pain with movement. Decreased range of motion.   Lymphadenopathy:      Cervical: No cervical adenopathy.   Neurological:      Comments: Grimaces   Psychiatric:      Comments: Not evaluated           Result Review    Result Review:  I have personally reviewed the results from the time of this admission to 8/18/2022 08:27 CDT and agree with these findings:  []  Laboratory  []  Microbiology  []  Radiology  []  EKG/Telemetry   []  Cardiology/Vascular   []  Pathology  []  Old records  []  Other:  Most notable findings include: None pertinent to ENT this morning    Assessment & Plan   Assessment / Plan   Brief Patient Summary:  Teresa Serna is a 66 y.o. female who has remained on mechanical ventilation, trach in position.  I will continue current trach care.  There is discussion of comfort measures.  I await final decision by the family.    Active Hospital Problems:   Active Hospital Problems    Diagnosis    • Acute tracheostomy management (HCC)    • Ventilator dependent (HCC)    • Acute on chronic respiratory failure with hypoxia and hypercapnia (HCC)    • Acute respiratory failure with hypoxia (HCC)      Plan:   • Tracheostomy management-patient is stable trach in position I will continue  current tracheostomy management.  • Respiratory failure-the patient remains on mechanical ventilation.  She is followed by the pulmonary service.  • Trach stoma erosion-the patient is improving.  I will continue to follow and continue dressing changes.  Discussed Plan with RT  Following patient as in-patient. Further recommendations will be made based on serial examinations.    Medications/Orders for this encounter: No new medications ordered.  No New orders written.    Discharge Planning: Per primary team        DVT prophylaxis:  Medical DVT prophylaxis orders are present.     Electronically signed by Luis Medina Jr, MD, 08/18/22, 8:27 AM CDT.

## 2022-08-18 NOTE — PROGRESS NOTES
PULMONARY AND CRITICAL CARE PROGRESS NOTE - [facility]  Patient: Teresa Eubanks Day    1955   Acct# [id number]  08/18/22   08:27 CDT  Referring Provider: Carson Bill MD  Chief Complaint: Mechanically ventilated  Interval history: Pt is unable to provide history due to mechanical ventilation.  She is afebrile. She is on no drips. She is on no nutrition. Evans in place. Kidney function worse. RRT recommended by renal yesterday. Awaiting family's decision about proceeding.  White count elevated today.  She is on cefepime and micafungin.  Infectious diseases following.  Sat is 90 on PEEP of 5 and FiO2 0.3.  Peak airway pressure 28.  ABG showing worsening acidosis.  Rate increased to 24.  FiO2 increased to 54 PO2 of 58.  Chest x-ray in a.m. unless she goes comfort today.  Review of Systems:   Cannot obtain due to mechanical ventilated state  Ventilator Settings: Mode: Assist control R 20 Vt/PC: 400 PEEP: 5  FiO2 0.3  Physical Exam:  Vital signs: T: 98.1   BP: 85/34   P: 78   R: 22   sat: 90 end-tidal 26  Physical Exam  Vitals reviewed.   Constitutional:       General: She is not in acute distress.     Appearance: She is ill-appearing. She is not diaphoretic.   HENT:      Head: Normocephalic.      Nose: Nose normal.      Mouth/Throat:      Mouth: Mucous membranes are moist.   Eyes:      General: No scleral icterus.  Neck:      Comments: Trach to vent  Cardiovascular:      Rate and Rhythm: Normal rate and regular rhythm.   Pulmonary:      Effort: Pulmonary effort is normal. No respiratory distress.      Breath sounds: Decreased breath sounds present. No wheezing or rhonchi.   Abdominal:      General: There is no distension.      Comments: PEG with tube feeding infusing   Genitourinary:     Comments: Evans cath  Musculoskeletal:         General: Swelling present.  SCDs     Right lower leg: Edema present.      Left lower leg: Edema present.   Skin:     Coloration: Skin is pale.    Neurological:      Comments: Grimaces to sternal rub     Electronically signed by MINNIE Bautista, 8/18/2022, 08:32 CDT     Physician Substantive Portion: Medical Decision Making:   Results from last 7 days   Lab Units 08/17/22  1103   WBC 10*3/mm3 27.32*   HEMOGLOBIN g/dL 7.4*   PLATELETS 10*3/mm3 340     Results from last 7 days   Lab Units 08/18/22  0418 08/17/22  0340 08/16/22  0438   SODIUM mmol/L 132* 133* 138   POTASSIUM mmol/L 6.0* 5.3* 4.6   BUN mg/dL 107* 95* 86*   CREATININE mg/dL 4.03* 3.49* 3.20*     Results from last 7 days   Lab Units 08/18/22  0359 08/17/22  0359 08/16/22  0325   PH, ARTERIAL pH units 7.280* 7.400 7.377   PCO2, ARTERIAL mm Hg 40.7 30.3* 36.1   PO2 ART mm Hg 58.4* 132.0* 60.9*   FIO2 % 55 35 30     Blood Culture   Date Value Ref Range Status   08/15/2022 No growth at 2 days  Preliminary   08/15/2022 Staphylococcus, coagulase negative (C)  Final   Recent films:  XR Chest 1 View    Result Date: 8/18/2022  1.. Worsening pulmonary edema and increasing effusions. This report was finalized on 08/18/2022 07:13 by Dr. Ignacio Perkins MD.    XR Chest 1 View    Result Date: 8/17/2022  1. Persistent pulmonary vascular congestion and small bibasal pleural effusion. 2. Other findings as above.     This report was finalized on 08/17/2022 07:08 by Dr. Deepak Pulliam MD.    My radiograph interpretation/independent review of imaging: Reviewed and agree with current interpretation    Pulmonary Assessment:  1. Acute hypoxic respiratory failure  2. On mechanically assisted ventilation s/p tracheostomy and PEG tube placement  3. Congestive heart failure with fluid overload  4. Acute kidney injury  5. Encephalopathy  6. Candidemia  7. Gram-negative pneumonia and sepsis improved  8. Unable to wean from the ventilator.  Patient is transitioning to comfort measures today.    Recommend/plan:   · Patient was seen in the follow-up visit in pulmonary rounds in LT[facility] today.  · Her neurologic status is  unchanged and she is not responsive.  · She is unable to do any spontaneous breathing trial and remains ventilator dependent.  · Ventilator changes has been made as discussed above  · Patient was seen and followed by nephrology and ID as well.  · Prolonged critical illness leading to severe neuromuscular weakness with significant neurodeficit  · Patient had worsening respiratory acidosis noted in the arterial blood gas.  · She had multiple organ dysfunction with poor prognosis.  Family conference was done by primary care provider Dr. Bill and family agreed to go for comfort measures this afternoon  · CODE STATUS.  No code.  Patient is going for comfort measures  · Overall prognosis: Guarded.  Care plan discussed with RT and RN  · Patient will be started on comfort measures protocol after the family members arrive.  · She will be taken off the ventilator when family is ready.  Continue current treatment plan till then.  · We appreciate the consult and we will sign off.  Please call us if needed.    This visit was performed by both a physician and an [facility] RN.  I personally evaluated and examined the patient.  I performed all aspects of the medical decision making as documented.    Electronically signed by     Anamaria Levine MD,  Pulmonologist/Intensivist   8/18/2022, 21:31 CDT

## 2022-08-19 ENCOUNTER — APPOINTMENT (OUTPATIENT)
Dept: GENERAL RADIOLOGY | Facility: HOSPITAL | Age: 67
End: 2022-08-19

## 2022-08-19 LAB
ALBUMIN SERPL ELPH-MCNC: 1.6 G/DL (ref 2.9–4.4)
ALBUMIN/GLOB SERPL: 0.5 {RATIO} (ref 0.7–1.7)
ALPHA1 GLOB SERPL ELPH-MCNC: 0.5 G/DL (ref 0–0.4)
ALPHA2 GLOB SERPL ELPH-MCNC: 1.1 G/DL (ref 0.4–1)
B-GLOBULIN SERPL ELPH-MCNC: 0.7 G/DL (ref 0.7–1.3)
CENTROMERE B AB SER-ACNC: <0.2 AI (ref 0–0.9)
CHROMATIN AB SERPL-ACNC: <0.2 AI (ref 0–0.9)
DSDNA AB SER-ACNC: <1 IU/ML (ref 0–9)
ENA JO1 AB SER-ACNC: <0.2 AI (ref 0–0.9)
ENA RNP AB SER-ACNC: 0.9 AI (ref 0–0.9)
ENA SCL70 AB SER-ACNC: <0.2 AI (ref 0–0.9)
ENA SM AB SER-ACNC: <0.2 AI (ref 0–0.9)
ENA SS-A AB SER-ACNC: <0.2 AI (ref 0–0.9)
ENA SS-B AB SER-ACNC: 0.3 AI (ref 0–0.9)
GAMMA GLOB SERPL ELPH-MCNC: 1.2 G/DL (ref 0.4–1.8)
GLOBULIN SER CALC-MCNC: 3.5 G/DL (ref 2.2–3.9)
LABORATORY COMMENT REPORT: ABNORMAL
Lab: NORMAL
M PROTEIN SERPL ELPH-MCNC: 0.2 G/DL
PROT PATTERN SERPL ELPH-IMP: ABNORMAL
PROT SERPL-MCNC: 5.1 G/DL (ref 6–8.5)

## 2022-08-19 NOTE — DISCHARGE SUMMARY
Fly NELSON. JUAN ANTONIO Sneed APRN      Internal Medicine Death Summary    Patient ID: Teresa Serna  MRN: [id number]     Acct:  [id number]       Patient's PCP: Teresa Contreras MD    Admit Date: 7/27/2022     Discharge Date: 8/18/2022      Admitting Physician: Carson Bill MD    Discharge Physician: MINNIE Simpson     Final Diagnoses  Acute hypoxic respiratory failure  DM2 with hyperglycemia  Rheumatoid arthritis  Dysphagia  History of CAD s/p CABG  Morbid obesity  Anxiety  History of retroperitoneal hematoma  UTI  Pseudomonas in sputum  Hypernatremia  Hypokalemia  Fungemia  Funguria      Hospital Problems    Acute respiratory failure with hypoxia (HCC)    Ventilator dependent (HCC)    Acute on chronic respiratory failure with hypoxia and hypercapnia (HCC)    Acute tracheostomy management (HCC)     Past Medical History:   Diagnosis Date    Abnormal stress test     Atrial flutter (HCC)     CAD (coronary artery disease)     CAD in native artery     CABG x 3    COVID-19 vaccine series completed 2021    [id number]    Diverticulosis     Essential hypertension     Tricuspid valve insufficiency, unspecified etiology     History of adenomatous polyp of colon     Hyperlipemia, mixed     Hyperlipidemia     Hypertension     IBS (irritable bowel syndrome)     MI, old     Mitral valve prolapse     Near syncope     Obesity, morbid (HCC)     Palpitations     Paroxysmal SVT (supraventricular tachycardia) (HCC)     Pedal edema     Precordial pain     Rheumatoid arthritis (HCC)     S/P CABG x 3     Type 2 diabetes mellitus (HCC)     Venous insufficiency of both lower extremities          Code Status:    There are no questions and answers to display.       Hospital Course: Teresa Serna is a  66 y.o.  female who presents with need for continued vent weaning and rehabilitation efforts following recent acute care stay. The patient had been in her usual state of health  when she developed increased abdominal pain with nausea and vomiting. She presented to her local ER with her complaints on 7/17. She was found to have evidence of small bowel obstruction and mesenteric mass. She has a recent history of retroperitoneal hematoma in November 2021 at which time she was hospitalized at [facility]. The patient had an NGT placed for decompression with initally large output. NGT output decreased and NGT was discontinued. On 7/19, she developed respiratory distress with hypotension. She was transferred to [facility] for higher level of care. Further workup revealed evidence of intravascular volume depletion. She was treated with IV fluids and required pressor support. The patient had further decline and required intubation with mechanical ventilation on 7/19. The patient was seen in consultation by general surgery who reviewed imaging and felt that the patient had evidence of slowly resolving left side retroperitoneal hematoma without evidence of mass/tumor. The patient was started on empiric antibiotic therapy for pneumonia. Sedation was held for spontaneous breathing trials. Patient was able to follow commands, but could not tolerate CPAP trials and developed tachypnea with low volumes. She transferred to our facility for continued vent weaning efforts.   While here, she was seen in consultation by pulmonology for continued vent weaning. She remained largely unresponsive - only withdrawing from pain. She had episodes of increased anxiety at times - biting ETT and grimacing. She developed fungemia and funguria and was treated with IV antibiotics and IV antifungals. ID was consulted and PICC line was removed. She was unable to liberate from ventilator and was seen in consultation by general surgery and ENT for trach and peg tube placement which were completed 8/5. She underwent bedside bronchoscopy on 8/7 and was found to have mucus plugging of the left mainstem bronchus which was  washed out at the time. She was found to have evidence of pseudomonas in sputum and required levophed for blood pressure support. She developed worsening renal function with electrolyte derangements. Despite medication regimen adjustments, this continued. Nephrology was consulted and recommended renal replacement therapy. As patient continued to decline, family opted to cease further aggressive diagnostic and treatment measures in favor of pursuing comfort/end of life care. The patient succumbed to her many acute and chronic illnesses on 8/18 at 1730.    Consults:  Dr. Weller (nephrology)  Dr. Roberson (ID)  Dr. Medina (ENT)  Dr. Henry (general surgery)  Dr. Estrada (pulmonology)      Time Spent on discharge is  32 minutes in patient examination, evaluation, patient/family counseling as well as medication reconciliation, prescriptions for required medications, discharge plan and follow up.     Electronically signed by MINNIE Simpson on 8/19/2022 at 09:08 CDT     I have discussed the care of Teresa Serna, including pertinent history and exam findings, with the nurse practitioner.    I have seen and examined the patient and the key elements of all parts of the encounter have been performed by me.  I agree with the assessment, plan and orders as documented by MINNIE Garcia, after I modified the exam findings and the plan of treatments and the final version is my approved version of the assessment.        Electronically signed by Carson Bill MD on 8/19/2022 at 23:02 CDT

## 2022-08-20 LAB — BACTERIA SPEC AEROBE CULT: NORMAL

## 2022-08-22 LAB
C-ANCA TITR SER IF: NORMAL TITER
MYELOPEROXIDASE AB SER IA-ACNC: <0.2 UNITS (ref 0–0.9)
P-ANCA ATYPICAL TITR SER IF: NORMAL TITER
P-ANCA TITR SER IF: NORMAL TITER
PROTEINASE3 AB SER IA-ACNC: <0.2 UNITS (ref 0–0.9)

## 2022-09-12 LAB — FUNGUS WND CULT: ABNORMAL

## 2022-09-18 LAB
MYCOBACTERIUM SPEC CULT: NORMAL
NIGHT BLUE STAIN TISS: NORMAL
NIGHT BLUE STAIN TISS: NORMAL
